# Patient Record
Sex: MALE | Race: WHITE | Employment: OTHER | URBAN - NONMETROPOLITAN AREA
[De-identification: names, ages, dates, MRNs, and addresses within clinical notes are randomized per-mention and may not be internally consistent; named-entity substitution may affect disease eponyms.]

---

## 2017-04-14 ENCOUNTER — HOSPITAL ENCOUNTER (OUTPATIENT)
Age: 67
Discharge: HOME | End: 2017-04-14
Payer: COMMERCIAL

## 2017-04-14 ENCOUNTER — HOSPITAL ENCOUNTER (OUTPATIENT)
Dept: GENERAL RADIOLOGY | Age: 67
Discharge: HOME | End: 2017-04-14
Payer: COMMERCIAL

## 2017-04-14 DIAGNOSIS — E55.9 UNSPECIFIED VITAMIN D DEFICIENCY: ICD-10-CM

## 2017-04-14 DIAGNOSIS — E78.5 HYPERLIPIDEMIA: ICD-10-CM

## 2017-04-14 DIAGNOSIS — I25.10 CORONARY ARTERY DISEASE INVOLVING NATIVE CORONARY ARTERY OF NATIVE HEART WITHOUT ANGINA PECTORIS: ICD-10-CM

## 2017-04-14 DIAGNOSIS — Z98.61 H/O CORONARY ANGIOPLASTY: ICD-10-CM

## 2017-04-14 DIAGNOSIS — I10 ESSENTIAL HYPERTENSION: ICD-10-CM

## 2017-04-14 DIAGNOSIS — E11.9 TYPE 2 DIABETES MELLITUS WITHOUT COMPLICATION (HCC): ICD-10-CM

## 2017-04-14 LAB
ABSOLUTE EOS #: 0.2 K/UL (ref 0–0.4)
ABSOLUTE LYMPH #: 1.4 K/UL (ref 0.9–2.5)
ABSOLUTE MONO #: 0.6 K/UL (ref 0–1)
ALBUMIN SERPL-MCNC: 4.1 G/DL (ref 3.5–5.2)
ALBUMIN/GLOBULIN RATIO: ABNORMAL (ref 1–2.5)
ALP BLD-CCNC: 60 U/L (ref 40–129)
ALT SERPL-CCNC: 22 U/L (ref 5–41)
ANION GAP SERPL CALCULATED.3IONS-SCNC: 12 MMOL/L (ref 9–17)
AST SERPL-CCNC: 19 U/L
BASOPHILS # BLD: 0 % (ref 0–2)
BASOPHILS ABSOLUTE: 0 K/UL (ref 0–0.2)
BILIRUB SERPL-MCNC: 0.85 MG/DL (ref 0.3–1.2)
BUN BLDV-MCNC: 21 MG/DL (ref 8–23)
BUN/CREAT BLD: 23 (ref 9–20)
CALCIUM SERPL-MCNC: 8.7 MG/DL (ref 8.6–10.4)
CHLORIDE BLD-SCNC: 103 MMOL/L (ref 98–107)
CHOLESTEROL/HDL RATIO: 4.6
CHOLESTEROL: 128 MG/DL
CO2: 28 MMOL/L (ref 20–31)
CREAT SERPL-MCNC: 0.92 MG/DL (ref 0.7–1.2)
DIFFERENTIAL TYPE: YES
EOSINOPHILS RELATIVE PERCENT: 2 % (ref 0–5)
GFR AFRICAN AMERICAN: >60 ML/MIN
GFR NON-AFRICAN AMERICAN: >60 ML/MIN
GFR SERPL CREATININE-BSD FRML MDRD: ABNORMAL ML/MIN/{1.73_M2}
GFR SERPL CREATININE-BSD FRML MDRD: ABNORMAL ML/MIN/{1.73_M2}
GLUCOSE BLD-MCNC: 170 MG/DL (ref 70–99)
HCT VFR BLD CALC: 41.7 % (ref 41–53)
HDLC SERPL-MCNC: 28 MG/DL
HEMOGLOBIN: 13.8 G/DL (ref 13.5–17.5)
LDL CHOLESTEROL: 62 MG/DL (ref 0–130)
LYMPHOCYTES # BLD: 22 % (ref 13–44)
MCH RBC QN AUTO: 30.4 PG (ref 26–34)
MCHC RBC AUTO-ENTMCNC: 33.1 G/DL (ref 31–37)
MCV RBC AUTO: 91.7 FL (ref 80–100)
MONOCYTES # BLD: 9 % (ref 5–9)
PATIENT FASTING?: YES
PDW BLD-RTO: 13.5 % (ref 12.1–15.2)
PLATELET # BLD: 232 K/UL (ref 140–450)
PLATELET ESTIMATE: NORMAL
PMV BLD AUTO: NORMAL FL (ref 6–12)
POTASSIUM SERPL-SCNC: 4.8 MMOL/L (ref 3.7–5.3)
RBC # BLD: 4.55 M/UL (ref 4.5–5.9)
RBC # BLD: NORMAL 10*6/UL
SEG NEUTROPHILS: 67 % (ref 39–75)
SEGMENTED NEUTROPHILS ABSOLUTE COUNT: 4.3 K/UL (ref 2.1–6.5)
SODIUM BLD-SCNC: 143 MMOL/L (ref 135–144)
TOTAL PROTEIN: 7 G/DL (ref 6.4–8.3)
TRIGL SERPL-MCNC: 188 MG/DL
TSH SERPL DL<=0.05 MIU/L-ACNC: 1.08 MIU/L (ref 0.3–5)
VITAMIN D 25-HYDROXY: 31.9 NG/ML (ref 30–100)
VLDLC SERPL CALC-MCNC: 38 MG/DL (ref 1–30)
WBC # BLD: 6.4 K/UL (ref 3.5–11)
WBC # BLD: NORMAL 10*3/UL

## 2017-04-14 PROCEDURE — 71020 XR CHEST STANDARD TWO VW: CPT

## 2017-04-14 PROCEDURE — 82306 VITAMIN D 25 HYDROXY: CPT

## 2017-04-14 PROCEDURE — 80053 COMPREHEN METABOLIC PANEL: CPT

## 2017-04-14 PROCEDURE — 84443 ASSAY THYROID STIM HORMONE: CPT

## 2017-04-14 PROCEDURE — 36415 COLL VENOUS BLD VENIPUNCTURE: CPT

## 2017-04-14 PROCEDURE — 80061 LIPID PANEL: CPT

## 2017-04-14 PROCEDURE — 93005 ELECTROCARDIOGRAM TRACING: CPT

## 2017-04-14 PROCEDURE — 85025 COMPLETE CBC W/AUTO DIFF WBC: CPT

## 2017-04-17 LAB
EKG ATRIAL RATE: 71 BPM
EKG P AXIS: 20 DEGREES
EKG P-R INTERVAL: 178 MS
EKG Q-T INTERVAL: 402 MS
EKG QRS DURATION: 108 MS
EKG QTC CALCULATION (BAZETT): 436 MS
EKG R AXIS: 70 DEGREES
EKG T AXIS: 79 DEGREES
EKG VENTRICULAR RATE: 71 BPM

## 2017-05-08 ENCOUNTER — OFFICE VISIT (OUTPATIENT)
Dept: CARDIOLOGY CLINIC | Age: 67
End: 2017-05-08
Payer: COMMERCIAL

## 2017-05-08 VITALS
BODY MASS INDEX: 34.45 KG/M2 | OXYGEN SATURATION: 96 % | WEIGHT: 247 LBS | DIASTOLIC BLOOD PRESSURE: 70 MMHG | HEART RATE: 76 BPM | SYSTOLIC BLOOD PRESSURE: 150 MMHG

## 2017-05-08 DIAGNOSIS — Z79.4 TYPE 2 DIABETES MELLITUS WITH MICROALBUMINURIA, WITH LONG-TERM CURRENT USE OF INSULIN (HCC): ICD-10-CM

## 2017-05-08 DIAGNOSIS — Z98.61 H/O CORONARY ANGIOPLASTY: ICD-10-CM

## 2017-05-08 DIAGNOSIS — E11.29 TYPE 2 DIABETES MELLITUS WITH MICROALBUMINURIA, WITH LONG-TERM CURRENT USE OF INSULIN (HCC): ICD-10-CM

## 2017-05-08 DIAGNOSIS — I10 ESSENTIAL HYPERTENSION: Primary | ICD-10-CM

## 2017-05-08 DIAGNOSIS — I25.10 CORONARY ARTERY DISEASE INVOLVING NATIVE CORONARY ARTERY OF NATIVE HEART WITHOUT ANGINA PECTORIS: ICD-10-CM

## 2017-05-08 DIAGNOSIS — R80.9 TYPE 2 DIABETES MELLITUS WITH MICROALBUMINURIA, WITH LONG-TERM CURRENT USE OF INSULIN (HCC): ICD-10-CM

## 2017-05-08 DIAGNOSIS — E78.5 HYPERLIPIDEMIA, UNSPECIFIED HYPERLIPIDEMIA TYPE: ICD-10-CM

## 2017-05-08 DIAGNOSIS — E55.9 VITAMIN D DEFICIENCY DISEASE: ICD-10-CM

## 2017-05-08 PROCEDURE — 93308 TTE F-UP OR LMTD: CPT | Performed by: INTERNAL MEDICINE

## 2017-05-08 PROCEDURE — 99214 OFFICE O/P EST MOD 30 MIN: CPT | Performed by: INTERNAL MEDICINE

## 2017-05-23 RX ORDER — DOXAZOSIN 8 MG/1
TABLET ORAL
Qty: 180 TABLET | Refills: 2 | Status: SHIPPED | OUTPATIENT
Start: 2017-05-23 | End: 2017-09-26 | Stop reason: SDUPTHER

## 2017-07-24 RX ORDER — CELECOXIB 200 MG/1
CAPSULE ORAL
Qty: 180 CAPSULE | Refills: 0 | Status: SHIPPED | OUTPATIENT
Start: 2017-07-24 | End: 2017-09-26 | Stop reason: SDUPTHER

## 2017-07-24 RX ORDER — POTASSIUM CHLORIDE 20 MEQ/1
TABLET, EXTENDED RELEASE ORAL
Qty: 90 TABLET | Refills: 0 | Status: SHIPPED | OUTPATIENT
Start: 2017-07-24 | End: 2017-09-26 | Stop reason: SDUPTHER

## 2017-07-24 RX ORDER — FUROSEMIDE 20 MG/1
TABLET ORAL
Qty: 180 TABLET | Refills: 0 | Status: SHIPPED | OUTPATIENT
Start: 2017-07-24 | End: 2017-09-26 | Stop reason: SDUPTHER

## 2017-07-28 ENCOUNTER — OFFICE VISIT (OUTPATIENT)
Dept: FAMILY MEDICINE CLINIC | Age: 67
End: 2017-07-28
Payer: COMMERCIAL

## 2017-07-28 VITALS
DIASTOLIC BLOOD PRESSURE: 72 MMHG | BODY MASS INDEX: 34.16 KG/M2 | HEART RATE: 70 BPM | SYSTOLIC BLOOD PRESSURE: 140 MMHG | WEIGHT: 244 LBS | HEIGHT: 71 IN | RESPIRATION RATE: 18 BRPM

## 2017-07-28 DIAGNOSIS — R25.2 MUSCLE CRAMP: ICD-10-CM

## 2017-07-28 DIAGNOSIS — E11.42 TYPE 2 DIABETES MELLITUS WITH PERIPHERAL NEUROPATHY (HCC): Primary | ICD-10-CM

## 2017-07-28 DIAGNOSIS — Z79.4 LONG-TERM INSULIN USE (HCC): ICD-10-CM

## 2017-07-28 DIAGNOSIS — E11.42 DIABETIC PERIPHERAL NEUROPATHY (HCC): ICD-10-CM

## 2017-07-28 DIAGNOSIS — L98.9 SKIN LESION: ICD-10-CM

## 2017-07-28 PROCEDURE — 99214 OFFICE O/P EST MOD 30 MIN: CPT | Performed by: FAMILY MEDICINE

## 2017-07-28 ASSESSMENT — ENCOUNTER SYMPTOMS
RESPIRATORY NEGATIVE: 1
GASTROINTESTINAL NEGATIVE: 1
DIARRHEA: 0

## 2017-08-14 RX ORDER — ATORVASTATIN CALCIUM 20 MG/1
TABLET, FILM COATED ORAL
Qty: 90 TABLET | Refills: 1 | Status: SHIPPED | OUTPATIENT
Start: 2017-08-14 | End: 2017-09-26 | Stop reason: SDUPTHER

## 2017-08-15 DIAGNOSIS — R06.02 SOB (SHORTNESS OF BREATH): ICD-10-CM

## 2017-08-15 DIAGNOSIS — R94.31 ABNORMAL EKG: Primary | ICD-10-CM

## 2017-08-22 ENCOUNTER — HOSPITAL ENCOUNTER (OUTPATIENT)
Dept: NUCLEAR MEDICINE | Age: 67
Discharge: HOME | End: 2017-08-22
Payer: COMMERCIAL

## 2017-08-22 ENCOUNTER — HOSPITAL ENCOUNTER (OUTPATIENT)
Dept: NON INVASIVE DIAGNOSTICS | Age: 67
Discharge: HOME | End: 2017-08-22
Payer: COMMERCIAL

## 2017-08-22 VITALS — HEART RATE: 77 BPM | DIASTOLIC BLOOD PRESSURE: 59 MMHG | RESPIRATION RATE: 16 BRPM | SYSTOLIC BLOOD PRESSURE: 142 MMHG

## 2017-08-22 DIAGNOSIS — R06.02 SOB (SHORTNESS OF BREATH): ICD-10-CM

## 2017-08-22 DIAGNOSIS — R94.31 ABNORMAL EKG: ICD-10-CM

## 2017-08-22 DIAGNOSIS — R94.31 EKG ABNORMALITIES: ICD-10-CM

## 2017-08-22 DIAGNOSIS — R07.9 CHEST PAIN, UNSPECIFIED TYPE: ICD-10-CM

## 2017-08-22 LAB
LV EF: 55 %
LVEF MODALITY: NORMAL

## 2017-08-22 PROCEDURE — A9500 TC99M SESTAMIBI: HCPCS | Performed by: INTERNAL MEDICINE

## 2017-08-22 PROCEDURE — 93017 CV STRESS TEST TRACING ONLY: CPT

## 2017-08-22 PROCEDURE — 78452 HT MUSCLE IMAGE SPECT MULT: CPT

## 2017-08-22 PROCEDURE — 93306 TTE W/DOPPLER COMPLETE: CPT

## 2017-08-22 PROCEDURE — 3430000000 HC RX DIAGNOSTIC RADIOPHARMACEUTICAL: Performed by: INTERNAL MEDICINE

## 2017-08-22 PROCEDURE — 6360000002 HC RX W HCPCS: Performed by: INTERNAL MEDICINE

## 2017-08-22 PROCEDURE — 2580000003 HC RX 258: Performed by: INTERNAL MEDICINE

## 2017-08-22 RX ORDER — AMINOPHYLLINE DIHYDRATE 25 MG/ML
50 INJECTION, SOLUTION INTRAVENOUS
Status: DISPENSED | OUTPATIENT
Start: 2017-08-22 | End: 2017-08-22

## 2017-08-22 RX ORDER — 0.9 % SODIUM CHLORIDE 0.9 %
10 VIAL (ML) INJECTION PRN
Status: DISCONTINUED | OUTPATIENT
Start: 2017-08-22 | End: 2017-08-23 | Stop reason: HOSPADM

## 2017-08-22 RX ORDER — AMINOPHYLLINE DIHYDRATE 25 MG/ML
100 INJECTION, SOLUTION INTRAVENOUS
Status: ACTIVE | OUTPATIENT
Start: 2017-08-22 | End: 2017-08-22

## 2017-08-22 RX ADMIN — REGADENOSON 0.4 MG: 0.08 INJECTION, SOLUTION INTRAVENOUS at 08:29

## 2017-08-22 RX ADMIN — Medication 10 ML: at 08:29

## 2017-08-22 RX ADMIN — TETRAKIS(2-METHOXYISOBUTYLISOCYANIDE)COPPER(I) TETRAFLUOROBORATE 10 MILLICURIE: 1 INJECTION, POWDER, LYOPHILIZED, FOR SOLUTION INTRAVENOUS at 07:10

## 2017-08-22 RX ADMIN — TETRAKIS(2-METHOXYISOBUTYLISOCYANIDE)COPPER(I) TETRAFLUOROBORATE 30 MILLICURIE: 1 INJECTION, POWDER, LYOPHILIZED, FOR SOLUTION INTRAVENOUS at 08:29

## 2017-08-24 ENCOUNTER — OFFICE VISIT (OUTPATIENT)
Dept: CARDIOLOGY CLINIC | Age: 67
End: 2017-08-24
Payer: COMMERCIAL

## 2017-08-24 ENCOUNTER — TELEPHONE (OUTPATIENT)
Dept: CARDIOLOGY CLINIC | Age: 67
End: 2017-08-24

## 2017-08-24 VITALS
SYSTOLIC BLOOD PRESSURE: 160 MMHG | HEART RATE: 78 BPM | DIASTOLIC BLOOD PRESSURE: 80 MMHG | WEIGHT: 243 LBS | BODY MASS INDEX: 33.89 KG/M2 | OXYGEN SATURATION: 95 %

## 2017-08-24 DIAGNOSIS — E11.29 TYPE 2 DIABETES MELLITUS WITH MICROALBUMINURIA, WITH LONG-TERM CURRENT USE OF INSULIN (HCC): ICD-10-CM

## 2017-08-24 DIAGNOSIS — I10 ESSENTIAL HYPERTENSION: Primary | ICD-10-CM

## 2017-08-24 DIAGNOSIS — R94.39 ABNORMAL STRESS ECG: ICD-10-CM

## 2017-08-24 DIAGNOSIS — Z98.61 H/O CORONARY ANGIOPLASTY: ICD-10-CM

## 2017-08-24 DIAGNOSIS — N28.9 RENAL INSUFFICIENCY: ICD-10-CM

## 2017-08-24 DIAGNOSIS — I25.10 CORONARY ARTERY DISEASE INVOLVING NATIVE CORONARY ARTERY OF NATIVE HEART WITHOUT ANGINA PECTORIS: ICD-10-CM

## 2017-08-24 DIAGNOSIS — R80.9 TYPE 2 DIABETES MELLITUS WITH MICROALBUMINURIA, WITH LONG-TERM CURRENT USE OF INSULIN (HCC): ICD-10-CM

## 2017-08-24 DIAGNOSIS — I20.8 ANGINA AT REST (HCC): ICD-10-CM

## 2017-08-24 DIAGNOSIS — Z79.4 TYPE 2 DIABETES MELLITUS WITH MICROALBUMINURIA, WITH LONG-TERM CURRENT USE OF INSULIN (HCC): ICD-10-CM

## 2017-08-24 DIAGNOSIS — E78.5 HYPERLIPIDEMIA, UNSPECIFIED HYPERLIPIDEMIA TYPE: ICD-10-CM

## 2017-08-24 PROCEDURE — 99214 OFFICE O/P EST MOD 30 MIN: CPT | Performed by: INTERNAL MEDICINE

## 2017-08-25 ENCOUNTER — HOSPITAL ENCOUNTER (OUTPATIENT)
Age: 67
Discharge: HOME | End: 2017-08-25
Payer: COMMERCIAL

## 2017-08-25 ENCOUNTER — HOSPITAL ENCOUNTER (OUTPATIENT)
Dept: GENERAL RADIOLOGY | Age: 67
Discharge: HOME | End: 2017-08-25
Payer: COMMERCIAL

## 2017-08-25 DIAGNOSIS — R94.39 ABNORMAL STRESS ECG: ICD-10-CM

## 2017-08-25 DIAGNOSIS — I25.10 CORONARY ARTERY DISEASE INVOLVING NATIVE CORONARY ARTERY OF NATIVE HEART WITHOUT ANGINA PECTORIS: ICD-10-CM

## 2017-08-25 DIAGNOSIS — E78.5 HYPERLIPIDEMIA, UNSPECIFIED HYPERLIPIDEMIA TYPE: ICD-10-CM

## 2017-08-25 DIAGNOSIS — Z79.4 TYPE 2 DIABETES MELLITUS WITH MICROALBUMINURIA, WITH LONG-TERM CURRENT USE OF INSULIN (HCC): ICD-10-CM

## 2017-08-25 DIAGNOSIS — R80.9 TYPE 2 DIABETES MELLITUS WITH MICROALBUMINURIA, WITH LONG-TERM CURRENT USE OF INSULIN (HCC): ICD-10-CM

## 2017-08-25 DIAGNOSIS — I10 ESSENTIAL HYPERTENSION: ICD-10-CM

## 2017-08-25 DIAGNOSIS — E11.29 TYPE 2 DIABETES MELLITUS WITH MICROALBUMINURIA, WITH LONG-TERM CURRENT USE OF INSULIN (HCC): ICD-10-CM

## 2017-08-25 DIAGNOSIS — Z98.61 H/O CORONARY ANGIOPLASTY: ICD-10-CM

## 2017-08-25 LAB
ABSOLUTE EOS #: 0.2 K/UL (ref 0–0.4)
ABSOLUTE LYMPH #: 1.4 K/UL (ref 0.9–2.5)
ABSOLUTE MONO #: 0.7 K/UL (ref 0–1)
ALBUMIN SERPL-MCNC: 3.9 G/DL (ref 3.5–5.2)
ALBUMIN/GLOBULIN RATIO: ABNORMAL (ref 1–2.5)
ALP BLD-CCNC: 72 U/L (ref 40–129)
ALT SERPL-CCNC: 25 U/L (ref 5–41)
ANION GAP SERPL CALCULATED.3IONS-SCNC: 13 MMOL/L (ref 9–17)
AST SERPL-CCNC: 20 U/L
BASOPHILS # BLD: 1 %
BASOPHILS ABSOLUTE: 0 K/UL (ref 0–0.2)
BILIRUB SERPL-MCNC: 0.71 MG/DL (ref 0.3–1.2)
BILIRUBIN URINE: NEGATIVE
BUN BLDV-MCNC: 19 MG/DL (ref 8–23)
BUN/CREAT BLD: 22 (ref 9–20)
C-REACTIVE PROTEIN: 15.7 MG/L (ref 0–5)
CALCIUM SERPL-MCNC: 8.4 MG/DL (ref 8.6–10.4)
CHLORIDE BLD-SCNC: 102 MMOL/L (ref 98–107)
CHOLESTEROL/HDL RATIO: 4
CHOLESTEROL: 120 MG/DL
CO2: 25 MMOL/L (ref 20–31)
COLOR: YELLOW
COMMENT UA: ABNORMAL
CREAT SERPL-MCNC: 0.86 MG/DL (ref 0.7–1.2)
DIFFERENTIAL TYPE: YES
EOSINOPHILS RELATIVE PERCENT: 3 %
GFR AFRICAN AMERICAN: >60 ML/MIN
GFR NON-AFRICAN AMERICAN: >60 ML/MIN
GFR SERPL CREATININE-BSD FRML MDRD: ABNORMAL ML/MIN/{1.73_M2}
GFR SERPL CREATININE-BSD FRML MDRD: ABNORMAL ML/MIN/{1.73_M2}
GLUCOSE BLD-MCNC: 208 MG/DL (ref 70–99)
GLUCOSE URINE: ABNORMAL
HCT VFR BLD CALC: 41.2 % (ref 41–53)
HDLC SERPL-MCNC: 30 MG/DL
HEMOGLOBIN: 13.8 G/DL (ref 13.5–17.5)
KETONES, URINE: NEGATIVE
LDL CHOLESTEROL: 55 MG/DL (ref 0–130)
LEUKOCYTE ESTERASE, URINE: NEGATIVE
LYMPHOCYTES # BLD: 21 %
MCH RBC QN AUTO: 30.9 PG (ref 26–34)
MCHC RBC AUTO-ENTMCNC: 33.5 G/DL (ref 31–37)
MCV RBC AUTO: 92.4 FL (ref 80–100)
MONOCYTES # BLD: 10 %
NITRITE, URINE: NEGATIVE
PATIENT FASTING?: YES
PDW BLD-RTO: 13.7 % (ref 12.1–15.2)
PH UA: 6.5 (ref 5–8)
PLATELET # BLD: 239 K/UL (ref 140–450)
PLATELET ESTIMATE: ABNORMAL
PMV BLD AUTO: ABNORMAL FL (ref 6–12)
POTASSIUM SERPL-SCNC: 4.1 MMOL/L (ref 3.7–5.3)
PROTEIN UA: ABNORMAL
RBC # BLD: 4.46 M/UL (ref 4.5–5.9)
RBC # BLD: ABNORMAL 10*6/UL
SEDIMENTATION RATE, ERYTHROCYTE: 27 MM (ref 0–20)
SEG NEUTROPHILS: 65 %
SEGMENTED NEUTROPHILS ABSOLUTE COUNT: 4.6 K/UL (ref 2.1–6.5)
SODIUM BLD-SCNC: 140 MMOL/L (ref 135–144)
SPECIFIC GRAVITY UA: 1.01 (ref 1–1.03)
TOTAL PROTEIN: 7.1 G/DL (ref 6.4–8.3)
TRIGL SERPL-MCNC: 177 MG/DL
TURBIDITY: CLEAR
URINE HGB: NEGATIVE
UROBILINOGEN, URINE: NORMAL
VLDLC SERPL CALC-MCNC: ABNORMAL MG/DL (ref 1–30)
WBC # BLD: 7 K/UL (ref 3.5–11)
WBC # BLD: ABNORMAL 10*3/UL

## 2017-08-25 PROCEDURE — 93005 ELECTROCARDIOGRAM TRACING: CPT

## 2017-08-25 PROCEDURE — 80061 LIPID PANEL: CPT

## 2017-08-25 PROCEDURE — 85651 RBC SED RATE NONAUTOMATED: CPT

## 2017-08-25 PROCEDURE — 86140 C-REACTIVE PROTEIN: CPT

## 2017-08-25 PROCEDURE — 85025 COMPLETE CBC W/AUTO DIFF WBC: CPT

## 2017-08-25 PROCEDURE — 86235 NUCLEAR ANTIGEN ANTIBODY: CPT

## 2017-08-25 PROCEDURE — 86225 DNA ANTIBODY NATIVE: CPT

## 2017-08-25 PROCEDURE — 81003 URINALYSIS AUTO W/O SCOPE: CPT

## 2017-08-25 PROCEDURE — 80053 COMPREHEN METABOLIC PANEL: CPT

## 2017-08-25 PROCEDURE — 36415 COLL VENOUS BLD VENIPUNCTURE: CPT

## 2017-08-25 PROCEDURE — 71020 XR CHEST STANDARD TWO VW: CPT

## 2017-08-28 ENCOUNTER — TELEPHONE (OUTPATIENT)
Dept: FAMILY MEDICINE CLINIC | Age: 67
End: 2017-08-28

## 2017-08-28 LAB
ANA REFERENCE RANGE:: NORMAL
ANTI DNA DOUBLE STRANDED: 8 IU/ML
ANTI JO-1 IGG: 11 U/ML
ANTI RNP AB: 42 U/ML
ANTI SSA: 10 U/ML
ANTI SSB: 13 U/ML
ANTI-CENTROMERE: 5 U/ML
ANTI-SCLERODERMA: 6 U/ML
ANTI-SMITH: 12 U/ML
HISTONE ANTIBODY: 17 U/ML

## 2017-08-28 RX ORDER — AZITHROMYCIN 250 MG/1
TABLET, FILM COATED ORAL
Qty: 1 PACKET | Refills: 0 | Status: SHIPPED | OUTPATIENT
Start: 2017-08-28 | End: 2017-09-07

## 2017-08-29 LAB
EKG ATRIAL RATE: 73 BPM
EKG P AXIS: 30 DEGREES
EKG P-R INTERVAL: 152 MS
EKG Q-T INTERVAL: 406 MS
EKG QRS DURATION: 106 MS
EKG QTC CALCULATION (BAZETT): 447 MS
EKG R AXIS: 74 DEGREES
EKG T AXIS: 58 DEGREES
EKG VENTRICULAR RATE: 73 BPM

## 2017-08-30 ENCOUNTER — PROCEDURE VISIT (OUTPATIENT)
Dept: CARDIOLOGY CLINIC | Age: 67
End: 2017-08-30

## 2017-08-30 DIAGNOSIS — Z98.61 POST PTCA: Primary | ICD-10-CM

## 2017-08-31 ENCOUNTER — TELEPHONE (OUTPATIENT)
Dept: CARDIOLOGY CLINIC | Age: 67
End: 2017-08-31

## 2017-08-31 RX ORDER — CLOPIDOGREL BISULFATE 75 MG/1
75 TABLET ORAL DAILY
Qty: 90 TABLET | Refills: 3 | Status: SHIPPED | OUTPATIENT
Start: 2017-08-31 | End: 2017-09-26 | Stop reason: SDUPTHER

## 2017-09-01 ENCOUNTER — HOSPITAL ENCOUNTER (OUTPATIENT)
Age: 67
Discharge: HOME | End: 2017-09-01
Payer: MEDICARE

## 2017-09-01 ENCOUNTER — TELEPHONE (OUTPATIENT)
Dept: CARDIOLOGY CLINIC | Age: 67
End: 2017-09-01

## 2017-09-01 DIAGNOSIS — E78.5 HYPERLIPIDEMIA, UNSPECIFIED HYPERLIPIDEMIA TYPE: ICD-10-CM

## 2017-09-01 DIAGNOSIS — R80.9 TYPE 2 DIABETES MELLITUS WITH MICROALBUMINURIA, WITH LONG-TERM CURRENT USE OF INSULIN (HCC): ICD-10-CM

## 2017-09-01 DIAGNOSIS — Z79.4 TYPE 2 DIABETES MELLITUS WITH MICROALBUMINURIA, WITH LONG-TERM CURRENT USE OF INSULIN (HCC): ICD-10-CM

## 2017-09-01 DIAGNOSIS — Z98.61 H/O CORONARY ANGIOPLASTY: ICD-10-CM

## 2017-09-01 DIAGNOSIS — I10 ESSENTIAL HYPERTENSION: ICD-10-CM

## 2017-09-01 DIAGNOSIS — R94.39 ABNORMAL STRESS ECG: ICD-10-CM

## 2017-09-01 DIAGNOSIS — E11.29 TYPE 2 DIABETES MELLITUS WITH MICROALBUMINURIA, WITH LONG-TERM CURRENT USE OF INSULIN (HCC): ICD-10-CM

## 2017-09-01 DIAGNOSIS — N28.9 RENAL INSUFFICIENCY: ICD-10-CM

## 2017-09-01 DIAGNOSIS — I25.10 CORONARY ARTERY DISEASE INVOLVING NATIVE CORONARY ARTERY OF NATIVE HEART WITHOUT ANGINA PECTORIS: ICD-10-CM

## 2017-09-01 LAB
ANION GAP SERPL CALCULATED.3IONS-SCNC: 13 MMOL/L (ref 9–17)
BUN BLDV-MCNC: 24 MG/DL (ref 8–23)
BUN/CREAT BLD: 25 (ref 9–20)
CALCIUM SERPL-MCNC: 8.6 MG/DL (ref 8.6–10.4)
CHLORIDE BLD-SCNC: 102 MMOL/L (ref 98–107)
CO2: 24 MMOL/L (ref 20–31)
CREAT SERPL-MCNC: 0.95 MG/DL (ref 0.7–1.2)
GFR AFRICAN AMERICAN: >60 ML/MIN
GFR NON-AFRICAN AMERICAN: >60 ML/MIN
GFR SERPL CREATININE-BSD FRML MDRD: ABNORMAL ML/MIN/{1.73_M2}
GFR SERPL CREATININE-BSD FRML MDRD: ABNORMAL ML/MIN/{1.73_M2}
GLUCOSE BLD-MCNC: 409 MG/DL (ref 70–99)
POTASSIUM SERPL-SCNC: 4.4 MMOL/L (ref 3.7–5.3)
SODIUM BLD-SCNC: 139 MMOL/L (ref 135–144)

## 2017-09-01 PROCEDURE — 36415 COLL VENOUS BLD VENIPUNCTURE: CPT

## 2017-09-01 PROCEDURE — 80048 BASIC METABOLIC PNL TOTAL CA: CPT

## 2017-09-08 ENCOUNTER — HOSPITAL ENCOUNTER (OUTPATIENT)
Dept: CARDIAC REHAB | Age: 67
Setting detail: THERAPIES SERIES
Discharge: HOME | End: 2017-09-08
Payer: MEDICARE

## 2017-09-08 VITALS — HEIGHT: 71 IN | BODY MASS INDEX: 33.71 KG/M2 | WEIGHT: 240.8 LBS

## 2017-09-11 ENCOUNTER — HOSPITAL ENCOUNTER (OUTPATIENT)
Dept: CARDIAC REHAB | Age: 67
Setting detail: THERAPIES SERIES
Discharge: HOME | End: 2017-09-11
Payer: MEDICARE

## 2017-09-11 PROCEDURE — 93798 PHYS/QHP OP CAR RHAB W/ECG: CPT

## 2017-09-12 ENCOUNTER — HOSPITAL ENCOUNTER (OUTPATIENT)
Dept: CARDIAC REHAB | Age: 67
Setting detail: THERAPIES SERIES
Discharge: HOME | End: 2017-09-12
Payer: MEDICARE

## 2017-09-12 PROCEDURE — 93798 PHYS/QHP OP CAR RHAB W/ECG: CPT

## 2017-09-14 ENCOUNTER — HOSPITAL ENCOUNTER (OUTPATIENT)
Dept: CARDIAC REHAB | Age: 67
Setting detail: THERAPIES SERIES
Discharge: HOME | End: 2017-09-14
Payer: MEDICARE

## 2017-09-14 PROCEDURE — 93798 PHYS/QHP OP CAR RHAB W/ECG: CPT

## 2017-09-18 ENCOUNTER — HOSPITAL ENCOUNTER (OUTPATIENT)
Dept: CARDIAC REHAB | Age: 67
Setting detail: THERAPIES SERIES
Discharge: HOME | End: 2017-09-18
Payer: MEDICARE

## 2017-09-18 PROCEDURE — 93798 PHYS/QHP OP CAR RHAB W/ECG: CPT

## 2017-09-19 ENCOUNTER — HOSPITAL ENCOUNTER (OUTPATIENT)
Dept: CARDIAC REHAB | Age: 67
Setting detail: THERAPIES SERIES
Discharge: HOME | End: 2017-09-19
Payer: MEDICARE

## 2017-09-19 PROCEDURE — 93798 PHYS/QHP OP CAR RHAB W/ECG: CPT

## 2017-09-21 ENCOUNTER — HOSPITAL ENCOUNTER (OUTPATIENT)
Dept: CARDIAC REHAB | Age: 67
Setting detail: THERAPIES SERIES
Discharge: HOME | End: 2017-09-21
Payer: MEDICARE

## 2017-09-21 PROCEDURE — 93798 PHYS/QHP OP CAR RHAB W/ECG: CPT

## 2017-09-25 ENCOUNTER — HOSPITAL ENCOUNTER (OUTPATIENT)
Dept: CARDIAC REHAB | Age: 67
Setting detail: THERAPIES SERIES
Discharge: HOME | End: 2017-09-25
Payer: MEDICARE

## 2017-09-25 PROCEDURE — 93798 PHYS/QHP OP CAR RHAB W/ECG: CPT

## 2017-09-26 ENCOUNTER — HOSPITAL ENCOUNTER (OUTPATIENT)
Dept: CARDIAC REHAB | Age: 67
Setting detail: THERAPIES SERIES
Discharge: HOME | End: 2017-09-26
Payer: MEDICARE

## 2017-09-26 ENCOUNTER — TELEPHONE (OUTPATIENT)
Dept: FAMILY MEDICINE CLINIC | Age: 67
End: 2017-09-26

## 2017-09-26 PROCEDURE — 93798 PHYS/QHP OP CAR RHAB W/ECG: CPT

## 2017-09-26 RX ORDER — CLONIDINE HYDROCHLORIDE 0.3 MG/1
TABLET ORAL
Qty: 270 TABLET | Refills: 3 | Status: SHIPPED | OUTPATIENT
Start: 2017-09-26 | End: 2018-04-10 | Stop reason: CLARIF

## 2017-09-26 RX ORDER — POTASSIUM CHLORIDE 20 MEQ/1
TABLET, EXTENDED RELEASE ORAL
Qty: 90 TABLET | Refills: 1 | Status: SHIPPED | OUTPATIENT
Start: 2017-09-26 | End: 2018-05-01 | Stop reason: ALTCHOICE

## 2017-09-26 RX ORDER — ATORVASTATIN CALCIUM 20 MG/1
TABLET, FILM COATED ORAL
Qty: 90 TABLET | Refills: 1 | Status: SHIPPED | OUTPATIENT
Start: 2017-09-26 | End: 2018-04-10 | Stop reason: SDUPTHER

## 2017-09-26 RX ORDER — CELECOXIB 200 MG/1
CAPSULE ORAL
Qty: 180 CAPSULE | Refills: 1 | Status: SHIPPED | OUTPATIENT
Start: 2017-09-26 | End: 2018-04-10 | Stop reason: CLARIF

## 2017-09-26 RX ORDER — FUROSEMIDE 20 MG/1
TABLET ORAL
Qty: 180 TABLET | Refills: 1 | Status: SHIPPED | OUTPATIENT
Start: 2017-09-26 | End: 2017-11-28 | Stop reason: DRUGHIGH

## 2017-09-26 RX ORDER — CLOPIDOGREL BISULFATE 75 MG/1
75 TABLET ORAL DAILY
Qty: 90 TABLET | Refills: 3 | Status: SHIPPED | OUTPATIENT
Start: 2017-09-26 | End: 2018-10-16 | Stop reason: SDUPTHER

## 2017-09-26 RX ORDER — DOXAZOSIN 8 MG/1
TABLET ORAL
Qty: 180 TABLET | Refills: 3 | Status: SHIPPED | OUTPATIENT
Start: 2017-09-26 | End: 2018-08-21 | Stop reason: SDUPTHER

## 2017-09-28 ENCOUNTER — HOSPITAL ENCOUNTER (OUTPATIENT)
Dept: CARDIAC REHAB | Age: 67
Setting detail: THERAPIES SERIES
Discharge: HOME | End: 2017-09-28
Payer: MEDICARE

## 2017-09-28 PROCEDURE — 93798 PHYS/QHP OP CAR RHAB W/ECG: CPT

## 2017-09-29 RX ORDER — DOXAZOSIN MESYLATE 4 MG/1
8 TABLET ORAL 2 TIMES DAILY
Qty: 360 TABLET | Refills: 3 | Status: SHIPPED | OUTPATIENT
Start: 2017-09-29 | End: 2017-10-09 | Stop reason: SDUPTHER

## 2017-10-02 ENCOUNTER — HOSPITAL ENCOUNTER (OUTPATIENT)
Dept: CARDIAC REHAB | Age: 67
Setting detail: THERAPIES SERIES
Discharge: HOME | End: 2017-10-02
Payer: MEDICARE

## 2017-10-02 PROCEDURE — 93798 PHYS/QHP OP CAR RHAB W/ECG: CPT

## 2017-10-03 ENCOUNTER — HOSPITAL ENCOUNTER (OUTPATIENT)
Dept: CARDIAC REHAB | Age: 67
Setting detail: THERAPIES SERIES
Discharge: HOME | End: 2017-10-03
Payer: MEDICARE

## 2017-10-03 PROCEDURE — 93798 PHYS/QHP OP CAR RHAB W/ECG: CPT

## 2017-10-05 ENCOUNTER — HOSPITAL ENCOUNTER (OUTPATIENT)
Dept: CARDIAC REHAB | Age: 67
Setting detail: THERAPIES SERIES
Discharge: HOME | End: 2017-10-05
Payer: MEDICARE

## 2017-10-05 PROCEDURE — 93798 PHYS/QHP OP CAR RHAB W/ECG: CPT

## 2017-10-06 NOTE — PROGRESS NOTES
Phase II Cardiac Rehab Individualized Treatment Plan-30 Day     Patient Name: Cori Peres  Date of Initial Assessment: 9/8/17  ACCOUNT #: [de-identified]  Diagnosis: Percutaneous Coronary Intervention  Onset Date: 8/30/17  Referring Physician: Anthony Mcfarlane MD  Risk Stratification: High  Session Number: 12   EXERCISE    Stages of Change:   [] pre-contemplation   [x] Action   [] Contemplate   [] Maintainence   [] Prep   [] Relapse          Exercise Prescription:  Mode: [x] TM [] B [x] STP [x] UBE [x] R  Frequency: 3 DAYS PER WEEK  Duration: 31-60 MIN  Intensity: RPE 11-15 / THRR   Progression: Increase 1-2 levels/week or 1-2 min/week to achieve target HR and RPE 11-15.      [] Angina with Exertion           THR:    [x] Resistance Training            Weight (70% OF 1 RM):                                         Reps: 8-15 REPS. [] Angina with Exertion THR:    [x] Resistance Training            Weight (70% OF 1 RM):                                         Reps: 8-15 REPS    Hypertension:  [x] Yes  [] No  Resting BP: 152/62  Peak Exercise BP: 166/62  [] Med change? NO    Intervention:  Home Exercise:  Type: TM AND STATIONARY ERGOMETER  Duration: 30-40 MIN  Frequency: 2-3 DAYS PER WEEK   [x] Resistance Training BLUE RESISTIVE BANDS    Education:   [x] Equipment Hardy  [x] Self pulse   [x] Proper use weights/therabands   [x] S/S to report  [x] Low Na Diet    [x] Warm up/ Cool down  [x] BP Medication    [x] RPE Scale   [x] Understand BP   [x] Ex Safety   [x] Exercise specialist class-Home Exercise       Target Goal:   -Individual Exercise Plan  -BP<140/90 or <130/80 if DM   -Aerobic active 30 + minutes 5-7 days per week    Nutrition    Stages of Change:   [] pre-contemplation   [x] Action   [] Contemplate   [] Maintainence   [] Prep   [] Relapse    Lipids: 5/8/17  Total Cholesterol: 120  Triglycerides: 177  HDL: 30  LDL: 55  [] Med Change?  NO    Diabetes:  [x] Yes  [] No  Random BS: 246  HbA1c:6.6 ON RISK  Assistive Device:   [] Cane  [] Maryette Leghorn [] Wheel Chair  [] Gait belt    Patient/Program goal:   \"To decrease shortness of breath when walking and exerting physically and to improve my balance\"  / PROGRESSING TOWARD GOAL  INCREASE HDL TO AT LEAST 40 ON NEXT 3-MONTH LIPID PANEL / Future lipid panel ordered per Dr. Siobhan Lyons  -increased stamina/strength to 30-50 total exercise by increasing 1-2 level/wk and 1-2 / AVERAGE CARDIO FUNCTIONAL CAPACITY HAS ADVANCED FROM 4 METS TO 4.4 METS, A 10% INCREASE AND TOLERATING 50 MINUTES TOTAL CARDIO EXERCISE WITHIN INTENSITY TARGETS   min/wk  to achieve THR and RPE 11-15  -introduce weights/ therabands 8-10# for 8-15 reps / UNIVERSAL WT MACHINE FOR ARNEL UE INITIATED ON 10/5/17 WITH AVG WT 30 LB  -manage BP better / NOT AT GOAL--DR. ULLOA NOTIFIED  -MANAGE BP BETTER / NOT AT GOAL-DR. GILES NOTIFIED  -develop regular exercise 30 min daily  LOSS 5-10 LB IN 2-3 MONTHS WITH CONTROLLED NUTRITION INTAKE AND REGULAR EXERCISE OF AVG >3.5 HR MOD LEVEL PER WEEK / BODY WT HAS INCREASED 5 LB--DIETICIAN CONSULT RECOMMENDED TO PT    Physician Changes/Comments:      Cardiac Rehab Staff:  Sierra Muller RN

## 2017-10-09 ENCOUNTER — HOSPITAL ENCOUNTER (OUTPATIENT)
Dept: CARDIAC REHAB | Age: 67
Setting detail: THERAPIES SERIES
Discharge: HOME | End: 2017-10-09
Payer: MEDICARE

## 2017-10-09 PROCEDURE — 93798 PHYS/QHP OP CAR RHAB W/ECG: CPT

## 2017-10-09 RX ORDER — DOXAZOSIN MESYLATE 4 MG/1
8 TABLET ORAL 2 TIMES DAILY
Qty: 360 TABLET | Refills: 3 | Status: SHIPPED | OUTPATIENT
Start: 2017-10-09 | End: 2017-10-31 | Stop reason: SDUPTHER

## 2017-10-10 ENCOUNTER — HOSPITAL ENCOUNTER (OUTPATIENT)
Dept: CARDIAC REHAB | Age: 67
Setting detail: THERAPIES SERIES
Discharge: HOME | End: 2017-10-10
Payer: MEDICARE

## 2017-10-10 ENCOUNTER — OFFICE VISIT (OUTPATIENT)
Dept: CARDIOLOGY CLINIC | Age: 67
End: 2017-10-10
Payer: MEDICARE

## 2017-10-10 VITALS
BODY MASS INDEX: 32.36 KG/M2 | SYSTOLIC BLOOD PRESSURE: 130 MMHG | OXYGEN SATURATION: 96 % | DIASTOLIC BLOOD PRESSURE: 60 MMHG | HEART RATE: 83 BPM | WEIGHT: 232 LBS

## 2017-10-10 DIAGNOSIS — E11.29 TYPE 2 DIABETES MELLITUS WITH MICROALBUMINURIA, WITH LONG-TERM CURRENT USE OF INSULIN (HCC): ICD-10-CM

## 2017-10-10 DIAGNOSIS — E78.5 HYPERLIPIDEMIA, UNSPECIFIED HYPERLIPIDEMIA TYPE: ICD-10-CM

## 2017-10-10 DIAGNOSIS — R80.9 TYPE 2 DIABETES MELLITUS WITH MICROALBUMINURIA, WITH LONG-TERM CURRENT USE OF INSULIN (HCC): ICD-10-CM

## 2017-10-10 DIAGNOSIS — Z79.4 TYPE 2 DIABETES MELLITUS WITH MICROALBUMINURIA, WITH LONG-TERM CURRENT USE OF INSULIN (HCC): ICD-10-CM

## 2017-10-10 DIAGNOSIS — G47.33 OSA (OBSTRUCTIVE SLEEP APNEA): Primary | ICD-10-CM

## 2017-10-10 DIAGNOSIS — R53.83 FATIGUE, UNSPECIFIED TYPE: ICD-10-CM

## 2017-10-10 DIAGNOSIS — R06.02 SOB (SHORTNESS OF BREATH): ICD-10-CM

## 2017-10-10 DIAGNOSIS — I10 ESSENTIAL HYPERTENSION: ICD-10-CM

## 2017-10-10 DIAGNOSIS — I25.10 CORONARY ARTERY DISEASE INVOLVING NATIVE CORONARY ARTERY OF NATIVE HEART WITHOUT ANGINA PECTORIS: ICD-10-CM

## 2017-10-10 PROCEDURE — 4040F PNEUMOC VAC/ADMIN/RCVD: CPT | Performed by: INTERNAL MEDICINE

## 2017-10-10 PROCEDURE — 1123F ACP DISCUSS/DSCN MKR DOCD: CPT | Performed by: INTERNAL MEDICINE

## 2017-10-10 PROCEDURE — 93798 PHYS/QHP OP CAR RHAB W/ECG: CPT

## 2017-10-10 PROCEDURE — G8484 FLU IMMUNIZE NO ADMIN: HCPCS | Performed by: INTERNAL MEDICINE

## 2017-10-10 PROCEDURE — G8598 ASA/ANTIPLAT THER USED: HCPCS | Performed by: INTERNAL MEDICINE

## 2017-10-10 PROCEDURE — 3046F HEMOGLOBIN A1C LEVEL >9.0%: CPT | Performed by: INTERNAL MEDICINE

## 2017-10-10 PROCEDURE — 3017F COLORECTAL CA SCREEN DOC REV: CPT | Performed by: INTERNAL MEDICINE

## 2017-10-10 PROCEDURE — G8427 DOCREV CUR MEDS BY ELIG CLIN: HCPCS | Performed by: INTERNAL MEDICINE

## 2017-10-10 PROCEDURE — 99214 OFFICE O/P EST MOD 30 MIN: CPT | Performed by: INTERNAL MEDICINE

## 2017-10-10 PROCEDURE — G8417 CALC BMI ABV UP PARAM F/U: HCPCS | Performed by: INTERNAL MEDICINE

## 2017-10-10 PROCEDURE — 1036F TOBACCO NON-USER: CPT | Performed by: INTERNAL MEDICINE

## 2017-10-10 RX ORDER — LISINOPRIL 20 MG/1
TABLET ORAL
Qty: 1 TABLET | Refills: 0 | Status: SHIPPED | COMMUNITY
Start: 2017-10-10 | End: 2018-05-31 | Stop reason: ALTCHOICE

## 2017-10-10 NOTE — PROGRESS NOTES
Ov Dr. Zuri Rock for follow up  C/o tired  Currently in rehab  Wearing cpap  Has never been checked/updated  Today is a face to face ov   For cpap supplies and   Machine update/check   He feels better wearing  cpap   Doesn't feel any different  Since ptca   Will be going to [facility] end of Nov    Will get PFT done   Will decide after PFT if CT Chest is needed  Will reorder a sleep study

## 2017-10-12 ENCOUNTER — HOSPITAL ENCOUNTER (OUTPATIENT)
Dept: CARDIAC REHAB | Age: 67
Setting detail: THERAPIES SERIES
Discharge: HOME | End: 2017-10-12
Payer: MEDICARE

## 2017-10-12 PROCEDURE — 93798 PHYS/QHP OP CAR RHAB W/ECG: CPT

## 2017-10-16 ENCOUNTER — HOSPITAL ENCOUNTER (OUTPATIENT)
Dept: PULMONOLOGY | Age: 67
Discharge: HOME | End: 2017-10-16
Payer: MEDICARE

## 2017-10-16 ENCOUNTER — HOSPITAL ENCOUNTER (OUTPATIENT)
Dept: CARDIAC REHAB | Age: 67
Setting detail: THERAPIES SERIES
Discharge: HOME | End: 2017-10-16
Payer: MEDICARE

## 2017-10-16 VITALS — OXYGEN SATURATION: 97 %

## 2017-10-16 PROCEDURE — 94729 DIFFUSING CAPACITY: CPT

## 2017-10-16 PROCEDURE — 6360000002 HC RX W HCPCS: Performed by: INTERNAL MEDICINE

## 2017-10-16 PROCEDURE — 94060 EVALUATION OF WHEEZING: CPT

## 2017-10-16 PROCEDURE — 94729 DIFFUSING CAPACITY: CPT | Performed by: INTERNAL MEDICINE

## 2017-10-16 PROCEDURE — 94060 EVALUATION OF WHEEZING: CPT | Performed by: INTERNAL MEDICINE

## 2017-10-16 PROCEDURE — 93798 PHYS/QHP OP CAR RHAB W/ECG: CPT

## 2017-10-16 PROCEDURE — 94726 PLETHYSMOGRAPHY LUNG VOLUMES: CPT

## 2017-10-16 RX ORDER — ALBUTEROL SULFATE 2.5 MG/3ML
2.5 SOLUTION RESPIRATORY (INHALATION) ONCE
Status: COMPLETED | OUTPATIENT
Start: 2017-10-16 | End: 2017-10-16

## 2017-10-16 RX ADMIN — ALBUTEROL SULFATE 2.5 MG: 2.5 SOLUTION RESPIRATORY (INHALATION) at 12:51

## 2017-10-17 ENCOUNTER — HOSPITAL ENCOUNTER (OUTPATIENT)
Dept: CARDIAC REHAB | Age: 67
Setting detail: THERAPIES SERIES
Discharge: HOME | End: 2017-10-17
Payer: MEDICARE

## 2017-10-17 NOTE — PROGRESS NOTES
Zina Vu M.D. [address] [place] [address]  [phone]          October 10, 2017            Victorina Dong DO  [address] [address]      RE:  [facility]  : 1950    Dear Dr. Fuad Ty:    CHIEF COMPLAINT:  1. Shortness of breath and fatigue. 2.  Severe coronary artery disease. 3.  Status post angioplasty and stent placement in a 99% lesion in the mid-portion of a very large right coronary artery, done on 2017. HISTORY OF PRESENT ILLNESS:  I had the pleasure of seeing Mr. Lovella Merlin in our office on 10/10/2017. As you well know, from the full H and P on 2017, he has severe coronary artery disease and had open heart surgery by Dr. Jacky Wright in  with a LIMA to the LAD and a vein graft to the diagonal.    He developed marked fatigue, which worsened in  and July with marked shortness of breath. I did a stress test on 2017, which was abnormal, showing a large defect in the inferolateral region. This prompted a cardiac catheterization on 2017. This showed a patent LIMA to the LAD and a patent vein graft to a very large diagonal.  The circumflex was unremarkable. His right coronary artery was extremely large and dominant, filled much of the inferolateral wall and had a 95% to 99% lesion in its midportion. EF was 50%. We dilated and stented his right coronary artery using a 3.5 x 18 mm drug-eluting Xience stent with a good end result. I am bringing him back for re-evaluation. Unfortunately, he has not noticed much difference in his shortness of breath. He has had no chest pain and he is in cardiac rehab. He is doing fairly well in cardiac rehab, but he complains of marked fatigue and shortness of breath at home when he attempts to do activity. Again, it has not really changed since his angioplasty. He has had no PND or orthopnea. No unusual pedal edema.   No No hepatomegaly, splenomegaly. EXTREMITIES:  Good femoral pulses. Good pedal pulses. No pedal edema. Skin was warm and dry. No calf tenderness. Nail beds pink. Good cap refill. PULSES:  Bilateral symmetrical radial, brachial and carotid pulses. No carotid bruits. Good femoral and pedal pulses. NEUROLOGIC EXAM:  Within normal limits. PSYCHIATRIC EXAM:  Within normal limits. LABORATORY DATA:  I did not repeat laboratory data. IMPRESSION:   1. Continued marked fatigue and shortness of breath with activity. 2.  Severe coronary artery disease. 3.  Status post cardiac catheterization on 08/30/2017, which demonstrated a patent vein graft to the very large diagonal and a patent LIMA to the LAD, with unremarkable circumflex and 95% to 99% disease in extremely large dominant right coronary artery, which was stented with a 3.5 x 18 mm Xience stent, with EF of 50% with mild inferior wall hypokinesis. 4.  Severe sleep apnea, on a CPAP mask since 2011, which has not been checked since that time. 5.  Hypertension, well controlled. 6.  Open heart surgery on 06/03/2014, by Dr. Kaz Green with a LIMA to the LAD and a vein graft to the diagonal.  7.  Insulin-dependent diabetes. 8.  Borderline normal LV function with inferior wall hypokinesis, with EF of 50%. PLAN:  1.  Pulmonary function tests. 2.  Recheck CPAP machine to see if it is still effective. 3.  We will determine, while he is in the rehab, whether he has an appropriate chronotropic response with exercise, which could be contributing to his shortness of breath. 4.  Possible CT scan of the chest dependent on the results of the PFTs. 5.  We will re-order sleep study. 6.  May need to decrease his clonidine if he has an insufficient chronotropic response contributing to his shortness of breath with activity. DISCUSSION:  Mr. Ryann Piper has had no change in his overall loss of energy and his fatigue and shortness of breath with activity.   This is disappointing and somewhat surprising as his right coronary artery was extremely large and dominant and had a severe 95% to 99% lesion in the midportion, with much myocardium at risk for damage from a myocardial infarction. I had a long discussion with Mr. Austen Fall and Mrs. Austen Fall. Obviously, his shortness of breath was not exclusively secondary to his coronary artery disease. Thus, we will start evaluation of his pulmonary status. He does have severe sleep apnea, has not had his machine checked and we will re-order a sleep study. He did have wheezing today and again was somewhat short of breath with minimal activity. We did check an O2 saturation and he remains in the 95% range with walking, even though he is short of breath. I will do pulmonary function tests to determine his lung status. I will decide after his pulmonary function tests whether we do a CT scan of his chest with contrast.  I did not order this now as his O2 saturation was okay with activity, although a chronic PE is still a consideration. Concerned also whether he has insufficient chronotropic response to activity contributing to his shortness of breath with activity. I will have Logan pay special attention to his heart rate when he is on the treadmill. Again, I may need to decrease his clonidine to increase his chronotropic response. We will do the above tests and I will go over the results and reevaluate on 11/17/2017. Of note, after his first angioplasty and after his open heart surgery, it took him several months for his shortness of breath to resolve. Thus, he will continue in cardiac rehab. Thank you very much for allowing me the privilege of seeing Mr. Austen Fall. If you have any questions on my thoughts, please do not hesitate to contact me.     Sincerely,        Cam Pallas    D: 10/11/2017 5:58:51       T: 10/11/2017 6:09:28     [id number]  Job#: [id number]     Doc#: [id number]

## 2017-10-18 ENCOUNTER — HOSPITAL ENCOUNTER (OUTPATIENT)
Dept: CARDIAC REHAB | Age: 67
Setting detail: THERAPIES SERIES
Discharge: HOME | End: 2017-10-18
Payer: MEDICARE

## 2017-10-18 PROCEDURE — 93798 PHYS/QHP OP CAR RHAB W/ECG: CPT

## 2017-10-18 NOTE — PROCEDURES
[address]                            [address]                              PULMONARY FUNCTION    PATIENT NAME: Manuel Yadav                     :             1950  MED REC NO:   [id number]                               ROOM:  ACCOUNT NO:   [de-identified]                            ADMISSION DATE:  10/16/2017  PROVIDER:     Matthias Colon    DATE OF PROCEDURE:  10/16/2017    PULMONARY FUNCTION TEST WITH BRONCHODILATOR AND DLCO    ATTENDING PHYSICIAN:  Dr. Corina Urrutia    ORDERING PROVIDER:  Amadeo Benitez    REASON FOR TEST:  Shortness of breath. SUMMARY:  1. The respiratory therapist reports the patient's effort has been  good, but requiring several attempts to get a good study. 2.  Forced vital capacity is decreased at 77% of predicted. 3.  The forced expiratory volume in 1 second is decreased at 76% of  predicted. 4.  The forced expiratory flow 25% to 75% is decreased to 70% of  predicted. 5.  A significant improvement was seen only in the forced expiratory  flow of 25% to 75% after one time dose of bronchodilator. 6.  Total lung capacity is decreased at 78% of predicted. 7.  The DLCO was mildly decreased at 78% of predicted. IMPRESSION:  1.  Pulmonary function test results suggest a combined mild  obstructive and mild restrictive lung disease. 2.  The obstructive component appears to have a reversible small  airway component (asthma), as seen by an improvement in the forced  expiratory flow of 25% to 75% after one time dose of bronchodilator,  please correlate clinically. 3.  The restrictive component appears to be extrapulmonary with the  expiratory reserve volume being decreased out of proportion to the  inspiratory capacity. This is likely secondary to obesity, please  correlate clinically. 4.  The DLCO was mildly decreased.       LIANA COLON    D: 10/17/2017 7:00:28       T: 10/17/2017 15:03:20 [id number]  Job#: [id number]     Doc#: [id number]

## 2017-10-20 ENCOUNTER — HOSPITAL ENCOUNTER (OUTPATIENT)
Dept: CARDIAC REHAB | Age: 67
Setting detail: THERAPIES SERIES
Discharge: HOME | End: 2017-10-20
Payer: MEDICARE

## 2017-10-20 DIAGNOSIS — G47.33 OSA (OBSTRUCTIVE SLEEP APNEA): ICD-10-CM

## 2017-10-20 DIAGNOSIS — I25.10 CORONARY ARTERY DISEASE INVOLVING NATIVE CORONARY ARTERY OF NATIVE HEART WITHOUT ANGINA PECTORIS: ICD-10-CM

## 2017-10-20 DIAGNOSIS — R06.02 SOB (SHORTNESS OF BREATH): Primary | ICD-10-CM

## 2017-10-20 LAB
AVERAGE GLUCOSE: NORMAL
HBA1C MFR BLD: 7.8 %

## 2017-10-20 PROCEDURE — 93798 PHYS/QHP OP CAR RHAB W/ECG: CPT

## 2017-10-23 ENCOUNTER — HOSPITAL ENCOUNTER (OUTPATIENT)
Dept: CARDIAC REHAB | Age: 67
Setting detail: THERAPIES SERIES
Discharge: HOME | End: 2017-10-23
Payer: MEDICARE

## 2017-10-23 ENCOUNTER — HOSPITAL ENCOUNTER (OUTPATIENT)
Age: 67
Discharge: HOME | End: 2017-10-23
Payer: MEDICARE

## 2017-10-23 DIAGNOSIS — R06.02 SOB (SHORTNESS OF BREATH): ICD-10-CM

## 2017-10-23 DIAGNOSIS — G47.33 OSA (OBSTRUCTIVE SLEEP APNEA): ICD-10-CM

## 2017-10-23 DIAGNOSIS — I25.10 CORONARY ARTERY DISEASE INVOLVING NATIVE CORONARY ARTERY OF NATIVE HEART WITHOUT ANGINA PECTORIS: ICD-10-CM

## 2017-10-23 LAB
ANION GAP SERPL CALCULATED.3IONS-SCNC: 11 MMOL/L (ref 9–17)
BUN BLDV-MCNC: 15 MG/DL (ref 8–23)
BUN/CREAT BLD: 18 (ref 9–20)
CALCIUM SERPL-MCNC: 8.6 MG/DL (ref 8.6–10.4)
CHLORIDE BLD-SCNC: 101 MMOL/L (ref 98–107)
CO2: 28 MMOL/L (ref 20–31)
CREAT SERPL-MCNC: 0.82 MG/DL (ref 0.7–1.2)
GFR AFRICAN AMERICAN: >60 ML/MIN
GFR NON-AFRICAN AMERICAN: >60 ML/MIN
GFR SERPL CREATININE-BSD FRML MDRD: ABNORMAL ML/MIN/{1.73_M2}
GFR SERPL CREATININE-BSD FRML MDRD: ABNORMAL ML/MIN/{1.73_M2}
GLUCOSE BLD-MCNC: 303 MG/DL (ref 70–99)
POTASSIUM SERPL-SCNC: 4 MMOL/L (ref 3.7–5.3)
SODIUM BLD-SCNC: 140 MMOL/L (ref 135–144)

## 2017-10-23 PROCEDURE — 80048 BASIC METABOLIC PNL TOTAL CA: CPT

## 2017-10-23 PROCEDURE — 36415 COLL VENOUS BLD VENIPUNCTURE: CPT

## 2017-10-23 PROCEDURE — 93798 PHYS/QHP OP CAR RHAB W/ECG: CPT

## 2017-10-25 ENCOUNTER — HOSPITAL ENCOUNTER (OUTPATIENT)
Dept: CARDIAC REHAB | Age: 67
Setting detail: THERAPIES SERIES
Discharge: HOME | End: 2017-10-25
Payer: MEDICARE

## 2017-10-25 PROCEDURE — 93798 PHYS/QHP OP CAR RHAB W/ECG: CPT

## 2017-10-27 ENCOUNTER — HOSPITAL ENCOUNTER (OUTPATIENT)
Dept: CT IMAGING | Age: 67
Discharge: HOME | End: 2017-10-27
Payer: MEDICARE

## 2017-10-27 ENCOUNTER — HOSPITAL ENCOUNTER (OUTPATIENT)
Dept: CARDIAC REHAB | Age: 67
Setting detail: THERAPIES SERIES
Discharge: HOME | End: 2017-10-27
Payer: MEDICARE

## 2017-10-27 DIAGNOSIS — R06.02 SOB (SHORTNESS OF BREATH): ICD-10-CM

## 2017-10-27 DIAGNOSIS — G47.33 OSA (OBSTRUCTIVE SLEEP APNEA): ICD-10-CM

## 2017-10-27 DIAGNOSIS — I25.10 CORONARY ARTERY DISEASE INVOLVING NATIVE CORONARY ARTERY OF NATIVE HEART WITHOUT ANGINA PECTORIS: ICD-10-CM

## 2017-10-27 PROCEDURE — 71275 CT ANGIOGRAPHY CHEST: CPT

## 2017-10-27 PROCEDURE — 6360000004 HC RX CONTRAST MEDICATION: Performed by: INTERNAL MEDICINE

## 2017-10-27 PROCEDURE — 93798 PHYS/QHP OP CAR RHAB W/ECG: CPT

## 2017-10-27 RX ADMIN — IOVERSOL 100 ML: 741 INJECTION INTRA-ARTERIAL; INTRAVENOUS at 14:19

## 2017-10-30 ENCOUNTER — HOSPITAL ENCOUNTER (OUTPATIENT)
Dept: CARDIAC REHAB | Age: 67
Setting detail: THERAPIES SERIES
Discharge: HOME | End: 2017-10-30
Payer: MEDICARE

## 2017-10-30 ENCOUNTER — TELEPHONE (OUTPATIENT)
Dept: CARDIOLOGY CLINIC | Age: 67
End: 2017-10-30

## 2017-10-30 PROCEDURE — 93798 PHYS/QHP OP CAR RHAB W/ECG: CPT

## 2017-10-31 RX ORDER — DOXAZOSIN 8 MG/1
8 TABLET ORAL 2 TIMES DAILY
Qty: 180 TABLET | Refills: 3 | Status: SHIPPED | OUTPATIENT
Start: 2017-10-31 | End: 2017-11-02 | Stop reason: SDUPTHER

## 2017-11-01 ENCOUNTER — HOSPITAL ENCOUNTER (OUTPATIENT)
Dept: CARDIAC REHAB | Age: 67
Setting detail: THERAPIES SERIES
Discharge: HOME | End: 2017-11-01
Payer: MEDICARE

## 2017-11-01 ENCOUNTER — HOSPITAL ENCOUNTER (OUTPATIENT)
Dept: SLEEP CENTER | Age: 67
Discharge: HOME | End: 2017-11-01
Payer: MEDICARE

## 2017-11-01 PROCEDURE — 93798 PHYS/QHP OP CAR RHAB W/ECG: CPT

## 2017-11-02 ENCOUNTER — OFFICE VISIT (OUTPATIENT)
Dept: CARDIOLOGY CLINIC | Age: 67
End: 2017-11-02
Payer: MEDICARE

## 2017-11-02 ENCOUNTER — HOSPITAL ENCOUNTER (EMERGENCY)
Age: 67
Discharge: HOME | End: 2017-11-02
Attending: EMERGENCY MEDICINE
Payer: MEDICARE

## 2017-11-02 ENCOUNTER — OFFICE VISIT (OUTPATIENT)
Dept: FAMILY MEDICINE CLINIC | Age: 67
End: 2017-11-02
Payer: MEDICARE

## 2017-11-02 ENCOUNTER — APPOINTMENT (OUTPATIENT)
Dept: GENERAL RADIOLOGY | Age: 67
End: 2017-11-02
Payer: MEDICARE

## 2017-11-02 ENCOUNTER — HOSPITAL ENCOUNTER (OUTPATIENT)
Age: 67
Discharge: HOME | End: 2017-11-02
Payer: MEDICARE

## 2017-11-02 VITALS
DIASTOLIC BLOOD PRESSURE: 52 MMHG | OXYGEN SATURATION: 95 % | TEMPERATURE: 97.9 F | BODY MASS INDEX: 35.15 KG/M2 | SYSTOLIC BLOOD PRESSURE: 136 MMHG | WEIGHT: 252 LBS | HEART RATE: 59 BPM | RESPIRATION RATE: 17 BRPM

## 2017-11-02 VITALS
HEART RATE: 68 BPM | SYSTOLIC BLOOD PRESSURE: 160 MMHG | BODY MASS INDEX: 35.7 KG/M2 | WEIGHT: 256 LBS | OXYGEN SATURATION: 95 % | DIASTOLIC BLOOD PRESSURE: 60 MMHG

## 2017-11-02 VITALS
HEART RATE: 68 BPM | WEIGHT: 240 LBS | BODY MASS INDEX: 33.6 KG/M2 | HEIGHT: 71 IN | SYSTOLIC BLOOD PRESSURE: 150 MMHG | DIASTOLIC BLOOD PRESSURE: 60 MMHG | OXYGEN SATURATION: 97 %

## 2017-11-02 DIAGNOSIS — I25.119 CORONARY ARTERY DISEASE INVOLVING NATIVE CORONARY ARTERY OF NATIVE HEART WITH ANGINA PECTORIS (HCC): ICD-10-CM

## 2017-11-02 DIAGNOSIS — J98.4 MIXED RESTRICTIVE AND OBSTRUCTIVE LUNG DISEASE (HCC): ICD-10-CM

## 2017-11-02 DIAGNOSIS — R06.02 SHORTNESS OF BREATH: Primary | ICD-10-CM

## 2017-11-02 DIAGNOSIS — G47.33 OSA (OBSTRUCTIVE SLEEP APNEA): ICD-10-CM

## 2017-11-02 DIAGNOSIS — R06.02 SOB (SHORTNESS OF BREATH): ICD-10-CM

## 2017-11-02 DIAGNOSIS — I50.21 ACUTE SYSTOLIC CONGESTIVE HEART FAILURE (HCC): ICD-10-CM

## 2017-11-02 DIAGNOSIS — R06.02 SOB (SHORTNESS OF BREATH): Primary | ICD-10-CM

## 2017-11-02 DIAGNOSIS — J43.9 MIXED RESTRICTIVE AND OBSTRUCTIVE LUNG DISEASE (HCC): ICD-10-CM

## 2017-11-02 DIAGNOSIS — Z79.4 TYPE 2 DIABETES MELLITUS WITH OTHER SPECIFIED COMPLICATION, WITH LONG-TERM CURRENT USE OF INSULIN (HCC): ICD-10-CM

## 2017-11-02 DIAGNOSIS — E11.69 TYPE 2 DIABETES MELLITUS WITH OTHER SPECIFIED COMPLICATION, WITH LONG-TERM CURRENT USE OF INSULIN (HCC): ICD-10-CM

## 2017-11-02 LAB
ABSOLUTE EOS #: 0.2 K/UL (ref 0–0.4)
ABSOLUTE IMMATURE GRANULOCYTE: ABNORMAL K/UL (ref 0–0.3)
ABSOLUTE LYMPH #: 1.5 K/UL (ref 1–4.8)
ABSOLUTE MONO #: 0.8 K/UL (ref 0–1)
ALBUMIN SERPL-MCNC: 4 G/DL (ref 3.5–5.2)
ALBUMIN/GLOBULIN RATIO: ABNORMAL (ref 1–2.5)
ALP BLD-CCNC: 70 U/L (ref 40–129)
ALT SERPL-CCNC: 42 U/L (ref 5–41)
ANION GAP SERPL CALCULATED.3IONS-SCNC: 12 MMOL/L (ref 9–17)
AST SERPL-CCNC: 33 U/L
BASOPHILS # BLD: 0 %
BASOPHILS ABSOLUTE: 0 K/UL (ref 0–0.2)
BILIRUB SERPL-MCNC: 0.78 MG/DL (ref 0.3–1.2)
BNP INTERPRETATION: ABNORMAL
BUN BLDV-MCNC: 20 MG/DL (ref 8–23)
BUN/CREAT BLD: 24 (ref 9–20)
CALCIUM SERPL-MCNC: 9.4 MG/DL (ref 8.6–10.4)
CHLORIDE BLD-SCNC: 102 MMOL/L (ref 98–107)
CO2: 26 MMOL/L (ref 20–31)
CREAT SERPL-MCNC: 0.85 MG/DL (ref 0.7–1.2)
D-DIMER QUANTITATIVE: 0.88 MG/L FEU (ref 0–0.5)
DIFFERENTIAL TYPE: YES
EKG ATRIAL RATE: 66 BPM
EKG P AXIS: 31 DEGREES
EKG P-R INTERVAL: 150 MS
EKG Q-T INTERVAL: 424 MS
EKG QRS DURATION: 94 MS
EKG QTC CALCULATION (BAZETT): 444 MS
EKG R AXIS: 85 DEGREES
EKG T AXIS: 69 DEGREES
EKG VENTRICULAR RATE: 66 BPM
EOSINOPHILS RELATIVE PERCENT: 2 %
GFR AFRICAN AMERICAN: >60 ML/MIN
GFR NON-AFRICAN AMERICAN: >60 ML/MIN
GFR SERPL CREATININE-BSD FRML MDRD: ABNORMAL ML/MIN/{1.73_M2}
GFR SERPL CREATININE-BSD FRML MDRD: ABNORMAL ML/MIN/{1.73_M2}
GLUCOSE BLD-MCNC: 152 MG/DL (ref 70–99)
HCT VFR BLD CALC: 38.3 % (ref 41–53)
HEMOGLOBIN: 13 G/DL (ref 13.5–17.5)
IMMATURE GRANULOCYTES: ABNORMAL %
LYMPHOCYTES # BLD: 17 %
MCH RBC QN AUTO: 31 PG (ref 26–34)
MCHC RBC AUTO-ENTMCNC: 33.9 G/DL (ref 31–37)
MCV RBC AUTO: 91.4 FL (ref 80–100)
MONOCYTES # BLD: 9 %
PDW BLD-RTO: 13.5 % (ref 12.1–15.2)
PLATELET # BLD: 202 K/UL (ref 140–450)
PLATELET ESTIMATE: ABNORMAL
PMV BLD AUTO: ABNORMAL FL (ref 6–12)
POTASSIUM SERPL-SCNC: 4.2 MMOL/L (ref 3.7–5.3)
PRO-BNP: 625 PG/ML
RBC # BLD: 4.19 M/UL (ref 4.5–5.9)
RBC # BLD: ABNORMAL 10*6/UL
SEG NEUTROPHILS: 72 %
SEGMENTED NEUTROPHILS ABSOLUTE COUNT: 6.4 K/UL (ref 2.1–6.5)
SODIUM BLD-SCNC: 140 MMOL/L (ref 135–144)
TOTAL PROTEIN: 6.6 G/DL (ref 6.4–8.3)
TROPONIN INTERP: NORMAL
TROPONIN T: <0.03 NG/ML
TSH SERPL DL<=0.05 MIU/L-ACNC: 3.53 MIU/L (ref 0.3–5)
WBC # BLD: 9 K/UL (ref 3.5–11)
WBC # BLD: ABNORMAL 10*3/UL

## 2017-11-02 PROCEDURE — G8598 ASA/ANTIPLAT THER USED: HCPCS | Performed by: FAMILY MEDICINE

## 2017-11-02 PROCEDURE — 3017F COLORECTAL CA SCREEN DOC REV: CPT | Performed by: INTERNAL MEDICINE

## 2017-11-02 PROCEDURE — 99214 OFFICE O/P EST MOD 30 MIN: CPT | Performed by: FAMILY MEDICINE

## 2017-11-02 PROCEDURE — 4040F PNEUMOC VAC/ADMIN/RCVD: CPT | Performed by: FAMILY MEDICINE

## 2017-11-02 PROCEDURE — 96374 THER/PROPH/DIAG INJ IV PUSH: CPT

## 2017-11-02 PROCEDURE — 85379 FIBRIN DEGRADATION QUANT: CPT

## 2017-11-02 PROCEDURE — 36415 COLL VENOUS BLD VENIPUNCTURE: CPT

## 2017-11-02 PROCEDURE — G8417 CALC BMI ABV UP PARAM F/U: HCPCS | Performed by: INTERNAL MEDICINE

## 2017-11-02 PROCEDURE — 84443 ASSAY THYROID STIM HORMONE: CPT

## 2017-11-02 PROCEDURE — 3017F COLORECTAL CA SCREEN DOC REV: CPT | Performed by: FAMILY MEDICINE

## 2017-11-02 PROCEDURE — 85025 COMPLETE CBC W/AUTO DIFF WBC: CPT

## 2017-11-02 PROCEDURE — 3046F HEMOGLOBIN A1C LEVEL >9.0%: CPT | Performed by: FAMILY MEDICINE

## 2017-11-02 PROCEDURE — G8484 FLU IMMUNIZE NO ADMIN: HCPCS | Performed by: INTERNAL MEDICINE

## 2017-11-02 PROCEDURE — 71020 XR CHEST STANDARD TWO VW: CPT

## 2017-11-02 PROCEDURE — 1123F ACP DISCUSS/DSCN MKR DOCD: CPT | Performed by: INTERNAL MEDICINE

## 2017-11-02 PROCEDURE — 6360000002 HC RX W HCPCS: Performed by: EMERGENCY MEDICINE

## 2017-11-02 PROCEDURE — 99214 OFFICE O/P EST MOD 30 MIN: CPT | Performed by: INTERNAL MEDICINE

## 2017-11-02 PROCEDURE — G8417 CALC BMI ABV UP PARAM F/U: HCPCS | Performed by: FAMILY MEDICINE

## 2017-11-02 PROCEDURE — 1123F ACP DISCUSS/DSCN MKR DOCD: CPT | Performed by: FAMILY MEDICINE

## 2017-11-02 PROCEDURE — 1036F TOBACCO NON-USER: CPT | Performed by: INTERNAL MEDICINE

## 2017-11-02 PROCEDURE — 80053 COMPREHEN METABOLIC PANEL: CPT

## 2017-11-02 PROCEDURE — G8926 SPIRO NO PERF OR DOC: HCPCS | Performed by: FAMILY MEDICINE

## 2017-11-02 PROCEDURE — 99285 EMERGENCY DEPT VISIT HI MDM: CPT

## 2017-11-02 PROCEDURE — 1036F TOBACCO NON-USER: CPT | Performed by: FAMILY MEDICINE

## 2017-11-02 PROCEDURE — G8484 FLU IMMUNIZE NO ADMIN: HCPCS | Performed by: FAMILY MEDICINE

## 2017-11-02 PROCEDURE — 4040F PNEUMOC VAC/ADMIN/RCVD: CPT | Performed by: INTERNAL MEDICINE

## 2017-11-02 PROCEDURE — G8598 ASA/ANTIPLAT THER USED: HCPCS | Performed by: INTERNAL MEDICINE

## 2017-11-02 PROCEDURE — 6370000000 HC RX 637 (ALT 250 FOR IP): Performed by: EMERGENCY MEDICINE

## 2017-11-02 PROCEDURE — G8427 DOCREV CUR MEDS BY ELIG CLIN: HCPCS | Performed by: FAMILY MEDICINE

## 2017-11-02 PROCEDURE — 3023F SPIROM DOC REV: CPT | Performed by: FAMILY MEDICINE

## 2017-11-02 PROCEDURE — 84484 ASSAY OF TROPONIN QUANT: CPT

## 2017-11-02 PROCEDURE — 93005 ELECTROCARDIOGRAM TRACING: CPT

## 2017-11-02 PROCEDURE — G8427 DOCREV CUR MEDS BY ELIG CLIN: HCPCS | Performed by: INTERNAL MEDICINE

## 2017-11-02 PROCEDURE — 83880 ASSAY OF NATRIURETIC PEPTIDE: CPT

## 2017-11-02 RX ORDER — ALBUTEROL SULFATE 90 UG/1
2 AEROSOL, METERED RESPIRATORY (INHALATION) EVERY 6 HOURS PRN
Qty: 1 INHALER | Refills: 3 | Status: SHIPPED | OUTPATIENT
Start: 2017-11-02 | End: 2018-09-05 | Stop reason: ALTCHOICE

## 2017-11-02 RX ORDER — LORAZEPAM 1 MG/1
1 TABLET ORAL EVERY 8 HOURS PRN
Qty: 5 TABLET | Refills: 0 | Status: SHIPPED | OUTPATIENT
Start: 2017-11-02 | End: 2017-12-02

## 2017-11-02 RX ORDER — SPIRONOLACTONE 25 MG/1
25 TABLET ORAL 2 TIMES DAILY
Qty: 60 TABLET | Refills: 11 | Status: SHIPPED | OUTPATIENT
Start: 2017-11-02 | End: 2017-11-28 | Stop reason: DRUGHIGH

## 2017-11-02 RX ORDER — NITROGLYCERIN 0.4 MG/1
0.4 TABLET SUBLINGUAL EVERY 5 MIN PRN
Status: DISCONTINUED | OUTPATIENT
Start: 2017-11-02 | End: 2017-11-02 | Stop reason: HOSPADM

## 2017-11-02 RX ORDER — FUROSEMIDE 10 MG/ML
20 INJECTION INTRAMUSCULAR; INTRAVENOUS ONCE
Status: COMPLETED | OUTPATIENT
Start: 2017-11-02 | End: 2017-11-02

## 2017-11-02 RX ORDER — LORAZEPAM 0.5 MG/1
1 TABLET ORAL ONCE
Status: COMPLETED | OUTPATIENT
Start: 2017-11-02 | End: 2017-11-02

## 2017-11-02 RX ADMIN — LORAZEPAM 1 MG: 0.5 TABLET ORAL at 02:33

## 2017-11-02 RX ADMIN — FUROSEMIDE 20 MG: 10 INJECTION, SOLUTION INTRAMUSCULAR; INTRAVENOUS at 02:34

## 2017-11-02 RX ADMIN — Medication 0.4 MG: at 02:34

## 2017-11-02 ASSESSMENT — PATIENT HEALTH QUESTIONNAIRE - PHQ9
SUM OF ALL RESPONSES TO PHQ QUESTIONS 1-9: 0
SUM OF ALL RESPONSES TO PHQ9 QUESTIONS 1 & 2: 0
2. FEELING DOWN, DEPRESSED OR HOPELESS: 0
1. LITTLE INTEREST OR PLEASURE IN DOING THINGS: 0

## 2017-11-02 NOTE — PROGRESS NOTES
Name: Jo Miller  : 1950         Chief Complaint:     Chief Complaint   Patient presents with    Shortness of Breath    Congestive Heart Failure     seen in [facility] ER 17       History of Present Illness: Jo Miller is a 78 y.o.  male who presents with Shortness of Breath and Congestive Heart Failure (seen in [facility] ER 17)      HPI     Pt presents accompanied by wife Karma c/o SOB. Long h/o CAD s/p stent placement & CABG a few yrs ago. In the summer was getting more fatigued and SOB, had cath and another stent placed at the end of August. He then started cardiac rehab, and 2-3 wks later started feeling \"like hell,\" getting very SOB, nhi with activity. Has been cutting back on food but weight increasing. Lower extremity edema has been increasing and belly feels bloated. Last night had a small/regular portion of tomato soup and grilled cheese sandwich but felt like he had eaten a huge meal. Went in for sleep study at 9:30, still felt very full and bloated. Was having to try to sleep without CPAP and felt unable to do it, couldn't lie flat and felt very SOB. He decided to end the test and went home. SOB persisted, along with wheezing, so he went to the ER. Had testing, was given lasix 20 mg IV and was suggested to start taking 40 mg every morning and 20 mg in the evening instead of the 20 BID he's usually taken. Yesterday at cardiac rehab was only able to walk 10 minutes at 2 mph before having to stop d/t SOB. Was able to go 20 min on arm bike. Previously had been able to walk >20 minutes at 2.8 mph. Pt has very little cough and no chest pain. Had recent PFT's and was told possible asthma. Has never tried inhaler. Reports sugars have been well-controlled. Sees endo and had recent med changes, stopping lantus, novolog, and bydureon and starting 70/30 instead.  Changes made d/t cost.     Planning to go to [place] at the end of the month and very much wanting to avoid any delay of the trip.    Past Medical History:     Past Medical History:   Diagnosis Date    Arthritis     CAD (coronary artery disease)     Diabetes mellitus ([address]     H/O coronary angioplasty     Hyperlipidemia     Hypertension     Numbness and tingling     dannie hands    Pulmonary edema     SECONDARY TO FAT EMBOLI AFTER KNEE SURGERY    Unspecified sleep apnea 2012    cpap        Past Surgical History:     Past Surgical History:   Procedure Laterality Date    ANGIOPLASTY  08/30/2017    Dr. Ludmila Basilio mm drug eluting Xience stents in the right coronary. DANIELA-3 flow prior to the procedure DANIELA-3 flow following the procedure. The patient has had a good result from his angioplasty of the right coronary artery. [facility] APPENDECTOMY      APPENDECTOMY      BUNIONECTOMY  2010    CARDIAC CATHETERIZATION      stent pacement    CARDIAC CATHETERIZATION Left 05/14/14    Dr Henrietta Hayden. [facility] Severe CAD, 70% disese with in-stent stenosis in the left anterior descending at the level of a very large codominant diagonal.  90% disease of the ostium of a very large codominant diagonal with 60% disease beyond the diagonal with a fractional flow reserve of 0.76 in the diagonal and 0.78 in the left anterior descending indicating obstructive disease of significance in both the     CARDIAC CATHETERIZATION Left 05/14/14    left anterior descending & diagonal.. Mild irregularities of 30% in a small, nondominant circumglex. Mild 30- 40% disease in the midportion of a very large,dominant right coronary artery.   Normal left ventricular function, ejection fraction of 55-60%    CATARACT REMOVAL  2004    COLONOSCOPY  10-    Dr. Christina Johnson (hemorrhoids)    COLONOSCOPY  10/09/2013    CORONARY ANGIOPLASTY WITH STENT PLACEMENT      CORONARY ARTERY BYPASS GRAFT  6/3/14    Dr Dolly Vines @ [address]   8/2012    B leakage in back of eyes    JOINT REPLACEMENT Bilateral     knees    KNEE SURGERY  2010, 2011    BILATERAL KNEE ORTHO Medications:       Prior to Admission medications    Medication Sig Start Date End Date Taking?  Authorizing Provider   albuterol sulfate HFA (VENTOLIN HFA) 108 (90 Base) MCG/ACT inhaler Inhale 2 puffs into the lungs every 6 hours as needed for Wheezing 11/2/17  Yes Ute Motta DO   budesonide (PULMICORT FLEXHALER) 180 MCG/ACT AEPB inhaler Inhale 2 puffs into the lungs 2 times daily 11/2/17  Yes Ute Motta DO   LORazepam (ATIVAN) 1 MG tablet Take 1 tablet by mouth every 8 hours as needed for Anxiety 11/2/17 12/2/17 Yes Michelle Fletcher DO   lisinopril (PRINIVIL;ZESTRIL) 20 MG tablet TAKE 20 MG IN MORNING AND 20 MG IN EVENING. 10/10/17  Yes Amie Pope MD   celecoxib (CELEBREX) 200 MG capsule TAKE 1 CAPSULE TWICE A DAY 9/26/17  Yes Ute Motta DO   atorvastatin (LIPITOR) 20 MG tablet TAKE 1 TABLET DAILY 9/26/17  Yes Ute Motta DO   furosemide (LASIX) 20 MG tablet TAKE 1 TABLET TWICE A DAY  Patient taking differently: 40 mg  9/26/17  Yes Ute Motta DO   potassium chloride (KLOR-CON M) 20 MEQ extended release tablet TAKE 1 TABLET DAILY 9/26/17  Yes Ute Motta DO   doxazosin (CARDURA) 8 MG tablet TAKE 1 TABLET TWICE A DAY 9/26/17  Yes Amie Pope MD   cloNIDine (CATAPRES) 0.3 MG tablet TAKE 1 TABLET THREE TIMES A DAY 9/26/17  Yes Amie Pope MD   metoprolol tartrate (LOPRESSOR) 25 MG tablet TAKE 1 TABLET THREE TIMES A DAY 9/26/17  Yes Amie Pope MD   clopidogrel (PLAVIX) 75 MG tablet Take 1 tablet by mouth daily 9/26/17  Yes Amie Pope MD   ONE TOUCH ULTRA TEST strip  8/8/16  Yes Historical Provider, MD   BD ULTRA-FINE PEN NEEDLES 29G X 12.7MM MISC  8/1/16  Yes Historical Provider, MD   glimepiride (AMARYL) 4 MG tablet Take 4 mg by mouth 2 times daily   Yes Historical Provider, MD   Omega-3 Fatty Acids (FISH OIL) 1200 MG CAPS Take 1 capsule by mouth 2 times daily Plus 360 omega 3   Yes Historical Provider, MD   amLODIPine (NORVASC) 10 MG tablet Take 10 mg by mouth daily   Yes Historical Provider, MD   Glucosamine Sulfate 1000 MG CAPS Take by mouth 2 times daily   Yes Historical Provider, MD   Chromium-Cinnamon (CINNAMON PLUS CHROMIUM PO) Take 1,000 mg by mouth 2 times daily    Yes Historical Provider, MD   vitamin E 400 UNIT capsule Take 400 Units by mouth daily   Yes Historical Provider, MD   Multiple Vitamins-Minerals (ONE-A-DAY MENS 50+ ADVANTAGE PO) Take by mouth daily   Yes Historical Provider, MD   B Complex-C (SUPER B COMPLEX/VITAMIN C PO) Take by mouth daily   Yes Historical Provider, MD   Vitamin D (CHOLECALCIFEROL) 1000 UNITS CAPS capsule   Take by mouth 2 times daily Vitamin D 3   Yes Historical Provider, MD   Insulin Syringes, Disposable, U-100 0.3 ML MISC Inject  into the skin. Use prn 7/19/12  Yes Isreal Cole,    metformin (GLUCOPHAGE) 1000 MG tablet Take 1,000 mg by mouth 2 times daily. Yes Historical Provider, MD   aspirin (ASPIR-81) 81 MG EC tablet Take 81 mg by mouth daily. Yes Historical Provider, MD   exenatide (BYDUREON) 2 MG SRER injection Inject 2 mg into the skin    Historical Provider, MD   Insulin NPH Isophane & Regular (NOVOLIN 70/30 INNOLET SC) Inject 50 Units into the skin 50 am 60 lunch 50 pm    Historical Provider, MD   spironolactone (ALDACTONE) 25 MG tablet Take 1 tablet by mouth 2 times daily 11/2/17   Santos Villalta MD        Allergies:       Review of patient's allergies indicates no known allergies. Social History:     Tobacco:    reports that he quit smoking about 42 years ago. He has a 1.50 pack-year smoking history. He has never used smokeless tobacco.  Alcohol:      reports that he does not drink alcohol. Drug Use:  reports that he does not use drugs.     Family History:     Family History   Problem Relation Age of Onset    Alzheimer's Disease Mother     Heart Disease Mother     Heart Disease Father        Review of Systems:     Positive and Negative as described in HPI    Review of Systems Gastrointestinal: Negative. Skin: Negative. Physical Exam:     Vitals:  BP (!) 150/60   Pulse 68   Ht 5' 11\" (1.803 m)   Wt 240 lb (108.9 kg)   SpO2 97%   BMI 33.47 kg/m²   Physical Exam   Constitutional: He is oriented to person, place, and time. He appears well-developed and well-nourished. He is cooperative. HENT:   Right Ear: Hearing and tympanic membrane normal.   Left Ear: Hearing and tympanic membrane normal.   Nose: Nose normal.   Mouth/Throat: Oropharynx is clear and moist and mucous membranes are normal. Normal dentition. Eyes: Conjunctivae and EOM are normal. Pupils are equal, round, and reactive to light. Neck: No thyroid mass and no thyromegaly present. Cardiovascular: Normal rate, regular rhythm, S1 normal and S2 normal.    No murmur heard. 3+ pitting edema dannie distal pretibial, gradually decreasing to trace in mid thighs   Pulmonary/Chest: Effort normal and breath sounds normal.   Diminished breath sounds, shallow breathing. Appears mildly short of breath but is able to speak in full sentences. After albuterol tx, pt reports feeling a little better and has marginally better air entry on exam.   Abdominal: Soft. Bowel sounds are normal. There is no hepatosplenomegaly. There is no tenderness. Musculoskeletal:   Muscles of normal tone and bulk. Normal gait. Lymphadenopathy:     He has no cervical adenopathy. Neurological: He is alert and oriented to person, place, and time. He has normal strength. Skin: Skin is warm and dry. No rash noted. Psychiatric: He has a normal mood and affect. His behavior is normal.   Nursing note and vitals reviewed.       Data:     Lab Results   Component Value Date     11/02/2017    K 4.2 11/02/2017     11/02/2017    CO2 26 11/02/2017    BUN 20 11/02/2017    CREATININE 0.85 11/02/2017    GLUCOSE 152 11/02/2017    PROT 6.6 11/02/2017    LABALBU 4.0 11/02/2017    BILITOT 0.78 11/02/2017    ALKPHOS 70 11/02/2017    AST 33 11/02/2017    ALT 42 11/02/2017     Lab Results   Component Value Date    WBC 9.0 11/02/2017    RBC 4.19 11/02/2017    HGB 13.0 11/02/2017    HCT 38.3 11/02/2017    MCV 91.4 11/02/2017    MCH 31.0 11/02/2017    MCHC 33.9 11/02/2017    RDW 13.5 11/02/2017     11/02/2017    MPV NOT REPORTED 11/02/2017     Lab Results   Component Value Date    TSH 3.53 11/02/2017     Lab Results   Component Value Date    CHOL 120 08/25/2017    HDL 30 08/25/2017    PSA 0.2 11/13/2015    LABA1C 6.6 04/18/2016         Assessment & Plan:       1. Shortness of breath     2. Coronary artery disease involving native coronary artery of native heart with angina pectoris ([facility]     3. Type 2 diabetes mellitus with other specified complication, with long-term current use of insulin ([facility]     4. 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[facility]     5. GHAZALA (obstructive sleep apnea)     SOB for past couple of mos, starting a couple wks after coronary artery stent placement. Hasn't been helped by cardiac rehab. ddx incl angina, lung disease (had abnl PFT's with MILD obstructive and MILD restrictive, which did respond to bronchodilator). Starting albuterol prn and pulmicort. Discussed restrictive component r/t obesity. May also be r/t some fluid overload. Increase lasix to 40 mg QAM and continue 20 mg every evening. Has f/u with Dr Sydney Arnett soon. Considered ordering echo but decided to leave decision on further cardiac diagnostics up to Dr Sydney Arnett. DM controlled. Updated med list. Continue current insulin and metformin dosing. GHAZALA - several yrs since last sleep study. Extensive difficulty doing study without mask and was told he could use ativan to help fall asleep when he re-attempts this. rx given.         Requested Prescriptions     Signed Prescriptions Disp Refills    albuterol sulfate HFA (VENTOLIN HFA) 108 (90 Base) MCG/ACT inhaler 1 Inhaler 3     Sig: Inhale 2 puffs into the lungs every 6 hours as needed for Wheezing    budesonide (PULMICORT FLEXHALER) 180 MCG/ACT AEPB inhaler 1 each 1     Sig: Inhale 2 puffs into the lungs 2 times daily    LORazepam (ATIVAN) 1 MG tablet 5 tablet 0     Sig: Take 1 tablet by mouth every 8 hours as needed for Anxiety       Patient Instructions     SURVEY:    You may be receiving a survey from [employer] regarding your visit today. Please complete the survey to enable us to provide the highest quality of care to you and your family. If you cannot score us as very good on any question, please call the office to discuss how we could have made your experience exceptional.     Thank you. Broderick Class received counseling on the following healthy behaviors: medication adherence  Reviewed prior labs and health maintenance. Continue current medications, diet and exercise. Discussed use, benefit, and side effects of prescribed medications. Barriers to medication compliance addressed. Patient given educational materials - see patient instructions. All patient questions answered. Patient voiced understanding.        Electronically signed by Enrike Negron DO on 11/3/2017 at 9:53 PM

## 2017-11-02 NOTE — ED PROVIDER NOTES
eMERGENCY dEPARTMENT eNCOUnter        [address] [facility] [place]    Chief Complaint   Patient presents with    Shortness of Breath     pt. has felt SOB with exertion for past few weeks, worsening tonight after attempted sleep study. Pt. had stent placed 8/30 by Dr. Jaiden Flores. HPI    Ned Chairez is a 78 y.o. male who presents to ED from home. By car. With complaint of shortness of breath. Onset 2 weeks, worse tonight   Intensity of symptoms moderate. Patient has extensive history of coronary artery disease. Patient states that he has been short of breath with exertion and also when laying down. REVIEW OF SYSTEMS    All systems reviewed and positives are in the HPI. PAST MEDICAL HISTORY    Past Medical History:   Diagnosis Date    Arthritis     CAD (coronary artery disease)     Diabetes mellitus ([address]     H/O coronary angioplasty     Hyperlipidemia     Hypertension     Numbness and tingling     dannie hands    Pulmonary edema     SECONDARY TO FAT EMBOLI AFTER KNEE SURGERY    Unspecified sleep apnea 2012    cpap       SURGICAL HISTORY    Past Surgical History:   Procedure Laterality Date    ANGIOPLASTY  08/30/2017    Dr. Tony Rivas mm drug eluting Xience stents in the right coronary. DANIELA-3 flow prior to the procedure DANIELA-3 flow following the procedure. The patient has had a good result from his angioplasty of the right coronary artery. Leonor Thompson APPENDECTOMY      APPENDECTOMY      BUNIONECTOMY  2010    CARDIAC CATHETERIZATION      stent pacement    CARDIAC CATHETERIZATION Left 05/14/14    Dr Axel Bhatia. [facility] Severe CAD, 70% disese with in-stent stenosis in the left anterior descending at the level of a very large codominant diagonal.  90% disease of the ostium of a very large codominant diagonal with 60% disease beyond the diagonal with a fractional flow reserve of 0.76 in the diagonal and 0.78 in the left anterior descending indicating obstructive disease of significance in both the    Gilmer nataliia CARDIAC CATHETERIZATION Left 05/14/14    left anterior descending & diagonal.. Mild irregularities of 30% in a small, nondominant circumglex. Mild 30- 40% disease in the midportion of a very large,dominant right coronary artery. Normal left ventricular function, ejection fraction of 55-60%    CATARACT REMOVAL  2004    COLONOSCOPY  10-    Dr. Jarrell Padron (hemorrhoids)    COLONOSCOPY  10/09/2013    CORONARY ANGIOPLASTY WITH STENT PLACEMENT      CORONARY ARTERY BYPASS GRAFT  6/3/14    Dr Lilliam Holcomb @ [address]   8/2012    B leakage in back of eyes    JOINT REPLACEMENT Bilateral     knees    KNEE SURGERY  2010, 2011    BILATERAL KNEE ORTHO       CURRENT MEDICATIONS    Current Outpatient Rx   Medication Sig Dispense Refill    doxazosin (CARDURA) 8 MG tablet Take 1 tablet by mouth 2 times daily 180 tablet 3    Insulin NPH Isophane & Regular (NOVOLIN 70/30 SC) Inject 50 Units into the skin 2 times daily       lisinopril (PRINIVIL;ZESTRIL) 20 MG tablet TAKE 20 MG IN MORNING AND 20 MG IN EVENING.  1 tablet 0    celecoxib (CELEBREX) 200 MG capsule TAKE 1 CAPSULE TWICE A  capsule 1    atorvastatin (LIPITOR) 20 MG tablet TAKE 1 TABLET DAILY 90 tablet 1    furosemide (LASIX) 20 MG tablet TAKE 1 TABLET TWICE A  tablet 1    potassium chloride (KLOR-CON M) 20 MEQ extended release tablet TAKE 1 TABLET DAILY 90 tablet 1    doxazosin (CARDURA) 8 MG tablet TAKE 1 TABLET TWICE A  tablet 3    cloNIDine (CATAPRES) 0.3 MG tablet TAKE 1 TABLET THREE TIMES A  tablet 3    metoprolol tartrate (LOPRESSOR) 25 MG tablet TAKE 1 TABLET THREE TIMES A  tablet 3    clopidogrel (PLAVIX) 75 MG tablet Take 1 tablet by mouth daily 90 tablet 3    glimepiride (AMARYL) 4 MG tablet Take 4 mg by mouth 2 times daily      Omega-3 Fatty Acids (FISH OIL) 1200 MG CAPS Take 1 capsule by mouth 2 times daily Plus 360 omega 3      amLODIPine (NORVASC) 10 MG tablet Take 10 mg by mouth daily      Glucosamine Sulfate 1000 MG CAPS Take by mouth 2 times daily      Chromium-Cinnamon (CINNAMON PLUS CHROMIUM PO) Take 1,000 mg by mouth 2 times daily       vitamin E 400 UNIT capsule Take 400 Units by mouth daily      Multiple Vitamins-Minerals (ONE-A-DAY MENS 50+ ADVANTAGE PO) Take by mouth daily      Coenzyme Q10 (CO Q 10 PO) Co Q 10 plus Carnitine 200mg    1 daily      B Complex-C (SUPER B COMPLEX/VITAMIN C PO) Take by mouth daily      Vitamin D (CHOLECALCIFEROL) 1000 UNITS CAPS capsule   Take by mouth 2 times daily Vitamin D 3      metformin (GLUCOPHAGE) 1000 MG tablet Take 1,000 mg by mouth 2 times daily.  aspirin (ASPIR-81) 81 MG EC tablet Take 81 mg by mouth daily.  ONE TOUCH ULTRA TEST strip       BD ULTRA-FINE PEN NEEDLES 29G X 12.7MM MISC       Insulin Syringes, Disposable, U-100 0.3 ML MISC Inject  into the skin.  Use prn 25 each 0       ALLERGIES    No Known Allergies    FAMILY HISTORY    Family History   Problem Relation Age of Onset    Alzheimer's Disease Mother     Heart Disease Mother     Heart Disease Father        SOCIAL HISTORY    Social History     Social History    Marital status:      Spouse name: N/A    Number of children: N/A    Years of education: N/A     Social History Main Topics    Smoking status: Former Smoker     Packs/day: 0.50     Years: 3.00     Quit date: 10/9/1975    Smokeless tobacco: Never Used    Alcohol use No    Drug use: No    Sexual activity: Not Asked     Other Topics Concern    None     Social History Narrative    None       PHYSICAL EXAM    VITAL SIGNS: BP (!) 136/52   Pulse 59   Temp 97.9 °F (36.6 °C) (Temporal)   Resp 17   Wt 252 lb (114.3 kg)   SpO2 95%   BMI 35.15 kg/m²    Constitutional:  Well developed, well nourished, no acute distress Patient appears mildly short of breath on arrival  HENT:  Atraumatic, external ears normal, nose normal, oropharynx moist. Neck- supple   Respiratory:  Bibasilar rales  Cardiovascular: Regular rhythm no murmurs  GI:  Soft, nondistended, normal bowel sounds, nontender, no organomegaly, no mass, no rebound, no guarding   Musculoskeletal:  No edema, no tenderness, no deformities. Back- no tenderness   Integument:  Well hydrated, no rash   Neurologic:  Alert & oriented x 3, no focal deficits noted     EKG    Sinus rhythm with sinus arrhythmia. Rate of 566    RADIOLOGY/PROCEDURES    . wet    Labs  Labs Reviewed   COMPREHENSIVE METABOLIC PANEL - Abnormal; Notable for the following:        Result Value    Glucose 152 (*)     Bun/Cre Ratio 24 (*)     ALT 42 (*)     All other components within normal limits   CBC WITH AUTO DIFFERENTIAL - Abnormal; Notable for the following:     RBC 4.19 (*)     Hemoglobin 13.0 (*)     Hematocrit 38.3 (*)     All other components within normal limits   BRAIN NATRIURETIC PEPTIDE - Abnormal; Notable for the following:     Pro- (*)     All other components within normal limits   TROPONIN   TSH WITHOUT REFLEX           Summation      Patient Course: Patient has elevated BNP of 625 the rest of the labs are within normal limits. Hospitalization versus management at home was discussed with the patient his family. Patient wants to go home. Patient is instructed to check weight daily. Patient is instructed to increase his morning dose of Lasix to 40 mg daily. Patient has a follow-up appointment with Dr. Deepali Bolden in AM.  Patient is also instructed to follow-up with Dr. Kelsy Johnson. ED Medications administered this visit:    Medications   furosemide (LASIX) injection 20 mg (20 mg Intravenous Given 11/2/17 0234)   LORazepam (ATIVAN) tablet 1 mg (1 mg Oral Given 11/2/17 0233)       New Prescriptions from this visit:    Discharge Medication List as of 11/2/2017  3:15 AM          Follow-up:  Ioana Crisostomo DO  [address] [id number] [phone]    Today  return to ED if worse        Final Impression:   1. Shortness of breath    2.  Acute systolic

## 2017-11-02 NOTE — PATIENT INSTRUCTIONS
SURVEY:    You may be receiving a survey from [employer] regarding your visit today. Please complete the survey to enable us to provide the highest quality of care to you and your family. If you cannot score us as very good on any question, please call the office to discuss how we could have made your experience exceptional.     Thank you.

## 2017-11-02 NOTE — LETTER
Solo Betts M.D. [address] [place] [address], [place]   [phone]          2017          Amrit Rustam [address], [facility]      [facility]  : 1950    Dear Dr. Marylee Georgia:    CHIEF COMPLAINT:  Marked shortness of breath. HISTORY OF PRESENT ILLNESS:  I saw Mr. Dl Serna in our office today, 2017. He went to get a sleep study last night. When he was in the sleep lab, he became very short of breath. He had stopped his study and drove home. He was then taken back to ER because of shortness of breath. He was seen in ER and a myocardial infarction was ruled out. D-dimer was not done. He was told he had congestive heart failure and he was given Lasix intravenously and discharged. He saw you then in your office. I had ordered pulmonary function test and a sleep study for a lung evaluation because of his shortness of breath. His pulmonary function test showed mild COPD with a reversible component consistent with asthma. He had a mild diffusion capacity defect also. When he saw you, you appropriately gave him Pulmicort twice a day for him to use his albuterol on an as needed basis. I am seeing him today in followup. On questioning again his history, it sounds like he did better after the angioplasty of his right coronary artery, which was on 2017. His right coronary artery was extremely large and dominant. He had a 99% lesion in its midportion with good result from the angioplasty. His shortness of breath however began to worsen several weeks after. He had his angioplasty when he was in cardiac rehab. I did do CTA of his chest on 10/27, which was negative for any PE. Again, we did order pulmonary function test, which we discussed. He has noted increased bloating of his abdomen and some edema.   He has had reversibility in addition to fluid overload with right-sided CHF. He does have restrictive lung disease from his abdomen pressing on his diaphragm. Hopefully with some diuresis, he will improve. I will meet with him again in a week to see the response to the Pulmicort and his Aldactone and will recheck kidney function. We will decide then whether we would do a Lexiscan Cardiolite stress test to evaluate his coronary artery status and followup with angioplasty of his right coronary artery. Thank you very much for allowing me the privilege of seeing Mr. Malathi Pinedo. Any questions on my thoughts, please do not hesitate to contact me.     Sincerely,        Ayah Sifuentes    D: 11/02/2017 11:33:55       T: 11/02/2017 23:50:02     [email or web address]  Job#: [id number]     Doc#: [id number]

## 2017-11-02 NOTE — PROGRESS NOTES
Ov dr Lola Edward was at sleep study  Butler sob  [place]ler anxious  Sent to ED   Before leaving the house felt abd bloating  Been sob 3 weeks   Was in cardiac rehab 2.0 on treadmill after 10 min had  To quick due to sob  Had cath done on Aug 30  Butler had trouble   Since then   After starting rehab started going down  [place] couple weeks afterwards  No chest pain  Some dannie ankle edema more in lt foot. Seen DR Edd Pimentel today   Had breathing treatment while in office  Didn't feel helped   Given albuteral and pulmocort inhalers  Told PFT showed asthma and some CHF  Had taken oral lasix last night then given another  20mg lasix IV and another 40mg oral today  Told to increase to 40mg bid  Sob on exertion  Suppose to be going to [place] for the winter   At end of month.   Normally sleep in bed   Will add aldactone 25mg bid  Stay with 20mg lasix bid instead of 40mg  Will repeat bmp in 1 week no rehab until then

## 2017-11-03 ENCOUNTER — HOSPITAL ENCOUNTER (OUTPATIENT)
Dept: NUCLEAR MEDICINE | Age: 67
Discharge: HOME | End: 2017-11-03
Payer: MEDICARE

## 2017-11-03 ENCOUNTER — TELEPHONE (OUTPATIENT)
Dept: CARDIOLOGY CLINIC | Age: 67
End: 2017-11-03

## 2017-11-03 ENCOUNTER — HOSPITAL ENCOUNTER (OUTPATIENT)
Dept: CARDIAC REHAB | Age: 67
Discharge: HOME | End: 2017-11-03

## 2017-11-03 DIAGNOSIS — R06.02 SOB (SHORTNESS OF BREATH): ICD-10-CM

## 2017-11-03 PROBLEM — J43.9 MIXED RESTRICTIVE AND OBSTRUCTIVE LUNG DISEASE (HCC): Status: ACTIVE | Noted: 2017-11-03

## 2017-11-03 PROBLEM — J44.9 MIXED RESTRICTIVE AND OBSTRUCTIVE LUNG DISEASE: Status: ACTIVE | Noted: 2017-11-03

## 2017-11-03 PROBLEM — J98.4 MIXED RESTRICTIVE AND OBSTRUCTIVE LUNG DISEASE (HCC): Status: ACTIVE | Noted: 2017-11-03

## 2017-11-03 PROCEDURE — 78582 LUNG VENTILAT&PERFUS IMAGING: CPT

## 2017-11-03 PROCEDURE — A9539 TC99M PENTETATE: HCPCS | Performed by: INTERNAL MEDICINE

## 2017-11-03 PROCEDURE — 3430000000 HC RX DIAGNOSTIC RADIOPHARMACEUTICAL: Performed by: INTERNAL MEDICINE

## 2017-11-03 PROCEDURE — A9567 TECHNETIUM TC-99M AEROSOL: HCPCS | Performed by: INTERNAL MEDICINE

## 2017-11-03 RX ORDER — KIT FOR THE PREPARATION OF TECHNETIUM TC 99M PENTETATE 20 MG/1
35 INJECTION, POWDER, LYOPHILIZED, FOR SOLUTION INTRAVENOUS; RESPIRATORY (INHALATION)
Status: COMPLETED | OUTPATIENT
Start: 2017-11-03 | End: 2017-11-03

## 2017-11-03 RX ADMIN — KIT FOR THE PREPARATION OF TECHNETIUM TC 99M PENTETATE 35 MILLICURIE: 20 INJECTION, POWDER, LYOPHILIZED, FOR SOLUTION INTRAVENOUS; RESPIRATORY (INHALATION) at 12:01

## 2017-11-03 ASSESSMENT — ENCOUNTER SYMPTOMS: GASTROINTESTINAL NEGATIVE: 1

## 2017-11-03 NOTE — PROGRESS NOTES
Phase II Cardiac Rehab Individualized Treatment Plan-60 Day     Patient Name: Dolly Lima  Date of Initial Assessment: 11/3/2017  ACCOUNT #:   Diagnosis: PTCA with Stent   Onset Date: 08/30/172  Referring Physician: DR Siobhan Lyons  Risk Stratification: High  Session Number: ***   EXERCISE    Stages of Change:   [] pre-contemplation   [x] Action   [] Contemplate   [] Maintainence   [x] Prep   [] Relapse          Exercise Prescription:  Mode: [x] TM [x] B [x] STP [x] EL [x] R  Frequency: 3 x week  Duration: 31-60 min   Intensity: METS  Progression: Increase 1-2 levels/week or 1-2 min/week to achieve target HR and RPE 11-13. [] Angina with Exertion THR:    [] Resistance Training Weight (lbs):   Reps:     Hypertension:  [x] Yes  [] No  Resting BP: ***  Peak Exercise BP: ***  [] Med change? Intervention:  Home Exercise:  Type: treadmill and walking  Duration: 30-40 min   Frequency: 2-3 x week   [x] Resistance Training-blue  Education:   [x] Equipment Arabi  [x] Self pulse   [x] Proper use weights/therabands   [x] S/S to report  [x] Low Na Diet    [x] Warm up/ Cool down  [x] BP Medication    [x] RPE Scale   [x] Understand BP   [x] Ex Safety   [x] Exercise specialist class-Home Exercise       Target Goal:   -Individual Exercise Plan  -BP<140/90 or <130/80 if DM   -Aerobic active 30 + minutes 5-7 days per week    Nutrition    Stages of Change:   [] pre-contemplation   [x] Action   [] Contemplate   [] Maintainence   [x] Prep   [] Relapse    Lipids: Total Cholesterol: no new results  Triglycerides:   HDL:   LDL:   [] Med Change? Diabetes:  [x] Yes  [] No  Random BS:  HbA1c: 6.6  [] Med Change?     Weight Management:  Wt Goal: 1-2 lbs/wk    Intervention:   [x] Dietitian Consult       [x] Nurse/Patient Discussion     [x] Diet Class           [] Referred to Diabetes Education     Education:  [x] S&S hypo/hyperglycemia  [x] Low fat/low cholesterol diet  [x] Weight loss methods      [x] Relate Diabetes/CAD level/wk and 1-2 / AVERAGE CARDIO FUNCTIONAL CAPACITY HAS ADVANCED FROM 4 METS TO 4.4 METS, A 10% INCREASE AND TOLERATING 50 MINUTES TOTAL CARDIO EXERCISE WITHIN INTENSITY TARGETS   min/wk  to achieve THR and RPE 11-15  -introduce weights/ therabands 8-10# for 8-15 reps / UNIVERSAL WT MACHINE FOR ARNEL UE INITIATED ON 10/5/17 WITH AVG WT 30 LB  -manage BP better / NOT AT GOAL--DR. ULLOA NOTIFIED  -MANAGE BP BETTER / NOT AT GOAL-DR. GILES NOTIFIED  -develop regular exercise 30 min daily  LOSS 5-10 LB IN 2-3 MONTHS WITH CONTROLLED NUTRITION INTAKE AND REGULAR EXERCISE OF AVG >3.5 HR MOD LEVEL PER WEEK / BODY WT HAS INCREASED 5 LB--DIETICIAN CONSULT RECOMMENDED TO PT    Physician Changes/Comments:      Cardiac Rehab Staff

## 2017-11-06 ENCOUNTER — HOSPITAL ENCOUNTER (OUTPATIENT)
Dept: CARDIAC REHAB | Age: 67
Discharge: HOME | End: 2017-11-06

## 2017-11-06 NOTE — PROGRESS NOTES
Alyssa Rizvi M.D. [address] [place] [address]  [phone]          2017          Sergo Vaz, DO  [address] [facility] [address]      [facility]  : 1950    Dear Dr. Alexandre Clements:    CHIEF COMPLAINT:  Marked shortness of breath. HISTORY OF PRESENT ILLNESS:  I saw Mr. Jason Wilkins in our office today, 2017. He went to get a sleep study last night. When he was in the sleep lab, he became very short of breath. He had stopped his study and drove home. He was then taken back to ER because of shortness of breath. He was seen in ER and a myocardial infarction was ruled out. D-dimer was not done. He was told he had congestive heart failure and he was given Lasix intravenously and discharged. He saw you then in your office. I had ordered pulmonary function test and a sleep study for a lung evaluation because of his shortness of breath. His pulmonary function test showed mild COPD with a reversible component consistent with asthma. He had a mild diffusion capacity defect also. When he saw you, you appropriately gave him Pulmicort twice a day for him to use his albuterol on an as needed basis. I am seeing him today in followup. On questioning again his history, it sounds like he did better after the angioplasty of his right coronary artery, which was on 2017. His right coronary artery was extremely large and dominant. He had a 99% lesion in its midportion with good result from the angioplasty. His shortness of breath however began to worsen several weeks after. He had his angioplasty when he was in cardiac rehab. I did do CTA of his chest on 10/27, which was negative for any PE. Again, we did order pulmonary function test, which we discussed. He has noted increased bloating of his abdomen and some edema.   He has had in general no PND or orthopnea until last night when he was in the sleep lab. He denies chest pain. His main complaint is that of marked shortness of breath. MEDICATIONS:  Today are albuterol 2 puffs every 6 hours p.r.n., Norvasc 10 mg daily, aspirin 81 mg daily, Lipitor 20 mg daily, Pulmicort 2 puffs b.i.d. given today, Celebrex 200 mg b.i.d., Catapres 0.3 mg t.i.d., Plavix 75 mg daily, Cardura 8 mg b.i.d., Lasix 20 mg b.i.d., Amaryl 4 mg b.i.d., Novolin 70/30 50 units in the morning, 60 at lunch, 50 at bedtime, Prinivil 20 mg b.i.d., Ativan 1 mg p.r.n., Glucophage 1000 mg b.i.d., Lopressor 25 mg t.i.d., fish oil 1200 mg b.i.d., vitamin D 1000 units b.i.d. and vitamin E daily. PHYSICAL EXAMINATION:  VITAL SIGNS:  His blood pressure was 160/60 with a heart rate of 70 and regular. Respiratory rate 18. O2 sat was 94%. Weight 256 pounds. GENERAL:  He is a pleasant 52-year-old gentleman. Denied pain. He was oriented to person, place, and time. Answered questions appropriately. SKIN:  No unusual skin changes. HEENT:  The pupils are equally round and reactive to light and accommodation. Extraocular movements were intact. Mucous membranes were dry. NECK:  No JVD. Good carotid pulses. No carotid bruits. No lymphadenopathy or thyromegaly. CARDIOVASCULAR EXAM:  S1 and S2 were normal.  No S3 or S4. Soft systolic blowing type murmur. No diastolic murmur. PMI was normal.  No lifts, thrusts or pericardial friction rub. LUNGS:  Fairly clear to auscultation and percussion. ABDOMEN:  Very protuberant. Soft and nontender. It appeared to me that there was some ascites present, although, difficult to ascertain for sure. EXTREMITIES:  Good femoral pulses. Good pedal pulses. He had 3+ edema bilaterally. Chest x-ray was unremarkable with no acute congestive heart failure. LABORATORY DATA:  Sodium was 140, potassium 4.2, BUN 20, creatinine 0.85, GFR greater than 60, calcium 9.4. Troponin less than 0.03. Pro-. ALT was 42, AST was 33. TSH 3.53. White count 9.0, hemoglobin 13.0 with a platelet count 576,[phone]. Pulmonary function test showed mild COPD with some reversibility consistent with angina. DLCO was mildly decreased at 78%. IMPRESSION:  1. Severe coronary artery disease. 2.  Status post angioplasty of a very large dominant right coronary artery, which was stenosed 99% on 08/30/2017 with a widely patent LIMA to the LAD and widely patent vein graft to the diagonal with unremarkable circumflex and EF of 50% with placement of 3.5 x 18 mm Xience stent in right coronary artery. 3.  Initially better, but then development of marked shortness of breath. 4.  Mild COPD with reversibility consistent with asthma, currently he just started taking Pulmicort. 5.  Marked shortness of breath last night while in the sleep lab associated with marked abdominal swelling and 3+ edema, more consistent with right-sided CHF as chest x-ray is relatively clear. 6.  Mild COPD with asthma component, currently on Pulmicort. 7.  Hypertension, well controlled. 8.  Hyperlipidemia. PLAN:  1. Place him on Aldactone 25 mg b.i.d. along with his Lasix 20 mg b.i.d.  2.  Do a D-dimer today to make sure there was no pulmonary embolism. 3.  See him in one week for repeat chem-7 and to also evaluate the response to both the Pulmicort and the Aldactone. 4.  If his shortness of breath is unchanged, will switch from Plavix to Brilinta as occasionally generic Plavix can cause shortness of breath. 5.  Would also do Sallyann Serve stress test to reevaluate his inferior wall to see if it appears that the stent is still patent.   6.  If there is any significant abnormality on his Cardiolite stress test in the inferior segments or if he did not respond to diuresis with Aldactone and Pulmicort, would consider repeat re-look catheterization to make sure his right coronary artery is widely patent after stenting on 08/30.  7.  Complete sleep study after the above tests are done.    DISCUSSION:  Mr. Austen Fall is somewhat perplexing. He was initially better after the angioplasty, but has worsened and had become markedly short of breath. He was short of breath when he went to the emergency room. He was told he had congestive heart failure, but really his BNP was minimally elevated at 625. As you well know, we generally see this in the 2 or 3000 range with significant CHF. He does appear to be fluid overloaded, but it seems to be mainly ascites and pedal edema consistent with pulmonary hypertension and right-sided CHF. With this in mind, I have placed him on Aldactone along with his Lasix. We might well need to raise the Lasix, but I will leave him on the 20 mg b.i.d. for the time being as we add the Aldactone 25 mg b.i.d.  I will need to see him in a week to reevaluate his ascites and edema and see his response to Aldactone. If he still has marked edema, then will increase him to 40 mg b.i.d. on Lasix. He does have some reversibility or asthma component to his COPD. I am anxious also to see the response to Pulmicort. I am hoping his weight will be down and he will be breathing better in the above measures. If there is no change, then I would want to reevaluate to see if it appears his right coronary artery is still widely patent. He had a good result from the angioplasty, but certainly anything is possible including edge dissection, which could have occurred later causing restenosis and his symptoms of shortness of breath. Therefore, I would do a Lexiscan Cardiolite stress test and consider repeat re-look catheterization to make sure the stent is widely patent in his right coronary artery. I explained to him that his symptoms are probably a confluence of several different etiologies including his sleep apnea and COPD with a reversibility in addition to fluid overload with right-sided CHF.   He does have restrictive lung disease from his abdomen pressing on his diaphragm. Hopefully with some diuresis, he will improve. I will meet with him again in a week to see the response to the Pulmicort and his Aldactone and will recheck kidney function. We will decide then whether we would do a Lexiscan Cardiolite stress test to evaluate his coronary artery status and followup with angioplasty of his right coronary artery. Thank you very much for allowing me the privilege of seeing Mr. Trupti Villagomez. Any questions on my thoughts, please do not hesitate to contact me.     Sincerely,        Brayan Baer    D: 11/02/2017 11:33:55       T: 11/02/2017 23:50:02     [email or web address]  Job#: [id number]     Doc#: [id number]

## 2017-11-08 ENCOUNTER — HOSPITAL ENCOUNTER (OUTPATIENT)
Dept: CARDIAC REHAB | Age: 67
Discharge: HOME | End: 2017-11-08

## 2017-11-08 ENCOUNTER — HOSPITAL ENCOUNTER (OUTPATIENT)
Age: 67
Discharge: HOME | End: 2017-11-08
Payer: MEDICARE

## 2017-11-08 DIAGNOSIS — R06.02 SOB (SHORTNESS OF BREATH): ICD-10-CM

## 2017-11-08 LAB
ANION GAP SERPL CALCULATED.3IONS-SCNC: 12 MMOL/L (ref 9–17)
BUN BLDV-MCNC: 24 MG/DL (ref 8–23)
BUN/CREAT BLD: 27 (ref 9–20)
CALCIUM SERPL-MCNC: 9.3 MG/DL (ref 8.6–10.4)
CHLORIDE BLD-SCNC: 102 MMOL/L (ref 98–107)
CO2: 26 MMOL/L (ref 20–31)
CREAT SERPL-MCNC: 0.89 MG/DL (ref 0.7–1.2)
GFR AFRICAN AMERICAN: >60 ML/MIN
GFR NON-AFRICAN AMERICAN: >60 ML/MIN
GFR SERPL CREATININE-BSD FRML MDRD: ABNORMAL ML/MIN/{1.73_M2}
GFR SERPL CREATININE-BSD FRML MDRD: ABNORMAL ML/MIN/{1.73_M2}
GLUCOSE BLD-MCNC: 249 MG/DL (ref 70–99)
POTASSIUM SERPL-SCNC: 4.8 MMOL/L (ref 3.7–5.3)
PROSTATE SPECIFIC ANTIGEN: 0.2 UG/L
SODIUM BLD-SCNC: 140 MMOL/L (ref 135–144)

## 2017-11-08 PROCEDURE — 80048 BASIC METABOLIC PNL TOTAL CA: CPT

## 2017-11-08 PROCEDURE — 36415 COLL VENOUS BLD VENIPUNCTURE: CPT

## 2017-11-08 PROCEDURE — 84153 ASSAY OF PSA TOTAL: CPT

## 2017-11-09 ENCOUNTER — OFFICE VISIT (OUTPATIENT)
Dept: CARDIOLOGY CLINIC | Age: 67
End: 2017-11-09
Payer: MEDICARE

## 2017-11-09 VITALS
BODY MASS INDEX: 35.84 KG/M2 | DIASTOLIC BLOOD PRESSURE: 50 MMHG | SYSTOLIC BLOOD PRESSURE: 140 MMHG | OXYGEN SATURATION: 96 % | HEART RATE: 66 BPM | WEIGHT: 257 LBS

## 2017-11-09 DIAGNOSIS — E11.42 TYPE 2 DIABETES MELLITUS WITH PERIPHERAL NEUROPATHY (HCC): Primary | ICD-10-CM

## 2017-11-09 PROCEDURE — 3017F COLORECTAL CA SCREEN DOC REV: CPT | Performed by: INTERNAL MEDICINE

## 2017-11-09 PROCEDURE — G8417 CALC BMI ABV UP PARAM F/U: HCPCS | Performed by: INTERNAL MEDICINE

## 2017-11-09 PROCEDURE — G8428 CUR MEDS NOT DOCUMENT: HCPCS | Performed by: INTERNAL MEDICINE

## 2017-11-09 PROCEDURE — 1123F ACP DISCUSS/DSCN MKR DOCD: CPT | Performed by: INTERNAL MEDICINE

## 2017-11-09 PROCEDURE — 99214 OFFICE O/P EST MOD 30 MIN: CPT | Performed by: INTERNAL MEDICINE

## 2017-11-09 PROCEDURE — 3046F HEMOGLOBIN A1C LEVEL >9.0%: CPT | Performed by: INTERNAL MEDICINE

## 2017-11-09 PROCEDURE — G8484 FLU IMMUNIZE NO ADMIN: HCPCS | Performed by: INTERNAL MEDICINE

## 2017-11-09 PROCEDURE — 1036F TOBACCO NON-USER: CPT | Performed by: INTERNAL MEDICINE

## 2017-11-09 PROCEDURE — 4040F PNEUMOC VAC/ADMIN/RCVD: CPT | Performed by: INTERNAL MEDICINE

## 2017-11-09 PROCEDURE — G8598 ASA/ANTIPLAT THER USED: HCPCS | Performed by: INTERNAL MEDICINE

## 2017-11-09 RX ORDER — ISOSORBIDE MONONITRATE 30 MG/1
15 TABLET, EXTENDED RELEASE ORAL DAILY
Qty: 30 TABLET | Refills: 3 | Status: SHIPPED | OUTPATIENT
Start: 2017-11-09 | End: 2017-12-19 | Stop reason: SDUPTHER

## 2017-11-09 NOTE — LETTER
Roby Gorman M.D. [address] [place] [address]  [phone]        2017        Jaz Colvin, DO   [address] [address]     RE:  Domenico Saldaña  : 1950    Dear Dr. James Romano:    CHIEF COMPLAINT:  Marked fatigue, shortness of breath with marked limitation of activity consistent with a class III angina. HISTORY OF PRESENT ILLNESS:  I had the pleasure of seeing Mr. Gerard Esquivel in our office on 2017. He is a pleasant 46-year-old gentleman who has diabetes, hyperlipidemia, as well as very labile hypertension. He also has severe sleep apnea, uses a CPAP mask. He has a history of CAD and on 2012, I did a catheterization which showed severe disease of the bifurcation in the LAD with unremarkable right coronary artery and circumflex. I did angioplasty on 2012, placing 2.75 x 28 mm Promus stent in the LAD. A repeat catheterization was done on May 14, 2015, which showed 70 to 80% in-stent stenosis in the LAD and 90% diagonal, circumflex and right coronary unremarkable, EF of 50 to 55%. He therefore had open heart surgery by Dr. Perez Bai on 2014, with a LIMA to the LAD and vein graft to diagonal.    He began developing increasing shortness of breath in May. Also began developing chest pain and chest discomfort. Did a stress test on 2017, which was abnormal, showing a large defect in the inferolateral region with some redistribution and a moderate amount of jose daniel-infarct ischemia. His bypass grafts were widely patent. I therefore did a cardiac catheterization on 2017. His right coronary artery was extremely large and dominant. He had a 99% disease in the mid portion.   His LAD had 90% disease with a patent LIMA to the LAD, diagonal had 90% disease with a patent saphenous vein graft to the mg daily; Pulmicort 2 puffs b.i.d.; Celebrex 200 mg b.i.d.; Catapres 0.3 mg t.i.d.; Plavix 75 mg daily; Cardura 8 mg b.i.d.; Lasix 20 mg b.i.d.; Amaryl 4 mg b.i.d.; insulin, Regular 50 units in the morning, 60 at lunch, 50 in the evening; Prinivil 20 mg b.i.d.; Ativan 1 mg p.r.n.; Glucophage 1000 mg b.i.d.; Lopressor 25 mg t.i.d.; fish oil 1200 b.i.d.; potassium 20 mEq daily; Aldactone 25 mg b.i.d.; vitamin D b.i.d.; vitamin E daily. PAST MEDICAL HISTORY:  Arthroscopic surgery in  with a knee replacement complicated by fat embolism 2011; insulin-dependent diabetes for 26 years; hypertension, many years; left and right cataract surgery in ; sleep apnea in , with his cardiac history as described above. FAMILY HISTORY:  Father  at 80, CHF. Mother  of CHF. Two brothers and sister, alive and well. SOCIAL HISTORY:  He is 78years old. He was a  and is retired. He is , has 2 children. He retired in 2017. He bought a house in a [place] with the intent of spending [place] in [place]. He has a daughter who is a professor at the [employer] in [place]; 2 grandchildren in [place]. REVIEW OF SYSTEMS:  Cardiac as above. Other 10 systems reviewed and are negative including neurologic, psychiatric, pulmonary, cardiac GI, , renal, and musculoskeletal.  He has had no weight loss, weight gain or change in bowel habits, no blood in stools. He has had increasing shortness of breath with activity with marked fatigue with chest pain with activity, all consistent with class III angina. He has known mild asthma with mild restrictive and obstructive lung disease. PHYSICAL EXAMINATION:  VITAL SIGNS:  His blood pressure was 140/50 with a heart rate of 66 and regular. Respirations were 18. O2 saturation 94%. Weight 257 pounds.   GENERAL:  He is a pleasant 43-year-old gentleman who has a very sallow appearance. He was oriented to person, place, and time. Answered questions appropriately. SKIN:  No unusual skin changes. HEENT:  The pupils are equally round and intact. Mucous membranes were dry. NECK:  No JVD. Good carotid pulses. No carotid bruits. No lymphadenopathy or thyromegaly. CARDIOVASCULAR EXAM:  S1 and S2 were normal.  No S3 or S4. Soft systolic blowing-type murmur. No diastolic murmur. PMI was normal.  No lift, thrust, or pericardial friction rub. LUNGS:  Quite clear to auscultation and percussion. ABDOMEN:  Soft and nontender. Good bowel sounds. The aorta was not enlarged. No hepatomegaly, splenomegaly. EXTREMITIES:  Good femorals, good pedal pulses. Mild pedal edema. Skin was warm and dry. No calf tenderness. Nail beds pink. Good cap refill. PULSES:  Bilateral symmetrical radial, brachial and carotid pulses. No carotid bruits. Good femoral and pedal pulses. NEUROLOGIC EXAM:  Within normal limits. PSYCHIATRIC EXAM:  Within normal limits. LABORATORY DATA:  On November 2, 2017; sodium 140, potassium 4.2, BUN 25, creatinine 0.85. GFR was 60. Calcium 9.4. ALT was 42, AST was 33. Glucose 152, with a TSH 3.53. White count 9.0, hemoglobin 13.0 with a platelet count 632,580. Pulmonary function test showed mild restrictive and obstructive lung disease with reversibility. CT scan was negative for any pulmonary embolism on October 27, 2017. Lung perfusion scan was normal with low probability of PE on November 3, 2017. Echocardiogram on August 22, 2017, showed an EF of 55% with mild-to-moderate mitral regurgitation. IMPRESSION:  1.  Marked limitation with marked shortness of breath, chest pain and marked loss of energy, all consistent with class III angina. 2.  Severe coronary artery disease. 3.  Catheterization, October 31, 2012, showing severe disease at bifurcation of LAD and diagonal with unremarkable right coronary and circumflex, EF of 55 to 60%. 4. Angioplasty to the LAD and diagonal on November 7, 2012, placing a 2.75 x 28 Promus stent in the LAD and dilating the diagonal through the struts with kissing balloons. 5.  Angina in May 2014. 6.  Catheterization on May 14, 2014, at [facility], showing 70 to 80% in-stent stenosis to the LAD and diagonal with FFR of 0.78 with unremarkable right coronary and circumflex, normal LV function. 7.  Open heart surgery, Cici 3, 2014, by Dr. Kemal Rothman with LIMA to LAD and vein graft to diagonal.  8.  Abnormal Cardiolite stress test, August 22nd with inferior wall ischemia. 9.  Status post cardiac catheterization on August 30, 2017, which showed patent LIMA to the LAD and patent vein graft to the diagonal with unremarkable circumflex and a severe 95% lesion in the mid right coronary artery with EF of 50% with angioplasty of the mid right coronary artery placing 3.5 x 18 mm drug-eluting Xience stent with a good end result. 10.  He initially did well for approximately 2 weeks and developed increasing shortness of breath, chest pain, loss of energy consistent with angina which has progressed since that time. 11.  Hypertension, well controlled. 12.  Hyperlipidemia. 13.  Severe sleep apnea with a CPAP mask. PLAN:  1. Add Imdur 30 mg half a tablet daily. 2.  Proceed with cardiac catheterization this coming week to redefine his anatomy in view of his class III angina. DISCUSSION:  Mr. Inez Kanner has deteriorated. He has more shortness of breath, chest pain, loss of energy, all consistent with his class III angina. He initially did better after his angioplasty to the right coronary artery, but he has subsequently worsened. I am concerned about a lesion in the right coronary artery such as _____ dissection. I have looked for other etiology such as pulmonary with a PE workup, which was negative.   We also checked his pulmonary function tests and he has

## 2017-11-09 NOTE — PROGRESS NOTES
Ov Dr Radha Slater 1 week follow up  Maybe for a day felt just little better  But still very sob almost feels  Little worse   Weight home up and down by a pound  Or two.  Still have edema  Will do heart cath on 11/15  Will start Imdur 30mg 1/2 tab daily  Walked in mathew very sob pulse ox 96% rest   No change with walking

## 2017-11-10 ENCOUNTER — HOSPITAL ENCOUNTER (OUTPATIENT)
Dept: CARDIAC REHAB | Age: 67
Setting detail: THERAPIES SERIES
Discharge: HOME | End: 2017-11-10

## 2017-11-13 ENCOUNTER — HOSPITAL ENCOUNTER (OUTPATIENT)
Dept: CARDIAC REHAB | Age: 67
Discharge: HOME | End: 2017-11-13

## 2017-11-13 NOTE — PROGRESS NOTES
Alyssa Rizvi M.D. [address] [place] [address] [facility]  [phone]        2017        Sergo Vaz DO   [address] [place] [address] [facility]     RE:  Mayelin Long  : 1950    Dear Dr. Alexandre Clements:    CHIEF COMPLAINT:  Marked fatigue, shortness of breath with marked limitation of activity consistent with a class III angina. HISTORY OF PRESENT ILLNESS:  I had the pleasure of seeing Mr. Jason Wilkins in our office on 2017. He is a pleasant 32-year-old gentleman who has diabetes, hyperlipidemia, as well as very labile hypertension. He also has severe sleep apnea, uses a CPAP mask. He has a history of CAD and on 2012, I did a catheterization which showed severe disease of the bifurcation in the LAD with unremarkable right coronary artery and circumflex. I did angioplasty on 2012, placing 2.75 x 28 mm Promus stent in the LAD. A repeat catheterization was done on May 14, 2015, which showed 70 to 80% in-stent stenosis in the LAD and 90% diagonal, circumflex and right coronary unremarkable, EF of 50 to 55%. He therefore had open heart surgery by Dr. Don Fall on 2014, with a LIMA to the LAD and vein graft to diagonal.    He began developing increasing shortness of breath in May. Also began developing chest pain and chest discomfort. Did a stress test on 2017, which was abnormal, showing a large defect in the inferolateral region with some redistribution and a moderate amount of jose daniel-infarct ischemia. His bypass grafts were widely patent. I therefore did a cardiac catheterization on 2017. His right coronary artery was extremely large and dominant. He had a 99% disease in the mid portion.   His LAD had 90% disease with a patent LIMA to the LAD, diagonal had 90% disease with a patent saphenous vein graft to the diagonal, circumflex is unremarkable, EF changes. HEENT:  The pupils are equally round and intact. Mucous membranes were dry. NECK:  No JVD. Good carotid pulses. No carotid bruits. No lymphadenopathy or thyromegaly. CARDIOVASCULAR EXAM:  S1 and S2 were normal.  No S3 or S4. Soft systolic blowing-type murmur. No diastolic murmur. PMI was normal.  No lift, thrust, or pericardial friction rub. LUNGS:  Quite clear to auscultation and percussion. ABDOMEN:  Soft and nontender. Good bowel sounds. The aorta was not enlarged. No hepatomegaly, splenomegaly. EXTREMITIES:  Good femorals, good pedal pulses. Mild pedal edema. Skin was warm and dry. No calf tenderness. Nail beds pink. Good cap refill. PULSES:  Bilateral symmetrical radial, brachial and carotid pulses. No carotid bruits. Good femoral and pedal pulses. NEUROLOGIC EXAM:  Within normal limits. PSYCHIATRIC EXAM:  Within normal limits. LABORATORY DATA:  On November 2, 2017; sodium 140, potassium 4.2, BUN 25, creatinine 0.85. GFR was 60. Calcium 9.4. ALT was 42, AST was 33. Glucose 152, with a TSH 3.53. White count 9.0, hemoglobin 13.0 with a platelet count 806,854. Pulmonary function test showed mild restrictive and obstructive lung disease with reversibility. CT scan was negative for any pulmonary embolism on October 27, 2017. Lung perfusion scan was normal with low probability of PE on November 3, 2017. Echocardiogram on August 22, 2017, showed an EF of 55% with mild-to-moderate mitral regurgitation. IMPRESSION:  1.  Marked limitation with marked shortness of breath, chest pain and marked loss of energy, all consistent with class III angina. 2.  Severe coronary artery disease. 3.  Catheterization, October 31, 2012, showing severe disease at bifurcation of LAD and diagonal with unremarkable right coronary and circumflex, EF of 55 to 60%.   4.  Angioplasty to the LAD and diagonal on November 7, 2012, placing a 2.75 x 28 Promus stent in the LAD and dilating will do so this coming Wednesday. I have added Imdur half a tablet daily. Again, I am very concerned about Mr. Trupti Villagomez. We will proceed with catheterization this coming week. Thank you very much for allowing me the privilege of seeing Mr. Trupti Villagomez. If you have any questions on my thoughts, please do not hesitate to contact me.     Sincerely,          Brayan Baer    D: 11/09/2017 14:02:58       T: 11/09/2017 17:34:38     [email or web address]  Job#: [id number]     Doc#: [id number]

## 2017-11-16 ENCOUNTER — TELEPHONE (OUTPATIENT)
Dept: CARDIOLOGY CLINIC | Age: 67
End: 2017-11-16

## 2017-11-16 DIAGNOSIS — R06.02 SOB (SHORTNESS OF BREATH): ICD-10-CM

## 2017-11-16 DIAGNOSIS — I27.20 PULMONARY HTN (HCC): ICD-10-CM

## 2017-11-16 DIAGNOSIS — R06.83 SNORING: Primary | ICD-10-CM

## 2017-11-16 NOTE — TELEPHONE ENCOUNTER
Pt called following cath   Sleep study referral made  To increase lasix to 40mg bid  To stop in tomorrow to talk with   Dr Haines Cornea

## 2017-11-17 ENCOUNTER — TELEPHONE (OUTPATIENT)
Dept: CARDIOLOGY CLINIC | Age: 67
End: 2017-11-17

## 2017-11-17 ENCOUNTER — HOSPITAL ENCOUNTER (OUTPATIENT)
Age: 67
Discharge: HOME | End: 2017-11-17
Payer: MEDICARE

## 2017-11-17 DIAGNOSIS — I27.20 PULMONARY HTN (HCC): Primary | ICD-10-CM

## 2017-11-17 DIAGNOSIS — E11.42 TYPE 2 DIABETES MELLITUS WITH PERIPHERAL NEUROPATHY (HCC): ICD-10-CM

## 2017-11-17 LAB
BUN BLDV-MCNC: 27 MG/DL (ref 8–23)
CREAT SERPL-MCNC: 0.98 MG/DL (ref 0.7–1.2)
GFR AFRICAN AMERICAN: >60 ML/MIN
GFR NON-AFRICAN AMERICAN: >60 ML/MIN
GFR SERPL CREATININE-BSD FRML MDRD: ABNORMAL ML/MIN/{1.73_M2}
GFR SERPL CREATININE-BSD FRML MDRD: ABNORMAL ML/MIN/{1.73_M2}

## 2017-11-17 PROCEDURE — 82565 ASSAY OF CREATININE: CPT

## 2017-11-17 PROCEDURE — 84520 ASSAY OF UREA NITROGEN: CPT

## 2017-11-17 PROCEDURE — 36415 COLL VENOUS BLD VENIPUNCTURE: CPT

## 2017-11-17 NOTE — TELEPHONE ENCOUNTER
Pt here for discussion with DR Mike Valenzuela  After heart cath  dannie edema sl improved to cont.  Amanda  Will see on 11/22 with bmp and check  Edema then again 1 week later   To hold off on cardiac rehab   Labs given today may restart metformin

## 2017-11-20 ENCOUNTER — HOSPITAL ENCOUNTER (OUTPATIENT)
Dept: CARDIAC REHAB | Age: 67
Discharge: HOME | End: 2017-11-20

## 2017-11-21 ENCOUNTER — HOSPITAL ENCOUNTER (OUTPATIENT)
Age: 67
Discharge: HOME | End: 2017-11-21
Payer: MEDICARE

## 2017-11-21 DIAGNOSIS — I27.20 PULMONARY HTN (HCC): ICD-10-CM

## 2017-11-21 LAB
ANION GAP SERPL CALCULATED.3IONS-SCNC: 15 MMOL/L (ref 9–17)
BUN BLDV-MCNC: 32 MG/DL (ref 8–23)
BUN/CREAT BLD: 33 (ref 9–20)
CALCIUM SERPL-MCNC: 9.3 MG/DL (ref 8.6–10.4)
CHLORIDE BLD-SCNC: 99 MMOL/L (ref 98–107)
CO2: 24 MMOL/L (ref 20–31)
CREAT SERPL-MCNC: 0.98 MG/DL (ref 0.7–1.2)
GFR AFRICAN AMERICAN: >60 ML/MIN
GFR NON-AFRICAN AMERICAN: >60 ML/MIN
GFR SERPL CREATININE-BSD FRML MDRD: ABNORMAL ML/MIN/{1.73_M2}
GFR SERPL CREATININE-BSD FRML MDRD: ABNORMAL ML/MIN/{1.73_M2}
GLUCOSE BLD-MCNC: 276 MG/DL (ref 70–99)
POTASSIUM SERPL-SCNC: 4.9 MMOL/L (ref 3.7–5.3)
SODIUM BLD-SCNC: 138 MMOL/L (ref 135–144)

## 2017-11-21 PROCEDURE — 36415 COLL VENOUS BLD VENIPUNCTURE: CPT

## 2017-11-21 PROCEDURE — 80048 BASIC METABOLIC PNL TOTAL CA: CPT

## 2017-11-22 ENCOUNTER — HOSPITAL ENCOUNTER (OUTPATIENT)
Dept: CARDIAC REHAB | Age: 67
Discharge: HOME | End: 2017-11-22

## 2017-11-22 ENCOUNTER — OFFICE VISIT (OUTPATIENT)
Dept: CARDIOLOGY CLINIC | Age: 67
End: 2017-11-22

## 2017-11-22 VITALS
BODY MASS INDEX: 35.01 KG/M2 | HEART RATE: 66 BPM | OXYGEN SATURATION: 99 % | SYSTOLIC BLOOD PRESSURE: 150 MMHG | DIASTOLIC BLOOD PRESSURE: 64 MMHG | WEIGHT: 251 LBS

## 2017-11-22 DIAGNOSIS — I10 HYPERTENSION, UNSPECIFIED TYPE: Primary | ICD-10-CM

## 2017-11-22 NOTE — PROGRESS NOTES
Pt here for bp check  Lost 6lbs edema improving  Sob improving some   Appears not as sob  But if walks a lot does   Get sob  Dr Julio Ruano aware no changes  In meds see again in 1 week

## 2017-11-27 ENCOUNTER — HOSPITAL ENCOUNTER (OUTPATIENT)
Age: 67
Discharge: HOME | End: 2017-11-27
Payer: MEDICARE

## 2017-11-27 ENCOUNTER — HOSPITAL ENCOUNTER (OUTPATIENT)
Dept: GENERAL RADIOLOGY | Age: 67
Discharge: HOME | End: 2017-11-27
Payer: MEDICARE

## 2017-11-27 ENCOUNTER — HOSPITAL ENCOUNTER (OUTPATIENT)
Dept: CARDIAC REHAB | Age: 67
Discharge: HOME | End: 2017-11-27

## 2017-11-27 DIAGNOSIS — Z79.4 TYPE 2 DIABETES MELLITUS WITH MICROALBUMINURIA, WITH LONG-TERM CURRENT USE OF INSULIN (HCC): ICD-10-CM

## 2017-11-27 DIAGNOSIS — I25.10 CORONARY ARTERY DISEASE INVOLVING NATIVE CORONARY ARTERY OF NATIVE HEART WITHOUT ANGINA PECTORIS: ICD-10-CM

## 2017-11-27 DIAGNOSIS — E78.5 HYPERLIPIDEMIA, UNSPECIFIED HYPERLIPIDEMIA TYPE: ICD-10-CM

## 2017-11-27 DIAGNOSIS — E55.9 VITAMIN D DEFICIENCY DISEASE: ICD-10-CM

## 2017-11-27 DIAGNOSIS — E11.29 TYPE 2 DIABETES MELLITUS WITH MICROALBUMINURIA, WITH LONG-TERM CURRENT USE OF INSULIN (HCC): ICD-10-CM

## 2017-11-27 DIAGNOSIS — I10 ESSENTIAL HYPERTENSION: ICD-10-CM

## 2017-11-27 DIAGNOSIS — R80.9 TYPE 2 DIABETES MELLITUS WITH MICROALBUMINURIA, WITH LONG-TERM CURRENT USE OF INSULIN (HCC): ICD-10-CM

## 2017-11-27 DIAGNOSIS — Z98.61 H/O CORONARY ANGIOPLASTY: ICD-10-CM

## 2017-11-27 LAB
ABSOLUTE EOS #: 0.2 K/UL (ref 0–0.4)
ABSOLUTE IMMATURE GRANULOCYTE: ABNORMAL K/UL (ref 0–0.3)
ABSOLUTE LYMPH #: 1.4 K/UL (ref 1–4.8)
ABSOLUTE MONO #: 0.6 K/UL (ref 0–1)
ALBUMIN SERPL-MCNC: 4.1 G/DL (ref 3.5–5.2)
ALBUMIN/GLOBULIN RATIO: ABNORMAL (ref 1–2.5)
ALP BLD-CCNC: 56 U/L (ref 40–129)
ALT SERPL-CCNC: 21 U/L (ref 5–41)
ANION GAP SERPL CALCULATED.3IONS-SCNC: 14 MMOL/L (ref 9–17)
AST SERPL-CCNC: 18 U/L
BASOPHILS # BLD: 1 % (ref 0–2)
BASOPHILS ABSOLUTE: 0 K/UL (ref 0–0.2)
BILIRUB SERPL-MCNC: 0.86 MG/DL (ref 0.3–1.2)
BUN BLDV-MCNC: 33 MG/DL (ref 8–23)
BUN/CREAT BLD: 32 (ref 9–20)
CALCIUM SERPL-MCNC: 9.7 MG/DL (ref 8.6–10.4)
CHLORIDE BLD-SCNC: 100 MMOL/L (ref 98–107)
CHOLESTEROL/HDL RATIO: 4.1
CHOLESTEROL: 123 MG/DL
CO2: 25 MMOL/L (ref 20–31)
CREAT SERPL-MCNC: 1.04 MG/DL (ref 0.7–1.2)
DIFFERENTIAL TYPE: YES
EKG ATRIAL RATE: 51 BPM
EKG P AXIS: 40 DEGREES
EKG P-R INTERVAL: 156 MS
EKG Q-T INTERVAL: 462 MS
EKG QRS DURATION: 104 MS
EKG QTC CALCULATION (BAZETT): 425 MS
EKG R AXIS: 72 DEGREES
EKG T AXIS: 65 DEGREES
EKG VENTRICULAR RATE: 51 BPM
EOSINOPHILS RELATIVE PERCENT: 3 % (ref 0–5)
GFR AFRICAN AMERICAN: >60 ML/MIN
GFR NON-AFRICAN AMERICAN: >60 ML/MIN
GFR SERPL CREATININE-BSD FRML MDRD: ABNORMAL ML/MIN/{1.73_M2}
GFR SERPL CREATININE-BSD FRML MDRD: ABNORMAL ML/MIN/{1.73_M2}
GLUCOSE BLD-MCNC: 204 MG/DL (ref 70–99)
HCT VFR BLD CALC: 38.9 % (ref 41–53)
HDLC SERPL-MCNC: 30 MG/DL
HEMOGLOBIN: 12.9 G/DL (ref 13.5–17.5)
IMMATURE GRANULOCYTES: ABNORMAL %
LDL CHOLESTEROL: 54 MG/DL (ref 0–130)
LYMPHOCYTES # BLD: 21 % (ref 13–44)
MAGNESIUM: 2.2 MG/DL (ref 1.6–2.6)
MCH RBC QN AUTO: 30.5 PG (ref 26–34)
MCHC RBC AUTO-ENTMCNC: 33.1 G/DL (ref 31–37)
MCV RBC AUTO: 92.1 FL (ref 80–100)
MONOCYTES # BLD: 9 % (ref 5–9)
PATIENT FASTING?: NO
PDW BLD-RTO: 13.6 % (ref 12.1–15.2)
PLATELET # BLD: 231 K/UL (ref 140–450)
PLATELET ESTIMATE: ABNORMAL
PMV BLD AUTO: ABNORMAL FL (ref 6–12)
POTASSIUM SERPL-SCNC: 5 MMOL/L (ref 3.7–5.3)
RBC # BLD: 4.22 M/UL (ref 4.5–5.9)
RBC # BLD: ABNORMAL 10*6/UL
SEG NEUTROPHILS: 66 % (ref 39–75)
SEGMENTED NEUTROPHILS ABSOLUTE COUNT: 4.2 K/UL (ref 2.1–6.5)
SODIUM BLD-SCNC: 139 MMOL/L (ref 135–144)
TOTAL PROTEIN: 6.9 G/DL (ref 6.4–8.3)
TRIGL SERPL-MCNC: 194 MG/DL
TSH SERPL DL<=0.05 MIU/L-ACNC: 1.54 MIU/L (ref 0.3–5)
VITAMIN D 25-HYDROXY: 29.4 NG/ML (ref 30–100)
VLDLC SERPL CALC-MCNC: ABNORMAL MG/DL (ref 1–30)
WBC # BLD: 6.4 K/UL (ref 3.5–11)
WBC # BLD: ABNORMAL 10*3/UL

## 2017-11-27 PROCEDURE — 85025 COMPLETE CBC W/AUTO DIFF WBC: CPT

## 2017-11-27 PROCEDURE — 82306 VITAMIN D 25 HYDROXY: CPT

## 2017-11-27 PROCEDURE — 36415 COLL VENOUS BLD VENIPUNCTURE: CPT

## 2017-11-27 PROCEDURE — 93005 ELECTROCARDIOGRAM TRACING: CPT

## 2017-11-27 PROCEDURE — 83735 ASSAY OF MAGNESIUM: CPT

## 2017-11-27 PROCEDURE — 84443 ASSAY THYROID STIM HORMONE: CPT

## 2017-11-27 PROCEDURE — 80061 LIPID PANEL: CPT

## 2017-11-27 PROCEDURE — 80053 COMPREHEN METABOLIC PANEL: CPT

## 2017-11-27 PROCEDURE — 71020 XR CHEST STANDARD TWO VW: CPT

## 2017-11-28 ENCOUNTER — HOSPITAL ENCOUNTER (OUTPATIENT)
Dept: SLEEP CENTER | Age: 67
Discharge: HOME | End: 2017-11-28
Payer: MEDICARE

## 2017-11-28 ENCOUNTER — OFFICE VISIT (OUTPATIENT)
Dept: CARDIOLOGY CLINIC | Age: 67
End: 2017-11-28
Payer: MEDICARE

## 2017-11-28 VITALS
BODY MASS INDEX: 34.87 KG/M2 | DIASTOLIC BLOOD PRESSURE: 60 MMHG | SYSTOLIC BLOOD PRESSURE: 120 MMHG | HEART RATE: 59 BPM | OXYGEN SATURATION: 96 % | WEIGHT: 250 LBS

## 2017-11-28 DIAGNOSIS — I27.20 PULMONARY HTN (HCC): ICD-10-CM

## 2017-11-28 DIAGNOSIS — E78.5 HYPERLIPIDEMIA, UNSPECIFIED HYPERLIPIDEMIA TYPE: ICD-10-CM

## 2017-11-28 DIAGNOSIS — I25.119 CORONARY ARTERY DISEASE INVOLVING NATIVE CORONARY ARTERY OF NATIVE HEART WITH ANGINA PECTORIS (HCC): ICD-10-CM

## 2017-11-28 DIAGNOSIS — I10 HYPERTENSION, UNSPECIFIED TYPE: Primary | ICD-10-CM

## 2017-11-28 PROCEDURE — G8417 CALC BMI ABV UP PARAM F/U: HCPCS | Performed by: INTERNAL MEDICINE

## 2017-11-28 PROCEDURE — G8428 CUR MEDS NOT DOCUMENT: HCPCS | Performed by: INTERNAL MEDICINE

## 2017-11-28 PROCEDURE — 4040F PNEUMOC VAC/ADMIN/RCVD: CPT | Performed by: INTERNAL MEDICINE

## 2017-11-28 PROCEDURE — G8598 ASA/ANTIPLAT THER USED: HCPCS | Performed by: INTERNAL MEDICINE

## 2017-11-28 PROCEDURE — 3017F COLORECTAL CA SCREEN DOC REV: CPT | Performed by: INTERNAL MEDICINE

## 2017-11-28 PROCEDURE — 95811 POLYSOM 6/>YRS CPAP 4/> PARM: CPT

## 2017-11-28 PROCEDURE — 1123F ACP DISCUSS/DSCN MKR DOCD: CPT | Performed by: INTERNAL MEDICINE

## 2017-11-28 PROCEDURE — 99213 OFFICE O/P EST LOW 20 MIN: CPT | Performed by: INTERNAL MEDICINE

## 2017-11-28 PROCEDURE — 1036F TOBACCO NON-USER: CPT | Performed by: INTERNAL MEDICINE

## 2017-11-28 PROCEDURE — G8484 FLU IMMUNIZE NO ADMIN: HCPCS | Performed by: INTERNAL MEDICINE

## 2017-11-28 RX ORDER — SPIRONOLACTONE 25 MG/1
50 TABLET ORAL 2 TIMES DAILY
Status: SHIPPED | COMMUNITY
Start: 2017-11-28 | End: 2017-11-28 | Stop reason: SDUPTHER

## 2017-11-28 RX ORDER — FUROSEMIDE 20 MG/1
40 TABLET ORAL 2 TIMES DAILY
Status: SHIPPED | COMMUNITY
Start: 2017-11-28 | End: 2017-11-28 | Stop reason: SDUPTHER

## 2017-11-28 RX ORDER — FUROSEMIDE 20 MG/1
40 TABLET ORAL 2 TIMES DAILY
Qty: 360 TABLET | Refills: 3 | Status: SHIPPED | OUTPATIENT
Start: 2017-11-28 | End: 2018-04-10 | Stop reason: SDUPTHER

## 2017-11-28 RX ORDER — SPIRONOLACTONE 25 MG/1
50 TABLET ORAL 2 TIMES DAILY
Qty: 360 TABLET | Refills: 3 | Status: SHIPPED | OUTPATIENT
Start: 2017-11-28 | End: 2017-12-04 | Stop reason: SDUPTHER

## 2017-11-28 ASSESSMENT — SLEEP AND FATIGUE QUESTIONNAIRES: NECK CIRCUMFERENCE (INCHES): 18

## 2017-11-28 NOTE — LETTER
[facility]  [address] [address]  Phone: [phone]  Fax: [phone]    Neel Harris MD        November 28, 2017     Patient: Caesar Shelby   YOB: 1950   Date of Visit: 11/28/2017       To Whom It May Concern: It is my medical opinion that Zonia Emerson requires a disability parking placard for the following reasons:  Pulmonary HTN  Duration of need: 2022    If you have any questions or concerns, please don't hesitate to call.     Sincerely,        Neel Harris MD
HEENT:  The pupils are equally round and intact. Mucous membranes were dry. NECK:  No JVD. Good carotid pulses. No carotid bruits. No lymphadenopathy or thyromegaly. CARDIOVASCULAR EXAM:  S1 and S2 were normal.  No S3 or S4. Soft systolic blowing type murmur. No diastolic murmur. PMI was normal.  No lifts, thrusts or pericardial friction rub. LUNGS:  Quite clear to auscultation and percussion. ABDOMEN:  Soft and nontender. Good bowel sounds. He was protuberant. Still appeared to have a moderate amount of ascites present. EXTREMITIES:  Good femoral pulses. Good pedal pulses. He had 3+ edema. Skin was warm and dry. No calf tenderness. Nail beds pink. Good cap refill. NEUROLOGIC EXAM:  Unremarkable. PSYCHIATRIC EXAM:  Unremarkable. LABORATORY DATA:  From today, sodium was 139, potassium 5.0, BUN 33, creatinine 1.04, GFR greater than 60, magnesium 2.2, calcium 9.7. Cholesterol 123 with an HDL of 30, LDL 54, triglycerides 194. ALT was 21, AST was 18. TSH was 1.54. Vitamin D 29.4. White count 6.4, hemoglobin 12.9 with a platelet count of 405,168. IMPRESSION:  1. Continued mainly right-sided CHF with ascites and pedal edema, 3+ in both lower extremities. 2.  History of severe sleep apnea with him having a repeat sleep study tonight. 3.  Status post catheterization on 11/15/2017 with patent LIMA to the LAD and a patent vein graft to the diagonal with 50% RCA, with a widely patent stent in the distal RCA, with 80% small diagonal and EF of 55%. 4.  Hypertension, well controlled. PLAN:  1. Increase Aldactone to 50 mg b.i.d.  2.  Have him decrease his banana intake to control his potassium. 3.  If his potassium does increase, we will need to next stop potassium supplement. 4.  If his potassium would increase on the higher dose of Aldactone, would have to change from Prinivil to hydralazine.   5.  Reevaluate Chem-7 in one week to make sure his electrolytes are okay

## 2017-11-29 ENCOUNTER — HOSPITAL ENCOUNTER (OUTPATIENT)
Dept: CARDIAC REHAB | Age: 67
Discharge: HOME | End: 2017-11-29

## 2017-11-30 ENCOUNTER — HOSPITAL ENCOUNTER (OUTPATIENT)
Dept: CARDIAC REHAB | Age: 67
Discharge: HOME | End: 2017-11-30

## 2017-12-01 ENCOUNTER — HOSPITAL ENCOUNTER (OUTPATIENT)
Dept: CARDIAC REHAB | Age: 67
Discharge: HOME | End: 2017-12-01

## 2017-12-04 ENCOUNTER — HOSPITAL ENCOUNTER (OUTPATIENT)
Age: 67
Discharge: HOME | End: 2017-12-04
Payer: MEDICARE

## 2017-12-04 ENCOUNTER — HOSPITAL ENCOUNTER (OUTPATIENT)
Dept: CARDIAC REHAB | Age: 67
Discharge: HOME | End: 2017-12-04

## 2017-12-04 DIAGNOSIS — I10 HYPERTENSION, UNSPECIFIED TYPE: ICD-10-CM

## 2017-12-04 DIAGNOSIS — I27.20 PULMONARY HTN (HCC): ICD-10-CM

## 2017-12-04 DIAGNOSIS — I25.119 CORONARY ARTERY DISEASE INVOLVING NATIVE CORONARY ARTERY OF NATIVE HEART WITH ANGINA PECTORIS (HCC): ICD-10-CM

## 2017-12-04 DIAGNOSIS — E78.5 HYPERLIPIDEMIA, UNSPECIFIED HYPERLIPIDEMIA TYPE: ICD-10-CM

## 2017-12-04 LAB
ANION GAP SERPL CALCULATED.3IONS-SCNC: 14 MMOL/L (ref 9–17)
BUN BLDV-MCNC: 34 MG/DL (ref 8–23)
BUN/CREAT BLD: 32 (ref 9–20)
CALCIUM SERPL-MCNC: 8.9 MG/DL (ref 8.6–10.4)
CHLORIDE BLD-SCNC: 98 MMOL/L (ref 98–107)
CO2: 24 MMOL/L (ref 20–31)
CREAT SERPL-MCNC: 1.06 MG/DL (ref 0.7–1.2)
GFR AFRICAN AMERICAN: >60 ML/MIN
GFR NON-AFRICAN AMERICAN: >60 ML/MIN
GFR SERPL CREATININE-BSD FRML MDRD: ABNORMAL ML/MIN/{1.73_M2}
GFR SERPL CREATININE-BSD FRML MDRD: ABNORMAL ML/MIN/{1.73_M2}
GLUCOSE BLD-MCNC: 271 MG/DL (ref 70–99)
POTASSIUM SERPL-SCNC: 5.3 MMOL/L (ref 3.7–5.3)
SODIUM BLD-SCNC: 136 MMOL/L (ref 135–144)

## 2017-12-04 PROCEDURE — 80048 BASIC METABOLIC PNL TOTAL CA: CPT

## 2017-12-04 PROCEDURE — 36415 COLL VENOUS BLD VENIPUNCTURE: CPT

## 2017-12-04 RX ORDER — SPIRONOLACTONE 25 MG/1
50 TABLET ORAL 2 TIMES DAILY
Qty: 120 TABLET | Refills: 3 | Status: SHIPPED | OUTPATIENT
Start: 2017-12-04 | End: 2018-04-10 | Stop reason: CLARIF

## 2017-12-04 NOTE — PROGRESS NOTES
Roby Gorman M.D. [address] [place] [address]  [phone]          2017          Jaz Colvin, DO  [address] [address]      RE:  Mau Trujillo. Gerard Esquivel  : 1950    Dear Dr. Ramirez Blood:    CHIEF COMPLAINT:  1.  Mainly right-sided CHF with ascites and edema. 2.  Severe sleep apnea. HISTORY OF PRESENT ILLNESS:  I met with Mr. Gerard Esquivel in the office on 2017. As you know, he has been having marked edema and ascites and we have been working on his diuretics. He does have severe sleep apnea. He is waiting to have a sleep study, which will be done tonight. His shortness of breath does seem to be better. His edema is now slightly both in his legs and his abdomen. He has had no chest pain or chest discomfort. His energy level is improved slightly. Of note, his wife states that he is eating \"bananas like chimpanzees. \"    MEDICATIONS:  His medications are Ventolin inhaler; Norvasc 10 mg daily; aspirin 81 mg daily; Lipitor 20 mg daily; Pulmicort inhaler 2 puffs b.i.d.; Celebrex 200 mg b.i.d.; Catapres 0.3 mg t.i.d.; Plavix 75 mg daily; Cardura 8 mg b.i.d.; Lasix 20 mg 2 tablets b.i.d.; Amaryl 4 mg b.i.d.; NPH 50 units in the morning, 60 at lunch, 50 in the evening; Imdur 30 mg half a tablet daily; Prinivil 20 mg b.i.d.; Ativan 1 mg q. 8 hours p.r.n.; Glucophage 1000 mg b.i.d.; metoprolol XL 25 mg t.i.d.; fish oil 1200 mg daily; potassium 20 mEq daily; Aldactone 25 mg b.i.d.; vitamin D 2000 units b.i.d. and vitamin E daily. PHYSICAL EXAMINATION:  VITAL SIGNS:  His blood pressure was 120/60 with a heart rate of 60 and regular. Respiratory rate 18. O2 saturation 96%. Weight 250 pounds. GENERAL:  He is a pleasant 81-year-old gentleman. Denied pain. He was oriented to person, place and time. Answered questions appropriately. SKIN:  No unusual skin changes.   HEENT:  The pupils are equally round and intact. Mucous membranes were dry. NECK:  No JVD. Good carotid pulses. No carotid bruits. No lymphadenopathy or thyromegaly. CARDIOVASCULAR EXAM:  S1 and S2 were normal.  No S3 or S4. Soft systolic blowing type murmur. No diastolic murmur. PMI was normal.  No lifts, thrusts or pericardial friction rub. LUNGS:  Quite clear to auscultation and percussion. ABDOMEN:  Soft and nontender. Good bowel sounds. He was protuberant. Still appeared to have a moderate amount of ascites present. EXTREMITIES:  Good femoral pulses. Good pedal pulses. He had 3+ edema. Skin was warm and dry. No calf tenderness. Nail beds pink. Good cap refill. NEUROLOGIC EXAM:  Unremarkable. PSYCHIATRIC EXAM:  Unremarkable. LABORATORY DATA:  From today, sodium was 139, potassium 5.0, BUN 33, creatinine 1.04, GFR greater than 60, magnesium 2.2, calcium 9.7. Cholesterol 123 with an HDL of 30, LDL 54, triglycerides 194. ALT was 21, AST was 18. TSH was 1.54. Vitamin D 29.4. White count 6.4, hemoglobin 12.9 with a platelet count of 470,815. IMPRESSION:  1. Continued mainly right-sided CHF with ascites and pedal edema, 3+ in both lower extremities. 2.  History of severe sleep apnea with him having a repeat sleep study tonight. 3.  Status post catheterization on 11/15/2017 with patent LIMA to the LAD and a patent vein graft to the diagonal with 50% RCA, with a widely patent stent in the distal RCA, with 80% small diagonal and EF of 55%. 4.  Hypertension, well controlled. PLAN:  1. Increase Aldactone to 50 mg b.i.d.  2.  Have him decrease his banana intake to control his potassium. 3.  If his potassium does increase, we will need to next stop potassium supplement. 4.  If his potassium would increase on the higher dose of Aldactone, would have to change from Prinivil to hydralazine. 5.  Reevaluate Chem-7 in one week to make sure his electrolytes are okay on the higher dose of Aldactone.     DISCUSSION:

## 2017-12-04 NOTE — PROGRESS NOTES
Phase II Cardiac Rehab Individualized Treatment Plan-Discharge    Patient Name: Cori Peres  Date of Initial Assessment: 9/8/2017  ACCOUNT #: [de-identified]  Diagnosis: PTCA with Stent   Onset Date: 08/30/17  Referring Physician: Dr Madelon Schirmer  Risk Stratification: Mod  Session Number: 23   EXERCISE    Stages of Change:   [] pre-contemplation   [x] Action   [] Contemplate   [] Maintainence   [] Prep   [] Relapse          Exercise Prescription:  Mode: [x] TM [x] B [x] STP [x] EL [x] R  Frequency: 3 x week  Duration: 31-60 min   Intensity: METS 4.8  Progression: Increase 1-2 levels/week or 1-2 min/week to achieve target HR and RPE 11-13.      [x] Angina with Exertion THR:    [x] Resistance Training  Weight (% of 1RM):  70              Reps: 8-15     Hypertension:  [] Yes  [] No  Resting BP: 152/60  Peak Exercise BP: 170/58  [x] Med Change? INCREASED ALDACTONE TO 50 MG, TWICE DAILY    Intervention:  Home Exercise:  Type: bike and treadmill  Duration: 2040 min   Frequency: 3-4 x week   [x] Resistance Training GREY RESISTIVE BANDS       Depression Screening:NOT COMPLETED D/T PATIENT DID NOT RETURN TO COMPLETE PROGRAM   [] Have you been feeling sad. ..down in the dumps? [] Have you lost interest in your job, sports, hobbies, friends? [] Do you often feel tired? [] Do you have trouble sleeping or do you sleep too much? [] Have you been gaining or losing weight? [] Do you often feel down on yourself, that everything is your fault? [] Do you have troubled making decisions or concentrating on your work? [] Do you often feel agitated or like you can barely move? [] Do you ever feel that life isn't worth living?    *If greater than 5 symptoms listed,  notified.     Education:   [x] Education Goals met (PATIENT HAD BEEN THROUGH PROGRAM PRIOR AND REVIEW OF CONTENT SATISFACTORILY COMPLETED.)        Target Goal:   -Individual Exercise Plan  -BP<140/90 or <130/80 if DM   -Aerobic active 30 + minutes 5-7 days per week    Nutrition    Stages of Change:   [] pre-contemplation   [x] Action   [] Contemplate   [] Maintainenc   [] Prep   [] Relapse    Lipids: 11/27/17  Total Cholesterol: 123  Triglycerides: 194  HDL: 30  LDL: 54  [] Med Change? NO    Diabetes:  [x] Yes  [] No  FBS: 145  HbA1c:6.6  [] Med Change? NO     [] BS in range NO THOUGH LAST A1C 4/2017 AT [facility]    Weight Management:  Weight: 247.4 LB  Height: 71 IN (3.25)  BMI: 34.6  Wt Goal: 1-2 lbs/wk / LOSS  Special Diet: AHA STEP II, 1,800 KCAL, AND ADA GUIDELINES TAUGHT AND RECOMMENDED TO THIS PATIENT    Intervention:   [] Dietitian Consult       [x] Nurse/Patient Discussion     [x] Diet Class           [] Referred to Diabetes Education     Education:  [x] Education Goals met    Target Goal:  -LDL-C<100 if triglycerides are > 200  -LDL-C < 70 for high risk patients  -HbA1c < 7%  -BMI < 25   Education    Stages of Change:    [] pre-contemplation   [x] Action   [] Contemplate   [] Maintainence   [] Prep   [] Relapse    Knowledge test score: NOT COMPLETED D/T PATIENT DOES NOT RETURN AFTER MEDICAL HOLD PER DR. Homero Freire      Family support: [x] Yes  [] No    Tobacco use: [] Yes  [x] No    Intervention:  [] Referred to smoking cessation counselor     [] Individual education and counseling  [] Tobacco adjunct  [] Informed of education class schedule     Education:   [x] Education goals met    Target Goal:  -Complete cessation of tobacco use (if applicable)  -Continued risk factor modifications  -Recognizing signs/symptoms to report  -Proper use of meds    Psychosocial  Stages of Change:    [] pre-contemplation   [] Action   [] Contemplate   [] Maintainence   [] Prep   [] Relapse    Psychosocial Test:  Tool Used: [employer] Quality of Life  Score: NOT COMPLETED D/T PATIENT DID NOT RETURN TO COMPLETE PROGRAM  Depression screening score: NOT COMPLETED D/T PATIENT DID NOT RETURN TO COMPLETE PROGRAM  []Medication change?  N/A    Education:    [x] Education Goals met    Target Goal:  -Assess presence or absence of depression using a valid screening tool. -Maximize coping skills.  -Positive support system.     Preventative Medication:   [] Aspirin       [x] Beta Blockade      [x] Statin or other lipid lowering agent     [x] Clopidogrel   [x] ACE Inhibitor   [] Other anticoagulation medications     Fall Risk assess: [] Yes  [x] No  Assistive Device:   [] Cane  [] Walker [] Wheel Chair  [] Gait belt    Patient/Program goal: \"To decrease shortness of breath when walking and exerting physically and to improve my balance\"  / NOT MET D/T PT PULMONARY NEW ONSET DIAGNOSIS AND TREATMENT AFTER DISCONTINUING PROGRAM  INCREASE HDL TO AT LEAST 40 ON NEXT 3-MONTH LIPID PANEL / Lipid panel ordered per Dr. Bhavana Valiente ON 11/27 NOT IMPROVED  -increased stamina/strength to 30-50 total exercise by increasing 1-2 level/wk and 1-2 / AVERAGE CARDIO FUNCTIONAL CAPACITY HAS ADVANCED FROM 4 METS TO 4.4 METS, A 10% INCREASE AND TOLERATING 50 MINUTES TOTAL CARDIO EXERCISE WITHIN INTENSITY TARGETS / ACHIEVED LAST 30 DAYS   min/wk  to achieve THR and RPE 11-15-working at 12-15 METS 4.8  -introduce weights/ therabands 8-10# for 8-15 reps / [address] UE INITIATED ON 10/5/17 WITH AVG WT 30 LB  -manage BP better / NOT ACHIEVED  -MANAGE BP BETTER / NOT ACHIEVED  -develop regular exercise 30 min daily-Not consistantly exercising at home / HAD BEEN COMPLIANT UNTIL PULMONARY SET-BACK  LOSS 5-10 LB IN 2-3 MONTHS WITH CONTROLLED NUTRITION INTAKE AND REGULAR EXERCISE OF AVG >3.5 HR MOD LEVEL PER WEEK / BODY WT HAS INCREASED 5 LB--DIETICIAN CONSULT RECOMMENDED TO PT / PT OPTS NOT TO SEE DIETICIAN AND DOES NOT LOSE WT      Physician Changes/Comments:      Cardiac Rehab Staff:  MONA SMITH RN, CEP

## 2017-12-05 ENCOUNTER — OFFICE VISIT (OUTPATIENT)
Dept: CARDIOLOGY CLINIC | Age: 67
End: 2017-12-05
Payer: MEDICARE

## 2017-12-05 VITALS
OXYGEN SATURATION: 96 % | WEIGHT: 253 LBS | HEART RATE: 60 BPM | BODY MASS INDEX: 35.29 KG/M2 | DIASTOLIC BLOOD PRESSURE: 80 MMHG | SYSTOLIC BLOOD PRESSURE: 142 MMHG

## 2017-12-05 DIAGNOSIS — R60.0 LEG EDEMA: ICD-10-CM

## 2017-12-05 DIAGNOSIS — R06.02 SOB (SHORTNESS OF BREATH): Primary | ICD-10-CM

## 2017-12-05 PROCEDURE — G8598 ASA/ANTIPLAT THER USED: HCPCS | Performed by: INTERNAL MEDICINE

## 2017-12-05 PROCEDURE — G8417 CALC BMI ABV UP PARAM F/U: HCPCS | Performed by: INTERNAL MEDICINE

## 2017-12-05 PROCEDURE — 3017F COLORECTAL CA SCREEN DOC REV: CPT | Performed by: INTERNAL MEDICINE

## 2017-12-05 PROCEDURE — G8427 DOCREV CUR MEDS BY ELIG CLIN: HCPCS | Performed by: INTERNAL MEDICINE

## 2017-12-05 PROCEDURE — 1123F ACP DISCUSS/DSCN MKR DOCD: CPT | Performed by: INTERNAL MEDICINE

## 2017-12-05 PROCEDURE — G8484 FLU IMMUNIZE NO ADMIN: HCPCS | Performed by: INTERNAL MEDICINE

## 2017-12-05 PROCEDURE — 1036F TOBACCO NON-USER: CPT | Performed by: INTERNAL MEDICINE

## 2017-12-05 PROCEDURE — 99212 OFFICE O/P EST SF 10 MIN: CPT | Performed by: INTERNAL MEDICINE

## 2017-12-05 PROCEDURE — 4040F PNEUMOC VAC/ADMIN/RCVD: CPT | Performed by: INTERNAL MEDICINE

## 2017-12-11 ENCOUNTER — HOSPITAL ENCOUNTER (OUTPATIENT)
Dept: CARDIAC REHAB | Age: 67
Discharge: HOME | End: 2017-12-11

## 2017-12-11 NOTE — PROGRESS NOTES
Don Echeverria M.D. [address] [place] [address] [address]  [phone]          2017          Shaina Santillan, DO  [address] [place], [place]      RE:  Jennifer Jon  : [phone]     Dear Dr. Deepali Bolden:    CHIEF COMPLAINT:  1. Shortness of breath. 2.  Coronary artery disease. HISTORY OF PRESENT ILLNESS:  I met with Mr. Anand Jon today. He had a sleep study done and has severe sleep apnea. He has changed his CPAP company to  [facility] [address] in [facility]. He was waiting for his CPAP machine and actually, they called him during this appointment and he is going over there this afternoon to get his CPAP machine. His edema is better and his ascites is better. He still has his shortness of breath. When he lays, it is somewhat better. He is leaving for [place] probably tomorrow after he gets his CPAP machine. MEDICATIONS:  His medications today are albuterol inhaler 2 puffs q. 6 hours p.r.n.; Norvasc 10 mg daily; aspirin 81 mg daily; Lipitor 20 mg daily; Pulmicort 2 puffs b.i.d.; Celebrex 200 mg b.i.d.; Catapres 0.3 mg t.i.d.; Plavix 75 mg daily; Cardura 8 mg b.i.d.; Lasix 20 mg 2 tablets b.i.d.; Amaryl 4 mg b.i.d.; NPH 50 units in the morning, 60 at lunch, 50 in the evening; Imdur 30 mg half a tablet daily; Prinivil 20 mg b.i.d.; Glucophage 1000 mg b.i.d.; Lopressor 25 mg t.i.d.; Aldactone 25 mg 2 tablets b.i.d.; vitamin D 1000 units b.i.d. PHYSICAL EXAMINATION:  VITAL SIGNS:  His blood pressure was 142/80 with a heart rate of 60 and regular. Respiratory rate 18. O2 saturation 96%. Weight 253 pounds. GENERAL:  He is a pleasant 62-year-old gentleman. Denied pain. He was oriented to person, place and time. Answered questions appropriately. SKIN:  No unusual skin changes. HEENT:  The pupils are equally round and reactive to light and accommodation. Extraocular movements were intact.   Mucous membranes were dry.  NECK:  No JVD. Good carotid pulses. No carotid bruits. No lymphadenopathy or thyromegaly. CARDIOVASCULAR EXAM:  S1 and S2 were normal.  No S3 or S4. Soft systolic blowing type murmur. No diastolic murmur. PMI was normal.  No lifts, thrusts or pericardial friction rub. LUNGS:  Quite clear to auscultation and percussion. ABDOMEN:  Soft and nontender. Good bowel sounds. EXTREMITIES:  Good femoral pulses. Good pedal pulses. Mild pedal edema of 1 to 2+. LABORATORY DATA:  From today, his sodium was 136, potassium 5.3, BUN 34, creatinine 1.06, GFR is 60, calcium 8.9. IMPRESSION:  1. Severe CAD. 2.  Status post angioplasty of a large dominant right coronary artery on 08/30/2017 because of a 99% disease with a widely patent LIMA to the LAD and a widely patent vein graft to the diagonal, unremarkable circumflex, EF of 50% with placing a 3.5 x 18 mm Xience stent in the right coronary artery. 3.  Severe sleep apnea, getting his CPAP machine today. 4.  Mild COPD. 5.  Hypertension, well controlled. 6.  Hyperlipidemia. 7.  Primarily right-sided CHF with edema and ascites, which is better with his current diuretic schedule. 8.  Shortness of breath, which I think is in large part secondary to his sleep apnea. PLAN:  1. Continue same medications. 2.  He will go to [place] and do a BMP in 2 weeks, which we will call him with the results. 3.  We will make an appointment for next April when he returns from [place], but he will keep us posted on how he is doing with his sleep apnea and with his edema in [place]. DISCUSSION:  Mr. Lakisha Stanton is doing better from an edema standpoint. He really does not have any ascites and his edema is at the 1 to 2+ range. He is limiting his fluids. His coronary artery status is good since he has had angioplasty of the right coronary artery. His LV function is also good at 50% to 55%. I am hoping his shortness of breath is primarily due to his sleep apnea.   I am hoping that his new CPAP machine will help his energy level and his breathing. He will continue his exercise program in [place]. He will keep us posted on his weight and his edema. I will see him when he gets back from [place], but we will be in touch by phone while he is down there. Thank you very much for allowing me the privilege of seeing Mr. Ryann Piper. Anyquestions on my thoughts, please do not hesitate to contact me.     Sincerely,        Concepcion Salinas    D: 12/07/2017 0:50:46       T: 12/07/2017 2:31:53     [email or web address]  Job#: [id number]     Doc#: [id number]

## 2017-12-12 NOTE — PROGRESS NOTES
[facility]                              [address] [address]                                SLEEP STUDY REPORT    PATIENT NAME: Marnell Seip                    :        1950  MED REC NO:   [id number]                              ROOM:  ACCOUNT NO:   [de-identified]                           ADMIT DATE: 2017  PROVIDER:     Sharita Middleton    [facility]  INTERPRETATION OF CLINICAL POLYSOMNOGRAPHY    DATE OF STUDY:  2017    This is a split night attended comprehensive polysomnogram.    Sleep laboratory is the [address] [place] [address]:  This is a 45-year-old male who is 71.5 inches tall,  weighs 245 pounds with a body mass index of 33.7. The patient was referred  for a split night study, as the patient's home BiPAP unit is broken, and  needs retested. MEDICATIONS:  Norvasc, aspirin, Lipitor, vitamins, Celebrex, Catapres,  Plavix, coenzyme Q10, Cardura, Lasix, Amaryl, insulin, Prinivil,  Glucophage, Lopressor, potassium, and vitamin D.    SLEEP STAGING:  This is a comprehensive polysomnogram portion of the study. Total duration of this portion of study is 199.8 minutes. Total sleep time  is 74 minutes. This led to an extremely poor sleep efficiency of only 37%. The patient has sleep latency in 2.6 minutes. Stage N1 sleep accounted for  45.3% of this portion of study, stage N2 sleep accounted for 39.9% of the  total study. Slow wave sleep accounted for 14.9% of this portion of study  and there was no REM sleep noted during this portion of the study. COMPREHENSIVE VENTILATORY MONITORING:  There were no central apneas. There  were 9 obstructive apneas. There were no mixed apneas. There were 87  hypopneas. This led to an apnea-hypopnea index of 77.8 per hour. The  average oxygenation while awake was 99% with a minimum oxygen saturation  down to 87%.     LIMB MOVEMENT DATA:  Two limb movements corresponding to a limb movement  index of 1.6 per hour. AROUSAL DATA:  There were total of 80 arousals leading to an arousal index  of 54.9 per hour, 66 arousals were associated with respiratory event, 13  arousals were spontaneous. BODY POSITION DATA:  The patient spent a total of 74 minutes in the supine  position, which corresponded to an apnea-hypopnea index of 70.5 per hour. IMPRESSION:  1. This patient does meet criteria for severe obstructive sleep apnea. Therefore, the patient had a split-night study. 2.  The patient would benefit from treatment of any other coexisting  medical conditions including obesity, insomnia, anxiety, or depression. 3.  Please correlate clinically. TITRATION PORTION OF THE STUDY:  Total duration of this portion of study is  292.9 minutes. Total sleep time is 265.5 minutes. This led to a sleep  efficiency of 90.7%. The patient has a sleep latency in 5.9 minutes. Stage N1 sleep accounted for 13.2% of this portion of study. Stage N2  sleep accounted for 64.8% of this portion of the study. Slow-wave sleep  accounted for 22% of this portion of the study and there was no REM sleep  noted during this recording. COMPREHENSIVE VENTILATORY MONITORING:  There were 2 central apneas. There  were no obstructive apneas. There were no mixed apneas and there were 3  hypopneas. This led to an apnea-hypopnea index of 1.1 per hour. The  average oxygenation while awake was 98% with a minimum oxygen saturation  down to 90%. LIMB MOVEMENT DATA:  There were a total of 12 limb movements corresponding  to a limb movement index of 12.7 per hour. AROUSAL DATA:  There were a total of 50 arousals leading to an arousal  index of 13.3 per hour, 24 arousals were associated with respiratory event,  22 arousals were spontaneous.     BODY POSITION DATA:  The patient spent a total of 265.5 minutes on the  supine position, which corresponded to an apnea-hypopnea index of 0. 7. TITRATION DATA:  The patient was started on CPAP at a low pressure and  titrated upwards in the patient's response and tendency for apnea. The  patient was then switched to BiPAP for ease of exhalation. The best nasal  BiPAP pressure was noted at 20/15 cm of water with a residual  apnea-hypopnea index of 0. IMPRESSION:  1. This patient's obstructive sleep apnea responds reasonably well to CPAP  at a pressure of 20/15 cm of water using heated humidification of air. The  suggested RAMP time is 20 minutes. 2.  The patient would benefit from treatment of any other coexisting  medical condition including obesity, insomnia, anxiety, or depression. 3.  Please correlate clinically.         Navid Garduno    D:12/07/2017 22:30:05               T: 12/08/2017 17:28:25     [email or web address]  Job#: [id number]     Doc#: [id number]    CC:  Jayson Parrish

## 2017-12-18 ENCOUNTER — TELEPHONE (OUTPATIENT)
Dept: CARDIOLOGY CLINIC | Age: 67
End: 2017-12-18

## 2017-12-18 ENCOUNTER — HOSPITAL ENCOUNTER (OUTPATIENT)
Dept: CARDIAC REHAB | Age: 67
Setting detail: THERAPIES SERIES
Discharge: HOME | End: 2017-12-18
Payer: MEDICARE

## 2017-12-19 DIAGNOSIS — R60.0 LEG EDEMA: ICD-10-CM

## 2017-12-19 DIAGNOSIS — R06.02 SOB (SHORTNESS OF BREATH): ICD-10-CM

## 2017-12-19 RX ORDER — ISOSORBIDE MONONITRATE 30 MG/1
15 TABLET, EXTENDED RELEASE ORAL DAILY
Qty: 90 TABLET | Refills: 3 | Status: SHIPPED | OUTPATIENT
Start: 2017-12-19 | End: 2018-01-29 | Stop reason: SDUPTHER

## 2018-01-29 RX ORDER — ISOSORBIDE MONONITRATE 30 MG/1
30 TABLET, EXTENDED RELEASE ORAL DAILY
Qty: 90 TABLET | Refills: 3 | Status: SHIPPED | OUTPATIENT
Start: 2018-01-29 | End: 2018-04-10 | Stop reason: CLARIF

## 2018-01-29 RX ORDER — ISOSORBIDE MONONITRATE 30 MG/1
30 TABLET, EXTENDED RELEASE ORAL DAILY
Qty: 90 TABLET | Refills: 3 | Status: SHIPPED | OUTPATIENT
Start: 2018-01-29 | End: 2018-02-12 | Stop reason: SDUPTHER

## 2018-02-12 RX ORDER — ISOSORBIDE MONONITRATE 30 MG/1
30 TABLET, EXTENDED RELEASE ORAL DAILY
Qty: 90 TABLET | Refills: 3 | Status: SHIPPED | OUTPATIENT
Start: 2018-02-12 | End: 2020-05-26 | Stop reason: SDUPTHER

## 2018-03-19 ENCOUNTER — TELEPHONE (OUTPATIENT)
Dept: FAMILY MEDICINE CLINIC | Age: 68
End: 2018-03-19

## 2018-04-10 ENCOUNTER — OFFICE VISIT (OUTPATIENT)
Dept: CARDIOLOGY CLINIC | Age: 68
End: 2018-04-10
Payer: MEDICARE

## 2018-04-10 VITALS
DIASTOLIC BLOOD PRESSURE: 60 MMHG | SYSTOLIC BLOOD PRESSURE: 160 MMHG | HEART RATE: 74 BPM | BODY MASS INDEX: 36.4 KG/M2 | WEIGHT: 261 LBS | OXYGEN SATURATION: 96 %

## 2018-04-10 DIAGNOSIS — Z95.1 S/P CABG (CORONARY ARTERY BYPASS GRAFT): ICD-10-CM

## 2018-04-10 DIAGNOSIS — Z95.820 S/P ANGIOPLASTY WITH STENT: ICD-10-CM

## 2018-04-10 DIAGNOSIS — I48.0 PAF (PAROXYSMAL ATRIAL FIBRILLATION) (HCC): ICD-10-CM

## 2018-04-10 DIAGNOSIS — Z51.81 VISIT FOR MONITORING TIKOSYN THERAPY: ICD-10-CM

## 2018-04-10 DIAGNOSIS — I48.91 ATRIAL FIBRILLATION, UNSPECIFIED TYPE (HCC): Primary | ICD-10-CM

## 2018-04-10 DIAGNOSIS — I50.32 CHRONIC DIASTOLIC CHF (CONGESTIVE HEART FAILURE), NYHA CLASS 3 (HCC): ICD-10-CM

## 2018-04-10 DIAGNOSIS — Z79.899 VISIT FOR MONITORING TIKOSYN THERAPY: ICD-10-CM

## 2018-04-10 DIAGNOSIS — Z79.01 CHRONIC ANTICOAGULATION: ICD-10-CM

## 2018-04-10 DIAGNOSIS — G47.33 OSA TREATED WITH BIPAP: ICD-10-CM

## 2018-04-10 DIAGNOSIS — I25.10 ASHD (ARTERIOSCLEROTIC HEART DISEASE): ICD-10-CM

## 2018-04-10 DIAGNOSIS — I27.20 PULMONARY HYPERTENSION (HCC): ICD-10-CM

## 2018-04-10 DIAGNOSIS — I48.91 ATRIAL FIBRILLATION, UNSPECIFIED TYPE (HCC): ICD-10-CM

## 2018-04-10 DIAGNOSIS — R60.0 LEG EDEMA: Primary | ICD-10-CM

## 2018-04-10 PROCEDURE — 99214 OFFICE O/P EST MOD 30 MIN: CPT | Performed by: INTERNAL MEDICINE

## 2018-04-10 PROCEDURE — 3017F COLORECTAL CA SCREEN DOC REV: CPT | Performed by: INTERNAL MEDICINE

## 2018-04-10 PROCEDURE — 1123F ACP DISCUSS/DSCN MKR DOCD: CPT | Performed by: INTERNAL MEDICINE

## 2018-04-10 PROCEDURE — G8598 ASA/ANTIPLAT THER USED: HCPCS | Performed by: INTERNAL MEDICINE

## 2018-04-10 PROCEDURE — 1036F TOBACCO NON-USER: CPT | Performed by: INTERNAL MEDICINE

## 2018-04-10 PROCEDURE — G8417 CALC BMI ABV UP PARAM F/U: HCPCS | Performed by: INTERNAL MEDICINE

## 2018-04-10 PROCEDURE — 4040F PNEUMOC VAC/ADMIN/RCVD: CPT | Performed by: INTERNAL MEDICINE

## 2018-04-10 PROCEDURE — G8427 DOCREV CUR MEDS BY ELIG CLIN: HCPCS | Performed by: INTERNAL MEDICINE

## 2018-04-10 RX ORDER — NIFEDIPINE 90 MG/1
90 TABLET, FILM COATED, EXTENDED RELEASE ORAL DAILY
COMMUNITY
End: 2018-04-10 | Stop reason: SDUPTHER

## 2018-04-10 RX ORDER — DOFETILIDE 0.25 MG/1
500 CAPSULE ORAL 2 TIMES DAILY
COMMUNITY
End: 2018-04-10 | Stop reason: SDUPTHER

## 2018-04-10 RX ORDER — CARVEDILOL 12.5 MG/1
12.5 TABLET ORAL 2 TIMES DAILY WITH MEALS
COMMUNITY
End: 2018-04-10 | Stop reason: SDUPTHER

## 2018-04-11 RX ORDER — CARVEDILOL 12.5 MG/1
12.5 TABLET ORAL 2 TIMES DAILY WITH MEALS
Qty: 180 TABLET | Refills: 3 | Status: SHIPPED | OUTPATIENT
Start: 2018-04-11 | End: 2018-04-16 | Stop reason: SDUPTHER

## 2018-04-11 RX ORDER — ATORVASTATIN CALCIUM 20 MG/1
TABLET, FILM COATED ORAL
Qty: 90 TABLET | Refills: 3 | Status: SHIPPED | OUTPATIENT
Start: 2018-04-11 | End: 2018-08-20 | Stop reason: SDUPTHER

## 2018-04-11 RX ORDER — NIFEDIPINE 90 MG/1
90 TABLET, FILM COATED, EXTENDED RELEASE ORAL DAILY
Qty: 90 TABLET | Refills: 3 | Status: SHIPPED | OUTPATIENT
Start: 2018-04-11 | End: 2018-04-16 | Stop reason: SDUPTHER

## 2018-04-11 RX ORDER — FUROSEMIDE 40 MG/1
TABLET ORAL
Qty: 135 TABLET | Refills: 3 | Status: SHIPPED | OUTPATIENT
Start: 2018-04-11 | End: 2018-05-08 | Stop reason: ALTCHOICE

## 2018-04-11 RX ORDER — DOFETILIDE 0.25 MG/1
500 CAPSULE ORAL 2 TIMES DAILY
Qty: 180 CAPSULE | Refills: 3 | Status: SHIPPED | OUTPATIENT
Start: 2018-04-11 | End: 2018-04-24 | Stop reason: CLARIF

## 2018-04-11 ASSESSMENT — ENCOUNTER SYMPTOMS
EYE PAIN: 0
DOUBLE VISION: 0
BACK PAIN: 0
WHEEZING: 0
HEMOPTYSIS: 0
COUGH: 0
SPUTUM PRODUCTION: 0
PHOTOPHOBIA: 0
EYE DISCHARGE: 0
ORTHOPNEA: 1
SHORTNESS OF BREATH: 1
BLURRED VISION: 0

## 2018-04-16 RX ORDER — CARVEDILOL 12.5 MG/1
12.5 TABLET ORAL 2 TIMES DAILY WITH MEALS
Qty: 60 TABLET | Refills: 3 | Status: SHIPPED | OUTPATIENT
Start: 2018-04-16 | End: 2019-01-08 | Stop reason: SDUPTHER

## 2018-04-16 RX ORDER — NIFEDIPINE 90 MG/1
90 TABLET, FILM COATED, EXTENDED RELEASE ORAL DAILY
Qty: 30 TABLET | Refills: 3 | Status: SHIPPED | OUTPATIENT
Start: 2018-04-16 | End: 2019-01-08 | Stop reason: SDUPTHER

## 2018-04-23 ENCOUNTER — TELEPHONE (OUTPATIENT)
Dept: CARDIOLOGY CLINIC | Age: 68
End: 2018-04-23

## 2018-04-23 DIAGNOSIS — I10 HYPERTENSION, UNSPECIFIED TYPE: ICD-10-CM

## 2018-04-23 DIAGNOSIS — E78.5 HYPERLIPIDEMIA, UNSPECIFIED HYPERLIPIDEMIA TYPE: ICD-10-CM

## 2018-04-23 DIAGNOSIS — I25.119 CORONARY ARTERY DISEASE INVOLVING NATIVE CORONARY ARTERY OF NATIVE HEART WITH ANGINA PECTORIS (HCC): Primary | ICD-10-CM

## 2018-04-23 DIAGNOSIS — Z79.4 TYPE 2 DIABETES MELLITUS WITH OTHER SPECIFIED COMPLICATION, WITH LONG-TERM CURRENT USE OF INSULIN (HCC): ICD-10-CM

## 2018-04-23 DIAGNOSIS — E11.69 TYPE 2 DIABETES MELLITUS WITH OTHER SPECIFIED COMPLICATION, WITH LONG-TERM CURRENT USE OF INSULIN (HCC): ICD-10-CM

## 2018-04-24 ENCOUNTER — OFFICE VISIT (OUTPATIENT)
Dept: CARDIOLOGY CLINIC | Age: 68
End: 2018-04-24
Payer: MEDICARE

## 2018-04-24 ENCOUNTER — HOSPITAL ENCOUNTER (OUTPATIENT)
Age: 68
Discharge: HOME | End: 2018-04-24
Payer: MEDICARE

## 2018-04-24 VITALS
WEIGHT: 265 LBS | OXYGEN SATURATION: 96 % | SYSTOLIC BLOOD PRESSURE: 150 MMHG | HEART RATE: 72 BPM | BODY MASS INDEX: 36.96 KG/M2 | DIASTOLIC BLOOD PRESSURE: 60 MMHG

## 2018-04-24 DIAGNOSIS — R06.02 SOB (SHORTNESS OF BREATH): ICD-10-CM

## 2018-04-24 DIAGNOSIS — I10 HYPERTENSION, UNSPECIFIED TYPE: ICD-10-CM

## 2018-04-24 DIAGNOSIS — I25.119 CORONARY ARTERY DISEASE INVOLVING NATIVE CORONARY ARTERY OF NATIVE HEART WITH ANGINA PECTORIS (HCC): ICD-10-CM

## 2018-04-24 DIAGNOSIS — E78.5 HYPERLIPIDEMIA, UNSPECIFIED HYPERLIPIDEMIA TYPE: ICD-10-CM

## 2018-04-24 DIAGNOSIS — Z79.4 TYPE 2 DIABETES MELLITUS WITH OTHER SPECIFIED COMPLICATION, WITH LONG-TERM CURRENT USE OF INSULIN (HCC): ICD-10-CM

## 2018-04-24 DIAGNOSIS — I10 HYPERTENSION, UNSPECIFIED TYPE: Primary | ICD-10-CM

## 2018-04-24 DIAGNOSIS — E11.69 TYPE 2 DIABETES MELLITUS WITH OTHER SPECIFIED COMPLICATION, WITH LONG-TERM CURRENT USE OF INSULIN (HCC): ICD-10-CM

## 2018-04-24 DIAGNOSIS — I48.91 ATRIAL FIBRILLATION, UNSPECIFIED TYPE (HCC): ICD-10-CM

## 2018-04-24 LAB
ANION GAP SERPL CALCULATED.3IONS-SCNC: 11 MMOL/L (ref 8–16)
BUN BLDV-MCNC: 22 MG/DL (ref 8–23)
BUN/CREAT BLD: 22 (ref 9–20)
CALCIUM SERPL-MCNC: 9.4 MG/DL (ref 8.6–10.4)
CHLORIDE BLD-SCNC: 100 MMOL/L (ref 98–107)
CO2: 29 MMOL/L (ref 20–31)
CREAT SERPL-MCNC: 0.98 MG/DL (ref 0.7–1.2)
GFR AFRICAN AMERICAN: >60 ML/MIN
GFR NON-AFRICAN AMERICAN: >60 ML/MIN
GFR SERPL CREATININE-BSD FRML MDRD: ABNORMAL ML/MIN/{1.73_M2}
GFR SERPL CREATININE-BSD FRML MDRD: ABNORMAL ML/MIN/{1.73_M2}
GLUCOSE BLD-MCNC: 309 MG/DL (ref 70–99)
POTASSIUM SERPL-SCNC: 4.7 MMOL/L (ref 3.7–5.3)
SODIUM BLD-SCNC: 140 MMOL/L (ref 135–144)

## 2018-04-24 PROCEDURE — 1123F ACP DISCUSS/DSCN MKR DOCD: CPT | Performed by: INTERNAL MEDICINE

## 2018-04-24 PROCEDURE — 36415 COLL VENOUS BLD VENIPUNCTURE: CPT

## 2018-04-24 PROCEDURE — G8427 DOCREV CUR MEDS BY ELIG CLIN: HCPCS | Performed by: INTERNAL MEDICINE

## 2018-04-24 PROCEDURE — 80048 BASIC METABOLIC PNL TOTAL CA: CPT

## 2018-04-24 PROCEDURE — G8598 ASA/ANTIPLAT THER USED: HCPCS | Performed by: INTERNAL MEDICINE

## 2018-04-24 PROCEDURE — 4040F PNEUMOC VAC/ADMIN/RCVD: CPT | Performed by: INTERNAL MEDICINE

## 2018-04-24 PROCEDURE — 99213 OFFICE O/P EST LOW 20 MIN: CPT | Performed by: INTERNAL MEDICINE

## 2018-04-24 PROCEDURE — 1036F TOBACCO NON-USER: CPT | Performed by: INTERNAL MEDICINE

## 2018-04-24 PROCEDURE — 3017F COLORECTAL CA SCREEN DOC REV: CPT | Performed by: INTERNAL MEDICINE

## 2018-04-24 PROCEDURE — G8417 CALC BMI ABV UP PARAM F/U: HCPCS | Performed by: INTERNAL MEDICINE

## 2018-04-24 RX ORDER — DOFETILIDE 0.25 MG/1
250 CAPSULE ORAL 2 TIMES DAILY
COMMUNITY
End: 2018-05-18 | Stop reason: ALTCHOICE

## 2018-04-24 RX ORDER — SPIRONOLACTONE 25 MG/1
25 TABLET ORAL 2 TIMES DAILY
COMMUNITY
End: 2019-12-30

## 2018-04-24 RX ORDER — CELECOXIB 200 MG/1
200 CAPSULE ORAL 2 TIMES DAILY
COMMUNITY
End: 2018-07-31 | Stop reason: SDUPTHER

## 2018-05-01 ENCOUNTER — OFFICE VISIT (OUTPATIENT)
Dept: CARDIOLOGY CLINIC | Age: 68
End: 2018-05-01
Payer: MEDICARE

## 2018-05-01 ENCOUNTER — HOSPITAL ENCOUNTER (OUTPATIENT)
Age: 68
Discharge: HOME | End: 2018-05-01
Payer: MEDICARE

## 2018-05-01 VITALS
WEIGHT: 261 LBS | DIASTOLIC BLOOD PRESSURE: 60 MMHG | BODY MASS INDEX: 36.4 KG/M2 | SYSTOLIC BLOOD PRESSURE: 130 MMHG | HEART RATE: 74 BPM

## 2018-05-01 DIAGNOSIS — R06.02 SOB (SHORTNESS OF BREATH): ICD-10-CM

## 2018-05-01 DIAGNOSIS — I10 HYPERTENSION, UNSPECIFIED TYPE: ICD-10-CM

## 2018-05-01 DIAGNOSIS — I48.91 ATRIAL FIBRILLATION, UNSPECIFIED TYPE (HCC): ICD-10-CM

## 2018-05-01 DIAGNOSIS — I48.91 ATRIAL FIBRILLATION, UNSPECIFIED TYPE (HCC): Primary | ICD-10-CM

## 2018-05-01 LAB
ANION GAP SERPL CALCULATED.3IONS-SCNC: 11 MMOL/L (ref 9–17)
BUN BLDV-MCNC: 20 MG/DL (ref 8–23)
BUN/CREAT BLD: 22 (ref 9–20)
CALCIUM SERPL-MCNC: 8.9 MG/DL (ref 8.6–10.4)
CHLORIDE BLD-SCNC: 100 MMOL/L (ref 98–107)
CO2: 28 MMOL/L (ref 20–31)
CREAT SERPL-MCNC: 0.89 MG/DL (ref 0.7–1.2)
GFR AFRICAN AMERICAN: >60 ML/MIN
GFR NON-AFRICAN AMERICAN: >60 ML/MIN
GFR SERPL CREATININE-BSD FRML MDRD: ABNORMAL ML/MIN/{1.73_M2}
GFR SERPL CREATININE-BSD FRML MDRD: ABNORMAL ML/MIN/{1.73_M2}
GLUCOSE BLD-MCNC: 201 MG/DL (ref 70–99)
POTASSIUM SERPL-SCNC: 4.5 MMOL/L (ref 3.7–5.3)
SODIUM BLD-SCNC: 139 MMOL/L (ref 135–144)

## 2018-05-01 PROCEDURE — 80048 BASIC METABOLIC PNL TOTAL CA: CPT

## 2018-05-01 PROCEDURE — G8417 CALC BMI ABV UP PARAM F/U: HCPCS | Performed by: INTERNAL MEDICINE

## 2018-05-01 PROCEDURE — 3017F COLORECTAL CA SCREEN DOC REV: CPT | Performed by: INTERNAL MEDICINE

## 2018-05-01 PROCEDURE — 36415 COLL VENOUS BLD VENIPUNCTURE: CPT

## 2018-05-01 PROCEDURE — G8428 CUR MEDS NOT DOCUMENT: HCPCS | Performed by: INTERNAL MEDICINE

## 2018-05-01 PROCEDURE — 99211 OFF/OP EST MAY X REQ PHY/QHP: CPT | Performed by: INTERNAL MEDICINE

## 2018-05-08 ENCOUNTER — OFFICE VISIT (OUTPATIENT)
Dept: CARDIOLOGY CLINIC | Age: 68
End: 2018-05-08
Payer: MEDICARE

## 2018-05-08 ENCOUNTER — HOSPITAL ENCOUNTER (OUTPATIENT)
Age: 68
Discharge: HOME | End: 2018-05-08
Payer: MEDICARE

## 2018-05-08 VITALS
WEIGHT: 258 LBS | OXYGEN SATURATION: 96 % | BODY MASS INDEX: 35.98 KG/M2 | DIASTOLIC BLOOD PRESSURE: 60 MMHG | HEART RATE: 76 BPM | SYSTOLIC BLOOD PRESSURE: 140 MMHG

## 2018-05-08 DIAGNOSIS — I10 HYPERTENSION, UNSPECIFIED TYPE: ICD-10-CM

## 2018-05-08 DIAGNOSIS — I10 HYPERTENSION, UNSPECIFIED TYPE: Primary | ICD-10-CM

## 2018-05-08 DIAGNOSIS — R06.02 SOB (SHORTNESS OF BREATH): ICD-10-CM

## 2018-05-08 DIAGNOSIS — I25.119 CORONARY ARTERY DISEASE INVOLVING NATIVE CORONARY ARTERY OF NATIVE HEART WITH ANGINA PECTORIS (HCC): ICD-10-CM

## 2018-05-08 LAB
ANION GAP SERPL CALCULATED.3IONS-SCNC: 12 MMOL/L (ref 9–17)
BUN BLDV-MCNC: 26 MG/DL (ref 8–23)
BUN/CREAT BLD: 29 (ref 9–20)
CALCIUM SERPL-MCNC: 9.2 MG/DL (ref 8.6–10.4)
CHLORIDE BLD-SCNC: 99 MMOL/L (ref 98–107)
CO2: 27 MMOL/L (ref 20–31)
CREAT SERPL-MCNC: 0.9 MG/DL (ref 0.7–1.2)
GFR AFRICAN AMERICAN: >60 ML/MIN
GFR NON-AFRICAN AMERICAN: >60 ML/MIN
GFR SERPL CREATININE-BSD FRML MDRD: ABNORMAL ML/MIN/{1.73_M2}
GFR SERPL CREATININE-BSD FRML MDRD: ABNORMAL ML/MIN/{1.73_M2}
GLUCOSE BLD-MCNC: 208 MG/DL (ref 70–99)
POTASSIUM SERPL-SCNC: 4.5 MMOL/L (ref 3.7–5.3)
SODIUM BLD-SCNC: 138 MMOL/L (ref 135–144)

## 2018-05-08 PROCEDURE — G8417 CALC BMI ABV UP PARAM F/U: HCPCS | Performed by: INTERNAL MEDICINE

## 2018-05-08 PROCEDURE — 99212 OFFICE O/P EST SF 10 MIN: CPT | Performed by: INTERNAL MEDICINE

## 2018-05-08 PROCEDURE — 1123F ACP DISCUSS/DSCN MKR DOCD: CPT | Performed by: INTERNAL MEDICINE

## 2018-05-08 PROCEDURE — 1036F TOBACCO NON-USER: CPT | Performed by: INTERNAL MEDICINE

## 2018-05-08 PROCEDURE — 80048 BASIC METABOLIC PNL TOTAL CA: CPT

## 2018-05-08 PROCEDURE — G8428 CUR MEDS NOT DOCUMENT: HCPCS | Performed by: INTERNAL MEDICINE

## 2018-05-08 PROCEDURE — G8598 ASA/ANTIPLAT THER USED: HCPCS | Performed by: INTERNAL MEDICINE

## 2018-05-08 PROCEDURE — 36415 COLL VENOUS BLD VENIPUNCTURE: CPT

## 2018-05-08 PROCEDURE — 3017F COLORECTAL CA SCREEN DOC REV: CPT | Performed by: INTERNAL MEDICINE

## 2018-05-08 PROCEDURE — 4040F PNEUMOC VAC/ADMIN/RCVD: CPT | Performed by: INTERNAL MEDICINE

## 2018-05-08 RX ORDER — FUROSEMIDE 40 MG/1
80 TABLET ORAL 2 TIMES DAILY
COMMUNITY
End: 2018-05-31 | Stop reason: CLARIF

## 2018-05-18 ENCOUNTER — HOSPITAL ENCOUNTER (OUTPATIENT)
Age: 68
Discharge: HOME | End: 2018-05-18
Payer: MEDICARE

## 2018-05-18 ENCOUNTER — OFFICE VISIT (OUTPATIENT)
Dept: CARDIOLOGY CLINIC | Age: 68
End: 2018-05-18
Payer: MEDICARE

## 2018-05-18 VITALS
DIASTOLIC BLOOD PRESSURE: 70 MMHG | BODY MASS INDEX: 35.84 KG/M2 | SYSTOLIC BLOOD PRESSURE: 160 MMHG | WEIGHT: 257 LBS | HEART RATE: 75 BPM | OXYGEN SATURATION: 95 %

## 2018-05-18 DIAGNOSIS — I48.91 ATRIAL FIBRILLATION, UNSPECIFIED TYPE (HCC): Primary | ICD-10-CM

## 2018-05-18 DIAGNOSIS — I10 HYPERTENSION, UNSPECIFIED TYPE: ICD-10-CM

## 2018-05-18 DIAGNOSIS — I25.119 CORONARY ARTERY DISEASE INVOLVING NATIVE CORONARY ARTERY OF NATIVE HEART WITH ANGINA PECTORIS (HCC): ICD-10-CM

## 2018-05-18 LAB
ANION GAP SERPL CALCULATED.3IONS-SCNC: 10 MMOL/L (ref 9–17)
BUN BLDV-MCNC: 23 MG/DL (ref 8–23)
BUN/CREAT BLD: 23 (ref 9–20)
CALCIUM SERPL-MCNC: 8.9 MG/DL (ref 8.6–10.4)
CHLORIDE BLD-SCNC: 102 MMOL/L (ref 98–107)
CO2: 28 MMOL/L (ref 20–31)
CREAT SERPL-MCNC: 0.99 MG/DL (ref 0.7–1.2)
GFR AFRICAN AMERICAN: >60 ML/MIN
GFR NON-AFRICAN AMERICAN: >60 ML/MIN
GFR SERPL CREATININE-BSD FRML MDRD: ABNORMAL ML/MIN/{1.73_M2}
GFR SERPL CREATININE-BSD FRML MDRD: ABNORMAL ML/MIN/{1.73_M2}
GLUCOSE BLD-MCNC: 189 MG/DL (ref 70–99)
POTASSIUM SERPL-SCNC: 4.8 MMOL/L (ref 3.7–5.3)
SODIUM BLD-SCNC: 140 MMOL/L (ref 135–144)

## 2018-05-18 PROCEDURE — 99213 OFFICE O/P EST LOW 20 MIN: CPT | Performed by: INTERNAL MEDICINE

## 2018-05-18 PROCEDURE — G8417 CALC BMI ABV UP PARAM F/U: HCPCS | Performed by: INTERNAL MEDICINE

## 2018-05-18 PROCEDURE — 4040F PNEUMOC VAC/ADMIN/RCVD: CPT | Performed by: INTERNAL MEDICINE

## 2018-05-18 PROCEDURE — 1123F ACP DISCUSS/DSCN MKR DOCD: CPT | Performed by: INTERNAL MEDICINE

## 2018-05-18 PROCEDURE — G8598 ASA/ANTIPLAT THER USED: HCPCS | Performed by: INTERNAL MEDICINE

## 2018-05-18 PROCEDURE — G8427 DOCREV CUR MEDS BY ELIG CLIN: HCPCS | Performed by: INTERNAL MEDICINE

## 2018-05-18 PROCEDURE — 36415 COLL VENOUS BLD VENIPUNCTURE: CPT

## 2018-05-18 PROCEDURE — 1036F TOBACCO NON-USER: CPT | Performed by: INTERNAL MEDICINE

## 2018-05-18 PROCEDURE — 80048 BASIC METABOLIC PNL TOTAL CA: CPT

## 2018-05-18 PROCEDURE — 3017F COLORECTAL CA SCREEN DOC REV: CPT | Performed by: INTERNAL MEDICINE

## 2018-05-23 ENCOUNTER — HOSPITAL ENCOUNTER (OUTPATIENT)
Age: 68
Discharge: HOME | End: 2018-05-23
Payer: MEDICARE

## 2018-05-23 DIAGNOSIS — I25.119 CORONARY ARTERY DISEASE INVOLVING NATIVE CORONARY ARTERY OF NATIVE HEART WITH ANGINA PECTORIS (HCC): ICD-10-CM

## 2018-05-23 DIAGNOSIS — I10 HYPERTENSION, UNSPECIFIED TYPE: ICD-10-CM

## 2018-05-23 LAB
ANION GAP SERPL CALCULATED.3IONS-SCNC: 11 MMOL/L (ref 9–17)
BUN BLDV-MCNC: 30 MG/DL (ref 8–23)
BUN/CREAT BLD: 31 (ref 9–20)
CALCIUM SERPL-MCNC: 8.7 MG/DL (ref 8.6–10.4)
CHLORIDE BLD-SCNC: 98 MMOL/L (ref 98–107)
CO2: 27 MMOL/L (ref 20–31)
CREAT SERPL-MCNC: 0.96 MG/DL (ref 0.7–1.2)
GFR AFRICAN AMERICAN: >60 ML/MIN
GFR NON-AFRICAN AMERICAN: >60 ML/MIN
GFR SERPL CREATININE-BSD FRML MDRD: ABNORMAL ML/MIN/{1.73_M2}
GFR SERPL CREATININE-BSD FRML MDRD: ABNORMAL ML/MIN/{1.73_M2}
GLUCOSE BLD-MCNC: 178 MG/DL (ref 70–99)
POTASSIUM SERPL-SCNC: 4.6 MMOL/L (ref 3.7–5.3)
SODIUM BLD-SCNC: 136 MMOL/L (ref 135–144)

## 2018-05-23 PROCEDURE — 36415 COLL VENOUS BLD VENIPUNCTURE: CPT

## 2018-05-23 PROCEDURE — 83735 ASSAY OF MAGNESIUM: CPT

## 2018-05-23 PROCEDURE — 80048 BASIC METABOLIC PNL TOTAL CA: CPT

## 2018-05-25 ENCOUNTER — OFFICE VISIT (OUTPATIENT)
Dept: CARDIOLOGY CLINIC | Age: 68
End: 2018-05-25
Payer: MEDICARE

## 2018-05-25 VITALS
DIASTOLIC BLOOD PRESSURE: 60 MMHG | HEART RATE: 76 BPM | SYSTOLIC BLOOD PRESSURE: 140 MMHG | WEIGHT: 258 LBS | BODY MASS INDEX: 35.98 KG/M2

## 2018-05-25 DIAGNOSIS — J43.9 MIXED RESTRICTIVE AND OBSTRUCTIVE LUNG DISEASE (HCC): ICD-10-CM

## 2018-05-25 DIAGNOSIS — I27.20 PULMONARY HTN (HCC): Primary | ICD-10-CM

## 2018-05-25 DIAGNOSIS — J98.4 MIXED RESTRICTIVE AND OBSTRUCTIVE LUNG DISEASE (HCC): ICD-10-CM

## 2018-05-25 DIAGNOSIS — R06.02 SOB (SHORTNESS OF BREATH): ICD-10-CM

## 2018-05-25 LAB — MAGNESIUM: 2.2 MG/DL (ref 1.6–2.6)

## 2018-05-25 PROCEDURE — 99212 OFFICE O/P EST SF 10 MIN: CPT | Performed by: INTERNAL MEDICINE

## 2018-05-25 PROCEDURE — 1123F ACP DISCUSS/DSCN MKR DOCD: CPT | Performed by: INTERNAL MEDICINE

## 2018-05-25 PROCEDURE — G8427 DOCREV CUR MEDS BY ELIG CLIN: HCPCS | Performed by: INTERNAL MEDICINE

## 2018-05-25 PROCEDURE — 4040F PNEUMOC VAC/ADMIN/RCVD: CPT | Performed by: INTERNAL MEDICINE

## 2018-05-25 PROCEDURE — 3017F COLORECTAL CA SCREEN DOC REV: CPT | Performed by: INTERNAL MEDICINE

## 2018-05-25 PROCEDURE — G8417 CALC BMI ABV UP PARAM F/U: HCPCS | Performed by: INTERNAL MEDICINE

## 2018-05-25 PROCEDURE — 1036F TOBACCO NON-USER: CPT | Performed by: INTERNAL MEDICINE

## 2018-05-25 PROCEDURE — G8926 SPIRO NO PERF OR DOC: HCPCS | Performed by: INTERNAL MEDICINE

## 2018-05-25 PROCEDURE — 3023F SPIROM DOC REV: CPT | Performed by: INTERNAL MEDICINE

## 2018-05-25 PROCEDURE — G8598 ASA/ANTIPLAT THER USED: HCPCS | Performed by: INTERNAL MEDICINE

## 2018-05-30 ENCOUNTER — HOSPITAL ENCOUNTER (OUTPATIENT)
Age: 68
Discharge: HOME | End: 2018-05-30
Payer: MEDICARE

## 2018-05-30 DIAGNOSIS — J98.4 MIXED RESTRICTIVE AND OBSTRUCTIVE LUNG DISEASE (HCC): ICD-10-CM

## 2018-05-30 DIAGNOSIS — J43.9 MIXED RESTRICTIVE AND OBSTRUCTIVE LUNG DISEASE (HCC): ICD-10-CM

## 2018-05-30 DIAGNOSIS — I27.20 PULMONARY HTN (HCC): ICD-10-CM

## 2018-05-30 DIAGNOSIS — R06.02 SOB (SHORTNESS OF BREATH): ICD-10-CM

## 2018-05-30 LAB
ANION GAP SERPL CALCULATED.3IONS-SCNC: 10 MMOL/L (ref 9–17)
BUN BLDV-MCNC: 32 MG/DL (ref 8–23)
BUN/CREAT BLD: 28 (ref 9–20)
CALCIUM SERPL-MCNC: 8.8 MG/DL (ref 8.6–10.4)
CHLORIDE BLD-SCNC: 102 MMOL/L (ref 98–107)
CO2: 28 MMOL/L (ref 20–31)
CREAT SERPL-MCNC: 1.16 MG/DL (ref 0.7–1.2)
GFR AFRICAN AMERICAN: >60 ML/MIN
GFR NON-AFRICAN AMERICAN: >60 ML/MIN
GFR SERPL CREATININE-BSD FRML MDRD: ABNORMAL ML/MIN/{1.73_M2}
GFR SERPL CREATININE-BSD FRML MDRD: ABNORMAL ML/MIN/{1.73_M2}
GLUCOSE BLD-MCNC: 193 MG/DL (ref 70–99)
MAGNESIUM: 2.2 MG/DL (ref 1.6–2.6)
POTASSIUM SERPL-SCNC: 4.7 MMOL/L (ref 3.7–5.3)
SODIUM BLD-SCNC: 140 MMOL/L (ref 135–144)

## 2018-05-30 PROCEDURE — 83735 ASSAY OF MAGNESIUM: CPT

## 2018-05-30 PROCEDURE — 36415 COLL VENOUS BLD VENIPUNCTURE: CPT

## 2018-05-30 PROCEDURE — 80048 BASIC METABOLIC PNL TOTAL CA: CPT

## 2018-05-31 ENCOUNTER — OFFICE VISIT (OUTPATIENT)
Dept: CARDIOLOGY CLINIC | Age: 68
End: 2018-05-31
Payer: MEDICARE

## 2018-05-31 VITALS
WEIGHT: 255 LBS | HEART RATE: 77 BPM | DIASTOLIC BLOOD PRESSURE: 60 MMHG | OXYGEN SATURATION: 98 % | BODY MASS INDEX: 35.57 KG/M2 | SYSTOLIC BLOOD PRESSURE: 138 MMHG

## 2018-05-31 DIAGNOSIS — I10 HYPERTENSION, UNSPECIFIED TYPE: Primary | ICD-10-CM

## 2018-05-31 PROCEDURE — G8598 ASA/ANTIPLAT THER USED: HCPCS | Performed by: INTERNAL MEDICINE

## 2018-05-31 PROCEDURE — 99212 OFFICE O/P EST SF 10 MIN: CPT | Performed by: INTERNAL MEDICINE

## 2018-05-31 PROCEDURE — G8417 CALC BMI ABV UP PARAM F/U: HCPCS | Performed by: INTERNAL MEDICINE

## 2018-05-31 PROCEDURE — 1123F ACP DISCUSS/DSCN MKR DOCD: CPT | Performed by: INTERNAL MEDICINE

## 2018-05-31 PROCEDURE — 1036F TOBACCO NON-USER: CPT | Performed by: INTERNAL MEDICINE

## 2018-05-31 PROCEDURE — 4040F PNEUMOC VAC/ADMIN/RCVD: CPT | Performed by: INTERNAL MEDICINE

## 2018-05-31 PROCEDURE — G8427 DOCREV CUR MEDS BY ELIG CLIN: HCPCS | Performed by: INTERNAL MEDICINE

## 2018-05-31 PROCEDURE — 3017F COLORECTAL CA SCREEN DOC REV: CPT | Performed by: INTERNAL MEDICINE

## 2018-05-31 RX ORDER — FUROSEMIDE 40 MG/1
80 TABLET ORAL 2 TIMES DAILY
Qty: 360 TABLET | Refills: 3 | Status: SHIPPED | OUTPATIENT
Start: 2018-05-31 | End: 2018-09-18 | Stop reason: SDUPTHER

## 2018-05-31 RX ORDER — FUROSEMIDE 80 MG
80 TABLET ORAL 2 TIMES DAILY
COMMUNITY
End: 2018-05-31 | Stop reason: SDUPTHER

## 2018-06-06 ENCOUNTER — HOSPITAL ENCOUNTER (OUTPATIENT)
Age: 68
Discharge: HOME | End: 2018-06-06
Payer: MEDICARE

## 2018-06-06 DIAGNOSIS — I10 HYPERTENSION, UNSPECIFIED TYPE: ICD-10-CM

## 2018-06-06 LAB
ANION GAP SERPL CALCULATED.3IONS-SCNC: 13 MMOL/L (ref 9–17)
BUN BLDV-MCNC: 39 MG/DL (ref 8–23)
BUN/CREAT BLD: 31 (ref 9–20)
CALCIUM SERPL-MCNC: 9.2 MG/DL (ref 8.6–10.4)
CHLORIDE BLD-SCNC: 100 MMOL/L (ref 98–107)
CO2: 28 MMOL/L (ref 20–31)
CREAT SERPL-MCNC: 1.26 MG/DL (ref 0.7–1.2)
GFR AFRICAN AMERICAN: >60 ML/MIN
GFR NON-AFRICAN AMERICAN: 57 ML/MIN
GFR SERPL CREATININE-BSD FRML MDRD: ABNORMAL ML/MIN/{1.73_M2}
GFR SERPL CREATININE-BSD FRML MDRD: ABNORMAL ML/MIN/{1.73_M2}
GLUCOSE BLD-MCNC: 105 MG/DL (ref 70–99)
POTASSIUM SERPL-SCNC: 4.4 MMOL/L (ref 3.7–5.3)
SODIUM BLD-SCNC: 141 MMOL/L (ref 135–144)

## 2018-06-06 PROCEDURE — 80048 BASIC METABOLIC PNL TOTAL CA: CPT

## 2018-06-06 PROCEDURE — 36415 COLL VENOUS BLD VENIPUNCTURE: CPT

## 2018-06-07 ENCOUNTER — HOSPITAL ENCOUNTER (OUTPATIENT)
Dept: CARDIAC REHAB | Age: 68
Setting detail: THERAPIES SERIES
Discharge: HOME | End: 2018-06-07
Payer: MEDICARE

## 2018-06-07 ENCOUNTER — TELEPHONE (OUTPATIENT)
Dept: CARDIOLOGY CLINIC | Age: 68
End: 2018-06-07

## 2018-06-07 DIAGNOSIS — I48.0 PAF (PAROXYSMAL ATRIAL FIBRILLATION) (HCC): ICD-10-CM

## 2018-06-07 DIAGNOSIS — I27.20 PULMONARY HTN (HCC): ICD-10-CM

## 2018-06-07 DIAGNOSIS — I25.119 CORONARY ARTERY DISEASE INVOLVING NATIVE CORONARY ARTERY OF NATIVE HEART WITH ANGINA PECTORIS (HCC): ICD-10-CM

## 2018-06-07 DIAGNOSIS — J98.4 MIXED RESTRICTIVE AND OBSTRUCTIVE LUNG DISEASE (HCC): ICD-10-CM

## 2018-06-07 DIAGNOSIS — I50.32 CHRONIC DIASTOLIC CHF (CONGESTIVE HEART FAILURE), NYHA CLASS 3 (HCC): ICD-10-CM

## 2018-06-07 DIAGNOSIS — J43.9 MIXED RESTRICTIVE AND OBSTRUCTIVE LUNG DISEASE (HCC): ICD-10-CM

## 2018-06-07 DIAGNOSIS — G47.33 OSA TREATED WITH BIPAP: ICD-10-CM

## 2018-06-07 DIAGNOSIS — I10 HYPERTENSION, UNSPECIFIED TYPE: Primary | ICD-10-CM

## 2018-06-07 PROCEDURE — 99211 OFF/OP EST MAY X REQ PHY/QHP: CPT

## 2018-06-07 RX ORDER — BACLOFEN 20 MG
500 TABLET ORAL DAILY
COMMUNITY

## 2018-06-11 VITALS
OXYGEN SATURATION: 95 % | SYSTOLIC BLOOD PRESSURE: 146 MMHG | HEART RATE: 72 BPM | BODY MASS INDEX: 34.87 KG/M2 | DIASTOLIC BLOOD PRESSURE: 62 MMHG | RESPIRATION RATE: 16 BRPM | WEIGHT: 250 LBS

## 2018-06-13 ENCOUNTER — HOSPITAL ENCOUNTER (OUTPATIENT)
Age: 68
Discharge: HOME | End: 2018-06-13
Payer: MEDICARE

## 2018-06-13 DIAGNOSIS — I25.119 CORONARY ARTERY DISEASE INVOLVING NATIVE CORONARY ARTERY OF NATIVE HEART WITH ANGINA PECTORIS (HCC): ICD-10-CM

## 2018-06-13 DIAGNOSIS — I50.32 CHRONIC DIASTOLIC CHF (CONGESTIVE HEART FAILURE), NYHA CLASS 3 (HCC): ICD-10-CM

## 2018-06-13 DIAGNOSIS — I10 HYPERTENSION, UNSPECIFIED TYPE: ICD-10-CM

## 2018-06-13 DIAGNOSIS — I48.0 PAF (PAROXYSMAL ATRIAL FIBRILLATION) (HCC): ICD-10-CM

## 2018-06-13 DIAGNOSIS — I27.20 PULMONARY HTN (HCC): ICD-10-CM

## 2018-06-13 LAB
ANION GAP SERPL CALCULATED.3IONS-SCNC: 10 MMOL/L (ref 9–17)
BUN BLDV-MCNC: 28 MG/DL (ref 8–23)
BUN/CREAT BLD: 26 (ref 9–20)
CALCIUM SERPL-MCNC: 8.6 MG/DL (ref 8.6–10.4)
CHLORIDE BLD-SCNC: 100 MMOL/L (ref 98–107)
CO2: 26 MMOL/L (ref 20–31)
CREAT SERPL-MCNC: 1.09 MG/DL (ref 0.7–1.2)
GFR AFRICAN AMERICAN: >60 ML/MIN
GFR NON-AFRICAN AMERICAN: >60 ML/MIN
GFR SERPL CREATININE-BSD FRML MDRD: ABNORMAL ML/MIN/{1.73_M2}
GFR SERPL CREATININE-BSD FRML MDRD: ABNORMAL ML/MIN/{1.73_M2}
GLUCOSE BLD-MCNC: 202 MG/DL (ref 70–99)
POTASSIUM SERPL-SCNC: 4.6 MMOL/L (ref 3.7–5.3)
SODIUM BLD-SCNC: 136 MMOL/L (ref 135–144)

## 2018-06-13 PROCEDURE — 36415 COLL VENOUS BLD VENIPUNCTURE: CPT

## 2018-06-13 PROCEDURE — 80048 BASIC METABOLIC PNL TOTAL CA: CPT

## 2018-06-14 ENCOUNTER — HOSPITAL ENCOUNTER (OUTPATIENT)
Dept: CARDIAC REHAB | Age: 68
Setting detail: THERAPIES SERIES
Discharge: HOME | End: 2018-06-14
Payer: MEDICARE

## 2018-06-14 VITALS
DIASTOLIC BLOOD PRESSURE: 62 MMHG | WEIGHT: 258 LBS | RESPIRATION RATE: 16 BRPM | OXYGEN SATURATION: 96 % | HEART RATE: 72 BPM | BODY MASS INDEX: 35.98 KG/M2 | SYSTOLIC BLOOD PRESSURE: 140 MMHG

## 2018-06-14 PROCEDURE — 99211 OFF/OP EST MAY X REQ PHY/QHP: CPT

## 2018-06-18 ENCOUNTER — TELEPHONE (OUTPATIENT)
Dept: CARDIOLOGY CLINIC | Age: 68
End: 2018-06-18

## 2018-06-18 DIAGNOSIS — J43.9 MIXED RESTRICTIVE AND OBSTRUCTIVE LUNG DISEASE (HCC): ICD-10-CM

## 2018-06-18 DIAGNOSIS — I10 HYPERTENSION, UNSPECIFIED TYPE: Primary | ICD-10-CM

## 2018-06-18 DIAGNOSIS — I27.20 PULMONARY HTN (HCC): ICD-10-CM

## 2018-06-18 DIAGNOSIS — Z95.1 S/P CABG (CORONARY ARTERY BYPASS GRAFT): ICD-10-CM

## 2018-06-18 DIAGNOSIS — G47.33 OSA TREATED WITH BIPAP: ICD-10-CM

## 2018-06-18 DIAGNOSIS — I25.119 CORONARY ARTERY DISEASE INVOLVING NATIVE CORONARY ARTERY OF NATIVE HEART WITH ANGINA PECTORIS (HCC): ICD-10-CM

## 2018-06-18 DIAGNOSIS — I50.32 CHRONIC DIASTOLIC CHF (CONGESTIVE HEART FAILURE), NYHA CLASS 3 (HCC): ICD-10-CM

## 2018-06-18 DIAGNOSIS — J98.4 MIXED RESTRICTIVE AND OBSTRUCTIVE LUNG DISEASE (HCC): ICD-10-CM

## 2018-06-20 ENCOUNTER — HOSPITAL ENCOUNTER (OUTPATIENT)
Age: 68
Discharge: HOME | End: 2018-06-20
Payer: MEDICARE

## 2018-06-20 DIAGNOSIS — Z95.1 S/P CABG (CORONARY ARTERY BYPASS GRAFT): ICD-10-CM

## 2018-06-20 DIAGNOSIS — I10 HYPERTENSION, UNSPECIFIED TYPE: ICD-10-CM

## 2018-06-20 DIAGNOSIS — I27.20 PULMONARY HTN (HCC): ICD-10-CM

## 2018-06-20 DIAGNOSIS — I25.119 CORONARY ARTERY DISEASE INVOLVING NATIVE CORONARY ARTERY OF NATIVE HEART WITH ANGINA PECTORIS (HCC): ICD-10-CM

## 2018-06-20 DIAGNOSIS — I50.32 CHRONIC DIASTOLIC CHF (CONGESTIVE HEART FAILURE), NYHA CLASS 3 (HCC): ICD-10-CM

## 2018-06-20 DIAGNOSIS — G47.33 OSA TREATED WITH BIPAP: ICD-10-CM

## 2018-06-20 DIAGNOSIS — J43.9 MIXED RESTRICTIVE AND OBSTRUCTIVE LUNG DISEASE (HCC): ICD-10-CM

## 2018-06-20 DIAGNOSIS — J98.4 MIXED RESTRICTIVE AND OBSTRUCTIVE LUNG DISEASE (HCC): ICD-10-CM

## 2018-06-20 LAB
ANION GAP SERPL CALCULATED.3IONS-SCNC: 14 MMOL/L (ref 9–17)
BUN BLDV-MCNC: 37 MG/DL (ref 8–23)
BUN/CREAT BLD: 33 (ref 9–20)
CALCIUM SERPL-MCNC: 8.9 MG/DL (ref 8.6–10.4)
CHLORIDE BLD-SCNC: 102 MMOL/L (ref 98–107)
CO2: 25 MMOL/L (ref 20–31)
CREAT SERPL-MCNC: 1.13 MG/DL (ref 0.7–1.2)
GFR AFRICAN AMERICAN: >60 ML/MIN
GFR NON-AFRICAN AMERICAN: >60 ML/MIN
GFR SERPL CREATININE-BSD FRML MDRD: ABNORMAL ML/MIN/{1.73_M2}
GFR SERPL CREATININE-BSD FRML MDRD: ABNORMAL ML/MIN/{1.73_M2}
GLUCOSE BLD-MCNC: 157 MG/DL (ref 70–99)
MAGNESIUM: 2.2 MG/DL (ref 1.6–2.6)
POTASSIUM SERPL-SCNC: 4.6 MMOL/L (ref 3.7–5.3)
SODIUM BLD-SCNC: 141 MMOL/L (ref 135–144)

## 2018-06-20 PROCEDURE — 80048 BASIC METABOLIC PNL TOTAL CA: CPT

## 2018-06-20 PROCEDURE — 36415 COLL VENOUS BLD VENIPUNCTURE: CPT

## 2018-06-20 PROCEDURE — 83735 ASSAY OF MAGNESIUM: CPT

## 2018-06-21 ENCOUNTER — HOSPITAL ENCOUNTER (OUTPATIENT)
Dept: CARDIAC REHAB | Age: 68
Setting detail: THERAPIES SERIES
Discharge: HOME | End: 2018-06-21
Payer: MEDICARE

## 2018-06-21 VITALS
DIASTOLIC BLOOD PRESSURE: 52 MMHG | OXYGEN SATURATION: 97 % | BODY MASS INDEX: 35.33 KG/M2 | HEART RATE: 72 BPM | SYSTOLIC BLOOD PRESSURE: 128 MMHG | WEIGHT: 253.3 LBS

## 2018-06-21 PROCEDURE — 99211 OFF/OP EST MAY X REQ PHY/QHP: CPT

## 2018-06-22 ENCOUNTER — TELEPHONE (OUTPATIENT)
Dept: CARDIOLOGY CLINIC | Age: 68
End: 2018-06-22

## 2018-06-22 DIAGNOSIS — I48.91 ATRIAL FIBRILLATION, UNSPECIFIED TYPE (HCC): ICD-10-CM

## 2018-06-22 DIAGNOSIS — I50.9 CHRONIC CONGESTIVE HEART FAILURE, UNSPECIFIED CONGESTIVE HEART FAILURE TYPE: ICD-10-CM

## 2018-06-22 DIAGNOSIS — I25.119 CORONARY ARTERY DISEASE INVOLVING NATIVE CORONARY ARTERY OF NATIVE HEART WITH ANGINA PECTORIS (HCC): Primary | ICD-10-CM

## 2018-06-22 DIAGNOSIS — I27.20 PULMONARY HTN (HCC): ICD-10-CM

## 2018-06-22 DIAGNOSIS — Z98.61 H/O CORONARY ANGIOPLASTY: ICD-10-CM

## 2018-06-25 ENCOUNTER — TELEPHONE (OUTPATIENT)
Dept: CARDIOLOGY CLINIC | Age: 68
End: 2018-06-25

## 2018-06-25 DIAGNOSIS — R06.02 SHORTNESS OF BREATH: ICD-10-CM

## 2018-06-25 DIAGNOSIS — I25.10 CORONARY ARTERY DISEASE INVOLVING NATIVE CORONARY ARTERY OF NATIVE HEART WITHOUT ANGINA PECTORIS: ICD-10-CM

## 2018-06-25 DIAGNOSIS — I10 ESSENTIAL HYPERTENSION: ICD-10-CM

## 2018-06-25 DIAGNOSIS — I50.22 CHRONIC SYSTOLIC CONGESTIVE HEART FAILURE (HCC): ICD-10-CM

## 2018-06-25 DIAGNOSIS — R00.2 PALPITATIONS: ICD-10-CM

## 2018-06-25 DIAGNOSIS — I48.0 PAF (PAROXYSMAL ATRIAL FIBRILLATION) (HCC): Primary | ICD-10-CM

## 2018-06-26 ENCOUNTER — HOSPITAL ENCOUNTER (OUTPATIENT)
Age: 68
Discharge: HOME | End: 2018-06-26
Payer: MEDICARE

## 2018-06-26 DIAGNOSIS — I10 ESSENTIAL HYPERTENSION: ICD-10-CM

## 2018-06-26 DIAGNOSIS — I48.0 PAF (PAROXYSMAL ATRIAL FIBRILLATION) (HCC): ICD-10-CM

## 2018-06-26 DIAGNOSIS — I25.10 CORONARY ARTERY DISEASE INVOLVING NATIVE CORONARY ARTERY OF NATIVE HEART WITHOUT ANGINA PECTORIS: ICD-10-CM

## 2018-06-26 DIAGNOSIS — R00.2 PALPITATIONS: ICD-10-CM

## 2018-06-26 DIAGNOSIS — I50.22 CHRONIC SYSTOLIC CONGESTIVE HEART FAILURE (HCC): ICD-10-CM

## 2018-06-26 DIAGNOSIS — R06.02 SHORTNESS OF BREATH: ICD-10-CM

## 2018-06-26 LAB
ANION GAP SERPL CALCULATED.3IONS-SCNC: 13 MMOL/L (ref 9–17)
BUN BLDV-MCNC: 40 MG/DL (ref 8–23)
BUN/CREAT BLD: 34 (ref 9–20)
CALCIUM SERPL-MCNC: 9.2 MG/DL (ref 8.6–10.4)
CHLORIDE BLD-SCNC: 101 MMOL/L (ref 98–107)
CO2: 26 MMOL/L (ref 20–31)
CREAT SERPL-MCNC: 1.16 MG/DL (ref 0.7–1.2)
GFR AFRICAN AMERICAN: >60 ML/MIN
GFR NON-AFRICAN AMERICAN: >60 ML/MIN
GFR SERPL CREATININE-BSD FRML MDRD: ABNORMAL ML/MIN/{1.73_M2}
GFR SERPL CREATININE-BSD FRML MDRD: ABNORMAL ML/MIN/{1.73_M2}
GLUCOSE BLD-MCNC: 204 MG/DL (ref 70–99)
POTASSIUM SERPL-SCNC: 5 MMOL/L (ref 3.7–5.3)
SODIUM BLD-SCNC: 140 MMOL/L (ref 135–144)

## 2018-06-26 PROCEDURE — 80048 BASIC METABOLIC PNL TOTAL CA: CPT

## 2018-06-26 PROCEDURE — 36415 COLL VENOUS BLD VENIPUNCTURE: CPT

## 2018-06-28 ENCOUNTER — HOSPITAL ENCOUNTER (OUTPATIENT)
Dept: CARDIAC REHAB | Age: 68
Setting detail: THERAPIES SERIES
Discharge: HOME | End: 2018-06-28
Payer: MEDICARE

## 2018-06-28 ENCOUNTER — TELEPHONE (OUTPATIENT)
Dept: CARDIOLOGY CLINIC | Age: 68
End: 2018-06-28

## 2018-06-28 VITALS
HEART RATE: 72 BPM | BODY MASS INDEX: 35.22 KG/M2 | WEIGHT: 252.5 LBS | DIASTOLIC BLOOD PRESSURE: 62 MMHG | SYSTOLIC BLOOD PRESSURE: 152 MMHG | RESPIRATION RATE: 16 BRPM | OXYGEN SATURATION: 98 %

## 2018-06-28 DIAGNOSIS — I25.10 CORONARY ARTERY DISEASE INVOLVING NATIVE CORONARY ARTERY OF NATIVE HEART WITHOUT ANGINA PECTORIS: ICD-10-CM

## 2018-06-28 DIAGNOSIS — I10 ESSENTIAL HYPERTENSION: ICD-10-CM

## 2018-06-28 DIAGNOSIS — R06.02 SHORTNESS OF BREATH: ICD-10-CM

## 2018-06-28 DIAGNOSIS — I50.22 CHRONIC SYSTOLIC CONGESTIVE HEART FAILURE (HCC): ICD-10-CM

## 2018-06-28 DIAGNOSIS — I48.91 ATRIAL FIBRILLATION, UNSPECIFIED TYPE (HCC): Primary | ICD-10-CM

## 2018-06-28 DIAGNOSIS — R53.83 FATIGUE, UNSPECIFIED TYPE: ICD-10-CM

## 2018-06-28 DIAGNOSIS — I48.0 PAF (PAROXYSMAL ATRIAL FIBRILLATION) (HCC): ICD-10-CM

## 2018-06-28 PROCEDURE — 99211 OFF/OP EST MAY X REQ PHY/QHP: CPT

## 2018-07-04 ENCOUNTER — HOSPITAL ENCOUNTER (OUTPATIENT)
Age: 68
Discharge: HOME | End: 2018-07-04
Payer: MEDICARE

## 2018-07-04 DIAGNOSIS — I50.22 CHRONIC SYSTOLIC CONGESTIVE HEART FAILURE (HCC): ICD-10-CM

## 2018-07-04 DIAGNOSIS — R53.83 FATIGUE, UNSPECIFIED TYPE: ICD-10-CM

## 2018-07-04 DIAGNOSIS — R06.02 SHORTNESS OF BREATH: ICD-10-CM

## 2018-07-04 DIAGNOSIS — I10 ESSENTIAL HYPERTENSION: ICD-10-CM

## 2018-07-04 DIAGNOSIS — I25.10 CORONARY ARTERY DISEASE INVOLVING NATIVE CORONARY ARTERY OF NATIVE HEART WITHOUT ANGINA PECTORIS: ICD-10-CM

## 2018-07-04 DIAGNOSIS — I48.0 PAF (PAROXYSMAL ATRIAL FIBRILLATION) (HCC): ICD-10-CM

## 2018-07-04 LAB
ANION GAP SERPL CALCULATED.3IONS-SCNC: 12 MMOL/L (ref 9–17)
BUN BLDV-MCNC: 26 MG/DL (ref 8–23)
BUN/CREAT BLD: 25 (ref 9–20)
CALCIUM SERPL-MCNC: 8.9 MG/DL (ref 8.6–10.4)
CHLORIDE BLD-SCNC: 100 MMOL/L (ref 98–107)
CO2: 28 MMOL/L (ref 20–31)
CREAT SERPL-MCNC: 1.02 MG/DL (ref 0.7–1.2)
GFR AFRICAN AMERICAN: >60 ML/MIN
GFR NON-AFRICAN AMERICAN: >60 ML/MIN
GFR SERPL CREATININE-BSD FRML MDRD: ABNORMAL ML/MIN/{1.73_M2}
GFR SERPL CREATININE-BSD FRML MDRD: ABNORMAL ML/MIN/{1.73_M2}
GLUCOSE BLD-MCNC: 185 MG/DL (ref 70–99)
PATIENT FASTING?: NO
POTASSIUM SERPL-SCNC: 4.5 MMOL/L (ref 3.7–5.3)
SODIUM BLD-SCNC: 140 MMOL/L (ref 135–144)

## 2018-07-04 PROCEDURE — 80048 BASIC METABOLIC PNL TOTAL CA: CPT

## 2018-07-04 PROCEDURE — 36415 COLL VENOUS BLD VENIPUNCTURE: CPT

## 2018-07-05 ENCOUNTER — HOSPITAL ENCOUNTER (OUTPATIENT)
Dept: CARDIAC REHAB | Age: 68
Setting detail: THERAPIES SERIES
Discharge: HOME | End: 2018-07-05
Payer: MEDICARE

## 2018-07-05 VITALS
HEART RATE: 72 BPM | BODY MASS INDEX: 34.73 KG/M2 | OXYGEN SATURATION: 96 % | WEIGHT: 249 LBS | RESPIRATION RATE: 16 BRPM

## 2018-07-16 ENCOUNTER — TELEPHONE (OUTPATIENT)
Dept: CARDIOLOGY CLINIC | Age: 68
End: 2018-07-16

## 2018-07-16 DIAGNOSIS — Z95.0 CARDIAC PACEMAKER IN SITU: Primary | ICD-10-CM

## 2018-07-16 DIAGNOSIS — I50.22 CHRONIC SYSTOLIC HEART FAILURE (HCC): ICD-10-CM

## 2018-07-16 DIAGNOSIS — I10 ESSENTIAL HYPERTENSION: ICD-10-CM

## 2018-07-16 DIAGNOSIS — R06.02 SHORTNESS OF BREATH: ICD-10-CM

## 2018-07-16 DIAGNOSIS — E78.5 DYSLIPIDEMIA: ICD-10-CM

## 2018-07-16 DIAGNOSIS — I48.0 PAF (PAROXYSMAL ATRIAL FIBRILLATION) (HCC): ICD-10-CM

## 2018-07-18 ENCOUNTER — HOSPITAL ENCOUNTER (OUTPATIENT)
Age: 68
Discharge: HOME | End: 2018-07-18
Payer: MEDICARE

## 2018-07-18 DIAGNOSIS — R06.02 SHORTNESS OF BREATH: ICD-10-CM

## 2018-07-18 DIAGNOSIS — E78.5 DYSLIPIDEMIA: ICD-10-CM

## 2018-07-18 DIAGNOSIS — I50.22 CHRONIC SYSTOLIC HEART FAILURE (HCC): ICD-10-CM

## 2018-07-18 DIAGNOSIS — I48.0 PAF (PAROXYSMAL ATRIAL FIBRILLATION) (HCC): ICD-10-CM

## 2018-07-18 DIAGNOSIS — I10 ESSENTIAL HYPERTENSION: ICD-10-CM

## 2018-07-18 DIAGNOSIS — Z95.0 CARDIAC PACEMAKER IN SITU: ICD-10-CM

## 2018-07-18 LAB
ANION GAP SERPL CALCULATED.3IONS-SCNC: 11 MMOL/L (ref 9–17)
BUN BLDV-MCNC: 34 MG/DL (ref 8–23)
BUN/CREAT BLD: 29 (ref 9–20)
CALCIUM SERPL-MCNC: 8.6 MG/DL (ref 8.6–10.4)
CHLORIDE BLD-SCNC: 103 MMOL/L (ref 98–107)
CO2: 27 MMOL/L (ref 20–31)
CREAT SERPL-MCNC: 1.16 MG/DL (ref 0.7–1.2)
GFR AFRICAN AMERICAN: >60 ML/MIN
GFR NON-AFRICAN AMERICAN: >60 ML/MIN
GFR SERPL CREATININE-BSD FRML MDRD: ABNORMAL ML/MIN/{1.73_M2}
GFR SERPL CREATININE-BSD FRML MDRD: ABNORMAL ML/MIN/{1.73_M2}
GLUCOSE BLD-MCNC: 139 MG/DL (ref 70–99)
POTASSIUM SERPL-SCNC: 4.9 MMOL/L (ref 3.7–5.3)
SODIUM BLD-SCNC: 141 MMOL/L (ref 135–144)

## 2018-07-18 PROCEDURE — 36415 COLL VENOUS BLD VENIPUNCTURE: CPT

## 2018-07-18 PROCEDURE — 80048 BASIC METABOLIC PNL TOTAL CA: CPT

## 2018-07-19 ENCOUNTER — HOSPITAL ENCOUNTER (OUTPATIENT)
Dept: CARDIAC REHAB | Age: 68
Setting detail: THERAPIES SERIES
Discharge: HOME | End: 2018-07-19
Payer: MEDICARE

## 2018-07-19 VITALS
WEIGHT: 254.5 LBS | DIASTOLIC BLOOD PRESSURE: 54 MMHG | HEART RATE: 59 BPM | BODY MASS INDEX: 35.5 KG/M2 | SYSTOLIC BLOOD PRESSURE: 152 MMHG | RESPIRATION RATE: 16 BRPM

## 2018-07-19 NOTE — PROGRESS NOTES
Chief Complaint:    Shortness of breath (improving)    Visit Diagnosis:    Chronic heart failure d/t left ventricular hypertrophy, pulmonary hypertension, and diastolic ventricle dysfunction  Coronary artery disease  Obstructive sleep apnea  Atrial fibrillatioin  Essential hypertension    Objective: Body wt is up to 254.5 lb, SPO2 96%, RR 16, HR 59, and /54  Serum creatinine up slightly to 1.16, Na+ is 140, K+ is 4.5, and GFR is >60  Lungs are CTA bilaterally throughout.  Pt denies cough or shortness of breath at rest.  Denied are chest pain, worsened edema, dizziness, orthopnea, and fatigue    Subjective: Body weight at home has been running 249   Overall feels like condition is continuing to improve  Feels swelling, shortness of breath, and energy have remained improved   Remains quite active at home with yard work, home maintenance, traveling with spouse, visiting family, and riding bicycle most days of the week    Progress Narrarive: This patient arrives, accompanied by spouse Martha Booth, for heart failure management reassessment as follow-u[p to last clinic visit here on 7/5/18. At that visit Mr. Sherrilyn Mohs continued to report improvements in energy and symptoms. He expressed satisfaction with his functional and health trends. Although body weight is up about 5 lb in last 3 weeks, Mr. Sherrilyn Mohs continues to report improved energy and less shortness of breath. He remains quite active with his summer, both at home and with family. He is happy to report a favorable meeting with his electrophysiologist who told him an ablation for atrial fibrillation prophylaxis would not be recommended, d/t this patient has experienced p/afib only twice and medication therapy is sufficient. Serum creatinine is up some to 1. 16.  BP remains improved for this patient. Evin Shrestha wishes to be seen in another 4 weeks to ensure progress is continuing. This patient and his spouse both agree to return on 8/16/18 at 0800 with labs prior.  AVS is printed and reviewed. Understanding is voiced. Patient Instructions:  1. No changes to medications today. 2.  Next appt 8/16 at 8 a.m    I have read and agree with all of the above.   Dominga Friedman MD

## 2018-07-31 RX ORDER — CELECOXIB 200 MG/1
CAPSULE ORAL
Qty: 180 CAPSULE | Refills: 1 | Status: SHIPPED | OUTPATIENT
Start: 2018-07-31 | End: 2019-01-14 | Stop reason: SDUPTHER

## 2018-08-09 ENCOUNTER — TELEPHONE (OUTPATIENT)
Dept: CARDIOLOGY CLINIC | Age: 68
End: 2018-08-09

## 2018-08-09 DIAGNOSIS — I48.0 PAF (PAROXYSMAL ATRIAL FIBRILLATION) (HCC): ICD-10-CM

## 2018-08-09 DIAGNOSIS — N28.9 RENAL INSUFFICIENCY: ICD-10-CM

## 2018-08-09 DIAGNOSIS — I25.10 CORONARY ARTERY DISEASE INVOLVING NATIVE CORONARY ARTERY OF NATIVE HEART WITHOUT ANGINA PECTORIS: ICD-10-CM

## 2018-08-09 DIAGNOSIS — I27.20 PULMONARY HTN (HCC): ICD-10-CM

## 2018-08-09 DIAGNOSIS — I50.32 CHRONIC DIASTOLIC CHF (CONGESTIVE HEART FAILURE), NYHA CLASS 3 (HCC): ICD-10-CM

## 2018-08-09 DIAGNOSIS — Z95.0 CARDIAC PACEMAKER IN SITU: Primary | ICD-10-CM

## 2018-08-09 DIAGNOSIS — I10 ESSENTIAL HYPERTENSION: ICD-10-CM

## 2018-08-13 ENCOUNTER — TELEPHONE (OUTPATIENT)
Dept: CARDIOLOGY CLINIC | Age: 68
End: 2018-08-13

## 2018-08-13 LAB
AVERAGE GLUCOSE: NORMAL
HBA1C MFR BLD: 7.2 %

## 2018-08-13 NOTE — TELEPHONE ENCOUNTER
Pt called to cancel appt on 8/16 [place] on vacation and DR Jennifer Jeffery  Also on vacation  Pt doing well some edema off and on   Weight up couple lbs past 3 weeks  Sob same pt feels ok to wait get pt in   For appt on 8/21 see Demetris Burroughs

## 2018-08-16 ENCOUNTER — HOSPITAL ENCOUNTER (OUTPATIENT)
Dept: CARDIAC REHAB | Age: 68
Setting detail: THERAPIES SERIES
Discharge: HOME | End: 2018-08-16
Payer: MEDICARE

## 2018-08-20 ENCOUNTER — OFFICE VISIT (OUTPATIENT)
Dept: FAMILY MEDICINE CLINIC | Age: 68
End: 2018-08-20
Payer: MEDICARE

## 2018-08-20 ENCOUNTER — HOSPITAL ENCOUNTER (OUTPATIENT)
Age: 68
Discharge: HOME | End: 2018-08-20
Payer: MEDICARE

## 2018-08-20 VITALS
DIASTOLIC BLOOD PRESSURE: 60 MMHG | BODY MASS INDEX: 35.56 KG/M2 | HEIGHT: 71 IN | SYSTOLIC BLOOD PRESSURE: 124 MMHG | WEIGHT: 254 LBS

## 2018-08-20 DIAGNOSIS — I48.0 PAF (PAROXYSMAL ATRIAL FIBRILLATION) (HCC): ICD-10-CM

## 2018-08-20 DIAGNOSIS — L82.0 INFLAMED SEBORRHEIC KERATOSIS: ICD-10-CM

## 2018-08-20 DIAGNOSIS — Z95.0 CARDIAC PACEMAKER IN SITU: ICD-10-CM

## 2018-08-20 DIAGNOSIS — Z79.4 LONG-TERM INSULIN USE (HCC): ICD-10-CM

## 2018-08-20 DIAGNOSIS — I27.20 PULMONARY HTN (HCC): ICD-10-CM

## 2018-08-20 DIAGNOSIS — I48.0 PAROXYSMAL ATRIAL FIBRILLATION (HCC): ICD-10-CM

## 2018-08-20 DIAGNOSIS — M54.41 CHRONIC BILATERAL LOW BACK PAIN WITH BILATERAL SCIATICA: ICD-10-CM

## 2018-08-20 DIAGNOSIS — I10 ESSENTIAL HYPERTENSION: ICD-10-CM

## 2018-08-20 DIAGNOSIS — I25.10 CORONARY ARTERY DISEASE INVOLVING NATIVE CORONARY ARTERY OF NATIVE HEART WITHOUT ANGINA PECTORIS: ICD-10-CM

## 2018-08-20 DIAGNOSIS — N28.9 RENAL INSUFFICIENCY: ICD-10-CM

## 2018-08-20 DIAGNOSIS — I50.32 CHRONIC DIASTOLIC CHF (CONGESTIVE HEART FAILURE), NYHA CLASS 3 (HCC): ICD-10-CM

## 2018-08-20 DIAGNOSIS — G89.29 CHRONIC BILATERAL LOW BACK PAIN WITH BILATERAL SCIATICA: ICD-10-CM

## 2018-08-20 DIAGNOSIS — M54.42 CHRONIC BILATERAL LOW BACK PAIN WITH BILATERAL SCIATICA: ICD-10-CM

## 2018-08-20 DIAGNOSIS — E11.42 TYPE 2 DIABETES MELLITUS WITH PERIPHERAL NEUROPATHY (HCC): ICD-10-CM

## 2018-08-20 DIAGNOSIS — E11.3299 NONPROLIFERATIVE DIABETIC RETINOPATHY (HCC): ICD-10-CM

## 2018-08-20 DIAGNOSIS — G47.33 OBSTRUCTIVE SLEEP APNEA: Primary | ICD-10-CM

## 2018-08-20 LAB
ANION GAP SERPL CALCULATED.3IONS-SCNC: 12 MMOL/L (ref 9–17)
BUN BLDV-MCNC: 31 MG/DL (ref 8–23)
BUN/CREAT BLD: 30 (ref 9–20)
CALCIUM SERPL-MCNC: 9 MG/DL (ref 8.6–10.4)
CHLORIDE BLD-SCNC: 101 MMOL/L (ref 98–107)
CO2: 27 MMOL/L (ref 20–31)
CREAT SERPL-MCNC: 1.04 MG/DL (ref 0.7–1.2)
GFR AFRICAN AMERICAN: >60 ML/MIN
GFR NON-AFRICAN AMERICAN: >60 ML/MIN
GFR SERPL CREATININE-BSD FRML MDRD: ABNORMAL ML/MIN/{1.73_M2}
GFR SERPL CREATININE-BSD FRML MDRD: ABNORMAL ML/MIN/{1.73_M2}
GLUCOSE BLD-MCNC: 147 MG/DL (ref 70–99)
POTASSIUM SERPL-SCNC: 4.7 MMOL/L (ref 3.7–5.3)
SODIUM BLD-SCNC: 140 MMOL/L (ref 135–144)

## 2018-08-20 PROCEDURE — 3045F PR MOST RECENT HEMOGLOBIN A1C LEVEL 7.0-9.0%: CPT | Performed by: FAMILY MEDICINE

## 2018-08-20 PROCEDURE — 99214 OFFICE O/P EST MOD 30 MIN: CPT | Performed by: FAMILY MEDICINE

## 2018-08-20 PROCEDURE — 2022F DILAT RTA XM EVC RTNOPTHY: CPT | Performed by: FAMILY MEDICINE

## 2018-08-20 PROCEDURE — 3017F COLORECTAL CA SCREEN DOC REV: CPT | Performed by: FAMILY MEDICINE

## 2018-08-20 PROCEDURE — G8598 ASA/ANTIPLAT THER USED: HCPCS | Performed by: FAMILY MEDICINE

## 2018-08-20 PROCEDURE — 1036F TOBACCO NON-USER: CPT | Performed by: FAMILY MEDICINE

## 2018-08-20 PROCEDURE — 36415 COLL VENOUS BLD VENIPUNCTURE: CPT

## 2018-08-20 PROCEDURE — 80048 BASIC METABOLIC PNL TOTAL CA: CPT

## 2018-08-20 PROCEDURE — 4040F PNEUMOC VAC/ADMIN/RCVD: CPT | Performed by: FAMILY MEDICINE

## 2018-08-20 PROCEDURE — 1123F ACP DISCUSS/DSCN MKR DOCD: CPT | Performed by: FAMILY MEDICINE

## 2018-08-20 PROCEDURE — G8427 DOCREV CUR MEDS BY ELIG CLIN: HCPCS | Performed by: FAMILY MEDICINE

## 2018-08-20 PROCEDURE — 17110 DESTRUCTION B9 LES UP TO 14: CPT | Performed by: FAMILY MEDICINE

## 2018-08-20 PROCEDURE — G8417 CALC BMI ABV UP PARAM F/U: HCPCS | Performed by: FAMILY MEDICINE

## 2018-08-20 PROCEDURE — 1101F PT FALLS ASSESS-DOCD LE1/YR: CPT | Performed by: FAMILY MEDICINE

## 2018-08-20 RX ORDER — ATORVASTATIN CALCIUM 40 MG/1
TABLET, FILM COATED ORAL
Qty: 90 TABLET | Refills: 3 | Status: SHIPPED | OUTPATIENT
Start: 2018-08-20 | End: 2019-07-29 | Stop reason: SDUPTHER

## 2018-08-20 RX ORDER — ATORVASTATIN CALCIUM 40 MG/1
TABLET, FILM COATED ORAL
Qty: 30 TABLET | Refills: 0 | Status: SHIPPED | OUTPATIENT
Start: 2018-08-20 | End: 2018-08-20 | Stop reason: SDUPTHER

## 2018-08-20 ASSESSMENT — PATIENT HEALTH QUESTIONNAIRE - PHQ9
SUM OF ALL RESPONSES TO PHQ QUESTIONS 1-9: 0
SUM OF ALL RESPONSES TO PHQ QUESTIONS 1-9: 0
SUM OF ALL RESPONSES TO PHQ9 QUESTIONS 1 & 2: 0
2. FEELING DOWN, DEPRESSED OR HOPELESS: 0
1. LITTLE INTEREST OR PLEASURE IN DOING THINGS: 0

## 2018-08-20 NOTE — PROGRESS NOTES
Name: Morgan Albert  : 1950         Chief Complaint:     Chief Complaint   Patient presents with    Diabetes    Hypertension       History of Present Illness: Morgan Albert is a 80 y.o.  male who presents with Diabetes and Hypertension      HPI     F/u SOB. Continues to struggle. Was in [place] [address] for the winter and was hospitalized twice with afib, had cardioversion. At one point there was discussion of placing pacemaker as his pulse got as low as 32. was pulled off meds and then pulse went to 140. Pt was asymptomatic in both cases. Lipitor dose increased to 40 mg daily by dr in [place] [address]. Has been on that for a little while and tolerating ok. GHAZALA - had repeat study. Doesn't feel machine helps a lot, yet can't sleep without it anymore, can't breathe when he lies down. BiPAP pressure still 20/15, changed from 19/15. Wears full face mask and does have a little bit of leak chronically. Has goatee which he's had for >40 yrs, won't change it. Uses humidification but still has dry mouth on waking. C/o back pain, radiating down both buttocks and legs, happening at least a couple yrs. At times limits activity, can cause a lot of pain while walking. Back pain limits walking more quickly than breathing does. Had mentioned to Dr. Axel Griffith recently and was told sounded like sciatica. C/o irritated skin lesions on chest and L upper arm, present for quite a while, get caught on clothing or by his fingernail and are itchy. Had similar in the past which resolved with cryo.      Past Medical History:     Past Medical History:   Diagnosis Date    Arthritis     CAD (coronary artery disease)     [facility])     Diabetes mellitus ([facility] [address]     H/O coronary angioplasty     Hyperlipidemia     Hypertension     Numbness and tingling     dannie hands    Pulmonary edema     SECONDARY TO FAT EMBOLI AFTER KNEE SURGERY    Unspecified sleep apnea 2012    cpap        Past Surgical History:     Past Surgical MD   ONE TOUCH ULTRA TEST strip  8/8/16   Historical Provider, MD   BD ULTRA-FINE PEN NEEDLES 29G X 12.7MM MISC  8/1/16   Historical Provider, MD   glimepiride (AMARYL) 4 MG tablet Take 4 mg by mouth 2 times daily    Historical Provider, MD   Omega-3 Fatty Acids (FISH OIL) 1200 MG CAPS Take 1 capsule by mouth 2 times daily Plus 360 omega 3    Historical Provider, MD   Glucosamine Sulfate 1000 MG CAPS Take by mouth 2 times daily    Historical Provider, MD   Chromium-Cinnamon (CINNAMON PLUS CHROMIUM PO) Take 1,000 mg by mouth 2 times daily     Historical Provider, MD   Multiple Vitamins-Minerals (ONE-A-DAY MENS 50+ ADVANTAGE PO) Take by mouth daily    Historical Provider, MD   B Complex-C (SUPER B COMPLEX/VITAMIN C PO) Take by mouth daily    Historical Provider, MD   Vitamin D (CHOLECALCIFEROL) 1000 UNITS CAPS capsule   Take by mouth 2 times daily Vitamin D 3    Historical Provider, MD   Insulin Syringes, Disposable, U-100 0.3 ML MISC Inject  into the skin. Use prn 7/19/12   Annalee Mingle A Jump, DO   metformin (GLUCOPHAGE) 1000 MG tablet Take 1,000 mg by mouth 2 times daily. Historical Provider, MD        Allergies:       Patient has no known allergies. Social History:     Tobacco:    reports that he quit smoking about 42 years ago. He has a 1.50 pack-year smoking history. He has never used smokeless tobacco.  Alcohol:      reports that he does not drink alcohol. Drug Use:  reports that he does not use drugs. Family History:     Family History   Problem Relation Age of Onset    Alzheimer's Disease Mother     Heart Disease Mother     Heart Disease Father        Review of Systems:     Positive and Negative as described in HPI    Review of Systems   Constitutional: Negative. Cardiovascular: Negative. Gastrointestinal: Negative.         Physical Exam:     Vitals:  /60   Ht 5' 11\" (1.803 m)   Wt 254 lb (115.2 kg)   BMI 35.43 kg/m²   Physical Exam   Constitutional: He is oriented to person, place, and GHAZALA in decent control on BiPAP with recent change in pressure. Humidification but still has dry mouth. Cont same. Chronic low back pain, worsening over years. Significant radicular symptoms. Has failed celebrex. MRI ordered. 6 SK's treated with cryo. ddx incl cutaneous horns. F/u if they recur/don't resolve. PAFib, in sinus today on exam. Anticoagulated. Follows with cardio. Cont current rx. Requested Prescriptions     Signed Prescriptions Disp Refills    atorvastatin (LIPITOR) 40 MG tablet 90 tablet 3     Sig: TAKE 1 TABLET DAILY       Patient Instructions     SURVEY:    You may be receiving a survey from [employer] regarding your visit today. Please complete the survey to enable us to provide the highest quality of care to you and your family. If you cannot score us as very good on any question, please call the office to discuss how we could have made your experience exceptional.     Thank you. Soo Byrd received counseling on the following healthy behaviors: medication adherence  Reviewed prior labs and health maintenance. Continue current medications, diet and exercise. Discussed use, benefit, and side effects of prescribed medications. Barriers to medication compliance addressed. Patient given educational materials - see patient instructions. All patient questions answered. Patient voiced understanding.      Electronically signed by Shabana Green DO on 8/22/2018 at 10:17 PM   [address]  [address] [place] [id number]  Dept: [phone]

## 2018-08-21 ENCOUNTER — TELEPHONE (OUTPATIENT)
Dept: CARDIOLOGY CLINIC | Age: 68
End: 2018-08-21

## 2018-08-21 ENCOUNTER — HOSPITAL ENCOUNTER (OUTPATIENT)
Dept: CARDIAC REHAB | Age: 68
Setting detail: THERAPIES SERIES
Discharge: HOME | End: 2018-08-21
Payer: MEDICARE

## 2018-08-21 DIAGNOSIS — I48.91 ATRIAL FIBRILLATION, UNSPECIFIED TYPE (HCC): ICD-10-CM

## 2018-08-21 DIAGNOSIS — I27.20 PULMONARY HTN (HCC): ICD-10-CM

## 2018-08-21 DIAGNOSIS — I50.32 CHRONIC DIASTOLIC CHF (CONGESTIVE HEART FAILURE), NYHA CLASS 3 (HCC): ICD-10-CM

## 2018-08-21 DIAGNOSIS — I48.0 PAF (PAROXYSMAL ATRIAL FIBRILLATION) (HCC): ICD-10-CM

## 2018-08-21 DIAGNOSIS — R53.83 FATIGUE, UNSPECIFIED TYPE: ICD-10-CM

## 2018-08-21 DIAGNOSIS — I25.119 CORONARY ARTERY DISEASE INVOLVING NATIVE CORONARY ARTERY OF NATIVE HEART WITH ANGINA PECTORIS (HCC): ICD-10-CM

## 2018-08-21 DIAGNOSIS — E78.49 OTHER HYPERLIPIDEMIA: ICD-10-CM

## 2018-08-21 DIAGNOSIS — R60.0 LEG EDEMA: ICD-10-CM

## 2018-08-21 DIAGNOSIS — E55.9 VITAMIN D DEFICIENCY: ICD-10-CM

## 2018-08-21 DIAGNOSIS — R06.02 SHORTNESS OF BREATH: ICD-10-CM

## 2018-08-21 DIAGNOSIS — I10 ESSENTIAL HYPERTENSION: Primary | ICD-10-CM

## 2018-08-21 DIAGNOSIS — R79.0 LOW BLOOD MAGNESIUM LEVEL: ICD-10-CM

## 2018-08-21 DIAGNOSIS — N28.9 RENAL INSUFFICIENCY: ICD-10-CM

## 2018-08-21 PROCEDURE — 99211 OFF/OP EST MAY X REQ PHY/QHP: CPT

## 2018-08-21 RX ORDER — DOXAZOSIN 8 MG/1
8 TABLET ORAL DAILY
Qty: 30 TABLET | Refills: 3 | Status: SHIPPED | OUTPATIENT
Start: 2018-08-21 | End: 2018-09-18 | Stop reason: SDUPTHER

## 2018-08-21 NOTE — PROGRESS NOTES
Chief Complaint:    LEG CRAMPS    Visit Diagnosis:    Chronic heart failure d/t left ventricular hypertrophy, pulmonary hypertension, and diastolic ventricle dysfunction  Coronary artery disease  Obstructive sleep apnea  Atrial fibrillatioin  Essential hypertension    Objective: Body wt is unchanged at 254 lb, SPO2 96%, RR 16, HR 59, and BP improved to 124/60  Serum creatinine down slightly to 1.04, Na+ is 147, K+ is 4.7, and GFR is >60  Lungs are CTA bilaterally throughout.  Pt denies cough or shortness of breath at rest.  Denied are chest pain, worsened edema, dizziness, orthopnea, and fatigue  Bilateral ankle edema remains improved to 1+, calves and thighs are soft  Abdomen is distended, soft, and non-tender    Subjective: Body weight at home has been running 249 lb  Overall feels like condition is continuing to improve  Feels swelling, shortness of breath, and energy have remained improved   Remains quite active at home with yard work, home maintenance, traveling with spouse, visiting family, and riding bicycle most days of the week    Progress Narrarive: This patient arrives, accompanied by spouse Nena Loaiza, for heart failure management reassessment as follow-u[p to last clinic visit here on 7/19/18. At that visit body weight was up about 5 lb in last 3 weeks. Mr. Toya Gonzáles continued to report improved energy and less shortness of breath. He had remained quite active with his summer, both at home and with family. He was happy to report a favorable meeting with his electrophysiologist who told him an ablation for atrial fibrillation prophylaxis would not be recommended, d/t this patient had experienced p/afib only twice and medication therapy was sufficient. Serum creatinine was up some to 1. 16.  BP remained improved for this patient. Michele Pizarro wanted to be seen in another 4 weeks to ensure progress was continuing. Today this patient cites continued improved energy and less shortness of breath.   C/O leg cramps,

## 2018-08-22 ASSESSMENT — ENCOUNTER SYMPTOMS: GASTROINTESTINAL NEGATIVE: 1

## 2018-08-23 PROCEDURE — 99211 OFF/OP EST MAY X REQ PHY/QHP: CPT

## 2018-08-23 PROCEDURE — 93798 PHYS/QHP OP CAR RHAB W/ECG: CPT

## 2018-08-24 ENCOUNTER — HOSPITAL ENCOUNTER (OUTPATIENT)
Dept: MRI IMAGING | Age: 68
Discharge: HOME | End: 2018-08-26
Payer: MEDICARE

## 2018-08-24 DIAGNOSIS — M54.41 CHRONIC BILATERAL LOW BACK PAIN WITH BILATERAL SCIATICA: ICD-10-CM

## 2018-08-24 DIAGNOSIS — M54.42 CHRONIC BILATERAL LOW BACK PAIN WITH BILATERAL SCIATICA: ICD-10-CM

## 2018-08-24 DIAGNOSIS — G89.29 CHRONIC BILATERAL LOW BACK PAIN WITH BILATERAL SCIATICA: ICD-10-CM

## 2018-08-24 PROCEDURE — 72148 MRI LUMBAR SPINE W/O DYE: CPT

## 2018-08-27 ENCOUNTER — TELEPHONE (OUTPATIENT)
Dept: FAMILY MEDICINE CLINIC | Age: 68
End: 2018-08-27

## 2018-08-27 NOTE — TELEPHONE ENCOUNTER
Patient notified of results, he will discuss with his wife and let us know where he would like to go.

## 2018-09-05 ENCOUNTER — OFFICE VISIT (OUTPATIENT)
Dept: FAMILY MEDICINE CLINIC | Age: 68
End: 2018-09-05
Payer: MEDICARE

## 2018-09-05 VITALS
SYSTOLIC BLOOD PRESSURE: 120 MMHG | DIASTOLIC BLOOD PRESSURE: 62 MMHG | BODY MASS INDEX: 35.7 KG/M2 | WEIGHT: 255 LBS | HEIGHT: 71 IN

## 2018-09-05 DIAGNOSIS — Z79.4 TYPE 2 DIABETES MELLITUS WITH HYPOGLYCEMIA WITHOUT COMA, WITH LONG-TERM CURRENT USE OF INSULIN (HCC): ICD-10-CM

## 2018-09-05 DIAGNOSIS — E11.649 TYPE 2 DIABETES MELLITUS WITH HYPOGLYCEMIA WITHOUT COMA, WITH LONG-TERM CURRENT USE OF INSULIN (HCC): ICD-10-CM

## 2018-09-05 DIAGNOSIS — L03.317 CELLULITIS OF GLUTEAL REGION: Primary | ICD-10-CM

## 2018-09-05 DIAGNOSIS — M48.062 SPINAL STENOSIS OF LUMBAR REGION WITH NEUROGENIC CLAUDICATION: ICD-10-CM

## 2018-09-05 PROCEDURE — 2022F DILAT RTA XM EVC RTNOPTHY: CPT | Performed by: FAMILY MEDICINE

## 2018-09-05 PROCEDURE — G8427 DOCREV CUR MEDS BY ELIG CLIN: HCPCS | Performed by: FAMILY MEDICINE

## 2018-09-05 PROCEDURE — 1036F TOBACCO NON-USER: CPT | Performed by: FAMILY MEDICINE

## 2018-09-05 PROCEDURE — 3045F PR MOST RECENT HEMOGLOBIN A1C LEVEL 7.0-9.0%: CPT | Performed by: FAMILY MEDICINE

## 2018-09-05 PROCEDURE — 4040F PNEUMOC VAC/ADMIN/RCVD: CPT | Performed by: FAMILY MEDICINE

## 2018-09-05 PROCEDURE — 1123F ACP DISCUSS/DSCN MKR DOCD: CPT | Performed by: FAMILY MEDICINE

## 2018-09-05 PROCEDURE — 3017F COLORECTAL CA SCREEN DOC REV: CPT | Performed by: FAMILY MEDICINE

## 2018-09-05 PROCEDURE — G8598 ASA/ANTIPLAT THER USED: HCPCS | Performed by: FAMILY MEDICINE

## 2018-09-05 PROCEDURE — 1101F PT FALLS ASSESS-DOCD LE1/YR: CPT | Performed by: FAMILY MEDICINE

## 2018-09-05 PROCEDURE — 99214 OFFICE O/P EST MOD 30 MIN: CPT | Performed by: FAMILY MEDICINE

## 2018-09-05 PROCEDURE — G8417 CALC BMI ABV UP PARAM F/U: HCPCS | Performed by: FAMILY MEDICINE

## 2018-09-05 RX ORDER — AMOXICILLIN AND CLAVULANATE POTASSIUM 875; 125 MG/1; MG/1
1 TABLET, FILM COATED ORAL 2 TIMES DAILY
Qty: 20 TABLET | Refills: 0 | Status: SHIPPED | OUTPATIENT
Start: 2018-09-05 | End: 2018-09-15

## 2018-09-05 NOTE — PROGRESS NOTES
Name: Sabrina Huffman  : 1950         Chief Complaint:     No chief complaint on file. History of Present Illness: Sabrina Huffman is a 80 y.o.  male who presents with No chief complaint on file. HPI    Pt c/o skin lesion which has been present on L buttock for several weeks and has been sore. 4-6 wks ago it drained a large amt of fluid, mainly blood but did have some purulence (per wife who is a recently-retired med-surg nurse). Has drained small amts of fluid since then. Has been riding bicycle and this area hurts. Pt had some diarrhea a few times recently but none currently and overall has had ok BM's. No fever or chills, feeling ok. Wife believes pt may have had some type of rectal fistula many yrs ago. Diabetes in good control. Has had some overnight lows recently, sporadic but happening a couple times a week, as low as 50s. Pt is symptomatic with these and corrects easily with oral carbs. Will see endo in November. Had small increase in insulin dose at last appt. Continues to have low back pain radiating to dannie buttocks and thighs. Had recent lumbar MRI and wants to review results.      Medical History:     Patient Active Problem List   Diagnosis    Hypertension    Hyperlipidemia    Diabetes mellitus ([address]    Encounter for screening colonoscopy    Coronary artery disease involving native coronary artery of native heart with angina pectoris ([address]    H/O coronary angioplasty    Severe nonproliferative diabetic retinopathy with macular edema associated with type 2 diabetes mellitus ([address]    Type 2 diabetes mellitus with peripheral neuropathy (HCC)    Diabetic peripheral neuropathy (HCC)    Long-term insulin use (HCC)    GHAZALA (obstructive sleep apnea)    SOB (shortness of breath)    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HCC)    Pulmonary HTN ([address]    Atrial fibrillation (HCC)       Medications:       Prior to Admission medications    Medication Sig Start Date End Historical Provider, MD   Omega-3 Fatty Acids (FISH OIL) 1200 MG CAPS Take 1 capsule by mouth 2 times daily Plus 360 omega 3   Yes Historical Provider, MD   Glucosamine Sulfate 1000 MG CAPS Take by mouth 2 times daily   Yes Historical Provider, MD   Chromium-Cinnamon (CINNAMON PLUS CHROMIUM PO) Take 1,000 mg by mouth 2 times daily    Yes Historical Provider, MD   Multiple Vitamins-Minerals (ONE-A-DAY MENS 50+ ADVANTAGE PO) Take by mouth daily   Yes Historical Provider, MD   Vitamin D (CHOLECALCIFEROL) 1000 UNITS CAPS capsule   Take by mouth 2 times daily Vitamin D 3   Yes Historical Provider, MD   Insulin Syringes, Disposable, U-100 0.3 ML MISC Inject  into the skin. Use prn 7/19/12  Yes Isreal Cole, DO   metformin (GLUCOPHAGE) 1000 MG tablet Take 1,000 mg by mouth 2 times daily. Yes Historical Provider, MD   Insulin NPH Isophane & Regular (NOVOLIN 70/30 INNOLET SC) Inject 50 Units into the skin 62 am 40 lunch 55 pm    Historical Provider, MD        Allergies:       Patient has no known allergies. Review of Systems:     Positive and Negative as described in HPI    Review of Systems   Constitutional: Negative. Genitourinary: Negative. Physical Exam:     Vitals:  /62   Ht 5' 11\" (1.803 m)   Wt 255 lb (115.7 kg)   BMI 35.57 kg/m²   Physical Exam   Constitutional: He is oriented to person, place, and time. He appears well-developed and well-nourished. No distress. Pulmonary/Chest: Effort normal.   Neurological: He is alert and oriented to person, place, and time. Skin: Skin is warm and dry. L medial buttock: approx 3x2 cm area of mild induration, no maximiliano fluctuance, very mild cellulitis. Tender. Psychiatric: He has a normal mood and affect. Judgment normal.   Nursing note and vitals reviewed.       Data:     Lab Results   Component Value Date     08/20/2018    K 4.7 08/20/2018     08/20/2018    CO2 27 08/20/2018    BUN 31 08/20/2018    CREATININE 1.04 08/20/2018    GLUCOSE

## 2018-09-17 ENCOUNTER — TELEPHONE (OUTPATIENT)
Dept: CARDIOLOGY CLINIC | Age: 68
End: 2018-09-17

## 2018-09-17 ENCOUNTER — HOSPITAL ENCOUNTER (OUTPATIENT)
Age: 68
Discharge: HOME | End: 2018-09-17
Payer: MEDICARE

## 2018-09-17 ENCOUNTER — OFFICE VISIT (OUTPATIENT)
Dept: FAMILY MEDICINE CLINIC | Age: 68
End: 2018-09-17
Payer: MEDICARE

## 2018-09-17 VITALS
SYSTOLIC BLOOD PRESSURE: 130 MMHG | WEIGHT: 255 LBS | HEIGHT: 71 IN | BODY MASS INDEX: 35.7 KG/M2 | DIASTOLIC BLOOD PRESSURE: 64 MMHG

## 2018-09-17 DIAGNOSIS — R60.0 LEG EDEMA: ICD-10-CM

## 2018-09-17 DIAGNOSIS — J98.4 MIXED RESTRICTIVE AND OBSTRUCTIVE LUNG DISEASE (HCC): ICD-10-CM

## 2018-09-17 DIAGNOSIS — I50.22 CHRONIC SYSTOLIC CONGESTIVE HEART FAILURE (HCC): ICD-10-CM

## 2018-09-17 DIAGNOSIS — J43.9 MIXED RESTRICTIVE AND OBSTRUCTIVE LUNG DISEASE (HCC): ICD-10-CM

## 2018-09-17 DIAGNOSIS — I10 ESSENTIAL HYPERTENSION: ICD-10-CM

## 2018-09-17 DIAGNOSIS — L03.317 CELLULITIS OF GLUTEAL REGION: Primary | ICD-10-CM

## 2018-09-17 DIAGNOSIS — I25.10 CORONARY ARTERY DISEASE INVOLVING NATIVE CORONARY ARTERY OF NATIVE HEART WITHOUT ANGINA PECTORIS: ICD-10-CM

## 2018-09-17 DIAGNOSIS — I25.10 CORONARY ARTERY DISEASE INVOLVING NATIVE CORONARY ARTERY OF NATIVE HEART WITHOUT ANGINA PECTORIS: Primary | ICD-10-CM

## 2018-09-17 DIAGNOSIS — I27.20 PULMONARY HYPERTENSION (HCC): ICD-10-CM

## 2018-09-17 DIAGNOSIS — R06.02 SHORTNESS OF BREATH: ICD-10-CM

## 2018-09-17 DIAGNOSIS — R79.0 LOW BLOOD MAGNESIUM LEVEL: ICD-10-CM

## 2018-09-17 DIAGNOSIS — I48.0 PAF (PAROXYSMAL ATRIAL FIBRILLATION) (HCC): ICD-10-CM

## 2018-09-17 DIAGNOSIS — Z23 NEED FOR 23-POLYVALENT PNEUMOCOCCAL POLYSACCHARIDE VACCINE: ICD-10-CM

## 2018-09-17 LAB
ANION GAP SERPL CALCULATED.3IONS-SCNC: 12 MMOL/L (ref 9–17)
BUN BLDV-MCNC: 35 MG/DL (ref 8–23)
BUN/CREAT BLD: 29 (ref 9–20)
CALCIUM SERPL-MCNC: 9.1 MG/DL (ref 8.6–10.4)
CHLORIDE BLD-SCNC: 103 MMOL/L (ref 98–107)
CO2: 26 MMOL/L (ref 20–31)
CREAT SERPL-MCNC: 1.22 MG/DL (ref 0.7–1.2)
GFR AFRICAN AMERICAN: >60 ML/MIN
GFR NON-AFRICAN AMERICAN: 59 ML/MIN
GFR SERPL CREATININE-BSD FRML MDRD: ABNORMAL ML/MIN/{1.73_M2}
GFR SERPL CREATININE-BSD FRML MDRD: ABNORMAL ML/MIN/{1.73_M2}
GLUCOSE BLD-MCNC: 171 MG/DL (ref 70–99)
MAGNESIUM: 2.4 MG/DL (ref 1.6–2.6)
POTASSIUM SERPL-SCNC: 5.2 MMOL/L (ref 3.7–5.3)
SODIUM BLD-SCNC: 141 MMOL/L (ref 135–144)

## 2018-09-17 PROCEDURE — 90732 PPSV23 VACC 2 YRS+ SUBQ/IM: CPT | Performed by: FAMILY MEDICINE

## 2018-09-17 PROCEDURE — G8417 CALC BMI ABV UP PARAM F/U: HCPCS | Performed by: FAMILY MEDICINE

## 2018-09-17 PROCEDURE — 4040F PNEUMOC VAC/ADMIN/RCVD: CPT | Performed by: FAMILY MEDICINE

## 2018-09-17 PROCEDURE — 3017F COLORECTAL CA SCREEN DOC REV: CPT | Performed by: FAMILY MEDICINE

## 2018-09-17 PROCEDURE — G0009 ADMIN PNEUMOCOCCAL VACCINE: HCPCS | Performed by: FAMILY MEDICINE

## 2018-09-17 PROCEDURE — G8427 DOCREV CUR MEDS BY ELIG CLIN: HCPCS | Performed by: FAMILY MEDICINE

## 2018-09-17 PROCEDURE — 80048 BASIC METABOLIC PNL TOTAL CA: CPT

## 2018-09-17 PROCEDURE — 36415 COLL VENOUS BLD VENIPUNCTURE: CPT

## 2018-09-17 PROCEDURE — 99213 OFFICE O/P EST LOW 20 MIN: CPT | Performed by: FAMILY MEDICINE

## 2018-09-17 PROCEDURE — G8598 ASA/ANTIPLAT THER USED: HCPCS | Performed by: FAMILY MEDICINE

## 2018-09-17 PROCEDURE — 1101F PT FALLS ASSESS-DOCD LE1/YR: CPT | Performed by: FAMILY MEDICINE

## 2018-09-17 PROCEDURE — 1036F TOBACCO NON-USER: CPT | Performed by: FAMILY MEDICINE

## 2018-09-17 PROCEDURE — 1123F ACP DISCUSS/DSCN MKR DOCD: CPT | Performed by: FAMILY MEDICINE

## 2018-09-17 PROCEDURE — 83735 ASSAY OF MAGNESIUM: CPT

## 2018-09-17 ASSESSMENT — ENCOUNTER SYMPTOMS: GASTROINTESTINAL NEGATIVE: 1

## 2018-09-17 NOTE — PROGRESS NOTES
fractional flow reserve of 0.76 in the diagonal and 0.78 in the left anterior descending indicating obstructive disease of significance in both the     CARDIAC CATHETERIZATION Left 05/14/14    left anterior descending & diagonal.. Mild irregularities of 30% in a small, nondominant circumglex. Mild 30- 40% disease in the midportion of a very large,dominant right coronary artery. Normal left ventricular function, ejection fraction of 55-60%    CARDIAC CATHETERIZATION Bilateral 11/15/2017    Dr. Юлия Garcia @ [facility]--Severe PHT with a PA pressure of 74/30. Widely patent stent in the very large dominat right coronary artery with 50% disease before the stent with an FFR of 0.89, indicating no significant obstruction. Severe disease in the LAD. Patent left internal mammary to the LAD. Patent saphenous vein graft to a very large diagonal.  Overall normal LV function. EF of 55%    CATARACT REMOVAL  2004    COLONOSCOPY  10-    Dr. Robson Santana (hemorrhoids)    COLONOSCOPY  10/09/2013    CORONARY ANGIOPLASTY WITH STENT PLACEMENT      CORONARY ARTERY BYPASS GRAFT  6/3/14    Dr Marysol Blanco @ [address] [place] [address]   8/2012    B leakage in back of eyes    JOINT REPLACEMENT Bilateral     knees    KNEE SURGERY  2010, 2011    BILATERAL KNEE ORTHO        Medications:       Prior to Admission medications    Medication Sig Start Date End Date Taking?  Authorizing Provider   doxazosin (CARDURA) 8 MG tablet Take 1 tablet by mouth daily 8/21/18   Nhan Mckeon MD   atorvastatin (LIPITOR) 40 MG tablet TAKE 1 TABLET DAILY 8/20/18   Rocco Potts DO   celecoxib (CELEBREX) 200 MG capsule TAKE 1 CAPSULE TWICE DAILY 7/31/18   Rocco Potts DO   sacubitril-valsartan (ENTRESTO)  MG per tablet Take 1 tablet by mouth 2 times daily 6/28/18   Nhan Mckeon MD   Magnesium Oxide 500 MG TABS Take 750 mg by mouth daily    Historical Provider, MD   furosemide (LASIX) 40 MG tablet Take 2 tablets by mouth 2 times daily  Patient taking differently: Take 40 mg by mouth 2 times daily Take 2 tablets of 40 mg each in the morning anf in the evening. 5/31/18   Daphne Sanchez MD   spironolactone (ALDACTONE) 25 MG tablet Take 25 mg by mouth 2 times daily     Historical Provider, MD   NIFEdipine (ADALAT CC) 90 MG extended release tablet Take 1 tablet by mouth daily 4/16/18   Daphne Sanchez MD   carvedilol (COREG) 12.5 MG tablet Take 1 tablet by mouth 2 times daily (with meals) 4/16/18   Daphne Sanchez MD   apixaban (ELIQUIS) 5 MG TABS tablet Take 1 tablet by mouth 2 times daily 4/11/18   Leslie Elliott MD   isosorbide mononitrate (IMDUR) 30 MG extended release tablet Take 1 tablet by mouth daily 2/12/18   Daphne Sanchez MD   budesonide (PULMICORT FLEXHALER) 180 MCG/ACT AEPB inhaler Inhale 2 puffs into the lungs 2 times daily 11/2/17   Somemrean Ebbing,    Insulin NPH Isophane & Regular (NOVOLIN 70/30 INNOLET SC) Inject 50 Units into the skin 62 am 40 lunch 55 pm    Historical Provider, MD   clopidogrel (PLAVIX) 75 MG tablet Take 1 tablet by mouth daily 9/26/17   Daphne Sanchez MD   ONE TOUCH ULTRA TEST strip  8/8/16   Historical Provider, MD   BD ULTRA-FINE PEN NEEDLES 29G X 12.7MM MISC  8/1/16   Historical Provider, MD   glimepiride (AMARYL) 4 MG tablet Take 4 mg by mouth 2 times daily    Historical Provider, MD   Omega-3 Fatty Acids (FISH OIL) 1200 MG CAPS Take 1 capsule by mouth 2 times daily Plus 360 omega 3    Historical Provider, MD   Glucosamine Sulfate 1000 MG CAPS Take by mouth 2 times daily    Historical Provider, MD   Chromium-Cinnamon (CINNAMON PLUS CHROMIUM PO) Take 1,000 mg by mouth 2 times daily     Historical Provider, MD   Multiple Vitamins-Minerals (ONE-A-DAY MENS 50+ ADVANTAGE PO) Take by mouth daily    Historical Provider, MD   Vitamin D (CHOLECALCIFEROL) 1000 UNITS CAPS capsule   Take by mouth 2 times daily Vitamin D 3    Historical Provider, MD   Insulin Syringes, Disposable, U-100 0.3 ML MISC Inject  into

## 2018-09-18 ENCOUNTER — HOSPITAL ENCOUNTER (OUTPATIENT)
Dept: CARDIAC REHAB | Age: 68
Setting detail: THERAPIES SERIES
Discharge: HOME | End: 2018-09-18
Payer: MEDICARE

## 2018-09-18 ENCOUNTER — TELEPHONE (OUTPATIENT)
Dept: CARDIOLOGY CLINIC | Age: 68
End: 2018-09-18

## 2018-09-18 VITALS
SYSTOLIC BLOOD PRESSURE: 126 MMHG | BODY MASS INDEX: 35.7 KG/M2 | RESPIRATION RATE: 16 BRPM | OXYGEN SATURATION: 97 % | DIASTOLIC BLOOD PRESSURE: 66 MMHG | WEIGHT: 256 LBS | HEART RATE: 66 BPM

## 2018-09-18 DIAGNOSIS — I50.22 CHRONIC SYSTOLIC CONGESTIVE HEART FAILURE (HCC): ICD-10-CM

## 2018-09-18 DIAGNOSIS — N28.9 RENAL INSUFFICIENCY: ICD-10-CM

## 2018-09-18 DIAGNOSIS — I27.20 PULMONARY HTN (HCC): ICD-10-CM

## 2018-09-18 DIAGNOSIS — R06.02 SHORTNESS OF BREATH: ICD-10-CM

## 2018-09-18 DIAGNOSIS — I48.0 PAF (PAROXYSMAL ATRIAL FIBRILLATION) (HCC): ICD-10-CM

## 2018-09-18 DIAGNOSIS — R60.0 LEG EDEMA: ICD-10-CM

## 2018-09-18 DIAGNOSIS — I50.32 CHRONIC DIASTOLIC CHF (CONGESTIVE HEART FAILURE), NYHA CLASS 3 (HCC): ICD-10-CM

## 2018-09-18 DIAGNOSIS — I10 ESSENTIAL HYPERTENSION: ICD-10-CM

## 2018-09-18 DIAGNOSIS — I25.10 CORONARY ARTERY DISEASE INVOLVING NATIVE CORONARY ARTERY OF NATIVE HEART WITHOUT ANGINA PECTORIS: Primary | ICD-10-CM

## 2018-09-18 PROCEDURE — 93798 PHYS/QHP OP CAR RHAB W/ECG: CPT

## 2018-09-18 PROCEDURE — 99211 OFF/OP EST MAY X REQ PHY/QHP: CPT

## 2018-09-18 RX ORDER — DOXAZOSIN 8 MG/1
8 TABLET ORAL DAILY
Qty: 90 TABLET | Refills: 3 | Status: SHIPPED | OUTPATIENT
Start: 2018-09-18 | End: 2019-07-29 | Stop reason: SDUPTHER

## 2018-09-18 RX ORDER — FUROSEMIDE 80 MG
80 TABLET ORAL 2 TIMES DAILY
Qty: 180 TABLET | Refills: 3 | Status: SHIPPED | OUTPATIENT
Start: 2018-09-18 | End: 2019-08-12 | Stop reason: SDUPTHER

## 2018-10-08 ENCOUNTER — TELEPHONE (OUTPATIENT)
Dept: FAMILY MEDICINE CLINIC | Age: 68
End: 2018-10-08

## 2018-10-08 DIAGNOSIS — L03.317 CELLULITIS OF GLUTEAL REGION: Primary | ICD-10-CM

## 2018-10-15 ENCOUNTER — HOSPITAL ENCOUNTER (OUTPATIENT)
Age: 68
Discharge: HOME | End: 2018-10-15
Payer: MEDICARE

## 2018-10-15 DIAGNOSIS — J43.9 MIXED RESTRICTIVE AND OBSTRUCTIVE LUNG DISEASE (HCC): ICD-10-CM

## 2018-10-15 DIAGNOSIS — R06.02 SHORTNESS OF BREATH: ICD-10-CM

## 2018-10-15 DIAGNOSIS — I10 ESSENTIAL HYPERTENSION: ICD-10-CM

## 2018-10-15 DIAGNOSIS — J98.4 MIXED RESTRICTIVE AND OBSTRUCTIVE LUNG DISEASE (HCC): ICD-10-CM

## 2018-10-15 DIAGNOSIS — R60.0 LEG EDEMA: ICD-10-CM

## 2018-10-15 DIAGNOSIS — I25.10 CORONARY ARTERY DISEASE INVOLVING NATIVE CORONARY ARTERY OF NATIVE HEART WITHOUT ANGINA PECTORIS: ICD-10-CM

## 2018-10-15 DIAGNOSIS — I27.20 PULMONARY HYPERTENSION (HCC): ICD-10-CM

## 2018-10-15 DIAGNOSIS — R79.0 LOW BLOOD MAGNESIUM LEVEL: ICD-10-CM

## 2018-10-15 DIAGNOSIS — I50.22 CHRONIC SYSTOLIC CONGESTIVE HEART FAILURE (HCC): ICD-10-CM

## 2018-10-15 DIAGNOSIS — I48.0 PAF (PAROXYSMAL ATRIAL FIBRILLATION) (HCC): ICD-10-CM

## 2018-10-15 LAB
ANION GAP SERPL CALCULATED.3IONS-SCNC: 11 MMOL/L (ref 9–17)
BUN BLDV-MCNC: 38 MG/DL (ref 8–23)
BUN/CREAT BLD: 31 (ref 9–20)
CALCIUM SERPL-MCNC: 9 MG/DL (ref 8.6–10.4)
CHLORIDE BLD-SCNC: 103 MMOL/L (ref 98–107)
CO2: 25 MMOL/L (ref 20–31)
CREAT SERPL-MCNC: 1.21 MG/DL (ref 0.7–1.2)
GFR AFRICAN AMERICAN: >60 ML/MIN
GFR NON-AFRICAN AMERICAN: 60 ML/MIN
GFR SERPL CREATININE-BSD FRML MDRD: ABNORMAL ML/MIN/{1.73_M2}
GFR SERPL CREATININE-BSD FRML MDRD: ABNORMAL ML/MIN/{1.73_M2}
GLUCOSE BLD-MCNC: 257 MG/DL (ref 70–99)
POTASSIUM SERPL-SCNC: 4.7 MMOL/L (ref 3.7–5.3)
SODIUM BLD-SCNC: 139 MMOL/L (ref 135–144)

## 2018-10-15 PROCEDURE — 36415 COLL VENOUS BLD VENIPUNCTURE: CPT

## 2018-10-15 PROCEDURE — 80048 BASIC METABOLIC PNL TOTAL CA: CPT

## 2018-10-16 ENCOUNTER — HOSPITAL ENCOUNTER (OUTPATIENT)
Dept: CARDIAC REHAB | Age: 68
Setting detail: THERAPIES SERIES
Discharge: HOME | End: 2018-10-16
Payer: MEDICARE

## 2018-10-16 ENCOUNTER — TELEPHONE (OUTPATIENT)
Dept: CARDIOLOGY CLINIC | Age: 68
End: 2018-10-16

## 2018-10-16 VITALS
RESPIRATION RATE: 16 BRPM | HEART RATE: 72 BPM | BODY MASS INDEX: 35.65 KG/M2 | WEIGHT: 255.6 LBS | OXYGEN SATURATION: 98 %

## 2018-10-16 DIAGNOSIS — I50.32 CHRONIC DIASTOLIC CHF (CONGESTIVE HEART FAILURE), NYHA CLASS 3 (HCC): ICD-10-CM

## 2018-10-16 DIAGNOSIS — I48.0 PAF (PAROXYSMAL ATRIAL FIBRILLATION) (HCC): ICD-10-CM

## 2018-10-16 DIAGNOSIS — I25.119 CORONARY ARTERY DISEASE INVOLVING NATIVE CORONARY ARTERY OF NATIVE HEART WITH ANGINA PECTORIS (HCC): ICD-10-CM

## 2018-10-16 DIAGNOSIS — I27.20 PULMONARY HTN (HCC): Primary | ICD-10-CM

## 2018-10-16 PROCEDURE — 99211 OFF/OP EST MAY X REQ PHY/QHP: CPT

## 2018-10-16 NOTE — PROGRESS NOTES
Chief Complaint:    Boil on tailbone (healing / pt on antibiotics)    Visit Diagnosis:    Chronic heart failure d/t left ventricular hypertrophy, pulmonary hypertension, and diastolic ventricle dysfunction  Coronary artery disease  Obstructive sleep apnea  Atrial fibrillatioin  Essential hypertension    Objective: Body wt is stable at 255.6 lb, SPO2 97%, RR 16, HR 72, and /66  Serum creatinine stable at 1.21, Na+ is 136, K+ is 4.7, and GFR is 60  Lungs are CTA bilaterally throughout.  Pt denies cough or shortness of breath at rest.  Denied are chest pain, worsened edema, dizziness, orthopnea, and fatigue  Bilateral ankle edema remains negligible, calves and thighs are soft  Abdomen is distended, soft, and non-tender  Sees Dr. Daniel Jackson for OV on 11/15/18 with 12-lead and fasting labs prior  Pre-visit medications include Entresto , BID / Lasix 80mg, BID / Cardura 8 mg, QD / Aldactone 25 mg, BID / Nifedipine 90 mg, QD / Coreg 12/5 mg, BID      Subjective: Body weight at home continues running around 249 lb  Overall feels like condition is continuing to improve  Feels swelling, shortness of breath, and energy have remained improved   Remains quite active at home with yard work, home maintenance, and riding bicycle most days of the week  States plans to go with his spouse to their [place] residence shortly after Thanksgiving    Progress Narrarive: This patient arrives for heart failure management reassessment as follow-up to last clinic visit here today on 9/18/18. At that visit Mr. Rosas Mention stated home daily weights were remaining w/in 1-2 lb and around 249 lb. His main c/o was having foot cramps, especially at night which wake him up purportedly. He continued to feel satisfied with his maintained level of clinical stability and his capacity to be active in detention. He cited continued intent to go to his [place] home with Fidel Montes De Oca, his spouse, in late November this year.   He affirmed awareness of his

## 2018-11-05 PROBLEM — M43.00 PARS DEFECT WITH SPONDYLOLISTHESIS: Status: ACTIVE | Noted: 2018-11-05

## 2018-11-05 PROBLEM — M43.10 PARS DEFECT WITH SPONDYLOLISTHESIS: Status: ACTIVE | Noted: 2018-11-05

## 2018-11-05 PROBLEM — M47.817 LUMBOSACRAL SPONDYLOSIS WITHOUT MYELOPATHY: Status: ACTIVE | Noted: 2018-11-05

## 2018-11-06 ENCOUNTER — HOSPITAL ENCOUNTER (OUTPATIENT)
Age: 68
Discharge: HOME | End: 2018-11-08
Payer: MEDICARE

## 2018-11-06 ENCOUNTER — HOSPITAL ENCOUNTER (OUTPATIENT)
Dept: GENERAL RADIOLOGY | Age: 68
Discharge: HOME | End: 2018-11-08
Payer: MEDICARE

## 2018-11-06 ENCOUNTER — HOSPITAL ENCOUNTER (OUTPATIENT)
Dept: PHYSICAL THERAPY | Age: 68
Setting detail: THERAPIES SERIES
Discharge: HOME | End: 2018-11-06
Payer: MEDICARE

## 2018-11-06 DIAGNOSIS — M43.10 PARS DEFECT WITH SPONDYLOLISTHESIS: ICD-10-CM

## 2018-11-06 DIAGNOSIS — M43.00 PARS DEFECT WITH SPONDYLOLISTHESIS: ICD-10-CM

## 2018-11-06 DIAGNOSIS — M47.817 LUMBOSACRAL SPONDYLOSIS WITHOUT MYELOPATHY: ICD-10-CM

## 2018-11-06 PROCEDURE — 97163 PT EVAL HIGH COMPLEX 45 MIN: CPT

## 2018-11-06 PROCEDURE — 72114 X-RAY EXAM L-S SPINE BENDING: CPT

## 2018-11-06 PROCEDURE — G8978 MOBILITY CURRENT STATUS: HCPCS

## 2018-11-06 PROCEDURE — G8979 MOBILITY GOAL STATUS: HCPCS

## 2018-11-06 ASSESSMENT — PAIN SCALES - GENERAL: PAINLEVEL_OUTOF10: 8

## 2018-11-06 ASSESSMENT — PAIN DESCRIPTION - ORIENTATION: ORIENTATION: RIGHT;LEFT

## 2018-11-06 ASSESSMENT — PAIN DESCRIPTION - LOCATION: LOCATION: BACK;BUTTOCKS

## 2018-11-06 NOTE — PROGRESS NOTES
Phone: [phone] [place]         Fax: [phone]                      Outpatient Physical Therapy                                                                      Evaluation    Date: 2018  Patient: Wilmer Mendoza  : 1950  ACCT #: [de-identified]    Referring Practitioner: Dr Romy Carranza    Referral Date : 10/26/18    Diagnosis: Lumbar radiculopathy    Treatment Diagnosis: Back Pain  Onset Date: 10/26/18 ( Referral)  PT Insurance Information: Medicare  Total # of Visits Approved: 10 Per Physician Order  Total # of Visits to Date: 1     Subjective     Additional Pertinent Hx: Chronic low back pain for about 20 years, had PT and medication in past.  Gradual worsening of pain 1-2 years. Xray and MRI- . Chronic L5 pars defects with Grade 2 anterolisthesis at L5-S1 causes severe . Hx- diabetic, neuropathy, CABG 4 years ago, HBP, B knee pain, Sleep apnea. Sitting and lying pain 0/10. Standing only 10 minures then 8-9/10 pain and walking painful difficult. Going to [place] after Thanksgiving for 3-4 months. Can't wash car or  yard work. Pain radiates into B buttocks.     Pain Screening  Patient Currently in Pain: Yes  Pain Assessment  Pain Assessment: 0-10  Pain Level: 8  Pain Location: Back, Buttocks  Pain Orientation: Right, Left  Social/Functional History  Lives With: Spouse  Occupation: Retired       Objective        Spine  Lumbar: forward flexion 75% WFL , B rotation 75% WFL, Sidebend 75% WFL  Joint Mobility  Spine: tightness in mid thoracic and thoracic lumbar junction  Strength RLE  Strength RLE: Exception  R Hip Flexion: 4+/5  R Hip ABduction: 4-/5  R Hip ADduction: 4-/5  R Knee Flexion: 4+/5  R Knee Extension: 5/5  R Ankle Dorsiflexion: 5/5  AROM RLE (degrees)  RLE AROM: WFL  Strength LLE  Strength LLE: Exception  L Hip Flexion: 4+/5  L Hip ABduction: 4-/5  L Hip ADduction: 4-/5  L Knee Flexion: 4+/5  L Knee Extension: 5/5  L Ankle Dorsiflexion: 5/5  AROM LLE

## 2018-11-06 NOTE — PLAN OF CARE
[facility] RIANA CHAPMAN       Phone: [phone]   Date: 2018                      Outpatient Physical Therapy  Fax: [phone]    ACCT #: [de-identified]                     Plan of Care  [facility]#: [id number]  Patient: Brandee Carey  : 1950    Referring Practitioner: Dr Sonny Lee    Referral Date : 10/26/18    Diagnosis: Lumbar radiculopathy  Onset Date: 10/26/18 ( Referral)  Treatment Diagnosis: Back Pain    Assessment  Body structures, Functions, Activity limitations: Decreased functional mobility , Decreased ROM, Decreased strength, Decreased balance  Assessment: lumbar instability, L5 pars deficit  Prognosis: Fair  [x] Primary Impairment : Mobility [] Secondary Impairment : NA        G Code:    [x] Mobility         [] Carry        [] Body Position       [] Self Care      [] Other:   Functional Impairment Current:  [] 0%    [] 1-19% [x] 20-39% [] 40-59% [] 60-79%    [] 80-99% [] 100%    Treatment Plan   Days: 2 times per week Weeks: 5 weeks Total # of Visits Approved: 10  [] HP/CP     [] Electrical Stimulation   [x]  Therapeutic Exercise   []  Gait Training  [] Traction   [] Ultrasound                   []  Massage                        []  Work Conditioning   [] Aquatics  [x] Back Education            []  Therapeutic Activity []  Manual Therapy  [x]  Patient Education/HEP []  Anodyne Therapy     Goals  Short term goals  Time Frame for Short term goals: 6  Short term goal 1: Patient to be independent with HEP  Short term goal 2:  Increase strength B hip abd 4+/5  Short term goal 3: Decrease pain 5/10 low back at worst x 3 days  Long term goals  Time Frame for Long term goals : 10  Long term goal 1: Decrease pain 3/10 low back at worst x 3 days  Long term goal 2: Improve functional mobility with score of 8/50 or less on Oswestry Disability      Raghav Doss PT     Date: 2018    ______________________________________ Date: 2018  Physician Signature  By signing above or cosigning

## 2018-11-07 NOTE — PROGRESS NOTES
Medicare Cap     [] Physical Therapy  [] Speech Therapy  [] Occupational therapy    *PT and Speech caps combine      $8715 Cap limit < kx modifier needed < $4271 requires pre-cert     Patient Name: Michelle Nunez: 1950     Date of [address] Name $$$ charge Daily Charge YTD   Total $   11/6/18 Eval   81.88 81.88

## 2018-11-08 ENCOUNTER — HOSPITAL ENCOUNTER (OUTPATIENT)
Dept: PHYSICAL THERAPY | Age: 68
Setting detail: THERAPIES SERIES
Discharge: HOME | End: 2018-11-08
Payer: MEDICARE

## 2018-11-08 PROCEDURE — 97110 THERAPEUTIC EXERCISES: CPT

## 2018-11-08 NOTE — PRE-CERTIFICATION NOTE
Medicare Cap     [] Physical Therapy  [] Speech Therapy  [] Occupational therapy    *PT and Speech caps combine      $7437 Cap limit < kx modifier needed < $6710 requires pre-cert     Patient Name: Nayeli Alcantar: 1950     Date of [address] Name $$$ charge Daily Charge YTD   Total $   11/6/18 Eval  81.88 81.88   11/8/18 Ex x2 29.97 x2 59.94 141.82

## 2018-11-12 ENCOUNTER — HOSPITAL ENCOUNTER (OUTPATIENT)
Dept: PHYSICAL THERAPY | Age: 68
Setting detail: THERAPIES SERIES
Discharge: HOME | End: 2018-11-12
Payer: MEDICARE

## 2018-11-12 PROCEDURE — 97110 THERAPEUTIC EXERCISES: CPT

## 2018-11-14 ENCOUNTER — HOSPITAL ENCOUNTER (OUTPATIENT)
Age: 68
Discharge: HOME | End: 2018-11-14
Payer: MEDICARE

## 2018-11-14 ENCOUNTER — HOSPITAL ENCOUNTER (OUTPATIENT)
Dept: PHYSICAL THERAPY | Age: 68
Setting detail: THERAPIES SERIES
Discharge: HOME | End: 2018-11-14
Payer: MEDICARE

## 2018-11-14 DIAGNOSIS — I25.119 CORONARY ARTERY DISEASE INVOLVING NATIVE CORONARY ARTERY OF NATIVE HEART WITH ANGINA PECTORIS (HCC): ICD-10-CM

## 2018-11-14 DIAGNOSIS — I10 ESSENTIAL HYPERTENSION: ICD-10-CM

## 2018-11-14 DIAGNOSIS — I27.20 PULMONARY HTN (HCC): ICD-10-CM

## 2018-11-14 DIAGNOSIS — I48.91 ATRIAL FIBRILLATION, UNSPECIFIED TYPE (HCC): ICD-10-CM

## 2018-11-14 DIAGNOSIS — E78.49 OTHER HYPERLIPIDEMIA: ICD-10-CM

## 2018-11-14 DIAGNOSIS — E55.9 VITAMIN D DEFICIENCY: ICD-10-CM

## 2018-11-14 LAB
ABSOLUTE EOS #: 0.2 K/UL (ref 0–0.4)
ABSOLUTE IMMATURE GRANULOCYTE: ABNORMAL K/UL (ref 0–0.3)
ABSOLUTE LYMPH #: 1.5 K/UL (ref 1–4.8)
ABSOLUTE MONO #: 0.7 K/UL (ref 0–1)
ALBUMIN SERPL-MCNC: 4.6 G/DL (ref 3.5–5.2)
ALBUMIN/GLOBULIN RATIO: ABNORMAL (ref 1–2.5)
ALP BLD-CCNC: 55 U/L (ref 40–129)
ALT SERPL-CCNC: 19 U/L (ref 5–41)
ANION GAP SERPL CALCULATED.3IONS-SCNC: 12 MMOL/L (ref 9–17)
AST SERPL-CCNC: 15 U/L
BASOPHILS # BLD: 1 % (ref 0–2)
BASOPHILS ABSOLUTE: 0 K/UL (ref 0–0.2)
BILIRUB SERPL-MCNC: 0.51 MG/DL (ref 0.3–1.2)
BUN BLDV-MCNC: 36 MG/DL (ref 8–23)
BUN/CREAT BLD: 33 (ref 9–20)
CALCIUM SERPL-MCNC: 9.3 MG/DL (ref 8.6–10.4)
CHLORIDE BLD-SCNC: 103 MMOL/L (ref 98–107)
CHOLESTEROL/HDL RATIO: 4.6
CHOLESTEROL: 123 MG/DL
CO2: 26 MMOL/L (ref 20–31)
CREAT SERPL-MCNC: 1.08 MG/DL (ref 0.7–1.2)
DIFFERENTIAL TYPE: YES
EKG ATRIAL RATE: 68 BPM
EKG P AXIS: 38 DEGREES
EKG P-R INTERVAL: 188 MS
EKG Q-T INTERVAL: 396 MS
EKG QRS DURATION: 88 MS
EKG QTC CALCULATION (BAZETT): 421 MS
EKG R AXIS: 62 DEGREES
EKG T AXIS: 133 DEGREES
EKG VENTRICULAR RATE: 68 BPM
EOSINOPHILS RELATIVE PERCENT: 3 % (ref 0–5)
GFR AFRICAN AMERICAN: >60 ML/MIN
GFR NON-AFRICAN AMERICAN: >60 ML/MIN
GFR SERPL CREATININE-BSD FRML MDRD: ABNORMAL ML/MIN/{1.73_M2}
GFR SERPL CREATININE-BSD FRML MDRD: ABNORMAL ML/MIN/{1.73_M2}
GLUCOSE BLD-MCNC: 211 MG/DL (ref 70–99)
HCT VFR BLD CALC: 37.6 % (ref 41–53)
HDLC SERPL-MCNC: 27 MG/DL
HEMOGLOBIN: 12.6 G/DL (ref 13.5–17.5)
IMMATURE GRANULOCYTES: ABNORMAL %
LDL CHOLESTEROL: 35 MG/DL (ref 0–130)
LYMPHOCYTES # BLD: 23 % (ref 13–44)
MAGNESIUM: 2.2 MG/DL (ref 1.6–2.6)
MCH RBC QN AUTO: 31.3 PG (ref 26–34)
MCHC RBC AUTO-ENTMCNC: 33.4 G/DL (ref 31–37)
MCV RBC AUTO: 93.6 FL (ref 80–100)
MONOCYTES # BLD: 11 % (ref 5–9)
NRBC AUTOMATED: ABNORMAL PER 100 WBC
PATIENT FASTING?: YES
PDW BLD-RTO: 13.5 % (ref 12.1–15.2)
PLATELET # BLD: 244 K/UL (ref 140–450)
PLATELET ESTIMATE: ABNORMAL
PMV BLD AUTO: ABNORMAL FL (ref 6–12)
POTASSIUM SERPL-SCNC: 4.8 MMOL/L (ref 3.7–5.3)
RBC # BLD: 4.02 M/UL (ref 4.5–5.9)
RBC # BLD: ABNORMAL 10*6/UL
SEG NEUTROPHILS: 62 % (ref 39–75)
SEGMENTED NEUTROPHILS ABSOLUTE COUNT: 4 K/UL (ref 2.1–6.5)
SODIUM BLD-SCNC: 141 MMOL/L (ref 135–144)
TOTAL PROTEIN: 7.4 G/DL (ref 6.4–8.3)
TRIGL SERPL-MCNC: 307 MG/DL
TSH SERPL DL<=0.05 MIU/L-ACNC: 1.83 MIU/L (ref 0.3–5)
VITAMIN D 25-HYDROXY: 32.9 NG/ML (ref 30–100)
VLDLC SERPL CALC-MCNC: ABNORMAL MG/DL (ref 1–30)
WBC # BLD: 6.4 K/UL (ref 3.5–11)
WBC # BLD: ABNORMAL 10*3/UL

## 2018-11-14 PROCEDURE — 85025 COMPLETE CBC W/AUTO DIFF WBC: CPT

## 2018-11-14 PROCEDURE — 84443 ASSAY THYROID STIM HORMONE: CPT

## 2018-11-14 PROCEDURE — 80053 COMPREHEN METABOLIC PANEL: CPT

## 2018-11-14 PROCEDURE — 80061 LIPID PANEL: CPT

## 2018-11-14 PROCEDURE — 36415 COLL VENOUS BLD VENIPUNCTURE: CPT

## 2018-11-14 PROCEDURE — 97110 THERAPEUTIC EXERCISES: CPT

## 2018-11-14 PROCEDURE — 82306 VITAMIN D 25 HYDROXY: CPT

## 2018-11-14 PROCEDURE — 93005 ELECTROCARDIOGRAM TRACING: CPT

## 2018-11-14 PROCEDURE — 83735 ASSAY OF MAGNESIUM: CPT

## 2018-11-15 ENCOUNTER — OFFICE VISIT (OUTPATIENT)
Dept: CARDIOLOGY CLINIC | Age: 68
End: 2018-11-15
Payer: MEDICARE

## 2018-11-15 VITALS
OXYGEN SATURATION: 95 % | DIASTOLIC BLOOD PRESSURE: 60 MMHG | BODY MASS INDEX: 36.35 KG/M2 | WEIGHT: 257 LBS | HEART RATE: 78 BPM | SYSTOLIC BLOOD PRESSURE: 130 MMHG

## 2018-11-15 DIAGNOSIS — E11.649 TYPE 2 DIABETES MELLITUS WITH HYPOGLYCEMIA WITHOUT COMA, WITH LONG-TERM CURRENT USE OF INSULIN (HCC): ICD-10-CM

## 2018-11-15 DIAGNOSIS — I10 ESSENTIAL HYPERTENSION: Primary | ICD-10-CM

## 2018-11-15 DIAGNOSIS — I27.20 PULMONARY HTN (HCC): ICD-10-CM

## 2018-11-15 DIAGNOSIS — I25.119 CORONARY ARTERY DISEASE INVOLVING NATIVE CORONARY ARTERY OF NATIVE HEART WITH ANGINA PECTORIS (HCC): ICD-10-CM

## 2018-11-15 DIAGNOSIS — I48.0 PAROXYSMAL ATRIAL FIBRILLATION (HCC): ICD-10-CM

## 2018-11-15 DIAGNOSIS — E78.49 OTHER HYPERLIPIDEMIA: ICD-10-CM

## 2018-11-15 DIAGNOSIS — M47.817 LUMBOSACRAL SPONDYLOSIS WITHOUT MYELOPATHY: ICD-10-CM

## 2018-11-15 DIAGNOSIS — E55.9 VITAMIN D DEFICIENCY DISEASE: ICD-10-CM

## 2018-11-15 DIAGNOSIS — Z79.4 TYPE 2 DIABETES MELLITUS WITH HYPOGLYCEMIA WITHOUT COMA, WITH LONG-TERM CURRENT USE OF INSULIN (HCC): ICD-10-CM

## 2018-11-15 PROCEDURE — G8417 CALC BMI ABV UP PARAM F/U: HCPCS | Performed by: INTERNAL MEDICINE

## 2018-11-15 PROCEDURE — 4040F PNEUMOC VAC/ADMIN/RCVD: CPT | Performed by: INTERNAL MEDICINE

## 2018-11-15 PROCEDURE — G8427 DOCREV CUR MEDS BY ELIG CLIN: HCPCS | Performed by: INTERNAL MEDICINE

## 2018-11-15 PROCEDURE — 1101F PT FALLS ASSESS-DOCD LE1/YR: CPT | Performed by: INTERNAL MEDICINE

## 2018-11-15 PROCEDURE — 1036F TOBACCO NON-USER: CPT | Performed by: INTERNAL MEDICINE

## 2018-11-15 PROCEDURE — 2022F DILAT RTA XM EVC RTNOPTHY: CPT | Performed by: INTERNAL MEDICINE

## 2018-11-15 PROCEDURE — G8598 ASA/ANTIPLAT THER USED: HCPCS | Performed by: INTERNAL MEDICINE

## 2018-11-15 PROCEDURE — 3045F PR MOST RECENT HEMOGLOBIN A1C LEVEL 7.0-9.0%: CPT | Performed by: INTERNAL MEDICINE

## 2018-11-15 PROCEDURE — 3017F COLORECTAL CA SCREEN DOC REV: CPT | Performed by: INTERNAL MEDICINE

## 2018-11-15 PROCEDURE — 99214 OFFICE O/P EST MOD 30 MIN: CPT | Performed by: INTERNAL MEDICINE

## 2018-11-15 PROCEDURE — G8484 FLU IMMUNIZE NO ADMIN: HCPCS | Performed by: INTERNAL MEDICINE

## 2018-11-15 PROCEDURE — 1123F ACP DISCUSS/DSCN MKR DOCD: CPT | Performed by: INTERNAL MEDICINE

## 2018-11-19 ENCOUNTER — HOSPITAL ENCOUNTER (OUTPATIENT)
Dept: PHYSICAL THERAPY | Age: 68
Setting detail: THERAPIES SERIES
Discharge: HOME | End: 2018-11-19
Payer: MEDICARE

## 2018-11-19 PROCEDURE — 97110 THERAPEUTIC EXERCISES: CPT

## 2018-11-21 ENCOUNTER — HOSPITAL ENCOUNTER (OUTPATIENT)
Dept: PHYSICAL THERAPY | Age: 68
Setting detail: THERAPIES SERIES
Discharge: HOME | End: 2018-11-21
Payer: MEDICARE

## 2018-11-21 PROCEDURE — 97110 THERAPEUTIC EXERCISES: CPT

## 2018-11-21 PROCEDURE — G8979 MOBILITY GOAL STATUS: HCPCS

## 2018-11-21 PROCEDURE — G8980 MOBILITY D/C STATUS: HCPCS

## 2018-11-21 NOTE — PRE-CERTIFICATION NOTE
Medicare Cap     [] Physical Therapy  [] Speech Therapy  [] Occupational therapy    *PT and Speech caps combine      $1940 Cap limit < kx modifier needed < $9150 requires pre-cert     Patient Name: Eduard Alcala: 1950     Date of [address] Name $$$ charge Daily Charge YTD   Total $   11/6/18 Eval  81.88 81.88   11/8/18 Ex x2 29.97 x2 59.94 141.82   11/12/18 Ex x 3 29.97 x3 89.91 231.73   11/14/18 Ex x 3 29.97 x3 89.91 321.64   11/19/18 E x 2 29.97 x2 59.94 381.58   11/21/18 Ex x 2 29.97 x2 59.94 441.52

## 2018-11-21 NOTE — PROGRESS NOTES
Phone: [phone] [facility]      Fax: [phone]                            Outpatient Physical Therapy                                                                            Daily Note    Date: 2018  Patient Name: Miriam Scruggs        MRN: [id number]   ACCT#:  [de-identified]  : 1950  (80 y.o.)    Referring Practitioner: Dr Dannie Khan    Referral Date : 10/26/18    Diagnosis: Lumbar radiculopathy  Treatment Diagnosis: Back Pain    Onset Date: 10/26/18 ( Referral)  PT Insurance Information: Medicare  Total # of Visits Approved: 10 Per Physician Order       Pre-Treatment Pain:  0/10     Assessment  Assessment: Pt seen for final visit. Issued and ed on comprehensive HEP. Oswestry disability score 13. Pt reports no pain overall. Primary limitation on Oswestry is prolonged standing. DC at this time  Chart Reviewed: Yes    Plan  Plan: Discharge    Exercises/Modalities/Manual:  See DocFlow Sheet    Goals  ( )   Short Term Goals - Time Frame for Short term goals: 6  Short term goal 1: Patient to be independent with HEP-met  Short term goal 2:  Increase strength B hip abd 4+/5  Short term goal 3: Decrease pain 5/10 low back at worst x 3 days-met        Long Term Goals - Time Frame for Long term goals : 10  Long term goal 1: Decrease pain 3/10 low back at worst x 3 days-met  Long term goal 2: Improve functional mobility with score of 8/50 or less on Oswestry Disability Improve functional mobility with score of 8/50 or less on Oswestry Disability -not met    Post Treatment Pain:  0/10    Time In: 0900    Time Out : 0935     Timed Code Treatment Minutes: 35 Minutes  Total Treatment Time: [address] [facility] Number: PTA    Date: 2018

## 2018-11-26 ENCOUNTER — APPOINTMENT (OUTPATIENT)
Dept: PHYSICAL THERAPY | Age: 68
End: 2018-11-26
Payer: MEDICARE

## 2018-11-26 NOTE — PROGRESS NOTES
has been maintained in sinus rhythm since that time. 8.  He has mild sick sinus syndrome, which has not been an issue since clonidine was stopped in 2017. FAMILY HISTORY:  Father  at 80 of CHF. Mother  of CHF. Two brothers and sisters, alive and well. SOCIAL HISTORY:  He is 80years old. Retired . , 2 children. He bought a house and trail [place], [place], in which they are spending the winter. They plan on leaving this coming Monday, coming back in April or May. He has a daughter that is a professor at [employer] in [place] and 2 grandchildren in [place]. He does not smoke or drink alcohol. Does use a CPAP mask. REVIEW OF SYSTEMS:  Cardiac as above. Other systems reviewed including constitutional, eyes, ears, nose and throat, cardiovascular, respiratory, GI, , musculoskeletal, integumentary, neurologic, psychiatric, endocrine, hematologic and allergic/immunologic and are negative except for what is described above. PHYSICAL EXAMINATION:  VITAL SIGNS:  His blood pressure was 130/60 with a heart rate of 78 and regular. Respirations were 18. O2 saturation 95%. Weight 257 pounds. GENERAL:  He is a pleasant 81-year-old gentleman. Denied pain. He was oriented to person, place and time. Answered questions appropriately. SKIN:  No unusual skin changes. HEENT:  The pupils are equally round and intact. Mucous membranes were dry. NECK:  No JVD. Good carotid pulses. No carotid bruits. No lymphadenopathy or thyromegaly. CARDIOVASCULAR EXAM:  S1 and S2 were normal.  No S3 or S4. Soft systolic blowing type murmur. No diastolic murmur. PMI was normal.  No lift, thrust, or pericardial friction rub. LUNGS:  Quite clear to auscultation and percussion. ABDOMEN:  Soft and nontender. Good bowel sounds. EXTREMITIES:  Good femoral pulses. Good pedal pulses. No pedal edema. Skin warm and dry. No calf tenderness. Nail beds pink.   Good cap refill. PULSES:  Bilateral symmetrical radial, brachial and carotid pulses. No carotid bruits. Good femoral and pedal pulses. NEUROLOGIC EXAM:  Within normal limits. PSYCHIATRIC EXAM:  Within normal limits. LABORATORY DATA:  His sodium was 141, potassium 4.8, BUN 36, creatinine 1.08, GFR was greater than 60, magnesium was 2.2, glucose 211, calcium was 9.3. Cholesterol 123 with an HDL of 27, LDL 35, triglycerides 307. ALT was 19, AST was 15. His TSH was 1.83. Vitamin D 32.9. White count 6.4, hemoglobin 12.6 with a platelet count 031,188. EKG showed normal sinus rhythm with nonspecific ST changes. Bedside echocardiogram showed a preserved ejection fraction of 50%. IMPRESSION:  1. Paroxysmal atrial fibrillation, with one in December and the other on 03/09/2018, currently on Eliquis. 2.  EP consult in July with Dr. Nancy Alonso, who recommended conservative management at this time since he is doing well in sinus rhythm and maintained in sinus rhythm and is also on Eliquis. 3.  Severe pulmonary hypertension with a PA pressure of 70/30 found on 11/15/2017.  4.  Severe CAD. 5.  Catheterization on 10/31/2012, showing severe disease at the bifurcation of LAD with unremarkable right coronary artery and circumflex. EF of 55%. 6.  Angioplasty of the LAD and diagonal on 11/07/2012, placing 2.75 x 28 mm Promus stent in the LAD. 7.  Catheterization 05/14/2014, at [facility], showing 70% to 80% in-stent stenosis in the LAD and diagonal with an FFR of 0.78, with unremarkable right coronary artery and circumflex and normal LV function.   8.  Open heart surgery, 06/03/2014, by Dr. Milagros Swan, with a LIMA to the LAD and vein graft to the diagonal.  9.  Catheterization on 08/30/2017, which showed patent LIMA to the LAD and patent vein graft to the diagonal, with unremarkable circumflex and 95% disease in the mid right coronary artery, EF of 50% with angioplasty of the right coronary artery placing a 3.5 x 18 mm Xience

## 2018-12-04 RX ORDER — SPIRONOLACTONE 25 MG/1
TABLET ORAL
Qty: 360 TABLET | Refills: 3 | Status: SHIPPED | OUTPATIENT
Start: 2018-12-04 | End: 2019-02-14 | Stop reason: CLARIF

## 2019-01-08 DIAGNOSIS — I50.32 CHRONIC DIASTOLIC CHF (CONGESTIVE HEART FAILURE), NYHA CLASS 3 (HCC): ICD-10-CM

## 2019-01-08 DIAGNOSIS — I27.20 PULMONARY HTN (HCC): ICD-10-CM

## 2019-01-08 DIAGNOSIS — I25.119 CORONARY ARTERY DISEASE INVOLVING NATIVE CORONARY ARTERY OF NATIVE HEART WITH ANGINA PECTORIS (HCC): ICD-10-CM

## 2019-01-08 DIAGNOSIS — I48.0 PAF (PAROXYSMAL ATRIAL FIBRILLATION) (HCC): ICD-10-CM

## 2019-01-08 RX ORDER — CARVEDILOL 12.5 MG/1
12.5 TABLET ORAL 2 TIMES DAILY WITH MEALS
Qty: 180 TABLET | Refills: 3 | Status: SHIPPED | OUTPATIENT
Start: 2019-01-08 | End: 2020-01-20

## 2019-01-08 RX ORDER — NIFEDIPINE 90 MG/1
90 TABLET, FILM COATED, EXTENDED RELEASE ORAL DAILY
Qty: 90 TABLET | Refills: 3 | Status: SHIPPED | OUTPATIENT
Start: 2019-01-08 | End: 2019-02-13 | Stop reason: SDUPTHER

## 2019-01-14 RX ORDER — CELECOXIB 200 MG/1
CAPSULE ORAL
Qty: 180 CAPSULE | Refills: 1 | Status: SHIPPED | OUTPATIENT
Start: 2019-01-14 | End: 2019-06-17 | Stop reason: SDUPTHER

## 2019-01-18 DIAGNOSIS — I25.119 CORONARY ARTERY DISEASE INVOLVING NATIVE CORONARY ARTERY OF NATIVE HEART WITH ANGINA PECTORIS (HCC): ICD-10-CM

## 2019-01-18 DIAGNOSIS — I10 ESSENTIAL HYPERTENSION: ICD-10-CM

## 2019-01-18 LAB
BUN BLDV-MCNC: 32 MG/DL
CALCIUM SERPL-MCNC: 8.6 MG/DL
CHLORIDE BLD-SCNC: 103 MMOL/L
CO2: 28 MMOL/L
CREAT SERPL-MCNC: 1.4 MG/DL
GFR CALCULATED: 52
GLUCOSE BLD-MCNC: 179 MG/DL
POTASSIUM SERPL-SCNC: 5 MMOL/L
SODIUM BLD-SCNC: 138 MMOL/L

## 2019-02-11 ENCOUNTER — TELEPHONE (OUTPATIENT)
Dept: FAMILY MEDICINE CLINIC | Age: 69
End: 2019-02-11

## 2019-02-11 RX ORDER — ISOSORBIDE MONONITRATE 30 MG/1
TABLET, EXTENDED RELEASE ORAL
Qty: 90 TABLET | Refills: 3 | Status: SHIPPED | OUTPATIENT
Start: 2019-02-11 | End: 2019-04-22 | Stop reason: SDUPTHER

## 2019-02-11 RX ORDER — AZITHROMYCIN 250 MG/1
250 TABLET, FILM COATED ORAL SEE ADMIN INSTRUCTIONS
Qty: 6 TABLET | Refills: 0 | Status: SHIPPED | OUTPATIENT
Start: 2019-02-11 | End: 2019-02-16

## 2019-02-13 RX ORDER — NIFEDIPINE 90 MG/1
90 TABLET, FILM COATED, EXTENDED RELEASE ORAL DAILY
Qty: 90 TABLET | Refills: 3 | Status: SHIPPED | OUTPATIENT
Start: 2019-02-13 | End: 2020-02-24

## 2019-02-14 ENCOUNTER — TELEPHONE (OUTPATIENT)
Dept: CARDIOLOGY CLINIC | Age: 69
End: 2019-02-14

## 2019-02-14 DIAGNOSIS — N28.9 RENAL INSUFFICIENCY: ICD-10-CM

## 2019-02-14 DIAGNOSIS — R53.83 FATIGUE, UNSPECIFIED TYPE: ICD-10-CM

## 2019-02-14 DIAGNOSIS — E87.5 HYPERKALEMIA: Primary | ICD-10-CM

## 2019-02-14 DIAGNOSIS — I10 ESSENTIAL HYPERTENSION: ICD-10-CM

## 2019-02-14 DIAGNOSIS — R06.02 SHORTNESS OF BREATH: ICD-10-CM

## 2019-02-14 DIAGNOSIS — I50.32 CHRONIC DIASTOLIC CHF (CONGESTIVE HEART FAILURE), NYHA CLASS 3 (HCC): ICD-10-CM

## 2019-02-14 DIAGNOSIS — I48.0 PAF (PAROXYSMAL ATRIAL FIBRILLATION) (HCC): ICD-10-CM

## 2019-02-14 DIAGNOSIS — I27.20 PULMONARY HYPERTENSION (HCC): ICD-10-CM

## 2019-02-14 DIAGNOSIS — I25.10 CORONARY ARTERY DISEASE INVOLVING NATIVE CORONARY ARTERY OF NATIVE HEART WITHOUT ANGINA PECTORIS: ICD-10-CM

## 2019-02-14 DIAGNOSIS — I25.119 CORONARY ARTERY DISEASE INVOLVING NATIVE CORONARY ARTERY OF NATIVE HEART WITH ANGINA PECTORIS (HCC): ICD-10-CM

## 2019-02-21 PROBLEM — Z79.4 LONG-TERM INSULIN USE (HCC): Status: RESOLVED | Noted: 2017-07-28 | Resolved: 2019-02-21

## 2019-03-27 ENCOUNTER — TELEPHONE (OUTPATIENT)
Dept: CARDIOLOGY CLINIC | Age: 69
End: 2019-03-27

## 2019-03-27 DIAGNOSIS — J43.9 MIXED RESTRICTIVE AND OBSTRUCTIVE LUNG DISEASE (HCC): ICD-10-CM

## 2019-03-27 DIAGNOSIS — I50.32 CHRONIC DIASTOLIC CHF (CONGESTIVE HEART FAILURE), NYHA CLASS 3 (HCC): ICD-10-CM

## 2019-03-27 DIAGNOSIS — N28.9 RENAL INSUFFICIENCY: ICD-10-CM

## 2019-03-27 DIAGNOSIS — R06.02 SHORTNESS OF BREATH: Primary | ICD-10-CM

## 2019-03-27 DIAGNOSIS — J98.4 MIXED RESTRICTIVE AND OBSTRUCTIVE LUNG DISEASE (HCC): ICD-10-CM

## 2019-03-27 DIAGNOSIS — I25.119 CORONARY ARTERY DISEASE INVOLVING NATIVE CORONARY ARTERY OF NATIVE HEART WITH ANGINA PECTORIS (HCC): ICD-10-CM

## 2019-03-27 DIAGNOSIS — I10 ESSENTIAL HYPERTENSION: ICD-10-CM

## 2019-03-27 DIAGNOSIS — I27.20 PULMONARY HTN (HCC): ICD-10-CM

## 2019-03-27 DIAGNOSIS — I48.0 PAF (PAROXYSMAL ATRIAL FIBRILLATION) (HCC): ICD-10-CM

## 2019-03-28 DIAGNOSIS — I25.10 CORONARY ARTERY DISEASE INVOLVING NATIVE CORONARY ARTERY OF NATIVE HEART WITHOUT ANGINA PECTORIS: ICD-10-CM

## 2019-03-28 DIAGNOSIS — I48.0 PAROXYSMAL ATRIAL FIBRILLATION (HCC): ICD-10-CM

## 2019-03-28 DIAGNOSIS — J98.4 MIXED RESTRICTIVE AND OBSTRUCTIVE LUNG DISEASE (HCC): ICD-10-CM

## 2019-03-28 DIAGNOSIS — R60.0 LEG EDEMA: ICD-10-CM

## 2019-03-28 DIAGNOSIS — J43.9 MIXED RESTRICTIVE AND OBSTRUCTIVE LUNG DISEASE (HCC): ICD-10-CM

## 2019-03-28 DIAGNOSIS — I10 ESSENTIAL HYPERTENSION: ICD-10-CM

## 2019-03-28 DIAGNOSIS — R06.02 SHORTNESS OF BREATH: ICD-10-CM

## 2019-03-28 DIAGNOSIS — I50.32 CHRONIC DIASTOLIC CHF (CONGESTIVE HEART FAILURE), NYHA CLASS 3 (HCC): ICD-10-CM

## 2019-03-28 DIAGNOSIS — I27.20 PULMONARY HTN (HCC): ICD-10-CM

## 2019-04-18 ENCOUNTER — HOSPITAL ENCOUNTER (OUTPATIENT)
Dept: GENERAL RADIOLOGY | Age: 69
Discharge: HOME | End: 2019-04-20
Payer: MEDICARE

## 2019-04-18 ENCOUNTER — HOSPITAL ENCOUNTER (OUTPATIENT)
Age: 69
Discharge: HOME | End: 2019-04-18
Payer: MEDICARE

## 2019-04-18 ENCOUNTER — HOSPITAL ENCOUNTER (OUTPATIENT)
Age: 69
Discharge: HOME | End: 2019-04-20
Payer: MEDICARE

## 2019-04-18 DIAGNOSIS — Z79.4 TYPE 2 DIABETES MELLITUS WITH HYPOGLYCEMIA WITHOUT COMA, WITH LONG-TERM CURRENT USE OF INSULIN (HCC): ICD-10-CM

## 2019-04-18 DIAGNOSIS — I27.20 PULMONARY HTN (HCC): ICD-10-CM

## 2019-04-18 DIAGNOSIS — I10 ESSENTIAL HYPERTENSION: ICD-10-CM

## 2019-04-18 DIAGNOSIS — I25.119 CORONARY ARTERY DISEASE INVOLVING NATIVE CORONARY ARTERY OF NATIVE HEART WITH ANGINA PECTORIS (HCC): ICD-10-CM

## 2019-04-18 DIAGNOSIS — I48.0 PAROXYSMAL ATRIAL FIBRILLATION (HCC): ICD-10-CM

## 2019-04-18 DIAGNOSIS — E11.649 TYPE 2 DIABETES MELLITUS WITH HYPOGLYCEMIA WITHOUT COMA, WITH LONG-TERM CURRENT USE OF INSULIN (HCC): ICD-10-CM

## 2019-04-18 DIAGNOSIS — E78.49 OTHER HYPERLIPIDEMIA: ICD-10-CM

## 2019-04-18 DIAGNOSIS — E55.9 VITAMIN D DEFICIENCY DISEASE: ICD-10-CM

## 2019-04-18 LAB
ABSOLUTE EOS #: 0.2 K/UL (ref 0–0.4)
ABSOLUTE IMMATURE GRANULOCYTE: ABNORMAL K/UL (ref 0–0.3)
ABSOLUTE LYMPH #: 1.5 K/UL (ref 1–4.8)
ABSOLUTE MONO #: 0.6 K/UL (ref 0–1)
ALBUMIN SERPL-MCNC: 4.5 G/DL (ref 3.5–5.2)
ALBUMIN/GLOBULIN RATIO: ABNORMAL (ref 1–2.5)
ALP BLD-CCNC: 62 U/L (ref 40–129)
ALT SERPL-CCNC: 23 U/L (ref 5–41)
ANION GAP SERPL CALCULATED.3IONS-SCNC: 13 MMOL/L (ref 9–17)
AST SERPL-CCNC: 18 U/L
BASOPHILS # BLD: 0 % (ref 0–2)
BASOPHILS ABSOLUTE: 0 K/UL (ref 0–0.2)
BILIRUB SERPL-MCNC: 0.59 MG/DL (ref 0.3–1.2)
BUN BLDV-MCNC: 35 MG/DL (ref 8–23)
BUN/CREAT BLD: 30 (ref 9–20)
CALCIUM SERPL-MCNC: 9.2 MG/DL (ref 8.6–10.4)
CHLORIDE BLD-SCNC: 102 MMOL/L (ref 98–107)
CHOLESTEROL/HDL RATIO: 3.6
CHOLESTEROL: 103 MG/DL
CO2: 25 MMOL/L (ref 20–31)
CREAT SERPL-MCNC: 1.18 MG/DL (ref 0.7–1.2)
DIFFERENTIAL TYPE: YES
EOSINOPHILS RELATIVE PERCENT: 3 % (ref 0–5)
ESTIMATED AVERAGE GLUCOSE: 143 MG/DL
GFR AFRICAN AMERICAN: >60 ML/MIN
GFR NON-AFRICAN AMERICAN: >60 ML/MIN
GFR SERPL CREATININE-BSD FRML MDRD: ABNORMAL ML/MIN/{1.73_M2}
GFR SERPL CREATININE-BSD FRML MDRD: ABNORMAL ML/MIN/{1.73_M2}
GLUCOSE BLD-MCNC: 170 MG/DL (ref 70–99)
HBA1C MFR BLD: 6.6 % (ref 4.8–5.9)
HCT VFR BLD CALC: 37 % (ref 41–53)
HDLC SERPL-MCNC: 29 MG/DL
HEMOGLOBIN: 12.2 G/DL (ref 13.5–17.5)
IMMATURE GRANULOCYTES: ABNORMAL %
LDL CHOLESTEROL: 34 MG/DL (ref 0–130)
LYMPHOCYTES # BLD: 25 % (ref 13–44)
MAGNESIUM: 2.5 MG/DL (ref 1.6–2.6)
MCH RBC QN AUTO: 31 PG (ref 26–34)
MCHC RBC AUTO-ENTMCNC: 33.1 G/DL (ref 31–37)
MCV RBC AUTO: 93.8 FL (ref 80–100)
MONOCYTES # BLD: 11 % (ref 5–9)
NRBC AUTOMATED: ABNORMAL PER 100 WBC
PATIENT FASTING?: YES
PDW BLD-RTO: 13.5 % (ref 12.1–15.2)
PLATELET # BLD: 268 K/UL (ref 140–450)
PLATELET ESTIMATE: ABNORMAL
PMV BLD AUTO: ABNORMAL FL (ref 6–12)
POTASSIUM SERPL-SCNC: 4.9 MMOL/L (ref 3.7–5.3)
RBC # BLD: 3.94 M/UL (ref 4.5–5.9)
RBC # BLD: ABNORMAL 10*6/UL
SEG NEUTROPHILS: 61 % (ref 39–75)
SEGMENTED NEUTROPHILS ABSOLUTE COUNT: 3.5 K/UL (ref 2.1–6.5)
SODIUM BLD-SCNC: 140 MMOL/L (ref 135–144)
TOTAL PROTEIN: 7.4 G/DL (ref 6.4–8.3)
TRIGL SERPL-MCNC: 200 MG/DL
TSH SERPL DL<=0.05 MIU/L-ACNC: 1.65 MIU/L (ref 0.3–5)
VITAMIN D 25-HYDROXY: 31.7 NG/ML (ref 30–100)
VLDLC SERPL CALC-MCNC: ABNORMAL MG/DL (ref 1–30)
WBC # BLD: 5.8 K/UL (ref 3.5–11)
WBC # BLD: ABNORMAL 10*3/UL

## 2019-04-18 PROCEDURE — 84443 ASSAY THYROID STIM HORMONE: CPT

## 2019-04-18 PROCEDURE — 85025 COMPLETE CBC W/AUTO DIFF WBC: CPT

## 2019-04-18 PROCEDURE — 83036 HEMOGLOBIN GLYCOSYLATED A1C: CPT

## 2019-04-18 PROCEDURE — 80053 COMPREHEN METABOLIC PANEL: CPT

## 2019-04-18 PROCEDURE — 83735 ASSAY OF MAGNESIUM: CPT

## 2019-04-18 PROCEDURE — 93005 ELECTROCARDIOGRAM TRACING: CPT

## 2019-04-18 PROCEDURE — 82306 VITAMIN D 25 HYDROXY: CPT

## 2019-04-18 PROCEDURE — 71046 X-RAY EXAM CHEST 2 VIEWS: CPT

## 2019-04-18 PROCEDURE — 80061 LIPID PANEL: CPT

## 2019-04-18 PROCEDURE — 36415 COLL VENOUS BLD VENIPUNCTURE: CPT

## 2019-04-22 ENCOUNTER — OFFICE VISIT (OUTPATIENT)
Dept: CARDIOLOGY CLINIC | Age: 69
End: 2019-04-22
Payer: MEDICARE

## 2019-04-22 VITALS
WEIGHT: 257 LBS | HEART RATE: 77 BPM | SYSTOLIC BLOOD PRESSURE: 118 MMHG | OXYGEN SATURATION: 96 % | BODY MASS INDEX: 36.35 KG/M2 | DIASTOLIC BLOOD PRESSURE: 60 MMHG

## 2019-04-22 DIAGNOSIS — E11.649 TYPE 2 DIABETES MELLITUS WITH HYPOGLYCEMIA WITHOUT COMA, WITH LONG-TERM CURRENT USE OF INSULIN (HCC): ICD-10-CM

## 2019-04-22 DIAGNOSIS — Z79.4 TYPE 2 DIABETES MELLITUS WITH HYPOGLYCEMIA WITHOUT COMA, WITH LONG-TERM CURRENT USE OF INSULIN (HCC): ICD-10-CM

## 2019-04-22 DIAGNOSIS — I25.10 CORONARY ARTERY DISEASE INVOLVING NATIVE CORONARY ARTERY OF NATIVE HEART WITHOUT ANGINA PECTORIS: Primary | ICD-10-CM

## 2019-04-22 DIAGNOSIS — E55.9 VITAMIN D DEFICIENCY DISEASE: ICD-10-CM

## 2019-04-22 DIAGNOSIS — I10 ESSENTIAL HYPERTENSION: ICD-10-CM

## 2019-04-22 PROCEDURE — 1123F ACP DISCUSS/DSCN MKR DOCD: CPT | Performed by: INTERNAL MEDICINE

## 2019-04-22 PROCEDURE — 3044F HG A1C LEVEL LT 7.0%: CPT | Performed by: INTERNAL MEDICINE

## 2019-04-22 PROCEDURE — 99214 OFFICE O/P EST MOD 30 MIN: CPT | Performed by: INTERNAL MEDICINE

## 2019-04-22 PROCEDURE — 4040F PNEUMOC VAC/ADMIN/RCVD: CPT | Performed by: INTERNAL MEDICINE

## 2019-04-22 PROCEDURE — G8417 CALC BMI ABV UP PARAM F/U: HCPCS | Performed by: INTERNAL MEDICINE

## 2019-04-22 PROCEDURE — G8427 DOCREV CUR MEDS BY ELIG CLIN: HCPCS | Performed by: INTERNAL MEDICINE

## 2019-04-22 PROCEDURE — 2022F DILAT RTA XM EVC RTNOPTHY: CPT | Performed by: INTERNAL MEDICINE

## 2019-04-22 PROCEDURE — 1036F TOBACCO NON-USER: CPT | Performed by: INTERNAL MEDICINE

## 2019-04-22 PROCEDURE — G8598 ASA/ANTIPLAT THER USED: HCPCS | Performed by: INTERNAL MEDICINE

## 2019-04-22 PROCEDURE — 3017F COLORECTAL CA SCREEN DOC REV: CPT | Performed by: INTERNAL MEDICINE

## 2019-04-22 RX ORDER — DULAGLUTIDE 1.5 MG/.5ML
1.5 INJECTION, SOLUTION SUBCUTANEOUS WEEKLY
COMMUNITY
Start: 2019-02-11

## 2019-04-22 NOTE — LETTER
Andres Clement M.D. [address] [place] [address]  [phone]          2019          [facility]   [address] [facility] [address]    RE:   Shantel Olivera  :  1950      Dear Dr. Kinga Manzano:    CHIEF COMPLAINT:  1. Shortness of breath. 2.  Coronary artery disease. 3.  Paroxysmal atrial fibrillation. HISTORY OF PRESENT ILLNESS:  I had the pleasure of seeing Mr. Katharina Ceballos in the office on 2019. He is a pleasant 42-year-old gentleman who had a catheterization on 10/31/2012, that showed severe disease at the bifurcation of the LAD with an unremarkable right coronary artery and circumflex. I did angioplasty on 2012, placing a 2.75 x 28 mm drug-eluting Promus stent with a good end result. A catheterization done on 2014, that showed 70% to 80% in-stent stenosis in the LAD and diagonal with right coronary artery and circumflex unremarkable. His EF was 50% to 55%, and he had open heart surgery by Dr. Makeda Hidalgo on 2014, with a LIMA to the LAD and a vein graft to the diagonal.    He had developed more shortness of breath in 2017. I did a catheterization on 2017, that showed a dominant right coronary artery with 95% disease in the midportion, the LAD had 90% disease, with a patent LIMA to the LAD, the diagonal had 90% disease with a patent vein graft to the diagonal, circumflex unremarkable, EF of 50%, and therefore I placed a 3.5 x 18 mm drug-eluting Xience stent in the right coronary artery. His last catheterization was on 11/15/2017, at [facility], which showed patent bypass grafts with widely patent stent in the right coronary artery with an FFR of 0.89, with an EF of 55%. He did have a severe pulmonary hypertension with a PA pressure of 70/30. He does have diastolic dysfunction and has been on Aldactone.   He also has paroxysmal atrial fibrillation, which is very symptomatic. He has been anticoagulated with Eliquis, which he has tolerated nicely. He did well over the winter. We have been following him through our CHF Clinic almost weekly. His weight has remained very stable. He has had blood work on a regular basis and his BUN and creatinine have been stable with his potassium also staying in a good range. He had had an episode of hyperkalemia a year ago when he was in [place]. He was seen by Dr. Nichelle Ortega for his paroxysmal atrial fibrillation but since his episodes of atrial fibrillation were rare, no ablation was recommended. He did recommend Multaq if he would develop more episodes of atrial fibrillation. Again, he has done well over the winter. He has had no chest pain or chest discomfort. He is limited in his activity because of his severe back pain and is having ablation later this summer. He uses a walker because of his back if he tries to exert himself. He had much company while they were in [place], and therefore, he did a fair amount of walking. He denies any chest pain or chest discomfort. He does get short of breath if he tries to exert himself but he attributes this to inactivity with his back pain. He had no hospitalizations or procedures. CARDIAC RISK FACTORS:  Known CAD:  Positive. PTCA:  Positive. Bypass Surgery:  Positive. Hypertension:  Positive. Hyperlipidemia:  Positive. Diabetes:  Positive. Smoking:  Negative. Other Family Members:  Positive.     MEDICATIONS AT THIS TIME:  He is on Eliquis 5 mg b.i.d., Lipitor 40 mg daily, Coreg 12.5 mg b.i.d., Celebrex 200 mg b.i.d., Cardura 8 mg daily, Lasix 80 mg b.i.d., Amaryl 4 mg b.i.d., NPH insulin 62 units in the morning, 40 units at lunch, and 55 in the evening, Imdur 30 mg daily, magnesium 750 mg daily, Glucophage 1000 mg b.i.d., Adalat CC 90 mg daily, Entresto 97/103 b.i.d., Aldactone 25 mg b.i.d, Trulicity 8.24 mg daily, vitamin D 1000 units b.i.d. PAST MEDICAL HISTORY:  1. Arthroscopic surgery in  with knee replacement complicated by fat embolism in 2011.  2.  Insulin-dependent diabetes for 29 years followed by an endocrinologist in [place] at [facility]. 3.  Hypertension many years. 4.  Left and right cataract surgery in . 5.  Sleep apnea in , uses a CPAP mask. 6.  Two episodes of atrial fibrillation, one in 2017 and one in 2018 with maintenance of sinus rhythm since that time. 7.  Mild sick sinus syndrome. FAMILY HISTORY:  Father  at 80 with CHF. Mother  of CHF. Two brothers and sisters alive and well. SOCIAL HISTORY:  He is 80years old, retired , , 2 children. He has a trailer house in [place], which they spent the winter. A daughter, who is a professor at [employer] in [place] with 2 grandchildren in [place]. Does not smoke or drink alcohol. He does use a CPAP mask. REVIEW OF SYSTEMS:  Cardiac as above. Other systems reviewed including constitutional, eyes, ears, nose and throat, cardiovascular, respiratory, GI, , musculoskeletal, integumentary, neurologic, psychiatric, endocrine, hematologic and allergic/immunologic and are negative except for what is described above. No weight loss or weight gain. No change in bowel habits, no blood in stools. No fevers, sweats or chills. PHYSICAL EXAMINATION:  VITAL SIGNS:  His blood pressure was 118/60 with a heart rate of 77 and regular. Respirations were 18. O2 saturation was 96%. Weight 257 pounds. GENERAL:  He is a pleasant 52-year-old gentleman. Denied pain. He was oriented to person, place and time. Answered questions appropriately. SKIN:  No unusual skin changes. HEENT:  The pupils are equally round and reactive to light and accommodation. Extraocular movements were intact. Mucous membranes were dry. NECK:  No JVD. Good carotid pulses. No carotid bruits.   No lymphadenopathy or thyromegaly. CARDIOVASCULAR EXAM:  S1 and S2 were normal.  No S3 or S4. Soft systolic blowing type murmur. No diastolic murmur. PMI was normal.  No lift, thrust, or pericardial friction rub. LUNGS:  Quite clear to auscultation and percussion. ABDOMEN:  Soft and nontender. Good bowel sounds. The aorta was not enlarged. No hepatomegaly, splenomegaly. EXTREMITIES:  Good femoral pulses. Good pedal pulses. No pedal edema. Skin was warm and dry. No calf tenderness. Nail beds pink. Good cap refill. PULSES:  Bilateral symmetrical radial, brachial and carotid pulses. No carotid bruits. Good femoral and pedal pulses. NEUROLOGIC EXAM:  Within normal limits. PSYCHIATRIC EXAM:  Within normal limits. LABORATORY DATA:  On 04/18/2019, sodium was 140, potassium 4.9, BUN was 35, creatinine 1.18, GFR was 60, magnesium was 2.5, glucose 170 with hemoglobin A1c of 6.6. Cholesterol 103 with an HDL of 29, LDL 34, triglycerides 200. ALT was 23, AST was 18. His TSH was 1.65. Vitamin D was 31.7. White count 5.8, hemoglobin 12.2 with a platelet count of 253,613. EKG showed sinus rhythm with PACs, otherwise normal.    Chest x-ray was unremarkable. IMPRESSION:  1. Severe CAD. 2.  Catheterization on 10/31/2012, showing severe disease at the bifurcation of the LAD and diagonal, with unremarkable right coronary artery and circumflex, EF of 55%. 3.  Angioplasty of the LAD and diagonal on 11/07/2012, placing 2.75 x 28 mm Promus stent in the LAD. 4.  Catheterization on 05/14/2014, at [facility] showing 70% to 80% disease in the LAD and diagonal, with an FFR of 0.78, with unremarkable right coronary artery and circumflex and normal LV function.   5.  Open heart surgery on 06/03/2014 by Dr. Ty Jackson, with a LIMA to the LAD and a vein graft to the diagonal.  6.  Catheterization on 08/30/2017, which showed patent LIMA to the LAD and patent vein graft to the diagonal, with unremarkable circumflex, 95% mid RCA, EF of 50%, with angioplasty of the right coronary artery, placing 3.5 x 18 mm Xience stent. 7.  Repeat catheterization on 11/15/2017 showing patent bypass graft with a widely patent stent in the right coronary artery, unremarkable circumflex, EF of 55%, with severe pulmonary hypertension with a PA pressure of 70/30 secondary to sleep apnea. 8.  Sleep apnea, on a CPAP mask. 9.  Diastolic function with history of right-sided CHF, which is controlled. 10.  Paroxysmal atrial fibrillation in 12/2017 and on 03/09/2018, currently on Eliquis with no further episodes of atrial fibrillation. 11.  EP consult with Dr. Emilio Stearns in 07/2018 who recommended conservative management. 12.  EF of 50% with mild-to-moderate mitral regurgitation. 13.  Severe back pain, pending possible ablation because of his back, which markedly limits his activity. PLAN:  1. Continue same medications. 2.  See in 3 months. 3.  We will see early if he develops more edema, shortness of breath or chest pain. DISCUSSION:  Mr. Joslyn Umanzor has done well over the winter. He has had no chest pain, no unusual shortness of breath. Because of his back pain, he uses a walker if he tries to exert himself. He has had shortness of breath if he exerts himself but he attributes this to relative inactivity. He is hoping to do ablation of his back nerves sometime in the early summer, which should help with his activity level. I think he has done an excellent job over the winter with maintaining a good exercise program and watching his diet. His hemoglobin A1c is doing excellent at 6.6. He has had excellent risk modification. I made no change in medications. I will see him in 3 months in followup. I am delighted on how he is doing as I see him today. If you have any questions on my thoughts, please do not hesitate to contact me.     Sincerely,        Analilia Gray

## 2019-04-22 NOTE — PROGRESS NOTES
Ov Dr. Nisreen Allen for 6 month follow up  No hospitalizations/procedures/er visits  No chest pain heaviness or palpitations  No unusual sob  No dizziness or lightheadedness  No unusual edema  Bedside echo done    No changes  Follow up in three months  With routine testing

## 2019-04-30 LAB
EKG ATRIAL RATE: 64 BPM
EKG P AXIS: 27 DEGREES
EKG P-R INTERVAL: 174 MS
EKG Q-T INTERVAL: 392 MS
EKG QRS DURATION: 102 MS
EKG QTC CALCULATION (BAZETT): 404 MS
EKG R AXIS: 67 DEGREES
EKG T AXIS: 102 DEGREES
EKG VENTRICULAR RATE: 64 BPM

## 2019-05-02 NOTE — PROGRESS NOTES
Fredrick Garcia M.D. [address] [place] [address]  [phone]          2019          Syl Astudillo, DO  [address] [address]    RE:   Juany Naik  :  1950      Dear Dr. Ally Karimi:    CHIEF COMPLAINT:  1. Shortness of breath. 2.  Coronary artery disease. 3.  Paroxysmal atrial fibrillation. HISTORY OF PRESENT ILLNESS:  I had the pleasure of seeing Mr. Dony Clements in the office on 2019. He is a pleasant 81-year-old gentleman who had a catheterization on 10/31/2012, that showed severe disease at the bifurcation of the LAD with an unremarkable right coronary artery and circumflex. I did angioplasty on 2012, placing a 2.75 x 28 mm drug-eluting Promus stent with a good end result. A catheterization done on 2014, that showed 70% to 80% in-stent stenosis in the LAD and diagonal with right coronary artery and circumflex unremarkable. His EF was 50% to 55%, and he had open heart surgery by Dr. Nitza Murry on 2014, with a LIMA to the LAD and a vein graft to the diagonal.    He had developed more shortness of breath in 2017. I did a catheterization on 2017, that showed a dominant right coronary artery with 95% disease in the midportion, the LAD had 90% disease, with a patent LIMA to the LAD, the diagonal had 90% disease with a patent vein graft to the diagonal, circumflex unremarkable, EF of 50%, and therefore I placed a 3.5 x 18 mm drug-eluting Xience stent in the right coronary artery. His last catheterization was on 11/15/2017, at [facility], which showed patent bypass grafts with widely patent stent in the right coronary artery with an FFR of 0.89, with an EF of 55%. He did have a severe pulmonary hypertension with a PA pressure of 70/30. He does have diastolic dysfunction and has been on Aldactone.   He also has paroxysmal atrial fibrillation, which is very symptomatic. He has been anticoagulated with Eliquis, which he has tolerated nicely. He did well over the winter. We have been following him through our CHF Clinic almost weekly. His weight has remained very stable. He has had blood work on a regular basis and his BUN and creatinine have been stable with his potassium also staying in a good range. He had had an episode of hyperkalemia a year ago when he was in [place]. He was seen by Dr. Jules Pace for his paroxysmal atrial fibrillation but since his episodes of atrial fibrillation were rare, no ablation was recommended. He did recommend Multaq if he would develop more episodes of atrial fibrillation. Again, he has done well over the winter. He has had no chest pain or chest discomfort. He is limited in his activity because of his severe back pain and is having ablation later this summer. He uses a walker because of his back if he tries to exert himself. He had much company while they were in [place], and therefore, he did a fair amount of walking. He denies any chest pain or chest discomfort. He does get short of breath if he tries to exert himself but he attributes this to inactivity with his back pain. He had no hospitalizations or procedures. CARDIAC RISK FACTORS:  Known CAD:  Positive. PTCA:  Positive. Bypass Surgery:  Positive. Hypertension:  Positive. Hyperlipidemia:  Positive. Diabetes:  Positive. Smoking:  Negative. Other Family Members:  Positive. MEDICATIONS AT THIS TIME:  He is on Eliquis 5 mg b.i.d., Lipitor 40 mg daily, Coreg 12.5 mg b.i.d., Celebrex 200 mg b.i.d., Cardura 8 mg daily, Lasix 80 mg b.i.d., Amaryl 4 mg b.i.d., NPH insulin 62 units in the morning, 40 units at lunch, and 55 in the evening, Imdur 30 mg daily, magnesium 750 mg daily, Glucophage 1000 mg b.i.d., Adalat CC 90 mg daily, Entresto 97/103 b.i.d., Aldactone 25 mg b.i.d, Trulicity 1.92 mg daily, vitamin D 1000 units b.i.d.     PAST MEDICAL HISTORY:  1. Arthroscopic surgery in  with knee replacement complicated by fat embolism in 2011.  2.  Insulin-dependent diabetes for 29 years followed by an endocrinologist in [facility] at [facility]. 3.  Hypertension many years. 4.  Left and right cataract surgery in . 5.  Sleep apnea in , uses a CPAP mask. 6.  Two episodes of atrial fibrillation, one in 2017 and one in 2018 with maintenance of sinus rhythm since that time. 7.  Mild sick sinus syndrome. FAMILY HISTORY:  Father  at 80 with CHF. Mother  of CHF. Two brothers and sisters alive and well. SOCIAL HISTORY:  He is 80years old, retired , , 2 children. He has a trailer house in [place], which they spent the winter. A daughter, who is a professor at [employer] in [place] with 2 grandchildren in [place]. Does not smoke or drink alcohol. He does use a CPAP mask. REVIEW OF SYSTEMS:  Cardiac as above. Other systems reviewed including constitutional, eyes, ears, nose and throat, cardiovascular, respiratory, GI, , musculoskeletal, integumentary, neurologic, psychiatric, endocrine, hematologic and allergic/immunologic and are negative except for what is described above. No weight loss or weight gain. No change in bowel habits, no blood in stools. No fevers, sweats or chills. PHYSICAL EXAMINATION:  VITAL SIGNS:  His blood pressure was 118/60 with a heart rate of 77 and regular. Respirations were 18. O2 saturation was 96%. Weight 257 pounds. GENERAL:  He is a pleasant 70-year-old gentleman. Denied pain. He was oriented to person, place and time. Answered questions appropriately. SKIN:  No unusual skin changes. HEENT:  The pupils are equally round and reactive to light and accommodation. Extraocular movements were intact. Mucous membranes were dry. NECK:  No JVD. Good carotid pulses. No carotid bruits. No lymphadenopathy or thyromegaly.   CARDIOVASCULAR EXAM:  S1 and S2 were normal. x 18 mm Xience stent. 7.  Repeat catheterization on 11/15/2017 showing patent bypass graft with a widely patent stent in the right coronary artery, unremarkable circumflex, EF of 55%, with severe pulmonary hypertension with a PA pressure of 70/30 secondary to sleep apnea. 8.  Sleep apnea, on a CPAP mask. 9.  Diastolic function with history of right-sided CHF, which is controlled. 10.  Paroxysmal atrial fibrillation in 12/2017 and on 03/09/2018, currently on Eliquis with no further episodes of atrial fibrillation. 11.  EP consult with Dr. Miguel Mcfarlane in 07/2018 who recommended conservative management. 12.  EF of 50% with mild-to-moderate mitral regurgitation. 13.  Severe back pain, pending possible ablation because of his back, which markedly limits his activity. PLAN:  1. Continue same medications. 2.  See in 3 months. 3.  We will see early if he develops more edema, shortness of breath or chest pain. DISCUSSION:  Mr. Dony Clements has done well over the winter. He has had no chest pain, no unusual shortness of breath. Because of his back pain, he uses a walker if he tries to exert himself. He has had shortness of breath if he exerts himself but he attributes this to relative inactivity. He is hoping to do ablation of his back nerves sometime in the early summer, which should help with his activity level. I think he has done an excellent job over the winter with maintaining a good exercise program and watching his diet. His hemoglobin A1c is doing excellent at 6.6. He has had excellent risk modification. I made no change in medications. I will see him in 3 months in followup. I am delighted on how he is doing as I see him today. If you have any questions on my thoughts, please do not hesitate to contact me.     Sincerely,        Mal Perez    D: 04/22/2019 14:40:12     T: 04/23/2019 2:44:24     [email or web address]  Job#: [id number]   Doc#: [id number]

## 2019-05-07 ENCOUNTER — OFFICE VISIT (OUTPATIENT)
Dept: FAMILY MEDICINE CLINIC | Age: 69
End: 2019-05-07
Payer: MEDICARE

## 2019-05-07 VITALS
DIASTOLIC BLOOD PRESSURE: 58 MMHG | HEIGHT: 71 IN | OXYGEN SATURATION: 98 % | SYSTOLIC BLOOD PRESSURE: 114 MMHG | WEIGHT: 258 LBS | BODY MASS INDEX: 36.12 KG/M2 | HEART RATE: 72 BPM

## 2019-05-07 DIAGNOSIS — E11.42 TYPE 2 DIABETES MELLITUS WITH PERIPHERAL NEUROPATHY (HCC): Primary | ICD-10-CM

## 2019-05-07 DIAGNOSIS — I27.20 PULMONARY HTN (HCC): ICD-10-CM

## 2019-05-07 DIAGNOSIS — I48.0 PAROXYSMAL ATRIAL FIBRILLATION (HCC): ICD-10-CM

## 2019-05-07 DIAGNOSIS — R25.2 MUSCLE CRAMP, NOCTURNAL: ICD-10-CM

## 2019-05-07 DIAGNOSIS — J43.9 MIXED RESTRICTIVE AND OBSTRUCTIVE LUNG DISEASE (HCC): ICD-10-CM

## 2019-05-07 DIAGNOSIS — J98.4 MIXED RESTRICTIVE AND OBSTRUCTIVE LUNG DISEASE (HCC): ICD-10-CM

## 2019-05-07 LAB
CREATININE URINE POCT: 50
MICROALBUMIN/CREAT 24H UR: 10 MG/G{CREAT}
MICROALBUMIN/CREAT UR-RTO: 30

## 2019-05-07 PROCEDURE — 3044F HG A1C LEVEL LT 7.0%: CPT | Performed by: FAMILY MEDICINE

## 2019-05-07 PROCEDURE — 3017F COLORECTAL CA SCREEN DOC REV: CPT | Performed by: FAMILY MEDICINE

## 2019-05-07 PROCEDURE — 3023F SPIROM DOC REV: CPT | Performed by: FAMILY MEDICINE

## 2019-05-07 PROCEDURE — 99214 OFFICE O/P EST MOD 30 MIN: CPT | Performed by: FAMILY MEDICINE

## 2019-05-07 PROCEDURE — G8417 CALC BMI ABV UP PARAM F/U: HCPCS | Performed by: FAMILY MEDICINE

## 2019-05-07 PROCEDURE — G8926 SPIRO NO PERF OR DOC: HCPCS | Performed by: FAMILY MEDICINE

## 2019-05-07 PROCEDURE — 1123F ACP DISCUSS/DSCN MKR DOCD: CPT | Performed by: FAMILY MEDICINE

## 2019-05-07 PROCEDURE — G8427 DOCREV CUR MEDS BY ELIG CLIN: HCPCS | Performed by: FAMILY MEDICINE

## 2019-05-07 PROCEDURE — 2022F DILAT RTA XM EVC RTNOPTHY: CPT | Performed by: FAMILY MEDICINE

## 2019-05-07 PROCEDURE — 82044 UR ALBUMIN SEMIQUANTITATIVE: CPT | Performed by: FAMILY MEDICINE

## 2019-05-07 PROCEDURE — G8598 ASA/ANTIPLAT THER USED: HCPCS | Performed by: FAMILY MEDICINE

## 2019-05-07 PROCEDURE — 4040F PNEUMOC VAC/ADMIN/RCVD: CPT | Performed by: FAMILY MEDICINE

## 2019-05-07 PROCEDURE — 1036F TOBACCO NON-USER: CPT | Performed by: FAMILY MEDICINE

## 2019-05-07 ASSESSMENT — PATIENT HEALTH QUESTIONNAIRE - PHQ9
SUM OF ALL RESPONSES TO PHQ QUESTIONS 1-9: 0
SUM OF ALL RESPONSES TO PHQ9 QUESTIONS 1 & 2: 0
SUM OF ALL RESPONSES TO PHQ QUESTIONS 1-9: 0
2. FEELING DOWN, DEPRESSED OR HOPELESS: 0
1. LITTLE INTEREST OR PLEASURE IN DOING THINGS: 0

## 2019-05-07 NOTE — PROGRESS NOTES
Name: Bria Rdz  : 1950         Chief Complaint:     Chief Complaint   Patient presents with    Diabetes     foot check and papers for diabetic shoes       History of Present Illness: Bria Rdz is a 80 y.o.  male who presents with Diabetes (foot check and papers for diabetic shoes)      HPI    DM well controlled. Does have some hypoglycemic events. Had recent decrease of dinnertime 70/30 dose which has helped a little. Neuropathy dannie feet and distal half of legs. S/p dannie bunion surgeries. Questions re measles and shingles immunizations. C/o ongoing leg cramps. Using theraworks which does seem to help. Happens overnight, toes draw down, has to stand up to get toes to extend. No pain with ambulation. Only issue with walking is back pain. Will see Dr Frederic Bradford next month and will consider RFA. Takes magox 750 nightly and has for past couple yrs. Has a little bit of diarrhea. Has been adjusting spironolactone and lasix dosing d/t K levels and swelling. Still has some SOB and at times wonders if it may be r/t his back trouble. Has been doing some exercise, bike riding. Walking is limited d/t back trouble. Did get a rollator in [place] and used it, so that he could sit if legs started feeling weak. Pulm htn and chronic SOB seem stable. On entresto and lasix. No peripheral edema. Paroxysmal afib without palpitations or dizziness. On eliquis and has no symptoms of bleeding.      Medical History:     Patient Active Problem List   Diagnosis    Hypertension    Hyperlipidemia    Coronary artery disease involving native coronary artery of native heart with angina pectoris ([facility]    H/O coronary angioplasty    Severe nonproliferative diabetic retinopathy with macular edema associated with type 2 diabetes mellitus (HCC)    Type 2 diabetes mellitus with peripheral neuropathy (HCC)    Diabetic peripheral neuropathy (HCC)    GHAZALA (obstructive sleep apnea)    SOB (shortness of breath)    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[facility] [address] [facility]    Pulmonary HTN ([facility] [address] [facility]    Atrial fibrillation ([facility] [address] [facility]    Lumbosacral spondylosis without myelopathy    Pars defect with spondylolisthesis       Medications:       Prior to Admission medications    Medication Sig Start Date End Date Taking?  Authorizing Provider   TRULICITY 0.85 EI/6.3LY SOPN  2/11/19  Yes Historical Provider, MD   NIFEdipine (ADALAT CC) 90 MG extended release tablet Take 1 tablet by mouth daily 2/13/19  Yes Teto Maradiaga MD   celecoxib (CELEBREX) 200 MG capsule TAKE 1 CAPSULE TWICE DAILY 1/14/19  Yes Sharri Holden DO   carvedilol (COREG) 12.5 MG tablet Take 1 tablet by mouth 2 times daily (with meals) 1/8/19  Yes Teto Maradiaga MD   apixaban (ELIQUIS) 5 MG TABS tablet Take 1 tablet by mouth 2 times daily 1/8/19  Yes Teto Maradiaga MD   sacubitril-valsartan (ENTRESTO)  MG per tablet Take 1 tablet by mouth 2 times daily 9/18/18  Yes Teto Maradiaga MD   furosemide (LASIX) 80 MG tablet Take 1 tablet by mouth 2 times daily 9/18/18  Yes Teto Maradiaga MD   doxazosin (CARDURA) 8 MG tablet Take 1 tablet by mouth daily 9/18/18  Yes Teto Maradiaga MD   atorvastatin (LIPITOR) 40 MG tablet TAKE 1 TABLET DAILY 8/20/18  Yes Sharri Holden DO   Magnesium Oxide 500 MG TABS Take 750 mg by mouth daily   Yes Historical Provider, MD   spironolactone (ALDACTONE) 25 MG tablet Take 25 mg by mouth 2 times daily    Yes Historical Provider, MD   isosorbide mononitrate (IMDUR) 30 MG extended release tablet Take 1 tablet by mouth daily 2/12/18  Yes Teto Maradiaga MD   Insulin NPH Isophane & Regular (NOVOLIN 70/30 INNOLET SC) Inject 50 Units into the skin 62 am 40 lunch 55 pm   Yes Historical Provider, MD   ONE TOUCH ULTRA TEST strip  8/8/16  Yes Historical Provider, MD   BD ULTRA-FINE PEN NEEDLES 29G X 12.7MM [address] [facility]  8/1/16  Yes Historical Provider, MD   glimepiride (AMARYL) 4 MG tablet Take 4 mg by mouth 2 times daily   Yes Historical Provider, MD   Omega-3 Fatty Acids (FISH OIL) 1200 MG CAPS Take 1 capsule by mouth 2 times daily Plus 360 omega 3   Yes Historical Provider, MD   Glucosamine Sulfate 1000 MG CAPS Take by mouth 2 times daily   Yes Historical Provider, MD   Chromium-Cinnamon (CINNAMON PLUS CHROMIUM PO) Take 1,000 mg by mouth 2 times daily    Yes Historical Provider, MD   Multiple Vitamins-Minerals (ONE-A-DAY MENS 50+ ADVANTAGE PO) Take by mouth daily   Yes Historical Provider, MD   Vitamin D (CHOLECALCIFEROL) 1000 UNITS CAPS capsule   Take by mouth 2 times daily Vitamin D 3   Yes Historical Provider, MD   Insulin Syringes, Disposable, U-100 0.3 ML MISC Inject  into the skin. Use prn 7/19/12  Yes Isreal HUFFMAN Jump, DO   metformin (GLUCOPHAGE) 1000 MG tablet Take 1,000 mg by mouth 2 times daily. Yes Historical Provider, MD        Allergies:       Patient has no known allergies. Review ofSystems:     Positive and Negative as described in HPI    Review of Systems   Constitutional: Negative. Gastrointestinal: Negative. Genitourinary: Negative. Physical Exam:     Vitals:  BP (!) 114/58 (Site: Left Upper Arm, Position: Sitting, Cuff Size: Medium Adult)   Pulse 72   Ht 5' 10.5\" (1.791 m)   Wt 258 lb (117 kg)   SpO2 98%   BMI 36.50 kg/m²   Physical Exam   Constitutional: He appears well-developed and well-nourished. No distress. Eyes: Conjunctivae and EOM are normal.   Cardiovascular: Normal rate, regular rhythm, normal heart sounds and intact distal pulses. No peripheral edema. Pulmonary/Chest: Effort normal and breath sounds normal.   Musculoskeletal: He exhibits no edema. Skin: Skin is warm and dry. Psychiatric: He has a normal mood and affect. Judgment normal.   Nursing note and vitals reviewed. DM foot exam: feet warm & well-perfused. DP and PT pulses 2+ bilaterally. Toenails in good repair. Sensation intact per light touch on dannie soles but monofilament sensation absent completely in feet and distal 1/3 of dannie legs.  R first web space white discoloration of skin, damp, slight separation of epidermis and dermis. Sole of R 1st MTP callus approx 1.5 cm diameter. Data:     Lab Results   Component Value Date     04/18/2019    K 4.9 04/18/2019     04/18/2019    CO2 25 04/18/2019    BUN 35 04/18/2019    CREATININE 1.18 04/18/2019    GLUCOSE 170 04/18/2019    PROT 7.4 04/18/2019    LABALBU 4.5 04/18/2019    BILITOT 0.59 04/18/2019    ALKPHOS 62 04/18/2019    AST 18 04/18/2019    ALT 23 04/18/2019     Lab Results   Component Value Date    WBC 5.8 04/18/2019    RBC 3.94 04/18/2019    HGB 12.2 04/18/2019    HCT 37.0 04/18/2019    MCV 93.8 04/18/2019    MCH 31.0 04/18/2019    MCHC 33.1 04/18/2019    RDW 13.5 04/18/2019     04/18/2019    MPV NOT REPORTED 04/18/2019     Lab Results   Component Value Date    TSH 1.65 04/18/2019     Lab Results   Component Value Date    CHOL 103 04/18/2019    HDL 29 04/18/2019    PSA 0.20 11/08/2017    LABA1C 6.6 04/18/2019         Assessment & Plan:        Diagnosis Orders   1. Type 2 diabetes mellitus with peripheral neuropathy (HCC)  POCT microalbumin   2. Muscle cramp, nocturnal     3. 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[address]     4. Pulmonary HTN (HCC)     5. Paroxysmal atrial fibrillation ([address]     DM well controlled, does have some hypoglycemia and is adjusting meds with endocrinologist. Cont statin and ARB (entresto combo). Extensive neuropathy, also has tinea and a callus on R foot. rx filled out for diabetic shoes. Check feet daily. Chronic nocturnal muscle cramps. Normal electrolytes. Advised may inc magox dose to 2 full tabs nightly, also stretch before bed. Breathing all stable, activity at a reasonable level. Cont same. Requested Prescriptions      No prescriptions requested or ordered in this encounter         Patient Instructions     SURVEY:    You may be receiving a survey from [employer] regarding your visit today.     Please complete the survey to enable us to provide the highest quality of care to you and your family. If you cannot score us a very good on any question, please call the office to discuss how we could of made your experience a very good one. Thank you. Yesy Ferrer received counseling on the following healthy behaviors: medication adherence  Reviewed prior labs and health maintenance. Continue current medications, diet and exercise. Discussed use, benefit, and side effects of prescribed medications. Barriers to medication compliance addressed. Patient given educational materials - see patient instructions. All patient questions answered. Patient voiced understanding.        Electronically signed by Patrick Martínez DO on 5/9/2019 at 10:55 PM  [address]  Dept: [phone]

## 2019-05-07 NOTE — PATIENT INSTRUCTIONS
SURVEY:    You may be receiving a survey from [employer] regarding your visit today. Please complete the survey to enable us to provide the highest quality of care to you and your family. If you cannot score us a very good on any question, please call the office to discuss how we could of made your experience a very good one. Thank you.

## 2019-05-09 ASSESSMENT — ENCOUNTER SYMPTOMS: GASTROINTESTINAL NEGATIVE: 1

## 2019-05-17 ENCOUNTER — TELEPHONE (OUTPATIENT)
Dept: CARDIAC REHAB | Age: 69
End: 2019-05-17

## 2019-05-21 ENCOUNTER — TELEPHONE (OUTPATIENT)
Dept: CARDIOLOGY CLINIC | Age: 69
End: 2019-05-21

## 2019-05-21 DIAGNOSIS — R06.02 SHORTNESS OF BREATH: ICD-10-CM

## 2019-05-21 DIAGNOSIS — I10 ESSENTIAL HYPERTENSION: Primary | ICD-10-CM

## 2019-05-21 DIAGNOSIS — I25.10 CORONARY ARTERY DISEASE INVOLVING NATIVE CORONARY ARTERY OF NATIVE HEART WITHOUT ANGINA PECTORIS: ICD-10-CM

## 2019-05-21 DIAGNOSIS — I48.0 PAROXYSMAL ATRIAL FIBRILLATION (HCC): ICD-10-CM

## 2019-05-21 DIAGNOSIS — I50.32 CHRONIC DIASTOLIC CHF (CONGESTIVE HEART FAILURE), NYHA CLASS 3 (HCC): ICD-10-CM

## 2019-05-21 DIAGNOSIS — R60.0 LEG EDEMA: ICD-10-CM

## 2019-05-21 DIAGNOSIS — N28.9 RENAL INSUFFICIENCY: ICD-10-CM

## 2019-05-21 DIAGNOSIS — I27.20 PULMONARY HTN (HCC): ICD-10-CM

## 2019-06-05 ENCOUNTER — TELEPHONE (OUTPATIENT)
Dept: CARDIAC REHAB | Age: 69
End: 2019-06-05

## 2019-06-17 ENCOUNTER — HOSPITAL ENCOUNTER (OUTPATIENT)
Age: 69
Discharge: HOME | End: 2019-06-17
Payer: MEDICARE

## 2019-06-17 DIAGNOSIS — I10 ESSENTIAL HYPERTENSION: ICD-10-CM

## 2019-06-17 DIAGNOSIS — I48.0 PAROXYSMAL ATRIAL FIBRILLATION (HCC): ICD-10-CM

## 2019-06-17 DIAGNOSIS — R60.0 LEG EDEMA: ICD-10-CM

## 2019-06-17 DIAGNOSIS — N28.9 RENAL INSUFFICIENCY: ICD-10-CM

## 2019-06-17 DIAGNOSIS — I50.32 CHRONIC DIASTOLIC CHF (CONGESTIVE HEART FAILURE), NYHA CLASS 3 (HCC): ICD-10-CM

## 2019-06-17 DIAGNOSIS — I27.20 PULMONARY HTN (HCC): ICD-10-CM

## 2019-06-17 DIAGNOSIS — I25.10 CORONARY ARTERY DISEASE INVOLVING NATIVE CORONARY ARTERY OF NATIVE HEART WITHOUT ANGINA PECTORIS: ICD-10-CM

## 2019-06-17 DIAGNOSIS — R06.02 SHORTNESS OF BREATH: ICD-10-CM

## 2019-06-17 LAB
ANION GAP SERPL CALCULATED.3IONS-SCNC: 13 MMOL/L (ref 9–17)
BUN BLDV-MCNC: 31 MG/DL (ref 8–23)
BUN/CREAT BLD: 24 (ref 9–20)
CALCIUM SERPL-MCNC: 9.1 MG/DL (ref 8.6–10.4)
CHLORIDE BLD-SCNC: 106 MMOL/L (ref 98–107)
CO2: 23 MMOL/L (ref 20–31)
CREAT SERPL-MCNC: 1.31 MG/DL (ref 0.7–1.2)
GFR AFRICAN AMERICAN: >60 ML/MIN
GFR NON-AFRICAN AMERICAN: 55 ML/MIN
GFR SERPL CREATININE-BSD FRML MDRD: ABNORMAL ML/MIN/{1.73_M2}
GFR SERPL CREATININE-BSD FRML MDRD: ABNORMAL ML/MIN/{1.73_M2}
GLUCOSE BLD-MCNC: 189 MG/DL (ref 70–99)
POTASSIUM SERPL-SCNC: 5.2 MMOL/L (ref 3.7–5.3)
SODIUM BLD-SCNC: 142 MMOL/L (ref 135–144)

## 2019-06-17 PROCEDURE — 80048 BASIC METABOLIC PNL TOTAL CA: CPT

## 2019-06-17 PROCEDURE — 36415 COLL VENOUS BLD VENIPUNCTURE: CPT

## 2019-06-18 RX ORDER — CELECOXIB 200 MG/1
CAPSULE ORAL
Qty: 180 CAPSULE | Refills: 1 | Status: SHIPPED | OUTPATIENT
Start: 2019-06-18 | End: 2019-12-02 | Stop reason: SDUPTHER

## 2019-06-18 NOTE — TELEPHONE ENCOUNTER
Omega-3 Fatty Acids (FISH OIL) 1200 MG CAPS Take 1 capsule by mouth 2 times daily Plus 360 omega 3    Historical Provider, MD   Glucosamine Sulfate 1000 MG CAPS Take by mouth 2 times daily    Historical Provider, MD   Chromium-Cinnamon (CINNAMON PLUS CHROMIUM PO) Take 1,000 mg by mouth 2 times daily     Historical Provider, MD   Multiple Vitamins-Minerals (ONE-A-DAY MENS 50+ ADVANTAGE PO) Take by mouth daily    Historical Provider, MD   Vitamin D (CHOLECALCIFEROL) 1000 UNITS CAPS capsule   Take by mouth 2 times daily Vitamin D 3    Historical Provider, MD   Insulin Syringes, Disposable, U-100 0.3 ML MISC Inject  into the skin. Use prn 7/19/12   Daune Pretty A Jump, DO   metformin (GLUCOPHAGE) 1000 MG tablet Take 1,000 mg by mouth 2 times daily. Historical Provider, MD       Allergies:  Patient has no known allergies. Hemoglobin A1C (%)   Date Value   04/18/2019 6.6 (H)   08/13/2018 7.2   10/20/2017 7.8             ( goal A1C is < 7)   Microalb/Crt.  Ratio (mcg/mg creat)   Date Value   09/27/2016 107 (H)     LDL Cholesterol (mg/dL)   Date Value   04/18/2019 34   11/14/2018 35       (goal LDL is <100)   AST (U/L)   Date Value   04/18/2019 18     ALT (U/L)   Date Value   04/18/2019 23     BUN (mg/dL)   Date Value   06/17/2019 31 (H)     BP Readings from Last 3 Encounters:   05/07/19 (!) 114/58   04/22/19 118/60   11/15/18 130/60          (goal 120/80)

## 2019-06-25 ENCOUNTER — TELEPHONE (OUTPATIENT)
Dept: CARDIAC REHAB | Age: 69
End: 2019-06-25

## 2019-06-25 NOTE — TELEPHONE ENCOUNTER
Attempted phone call for heart failure management reassessment as follow up to last attempt on 6/5/19 and to review basic metabolic panel results from last week. No answer but VM left for patient to call [facility] back.

## 2019-07-18 ENCOUNTER — HOSPITAL ENCOUNTER (OUTPATIENT)
Age: 69
Discharge: HOME | End: 2019-07-18
Payer: MEDICARE

## 2019-07-18 DIAGNOSIS — I10 ESSENTIAL HYPERTENSION: ICD-10-CM

## 2019-07-18 DIAGNOSIS — I25.10 CORONARY ARTERY DISEASE INVOLVING NATIVE CORONARY ARTERY OF NATIVE HEART WITHOUT ANGINA PECTORIS: ICD-10-CM

## 2019-07-18 DIAGNOSIS — Z79.4 TYPE 2 DIABETES MELLITUS WITH HYPOGLYCEMIA WITHOUT COMA, WITH LONG-TERM CURRENT USE OF INSULIN (HCC): ICD-10-CM

## 2019-07-18 DIAGNOSIS — E11.649 TYPE 2 DIABETES MELLITUS WITH HYPOGLYCEMIA WITHOUT COMA, WITH LONG-TERM CURRENT USE OF INSULIN (HCC): ICD-10-CM

## 2019-07-18 DIAGNOSIS — E55.9 VITAMIN D DEFICIENCY DISEASE: ICD-10-CM

## 2019-07-18 LAB
ABSOLUTE EOS #: 0.2 K/UL (ref 0–0.4)
ABSOLUTE IMMATURE GRANULOCYTE: ABNORMAL K/UL (ref 0–0.3)
ABSOLUTE LYMPH #: 1.6 K/UL (ref 1–4.8)
ABSOLUTE MONO #: 0.6 K/UL (ref 0–1)
ALBUMIN SERPL-MCNC: 4.6 G/DL (ref 3.5–5.2)
ALBUMIN/GLOBULIN RATIO: ABNORMAL (ref 1–2.5)
ALP BLD-CCNC: 54 U/L (ref 40–129)
ALT SERPL-CCNC: 23 U/L (ref 5–41)
ANION GAP SERPL CALCULATED.3IONS-SCNC: 13 MMOL/L (ref 9–17)
AST SERPL-CCNC: 17 U/L
BASOPHILS # BLD: 0 % (ref 0–2)
BASOPHILS ABSOLUTE: 0 K/UL (ref 0–0.2)
BILIRUB SERPL-MCNC: 0.55 MG/DL (ref 0.3–1.2)
BUN BLDV-MCNC: 26 MG/DL (ref 8–23)
BUN/CREAT BLD: 21 (ref 9–20)
CALCIUM SERPL-MCNC: 9.4 MG/DL (ref 8.6–10.4)
CHLORIDE BLD-SCNC: 103 MMOL/L (ref 98–107)
CHOLESTEROL/HDL RATIO: 3.4
CHOLESTEROL: 110 MG/DL
CO2: 26 MMOL/L (ref 20–31)
CREAT SERPL-MCNC: 1.25 MG/DL (ref 0.7–1.2)
DIFFERENTIAL TYPE: YES
EOSINOPHILS RELATIVE PERCENT: 3 % (ref 0–5)
ESTIMATED AVERAGE GLUCOSE: 163 MG/DL
GFR AFRICAN AMERICAN: >60 ML/MIN
GFR NON-AFRICAN AMERICAN: 57 ML/MIN
GFR SERPL CREATININE-BSD FRML MDRD: ABNORMAL ML/MIN/{1.73_M2}
GFR SERPL CREATININE-BSD FRML MDRD: ABNORMAL ML/MIN/{1.73_M2}
GLUCOSE BLD-MCNC: 91 MG/DL (ref 70–99)
HBA1C MFR BLD: 7.3 % (ref 4.8–5.9)
HCT VFR BLD CALC: 37.2 % (ref 41–53)
HDLC SERPL-MCNC: 32 MG/DL
HEMOGLOBIN: 12.6 G/DL (ref 13.5–17.5)
IMMATURE GRANULOCYTES: ABNORMAL %
LDL CHOLESTEROL: 45 MG/DL (ref 0–130)
LYMPHOCYTES # BLD: 27 % (ref 13–44)
MAGNESIUM: 2.3 MG/DL (ref 1.6–2.6)
MCH RBC QN AUTO: 31.7 PG (ref 26–34)
MCHC RBC AUTO-ENTMCNC: 33.9 G/DL (ref 31–37)
MCV RBC AUTO: 93.4 FL (ref 80–100)
MONOCYTES # BLD: 11 % (ref 5–9)
NRBC AUTOMATED: ABNORMAL PER 100 WBC
PATIENT FASTING?: YES
PDW BLD-RTO: 13.9 % (ref 12.1–15.2)
PLATELET # BLD: 246 K/UL (ref 140–450)
PLATELET ESTIMATE: ABNORMAL
PMV BLD AUTO: ABNORMAL FL (ref 6–12)
POTASSIUM SERPL-SCNC: 4.4 MMOL/L (ref 3.7–5.3)
RBC # BLD: 3.98 M/UL (ref 4.5–5.9)
RBC # BLD: ABNORMAL 10*6/UL
SEG NEUTROPHILS: 59 % (ref 39–75)
SEGMENTED NEUTROPHILS ABSOLUTE COUNT: 3.5 K/UL (ref 2.1–6.5)
SODIUM BLD-SCNC: 142 MMOL/L (ref 135–144)
TOTAL PROTEIN: 7.7 G/DL (ref 6.4–8.3)
TRIGL SERPL-MCNC: 165 MG/DL
TSH SERPL DL<=0.05 MIU/L-ACNC: 2.53 MIU/L (ref 0.3–5)
VLDLC SERPL CALC-MCNC: ABNORMAL MG/DL (ref 1–30)
WBC # BLD: 5.9 K/UL (ref 3.5–11)
WBC # BLD: ABNORMAL 10*3/UL

## 2019-07-18 PROCEDURE — 83735 ASSAY OF MAGNESIUM: CPT

## 2019-07-18 PROCEDURE — 36415 COLL VENOUS BLD VENIPUNCTURE: CPT

## 2019-07-18 PROCEDURE — 83036 HEMOGLOBIN GLYCOSYLATED A1C: CPT

## 2019-07-18 PROCEDURE — 84443 ASSAY THYROID STIM HORMONE: CPT

## 2019-07-18 PROCEDURE — 82306 VITAMIN D 25 HYDROXY: CPT

## 2019-07-18 PROCEDURE — 85025 COMPLETE CBC W/AUTO DIFF WBC: CPT

## 2019-07-18 PROCEDURE — 93005 ELECTROCARDIOGRAM TRACING: CPT

## 2019-07-18 PROCEDURE — 80053 COMPREHEN METABOLIC PANEL: CPT

## 2019-07-18 PROCEDURE — 80061 LIPID PANEL: CPT

## 2019-07-19 LAB
EKG ATRIAL RATE: 61 BPM
EKG P AXIS: 22 DEGREES
EKG P-R INTERVAL: 184 MS
EKG Q-T INTERVAL: 394 MS
EKG QRS DURATION: 112 MS
EKG QTC CALCULATION (BAZETT): 396 MS
EKG R AXIS: 66 DEGREES
EKG T AXIS: 86 DEGREES
EKG VENTRICULAR RATE: 61 BPM
VITAMIN D 25-HYDROXY: 42.2 NG/ML (ref 30–100)

## 2019-07-19 PROCEDURE — 93010 ELECTROCARDIOGRAM REPORT: CPT | Performed by: INTERNAL MEDICINE

## 2019-07-23 ENCOUNTER — OFFICE VISIT (OUTPATIENT)
Dept: CARDIOLOGY CLINIC | Age: 69
End: 2019-07-23
Payer: MEDICARE

## 2019-07-23 VITALS
OXYGEN SATURATION: 96 % | SYSTOLIC BLOOD PRESSURE: 114 MMHG | WEIGHT: 258 LBS | DIASTOLIC BLOOD PRESSURE: 60 MMHG | BODY MASS INDEX: 37.02 KG/M2 | HEART RATE: 87 BPM

## 2019-07-23 DIAGNOSIS — Z79.4 TYPE 2 DIABETES MELLITUS WITH HYPOGLYCEMIA WITHOUT COMA, WITH LONG-TERM CURRENT USE OF INSULIN (HCC): ICD-10-CM

## 2019-07-23 DIAGNOSIS — I25.10 CORONARY ARTERY DISEASE INVOLVING NATIVE CORONARY ARTERY OF NATIVE HEART WITHOUT ANGINA PECTORIS: Primary | ICD-10-CM

## 2019-07-23 DIAGNOSIS — E11.649 TYPE 2 DIABETES MELLITUS WITH HYPOGLYCEMIA WITHOUT COMA, WITH LONG-TERM CURRENT USE OF INSULIN (HCC): ICD-10-CM

## 2019-07-23 DIAGNOSIS — I10 ESSENTIAL HYPERTENSION: ICD-10-CM

## 2019-07-23 DIAGNOSIS — E55.9 VITAMIN D DEFICIENCY DISEASE: ICD-10-CM

## 2019-07-23 PROCEDURE — 1036F TOBACCO NON-USER: CPT | Performed by: INTERNAL MEDICINE

## 2019-07-23 PROCEDURE — G8428 CUR MEDS NOT DOCUMENT: HCPCS | Performed by: INTERNAL MEDICINE

## 2019-07-23 PROCEDURE — 3045F PR MOST RECENT HEMOGLOBIN A1C LEVEL 7.0-9.0%: CPT | Performed by: INTERNAL MEDICINE

## 2019-07-23 PROCEDURE — 3017F COLORECTAL CA SCREEN DOC REV: CPT | Performed by: INTERNAL MEDICINE

## 2019-07-23 PROCEDURE — 4040F PNEUMOC VAC/ADMIN/RCVD: CPT | Performed by: INTERNAL MEDICINE

## 2019-07-23 PROCEDURE — G8598 ASA/ANTIPLAT THER USED: HCPCS | Performed by: INTERNAL MEDICINE

## 2019-07-23 PROCEDURE — 2022F DILAT RTA XM EVC RTNOPTHY: CPT | Performed by: INTERNAL MEDICINE

## 2019-07-23 PROCEDURE — 99213 OFFICE O/P EST LOW 20 MIN: CPT | Performed by: INTERNAL MEDICINE

## 2019-07-23 PROCEDURE — G8417 CALC BMI ABV UP PARAM F/U: HCPCS | Performed by: INTERNAL MEDICINE

## 2019-07-23 PROCEDURE — 1123F ACP DISCUSS/DSCN MKR DOCD: CPT | Performed by: INTERNAL MEDICINE

## 2019-07-23 NOTE — LETTER
3.  Hypertension many years, well controlled. 4.  Left and right cataract surgery in 2004. 5.  Sleep apnea in 2014, using a CPAP mask, which has been somewhat bothersome this summer. 6.  Two episodes of atrial fibrillation in 12/2017 and in 03/2018, with maintenance of sinus rhythm, currently on Eliquis. 7.  Mild sick sinus syndrome. 8.  Severe back pain, with ablation on his left side, with ablation pending on his right side. FAMILY HISTORY:      SOCIAL HISTORY:  He is 77years old. Retired . . Two children. Has a trailer in [place], which they spend their winter in. Daughter is a professor at [employer] in [place]. Two grandchildren in [place]. They enjoy golfing. He plans to golf with them this winter. He does not smoke or drink alcohol. He uses a CPAP. He has not been exercising as much as usual because of the hot humid weather. 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 REVIEW OF SYSTEMS:  Cardiac as above. Other systems reviewed including constitutional, eyes, ears, nose and throat, cardiovascular, respiratory, GI, , musculoskeletal, integumentary, neurologic, psychiatric, endocrine, hematologic and allergic/immunologic are negative except for what is described above. No weight loss or weight gain. No change in bowel habits, no blood in stools. No fevers, sweats or chills. PHYSICAL EXAMINATION:  VITAL SIGNS:  His blood pressure was excellent at 114/60 with a heart rate of 87 and regular. Respirations were 18. O2 saturation 96%. Weight 258 pounds. GENERAL:  He is a pleasant 60-year-old gentleman. Denied pain. He was oriented to person, place and time. Answered questions appropriately. SKIN:  No unusual skin changes. HEENT:  The pupils are equally round and reactive to light and accommodation. Extraocular movements were intact. Mucous membranes were dry.

## 2019-07-29 DIAGNOSIS — I10 ESSENTIAL HYPERTENSION: ICD-10-CM

## 2019-07-29 DIAGNOSIS — I25.10 CORONARY ARTERY DISEASE INVOLVING NATIVE CORONARY ARTERY OF NATIVE HEART WITHOUT ANGINA PECTORIS: ICD-10-CM

## 2019-07-29 DIAGNOSIS — R06.02 SHORTNESS OF BREATH: ICD-10-CM

## 2019-07-29 DIAGNOSIS — R60.0 LEG EDEMA: ICD-10-CM

## 2019-07-29 DIAGNOSIS — I50.22 CHRONIC SYSTOLIC CONGESTIVE HEART FAILURE (HCC): ICD-10-CM

## 2019-07-29 RX ORDER — DOXAZOSIN 8 MG/1
TABLET ORAL
Qty: 90 TABLET | Refills: 3 | Status: SHIPPED | OUTPATIENT
Start: 2019-07-29 | End: 2020-04-27 | Stop reason: SDUPTHER

## 2019-07-29 RX ORDER — ATORVASTATIN CALCIUM 40 MG/1
TABLET, FILM COATED ORAL
Qty: 90 TABLET | Refills: 3 | Status: SHIPPED | OUTPATIENT
Start: 2019-07-29 | End: 2020-04-27 | Stop reason: SDUPTHER

## 2019-07-29 NOTE — PROGRESS NOTES
cataract surgery in 2004. 5.  Sleep apnea in 2014, using a CPAP mask, which has been somewhat bothersome this summer. 6.  Two episodes of atrial fibrillation in 12/2017 and in 03/2018, with maintenance of sinus rhythm, currently on Eliquis. 7.  Mild sick sinus syndrome. 8.  Severe back pain, with ablation on his left side, with ablation pending on his right side. FAMILY HISTORY:      SOCIAL HISTORY:  He is 77years old. Retired . . Two children. Has a trailer in [place], which they spend their winter in. Daughter is a professor at [employer] in [place]. Two grandchildren in [place]. They enjoy golfing. He plans to golf with them this winter. He does not smoke or drink alcohol. He uses a CPAP. He has not been exercising as much as usual because of the hot humid weather. 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 REVIEW OF SYSTEMS:  Cardiac as above. Other systems reviewed including constitutional, eyes, ears, nose and throat, cardiovascular, respiratory, GI, , musculoskeletal, integumentary, neurologic, psychiatric, endocrine, hematologic and allergic/immunologic are negative except for what is described above. No weight loss or weight gain. No change in bowel habits, no blood in stools. No fevers, sweats or chills. PHYSICAL EXAMINATION:  VITAL SIGNS:  His blood pressure was excellent at 114/60 with a heart rate of 87 and regular. Respirations were 18. O2 saturation 96%. Weight 258 pounds. GENERAL:  He is a pleasant 70-year-old gentleman. Denied pain. He was oriented to person, place and time. Answered questions appropriately. SKIN:  No unusual skin changes. HEENT:  The pupils are equally round and reactive to light and accommodation. Extraocular movements were intact. Mucous membranes were dry. NECK:  No JVD. Good carotid pulses. No carotid bruits. No lymphadenopathy or thyromegaly.   CARDIOVASCULAR EXAM:  S1 and S2 were normal.  No S3 or S4. Soft systolic blowing type murmur. No diastolic murmur. PMI was normal.  No lift, thrust, or pericardial friction rub. LUNGS:  Quite clear to auscultation and percussion. ABDOMEN:  Soft and nontender. Good bowel sounds. The aorta was not enlarged. No hepatomegaly, splenomegaly. EXTREMITIES:  Good femoral pulses. Good pedal pulses. He had 2+ edema. Skin was warm and dry. No calf tenderness. Nail beds pink. Good cap refill. PULSES:  Bilateral symmetrical radial, brachial and carotid pulses. No carotid bruits. Good femoral and pedal pulses. NEUROLOGIC EXAM:  Within normal limits. PSYCHIATRIC EXAM:  Within normal limits. LABORATORY DATA:  From 07/18/2019, sodium 142, potassium 4.2, BUN 26, creatinine 1.25, GFR was 57. Cholesterol 110 with an HDL of 32, LDL 45, triglycerides 165. ALT was 23, AST was 17. Hemoglobin A1c was 7.3. TSH 2.53 with a vitamin D 42.2. White count 5.9, hemoglobin 12.6 with a platelet count of 751,911. His EKG showed sinus rhythm and was normal.    Bedside echocardiogram showed normal LV size and function. IMPRESSION:  1. Catheterization on 10/31/2012, showing severe disease with bifurcation of LAD and diagonal, with unremarkable right coronary artery and circumflex, EF of 55%. 2.  Angioplasty of the LAD and diagonal on 11/07/2012, placing a 2.75 x 28 mm Promus stent in the LAD. 3.  Catheterization on 05/14/2014, showing 70% to 80% LAD and diagonal in-stent stenosis with an FFR of 0.78, with unremarkable right coronary artery and circumflex, normal LV function.   4.  Open heart surgery on 06/03/2014, by Dr. Alexis Rico with a LIMA to the LAD and a vein graft to the diagonal.  5.  Catheterization on 08/30/2017, showing patent LIMA to the LAD and a patent vein graft to the diagonal, with unremarkable circumflex, with 95% disease in the mid right coronary artery, EF of 50%, with angioplasty of the right coronary artery, placing a 3.5 x 18

## 2019-08-12 DIAGNOSIS — I50.22 CHRONIC SYSTOLIC CONGESTIVE HEART FAILURE (HCC): ICD-10-CM

## 2019-08-12 DIAGNOSIS — R60.0 LEG EDEMA: ICD-10-CM

## 2019-08-12 DIAGNOSIS — I25.10 CORONARY ARTERY DISEASE INVOLVING NATIVE CORONARY ARTERY OF NATIVE HEART WITHOUT ANGINA PECTORIS: ICD-10-CM

## 2019-08-12 DIAGNOSIS — I50.32 CHRONIC DIASTOLIC CHF (CONGESTIVE HEART FAILURE), NYHA CLASS 3 (HCC): ICD-10-CM

## 2019-08-12 DIAGNOSIS — R06.02 SHORTNESS OF BREATH: ICD-10-CM

## 2019-08-12 DIAGNOSIS — I10 ESSENTIAL HYPERTENSION: ICD-10-CM

## 2019-08-12 DIAGNOSIS — I48.0 PAF (PAROXYSMAL ATRIAL FIBRILLATION) (HCC): ICD-10-CM

## 2019-08-12 DIAGNOSIS — I27.20 PULMONARY HTN (HCC): ICD-10-CM

## 2019-08-12 RX ORDER — SACUBITRIL AND VALSARTAN 97; 103 MG/1; MG/1
TABLET, FILM COATED ORAL
Qty: 180 TABLET | Refills: 3 | Status: SHIPPED | OUTPATIENT
Start: 2019-08-12 | End: 2020-04-27 | Stop reason: SDUPTHER

## 2019-08-12 RX ORDER — FUROSEMIDE 80 MG
TABLET ORAL
Qty: 180 TABLET | Refills: 3 | Status: SHIPPED | OUTPATIENT
Start: 2019-08-12 | End: 2020-04-27 | Stop reason: SDUPTHER

## 2019-10-09 ENCOUNTER — HOSPITAL ENCOUNTER (OUTPATIENT)
Age: 69
Discharge: HOME | End: 2019-10-09
Payer: MEDICARE

## 2019-10-09 DIAGNOSIS — E11.649 TYPE 2 DIABETES MELLITUS WITH HYPOGLYCEMIA WITHOUT COMA, WITH LONG-TERM CURRENT USE OF INSULIN (HCC): ICD-10-CM

## 2019-10-09 DIAGNOSIS — E55.9 VITAMIN D DEFICIENCY DISEASE: ICD-10-CM

## 2019-10-09 DIAGNOSIS — I25.10 CORONARY ARTERY DISEASE INVOLVING NATIVE CORONARY ARTERY OF NATIVE HEART WITHOUT ANGINA PECTORIS: ICD-10-CM

## 2019-10-09 DIAGNOSIS — I10 ESSENTIAL HYPERTENSION: ICD-10-CM

## 2019-10-09 DIAGNOSIS — Z79.4 TYPE 2 DIABETES MELLITUS WITH HYPOGLYCEMIA WITHOUT COMA, WITH LONG-TERM CURRENT USE OF INSULIN (HCC): ICD-10-CM

## 2019-10-09 LAB
ABSOLUTE EOS #: 0.2 K/UL (ref 0–0.4)
ABSOLUTE IMMATURE GRANULOCYTE: ABNORMAL K/UL (ref 0–0.3)
ABSOLUTE LYMPH #: 1.6 K/UL (ref 1–4.8)
ABSOLUTE MONO #: 0.7 K/UL (ref 0–1)
ALBUMIN SERPL-MCNC: 4.4 G/DL (ref 3.5–5.2)
ALBUMIN/GLOBULIN RATIO: ABNORMAL (ref 1–2.5)
ALP BLD-CCNC: 62 U/L (ref 40–129)
ALT SERPL-CCNC: 26 U/L (ref 5–41)
ANION GAP SERPL CALCULATED.3IONS-SCNC: 12 MMOL/L (ref 9–17)
AST SERPL-CCNC: 21 U/L
BASOPHILS # BLD: 1 % (ref 0–2)
BASOPHILS ABSOLUTE: 0 K/UL (ref 0–0.2)
BILIRUB SERPL-MCNC: 0.5 MG/DL (ref 0.3–1.2)
BUN BLDV-MCNC: 37 MG/DL (ref 8–23)
BUN/CREAT BLD: 24 (ref 9–20)
CALCIUM SERPL-MCNC: 9.4 MG/DL (ref 8.6–10.4)
CHLORIDE BLD-SCNC: 101 MMOL/L (ref 98–107)
CHOLESTEROL/HDL RATIO: 4.3
CHOLESTEROL: 113 MG/DL
CO2: 25 MMOL/L (ref 20–31)
CREAT SERPL-MCNC: 1.52 MG/DL (ref 0.7–1.2)
CREATININE URINE: 112.2 MG/DL (ref 39–259)
DIFFERENTIAL TYPE: YES
EOSINOPHILS RELATIVE PERCENT: 3 % (ref 0–5)
ESTIMATED AVERAGE GLUCOSE: 177 MG/DL
GFR AFRICAN AMERICAN: 56 ML/MIN
GFR NON-AFRICAN AMERICAN: 46 ML/MIN
GFR SERPL CREATININE-BSD FRML MDRD: ABNORMAL ML/MIN/{1.73_M2}
GFR SERPL CREATININE-BSD FRML MDRD: ABNORMAL ML/MIN/{1.73_M2}
GLUCOSE BLD-MCNC: 159 MG/DL (ref 70–99)
HBA1C MFR BLD: 7.8 % (ref 4.8–5.9)
HCT VFR BLD CALC: 36.5 % (ref 41–53)
HDLC SERPL-MCNC: 26 MG/DL
HEMOGLOBIN: 12.3 G/DL (ref 13.5–17.5)
IMMATURE GRANULOCYTES: ABNORMAL %
LDL CHOLESTEROL: 15 MG/DL (ref 0–130)
LYMPHOCYTES # BLD: 24 % (ref 13–44)
MAGNESIUM: 2.5 MG/DL (ref 1.6–2.6)
MCH RBC QN AUTO: 31.6 PG (ref 26–34)
MCHC RBC AUTO-ENTMCNC: 33.8 G/DL (ref 31–37)
MCV RBC AUTO: 93.5 FL (ref 80–100)
MICROALBUMIN/CREAT 24H UR: <12 MG/L
MICROALBUMIN/CREAT UR-RTO: NORMAL MCG/MG CREAT
MONOCYTES # BLD: 10 % (ref 5–9)
NRBC AUTOMATED: ABNORMAL PER 100 WBC
PATIENT FASTING?: YES
PDW BLD-RTO: 13.5 % (ref 12.1–15.2)
PLATELET # BLD: 264 K/UL (ref 140–450)
PLATELET ESTIMATE: ABNORMAL
PMV BLD AUTO: ABNORMAL FL (ref 6–12)
POTASSIUM SERPL-SCNC: 5 MMOL/L (ref 3.7–5.3)
RBC # BLD: 3.9 M/UL (ref 4.5–5.9)
RBC # BLD: ABNORMAL 10*6/UL
SEG NEUTROPHILS: 62 % (ref 39–75)
SEGMENTED NEUTROPHILS ABSOLUTE COUNT: 4.3 K/UL (ref 2.1–6.5)
SODIUM BLD-SCNC: 138 MMOL/L (ref 135–144)
T3 FREE: 3.73 PG/ML (ref 2.02–4.43)
THYROXINE, FREE: 1.06 NG/DL (ref 0.93–1.7)
TOTAL CK: 142 U/L (ref 39–308)
TOTAL PROTEIN: 7.5 G/DL (ref 6.4–8.3)
TRIGL SERPL-MCNC: 358 MG/DL
TSH SERPL DL<=0.05 MIU/L-ACNC: 1.71 MIU/L (ref 0.3–5)
VITAMIN B-12: 457 PG/ML (ref 232–1245)
VITAMIN D 25-HYDROXY: 36.8 NG/ML (ref 30–100)
VLDLC SERPL CALC-MCNC: ABNORMAL MG/DL (ref 1–30)
WBC # BLD: 6.9 K/UL (ref 3.5–11)
WBC # BLD: ABNORMAL 10*3/UL

## 2019-10-09 PROCEDURE — 82570 ASSAY OF URINE CREATININE: CPT

## 2019-10-09 PROCEDURE — 82306 VITAMIN D 25 HYDROXY: CPT

## 2019-10-09 PROCEDURE — 83735 ASSAY OF MAGNESIUM: CPT

## 2019-10-09 PROCEDURE — 80061 LIPID PANEL: CPT

## 2019-10-09 PROCEDURE — 82043 UR ALBUMIN QUANTITATIVE: CPT

## 2019-10-09 PROCEDURE — 36415 COLL VENOUS BLD VENIPUNCTURE: CPT

## 2019-10-09 PROCEDURE — 84439 ASSAY OF FREE THYROXINE: CPT

## 2019-10-09 PROCEDURE — 85025 COMPLETE CBC W/AUTO DIFF WBC: CPT

## 2019-10-09 PROCEDURE — 84443 ASSAY THYROID STIM HORMONE: CPT

## 2019-10-09 PROCEDURE — 82550 ASSAY OF CK (CPK): CPT

## 2019-10-09 PROCEDURE — 93005 ELECTROCARDIOGRAM TRACING: CPT

## 2019-10-09 PROCEDURE — 80053 COMPREHEN METABOLIC PANEL: CPT

## 2019-10-09 PROCEDURE — 84481 FREE ASSAY (FT-3): CPT

## 2019-10-09 PROCEDURE — 82607 VITAMIN B-12: CPT

## 2019-10-09 PROCEDURE — 83036 HEMOGLOBIN GLYCOSYLATED A1C: CPT

## 2019-10-10 LAB
EKG ATRIAL RATE: 67 BPM
EKG P AXIS: 16 DEGREES
EKG P-R INTERVAL: 176 MS
EKG Q-T INTERVAL: 394 MS
EKG QRS DURATION: 100 MS
EKG QTC CALCULATION (BAZETT): 416 MS
EKG R AXIS: 88 DEGREES
EKG T AXIS: 106 DEGREES
EKG VENTRICULAR RATE: 67 BPM

## 2019-10-10 PROCEDURE — 93010 ELECTROCARDIOGRAM REPORT: CPT | Performed by: INTERNAL MEDICINE

## 2019-10-17 ENCOUNTER — OFFICE VISIT (OUTPATIENT)
Dept: FAMILY MEDICINE CLINIC | Age: 69
End: 2019-10-17
Payer: MEDICARE

## 2019-10-17 VITALS
DIASTOLIC BLOOD PRESSURE: 66 MMHG | BODY MASS INDEX: 35.84 KG/M2 | HEIGHT: 71 IN | WEIGHT: 256 LBS | SYSTOLIC BLOOD PRESSURE: 130 MMHG | HEART RATE: 75 BPM | OXYGEN SATURATION: 99 %

## 2019-10-17 DIAGNOSIS — L40.9 PSORIASIS: ICD-10-CM

## 2019-10-17 DIAGNOSIS — Z82.61 FAMILY HISTORY OF RHEUMATOID ARTHRITIS: ICD-10-CM

## 2019-10-17 DIAGNOSIS — Z23 NEED FOR INFLUENZA VACCINATION: ICD-10-CM

## 2019-10-17 DIAGNOSIS — Z84.0 FAMILY HISTORY OF PSORIATIC ARTHRITIS: ICD-10-CM

## 2019-10-17 DIAGNOSIS — M25.50 POLYARTHRALGIA: Primary | ICD-10-CM

## 2019-10-17 DIAGNOSIS — T75.3XXA MOTION SICKNESS, INITIAL ENCOUNTER: ICD-10-CM

## 2019-10-17 DIAGNOSIS — L57.0 ACTINIC KERATOSES: ICD-10-CM

## 2019-10-17 PROBLEM — R06.02 SOB (SHORTNESS OF BREATH): Status: RESOLVED | Noted: 2017-10-10 | Resolved: 2019-10-17

## 2019-10-17 PROCEDURE — 3017F COLORECTAL CA SCREEN DOC REV: CPT | Performed by: FAMILY MEDICINE

## 2019-10-17 PROCEDURE — 1123F ACP DISCUSS/DSCN MKR DOCD: CPT | Performed by: FAMILY MEDICINE

## 2019-10-17 PROCEDURE — G8417 CALC BMI ABV UP PARAM F/U: HCPCS | Performed by: FAMILY MEDICINE

## 2019-10-17 PROCEDURE — 17003 DESTRUCT PREMALG LES 2-14: CPT | Performed by: FAMILY MEDICINE

## 2019-10-17 PROCEDURE — 99214 OFFICE O/P EST MOD 30 MIN: CPT | Performed by: FAMILY MEDICINE

## 2019-10-17 PROCEDURE — G8482 FLU IMMUNIZE ORDER/ADMIN: HCPCS | Performed by: FAMILY MEDICINE

## 2019-10-17 PROCEDURE — 90686 IIV4 VACC NO PRSV 0.5 ML IM: CPT | Performed by: FAMILY MEDICINE

## 2019-10-17 PROCEDURE — 1036F TOBACCO NON-USER: CPT | Performed by: FAMILY MEDICINE

## 2019-10-17 PROCEDURE — G8427 DOCREV CUR MEDS BY ELIG CLIN: HCPCS | Performed by: FAMILY MEDICINE

## 2019-10-17 PROCEDURE — 17000 DESTRUCT PREMALG LESION: CPT | Performed by: FAMILY MEDICINE

## 2019-10-17 PROCEDURE — 4040F PNEUMOC VAC/ADMIN/RCVD: CPT | Performed by: FAMILY MEDICINE

## 2019-10-17 PROCEDURE — G8598 ASA/ANTIPLAT THER USED: HCPCS | Performed by: FAMILY MEDICINE

## 2019-10-17 PROCEDURE — G0008 ADMIN INFLUENZA VIRUS VAC: HCPCS | Performed by: FAMILY MEDICINE

## 2019-10-17 RX ORDER — SCOLOPAMINE TRANSDERMAL SYSTEM 1 MG/1
1 PATCH, EXTENDED RELEASE TRANSDERMAL
Qty: 2 PATCH | Refills: 0 | Status: SHIPPED | OUTPATIENT
Start: 2019-10-17 | End: 2020-04-20 | Stop reason: ALTCHOICE

## 2019-10-17 RX ORDER — FLUOCINOLONE ACETONIDE 0.1 MG/ML
SOLUTION TOPICAL
Qty: 90 ML | Refills: 1 | Status: SHIPPED | OUTPATIENT
Start: 2019-10-17 | End: 2020-11-05

## 2019-10-17 ASSESSMENT — ENCOUNTER SYMPTOMS
RESPIRATORY NEGATIVE: 1
GASTROINTESTINAL NEGATIVE: 1

## 2019-10-30 ENCOUNTER — TELEPHONE (OUTPATIENT)
Dept: FAMILY MEDICINE CLINIC | Age: 69
End: 2019-10-30

## 2019-10-30 RX ORDER — AZITHROMYCIN 250 MG/1
250 TABLET, FILM COATED ORAL SEE ADMIN INSTRUCTIONS
Qty: 6 TABLET | Refills: 0 | Status: SHIPPED | OUTPATIENT
Start: 2019-10-30 | End: 2019-11-04

## 2019-11-05 ENCOUNTER — OFFICE VISIT (OUTPATIENT)
Dept: CARDIOLOGY CLINIC | Age: 69
End: 2019-11-05
Payer: MEDICARE

## 2019-11-05 VITALS
HEART RATE: 70 BPM | OXYGEN SATURATION: 99 % | WEIGHT: 258 LBS | BODY MASS INDEX: 36.5 KG/M2 | SYSTOLIC BLOOD PRESSURE: 120 MMHG | DIASTOLIC BLOOD PRESSURE: 60 MMHG

## 2019-11-05 DIAGNOSIS — I10 ESSENTIAL HYPERTENSION: ICD-10-CM

## 2019-11-05 DIAGNOSIS — R06.02 SHORTNESS OF BREATH: ICD-10-CM

## 2019-11-05 DIAGNOSIS — I50.22 CHRONIC SYSTOLIC CONGESTIVE HEART FAILURE (HCC): ICD-10-CM

## 2019-11-05 DIAGNOSIS — E55.9 VITAMIN D DEFICIENCY DISEASE: ICD-10-CM

## 2019-11-05 DIAGNOSIS — I27.20 PULMONARY HTN (HCC): ICD-10-CM

## 2019-11-05 DIAGNOSIS — E11.649 TYPE 2 DIABETES MELLITUS WITH HYPOGLYCEMIA WITHOUT COMA, WITH LONG-TERM CURRENT USE OF INSULIN (HCC): ICD-10-CM

## 2019-11-05 DIAGNOSIS — I25.10 CORONARY ARTERY DISEASE INVOLVING NATIVE CORONARY ARTERY OF NATIVE HEART WITHOUT ANGINA PECTORIS: Primary | ICD-10-CM

## 2019-11-05 DIAGNOSIS — Z79.4 TYPE 2 DIABETES MELLITUS WITH HYPOGLYCEMIA WITHOUT COMA, WITH LONG-TERM CURRENT USE OF INSULIN (HCC): ICD-10-CM

## 2019-11-05 PROCEDURE — G8598 ASA/ANTIPLAT THER USED: HCPCS | Performed by: INTERNAL MEDICINE

## 2019-11-05 PROCEDURE — G8482 FLU IMMUNIZE ORDER/ADMIN: HCPCS | Performed by: INTERNAL MEDICINE

## 2019-11-05 PROCEDURE — 2022F DILAT RTA XM EVC RTNOPTHY: CPT | Performed by: INTERNAL MEDICINE

## 2019-11-05 PROCEDURE — 4040F PNEUMOC VAC/ADMIN/RCVD: CPT | Performed by: INTERNAL MEDICINE

## 2019-11-05 PROCEDURE — G8428 CUR MEDS NOT DOCUMENT: HCPCS | Performed by: INTERNAL MEDICINE

## 2019-11-05 PROCEDURE — 1123F ACP DISCUSS/DSCN MKR DOCD: CPT | Performed by: INTERNAL MEDICINE

## 2019-11-05 PROCEDURE — G8417 CALC BMI ABV UP PARAM F/U: HCPCS | Performed by: INTERNAL MEDICINE

## 2019-11-05 PROCEDURE — 99214 OFFICE O/P EST MOD 30 MIN: CPT | Performed by: INTERNAL MEDICINE

## 2019-11-05 PROCEDURE — 1036F TOBACCO NON-USER: CPT | Performed by: INTERNAL MEDICINE

## 2019-11-05 PROCEDURE — 3017F COLORECTAL CA SCREEN DOC REV: CPT | Performed by: INTERNAL MEDICINE

## 2019-11-13 ENCOUNTER — HOSPITAL ENCOUNTER (OUTPATIENT)
Dept: NON INVASIVE DIAGNOSTICS | Age: 69
Discharge: HOME | End: 2019-11-13
Payer: MEDICARE

## 2019-11-13 DIAGNOSIS — R06.02 SHORTNESS OF BREATH: ICD-10-CM

## 2019-11-13 DIAGNOSIS — I27.20 PULMONARY HTN (HCC): ICD-10-CM

## 2019-11-13 LAB
LV EF: 55 %
LVEF MODALITY: NORMAL

## 2019-11-13 PROCEDURE — 93306 TTE W/DOPPLER COMPLETE: CPT

## 2019-11-19 ENCOUNTER — TELEPHONE (OUTPATIENT)
Dept: CARDIOLOGY CLINIC | Age: 69
End: 2019-11-19

## 2019-11-21 ENCOUNTER — TELEPHONE (OUTPATIENT)
Dept: CARDIOLOGY CLINIC | Age: 69
End: 2019-11-21

## 2019-12-03 RX ORDER — ISOSORBIDE MONONITRATE 30 MG/1
TABLET, EXTENDED RELEASE ORAL
Qty: 90 TABLET | Refills: 3 | Status: SHIPPED | OUTPATIENT
Start: 2019-12-03 | End: 2020-04-27 | Stop reason: SDUPTHER

## 2019-12-03 RX ORDER — CELECOXIB 200 MG/1
CAPSULE ORAL
Qty: 180 CAPSULE | Refills: 1 | Status: SHIPPED | OUTPATIENT
Start: 2019-12-03 | End: 2020-07-14 | Stop reason: SDUPTHER

## 2019-12-21 DIAGNOSIS — I48.0 PAF (PAROXYSMAL ATRIAL FIBRILLATION) (HCC): ICD-10-CM

## 2019-12-21 DIAGNOSIS — I50.32 CHRONIC DIASTOLIC CHF (CONGESTIVE HEART FAILURE), NYHA CLASS 3 (HCC): ICD-10-CM

## 2019-12-21 DIAGNOSIS — I25.119 CORONARY ARTERY DISEASE INVOLVING NATIVE CORONARY ARTERY OF NATIVE HEART WITH ANGINA PECTORIS (HCC): ICD-10-CM

## 2019-12-21 DIAGNOSIS — I27.20 PULMONARY HTN (HCC): ICD-10-CM

## 2019-12-23 ENCOUNTER — TELEPHONE (OUTPATIENT)
Dept: CARDIOLOGY CLINIC | Age: 69
End: 2019-12-23

## 2019-12-23 RX ORDER — APIXABAN 5 MG/1
TABLET, FILM COATED ORAL
Qty: 180 TABLET | Refills: 3 | Status: SHIPPED | OUTPATIENT
Start: 2019-12-23 | End: 2020-11-17 | Stop reason: SDUPTHER

## 2019-12-30 RX ORDER — SPIRONOLACTONE 25 MG/1
TABLET ORAL
Qty: 360 TABLET | Refills: 3 | Status: SHIPPED | OUTPATIENT
Start: 2019-12-30 | End: 2020-04-27 | Stop reason: ALTCHOICE

## 2020-01-20 RX ORDER — CARVEDILOL 12.5 MG/1
TABLET ORAL
Qty: 180 TABLET | Refills: 3 | Status: SHIPPED | OUTPATIENT
Start: 2020-01-20 | End: 2020-11-17 | Stop reason: SDUPTHER

## 2020-02-21 ENCOUNTER — TELEPHONE (OUTPATIENT)
Dept: CARDIOLOGY CLINIC | Age: 70
End: 2020-02-21

## 2020-02-24 RX ORDER — NIFEDIPINE 90 MG/1
TABLET, FILM COATED, EXTENDED RELEASE ORAL
Qty: 90 TABLET | Refills: 3 | Status: SHIPPED | OUTPATIENT
Start: 2020-02-24 | End: 2020-11-17

## 2020-03-16 ENCOUNTER — TELEPHONE (OUTPATIENT)
Dept: CARDIOLOGY CLINIC | Age: 70
End: 2020-03-16

## 2020-04-20 ENCOUNTER — HOSPITAL ENCOUNTER (OUTPATIENT)
Age: 70
Discharge: HOME | End: 2020-04-20
Payer: MEDICARE

## 2020-04-20 ENCOUNTER — OFFICE VISIT (OUTPATIENT)
Dept: FAMILY MEDICINE CLINIC | Age: 70
End: 2020-04-20
Payer: MEDICARE

## 2020-04-20 ENCOUNTER — HOSPITAL ENCOUNTER (OUTPATIENT)
Age: 70
Discharge: HOME | End: 2020-04-22
Payer: MEDICARE

## 2020-04-20 ENCOUNTER — HOSPITAL ENCOUNTER (OUTPATIENT)
Dept: GENERAL RADIOLOGY | Age: 70
Discharge: HOME | End: 2020-04-22
Payer: MEDICARE

## 2020-04-20 VITALS
HEART RATE: 61 BPM | SYSTOLIC BLOOD PRESSURE: 136 MMHG | DIASTOLIC BLOOD PRESSURE: 68 MMHG | BODY MASS INDEX: 35.7 KG/M2 | HEIGHT: 71 IN | OXYGEN SATURATION: 98 % | WEIGHT: 255 LBS

## 2020-04-20 LAB
ABSOLUTE EOS #: 0.2 K/UL (ref 0–0.4)
ABSOLUTE IMMATURE GRANULOCYTE: ABNORMAL K/UL (ref 0–0.3)
ABSOLUTE LYMPH #: 1.5 K/UL (ref 1–4.8)
ABSOLUTE MONO #: 0.7 K/UL (ref 0–1)
ALBUMIN SERPL-MCNC: 4.9 G/DL (ref 3.5–5.2)
ALBUMIN/GLOBULIN RATIO: ABNORMAL (ref 1–2.5)
ALP BLD-CCNC: 67 U/L (ref 40–129)
ALT SERPL-CCNC: 26 U/L (ref 5–41)
ANION GAP SERPL CALCULATED.3IONS-SCNC: 12 MMOL/L (ref 9–17)
AST SERPL-CCNC: 19 U/L
BASOPHILS # BLD: 0 % (ref 0–2)
BASOPHILS ABSOLUTE: 0 K/UL (ref 0–0.2)
BILIRUB SERPL-MCNC: 0.53 MG/DL (ref 0.3–1.2)
BUN BLDV-MCNC: 37 MG/DL (ref 8–23)
BUN/CREAT BLD: 30 (ref 9–20)
CALCIUM SERPL-MCNC: 9.5 MG/DL (ref 8.6–10.4)
CHLORIDE BLD-SCNC: 104 MMOL/L (ref 98–107)
CHOLESTEROL/HDL RATIO: 3.9
CHOLESTEROL: 109 MG/DL
CO2: 26 MMOL/L (ref 20–31)
CREAT SERPL-MCNC: 1.25 MG/DL (ref 0.7–1.2)
DIFFERENTIAL TYPE: YES
EOSINOPHILS RELATIVE PERCENT: 2 % (ref 0–5)
ESTIMATED AVERAGE GLUCOSE: 160 MG/DL
GFR AFRICAN AMERICAN: >60 ML/MIN
GFR NON-AFRICAN AMERICAN: 57 ML/MIN
GFR SERPL CREATININE-BSD FRML MDRD: ABNORMAL ML/MIN/{1.73_M2}
GFR SERPL CREATININE-BSD FRML MDRD: ABNORMAL ML/MIN/{1.73_M2}
GLUCOSE BLD-MCNC: 180 MG/DL (ref 70–99)
HBA1C MFR BLD: 7.2 % (ref 4.8–5.9)
HCT VFR BLD CALC: 37.7 % (ref 41–53)
HDLC SERPL-MCNC: 28 MG/DL
HEMOGLOBIN: 12.5 G/DL (ref 13.5–17.5)
IMMATURE GRANULOCYTES: ABNORMAL %
LDL CHOLESTEROL: 33 MG/DL (ref 0–130)
LYMPHOCYTES # BLD: 21 % (ref 13–44)
MAGNESIUM: 2.5 MG/DL (ref 1.6–2.6)
MCH RBC QN AUTO: 31.2 PG (ref 26–34)
MCHC RBC AUTO-ENTMCNC: 33.2 G/DL (ref 31–37)
MCV RBC AUTO: 94 FL (ref 80–100)
MONOCYTES # BLD: 10 % (ref 5–9)
NRBC AUTOMATED: ABNORMAL PER 100 WBC
PATIENT FASTING?: YES
PDW BLD-RTO: 13.7 % (ref 12.1–15.2)
PLATELET # BLD: 280 K/UL (ref 140–450)
PLATELET ESTIMATE: ABNORMAL
PMV BLD AUTO: ABNORMAL FL (ref 6–12)
POTASSIUM SERPL-SCNC: 5.1 MMOL/L (ref 3.7–5.3)
RBC # BLD: 4.01 M/UL (ref 4.5–5.9)
RBC # BLD: ABNORMAL 10*6/UL
SEG NEUTROPHILS: 67 % (ref 39–75)
SEGMENTED NEUTROPHILS ABSOLUTE COUNT: 4.6 K/UL (ref 2.1–6.5)
SODIUM BLD-SCNC: 142 MMOL/L (ref 135–144)
TOTAL PROTEIN: 7.9 G/DL (ref 6.4–8.3)
TRIGL SERPL-MCNC: 241 MG/DL
TSH SERPL DL<=0.05 MIU/L-ACNC: 1.79 MIU/L (ref 0.3–5)
VITAMIN D 25-HYDROXY: 34.8 NG/ML (ref 30–100)
VLDLC SERPL CALC-MCNC: ABNORMAL MG/DL (ref 1–30)
WBC # BLD: 7 K/UL (ref 3.5–11)
WBC # BLD: ABNORMAL 10*3/UL

## 2020-04-20 PROCEDURE — G8510 SCR DEP NEG, NO PLAN REQD: HCPCS | Performed by: FAMILY MEDICINE

## 2020-04-20 PROCEDURE — 85025 COMPLETE CBC W/AUTO DIFF WBC: CPT

## 2020-04-20 PROCEDURE — 83735 ASSAY OF MAGNESIUM: CPT

## 2020-04-20 PROCEDURE — 71046 X-RAY EXAM CHEST 2 VIEWS: CPT

## 2020-04-20 PROCEDURE — 82306 VITAMIN D 25 HYDROXY: CPT

## 2020-04-20 PROCEDURE — 80053 COMPREHEN METABOLIC PANEL: CPT

## 2020-04-20 PROCEDURE — 3288F FALL RISK ASSESSMENT DOCD: CPT | Performed by: FAMILY MEDICINE

## 2020-04-20 PROCEDURE — 3017F COLORECTAL CA SCREEN DOC REV: CPT | Performed by: FAMILY MEDICINE

## 2020-04-20 PROCEDURE — 99214 OFFICE O/P EST MOD 30 MIN: CPT | Performed by: FAMILY MEDICINE

## 2020-04-20 PROCEDURE — 2022F DILAT RTA XM EVC RTNOPTHY: CPT | Performed by: FAMILY MEDICINE

## 2020-04-20 PROCEDURE — 83036 HEMOGLOBIN GLYCOSYLATED A1C: CPT

## 2020-04-20 PROCEDURE — 3051F HG A1C>EQUAL 7.0%<8.0%: CPT | Performed by: FAMILY MEDICINE

## 2020-04-20 PROCEDURE — G0438 PPPS, INITIAL VISIT: HCPCS | Performed by: FAMILY MEDICINE

## 2020-04-20 PROCEDURE — 1123F ACP DISCUSS/DSCN MKR DOCD: CPT | Performed by: FAMILY MEDICINE

## 2020-04-20 PROCEDURE — 36415 COLL VENOUS BLD VENIPUNCTURE: CPT

## 2020-04-20 PROCEDURE — G8417 CALC BMI ABV UP PARAM F/U: HCPCS | Performed by: FAMILY MEDICINE

## 2020-04-20 PROCEDURE — 84443 ASSAY THYROID STIM HORMONE: CPT

## 2020-04-20 PROCEDURE — 1036F TOBACCO NON-USER: CPT | Performed by: FAMILY MEDICINE

## 2020-04-20 PROCEDURE — 80061 LIPID PANEL: CPT

## 2020-04-20 PROCEDURE — 4040F PNEUMOC VAC/ADMIN/RCVD: CPT | Performed by: FAMILY MEDICINE

## 2020-04-20 PROCEDURE — G8427 DOCREV CUR MEDS BY ELIG CLIN: HCPCS | Performed by: FAMILY MEDICINE

## 2020-04-20 PROCEDURE — 93005 ELECTROCARDIOGRAM TRACING: CPT

## 2020-04-20 ASSESSMENT — PATIENT HEALTH QUESTIONNAIRE - PHQ9
SUM OF ALL RESPONSES TO PHQ9 QUESTIONS 1 & 2: 0
1. LITTLE INTEREST OR PLEASURE IN DOING THINGS: 0
SUM OF ALL RESPONSES TO PHQ QUESTIONS 1-9: 0
2. FEELING DOWN, DEPRESSED OR HOPELESS: 0
SUM OF ALL RESPONSES TO PHQ QUESTIONS 1-9: 0

## 2020-04-20 ASSESSMENT — LIFESTYLE VARIABLES: HOW OFTEN DO YOU HAVE A DRINK CONTAINING ALCOHOL: 0

## 2020-04-20 NOTE — PROGRESS NOTES
artery. [facility] APPENDECTOMY      APPENDECTOMY      BUNIONECTOMY  2010    CARDIAC CATHETERIZATION      stent pacement    CARDIAC CATHETERIZATION Left 05/14/14    Dr Flynn Marie. [facility] Severe CAD, 70% disese with in-stent stenosis in the left anterior descending at the level of a very large codominant diagonal.  90% disease of the ostium of a very large codominant diagonal with 60% disease beyond the diagonal with a fractional flow reserve of 0.76 in the diagonal and 0.78 in the left anterior descending indicating obstructive disease of significance in both the     CARDIAC CATHETERIZATION Left 05/14/14    left anterior descending & diagonal.. Mild irregularities of 30% in a small, nondominant circumglex. Mild 30- 40% disease in the midportion of a very large,dominant right coronary artery. Normal left ventricular function, ejection fraction of 55-60%    CARDIAC CATHETERIZATION Bilateral 11/15/2017    Dr. Bentley Adams @ [facility]--Severe PHT with a PA pressure of 74/30. Widely patent stent in the very large dominat right coronary artery with 50% disease before the stent with an FFR of 0.89, indicating no significant obstruction. Severe disease in the LAD. Patent left internal mammary to the LAD. Patent saphenous vein graft to a very large diagonal.  Overall normal LV function.   EF of 55%    CATARACT REMOVAL  2004    COLONOSCOPY  10-    Dr. Jordan Trujillo (hemorrhoids)    COLONOSCOPY  10/09/2013    CORONARY ANGIOPLASTY WITH STENT PLACEMENT      CORONARY ARTERY BYPASS GRAFT  6/3/14    Dr Sissy Boast @ [address] [place] [address] GRAFT      EYE SURGERY  8/2012    B leakage in back of eyes    JOINT REPLACEMENT Bilateral     knees    JOINT REPLACEMENT      KNEE SURGERY  2010, 2011    BILATERAL KNEE ORTHO    KNEE SURGERY         Family History   Problem Relation Age of Onset    Alzheimer's Disease Mother     Heart Disease Mother     Arthritis Mother     High Blood Pressure Mother     Diabetes Mother    Guera Villanueva

## 2020-04-20 NOTE — PROGRESS NOTES
End Date Taking? Authorizing Provider   NIFEdipine (ADALAT CC) 90 MG extended release tablet TAKE 1 TABLET DAILY 2/24/20  Yes Mahin Bean MD   carvedilol (COREG) 12.5 MG tablet TAKE 1 TABLET TWICE DAILY  WITH MEALS 1/20/20  Yes Mahin Bean MD   spironolactone (ALDACTONE) 25 MG tablet TAKE 2 TABLETS TWICE A DAY 12/30/19  Yes Mahin Bean MD   ELIQUIS 5 MG TABS tablet TAKE 1 TABLET TWICE A DAY 12/23/19  Yes Mahin Bean MD   isosorbide mononitrate (IMDUR) 30 MG extended release tablet TAKE 1 TABLET DAILY 12/3/19  Yes Mahin Bean MD   celecoxib (CELEBREX) 200 MG capsule TAKE 1 CAPSULE TWICE DAILY 12/3/19  Yes Zach Langston DO   fluocinolone acetonide (SYNALAR) 0.01 % external solution Apply topically 2 times daily.  10/17/19  Yes Zach Langston DO   furosemide (LASIX) 80 MG tablet TAKE 1 TABLET TWICE A DAY 8/12/19  Yes Mahin Bean MD   ENTRESTO  MG per tablet TAKE 1 TABLET TWICE A DAY 8/12/19  Yes Mahin Bean MD   atorvastatin (LIPITOR) 40 MG tablet TAKE 1 TABLET DAILY 7/29/19  Yes Zach Lagnston DO   doxazosin (CARDURA) 8 MG tablet TAKE 1 TABLET DAILY 7/29/19  Yes Mahin Bean MD   TRULICITY 6.29 XH/2.2DG SOPN  2/11/19  Yes Historical Provider, MD   Magnesium Oxide 500 MG TABS Take 750 mg by mouth daily   Yes Historical Provider, MD   isosorbide mononitrate (IMDUR) 30 MG extended release tablet Take 1 tablet by mouth daily 2/12/18  Yes Mahin Bean MD   Insulin NPH Isophane & Regular (NOVOLIN 70/30 INNOLET SC) Inject 50 Units into the skin 62 am 40 lunch 55 pm   Yes Historical Provider, MD   ONE TOUCH ULTRA TEST strip  8/8/16  Yes Historical Provider, MD   BD ULTRA-FINE PEN NEEDLES 29G X 12.7MM MISC  8/1/16  Yes Historical Provider, MD   glimepiride (AMARYL) 4 MG tablet Take 4 mg by mouth 2 times daily   Yes Historical Provider, MD   Omega-3 Fatty Acids (FISH OIL) 1200 MG CAPS Take 1 capsule by mouth 2 times daily Plus 360 omega 3   Yes Historical Provider, MD

## 2020-04-20 NOTE — PATIENT INSTRUCTIONS
SURVEY:    You may be receiving a survey from [employer] regarding your visit today. You may get this in the mail, through your MyChart or in your email. Please complete the survey to enable us to provide the highest quality of care to you and your family. If you cannot score us as very good ( 5 Stars) on any question, please feel free to call the office to discuss how we could have made your experience exceptional.     Thank you. Clinical Care Team:  DO Catalino Loco, [address] [place] [address] Team:                              Syed Nina    Personalized Preventive Plan for Cori Nyeo - 4/20/2020  Medicare offers a range of preventive health benefits. Some of the tests and screenings are paid in full while other may be subject to a deductible, co-insurance, and/or copay. Some of these benefits include a comprehensive review of your medical history including lifestyle, illnesses that may run in your family, and various assessments and screenings as appropriate. After reviewing your medical record and screening and assessments performed today your provider may have ordered immunizations, labs, imaging, and/or referrals for you. A list of these orders (if applicable) as well as your Preventive Care list are included within your After Visit Summary for your review. Other Preventive Recommendations:    · A preventive eye exam performed by an eye specialist is recommended every 1-2 years to screen for glaucoma; cataracts, macular degeneration, and other eye disorders. · A preventive dental visit is recommended every 6 months. · Try to get at least 150 minutes of exercise per week or 10,000 steps per day on a pedometer . · Order or download the FREE \"Exercise & Physical Activity: Your Everyday Guide\" from [employer].  Call 9-589.973.1997 or search [employer] online. · You need 9100-2182 mg of calcium and 9365-2747 IU of vitamin D per day. It is possible to meet your calcium requirement with diet alone, but a vitamin D supplement is usually necessary to meet this goal.  · When exposed to the sun, use a sunscreen that protects against both UVA and UVB radiation with an SPF of 30 or greater. Reapply every 2 to 3 hours or after sweating, drying off with a towel, or swimming. · Always wear a seat belt when traveling in a car. Always wear a helmet when riding a bicycle or motorcycle.

## 2020-04-21 LAB
EKG ATRIAL RATE: 65 BPM
EKG P AXIS: 10 DEGREES
EKG P-R INTERVAL: 176 MS
EKG Q-T INTERVAL: 394 MS
EKG QRS DURATION: 104 MS
EKG QTC CALCULATION (BAZETT): 409 MS
EKG R AXIS: 60 DEGREES
EKG T AXIS: 73 DEGREES
EKG VENTRICULAR RATE: 65 BPM

## 2020-04-21 PROCEDURE — 93010 ELECTROCARDIOGRAM REPORT: CPT | Performed by: INTERNAL MEDICINE

## 2020-04-21 ASSESSMENT — ENCOUNTER SYMPTOMS
GASTROINTESTINAL NEGATIVE: 1
RESPIRATORY NEGATIVE: 1
EYES NEGATIVE: 1

## 2020-04-27 ENCOUNTER — OFFICE VISIT (OUTPATIENT)
Dept: CARDIOLOGY CLINIC | Age: 70
End: 2020-04-27
Payer: MEDICARE

## 2020-04-27 VITALS
SYSTOLIC BLOOD PRESSURE: 110 MMHG | WEIGHT: 256 LBS | OXYGEN SATURATION: 96 % | HEART RATE: 76 BPM | DIASTOLIC BLOOD PRESSURE: 60 MMHG | BODY MASS INDEX: 36.21 KG/M2

## 2020-04-27 PROCEDURE — 1036F TOBACCO NON-USER: CPT | Performed by: INTERNAL MEDICINE

## 2020-04-27 PROCEDURE — 4040F PNEUMOC VAC/ADMIN/RCVD: CPT | Performed by: INTERNAL MEDICINE

## 2020-04-27 PROCEDURE — 3017F COLORECTAL CA SCREEN DOC REV: CPT | Performed by: INTERNAL MEDICINE

## 2020-04-27 PROCEDURE — G8417 CALC BMI ABV UP PARAM F/U: HCPCS | Performed by: INTERNAL MEDICINE

## 2020-04-27 PROCEDURE — 1123F ACP DISCUSS/DSCN MKR DOCD: CPT | Performed by: INTERNAL MEDICINE

## 2020-04-27 PROCEDURE — G8427 DOCREV CUR MEDS BY ELIG CLIN: HCPCS | Performed by: INTERNAL MEDICINE

## 2020-04-27 PROCEDURE — 99214 OFFICE O/P EST MOD 30 MIN: CPT | Performed by: INTERNAL MEDICINE

## 2020-04-27 RX ORDER — ATORVASTATIN CALCIUM 40 MG/1
TABLET, FILM COATED ORAL
Qty: 90 TABLET | Refills: 3 | Status: SHIPPED | OUTPATIENT
Start: 2020-04-27 | End: 2020-07-15 | Stop reason: SDUPTHER

## 2020-04-27 RX ORDER — DOXAZOSIN 8 MG/1
TABLET ORAL
Qty: 90 TABLET | Refills: 3 | Status: SHIPPED | OUTPATIENT
Start: 2020-04-27 | End: 2020-05-26 | Stop reason: SDUPTHER

## 2020-04-27 RX ORDER — SACUBITRIL AND VALSARTAN 97; 103 MG/1; MG/1
TABLET, FILM COATED ORAL
Qty: 180 TABLET | Refills: 3 | Status: SHIPPED | OUTPATIENT
Start: 2020-04-27 | End: 2020-05-26 | Stop reason: SDUPTHER

## 2020-04-27 RX ORDER — SPIRONOLACTONE 25 MG/1
25 TABLET ORAL DAILY
COMMUNITY
End: 2021-05-06 | Stop reason: DRUGHIGH

## 2020-04-27 RX ORDER — FUROSEMIDE 80 MG
TABLET ORAL
Qty: 180 TABLET | Refills: 3 | Status: SHIPPED | OUTPATIENT
Start: 2020-04-27 | End: 2020-05-26 | Stop reason: SDUPTHER

## 2020-05-20 PROBLEM — M48.061 SPINAL STENOSIS OF LUMBAR REGION WITHOUT NEUROGENIC CLAUDICATION: Status: ACTIVE | Noted: 2020-05-20

## 2020-05-21 ENCOUNTER — HOSPITAL ENCOUNTER (OUTPATIENT)
Age: 70
Discharge: HOME | End: 2020-05-23
Payer: MEDICARE

## 2020-05-21 ENCOUNTER — HOSPITAL ENCOUNTER (OUTPATIENT)
Dept: GENERAL RADIOLOGY | Age: 70
Discharge: HOME | End: 2020-05-23
Payer: MEDICARE

## 2020-05-21 ENCOUNTER — HOSPITAL ENCOUNTER (OUTPATIENT)
Age: 70
Discharge: HOME | End: 2020-05-21
Payer: MEDICARE

## 2020-05-21 LAB
C-REACTIVE PROTEIN: 6 MG/L (ref 0–5)
HEPATITIS C ANTIBODY: NONREACTIVE
PROSTATE SPECIFIC ANTIGEN: 0.31 UG/L
RHEUMATOID FACTOR: <10 IU/ML
THYROXINE, FREE: 1.02 NG/DL (ref 0.93–1.7)
TSH SERPL DL<=0.05 MIU/L-ACNC: 1.52 MIU/L (ref 0.3–5)

## 2020-05-21 PROCEDURE — 84439 ASSAY OF FREE THYROXINE: CPT

## 2020-05-21 PROCEDURE — 36415 COLL VENOUS BLD VENIPUNCTURE: CPT

## 2020-05-21 PROCEDURE — 73110 X-RAY EXAM OF WRIST: CPT

## 2020-05-21 PROCEDURE — 86803 HEPATITIS C AB TEST: CPT

## 2020-05-21 PROCEDURE — 86038 ANTINUCLEAR ANTIBODIES: CPT

## 2020-05-21 PROCEDURE — 73130 X-RAY EXAM OF HAND: CPT

## 2020-05-21 PROCEDURE — 86200 CCP ANTIBODY: CPT

## 2020-05-21 PROCEDURE — 84443 ASSAY THYROID STIM HORMONE: CPT

## 2020-05-21 PROCEDURE — 85651 RBC SED RATE NONAUTOMATED: CPT

## 2020-05-21 PROCEDURE — 86431 RHEUMATOID FACTOR QUANT: CPT

## 2020-05-21 PROCEDURE — 86140 C-REACTIVE PROTEIN: CPT

## 2020-05-21 PROCEDURE — G0103 PSA SCREENING: HCPCS

## 2020-05-22 LAB
ANTI-NUCLEAR ANTIBODY (ANA): NEGATIVE
CCP IGG ANTIBODIES: <1.5 U/ML
SEDIMENTATION RATE, ERYTHROCYTE: 17 MM (ref 0–10)

## 2020-05-26 RX ORDER — DOXAZOSIN 8 MG/1
TABLET ORAL
Qty: 90 TABLET | Refills: 3 | Status: SHIPPED | OUTPATIENT
Start: 2020-05-26 | End: 2021-05-17

## 2020-05-26 RX ORDER — SACUBITRIL AND VALSARTAN 97; 103 MG/1; MG/1
TABLET, FILM COATED ORAL
Qty: 180 TABLET | Refills: 3 | Status: SHIPPED | OUTPATIENT
Start: 2020-05-26 | End: 2021-06-21

## 2020-05-26 RX ORDER — FUROSEMIDE 80 MG
TABLET ORAL
Qty: 180 TABLET | Refills: 3 | Status: SHIPPED | OUTPATIENT
Start: 2020-05-26 | End: 2021-02-01 | Stop reason: DRUGHIGH

## 2020-05-26 RX ORDER — ISOSORBIDE MONONITRATE 30 MG/1
30 TABLET, EXTENDED RELEASE ORAL DAILY
Qty: 90 TABLET | Refills: 3 | Status: SHIPPED | OUTPATIENT
Start: 2020-05-26 | End: 2021-03-02

## 2020-05-26 NOTE — TELEPHONE ENCOUNTER
Patient called for refills on his Isosorbide 30mg 1 tab daily, furosemide 80mg 1 daily, Entresto 97/103mg bid, Doxazosin 8mg QD to [facility] [facility]. Patient returned call to office.   Rx's sent to [address] per request, cheaper for him

## 2020-05-28 ENCOUNTER — HOSPITAL ENCOUNTER (OUTPATIENT)
Dept: ULTRASOUND IMAGING | Age: 70
Discharge: HOME | End: 2020-05-30
Payer: MEDICARE

## 2020-05-28 PROCEDURE — 76706 US ABDL AORTA SCREEN AAA: CPT

## 2020-06-02 ENCOUNTER — HOSPITAL ENCOUNTER (OUTPATIENT)
Dept: GENERAL RADIOLOGY | Age: 70
Discharge: HOME | End: 2020-06-04
Payer: MEDICARE

## 2020-06-02 ENCOUNTER — HOSPITAL ENCOUNTER (OUTPATIENT)
Age: 70
Discharge: HOME | End: 2020-06-04
Payer: MEDICARE

## 2020-06-02 PROCEDURE — 72114 X-RAY EXAM L-S SPINE BENDING: CPT

## 2020-06-04 LAB
SEND OUT REPORT: NORMAL
TEST NAME: NORMAL

## 2020-07-14 NOTE — TELEPHONE ENCOUNTER
Last visit:  4/20/2020  Next Visit Date:    Future Appointments   Date Time Provider Jaovn Degroot   7/29/2020 10:15 AM MD HERBERT Renteria [facility]   10/29/2020  9:30 AM MD Angelique Bowers [facility]     Last Med refill:    Medication List:  Prior to Admission medications    Medication Sig Start Date End Date Taking? Authorizing Provider   doxazosin (CARDURA) 8 MG tablet TAKE 1 TABLET DAILY 5/26/20   Jose F Garrison MD   sacubitril-valsartan (ENTRESTO)  MG per tablet TAKE 1 TABLET TWICE A DAY 5/26/20   Jose F Garrison MD   furosemide (LASIX) 80 MG tablet TAKE 1 TABLET TWICE A DAY 5/26/20   Jose F Garrison MD   isosorbide mononitrate (IMDUR) 30 MG extended release tablet Take 1 tablet by mouth daily 5/26/20   Jose F Garrison MD   spironolactone (ALDACTONE) 25 MG tablet Take 25 mg by mouth 2 times daily    Historical Provider, MD   atorvastatin (LIPITOR) 40 MG tablet One daily 4/27/20   Jose F Garrison MD   NIFEdipine (ADALAT CC) 90 MG extended release tablet TAKE 1 TABLET DAILY 2/24/20   Jose F Garrison MD   carvedilol (COREG) 12.5 MG tablet TAKE 1 TABLET TWICE DAILY  WITH MEALS 1/20/20   Jose F Garrison MD   ELIQUIS 5 MG TABS tablet TAKE 1 TABLET TWICE A DAY 12/23/19   Jose F Garrison MD   celecoxib (CELEBREX) 200 MG capsule TAKE 1 CAPSULE TWICE DAILY 12/3/19   Radhika Mahmood DO   fluocinolone acetonide (SYNALAR) 0.01 % external solution Apply topically 2 times daily.  10/17/19   Radhika Mahmood DO   TRULICITY 4.75 DD/1.5JL SOPN  2/11/19   Historical Provider, MD   Magnesium Oxide 500 MG TABS Take 500 mg by mouth daily     Historical Provider, MD   Insulin NPH Isophane & Regular (NOVOLIN 70/30 INNOLET SC) Inject 50 Units into the skin 62 am 40 lunch 55 pm    Historical Provider, MD   [address] TEST strip  8/8/16   Historical Provider, MD   BD ULTRA-FINE PEN NEEDLES 29G X 12.7MM MISC  8/1/16   Historical Provider, MD   glimepiride (AMARYL) 4 MG tablet Take 4 mg by mouth 2 times daily    Historical Provider, MD   Omega-3 Fatty Acids (FISH OIL) 1200 MG CAPS Take 1 capsule by mouth 2 times daily Plus 360 omega 3    Historical Provider, MD   Glucosamine Sulfate 1000 MG CAPS Take by mouth 2 times daily    Historical Provider, MD   Chromium-Cinnamon (CINNAMON PLUS CHROMIUM PO) Take 1,000 mg by mouth 2 times daily     Historical Provider, MD   Multiple Vitamins-Minerals (ONE-A-DAY MENS 50+ ADVANTAGE PO) Take by mouth daily    Historical Provider, MD   Vitamin D (CHOLECALCIFEROL) 1000 UNITS CAPS capsule   Take by mouth 2 times daily Vitamin D 3    Historical Provider, MD   Insulin Syringes, Disposable, U-100 0.3 ML MISC Inject  into the skin. Use prn 7/19/12   Nahomi Cole,    metformin (GLUCOPHAGE) 1000 MG tablet Take 1,000 mg by mouth 2 times daily. Historical Provider, MD       Allergies:  Patient has no known allergies. Hemoglobin A1C (%)   Date Value   04/20/2020 7.2 (H)   10/09/2019 7.8 (H)   07/18/2019 7.3 (H)             ( goal A1C is < 7)   Microalb/Crt.  Ratio (mcg/mg creat)   Date Value   10/09/2019 CANNOT BE CALCULATED     LDL Cholesterol (mg/dL)   Date Value   04/20/2020 33   10/09/2019 15       (goal LDL is <100)   AST (U/L)   Date Value   04/20/2020 19     ALT (U/L)   Date Value   04/20/2020 26     BUN (mg/dL)   Date Value   04/20/2020 37 (H)     BP Readings from Last 3 Encounters:   04/27/20 110/60   04/20/20 136/68   11/05/19 120/60          (goal 120/80)

## 2020-07-15 RX ORDER — ATORVASTATIN CALCIUM 40 MG/1
TABLET, FILM COATED ORAL
Qty: 90 TABLET | Refills: 3 | Status: SHIPPED | OUTPATIENT
Start: 2020-07-15 | End: 2021-07-20 | Stop reason: SDUPTHER

## 2020-07-15 RX ORDER — CELECOXIB 200 MG/1
CAPSULE ORAL
Qty: 180 CAPSULE | Refills: 1 | Status: SHIPPED | OUTPATIENT
Start: 2020-07-15 | End: 2021-04-05

## 2020-09-24 ENCOUNTER — PATIENT MESSAGE (OUTPATIENT)
Dept: FAMILY MEDICINE CLINIC | Age: 70
End: 2020-09-24

## 2020-09-25 NOTE — TELEPHONE ENCOUNTER
From: [place]  To: Radhika Mahmood DO  Sent: 9/24/2020 8:49 PM EDT  Subject: Non-Urgent Medical Question    Please update my record. I received my senior flu shot on Sept 24, 2020. Thank you.

## 2020-10-19 ENCOUNTER — HOSPITAL ENCOUNTER (OUTPATIENT)
Dept: GENERAL RADIOLOGY | Age: 70
Discharge: HOME | End: 2020-10-21
Payer: MEDICARE

## 2020-10-19 ENCOUNTER — HOSPITAL ENCOUNTER (OUTPATIENT)
Age: 70
Discharge: HOME | End: 2020-10-19
Payer: MEDICARE

## 2020-10-19 ENCOUNTER — HOSPITAL ENCOUNTER (OUTPATIENT)
Age: 70
Discharge: HOME | End: 2020-10-21
Payer: MEDICARE

## 2020-10-19 LAB
ABSOLUTE EOS #: 0.2 K/UL (ref 0–0.4)
ABSOLUTE IMMATURE GRANULOCYTE: ABNORMAL K/UL (ref 0–0.3)
ABSOLUTE LYMPH #: 1.3 K/UL (ref 1–4.8)
ABSOLUTE MONO #: 0.7 K/UL (ref 0–1)
BASOPHILS # BLD: 1 % (ref 0–2)
BASOPHILS ABSOLUTE: 0 K/UL (ref 0–0.2)
DIFFERENTIAL TYPE: YES
EOSINOPHILS RELATIVE PERCENT: 3 % (ref 0–5)
HCT VFR BLD CALC: 35.6 % (ref 41–53)
HEMOGLOBIN: 11.9 G/DL (ref 13.5–17.5)
IMMATURE GRANULOCYTES: ABNORMAL %
LYMPHOCYTES # BLD: 22 % (ref 13–44)
MAGNESIUM: 2.3 MG/DL (ref 1.6–2.6)
MCH RBC QN AUTO: 30.6 PG (ref 26–34)
MCHC RBC AUTO-ENTMCNC: 33.3 G/DL (ref 31–37)
MCV RBC AUTO: 92.2 FL (ref 80–100)
MONOCYTES # BLD: 11 % (ref 5–9)
NRBC AUTOMATED: ABNORMAL PER 100 WBC
PDW BLD-RTO: 14.1 % (ref 12.1–15.2)
PLATELET # BLD: 261 K/UL (ref 140–450)
PLATELET ESTIMATE: ABNORMAL
PMV BLD AUTO: ABNORMAL FL (ref 6–12)
RBC # BLD: 3.87 M/UL (ref 4.5–5.9)
RBC # BLD: ABNORMAL 10*6/UL
SEG NEUTROPHILS: 63 % (ref 39–75)
SEGMENTED NEUTROPHILS ABSOLUTE COUNT: 3.9 K/UL (ref 2.1–6.5)
VITAMIN D 25-HYDROXY: 35 NG/ML (ref 30–100)
WBC # BLD: 6.2 K/UL (ref 3.5–11)
WBC # BLD: ABNORMAL 10*3/UL

## 2020-10-19 PROCEDURE — 83735 ASSAY OF MAGNESIUM: CPT

## 2020-10-19 PROCEDURE — 73130 X-RAY EXAM OF HAND: CPT

## 2020-10-19 PROCEDURE — 82306 VITAMIN D 25 HYDROXY: CPT

## 2020-10-19 PROCEDURE — 73110 X-RAY EXAM OF WRIST: CPT

## 2020-10-19 PROCEDURE — 85025 COMPLETE CBC W/AUTO DIFF WBC: CPT

## 2020-10-19 PROCEDURE — 93005 ELECTROCARDIOGRAM TRACING: CPT

## 2020-10-19 PROCEDURE — 36415 COLL VENOUS BLD VENIPUNCTURE: CPT

## 2020-10-20 LAB
EKG ATRIAL RATE: 73 BPM
EKG Q-T INTERVAL: 362 MS
EKG QRS DURATION: 152 MS
EKG QTC CALCULATION (BAZETT): 500 MS
EKG R AXIS: 108 DEGREES
EKG T AXIS: -58 DEGREES
EKG VENTRICULAR RATE: 115 BPM

## 2020-10-20 PROCEDURE — 93010 ELECTROCARDIOGRAM REPORT: CPT | Performed by: INTERNAL MEDICINE

## 2020-10-29 ENCOUNTER — OFFICE VISIT (OUTPATIENT)
Dept: CARDIOLOGY CLINIC | Age: 70
End: 2020-10-29
Payer: MEDICARE

## 2020-10-29 ENCOUNTER — HOSPITAL ENCOUNTER (OUTPATIENT)
Age: 70
Discharge: HOME | End: 2020-10-29
Payer: MEDICARE

## 2020-10-29 VITALS
SYSTOLIC BLOOD PRESSURE: 125 MMHG | WEIGHT: 257 LBS | HEART RATE: 80 BPM | OXYGEN SATURATION: 95 % | DIASTOLIC BLOOD PRESSURE: 63 MMHG | BODY MASS INDEX: 36.88 KG/M2

## 2020-10-29 PROCEDURE — 4040F PNEUMOC VAC/ADMIN/RCVD: CPT | Performed by: INTERNAL MEDICINE

## 2020-10-29 PROCEDURE — G8482 FLU IMMUNIZE ORDER/ADMIN: HCPCS | Performed by: INTERNAL MEDICINE

## 2020-10-29 PROCEDURE — 93005 ELECTROCARDIOGRAM TRACING: CPT

## 2020-10-29 PROCEDURE — 2022F DILAT RTA XM EVC RTNOPTHY: CPT | Performed by: INTERNAL MEDICINE

## 2020-10-29 PROCEDURE — 3051F HG A1C>EQUAL 7.0%<8.0%: CPT | Performed by: INTERNAL MEDICINE

## 2020-10-29 PROCEDURE — 1036F TOBACCO NON-USER: CPT | Performed by: INTERNAL MEDICINE

## 2020-10-29 PROCEDURE — G8427 DOCREV CUR MEDS BY ELIG CLIN: HCPCS | Performed by: INTERNAL MEDICINE

## 2020-10-29 PROCEDURE — G8417 CALC BMI ABV UP PARAM F/U: HCPCS | Performed by: INTERNAL MEDICINE

## 2020-10-29 PROCEDURE — 99214 OFFICE O/P EST MOD 30 MIN: CPT | Performed by: INTERNAL MEDICINE

## 2020-10-29 PROCEDURE — 1123F ACP DISCUSS/DSCN MKR DOCD: CPT | Performed by: INTERNAL MEDICINE

## 2020-10-29 PROCEDURE — 3017F COLORECTAL CA SCREEN DOC REV: CPT | Performed by: INTERNAL MEDICINE

## 2020-10-29 RX ORDER — AMIODARONE HYDROCHLORIDE 200 MG/1
200 TABLET ORAL 2 TIMES DAILY
Qty: 60 TABLET | Refills: 11 | Status: SHIPPED | OUTPATIENT
Start: 2020-10-29 | End: 2020-11-17 | Stop reason: SDUPTHER

## 2020-10-29 NOTE — LETTER
Kateryna Crane M.D. [address] [place] [address], [facility]  [phone]        2020        Agus Ramirez, DO Eloina Finley, [address]    RE:   Elder Briones  :  1950    Dear Dr. Janey Diggs:    CHIEF COMPLAINT:  1. Atrial fibrillation. 2.  Shortness of breath. 3.  Coronary artery disease. HISTORY OF PRESENT ILLNESS:  I had the pleasure of seeing Mr. Laith Curtis in our office on 10/29/2020. He is a pleasant 71-year-old gentleman who has severe CAD. He had a catheterization on 10/31/2012 that showed severe disease at the bifurcation of the LAD and diagonal with unremarkable right coronary artery and circumflex. He had angioplasty on 2012, placing a 2.75 x 28 mm drug-eluting Promus stent in the LAD with a good end result. On 2014, he had a catheterization that showed 70% to 80% in-stent stenosis of the LAD and diagonal with unremarkable right coronary artery and circumflex, EF 50% to 55%. He had open heart surgery by Dr. Martha Harrison on 2014 with a LIMA to the LAD and a vein graft to the diagonal.    On 2017, he had shortness of breath and a catheterization on 2017 showed 95% disease in the very dominant right coronary artery with 90% LAD and a patent LIMA to the LAD, with the diagonal of 90% with a patent vein graft to the diagonal, circumflex unremarkable, EF of 50%, and I placed a 3.5 x 18 mm drug-eluting Xience stent in the right coronary artery. His last catheterization was on 11/15/2017 at [facility] in [place] that showed widely patent stents and widely patent bypass grafts with unremarkable circumflex, EF of 55%. He has very symptomatic paroxysmal atrial fibrillation and is anticoagulated with Eliquis. He does have severe back discomfort and he has had ablation, which has not particularly helped. He has had no chest pain or chest discomfort. He has chronic shortness of breath, which is about at baseline. He has had ankle edema, which was worse in the past month. He did have an epidural injection one week ago. He does feel that he has been in atrial fibrillation off and on. He had an EKG on 10/19 in preparation for this visit, which showed atrial fib/flutter. He had a rather rapid ventricular response at 115 to 130 beats per minute. He had a repeat EKG today that showed atrial flutter with 2:1 conduction at a heart rate of 120 beats per minute. He has taken his Eliquis. He denies any chest pain or chest discomfort. He has had no syncope or near syncope. Energy has been low in the last month. CARDIAC RISK FACTORS:  Known CAD:  Positive. PTCA:  Positive. Bypass Surgery:  Positive. Hypertension:  Positive. Hyperlipidemia:  Positive. Diabetes:  Positive. Smoking:  Negative. Other Family Members:  Positive. MEDICATIONS AT THIS TIME:  He is on Lipitor 40 mg daily; Coreg 12.5 mg b.i.d.; Celebrex 200 mg b.i.d.; Cardura 8 mg daily; Eliquis 5 mg b.i.d.; Synalar topical cream; Lasix 80 mg b.i.d.; Amaryl 4 mg b.i.d.; NPH 62 units in the morning, 40 at lunch, 55 in the evening; Imdur 30 mg daily; magnesium 500 mg daily; Glucophage 1000 mg b.i.d.; Adalat CC 90 mg daily; omega fish oil 1200 mg b.i.d.; Entresto 97/103 b.i.d.; Aldactone 25 mg b.i.d.; Trulicity 2.11 mg daily; vitamin D 1000 units daily. PAST MEDICAL AND SURGICAL HISTORY:  1. Arthroscopic surgery with knee replacement that was complicated by fat embolism in 02/2011.  2.  Insulin-dependent diabetes for 32 years, followed by an endocrinologist in [place]. 3.  Hypertension for many years. 4.  Left and right cataract surgery. 5.  Sleep apnea discovered in 2014, uses a CPAP mask. 6.  Paroxysmal atrial fibrillation, 12/2017 and 03/2018 and on his current EKG, on Eliquis b.i.d.  7.  Mild sick sinus syndrome. 8.  Chronic back pain, status post ablation, with now injections in his back. FAMILY HISTORY:  Mother and father had CHF. SOCIAL HISTORY:  He is 72years old, retired . , two children. He has a trailer in [place] where he and his wife spend the winter. His daughter is a professor at [employer] in [place], with two grandchildren in [place]. He does not smoke, does not drink alcohol. Uses CPAP mask. Severe back discomfort. REVIEW OF SYSTEMS:  Cardiac as above. Other systems reviewed including constitutional, eyes, ears, nose and throat, cardiovascular, respiratory, GI, , musculoskeletal, integumentary, neurologic, endocrine, hematologic and allergic/immunologic are negative except for what is described above. No weight loss or weight gain. No change in bowel habits, no blood in stools. No fevers, sweats, or chills. He has had loss of energy over the last month with slightly increased shortness of breath. PHYSICAL EXAMINATION:  VITAL SIGNS:  His blood pressure was 125/63 with a heart rate of 110. Respiratory rate 18. He weighed 257 pounds. GENERAL:  He is a pleasant 29-year-old gentleman. Denied pain. He was oriented to person, place and time. Answered questions appropriately. SKIN:  No unusual skin changes. HEENT:  The pupils are equally round and intact. NECK:  No JVD. Good carotid pulses. No carotid bruits. No lymphadenopathy or thyromegaly. CARDIOVASCULAR EXAM:  S1 and S2 were normal.  No S3 or S4. Soft systolic blowing type murmur. No diastolic murmur. PMI was normal.  No lift, thrust, or pericardial friction rub. LUNGS:  Quite clear to auscultation and percussion. ABDOMEN:  Soft and nontender. Good bowel sounds. EXTREMITIES:  Good femoral pulses. Good pedal pulses. No pedal edema. Skin was warm and dry. No calf tenderness. Nail beds pink. Good cap refill. PULSES:  Bilateral symmetrical radial, brachial and carotid pulses.   No carotid bruits. Good femoral and pedal pulses. NEUROLOGIC EXAM:  Within normal limits. PSYCHIATRIC EXAM:  Within normal limits. LABORATORY DATA:  From 10/19/2020, magnesium 2.3. Vitamin D 35. White count 6.2, hemoglobin 11.0 with a platelet count of 292,027. EKG today showed atrial flutter with 2:1 conduction at 120 beats per minute. He does have an underlying left bundle-branch block. IMPRESSION:  1. Atrial flutter with 2:1 conduction. 2.  History of paroxysmal atrial fibrillation, on Eliquis. 3.  Coronary artery disease. 4.  Catheterization on 10/31/2012, showing severe disease at the bifurcation of the LAD and diagonal with unremarkable right coronary artery and circumflex, EF of 55%. 5.  Angioplasty of LAD and diagonal on 11/07/2012, placing a 2.75 x 28 mm Promus stent in the LAD. 6.  Catheterization on 05/14/2014, showing 70% to 80% LAD and diagonal with in-stent stenosis with an FFR of 0.78, with an unremarkable right coronary artery and circumflex, normal LV function. 7.  Open heart surgery on 06/03/2014, by Dr. Bernabe Vincent with a LIMA to the LAD and a vein graft to the diagonal.  8.  Catheterization on 08/30/2017 showing patent bypass graft with 95% disease in the mid right coronary artery, EF of 50%, with angioplasty of the right coronary artery placing a 3.5 x 18 mm Xience stent. 9.  Last catheterization on 11/15/2017, showing widely patent bypass grafts and widely patent stents, EF of 55%, with a PA pressure elevated at 70/30.  10.  Sleep apnea, on a CPAP mask. 11.  Paroxysmal atrial fibrillation, 12/2017 and 03/09/2018, on Eliquis. 12.  Severe back pain that markedly limits his activity. PLAN:  1. Start amiodarone 200 mg b.i.d.  2.  We will do cardioversion this coming Thursday on 11/05. 3.  We will do labs on Monday or Tuesday and the cardioversion again on Thursday.   4.  We will see again before leaving for [place] to make sure that he is in sinus rhythm and we will decrease his amiodarone to 200 mg daily at that time. DISCUSSION:  Mr. Santiago Whitehead is in atrial flutter at this time. His ventricular response is approximately 115 to 120 beats per minute. He is on beta-blocker and calcium channel blocker. Therefore, I will add amiodarone 200 mg b.i.d. to load him prior to his cardioversion in one week. He is tolerating nicely, although he is slightly more short of breath now. His back pain, however, limits his activity more than his shortness of breath. He is not having any chest pain or chest discomfort to indicate he is having any angina. We will do no other testing. Thank you very much for allowing me the privilege of seeing Mr. Santiago Whitehead. If you have any questions on my thoughts, please do not hesitate to contact me.     Sincerely,        Tiffanie Couch    D: 11/01/2020 19:37:52     T: 11/01/2020 22:43:22     [email or web address]  Job#: [id number]   Doc#: [id number]

## 2020-10-29 NOTE — PROGRESS NOTES
Ov DR Emily Potter 6 mth follow up   Leaving for [place] [address] [address]. No chest pain   Sob not new   No worse. Ankle edema  Getting worse past  Month. Injection epidural back  1 week ago   No hospitalizations  Since seen. Been in a fib off and   On. Repeat EKG   In flutter  Bedside echo done. Will do cardioversion   Next week. Thursday Nov 5th  DR Mari Schneider Nov 19th  For diabetes. Labs on Mon or Tues  Then do cardioversion   On Thursday  Start amiodarone 200mg   Bid.    See before leaving for   [place]

## 2020-11-01 LAB
EKG ATRIAL RATE: 120 BPM
EKG P AXIS: 54 DEGREES
EKG P-R INTERVAL: 160 MS
EKG Q-T INTERVAL: 344 MS
EKG QRS DURATION: 148 MS
EKG QTC CALCULATION (BAZETT): 486 MS
EKG R AXIS: 68 DEGREES
EKG T AXIS: -91 DEGREES
EKG VENTRICULAR RATE: 120 BPM

## 2020-11-01 PROCEDURE — 93010 ELECTROCARDIOGRAM REPORT: CPT | Performed by: INTERNAL MEDICINE

## 2020-11-02 NOTE — PROGRESS NOTES
Evangelina Ahumada, M.D. [address] [place] [address]  [phone]        2020        Cliffton MeckelDO  [facility], [place] [address]    RE:   Ina Vernon  :  1950    Dear Dr. Kamla Reese:    CHIEF COMPLAINT:  1. Atrial fibrillation. 2.  Shortness of breath. 3.  Coronary artery disease. HISTORY OF PRESENT ILLNESS:  I had the pleasure of seeing Mr. Bibi Lopez in our office on 10/29/2020. He is a pleasant 58-year-old gentleman who has severe CAD. He had a catheterization on 10/31/2012 that showed severe disease at the bifurcation of the LAD and diagonal with unremarkable right coronary artery and circumflex. He had angioplasty on 2012, placing a 2.75 x 28 mm drug-eluting Promus stent in the LAD with a good end result. On 2014, he had a catheterization that showed 70% to 80% in-stent stenosis of the LAD and diagonal with unremarkable right coronary artery and circumflex, EF 50% to 55%. He had open heart surgery by Dr. Matthew Segura on 2014 with a LIMA to the LAD and a vein graft to the diagonal.    On 2017, he had shortness of breath and a catheterization on 2017 showed 95% disease in the very dominant right coronary artery with 90% LAD and a patent LIMA to the LAD, with the diagonal of 90% with a patent vein graft to the diagonal, circumflex unremarkable, EF of 50%, and I placed a 3.5 x 18 mm drug-eluting Xience stent in the right coronary artery. His last catheterization was on 11/15/2017 at [facility] in [place] that showed widely patent stents and widely patent bypass grafts with unremarkable circumflex, EF of 55%. He has very symptomatic paroxysmal atrial fibrillation and is anticoagulated with Eliquis. He does have severe back discomfort and he has had ablation, which has not particularly helped. He has had no chest pain or chest discomfort.   He has chronic shortness of breath, which is about at baseline. He has had ankle edema, which was worse in the past month. He did have an epidural injection one week ago. He does feel that he has been in atrial fibrillation off and on. He had an EKG on 10/19 in preparation for this visit, which showed atrial fib/flutter. He had a rather rapid ventricular response at 115 to 130 beats per minute. He had a repeat EKG today that showed atrial flutter with 2:1 conduction at a heart rate of 120 beats per minute. He has taken his Eliquis. He denies any chest pain or chest discomfort. He has had no syncope or near syncope. Energy has been low in the last month. CARDIAC RISK FACTORS:  Known CAD:  Positive. PTCA:  Positive. Bypass Surgery:  Positive. Hypertension:  Positive. Hyperlipidemia:  Positive. Diabetes:  Positive. Smoking:  Negative. Other Family Members:  Positive. MEDICATIONS AT THIS TIME:  He is on Lipitor 40 mg daily; Coreg 12.5 mg b.i.d.; Celebrex 200 mg b.i.d.; Cardura 8 mg daily; Eliquis 5 mg b.i.d.; Synalar topical cream; Lasix 80 mg b.i.d.; Amaryl 4 mg b.i.d.; NPH 62 units in the morning, 40 at lunch, 55 in the evening; Imdur 30 mg daily; magnesium 500 mg daily; Glucophage 1000 mg b.i.d.; Adalat CC 90 mg daily; omega fish oil 1200 mg b.i.d.; Entresto 97/103 b.i.d.; Aldactone 25 mg b.i.d.; Trulicity 5.61 mg daily; vitamin D 1000 units daily. PAST MEDICAL AND SURGICAL HISTORY:  1. Arthroscopic surgery with knee replacement that was complicated by fat embolism in 02/2011.  2.  Insulin-dependent diabetes for 32 years, followed by an endocrinologist in [place]. 3.  Hypertension for many years. 4.  Left and right cataract surgery. 5.  Sleep apnea discovered in 2014, uses a CPAP mask. 6.  Paroxysmal atrial fibrillation, 12/2017 and 03/2018 and on his current EKG, on Eliquis b.i.d.  7.  Mild sick sinus syndrome.   8.  Chronic back pain, status post ablation, with now injections in his back.    FAMILY HISTORY:  Mother and father had CHF. SOCIAL HISTORY:  He is 72years old, retired . , two children. He has a trailer in [place] where he and his wife spend the winter. His daughter is a professor at [employer] in [place], with two grandchildren in [place]. He does not smoke, does not drink alcohol. Uses CPAP mask. Severe back discomfort. REVIEW OF SYSTEMS:  Cardiac as above. Other systems reviewed including constitutional, eyes, ears, nose and throat, cardiovascular, respiratory, GI, , musculoskeletal, integumentary, neurologic, endocrine, hematologic and allergic/immunologic are negative except for what is described above. No weight loss or weight gain. No change in bowel habits, no blood in stools. No fevers, sweats, or chills. He has had loss of energy over the last month with slightly increased shortness of breath. PHYSICAL EXAMINATION:  VITAL SIGNS:  His blood pressure was 125/63 with a heart rate of 110. Respiratory rate 18. He weighed 257 pounds. GENERAL:  He is a pleasant 29-year-old gentleman. Denied pain. He was oriented to person, place and time. Answered questions appropriately. SKIN:  No unusual skin changes. HEENT:  The pupils are equally round and intact. NECK:  No JVD. Good carotid pulses. No carotid bruits. No lymphadenopathy or thyromegaly. CARDIOVASCULAR EXAM:  S1 and S2 were normal.  No S3 or S4. Soft systolic blowing type murmur. No diastolic murmur. PMI was normal.  No lift, thrust, or pericardial friction rub. LUNGS:  Quite clear to auscultation and percussion. ABDOMEN:  Soft and nontender. Good bowel sounds. EXTREMITIES:  Good femoral pulses. Good pedal pulses. No pedal edema. Skin was warm and dry. No calf tenderness. Nail beds pink. Good cap refill. PULSES:  Bilateral symmetrical radial, brachial and carotid pulses. No carotid bruits. Good femoral and pedal pulses.   NEUROLOGIC EXAM:  Within normal limits. PSYCHIATRIC EXAM:  Within normal limits. LABORATORY DATA:  From 10/19/2020, magnesium 2.3. Vitamin D 35. White count 6.2, hemoglobin 11.0 with a platelet count of 395,061. EKG today showed atrial flutter with 2:1 conduction at 120 beats per minute. He does have an underlying left bundle-branch block. IMPRESSION:  1. Atrial flutter with 2:1 conduction. 2.  History of paroxysmal atrial fibrillation, on Eliquis. 3.  Coronary artery disease. 4.  Catheterization on 10/31/2012, showing severe disease at the bifurcation of the LAD and diagonal with unremarkable right coronary artery and circumflex, EF of 55%. 5.  Angioplasty of LAD and diagonal on 11/07/2012, placing a 2.75 x 28 mm Promus stent in the LAD. 6.  Catheterization on 05/14/2014, showing 70% to 80% LAD and diagonal with in-stent stenosis with an FFR of 0.78, with an unremarkable right coronary artery and circumflex, normal LV function. 7.  Open heart surgery on 06/03/2014, by Dr. Sarah Ayala with a LIMA to the LAD and a vein graft to the diagonal.  8.  Catheterization on 08/30/2017 showing patent bypass graft with 95% disease in the mid right coronary artery, EF of 50%, with angioplasty of the right coronary artery placing a 3.5 x 18 mm Xience stent. 9.  Last catheterization on 11/15/2017, showing widely patent bypass grafts and widely patent stents, EF of 55%, with a PA pressure elevated at 70/30.  10.  Sleep apnea, on a CPAP mask. 11.  Paroxysmal atrial fibrillation, 12/2017 and 03/09/2018, on Eliquis. 12.  Severe back pain that markedly limits his activity. PLAN:  1. Start amiodarone 200 mg b.i.d.  2.  We will do cardioversion this coming Thursday on 11/05. 3.  We will do labs on Monday or Tuesday and the cardioversion again on Thursday. 4.  We will see again before leaving for [place] to make sure that he is in sinus rhythm and we will decrease his amiodarone to 200 mg daily at that time.     DISCUSSION:  Mr. Nemo Mendoza is in atrial flutter at this time. His ventricular response is approximately 115 to 120 beats per minute. He is on beta-blocker and calcium channel blocker. Therefore, I will add amiodarone 200 mg b.i.d. to load him prior to his cardioversion in one week. He is tolerating nicely, although he is slightly more short of breath now. His back pain, however, limits his activity more than his shortness of breath. He is not having any chest pain or chest discomfort to indicate he is having any angina. We will do no other testing. Thank you very much for allowing me the privilege of seeing Mr. Jing Pwoer. If you have any questions on my thoughts, please do not hesitate to contact me.     Sincerely,        Jarrell Brumfield    D: 11/01/2020 19:37:52     T: 11/01/2020 22:43:22     [email or web address]  Job#: [id number]   Doc#: [id number]

## 2020-11-03 ENCOUNTER — HOSPITAL ENCOUNTER (OUTPATIENT)
Age: 70
Discharge: HOME | End: 2020-11-03
Payer: MEDICARE

## 2020-11-03 LAB
ABSOLUTE EOS #: 0.2 K/UL (ref 0–0.4)
ABSOLUTE IMMATURE GRANULOCYTE: ABNORMAL K/UL (ref 0–0.3)
ABSOLUTE LYMPH #: 1.4 K/UL (ref 1–4.8)
ABSOLUTE MONO #: 0.7 K/UL (ref 0–1)
ALBUMIN SERPL-MCNC: 4.4 G/DL (ref 3.5–5.2)
ALBUMIN/GLOBULIN RATIO: ABNORMAL (ref 1–2.5)
ALP BLD-CCNC: 64 U/L (ref 40–129)
ALT SERPL-CCNC: 17 U/L (ref 5–41)
ANION GAP SERPL CALCULATED.3IONS-SCNC: 10 MMOL/L (ref 9–17)
AST SERPL-CCNC: 13 U/L
BASOPHILS # BLD: 0 % (ref 0–2)
BASOPHILS ABSOLUTE: 0 K/UL (ref 0–0.2)
BILIRUB SERPL-MCNC: 0.53 MG/DL (ref 0.3–1.2)
BUN BLDV-MCNC: 55 MG/DL (ref 8–23)
BUN/CREAT BLD: 26 (ref 9–20)
CALCIUM SERPL-MCNC: 9.2 MG/DL (ref 8.6–10.4)
CHLORIDE BLD-SCNC: 101 MMOL/L (ref 98–107)
CO2: 24 MMOL/L (ref 20–31)
CREAT SERPL-MCNC: 2.09 MG/DL (ref 0.7–1.2)
DIFFERENTIAL TYPE: YES
EOSINOPHILS RELATIVE PERCENT: 3 % (ref 0–5)
GFR AFRICAN AMERICAN: 38 ML/MIN
GFR NON-AFRICAN AMERICAN: 32 ML/MIN
GFR SERPL CREATININE-BSD FRML MDRD: ABNORMAL ML/MIN/{1.73_M2}
GFR SERPL CREATININE-BSD FRML MDRD: ABNORMAL ML/MIN/{1.73_M2}
GLUCOSE BLD-MCNC: 177 MG/DL (ref 70–99)
HCT VFR BLD CALC: 35.5 % (ref 41–53)
HEMOGLOBIN: 11.7 G/DL (ref 13.5–17.5)
IMMATURE GRANULOCYTES: ABNORMAL %
LYMPHOCYTES # BLD: 20 % (ref 13–44)
MCH RBC QN AUTO: 30.6 PG (ref 26–34)
MCHC RBC AUTO-ENTMCNC: 33 G/DL (ref 31–37)
MCV RBC AUTO: 92.8 FL (ref 80–100)
MONOCYTES # BLD: 9 % (ref 5–9)
NRBC AUTOMATED: ABNORMAL PER 100 WBC
PATIENT FASTING?: YES
PDW BLD-RTO: 14.1 % (ref 12.1–15.2)
PLATELET # BLD: 260 K/UL (ref 140–450)
PLATELET ESTIMATE: ABNORMAL
PMV BLD AUTO: ABNORMAL FL (ref 6–12)
POTASSIUM SERPL-SCNC: 5.1 MMOL/L (ref 3.7–5.3)
RBC # BLD: 3.82 M/UL (ref 4.5–5.9)
RBC # BLD: ABNORMAL 10*6/UL
SEG NEUTROPHILS: 68 % (ref 39–75)
SEGMENTED NEUTROPHILS ABSOLUTE COUNT: 4.7 K/UL (ref 2.1–6.5)
SODIUM BLD-SCNC: 135 MMOL/L (ref 135–144)
TOTAL PROTEIN: 7 G/DL (ref 6.4–8.3)
WBC # BLD: 6.9 K/UL (ref 3.5–11)
WBC # BLD: ABNORMAL 10*3/UL

## 2020-11-03 PROCEDURE — 85025 COMPLETE CBC W/AUTO DIFF WBC: CPT

## 2020-11-03 PROCEDURE — 80053 COMPREHEN METABOLIC PANEL: CPT

## 2020-11-03 PROCEDURE — 36415 COLL VENOUS BLD VENIPUNCTURE: CPT

## 2020-11-05 ENCOUNTER — HOSPITAL ENCOUNTER (OUTPATIENT)
Age: 70
Discharge: HOME | End: 2020-11-05
Payer: MEDICARE

## 2020-11-05 ENCOUNTER — HOSPITAL ENCOUNTER (OUTPATIENT)
Dept: NON INVASIVE DIAGNOSTICS | Age: 70
Discharge: HOME | End: 2020-11-05
Payer: MEDICARE

## 2020-11-05 VITALS
RESPIRATION RATE: 18 BRPM | SYSTOLIC BLOOD PRESSURE: 143 MMHG | OXYGEN SATURATION: 96 % | DIASTOLIC BLOOD PRESSURE: 77 MMHG | HEART RATE: 75 BPM

## 2020-11-05 PROCEDURE — 6360000002 HC RX W HCPCS: Performed by: INTERNAL MEDICINE

## 2020-11-05 PROCEDURE — 92960 CARDIOVERSION ELECTRIC EXT: CPT

## 2020-11-05 PROCEDURE — 93005 ELECTROCARDIOGRAM TRACING: CPT

## 2020-11-05 PROCEDURE — 92960 CARDIOVERSION ELECTRIC EXT: CPT | Performed by: INTERNAL MEDICINE

## 2020-11-05 RX ORDER — PROPOFOL 10 MG/ML
INJECTION, EMULSION INTRAVENOUS
Status: COMPLETED | OUTPATIENT
Start: 2020-11-05 | End: 2020-11-05

## 2020-11-05 RX ORDER — HUMAN INSULIN 100 [USP'U]/ML
54 INJECTION, SUSPENSION SUBCUTANEOUS 3 TIMES DAILY
COMMUNITY
Start: 2020-09-21 | End: 2021-04-21 | Stop reason: SDUPTHER

## 2020-11-05 RX ORDER — TRAMADOL HYDROCHLORIDE 50 MG/1
1 TABLET ORAL 2 TIMES DAILY PRN
COMMUNITY
Start: 2020-09-08

## 2020-11-05 RX ADMIN — PROPOFOL 100 MG: 10 INJECTION, EMULSION INTRAVENOUS at 11:33

## 2020-11-05 RX ADMIN — PROPOFOL 30 MG: 10 INJECTION, EMULSION INTRAVENOUS at 11:33

## 2020-11-05 NOTE — H&P
Herman Ribeiro M.D. [address] [place] [address]  [phone]           2020           DO Eloina Orourke  [place] [facility] [address]     RE:   Sylvia Snider  :  1950     Dear Dr. Tanner Renee:  [address]:  1. Atrial fibrillation. 2.  Shortness of breath. 3.  Coronary artery disease.     HISTORY OF PRESENT ILLNESS:  I had the pleasure of seeing Mr. Willi Dan in our office on 10/29/2020. He is a pleasant 52-year-old gentleman who has severe CAD. He had a catheterization on 10/31/2012 that showed severe disease at the bifurcation of the LAD and diagonal with unremarkable right coronary artery and circumflex. He had angioplasty on 2012, placing a 2.75 x 28 mm drug-eluting Promus stent in the LAD with a good end result.     On 2014, he had a catheterization that showed 70% to 80% in-stent stenosis of the LAD and diagonal with unremarkable right coronary artery and circumflex, EF 50% to 55%. He had open heart surgery by Dr. Maria Antonia Elaine on 2014 with a LIMA to the LAD and a vein graft to the diagonal.     On 2017, he had shortness of breath and a catheterization on 2017 showed 95% disease in the very dominant right coronary artery with 90% LAD and a patent LIMA to the LAD, with the diagonal of 90% with a patent vein graft to the diagonal, circumflex unremarkable, EF of 50%, and I placed a 3.5 x 18 mm drug-eluting Xience stent in the right coronary artery.     His last catheterization was on 11/15/2017 at [facility] in [place] that showed widely patent stents and widely patent bypass grafts with unremarkable circumflex, EF of 55%.     He has very symptomatic paroxysmal atrial fibrillation and is anticoagulated with Eliquis. He does have severe back discomfort and he has had ablation, which has not particularly helped.     He has had no chest pain or chest discomfort.   He has chronic shortness of breath, which is about at baseline. He has had ankle edema, which was worse in the past month. He did have an epidural injection one week ago. He does feel that he has been in atrial fibrillation off and on.     He had an EKG on 10/19 in preparation for this visit, which showed atrial fib/flutter. He had a rather rapid ventricular response at 115 to 130 beats per minute. He had a repeat EKG today that showed atrial flutter with 2:1 conduction at a heart rate of 120 beats per minute. He has taken his Eliquis.     He denies any chest pain or chest discomfort. He has had no syncope or near syncope. Energy has been low in the last month.     CARDIAC RISK FACTORS:  Known CAD:  Positive. PTCA:  Positive. Bypass Surgery:  Positive. Hypertension:  Positive. Hyperlipidemia:  Positive. Diabetes:  Positive. Smoking:  Negative. Other Family Members:  Positive.     MEDICATIONS AT THIS TIME:  He is on Lipitor 40 mg daily; Coreg 12.5 mg b.i.d.; Celebrex 200 mg b.i.d.; Cardura 8 mg daily; Eliquis 5 mg b.i.d.; Synalar topical cream; Lasix 80 mg b.i.d.; Amaryl 4 mg b.i.d.; NPH 62 units in the morning, 40 at lunch, 55 in the evening; Imdur 30 mg daily; magnesium 500 mg daily; Glucophage 1000 mg b.i.d.; Adalat CC 90 mg daily; omega fish oil 1200 mg b.i.d.; Entresto 97/103 b.i.d.; Aldactone 25 mg b.i.d.; Trulicity 2.11 mg daily; vitamin D 1000 units daily.     PAST MEDICAL AND SURGICAL HISTORY:  1. Arthroscopic surgery with knee replacement that was complicated by fat embolism in 02/2011.  2.  Insulin-dependent diabetes for 32 years, followed by an endocrinologist in [place]. 3.  Hypertension for many years. 4.  Left and right cataract surgery. 5.  Sleep apnea discovered in 2014, uses a CPAP mask. 6.  Paroxysmal atrial fibrillation, 12/2017 and 03/2018 and on his current EKG, on Eliquis b.i.d.  7.  Mild sick sinus syndrome.   8.  Chronic back pain, status post ablation, with now injections in his back.     FAMILY HISTORY:  Mother and father had CHF.     SOCIAL HISTORY:  He is 72years old, retired . , two children. He has a trailer in [place] where he and his wife spend the winter. His daughter is a professor at [employer] in [place], with two grandchildren in [place]. He does not smoke, does not drink alcohol. Uses CPAP mask. Severe back discomfort.     REVIEW OF SYSTEMS:  Cardiac as above. Other systems reviewed including constitutional, eyes, ears, nose and throat, cardiovascular, respiratory, GI, , musculoskeletal, integumentary, neurologic, endocrine, hematologic and allergic/immunologic are negative except for what is described above. No weight loss or weight gain. No change in bowel habits, no blood in stools. No fevers, sweats, or chills. He has had loss of energy over the last month with slightly increased shortness of breath.       PHYSICAL EXAMINATION:  VITAL SIGNS:  His blood pressure was 125/63 with a heart rate of 110. Respiratory rate 18. He weighed 257 pounds. GENERAL:  He is a pleasant 43-year-old gentleman. Denied pain. He was oriented to person, place and time. Answered questions appropriately. SKIN:  No unusual skin changes. HEENT:  The pupils are equally round and intact. NECK:  No JVD. Good carotid pulses. No carotid bruits. No lymphadenopathy or thyromegaly. CARDIOVASCULAR EXAM:  S1 and S2 were normal.  No S3 or S4. Soft systolic blowing type murmur. No diastolic murmur. PMI was normal.  No lift, thrust, or pericardial friction rub. LUNGS:  Quite clear to auscultation and percussion. ABDOMEN:  Soft and nontender. Good bowel sounds. EXTREMITIES:  Good femoral pulses. Good pedal pulses. No pedal edema. Skin was warm and dry. No calf tenderness. Nail beds pink. Good cap refill. PULSES:  Bilateral symmetrical radial, brachial and carotid pulses. No carotid bruits. Good femoral and pedal pulses.   NEUROLOGIC EXAM: Within normal limits. PSYCHIATRIC EXAM:  Within normal limits.     LABORATORY DATA:  From 10/19/2020, magnesium 2.3. Vitamin D 35. White count 6.2, hemoglobin 11.0 with a platelet count of 516,035.     EKG today showed atrial flutter with 2:1 conduction at 120 beats per minute. He does have an underlying left bundle-branch block.     IMPRESSION:  1. Atrial flutter with 2:1 conduction. 2.  History of paroxysmal atrial fibrillation, on Eliquis. 3.  Coronary artery disease. 4.  Catheterization on 10/31/2012, showing severe disease at the bifurcation of the LAD and diagonal with unremarkable right coronary artery and circumflex, EF of 55%. 5.  Angioplasty of LAD and diagonal on 11/07/2012, placing a 2.75 x 28 mm Promus stent in the LAD. 6.  Catheterization on 05/14/2014, showing 70% to 80% LAD and diagonal with in-stent stenosis with an FFR of 0.78, with an unremarkable right coronary artery and circumflex, normal LV function. 7.  Open heart surgery on 06/03/2014, by Dr. Bipin Espinoza with a LIMA to the LAD and a vein graft to the diagonal.  8.  Catheterization on 08/30/2017 showing patent bypass graft with 95% disease in the mid right coronary artery, EF of 50%, with angioplasty of the right coronary artery placing a 3.5 x 18 mm Xience stent. 9.  Last catheterization on 11/15/2017, showing widely patent bypass grafts and widely patent stents, EF of 55%, with a PA pressure elevated at 70/30.  10.  Sleep apnea, on a CPAP mask. 11.  Paroxysmal atrial fibrillation, 12/2017 and 03/09/2018, on Eliquis. 12.  Severe back pain that markedly limits his activity.     PLAN:  1. Start amiodarone 200 mg b.i.d.  2.  We will do cardioversion this coming Thursday on 11/05. 3.  We will do labs on Monday or Tuesday and the cardioversion again on Thursday.   4.  We will see again before leaving for [place] to make sure that he is in sinus rhythm and we will decrease his amiodarone to 200 mg daily at that time.     DISCUSSION:   Alexsander Mccoy is in atrial flutter at this time. His ventricular response is approximately 115 to 120 beats per minute.     He is on beta-blocker and calcium channel blocker. Therefore, I will add amiodarone 200 mg b.i.d. to load him prior to his cardioversion in one week.     He is tolerating nicely, although he is slightly more short of breath now. His back pain, however, limits his activity more than his shortness of breath.     He is not having any chest pain or chest discomfort to indicate he is having any angina. We will do no other testing.     Thank you very much for allowing me the privilege of seeing Mr. Alexsander Mccoy.   If you have any questions on my thoughts, please do not hesitate to contact me.     Sincerely,           Cony Quintana

## 2020-11-05 NOTE — PRE SEDATION
Sedation Pre-Procedure Note    Patient Name: Anjel Ramon   Date of SKBBE:[birth year]  Room/Bed: Room/bed info not found  Medical Record Number: [id number]  Date: 11/5/2020   Time: 8:06 AM       Indication:  Atrial fibrillation    Consent: I have discussed with the patient and/or the patient representative the indication, alternatives, and the possible risks and/or complications of the planned procedure and the anesthesia methods. The patient and/or patient representative appear to understand and agree to proceed. Vital Signs: There were no vitals filed for this visit. Past Medical History:   has a past medical history of Arthritis, Arthritis, CAD (coronary artery disease), CHF (congestive heart failure) ([facility] CHF (congestive heart failure) ([facility] Coronary artery disease, Diabetes ([facility] [address] Diabetes mellitus ([facility] H/O coronary angioplasty, Hyperlipidemia, Hypertension, Neuropathy, Numbness and tingling, Pulmonary edema, Sleep apnea, and Unspecified sleep apnea. Past Surgical History:   has a past surgical history that includes knee surgery (2010, 2011); Bunionectomy (2010); Cataract removal (2004); Appendectomy; Appendectomy; eye surgery (8/2012); joint replacement (Bilateral); Cardiac catheterization; Coronary angioplasty with stent; Colonoscopy (10-); Colonoscopy (10/09/2013); Cardiac catheterization (Left, 05/14/14); Cardiac catheterization (Left, 05/14/14); Coronary artery bypass graft (6/3/14); angioplasty (08/30/2017); Cardiac catheterization (Bilateral, 11/15/2017); Coronary artery bypass graft; knee surgery; and joint replacement. Medications:   Scheduled Meds:    dexamethasone (PF)  10 mg Other Once     Continuous Infusions:   PRN Meds:   Home Meds:   Prior to Admission medications    Medication Sig Start Date End Date Taking?  Authorizing Provider   amiodarone (CORDARONE) 200 MG tablet Take 1 tablet by mouth 2 times daily 10/29/20   Jackie Teixeira MD   atorvastatin (LIPITOR) 40 MG tablet One daily 7/15/20   Ricardo Paulino DO   celecoxib (CELEBREX) 200 MG capsule TAKE 1 CAPSULE TWICE DAILY 7/15/20   Ricardo Paulino DO   doxazosin (CARDURA) 8 MG tablet TAKE 1 TABLET DAILY 5/26/20   Collins Stephens MD   sacubitril-valsartan (ENTRESTO)  MG per tablet TAKE 1 TABLET TWICE A DAY 5/26/20   Collins Stephens MD   furosemide (LASIX) 80 MG tablet TAKE 1 TABLET TWICE A DAY 5/26/20   Collins Stephens MD   isosorbide mononitrate (IMDUR) 30 MG extended release tablet Take 1 tablet by mouth daily 5/26/20   Collins Stephens MD   spironolactone (ALDACTONE) 25 MG tablet Take 25 mg by mouth 2 times daily    Historical Provider, MD   NIFEdipine (ADALAT CC) 90 MG extended release tablet TAKE 1 TABLET DAILY 2/24/20   Collins Stephens MD   carvedilol (COREG) 12.5 MG tablet TAKE 1 TABLET TWICE DAILY  WITH MEALS 1/20/20   Collins Stephens MD   ELIQUIS 5 MG TABS tablet TAKE 1 TABLET TWICE A DAY 12/23/19   Collins Stephens MD   fluocinolone acetonide (SYNALAR) 0.01 % external solution Apply topically 2 times daily.  10/17/19   Ricardo Paulino DO   TRULICITY 1.77 FB/5.3WH SOPN  2/11/19   Historical Provider, MD   Magnesium Oxide 500 MG TABS Take 500 mg by mouth daily     Historical Provider, MD   Insulin NPH Isophane & Regular (NOVOLIN 70/30 INNOLET SC) Inject 50 Units into the skin 62 am 40 lunch 55 pm    Historical Provider, MD   ONE TOUCH ULTRA TEST strip  8/8/16   Historical Provider, MD   BD ULTRA-FINE PEN NEEDLES 29G X 12.7MM MISC  8/1/16   Historical Provider, MD   glimepiride (AMARYL) 4 MG tablet Take 4 mg by mouth 2 times daily    Historical Provider, MD   Omega-3 Fatty Acids (FISH OIL) 1200 MG CAPS Take 1 capsule by mouth 2 times daily Plus 360 omega 3    Historical Provider, MD   Glucosamine Sulfate 1000 MG CAPS Take by mouth 2 times daily    Historical Provider, MD   Chromium-Cinnamon (CINNAMON PLUS CHROMIUM PO) Take 1,000 mg by mouth 2 times daily     Historical Provider, MD   Multiple Vitamins-Minerals

## 2020-11-09 LAB
EKG ATRIAL RATE: 202 BPM
EKG ATRIAL RATE: 76 BPM
EKG P AXIS: 36 DEGREES
EKG P-R INTERVAL: 196 MS
EKG Q-T INTERVAL: 430 MS
EKG Q-T INTERVAL: 444 MS
EKG QRS DURATION: 154 MS
EKG QRS DURATION: 158 MS
EKG QTC CALCULATION (BAZETT): 486 MS
EKG QTC CALCULATION (BAZETT): 499 MS
EKG R AXIS: 76 DEGREES
EKG R AXIS: 86 DEGREES
EKG T AXIS: 62 DEGREES
EKG T AXIS: 75 DEGREES
EKG VENTRICULAR RATE: 76 BPM
EKG VENTRICULAR RATE: 77 BPM

## 2020-11-09 NOTE — PROCEDURES
[place] [address]                            CARDIAC CATHETERIZATION    PATIENT NAME: Jo Wen                    :        1950  MED REC NO:   [id number]                              ROOM:  ACCOUNT NO:   [de-identified]                           ADMIT DATE: 2020  PROVIDER:     Aparna Diane    DATE OF PROCEDURE:  2020    NAME OF TEST:  Cardioversion to sinus rhythm from atrial flutter. With the help of two RNs and a respiratory therapist, we gave him  propofol. After he was asleep, we converted him once with 100 joules  successfully to sinus rhythm from atrial flutter. He awoke without  difficulty. There were no complications. IMPRESSION:  Successful cardioversion from atrial flutter to normal  sinus rhythm with 100 joules.         Charisse Olivo    D: 2020 3:46:52       T: 2020 4:20:21     [email or web address]  Job#: [id number]     Doc#: [id number]    CC:  Ashley Cruz

## 2020-11-16 ENCOUNTER — TELEPHONE (OUTPATIENT)
Dept: CARDIOLOGY CLINIC | Age: 70
End: 2020-11-16

## 2020-11-16 NOTE — TELEPHONE ENCOUNTER
Patient's wife Enio called to report that Jason's HR has been running low past few days. Is 46 currently. He is feeling whoozy and has HA. Denies CP/pressure. His SOB is no more than usual. Not sure what they should do. Did sent via email his EKG from his [facility] on phone to nurse Brianda Salas for Dr to review.  Please advise

## 2020-11-17 ENCOUNTER — HOSPITAL ENCOUNTER (OUTPATIENT)
Age: 70
Discharge: HOME | End: 2020-11-17
Payer: MEDICARE

## 2020-11-17 ENCOUNTER — OFFICE VISIT (OUTPATIENT)
Dept: CARDIOLOGY CLINIC | Age: 70
End: 2020-11-17
Payer: MEDICARE

## 2020-11-17 VITALS
SYSTOLIC BLOOD PRESSURE: 102 MMHG | DIASTOLIC BLOOD PRESSURE: 47 MMHG | BODY MASS INDEX: 36.88 KG/M2 | WEIGHT: 257 LBS | OXYGEN SATURATION: 98 % | HEART RATE: 44 BPM

## 2020-11-17 LAB
ABSOLUTE EOS #: 0.1 K/UL (ref 0–0.4)
ABSOLUTE IMMATURE GRANULOCYTE: ABNORMAL K/UL (ref 0–0.3)
ABSOLUTE LYMPH #: 1.3 K/UL (ref 1–4.8)
ABSOLUTE MONO #: 0.7 K/UL (ref 0–1)
ALBUMIN SERPL-MCNC: 4.6 G/DL (ref 3.5–5.2)
ALBUMIN/GLOBULIN RATIO: ABNORMAL (ref 1–2.5)
ALP BLD-CCNC: 63 U/L (ref 40–129)
ALT SERPL-CCNC: 17 U/L (ref 5–41)
ANION GAP SERPL CALCULATED.3IONS-SCNC: 9 MMOL/L (ref 9–17)
AST SERPL-CCNC: 17 U/L
BASOPHILS # BLD: 0 % (ref 0–2)
BASOPHILS ABSOLUTE: 0 K/UL (ref 0–0.2)
BILIRUB SERPL-MCNC: 0.67 MG/DL (ref 0.3–1.2)
BUN BLDV-MCNC: 64 MG/DL (ref 8–23)
BUN/CREAT BLD: 25 (ref 9–20)
CALCIUM SERPL-MCNC: 10.2 MG/DL (ref 8.6–10.4)
CHLORIDE BLD-SCNC: 98 MMOL/L (ref 98–107)
CO2: 28 MMOL/L (ref 20–31)
CREAT SERPL-MCNC: 2.59 MG/DL (ref 0.7–1.2)
DIFFERENTIAL TYPE: YES
EOSINOPHILS RELATIVE PERCENT: 2 % (ref 0–5)
GFR AFRICAN AMERICAN: 30 ML/MIN
GFR NON-AFRICAN AMERICAN: 25 ML/MIN
GFR SERPL CREATININE-BSD FRML MDRD: ABNORMAL ML/MIN/{1.73_M2}
GFR SERPL CREATININE-BSD FRML MDRD: ABNORMAL ML/MIN/{1.73_M2}
GLUCOSE BLD-MCNC: 137 MG/DL (ref 70–99)
HCT VFR BLD CALC: 35.7 % (ref 41–53)
HEMOGLOBIN: 11.9 G/DL (ref 13.5–17.5)
IMMATURE GRANULOCYTES: ABNORMAL %
LYMPHOCYTES # BLD: 20 % (ref 13–44)
MCH RBC QN AUTO: 31.1 PG (ref 26–34)
MCHC RBC AUTO-ENTMCNC: 33.4 G/DL (ref 31–37)
MCV RBC AUTO: 92.9 FL (ref 80–100)
MONOCYTES # BLD: 11 % (ref 5–9)
NRBC AUTOMATED: ABNORMAL PER 100 WBC
PDW BLD-RTO: 14.3 % (ref 12.1–15.2)
PLATELET # BLD: 270 K/UL (ref 140–450)
PLATELET ESTIMATE: ABNORMAL
PMV BLD AUTO: ABNORMAL FL (ref 6–12)
POTASSIUM SERPL-SCNC: 6 MMOL/L (ref 3.7–5.3)
RBC # BLD: 3.84 M/UL (ref 4.5–5.9)
RBC # BLD: ABNORMAL 10*6/UL
SEG NEUTROPHILS: 67 % (ref 39–75)
SEGMENTED NEUTROPHILS ABSOLUTE COUNT: 4.2 K/UL (ref 2.1–6.5)
SODIUM BLD-SCNC: 135 MMOL/L (ref 135–144)
TOTAL PROTEIN: 7.4 G/DL (ref 6.4–8.3)
WBC # BLD: 6.3 K/UL (ref 3.5–11)
WBC # BLD: ABNORMAL 10*3/UL

## 2020-11-17 PROCEDURE — 99212 OFFICE O/P EST SF 10 MIN: CPT | Performed by: INTERNAL MEDICINE

## 2020-11-17 PROCEDURE — 1036F TOBACCO NON-USER: CPT | Performed by: INTERNAL MEDICINE

## 2020-11-17 PROCEDURE — G8417 CALC BMI ABV UP PARAM F/U: HCPCS | Performed by: INTERNAL MEDICINE

## 2020-11-17 PROCEDURE — G8428 CUR MEDS NOT DOCUMENT: HCPCS | Performed by: INTERNAL MEDICINE

## 2020-11-17 PROCEDURE — 3017F COLORECTAL CA SCREEN DOC REV: CPT | Performed by: INTERNAL MEDICINE

## 2020-11-17 PROCEDURE — 36415 COLL VENOUS BLD VENIPUNCTURE: CPT

## 2020-11-17 PROCEDURE — G8482 FLU IMMUNIZE ORDER/ADMIN: HCPCS | Performed by: INTERNAL MEDICINE

## 2020-11-17 PROCEDURE — 1123F ACP DISCUSS/DSCN MKR DOCD: CPT | Performed by: INTERNAL MEDICINE

## 2020-11-17 PROCEDURE — 4040F PNEUMOC VAC/ADMIN/RCVD: CPT | Performed by: INTERNAL MEDICINE

## 2020-11-17 PROCEDURE — 80053 COMPREHEN METABOLIC PANEL: CPT

## 2020-11-17 PROCEDURE — 85025 COMPLETE CBC W/AUTO DIFF WBC: CPT

## 2020-11-17 PROCEDURE — 93005 ELECTROCARDIOGRAM TRACING: CPT

## 2020-11-17 RX ORDER — NIFEDIPINE 90 MG/1
45 TABLET, FILM COATED, EXTENDED RELEASE ORAL DAILY
Qty: 45 TABLET | Refills: 3 | Status: SHIPPED | OUTPATIENT
Start: 2020-11-17 | End: 2020-11-18 | Stop reason: SDUPTHER

## 2020-11-17 RX ORDER — AMIODARONE HYDROCHLORIDE 200 MG/1
200 TABLET ORAL NIGHTLY
Qty: 90 TABLET | Refills: 3 | Status: SHIPPED | OUTPATIENT
Start: 2020-11-17 | End: 2020-11-18 | Stop reason: SDUPTHER

## 2020-11-17 RX ORDER — CARVEDILOL 12.5 MG/1
TABLET ORAL
Qty: 180 TABLET | Refills: 3 | Status: SHIPPED | OUTPATIENT
Start: 2020-11-17 | End: 2020-11-18 | Stop reason: SDUPTHER

## 2020-11-17 NOTE — LETTER
Jayson White M.D. [address] [place] [address]  [phone]        2020        [place] [address]    RE:   Roxie Yeh  :  1950    Dear Dr. Guera Chung:    CHIEF COMPLAINT:  1. Atrial fibrillation. 2.  Status post cardioversion on 2020.  3.  Hypotension and bradycardia after taking morning meds. HISTORY OF PRESENT ILLNESS:  I met with Mr. Lisa Huston and his wife, Susanne Kendrick, in the office on . I trust you received my full H and P from 10/29. He developed his atrial fibrillation and we brought him in for cardioversion on . I had placed him on amiodarone 200 mg b.i.d. He converted to sinus rhythm. However, in the last couple of days, he has been developing more dizziness and lightheadedness. He will awake doing well but approximately an hour after he starts taking medications, he becomes dizzy, lightheaded and very fatigued. His wife has been taking his blood pressure and his pressure has been running in the low 100s, with the heart rate at high 40s and low 50s. We brought him in today for reevaluation. He denies any chest pain or chest discomfort. He does have pedal edema, which has not changed. MEDICATIONS:  His medications today are Cordarone 200 mg b.i.d.; Eliquis 5 mg b.i.d.; Lipitor 40 mg daily; Coreg 12.5 mg b.i.d.; Celebrex 200 mg b.i.d.; Cardura 8 mg nightly; Lasix 80 mg b.i.d.; Amaryl 4 mg b.i.d.; Imdur 30 mg daily; Glucophage 1000 mg b.i.d.; Adalat 90 mg daily; Novolin 70/30 50 units in the morning, 32 at lunch and 40 at supper; fish oil 1200 mg b.i.d.; Entresto 97/103 b.i.d.; Aldactone 25 mg b.i.d.; Trulicity daily; and vitamin D 1000 units b.i.d. PHYSICAL EXAMINATION:  VITAL SIGNS:  His blood pressure today was 102/47 with a heart rate of 44. He weighed 257 pounds. His exam was unremarkable. An EKG was done that showed sinus rhythm with a left bundle-branch block at a rate of 76 beats per minute. IMPRESSION:  1. Normal sinus rhythm after cardioversion on 11/05/2020.  2.  Hypotension and bradycardia after taking morning medications. 3.  Pedal edema, which has not changed. PLAN:  1. We will decrease Cordarone to 200 mg once a day taking it in the evening rather than in the morning. 2.  We will decrease Adalat to a half a tablet or 45 mg daily. 3.  He will take his first dose of Cordarone tomorrow night and nightly thereafter. 4.  He will call us tomorrow and Friday to let us know how he is doing on the lower dose of Cordarone and the lower dose of Adalat. DISCUSSION:  Mr. Tisha Martinez is developing bradycardia and hypotension after he takes his morning medications. The new medication, of course, is amiodarone, which he has been taking twice a day. We will lower his amiodarone to once a day and I will have him take it in the evening rather than in the morning. I have also lowered his Adalat to a half a tablet or 45 mg daily. Therefore, in the morning, he will take his usual dose of Coreg at 12.5 mg, which is twice a day dosing. He will also take a half a tablet of Adalat in the morning. We will check to see if he is still bradycardic and hypotensive on the lower dose of Adalat and his usual dose of Coreg. If he is, then I would stop Adalat entirely and just have him take Coreg 12.5 mg b.i.d. along with amiodarone 200 mg once a day in the evening. We will repeat a set of blood work today to make sure that his electrolytes are doing well. Thank you very much for allowing me the privilege of seeing Mr. Tisha Martinez. If you have any questions on my thoughts, please do not hesitate to contact me.      Sincerely,        Hyla Frankel    D: 11/17/2020 12:00:12     T: 11/17/2020 12:05:41     [email or web address]  Job#: [id number]   Doc#: [id number]

## 2020-11-17 NOTE — PROGRESS NOTES
Ov DR Jauregui Apt follow up post   Cardioversion. Last couple days dizzyness   Heart rate lower and bp low . About an hour after taking medication. By evening feels better. Decreased amiodarone yesterday  To once a day so only took 1 dose  Yesterday and 1 dose today. No chest pain. Has dannie ankle edema  Bedside echo done. Decrease adalat to 1/2 tab   Daily   Start taking amiodarone once   A day at night   Will do cbc and cmp today   Do monthly cmp in [address] to leave on Sunday.      Pt called with potassium level   To hold lasix and aldactone for   2 days repeat lab on Thursday

## 2020-11-18 LAB
EKG ATRIAL RATE: 49 BPM
EKG P AXIS: 27 DEGREES
EKG P-R INTERVAL: 196 MS
EKG Q-T INTERVAL: 486 MS
EKG QRS DURATION: 160 MS
EKG QTC CALCULATION (BAZETT): 439 MS
EKG R AXIS: 66 DEGREES
EKG T AXIS: 72 DEGREES
EKG VENTRICULAR RATE: 49 BPM

## 2020-11-18 PROCEDURE — 93010 ELECTROCARDIOGRAM REPORT: CPT | Performed by: INTERNAL MEDICINE

## 2020-11-18 RX ORDER — NIFEDIPINE 90 MG/1
90 TABLET, FILM COATED, EXTENDED RELEASE ORAL DAILY
Qty: 90 TABLET | Refills: 3 | Status: SHIPPED | OUTPATIENT
Start: 2020-11-18 | End: 2021-04-11

## 2020-11-18 RX ORDER — CARVEDILOL 12.5 MG/1
TABLET ORAL
Qty: 180 TABLET | Refills: 3 | Status: SHIPPED | OUTPATIENT
Start: 2020-11-18 | End: 2021-04-26 | Stop reason: DRUGHIGH

## 2020-11-18 RX ORDER — AMIODARONE HYDROCHLORIDE 200 MG/1
200 TABLET ORAL NIGHTLY
Qty: 90 TABLET | Refills: 3 | Status: SHIPPED | OUTPATIENT
Start: 2020-11-18 | End: 2020-11-23 | Stop reason: ALTCHOICE

## 2020-11-19 ENCOUNTER — HOSPITAL ENCOUNTER (OUTPATIENT)
Age: 70
Discharge: HOME | End: 2020-11-19
Payer: MEDICARE

## 2020-11-19 LAB
ANION GAP SERPL CALCULATED.3IONS-SCNC: 9 MMOL/L (ref 9–17)
BUN BLDV-MCNC: 45 MG/DL (ref 8–23)
BUN/CREAT BLD: 24 (ref 9–20)
CALCIUM SERPL-MCNC: 9.9 MG/DL (ref 8.6–10.4)
CHLORIDE BLD-SCNC: 104 MMOL/L (ref 98–107)
CO2: 25 MMOL/L (ref 20–31)
CREAT SERPL-MCNC: 1.84 MG/DL (ref 0.7–1.2)
GFR AFRICAN AMERICAN: 44 ML/MIN
GFR NON-AFRICAN AMERICAN: 37 ML/MIN
GFR SERPL CREATININE-BSD FRML MDRD: ABNORMAL ML/MIN/{1.73_M2}
GFR SERPL CREATININE-BSD FRML MDRD: ABNORMAL ML/MIN/{1.73_M2}
GLUCOSE BLD-MCNC: 118 MG/DL (ref 70–99)
POTASSIUM SERPL-SCNC: 5.5 MMOL/L (ref 3.7–5.3)
SODIUM BLD-SCNC: 138 MMOL/L (ref 135–144)

## 2020-11-19 PROCEDURE — 36415 COLL VENOUS BLD VENIPUNCTURE: CPT

## 2020-11-19 PROCEDURE — 80048 BASIC METABOLIC PNL TOTAL CA: CPT

## 2020-11-23 ENCOUNTER — TELEPHONE (OUTPATIENT)
Dept: CARDIOLOGY CLINIC | Age: 70
End: 2020-11-23

## 2020-11-23 ENCOUNTER — HOSPITAL ENCOUNTER (OUTPATIENT)
Age: 70
Discharge: HOME | End: 2020-11-23
Payer: MEDICARE

## 2020-11-23 LAB
ABSOLUTE EOS #: 0.2 K/UL (ref 0–0.4)
ABSOLUTE IMMATURE GRANULOCYTE: ABNORMAL K/UL (ref 0–0.3)
ABSOLUTE LYMPH #: 1.2 K/UL (ref 1–4.8)
ABSOLUTE MONO #: 0.7 K/UL (ref 0–1)
ALBUMIN SERPL-MCNC: 4.1 G/DL (ref 3.5–5.2)
ALBUMIN/GLOBULIN RATIO: ABNORMAL (ref 1–2.5)
ALP BLD-CCNC: 68 U/L (ref 40–129)
ALT SERPL-CCNC: 19 U/L (ref 5–41)
ANION GAP SERPL CALCULATED.3IONS-SCNC: 10 MMOL/L (ref 9–17)
AST SERPL-CCNC: 15 U/L
BASOPHILS # BLD: 1 % (ref 0–2)
BASOPHILS ABSOLUTE: 0 K/UL (ref 0–0.2)
BILIRUB SERPL-MCNC: 0.45 MG/DL (ref 0.3–1.2)
BUN BLDV-MCNC: 26 MG/DL (ref 8–23)
BUN/CREAT BLD: 22 (ref 9–20)
CALCIUM SERPL-MCNC: 9 MG/DL (ref 8.6–10.4)
CHLORIDE BLD-SCNC: 109 MMOL/L (ref 98–107)
CHOLESTEROL/HDL RATIO: 3.2
CHOLESTEROL: 93 MG/DL
CO2: 22 MMOL/L (ref 20–31)
CREAT SERPL-MCNC: 1.18 MG/DL (ref 0.7–1.2)
DIFFERENTIAL TYPE: YES
EOSINOPHILS RELATIVE PERCENT: 3 % (ref 0–5)
GFR AFRICAN AMERICAN: >60 ML/MIN
GFR NON-AFRICAN AMERICAN: >60 ML/MIN
GFR SERPL CREATININE-BSD FRML MDRD: ABNORMAL ML/MIN/{1.73_M2}
GFR SERPL CREATININE-BSD FRML MDRD: ABNORMAL ML/MIN/{1.73_M2}
GLUCOSE BLD-MCNC: 125 MG/DL (ref 70–99)
HCT VFR BLD CALC: 33.9 % (ref 41–53)
HDLC SERPL-MCNC: 29 MG/DL
HEMOGLOBIN: 11.2 G/DL (ref 13.5–17.5)
IMMATURE GRANULOCYTES: ABNORMAL %
LDL CHOLESTEROL: 44 MG/DL (ref 0–130)
LYMPHOCYTES # BLD: 19 % (ref 13–44)
MAGNESIUM: 2.1 MG/DL (ref 1.6–2.6)
MCH RBC QN AUTO: 30.9 PG (ref 26–34)
MCHC RBC AUTO-ENTMCNC: 33 G/DL (ref 31–37)
MCV RBC AUTO: 93.7 FL (ref 80–100)
MONOCYTES # BLD: 12 % (ref 5–9)
NRBC AUTOMATED: ABNORMAL PER 100 WBC
PDW BLD-RTO: 14.4 % (ref 12.1–15.2)
PLATELET # BLD: 244 K/UL (ref 140–450)
PLATELET ESTIMATE: ABNORMAL
PMV BLD AUTO: ABNORMAL FL (ref 6–12)
POTASSIUM SERPL-SCNC: 4.5 MMOL/L (ref 3.7–5.3)
RBC # BLD: 3.61 M/UL (ref 4.5–5.9)
RBC # BLD: ABNORMAL 10*6/UL
SEG NEUTROPHILS: 65 % (ref 39–75)
SEGMENTED NEUTROPHILS ABSOLUTE COUNT: 4.2 K/UL (ref 2.1–6.5)
SODIUM BLD-SCNC: 141 MMOL/L (ref 135–144)
TOTAL PROTEIN: 6.7 G/DL (ref 6.4–8.3)
TRIGL SERPL-MCNC: 99 MG/DL
TSH SERPL DL<=0.05 MIU/L-ACNC: 2.28 MIU/L (ref 0.3–5)
VITAMIN D 25-HYDROXY: 40.1 NG/ML (ref 30–100)
VLDLC SERPL CALC-MCNC: ABNORMAL MG/DL (ref 1–30)
WBC # BLD: 6.3 K/UL (ref 3.5–11)
WBC # BLD: ABNORMAL 10*3/UL

## 2020-11-23 PROCEDURE — 84443 ASSAY THYROID STIM HORMONE: CPT

## 2020-11-23 PROCEDURE — 85025 COMPLETE CBC W/AUTO DIFF WBC: CPT

## 2020-11-23 PROCEDURE — 83735 ASSAY OF MAGNESIUM: CPT

## 2020-11-23 PROCEDURE — 80061 LIPID PANEL: CPT

## 2020-11-23 PROCEDURE — 80053 COMPREHEN METABOLIC PANEL: CPT

## 2020-11-23 PROCEDURE — 82306 VITAMIN D 25 HYDROXY: CPT

## 2020-11-23 PROCEDURE — 36415 COLL VENOUS BLD VENIPUNCTURE: CPT

## 2020-11-23 NOTE — PROGRESS NOTES
Aly Anderson M.D. [address] [place] [address]  [phone]        2020        Yaquelin Ceballos DO  [place] [address]    RE:   Mason Schmidt  :  1950    Dear Dr. Sylvain Hancock:    CHIEF COMPLAINT:  1. Atrial fibrillation. 2.  Status post cardioversion on 2020.  3.  Hypotension and bradycardia after taking morning meds. HISTORY OF PRESENT ILLNESS:  I met with Mr. Kirill Maurer and his wife, Connor Lau, in the office on . I trust you received my full H and P from 10/29. He developed his atrial fibrillation and we brought him in for cardioversion on . I had placed him on amiodarone 200 mg b.i.d. He converted to sinus rhythm. However, in the last couple of days, he has been developing more dizziness and lightheadedness. He will awake doing well but approximately an hour after he starts taking medications, he becomes dizzy, lightheaded and very fatigued. His wife has been taking his blood pressure and his pressure has been running in the low 100s, with the heart rate at high 40s and low 50s. We brought him in today for reevaluation. He denies any chest pain or chest discomfort. He does have pedal edema, which has not changed. MEDICATIONS:  His medications today are Cordarone 200 mg b.i.d.; Eliquis 5 mg b.i.d.; Lipitor 40 mg daily; Coreg 12.5 mg b.i.d.; Celebrex 200 mg b.i.d.; Cardura 8 mg nightly; Lasix 80 mg b.i.d.; Amaryl 4 mg b.i.d.; Imdur 30 mg daily; Glucophage 1000 mg b.i.d.; Adalat 90 mg daily; Novolin 70/30 50 units in the morning, 32 at lunch and 40 at supper; fish oil 1200 mg b.i.d.; Entresto 97/103 b.i.d.; Aldactone 25 mg b.i.d.; Trulicity daily; and vitamin D 1000 units b.i.d. PHYSICAL EXAMINATION:  VITAL SIGNS:  His blood pressure today was 102/47 with a heart rate of 44. He weighed 257 pounds. His exam was unremarkable.     An EKG was done that showed sinus

## 2020-11-23 NOTE — TELEPHONE ENCOUNTER
Pt called been texting  changing medication and holding others   Bmp today   DR informed   Pt to take lasix 80mg 1/2 tab daily  And aldactone 25mg daily instead of bid  Will continue to talk with  on phone  To get bmp in [place] when they go down   To do on 11/30

## 2020-12-22 ENCOUNTER — TELEPHONE (OUTPATIENT)
Dept: CARDIOLOGY CLINIC | Age: 70
End: 2020-12-22

## 2020-12-22 NOTE — TELEPHONE ENCOUNTER
Pt called c/o increased edema   Talked with DR Rona Gimenez   To increase lasix to full tab in am  40mg in pm

## 2020-12-30 LAB
ALBUMIN SERPL-MCNC: 4.1 G/DL
ALP BLD-CCNC: 58 U/L
ALT SERPL-CCNC: 17 U/L
ANION GAP SERPL CALCULATED.3IONS-SCNC: NORMAL MMOL/L
AST SERPL-CCNC: 16 U/L
BILIRUB SERPL-MCNC: 0.6 MG/DL (ref 0.1–1.4)
BUN BLDV-MCNC: 24 MG/DL
CALCIUM SERPL-MCNC: 8.4 MG/DL
CHLORIDE BLD-SCNC: 104 MMOL/L
CO2: NORMAL
CREAT SERPL-MCNC: 1.18 MG/DL
GFR CALCULATED: 63
GLUCOSE BLD-MCNC: 112 MG/DL
POTASSIUM SERPL-SCNC: 4.7 MMOL/L
SODIUM BLD-SCNC: 142 MMOL/L
TOTAL PROTEIN: 6.3

## 2021-01-04 ENCOUNTER — TELEPHONE (OUTPATIENT)
Dept: CARDIOLOGY CLINIC | Age: 71
End: 2021-01-04

## 2021-02-01 ENCOUNTER — TELEPHONE (OUTPATIENT)
Dept: CARDIOLOGY CLINIC | Age: 71
End: 2021-02-01

## 2021-02-01 DIAGNOSIS — R60.0 LEG EDEMA: ICD-10-CM

## 2021-02-01 DIAGNOSIS — I27.20 PULMONARY HTN (HCC): ICD-10-CM

## 2021-02-01 DIAGNOSIS — I48.0 PAF (PAROXYSMAL ATRIAL FIBRILLATION) (HCC): ICD-10-CM

## 2021-02-01 DIAGNOSIS — I50.32 CHRONIC DIASTOLIC CHF (CONGESTIVE HEART FAILURE), NYHA CLASS 3 (HCC): ICD-10-CM

## 2021-02-01 DIAGNOSIS — R06.02 SHORTNESS OF BREATH: ICD-10-CM

## 2021-02-01 DIAGNOSIS — I25.10 CORONARY ARTERY DISEASE INVOLVING NATIVE CORONARY ARTERY OF NATIVE HEART WITHOUT ANGINA PECTORIS: ICD-10-CM

## 2021-02-01 DIAGNOSIS — I10 ESSENTIAL HYPERTENSION: ICD-10-CM

## 2021-02-01 DIAGNOSIS — N28.9 KIDNEY INSUFFICIENCY: Primary | ICD-10-CM

## 2021-02-01 DIAGNOSIS — I50.22 CHRONIC SYSTOLIC CONGESTIVE HEART FAILURE (HCC): ICD-10-CM

## 2021-02-01 RX ORDER — FUROSEMIDE 80 MG
TABLET ORAL
Qty: 180 TABLET | Refills: 3 | Status: SHIPPED
Start: 2021-02-01 | End: 2021-05-06 | Stop reason: DRUGHIGH

## 2021-02-11 ENCOUNTER — TELEPHONE (OUTPATIENT)
Dept: CARDIOLOGY CLINIC | Age: 71
End: 2021-02-11

## 2021-02-11 ENCOUNTER — PATIENT MESSAGE (OUTPATIENT)
Dept: FAMILY MEDICINE CLINIC | Age: 71
End: 2021-02-11

## 2021-02-11 DIAGNOSIS — N28.9 KIDNEY INSUFFICIENCY: ICD-10-CM

## 2021-02-11 DIAGNOSIS — R60.0 LEG EDEMA: ICD-10-CM

## 2021-02-11 NOTE — TELEPHONE ENCOUNTER
Pt has been sending weekly Kardia strip and vs in and out of a fib   No changes in meds made after reviewing with dr Elsy Mchugh  See weekly reading for updates.

## 2021-02-12 NOTE — TELEPHONE ENCOUNTER
From: Marlon Alonzo  To: Heriberto Reeves DO  Sent: 2/11/2021 7:11 PM EST  Subject: Non-Urgent Medical Question    Joel Posey has received Covid Vaccine -first dose Pfizer Lot# [id number] on 1-21-21 at [facility] in [facility]. Joel Posey has received Covid Vaccine - second dose [facility] Lot # [id number] on 2-11-21 at [facility] in [facility]. Please update our records. Thank you.  [address]

## 2021-03-02 RX ORDER — ISOSORBIDE MONONITRATE 30 MG/1
TABLET, EXTENDED RELEASE ORAL
Qty: 90 TABLET | Refills: 3 | Status: SHIPPED | OUTPATIENT
Start: 2021-03-02 | End: 2022-05-16

## 2021-04-11 ENCOUNTER — HOSPITAL ENCOUNTER (INPATIENT)
Age: 71
LOS: 3 days | Discharge: HOME | DRG: 280 | End: 2021-04-14
Attending: EMERGENCY MEDICINE | Admitting: INTERNAL MEDICINE
Payer: MEDICARE

## 2021-04-11 ENCOUNTER — APPOINTMENT (OUTPATIENT)
Dept: GENERAL RADIOLOGY | Age: 71
DRG: 280 | End: 2021-04-11
Payer: MEDICARE

## 2021-04-11 DIAGNOSIS — I50.20 SYSTOLIC CONGESTIVE HEART FAILURE, UNSPECIFIED HF CHRONICITY (HCC): Primary | ICD-10-CM

## 2021-04-11 DIAGNOSIS — I25.10 CORONARY ARTERY DISEASE INVOLVING NATIVE CORONARY ARTERY OF NATIVE HEART WITHOUT ANGINA PECTORIS: ICD-10-CM

## 2021-04-11 DIAGNOSIS — I10 ESSENTIAL HYPERTENSION: ICD-10-CM

## 2021-04-11 DIAGNOSIS — I50.23 ACUTE ON CHRONIC SYSTOLIC CONGESTIVE HEART FAILURE (HCC): ICD-10-CM

## 2021-04-11 DIAGNOSIS — R06.00 DYSPNEA, UNSPECIFIED TYPE: ICD-10-CM

## 2021-04-11 PROBLEM — I50.33 ACUTE ON CHRONIC DIASTOLIC CHF (CONGESTIVE HEART FAILURE) (HCC): Status: ACTIVE | Noted: 2021-04-11

## 2021-04-11 PROBLEM — I48.0 PAF (PAROXYSMAL ATRIAL FIBRILLATION) (HCC): Status: ACTIVE | Noted: 2018-05-18

## 2021-04-11 PROBLEM — I50.9 CHF EXACERBATION (HCC): Status: ACTIVE | Noted: 2021-04-11

## 2021-04-11 LAB
ABSOLUTE EOS #: 0.1 K/UL (ref 0–0.4)
ABSOLUTE IMMATURE GRANULOCYTE: ABNORMAL K/UL (ref 0–0.3)
ABSOLUTE LYMPH #: 1.1 K/UL (ref 1–4.8)
ABSOLUTE MONO #: 0.8 K/UL (ref 0–1)
ALBUMIN SERPL-MCNC: 4 G/DL (ref 3.5–5.2)
ALBUMIN/GLOBULIN RATIO: ABNORMAL (ref 1–2.5)
ALP BLD-CCNC: 75 U/L (ref 40–129)
ALT SERPL-CCNC: 21 U/L (ref 5–41)
ANION GAP SERPL CALCULATED.3IONS-SCNC: 10 MMOL/L (ref 9–17)
AST SERPL-CCNC: 14 U/L
BASOPHILS # BLD: 0 % (ref 0–2)
BASOPHILS ABSOLUTE: 0 K/UL (ref 0–0.2)
BILIRUB SERPL-MCNC: 0.7 MG/DL (ref 0.3–1.2)
BNP INTERPRETATION: ABNORMAL
BUN BLDV-MCNC: 30 MG/DL (ref 8–23)
BUN/CREAT BLD: 19 (ref 9–20)
CALCIUM SERPL-MCNC: 9.3 MG/DL (ref 8.6–10.4)
CHLORIDE BLD-SCNC: 101 MMOL/L (ref 98–107)
CO2: 28 MMOL/L (ref 20–31)
CREAT SERPL-MCNC: 1.59 MG/DL (ref 0.7–1.2)
DIFFERENTIAL TYPE: YES
EKG ATRIAL RATE: 52 BPM
EKG Q-T INTERVAL: 466 MS
EKG QRS DURATION: 158 MS
EKG QTC CALCULATION (BAZETT): 506 MS
EKG R AXIS: 59 DEGREES
EKG T AXIS: 133 DEGREES
EKG VENTRICULAR RATE: 71 BPM
EOSINOPHILS RELATIVE PERCENT: 2 % (ref 0–5)
GFR AFRICAN AMERICAN: 53 ML/MIN
GFR NON-AFRICAN AMERICAN: 43 ML/MIN
GFR SERPL CREATININE-BSD FRML MDRD: ABNORMAL ML/MIN/{1.73_M2}
GFR SERPL CREATININE-BSD FRML MDRD: ABNORMAL ML/MIN/{1.73_M2}
GLUCOSE BLD-MCNC: 125 MG/DL (ref 65–99)
GLUCOSE BLD-MCNC: 151 MG/DL (ref 65–99)
GLUCOSE BLD-MCNC: 220 MG/DL (ref 70–99)
HCT VFR BLD CALC: 34.8 % (ref 41–53)
HEMOGLOBIN: 11.6 G/DL (ref 13.5–17.5)
IMMATURE GRANULOCYTES: ABNORMAL %
LYMPHOCYTES # BLD: 15 % (ref 13–44)
MCH RBC QN AUTO: 31 PG (ref 26–34)
MCHC RBC AUTO-ENTMCNC: 33.3 G/DL (ref 31–37)
MCV RBC AUTO: 93.1 FL (ref 80–100)
MONOCYTES # BLD: 11 % (ref 5–9)
NRBC AUTOMATED: ABNORMAL PER 100 WBC
PDW BLD-RTO: 14.3 % (ref 12.1–15.2)
PLATELET # BLD: 263 K/UL (ref 140–450)
PLATELET ESTIMATE: ABNORMAL
PMV BLD AUTO: ABNORMAL FL (ref 6–12)
POTASSIUM SERPL-SCNC: 4.9 MMOL/L (ref 3.7–5.3)
PRO-BNP: 1629 PG/ML
RBC # BLD: 3.73 M/UL (ref 4.5–5.9)
RBC # BLD: ABNORMAL 10*6/UL
SARS-COV-2, RAPID: NOT DETECTED
SEG NEUTROPHILS: 72 % (ref 39–75)
SEGMENTED NEUTROPHILS ABSOLUTE COUNT: 5.4 K/UL (ref 2.1–6.5)
SODIUM BLD-SCNC: 139 MMOL/L (ref 135–144)
SPECIMEN DESCRIPTION: NORMAL
TOTAL PROTEIN: 6.7 G/DL (ref 6.4–8.3)
TROPONIN INTERP: ABNORMAL
TROPONIN T: ABNORMAL NG/ML
TROPONIN, HIGH SENSITIVITY: 27 NG/L (ref 0–22)
TROPONIN, HIGH SENSITIVITY: 29 NG/L (ref 0–22)
TROPONIN, HIGH SENSITIVITY: 33 NG/L (ref 0–22)
TSH SERPL DL<=0.05 MIU/L-ACNC: 1.65 MIU/L (ref 0.3–5)
WBC # BLD: 7.4 K/UL (ref 3.5–11)
WBC # BLD: ABNORMAL 10*3/UL

## 2021-04-11 PROCEDURE — 1200000000 HC SEMI PRIVATE

## 2021-04-11 PROCEDURE — 85025 COMPLETE CBC W/AUTO DIFF WBC: CPT

## 2021-04-11 PROCEDURE — 87635 SARS-COV-2 COVID-19 AMP PRB: CPT

## 2021-04-11 PROCEDURE — 6370000000 HC RX 637 (ALT 250 FOR IP): Performed by: INTERNAL MEDICINE

## 2021-04-11 PROCEDURE — 36415 COLL VENOUS BLD VENIPUNCTURE: CPT

## 2021-04-11 PROCEDURE — 99284 EMERGENCY DEPT VISIT MOD MDM: CPT

## 2021-04-11 PROCEDURE — 93005 ELECTROCARDIOGRAM TRACING: CPT | Performed by: EMERGENCY MEDICINE

## 2021-04-11 PROCEDURE — 83880 ASSAY OF NATRIURETIC PEPTIDE: CPT

## 2021-04-11 PROCEDURE — 6360000002 HC RX W HCPCS: Performed by: EMERGENCY MEDICINE

## 2021-04-11 PROCEDURE — 82947 ASSAY GLUCOSE BLOOD QUANT: CPT

## 2021-04-11 PROCEDURE — 93010 ELECTROCARDIOGRAM REPORT: CPT | Performed by: INTERNAL MEDICINE

## 2021-04-11 PROCEDURE — 99223 1ST HOSP IP/OBS HIGH 75: CPT | Performed by: FAMILY MEDICINE

## 2021-04-11 PROCEDURE — 84443 ASSAY THYROID STIM HORMONE: CPT

## 2021-04-11 PROCEDURE — C9803 HOPD COVID-19 SPEC COLLECT: HCPCS

## 2021-04-11 PROCEDURE — 2580000003 HC RX 258: Performed by: INTERNAL MEDICINE

## 2021-04-11 PROCEDURE — 71045 X-RAY EXAM CHEST 1 VIEW: CPT

## 2021-04-11 PROCEDURE — 80053 COMPREHEN METABOLIC PANEL: CPT

## 2021-04-11 PROCEDURE — 83036 HEMOGLOBIN GLYCOSYLATED A1C: CPT

## 2021-04-11 PROCEDURE — 94761 N-INVAS EAR/PLS OXIMETRY MLT: CPT

## 2021-04-11 PROCEDURE — 84484 ASSAY OF TROPONIN QUANT: CPT

## 2021-04-11 PROCEDURE — 96374 THER/PROPH/DIAG INJ IV PUSH: CPT

## 2021-04-11 RX ORDER — ACETAMINOPHEN 500 MG
500 TABLET ORAL SEE ADMIN INSTRUCTIONS
COMMUNITY

## 2021-04-11 RX ORDER — SODIUM CHLORIDE 9 MG/ML
25 INJECTION, SOLUTION INTRAVENOUS PRN
Status: DISCONTINUED | OUTPATIENT
Start: 2021-04-11 | End: 2021-04-14 | Stop reason: HOSPADM

## 2021-04-11 RX ORDER — CARVEDILOL 12.5 MG/1
12.5 TABLET ORAL 2 TIMES DAILY WITH MEALS
Status: DISCONTINUED | OUTPATIENT
Start: 2021-04-11 | End: 2021-04-14 | Stop reason: HOSPADM

## 2021-04-11 RX ORDER — DOXAZOSIN 2 MG/1
8 TABLET ORAL DAILY
Status: DISCONTINUED | OUTPATIENT
Start: 2021-04-11 | End: 2021-04-14 | Stop reason: HOSPADM

## 2021-04-11 RX ORDER — SODIUM CHLORIDE 0.9 % (FLUSH) 0.9 %
5-40 SYRINGE (ML) INJECTION EVERY 12 HOURS SCHEDULED
Status: DISCONTINUED | OUTPATIENT
Start: 2021-04-11 | End: 2021-04-14 | Stop reason: HOSPADM

## 2021-04-11 RX ORDER — NIFEDIPINE 90 MG/1
45 TABLET, FILM COATED, EXTENDED RELEASE ORAL DAILY
Status: DISCONTINUED | OUTPATIENT
Start: 2021-04-12 | End: 2021-04-12 | Stop reason: DRUGHIGH

## 2021-04-11 RX ORDER — NICOTINE POLACRILEX 4 MG
15 LOZENGE BUCCAL PRN
Status: DISCONTINUED | OUTPATIENT
Start: 2021-04-11 | End: 2021-04-14 | Stop reason: HOSPADM

## 2021-04-11 RX ORDER — LANOLIN ALCOHOL/MO/W.PET/CERES
400 CREAM (GRAM) TOPICAL DAILY
Status: DISCONTINUED | OUTPATIENT
Start: 2021-04-11 | End: 2021-04-14 | Stop reason: HOSPADM

## 2021-04-11 RX ORDER — SPIRONOLACTONE 25 MG/1
25 TABLET ORAL DAILY
Status: DISCONTINUED | OUTPATIENT
Start: 2021-04-11 | End: 2021-04-14 | Stop reason: HOSPADM

## 2021-04-11 RX ORDER — NIFEDIPINE 90 MG/1
45 TABLET, FILM COATED, EXTENDED RELEASE ORAL DAILY
COMMUNITY

## 2021-04-11 RX ORDER — AMIODARONE HYDROCHLORIDE 200 MG/1
100 TABLET ORAL NIGHTLY
COMMUNITY
Start: 2021-02-14 | End: 2021-11-01

## 2021-04-11 RX ORDER — OMEGA-3-ACID ETHYL ESTERS 1 G/1
1 CAPSULE, LIQUID FILLED ORAL 2 TIMES DAILY
Status: DISCONTINUED | OUTPATIENT
Start: 2021-04-11 | End: 2021-04-14 | Stop reason: HOSPADM

## 2021-04-11 RX ORDER — DEXTROSE MONOHYDRATE 50 MG/ML
100 INJECTION, SOLUTION INTRAVENOUS PRN
Status: DISCONTINUED | OUTPATIENT
Start: 2021-04-11 | End: 2021-04-14 | Stop reason: HOSPADM

## 2021-04-11 RX ORDER — MV-MINS/FOLIC/LYCOPENE/GINKGO 400-300MCG
1 TABLET ORAL DAILY
Status: DISCONTINUED | OUTPATIENT
Start: 2021-04-11 | End: 2021-04-11

## 2021-04-11 RX ORDER — POLYETHYLENE GLYCOL 3350 17 G/17G
17 POWDER, FOR SOLUTION ORAL DAILY PRN
Status: DISCONTINUED | OUTPATIENT
Start: 2021-04-11 | End: 2021-04-14 | Stop reason: HOSPADM

## 2021-04-11 RX ORDER — DEXTROSE MONOHYDRATE 25 G/50ML
12.5 INJECTION, SOLUTION INTRAVENOUS PRN
Status: DISCONTINUED | OUTPATIENT
Start: 2021-04-11 | End: 2021-04-14 | Stop reason: HOSPADM

## 2021-04-11 RX ORDER — TRAMADOL HYDROCHLORIDE 50 MG/1
50 TABLET ORAL 2 TIMES DAILY PRN
Status: DISCONTINUED | OUTPATIENT
Start: 2021-04-11 | End: 2021-04-14 | Stop reason: HOSPADM

## 2021-04-11 RX ORDER — PROMETHAZINE HYDROCHLORIDE 25 MG/1
12.5 TABLET ORAL EVERY 6 HOURS PRN
Status: DISCONTINUED | OUTPATIENT
Start: 2021-04-11 | End: 2021-04-14 | Stop reason: HOSPADM

## 2021-04-11 RX ORDER — SODIUM CHLORIDE 0.9 % (FLUSH) 0.9 %
5-40 SYRINGE (ML) INJECTION PRN
Status: DISCONTINUED | OUTPATIENT
Start: 2021-04-11 | End: 2021-04-14 | Stop reason: HOSPADM

## 2021-04-11 RX ORDER — FUROSEMIDE 10 MG/ML
20 INJECTION INTRAMUSCULAR; INTRAVENOUS ONCE
Status: COMPLETED | OUTPATIENT
Start: 2021-04-11 | End: 2021-04-11

## 2021-04-11 RX ORDER — FUROSEMIDE 10 MG/ML
40 INJECTION INTRAMUSCULAR; INTRAVENOUS 2 TIMES DAILY
Status: DISCONTINUED | OUTPATIENT
Start: 2021-04-11 | End: 2021-04-14 | Stop reason: HOSPADM

## 2021-04-11 RX ORDER — VITAMIN B COMPLEX
1000 TABLET ORAL 2 TIMES DAILY
Status: DISCONTINUED | OUTPATIENT
Start: 2021-04-11 | End: 2021-04-14 | Stop reason: HOSPADM

## 2021-04-11 RX ORDER — ZINC GLUCONATE 50 MG
50 TABLET ORAL DAILY
COMMUNITY
End: 2021-04-21 | Stop reason: ALTCHOICE

## 2021-04-11 RX ORDER — ONDANSETRON 2 MG/ML
4 INJECTION INTRAMUSCULAR; INTRAVENOUS EVERY 6 HOURS PRN
Status: DISCONTINUED | OUTPATIENT
Start: 2021-04-11 | End: 2021-04-14 | Stop reason: HOSPADM

## 2021-04-11 RX ORDER — MULTIVITAMIN WITH IRON
1 TABLET ORAL DAILY
Status: DISCONTINUED | OUTPATIENT
Start: 2021-04-11 | End: 2021-04-14 | Stop reason: HOSPADM

## 2021-04-11 RX ORDER — AMIODARONE HYDROCHLORIDE 200 MG/1
200 TABLET ORAL SEE ADMIN INSTRUCTIONS
Status: DISCONTINUED | OUTPATIENT
Start: 2021-04-11 | End: 2021-04-12 | Stop reason: SDUPTHER

## 2021-04-11 RX ORDER — ISOSORBIDE MONONITRATE 30 MG/1
30 TABLET, EXTENDED RELEASE ORAL DAILY
Status: DISCONTINUED | OUTPATIENT
Start: 2021-04-11 | End: 2021-04-14 | Stop reason: HOSPADM

## 2021-04-11 RX ORDER — ACETAMINOPHEN 325 MG/1
650 TABLET ORAL EVERY 6 HOURS PRN
Status: DISCONTINUED | OUTPATIENT
Start: 2021-04-11 | End: 2021-04-14 | Stop reason: HOSPADM

## 2021-04-11 RX ORDER — ATORVASTATIN CALCIUM 40 MG/1
40 TABLET, FILM COATED ORAL DAILY
Status: DISCONTINUED | OUTPATIENT
Start: 2021-04-11 | End: 2021-04-14 | Stop reason: HOSPADM

## 2021-04-11 RX ORDER — ACETAMINOPHEN 650 MG/1
650 SUPPOSITORY RECTAL EVERY 6 HOURS PRN
Status: DISCONTINUED | OUTPATIENT
Start: 2021-04-11 | End: 2021-04-14 | Stop reason: HOSPADM

## 2021-04-11 RX ADMIN — SACUBITRIL AND VALSARTAN 4 TABLET: 24; 26 TABLET, FILM COATED ORAL at 21:20

## 2021-04-11 RX ADMIN — SODIUM CHLORIDE, PRESERVATIVE FREE 10 ML: 5 INJECTION INTRAVENOUS at 21:21

## 2021-04-11 RX ADMIN — CHOLECALCIFEROL TAB 25 MCG (1000 UNIT) 1000 UNITS: 25 TAB at 21:09

## 2021-04-11 RX ADMIN — FUROSEMIDE 20 MG: 10 INJECTION, SOLUTION INTRAMUSCULAR; INTRAVENOUS at 15:19

## 2021-04-11 RX ADMIN — APIXABAN 5 MG: 5 TABLET, FILM COATED ORAL at 21:09

## 2021-04-11 RX ADMIN — DOXAZOSIN 8 MG: 2 TABLET ORAL at 21:20

## 2021-04-11 RX ADMIN — ATORVASTATIN CALCIUM 40 MG: 40 TABLET, FILM COATED ORAL at 21:32

## 2021-04-11 RX ADMIN — CARVEDILOL 12.5 MG: 12.5 TABLET, FILM COATED ORAL at 21:09

## 2021-04-11 RX ADMIN — OMEGA-3-ACID ETHYL ESTERS 1 CAPSULE: 1 CAPSULE, LIQUID FILLED ORAL at 21:09

## 2021-04-11 ASSESSMENT — ENCOUNTER SYMPTOMS
NAUSEA: 0
COUGH: 0
BACK PAIN: 0
VOMITING: 0
SHORTNESS OF BREATH: 1
TROUBLE SWALLOWING: 0
EYE PAIN: 0
EYE REDNESS: 0
ABDOMINAL PAIN: 0
DIARRHEA: 0
SORE THROAT: 0
ABDOMINAL DISTENTION: 1
COLOR CHANGE: 0

## 2021-04-11 NOTE — ED PROVIDER NOTES
[address]  eMERGENCY dEPARTMENT eNCOUnter      Pt Name: Muna Francis  MRN: [id number]  [place] 1950  Date of evaluation: 4/11/2021  Provider: Jazmin Ordonez MD    CHIEF COMPLAINT       Chief Complaint   Patient presents with   [facility]     feels like abd is bloated, increased SOB, hx CHF     Shortness of Breath     Patient is a 23-year-old male who presents to the emergency department complaining of shortness of breath which is gradually worsened over the past few weeks and feeling bloated. He states he feels like his abdomen is bloated and the last time he felt like this he had a CHF exacerbation. He denies any chest pain. He does have increased swelling in his legs. He states they have been trying to wean down his diuretics to protect his kidneys. He denies other associated symptoms. He denies any fever or chills. Nursing Notes were reviewed. REVIEW OF SYSTEMS    (2-9 systems for level 4, 10 or more for level 5)     Review of Systems   Constitutional: Negative for chills and fever. HENT: Negative for ear pain, sore throat and trouble swallowing. Eyes: Negative for pain and redness. Respiratory: Positive for shortness of breath. Negative for cough. Cardiovascular: Positive for leg swelling. Negative for chest pain and palpitations. Gastrointestinal: Positive for abdominal distention. Negative for abdominal pain, diarrhea, nausea and vomiting. Genitourinary: Negative for dysuria and frequency. Musculoskeletal: Negative for back pain and neck pain. Skin: Negative for color change and rash. Neurological: Negative for dizziness, syncope and headaches. Psychiatric/Behavioral: Negative for hallucinations and suicidal ideas. Except as noted above the remainder of the review of systems was reviewed and negative.        PAST MEDICAL HISTORY     Past Medical History:   Diagnosis Date    Arthritis     Arthritis     CAD (coronary artery disease)     CHF (congestive heart failure) (HCC)     CHF (congestive heart failure) (HCC)     Coronary artery disease     Diabetes [facility] [address]     Diabetes mellitus ([facility] [address]     H/O coronary angioplasty     Hyperlipidemia     Hypertension     Neuropathy     Numbness and tingling     dannie hands    Pulmonary edema     SECONDARY TO FAT EMBOLI AFTER KNEE SURGERY    Sleep apnea     Unspecified sleep apnea 2012    cpap         SURGICAL HISTORY       Past Surgical History:   Procedure Laterality Date    ANGIOPLASTY  08/30/2017    Dr. Warner Davis mm drug eluting Xience stents in the right coronary. DANIELA-3 flow prior to the procedure DANIELA-3 flow following the procedure. The patient has had a good result from his angioplasty of the right coronary artery. Ej Liao APPENDECTOMY      APPENDECTOMY      BUNIONECTOMY  2010    CARDIAC CATHETERIZATION      stent pacement    CARDIAC CATHETERIZATION Left 05/14/14    Dr Wilmer Valdivia. [facility] Severe CAD, 70% disese with in-stent stenosis in the left anterior descending at the level of a very large codominant diagonal.  90% disease of the ostium of a very large codominant diagonal with 60% disease beyond the diagonal with a fractional flow reserve of 0.76 in the diagonal and 0.78 in the left anterior descending indicating obstructive disease of significance in both the     CARDIAC CATHETERIZATION Left 05/14/14    left anterior descending & diagonal.. Mild irregularities of 30% in a small, nondominant circumglex. Mild 30- 40% disease in the midportion of a very large,dominant right coronary artery. Normal left ventricular function, ejection fraction of 55-60%    CARDIAC CATHETERIZATION Bilateral 11/15/2017    Dr. Sarah Arguelles @ [facility]--Severe PHT with a PA pressure of 74/30. Widely patent stent in the very large dominat right coronary artery with 50% disease before the stent with an FFR of 0.89, indicating no significant obstruction. Severe disease in the LAD.   Patent left internal mammary to the LAD.  Patent saphenous vein graft to a very large diagonal.  Overall normal LV function. EF of 55%    CATARACT REMOVAL      COLONOSCOPY  10-    Dr. Adela Shaffer (hemorrhoids)    COLONOSCOPY  10/09/2013    CORONARY ANGIOPLASTY WITH STENT PLACEMENT      CORONARY ARTERY BYPASS GRAFT  6/3/14    Dr Nick Duncan @ [facility] GRAFT      EYE SURGERY  2012    B leakage in back of eyes    JOINT REPLACEMENT Bilateral     knees    JOINT REPLACEMENT      KNEE SURGERY  ,     BILATERAL KNEE ORTHO    KNEE SURGERY           ALLERGIES     Patient has no known allergies.     FAMILY HISTORY       Family History   Problem Relation Age of Onset    Alzheimer's Disease Mother     Heart Disease Mother     Arthritis Mother     High Blood Pressure Mother     Diabetes Mother     Heart Disease Father     Arthritis Father     High Blood Pressure Father     Diabetes Father           SOCIAL HISTORY       Social History     Socioeconomic History    Marital status:      Spouse name: None    Number of children: None    Years of education: None    Highest education level: None   Occupational History    None   Social Needs    Financial resource strain: None    Food insecurity     Worry: None     Inability: None    Transportation needs     Medical: None     Non-medical: None   Tobacco Use    Smoking status: Former Smoker     Packs/day: 0.50     Years: 3.00     Pack years: 1.50     Quit date: 10/9/1975     Years since quittin.5    Smokeless tobacco: Never Used   Substance and Sexual Activity    Alcohol use: No    Drug use: No    Sexual activity: None   Lifestyle    Physical activity     Days per week: None     Minutes per session: None    Stress: None   Relationships    Social connections     Talks on phone: None     Gets together: None     Attends Faith service: None     Active member of club or organization: None     Attends meetings of clubs or organizations: None Relationship status: None    Intimate partner violence     Fear of current or ex partner: None     Emotionally abused: None     Physically abused: None     Forced sexual activity: None   Other Topics Concern    None   Social History Narrative    None           PHYSICAL EXAM    (up to 7 for level 4, 8 ormore for level 5)     ED Triage Vitals [04/11/21 1401]   BP Temp Temp src Pulse Resp SpO2 Height Weight   (!) 127/51 97.6 °F (36.4 °C) -- 75 20 93 % -- 263 lb (119.3 kg)       Physical Exam     Physical    Vital signs and nursing notes were reviewed as well as the social, family, and past medical history. Gen. appearance: Patient is alert and oriented and in no acute distress    Head: Atraumatic, normocephalic    Neck: Supple, trachea/thyroid normal    EENT: PERRLA, EOMI, conjunctiva normal.    Skin: Warm and dry with no rash    Cardiovascular: Heart RRR, no gallops or rubs, no aortic enlargement or bruits noted. Respiratory: Lungs clear, no wheezing, no rales, normal breath sounds. Gastrointestinal: Abdomen nontender, bowel sounds normal, no rebound/guarding/distention or mass    Musculoskeletal: No tenderness in the extremities, no back or hip pain. Neurological: Patient is alert and oriented ×3, no focal motor or sensory deficits noted      DIAGNOSTIC RESULTS     EKG: All EKG's are interpreted by the Emergency Department Physicianwho either signs or Co-signs this chart in the absence of a cardiologist.    EKG shows atrial fibrillation with a left bundle branch block and a rate of 71.   No acute change    RADIOLOGY:         LABS:  Labs Reviewed   CBC WITH AUTO DIFFERENTIAL - Abnormal; Notable for the following components:       Result Value    RBC 3.73 (*)     Hemoglobin 11.6 (*)     Hematocrit 34.8 (*)     Monocytes 11 (*)     All other components within normal limits   COMPREHENSIVE METABOLIC PANEL W/ REFLEX TO MG FOR LOW K - Abnormal; Notable for the following components:    Glucose 220 (*)     BUN 30 (*)     CREATININE 1.59 (*)     GFR Non- 43 (*)     GFR  53 (*)     All other components within normal limits   TROPONIN - Abnormal; Notable for the following components:    Troponin, High Sensitivity 33 (*)     All other components within normal limits   BRAIN NATRIURETIC PEPTIDE - Abnormal; Notable for the following components:    Pro-BNP 1,629 (*)     All other components within normal limits   TROPONIN - Abnormal; Notable for the following components:    Troponin, High Sensitivity 29 (*)     All other components within normal limits   TROPONIN - Abnormal; Notable for the following components:    Troponin, High Sensitivity 27 (*)     All other components within normal limits   HEMOGLOBIN A1C - Abnormal; Notable for the following components:    Hemoglobin A1C 7.7 (*)     All other components within normal limits   GLUCOSE, WHOLE BLOOD - Abnormal; Notable for the following components:    POC Glucose 125 (*)     All other components within normal limits   BASIC METABOLIC PANEL - Abnormal; Notable for the following components:    Glucose 140 (*)     BUN 25 (*)     CREATININE 1.36 (*)     Anion Gap 8 (*)     GFR Non- 52 (*)     All other components within normal limits   LIPID PANEL - Abnormal; Notable for the following components:    HDL 25 (*)     Triglycerides 159 (*)     VLDL NOT REPORTED (*)     All other components within normal limits   CBC WITH AUTO DIFFERENTIAL - Abnormal; Notable for the following components:    RBC 4.00 (*)     Hemoglobin 12.3 (*)     Hematocrit 37.1 (*)     Monocytes 13 (*)     All other components within normal limits   GLUCOSE, WHOLE BLOOD - Abnormal; Notable for the following components:    POC Glucose 151 (*)     All other components within normal limits   GLUCOSE, WHOLE BLOOD - Abnormal; Notable for the following components:    POC Glucose 203 (*)     All other components within normal limits   GLUCOSE, WHOLE BLOOD - Abnormal; Notable MD  04/16/21 9453

## 2021-04-11 NOTE — PROGRESS NOTES
Call made to Dr Airam Arellano to clarify. TO to hold this dose. Patient had 80 mgPO at home, 20 mg IV in ED. Electronically signed by Bonny Han RN on 4/11/2021 at 6:36 PM

## 2021-04-11 NOTE — CONSULTS
Patient: Gladis Hamilton  : 1950  Date of Admission: 2021  Primary Care Physician: Ebonie Galindo  Today's Date: 2021    REASON FOR CONSULTATION: Bloated (feels like abd is bloated, increased SOB, hx CHF ) and Shortness of Breath      HPI: Mr. Lisa Pickett is a 72 y.o. male who was admitted to the hospital with a roughly 2 week history of progressive increased shortness of breath, increased lower extremity edema and abdominal distention. Mr. Lisa Pickett is a patient of Dr. Aashish Landaverde and was last seen in his office on . Last year he developed his atrial fibrillation and underwent a cardioversion on 2020. He was placed on amiodarone 200 mg b.i.d. and then continued on 200 mg daily. She had a generator change on 2015, with a [employer] Ellipse ICD implanted by Dr. Rupal Archer. He had a catheterization on 10/31/2012 that showed severe disease at the bifurcation of the LAD and diagonal with unremarkable right coronary artery and circumflex. He had angioplasty on 2012, placing a 2.75 x 28 mm drug-eluting Promus stent in the LAD with a good end result.     On 2014, he had a catheterization that showed 70% to 80% in-stent stenosis of the LAD and diagonal with unremarkable right coronary artery and circumflex, EF 50% to 55%.   He had open heart surgery by Dr. Jabari Murcia on 2014 with a LIMA to the LAD and a vein graft to the diagonal.     On 2017, he had shortness of breath and a catheterization on 2017 showed 95% disease in the very dominant right coronary artery with 90% LAD and a patent LIMA to the LAD, with the diagonal of 90% with a patent vein graft to the diagonal, circumflex unremarkable, EF of 50%, and I placed a 3.5 x 18 mm drug-eluting Xience stent in the right coronary artery.     His last catheterization was on 11/15/2017 at [place] [address] in [place] that showed widely patent stents and widely patent bypass grafts with unremarkable circumflex, EF of 55%.     He has very symptomatic paroxysmal atrial fibrillation and is anticoagulated with Eliquis. He does have severe back discomfort and he has had ablation, which has not particularly helped. Mr. Mary Jo Baxter says that he had been feeling fine but just drove back from [place] last Wednesday. He says he blood pressure has been well controlled and denies any medication changes. He says he cannot tell when he is in atrial fibrillation but says that when he checks his rhythm he has more times than not been in atrial fibrillation. The last time he thinks he was is normal sinus rhythm was about 2 weeks about but he is not sure about this. Mr. Mary Jo Baxter also denied any current or recent chest pain, abdominal pain, bleeding problems, problems with his medications or any other concerns at this time. Past Medical History:   Diagnosis Date    Arthritis     Arthritis     CAD (coronary artery disease)     [facility]     [facility]     Coronary artery disease     Diabetes ([address]     Diabetes mellitus ([address]     H/O coronary angioplasty     Hyperlipidemia     Hypertension     Neuropathy     Numbness and tingling     dannie hands    Pulmonary edema     SECONDARY TO FAT EMBOLI AFTER KNEE SURGERY    Sleep apnea     Unspecified sleep apnea 2012    cpap       CURRENT ALLERGIES: Patient has no known allergies. REVIEW OF SYSTEMS: 14 systems were reviewed. Pertinent positives and negatives as above, all else negative. Past Surgical History:   Procedure Laterality Date    ANGIOPLASTY  08/30/2017    Dr. Khalida Bustillo mm drug eluting Xience stents in the right coronary. DANIELA-3 flow prior to the procedure DANIELA-3 flow following the procedure. The patient has had a good result from his angioplasty of the right coronary artery. Bari Bennett APPENDECTOMY      APPENDECTOMY      BUNIONECTOMY  2010    CARDIAC CATHETERIZATION      stent pacement    CARDIAC CATHETERIZATION Left 14    Dr Karin Hou Severe CAD, 70% disese with in-stent stenosis in the left anterior descending at the level of a very large codominant diagonal.  90% disease of the ostium of a very large codominant diagonal with 60% disease beyond the diagonal with a fractional flow reserve of 0.76 in the diagonal and 0.78 in the left anterior descending indicating obstructive disease of significance in both the     CARDIAC CATHETERIZATION Left 14    left anterior descending & diagonal.. Mild irregularities of 30% in a small, nondominant circumglex. Mild 30- 40% disease in the midportion of a very large,dominant right coronary artery. Normal left ventricular function, ejection fraction of 55-60%    CARDIAC CATHETERIZATION Bilateral 11/15/2017    Dr. Clare Chou @ [facility]--Severe PHT with a PA pressure of 74/30. Widely patent stent in the very large dominat right coronary artery with 50% disease before the stent with an FFR of 0.89, indicating no significant obstruction. Severe disease in the LAD. Patent left internal mammary to the LAD. Patent saphenous vein graft to a very large diagonal.  Overall normal LV function.   EF of 55%    CATARACT REMOVAL      COLONOSCOPY  10-    Dr. Khalil Saliva (hemorrhoids)    COLONOSCOPY  10/09/2013    CORONARY ANGIOPLASTY WITH STENT PLACEMENT      CORONARY ARTERY BYPASS GRAFT  6/3/14    Dr Luis Mercedes @ [address] [place] [address] GRAFT      EYE SURGERY  2012    B leakage in back of eyes    JOINT REPLACEMENT Bilateral     knees    JOINT REPLACEMENT      KNEE SURGERY  ,     BILATERAL KNEE ORTHO    KNEE SURGERY      Social History:  Social History     Tobacco Use    Smoking status: Former Smoker     Packs/day: 0.50     Years: 3.00     Pack years: 1.50     Quit date: 10/9/1975     Years since quittin.5    Smokeless tobacco: Never Used   Substance Use Topics    Alcohol use: No    Drug use: No        CURRENT MEDICATIONS:  Outpatient Medications Marked as Taking for the 4/11/21 encounter [facility] Encounter)   Medication Sig Dispense Refill    NIFEdipine (ADALAT CC) 90 MG extended release tablet Take 45 mg by mouth daily      Coenzyme Q10 (CO Q 10) 100 MG CAPS Take 100 mg by mouth nightly      acetaminophen (TYLENOL) 500 MG tablet Take 500 mg by mouth See Admin Instructions Take 500 mg in am  Take 1000 mg in Pm      zinc 50 MG TABS tablet Take 50 mg by mouth daily      celecoxib (CELEBREX) 200 MG capsule TAKE 1 CAPSULE TWICE DAILY 180 capsule 1    isosorbide mononitrate (IMDUR) 30 MG extended release tablet TAKE 1 TABLET DAILY 90 tablet 3    furosemide (LASIX) 80 MG tablet Take 1 tab in the am and 1/2 tab pm 180 tablet 3    apixaban (ELIQUIS) 5 MG TABS tablet TAKE 1 TABLET TWICE A  tablet 3    carvedilol (COREG) 12.5 MG tablet TAKE 1 TABLET TWICE DAILY  WITH MEALS 180 tablet 3    traMADol (ULTRAM) 50 MG tablet Take 1 tablet by mouth 2 times daily as needed for Pain.  NOVOLIN 70/30 (70-30) 100 UNIT/ML injection vial Inject 54 Units into the skin 3 times daily Inject 54 units AM, Inject 32 units at Lunch, Inject 40 units at supper as directed subcutaneously.       atorvastatin (LIPITOR) 40 MG tablet One daily 90 tablet 3    doxazosin (CARDURA) 8 MG tablet TAKE 1 TABLET DAILY 90 tablet 3    sacubitril-valsartan (ENTRESTO)  MG per tablet TAKE 1 TABLET TWICE A  tablet 3    spironolactone (ALDACTONE) 25 MG tablet Take 25 mg by mouth daily       TRULICITY 0.24 TT/6.3MD SOPN Inject 0.75 mg into the skin once a week Mondays      Magnesium Oxide 500 MG TABS Take 500 mg by mouth daily       glimepiride (AMARYL) 4 MG tablet Take 2 mg by mouth 2 times daily       Omega-3 Fatty Acids (FISH OIL) 1200 MG CAPS Take 1 capsule by mouth 2 times daily Plus 360 omega 3      Glucosamine Sulfate 1000 MG CAPS Take 10,000 mg by mouth daily       Chromium-Cinnamon (CINNAMON PLUS CHROMIUM PO) Take 1,000 mg by mouth 2 times daily  Multiple Vitamins-Minerals (ONE-A-DAY MENS 50+ ADVANTAGE PO) Take 1 tablet by mouth daily       Vitamin D (CHOLECALCIFEROL) 1000 UNITS CAPS capsule   Take by mouth 2 times daily Vitamin D 3      metformin (GLUCOPHAGE) 1000 MG tablet Take 1,000 mg by mouth daily (with breakfast)          amiodarone (CORDARONE) tablet 200 mg, See Admin Instructions  apixaban (ELIQUIS) tablet 5 mg, BID  atorvastatin (LIPITOR) tablet 40 mg, Daily  carvedilol (COREG) tablet 12.5 mg, BID WC  doxazosin (CARDURA) tablet 8 mg, Daily  isosorbide mononitrate (IMDUR) extended release tablet 30 mg, Daily  magnesium oxide (MAG-OX) tablet 400 mg, Daily  [START ON 4/12/2021] NIFEdipine (ADALAT CC) extended release tablet 90 mg, Daily  [START ON 4/12/2021] insulin lispro protamine & lispro (HUMALOG MIX) (75-25) 100 UNIT per ML injection vial SUSP 54 Units, QAM  [START ON 4/12/2021] insulin lispro protamine & lispro (HUMALOG MIX) (75-25) 100 UNIT per ML injection vial SUSP 32 Units, Daily before lunch  insulin lispro protamine & lispro (HUMALOG MIX) (75-25) 100 UNIT per ML injection vial SUSP 40 Units, Nightly  omega-3 acid ethyl esters (LOVAZA) capsule 1 capsule, BID  sacubitril-valsartan (ENTRESTO) 24-26 MG per tablet 4 tablet, BID  spironolactone (ALDACTONE) tablet 25 mg, Daily  traMADol (ULTRAM) tablet 50 mg, BID PRN  Dulaglutide SOPN 0.75 mg, Weekly  Vitamin D (CHOLECALCIFEROL) tablet 1,000 Units, BID  furosemide (LASIX) injection 40 mg, BID  sodium chloride flush 0.9 % injection 5-40 mL, 2 times per day  sodium chloride flush 0.9 % injection 5-40 mL, PRN  0.9 % sodium chloride infusion, PRN  promethazine (PHENERGAN) tablet 12.5 mg, Q6H PRN    Or  ondansetron (ZOFRAN) injection 4 mg, Q6H PRN  polyethylene glycol (GLYCOLAX) packet 17 g, Daily PRN  acetaminophen (TYLENOL) tablet 650 mg, Q6H PRN    Or  acetaminophen (TYLENOL) suppository 650 mg, Q6H PRN  glucose (GLUTOSE) 40 % oral gel 15 g, PRN  dextrose 50 % IV solution, PRN  glucagon (rDNA) injection 1 mg, PRN  dextrose 5 % solution, PRN  insulin lispro (HUMALOG) injection vial 0-3 Units, Nightly  multivitamin 1 tablet, Daily    sodium chloride 0.9 % injection 10 mL, PRN       FAMILY HISTORY: family history includes Alzheimer's Disease in his mother; Arthritis in his father and mother; Diabetes in his father and mother; Heart Disease in his father and mother; High Blood Pressure in his father and mother. PHYSICAL EXAM:   /66   Pulse 88   Temp 98.6 °F (37 °C) (Oral)   Resp 20   Ht 5' 10\" (1.778 m)   Wt 259 lb 6.4 oz (117.7 kg)   SpO2 94%   BMI 37.22 kg/m²  Body mass index is 37.22 kg/m². [ INSTRUCTIONS:  \"[x]\" Indicates a positive item  \"[]\" Indicates a negative item   Vital Signs: (As obtained by patient/caregiver or practitioner observation)    Blood pressure-  Heart rate-    Respiratory rate-    Temperature-  Pulse oximetry-     Constitutional: [x] Appears well-developed and well-nourished [x] No apparent distress      [] Abnormal-   Mental status  [x] Alert and awake  [x] Oriented to person/place/time [x]Able to follow commands      Eyes:  EOM    [x]  Normal  [] Abnormal-  Sclera  [x]  Normal  [] Abnormal -         Discharge [x]  None visible  [] Abnormal -    HENT:   [x] Normocephalic, atraumatic.   [] Abnormal   [] Mouth/Throat: Mucous membranes are moist.     External Ears [x] Normal  [] Abnormal-     Neck: [x] No visualized mass     Pulmonary/Chest: [x] Respiratory effort normal.  [x] No visualized signs of difficulty breathing or respiratory distress        [] Abnormal-     Neurological:        [x] No Facial Asymmetry (Cranial nerve 7 motor function) (limited exam to video visit)          [] No gaze palsy        [] Abnormal-         Skin:        [x] No significant exanthematous lesions or discoloration noted on facial skin         [] Abnormal-            Psychiatric:       [x] Normal Affect [] No Hallucinations        [] Abnormal-     Other pertinent observable physical exam findings: None       MOST RECENT LABS ON RECORD:   Lab Results   Component Value Date    WBC 7.4 04/11/2021    HGB 11.6 (L) 04/11/2021    HCT 34.8 (L) 04/11/2021     04/11/2021    CHOL 93 11/23/2020    TRIG 99 11/23/2020    HDL 29 (L) 11/23/2020    LDLCHOLESTEROL 44 11/23/2020    ALT 21 04/11/2021    AST 14 04/11/2021     04/11/2021    K 4.9 04/11/2021     04/11/2021    CREATININE 1.59 (H) 04/11/2021    BUN 30 (H) 04/11/2021    CO2 28 04/11/2021    TSH 1.65 04/11/2021    PSA 0.31 05/21/2020    LABA1C 7.2 (H) 04/20/2020    LABMICR CANNOT BE CALCULATED 10/09/2019        ASSESSMENT:  Patient Active Problem List    Diagnosis Date Noted    CHF exacerbation ([facility] 04/11/2021    Acute on chronic diastolic CHF (congestive heart failure) ([facility] 04/11/2021    Spinal stenosis of lumbar region without neurogenic claudication 05/20/2020    Lumbosacral spondylosis without myelopathy 11/05/2018    Pars defect with spondylolisthesis 11/05/2018    Atrial fibrillation ([address] 05/18/2018    Pulmonary HTN ([address] 11/16/2017    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[address] 11/03/2017    GHAZALA (obstructive sleep apnea) 10/10/2017    Type 2 diabetes mellitus with peripheral neuropathy ([address] 07/28/2017    Diabetic peripheral neuropathy ([facility] 07/28/2017    Severe nonproliferative diabetic retinopathy with macular edema associated with type 2 diabetes mellitus ([address] 11/07/2016    Coronary artery disease involving native coronary artery of native heart with angina pectoris ([address]     H/O coronary angioplasty     Hypertension 10/10/2011    Hyperlipidemia 10/10/2011     PLAN:     Acute on chronic diastolic heart failure: New York Heart Association Class: IV (Severe limitations, symptoms even at rest)   Beta Blocker: Continue Carvedilol (Coreg) 12.5 mg twice daily.  ACE Inibitor/ARB: Continue sacubitril/valsartan (Entresto) 24/26 mg twice daily.    Diuretics: START furosemide (Lasix) 40 mg IV 2 embolization    Disclaimer: Risk Score calculation is dependent on accuracy of patient problem list and past encounter diagnosis.  Stroke Risk: CHADS2-VASc Score: 4/9 (4% stroke risk)   Anticoagulation: Continue Apixaban (Eliquis) 5 mg every 12 hours.  Additional Testing List: I ordered a echocardiogram to better assess for the etiology of this problem and to help guide future management     Type 2 Myocardial Infarction: Mr. Liza Morrison meets criteria for a Type 2 MI defined by a rise and fall of troponin with at least one value above the 99th percentile and evidence of an imbalance between myocardial oxygen supply and demand unrelated to coronary thrombosis, evidenced by symptoms of acute myocardial ischemia which has manifested as the development of heart failure and shortness of breath at rest.  o Because of this history, I ordered a echocardiogram to better assess the severity of this problem. Once again, thank you for allowing me to participate in this patients care. Please do not hesitate to contact me if I could be of any further assistance. Sincerely,  Keesha Miranda MD, MS, F.A.C.C. [facility] Cardiology Specialist    [address] [place] [address]  Phone: [phone], Fax: [phone]     I believe that the risk of significant morbidity and mortality related to the patient's current medical conditions are: intermediate-high. April 11, 2021     Fadi Rosales is a 72 y.o. male being evaluated by a Virtual Visit (video visit) encounter to address concerns as mentioned above. A caregiver was present when appropriate. Due to this being a TeleHealth encounter (During ZVTXS-94 public health emergency), evaluation of the following organ systems was limited: Vitals/Constitutional/EENT/Resp/CV/GI//MS/Neuro/Skin/Heme-Lymph-Imm.   Pursuant to the emergency declaration under the [address] [facility] waiver authority and the Coronavirus Preparedness and Response Supplemental Appropriations Act, this Virtual Visit was conducted with patient's (and/or legal guardian's) consent, to reduce the patient's risk of exposure to COVID-19 and provide necessary medical care. The patient (and/or legal guardian) has also been advised to contact this office for worsening conditions or problems, and seek emergency medical treatment and/or call 911 if deemed necessary. Services were provided through a video synchronous discussion virtually to substitute for in-person visit. Mr. Edson Helms was located in the patients hospital room in [address] [place] [address] performed the visit from my home in [facility]    --Eugenie Simmons MD on 4/11/2021 at 7:53 PM    An electronic signature was used to authenticate this note. --Eugenie Simmons MD on 4/11/2021 at 7:31 PM    An electronic signature was used to authenticate this note.

## 2021-04-11 NOTE — PROGRESS NOTES
Radiology called about ECHO order. It will be done in the am. Electronically signed by Krista Kumar RN on 4/11/2021 at 5:54 PM

## 2021-04-11 NOTE — PLAN OF CARE
Problem: Falls - Risk of:  Goal: Will remain free from falls  Description: Will remain free from falls  Outcome: Met This Shift     Problem:  Activity:  Goal: Risk for activity intolerance will decrease  Description: Risk for activity intolerance will decrease  Outcome: Ongoing  Goal: Capacity to carry out activities will improve  Description: Capacity to carry out activities will improve  Outcome: Ongoing

## 2021-04-12 LAB
ABSOLUTE EOS #: 0.2 K/UL (ref 0–0.4)
ABSOLUTE IMMATURE GRANULOCYTE: ABNORMAL K/UL (ref 0–0.3)
ABSOLUTE LYMPH #: 1.4 K/UL (ref 1–4.8)
ABSOLUTE MONO #: 0.8 K/UL (ref 0–1)
ANION GAP SERPL CALCULATED.3IONS-SCNC: 8 MMOL/L (ref 9–17)
BASOPHILS # BLD: 1 % (ref 0–2)
BASOPHILS ABSOLUTE: 0 K/UL (ref 0–0.2)
BUN BLDV-MCNC: 25 MG/DL (ref 8–23)
BUN/CREAT BLD: 18 (ref 9–20)
CALCIUM SERPL-MCNC: 9.1 MG/DL (ref 8.6–10.4)
CHLORIDE BLD-SCNC: 102 MMOL/L (ref 98–107)
CHOLESTEROL/HDL RATIO: 3.9
CHOLESTEROL: 98 MG/DL
CO2: 29 MMOL/L (ref 20–31)
CREAT SERPL-MCNC: 1.36 MG/DL (ref 0.7–1.2)
DIFFERENTIAL TYPE: YES
EKG ATRIAL RATE: 64 BPM
EKG P AXIS: 14 DEGREES
EKG P-R INTERVAL: 196 MS
EKG Q-T INTERVAL: 500 MS
EKG QRS DURATION: 166 MS
EKG QTC CALCULATION (BAZETT): 515 MS
EKG R AXIS: 74 DEGREES
EKG T AXIS: 96 DEGREES
EKG VENTRICULAR RATE: 64 BPM
EOSINOPHILS RELATIVE PERCENT: 3 % (ref 0–5)
ESTIMATED AVERAGE GLUCOSE: 174 MG/DL
GFR AFRICAN AMERICAN: >60 ML/MIN
GFR NON-AFRICAN AMERICAN: 52 ML/MIN
GFR SERPL CREATININE-BSD FRML MDRD: ABNORMAL ML/MIN/{1.73_M2}
GFR SERPL CREATININE-BSD FRML MDRD: ABNORMAL ML/MIN/{1.73_M2}
GLUCOSE BLD-MCNC: 140 MG/DL (ref 70–99)
GLUCOSE BLD-MCNC: 203 MG/DL (ref 65–99)
GLUCOSE BLD-MCNC: 246 MG/DL (ref 65–99)
GLUCOSE BLD-MCNC: 317 MG/DL (ref 65–99)
HBA1C MFR BLD: 7.7 % (ref 4–6)
HCT VFR BLD CALC: 37.1 % (ref 41–53)
HDLC SERPL-MCNC: 25 MG/DL
HEMOGLOBIN: 12.3 G/DL (ref 13.5–17.5)
IMMATURE GRANULOCYTES: ABNORMAL %
LDL CHOLESTEROL: 41 MG/DL (ref 0–130)
LV EF: 43 %
LVEF MODALITY: NORMAL
LYMPHOCYTES # BLD: 23 % (ref 13–44)
MAGNESIUM: 2 MG/DL (ref 1.6–2.6)
MCH RBC QN AUTO: 30.7 PG (ref 26–34)
MCHC RBC AUTO-ENTMCNC: 33 G/DL (ref 31–37)
MCV RBC AUTO: 92.8 FL (ref 80–100)
MONOCYTES # BLD: 13 % (ref 5–9)
NRBC AUTOMATED: ABNORMAL PER 100 WBC
PDW BLD-RTO: 14.3 % (ref 12.1–15.2)
PLATELET # BLD: 259 K/UL (ref 140–450)
PLATELET ESTIMATE: ABNORMAL
PMV BLD AUTO: ABNORMAL FL (ref 6–12)
POTASSIUM SERPL-SCNC: 4.4 MMOL/L (ref 3.7–5.3)
RBC # BLD: 4 M/UL (ref 4.5–5.9)
RBC # BLD: ABNORMAL 10*6/UL
SEG NEUTROPHILS: 60 % (ref 39–75)
SEGMENTED NEUTROPHILS ABSOLUTE COUNT: 3.8 K/UL (ref 2.1–6.5)
SODIUM BLD-SCNC: 139 MMOL/L (ref 135–144)
TRIGL SERPL-MCNC: 159 MG/DL
VLDLC SERPL CALC-MCNC: ABNORMAL MG/DL (ref 1–30)
WBC # BLD: 6.2 K/UL (ref 3.5–11)
WBC # BLD: ABNORMAL 10*3/UL

## 2021-04-12 PROCEDURE — 83735 ASSAY OF MAGNESIUM: CPT

## 2021-04-12 PROCEDURE — 80061 LIPID PANEL: CPT

## 2021-04-12 PROCEDURE — 80048 BASIC METABOLIC PNL TOTAL CA: CPT

## 2021-04-12 PROCEDURE — 82947 ASSAY GLUCOSE BLOOD QUANT: CPT

## 2021-04-12 PROCEDURE — 2580000003 HC RX 258: Performed by: INTERNAL MEDICINE

## 2021-04-12 PROCEDURE — 1200000000 HC SEMI PRIVATE

## 2021-04-12 PROCEDURE — 6370000000 HC RX 637 (ALT 250 FOR IP): Performed by: INTERNAL MEDICINE

## 2021-04-12 PROCEDURE — 99222 1ST HOSP IP/OBS MODERATE 55: CPT | Performed by: INTERNAL MEDICINE

## 2021-04-12 PROCEDURE — 93010 ELECTROCARDIOGRAM REPORT: CPT | Performed by: INTERNAL MEDICINE

## 2021-04-12 PROCEDURE — 85025 COMPLETE CBC W/AUTO DIFF WBC: CPT

## 2021-04-12 PROCEDURE — 93005 ELECTROCARDIOGRAM TRACING: CPT | Performed by: INTERNAL MEDICINE

## 2021-04-12 PROCEDURE — 6360000002 HC RX W HCPCS: Performed by: INTERNAL MEDICINE

## 2021-04-12 PROCEDURE — 94761 N-INVAS EAR/PLS OXIMETRY MLT: CPT

## 2021-04-12 PROCEDURE — 36415 COLL VENOUS BLD VENIPUNCTURE: CPT

## 2021-04-12 PROCEDURE — 5A2204Z RESTORATION OF CARDIAC RHYTHM, SINGLE: ICD-10-PCS | Performed by: INTERNAL MEDICINE

## 2021-04-12 RX ORDER — PROPOFOL 10 MG/ML
INJECTION, EMULSION INTRAVENOUS
Status: COMPLETED | OUTPATIENT
Start: 2021-04-12 | End: 2021-04-12

## 2021-04-12 RX ORDER — AMIODARONE HYDROCHLORIDE 200 MG/1
200 TABLET ORAL EVERY EVENING
Status: DISCONTINUED | OUTPATIENT
Start: 2021-04-12 | End: 2021-04-12

## 2021-04-12 RX ORDER — NIFEDIPINE 90 MG/1
45 TABLET, FILM COATED, EXTENDED RELEASE ORAL DAILY
Status: DISCONTINUED | OUTPATIENT
Start: 2021-04-12 | End: 2021-04-14 | Stop reason: HOSPADM

## 2021-04-12 RX ORDER — AMIODARONE HYDROCHLORIDE 200 MG/1
200 TABLET ORAL EVERY EVENING
Status: DISCONTINUED | OUTPATIENT
Start: 2021-04-12 | End: 2021-04-14 | Stop reason: HOSPADM

## 2021-04-12 RX ORDER — AMIODARONE HYDROCHLORIDE 200 MG/1
100 TABLET ORAL
Status: DISCONTINUED | OUTPATIENT
Start: 2021-04-12 | End: 2021-04-14 | Stop reason: HOSPADM

## 2021-04-12 RX ADMIN — CHOLECALCIFEROL TAB 25 MCG (1000 UNIT) 1000 UNITS: 25 TAB at 10:16

## 2021-04-12 RX ADMIN — Medication 400 MG: at 10:16

## 2021-04-12 RX ADMIN — CARVEDILOL 12.5 MG: 12.5 TABLET, FILM COATED ORAL at 20:47

## 2021-04-12 RX ADMIN — SODIUM CHLORIDE, PRESERVATIVE FREE 10 ML: 5 INJECTION INTRAVENOUS at 18:22

## 2021-04-12 RX ADMIN — SACUBITRIL AND VALSARTAN 4 TABLET: 24; 26 TABLET, FILM COATED ORAL at 20:46

## 2021-04-12 RX ADMIN — ATORVASTATIN CALCIUM 40 MG: 40 TABLET, FILM COATED ORAL at 20:46

## 2021-04-12 RX ADMIN — SODIUM CHLORIDE, PRESERVATIVE FREE 10 ML: 5 INJECTION INTRAVENOUS at 10:21

## 2021-04-12 RX ADMIN — THERA TABS 1 TABLET: TAB at 10:16

## 2021-04-12 RX ADMIN — APIXABAN 5 MG: 5 TABLET, FILM COATED ORAL at 20:47

## 2021-04-12 RX ADMIN — SODIUM CHLORIDE, PRESERVATIVE FREE 10 ML: 5 INJECTION INTRAVENOUS at 20:51

## 2021-04-12 RX ADMIN — OMEGA-3-ACID ETHYL ESTERS 1 CAPSULE: 1 CAPSULE, LIQUID FILLED ORAL at 20:47

## 2021-04-12 RX ADMIN — CARVEDILOL 12.5 MG: 12.5 TABLET, FILM COATED ORAL at 10:17

## 2021-04-12 RX ADMIN — OMEGA-3-ACID ETHYL ESTERS 1 CAPSULE: 1 CAPSULE, LIQUID FILLED ORAL at 10:14

## 2021-04-12 RX ADMIN — SPIRONOLACTONE 25 MG: 25 TABLET, FILM COATED ORAL at 10:16

## 2021-04-12 RX ADMIN — PROPOFOL 130 MG: 10 INJECTION, EMULSION INTRAVENOUS at 07:47

## 2021-04-12 RX ADMIN — FUROSEMIDE 40 MG: 10 INJECTION, SOLUTION INTRAMUSCULAR; INTRAVENOUS at 18:16

## 2021-04-12 RX ADMIN — APIXABAN 5 MG: 5 TABLET, FILM COATED ORAL at 10:14

## 2021-04-12 RX ADMIN — FUROSEMIDE 40 MG: 10 INJECTION, SOLUTION INTRAMUSCULAR; INTRAVENOUS at 10:18

## 2021-04-12 RX ADMIN — ISOSORBIDE MONONITRATE 30 MG: 30 TABLET, EXTENDED RELEASE ORAL at 10:20

## 2021-04-12 RX ADMIN — AMIODARONE HYDROCHLORIDE 100 MG: 200 TABLET ORAL at 12:46

## 2021-04-12 RX ADMIN — SACUBITRIL AND VALSARTAN 4 TABLET: 24; 26 TABLET, FILM COATED ORAL at 10:21

## 2021-04-12 RX ADMIN — NIFEDIPINE 90 MG: 90 TABLET, FILM COATED, EXTENDED RELEASE ORAL at 18:51

## 2021-04-12 RX ADMIN — CHOLECALCIFEROL TAB 25 MCG (1000 UNIT) 1000 UNITS: 25 TAB at 20:47

## 2021-04-12 RX ADMIN — AMIODARONE HYDROCHLORIDE 200 MG: 200 TABLET ORAL at 20:47

## 2021-04-12 RX ADMIN — DOXAZOSIN 8 MG: 2 TABLET ORAL at 20:49

## 2021-04-12 NOTE — PROGRESS NOTES
SW met with pt and spouse to complete assessment during quality rounds this morning with RN case manager. Pt is alert and oriented and cooperative with assessment. Pt is a 72year old  male admitted for CHF exacerbation. Pt also had a cardioversion this morning. Pt lives with is spouse in their home in [facility]. Pt has a CPAP, diabetes supplies, and a walker at home to use. Spouse reports that they have a plethora of DME In their basement to use as needed. Pt was not utilizing any services prior to admission and recently returned from their home in [place]. Pt drives and is able to get to appointments. Pt is a full code and follows with Dr Samantha Arevalo as PCP. Pt has advance directives and spouse provided a copy and this was placed on paper chart. ACP note completed with pt. Pt reports that his medications are affordable. Pt plans to return home with spouse at discharge. Pt and spouse deny any discharge needs or concerns at this time. SW will remain available as needed.  Arianne HARDYW 4/12/2021

## 2021-04-12 NOTE — CONSULTS
[place] [address]                                  CONSULTATION    PATIENT NAME: Ascencion Ace                    :        1950  MED REC NO:   [id number]                              ROOM:       1735  ACCOUNT NO:   [de-identified]                           ADMIT DATE: 2021  PROVIDER:     Araceli Chahal    CONSULT DATE:  2021    REASON FOR CONSULT:  1.  CHF. 2.  Atrial fibrillation. HISTORY OF PRESENT ILLNESS:  The patient is a pleasant 43-year-old  gentleman with extensive cardiac history. He had a catheterization on  10/31/2012, that showed severe disease at the bifurcation of the LAD and  diagonal, with unremarkable right coronary artery and circumflex. He  had angioplasty on 2012, placing a 2.75 x 28 mm Promus stent with  a good end result. On 2014, a catheterization showed 70% to 80% in-stent stenosis in  the LAD and diagonal, with unremarkable right coronary artery and  circumflex, EF of 55% to 60%. He had an open heart surgery by Dr. Rossy Ibrahim on 2014, with a LIMA to the LAD and a vein graft to the  diagonal.    On 2017, he had shortness of breath, and a catheterization on  2017, showed 95% disease in a very dominant right coronary artery,  with 90% LAD, patent LIMA to the LAD, with diagonal of 90%, with patent  vein graft to the diagonal, circumflex unremarkable, EF of 50%, and I  placed a 3.5 x 18 mm drug-eluting Xience stent in the right coronary  artery. Last catheterization was on 11/15/2017, that showed widely patent bypass  grafts, with an EF of 55%. He has very symptomatic paroxysmal atrial fibrillation and is  anticoagulated with Eliquis. We did a last cardioversion on 2020. He then went to [place]. He  had been in touch with us in [place] and has actually done quite well.    He did have some episodes of atrial fibrillation in [place] but reverted  to sinus rhythm. He and his wife came back from [place] 5 days ago. He began noticing  increasing shortness of breath as well as edema and ascites for the last  2 weeks in [place]. He denied any chest pain or chest discomfort. Last night, his shortness of breath worsened and he came to the  emergency room and was admitted for CHF. His chest x-ray did show  borderline pulmonary vascular congestion. He does have renal insufficiency, with creatinine about at baseline at  1.36. When I see him this morning, he has been diuresing. He is less short of  breath although still does appear to be short of breath. He has had no  syncope or near syncope. Denies any palpitations. He does have sleep apnea, which is treated with a CPAP mask, which he  uses routinely. He has been markedly helped for his sleep apnea with  his CPAP machine. His enzymes have been negative for myocardial infarction. Dr. Dheeraj Matta saw him via tele-consult last night and recommended diuresis. CARDIAC RISK FACTORS:  Known CAD:  Positive. PTCA:  Positive. Bypass Surgery:  Positive. Hypertension:  Positive. Hyperlipidemia:  Positive. Diabetes:  Positive. Smoking:  Negative. Other Family Members:  Positive. MEDICATIONS AT HOME PRIOR TO ADMISSION:  Adalat 90 mg half a tablet  daily, CoQ10 daily, Celebrex 200 mg b.i.d., Imdur 30 mg daily, Lasix 80  mg b.i.d., Eliquis 5 mg b.i.d., Coreg 12.5 mg b.i.d., Novolin 70/30 54  units t.i.d., Lipitor 40 mg daily, Cardura 8 mg daily, Entresto 97/103  b.i.d., Aldactone 25 mg daily, Trulicity weekly, Amaryl 4 mg half a  tablet b.i.d., fish oil daily, glucosamine daily, vitamin D daily,  Glucophage 1000 mg daily, Cordarone 200 mg daily in PM with 100 mg AM.    PAST MEDICAL AND SURGICAL HISTORY:  1.   Arthroscopic surgery with knee replacement, complicated by fat  embolism in 02/2011.  2.  Insulin-dependent diabetes for 33 years, followed by endocrinologist.  3.  Hypertension many years. 4.  Left and right cataract surgery. 5.  Sleep apnea in 2014, uses a CPAP mask, which has helped markedly. 6.  Paroxysmal atrial fibrillation, on Eliquis b.i.d.  7.  Mild sick sinus syndrome, not requiring a pacemaker. 8.  Chronic back pain, with status post ablation, with injection in the  back. FAMILY HISTORY:  Mother and father with CHF. SOCIAL HISTORY:  He is 72years old. Retired . . Two children. Has a trailer in [place], in which he and his  wife spent the winter, just came last Wednesday, 5 days ago. Daughter  is a professor at [employer] in [place]. Two grandchildren in  [place]. Does not smoke or drink alcohol. Uses a CPAP mask. He has  severe back pain. REVIEW OF SYSTEMS:  Cardiac as above. Other systems reviewed including  constitutional, eyes, ears, nose and throat, cardiovascular,  respiratory, GI, , musculoskeletal, integumentary, neurologic,  endocrine, hematologic and allergic/immunologic are negative except for  what is described above. PHYSICAL EXAMINATION:  VITAL SIGNS:  His blood pressure was 127/59 with a heart rate of 60 and  irregularly irregular. Respirations were 18. GENERAL:  He is a pleasant 64-year-old gentleman. Denied pain. He was  oriented to person, place and time. Answered questions appropriately. SKIN:  No unusual skin changes. HEENT:  The pupils are equally round and intact. Mucous membranes were  dry. NECK:  No JVD. Good carotid pulses. No carotid bruits. No  lymphadenopathy or thyromegaly. CARDIOVASCULAR EXAM:  S1 and S2 were normal.  No S3 or S4. Soft  systolic blowing type murmur. No diastolic murmur. PMI was normal.  No  lift, thrust, or pericardial friction rub. LUNGS:  Quite clear to auscultation and percussion. ABDOMEN:  Soft and nontender. Good bowel sounds. EXTREMITIES:  Good femoral pulses. Good pedal pulses. No pedal edema.    Skin was warm and dry. No calf tenderness. Nail beds pink. Good cap  refill. PULSES:  Bilateral symmetrical radial, brachial and carotid pulses. No  carotid bruits. Good femoral and pedal pulses. NEUROLOGIC EXAM:  Within normal limits. PSYCHIATRIC EXAM:  Within normal limits. LABORATORY DATA:  His sodium was 139, potassium 4.4, BUN 25, creatinine  1.36. His white count 6.2, hemoglobin 12.3 with a platelet count of  327,856. EKG showed atrial fibrillation with left bundle-branch block. IMPRESSION:  1.  CHF. 2.  Paroxysmal atrial fibrillation, atrial fibrillation at this time, on  Eliquis. 3.  Progressive shortness of breath and increased edema and ascites for  the last 2 weeks. 4.  On amiodarone for his paroxysmal atrial fibrillation, with his last  cardioversion on 11/05/2020.  5.  Coronary artery disease. 6.  Catheterization on 10/31/2012, showing severe disease at the  bifurcation of the LAD and diagonal, unremarkable right coronary artery  and circumflex, EF of 55%. 7.  Angioplasty of the LAD and diagonal on 11/07/2012, placing a 2.75 x  28 mm Promus stent in the LAD. 8.  Catheterization on 05/14/2014, showing 70% to 80% LAD and diagonal  in-stent stenosis with an FFR of 0.78, with unremarkable right coronary  artery and circumflex, EF of 55% to 60%. 9.  Open heart surgery on 06/30/2014, by Dr. Tara Noel with a LIMA to the  LAD and a vein graft to the diagonal.  10.  Catheterization on 08/30/2017, showing patent bypass grafts, with  95% disease in the mid right coronary artery, EF of 50%, with  angioplasty of the right coronary artery, placing a 3.5 x 18 mm Xience  stent. 11.  Last catheterization on 11/15/2017, showed widely patent stents and  widely patent bypass grafts. 12.  Sleep apnea, on a CPAP mask, which he uses regularly and it  markedly helped. 13.  Chronic back pain. PLAN:  1. Cardioversion. 2.  Continue amiodarone at 83267 mg daily.   3.  If he would revert back out of sinus rhythm again to atrial  fibrillation, would leave him in atrial fibrillation although would  consider ablation evaluation since he is quite symptomatic with his  atrial fibrillation. 4.  Continue diuresis. 5.  Repeat echocardiogram.    DISCUSSION:  The patient has had increase in edema and ascites for the  last 2 weeks. I suspect that he has been in atrial fibrillation over  the last 2 weeks. He is being diuresed and is already feeling better although still has  marked ascites. We will continue diuresis, but I will also cardiovert him to sinus  rhythm. If he would revert back out of sinus rhythm, I would leave him  off amiodarone but would consider ablation if he remains symptomatic  with his atrial fibrillation. There is no evidence of any acute coronary syndrome and he denies any  chest pain or chest discomfort. He is stable at this time. I  anticipate he would perhaps go home tomorrow. If he does revert back to atrial fibrillation will have him consult with EP for consideration of ablation. Thank you very much for allowing me the privilege of seeing the patient. If you have any questions on my thoughts, please do not hesitate to  contact me.         Christy Kenney    D: 04/12/2021 8:04:58       T: 04/12/2021 9:57:59     [email or web address]  Job#: [id number]     Doc#: [id number]    CC:  Yordy Colon

## 2021-04-12 NOTE — PRE SEDATION
Sedation Pre-Procedure Note    Patient Name: Cade Connolly   YOB: 1950  Room/Bed: [facility]0266-01  Medical Record Number: [id number]  Date: 4/12/2021   Time: 7:34 AM       Indication:  Atrial fibrillation    Consent: I have discussed with the patient and/or the patient representative the indication, alternatives, and the possible risks and/or complications of the planned procedure and the anesthesia methods. The patient and/or patient representative appear to understand and agree to proceed. Vital Signs:   Vitals:    04/12/21 0731   BP: 137/79   Pulse: 83   Resp: 18   Temp:    SpO2: 96%       Past Medical History:   has a past medical history of Arthritis, Arthritis, CAD (coronary artery disease), CHF (congestive heart failure) ([facility] [address] CHF (congestive heart failure) ([facility] [address] Coronary artery disease, Diabetes ([facility] [address] Diabetes mellitus ([facility] [address] H/O coronary angioplasty, Hyperlipidemia, Hypertension, Neuropathy, Numbness and tingling, Pulmonary edema, Sleep apnea, and Unspecified sleep apnea. Past Surgical History:   has a past surgical history that includes knee surgery (2010, 2011); Bunionectomy (2010); Cataract removal (2004); Appendectomy; Appendectomy; eye surgery (8/2012); joint replacement (Bilateral); Cardiac catheterization; Coronary angioplasty with stent; Colonoscopy (10-); Colonoscopy (10/09/2013); Cardiac catheterization (Left, 05/14/14); Cardiac catheterization (Left, 05/14/14); Coronary artery bypass graft (6/3/14); angioplasty (08/30/2017); Cardiac catheterization (Bilateral, 11/15/2017); Coronary artery bypass graft; knee surgery; and joint replacement.     Medications:   Scheduled Meds:    amiodarone  200 mg Oral See Admin Instructions    apixaban  5 mg Oral BID    atorvastatin  40 mg Oral Daily    carvedilol  12.5 mg Oral BID WC    doxazosin  8 mg Oral Daily    isosorbide mononitrate  30 mg Oral Daily    magnesium oxide  400 mg Oral Daily    NIFEdipine  90 mg Oral Daily    insulin lispro protamine & lispro  54 Units Subcutaneous QAM    insulin lispro protamine & lispro  32 Units Subcutaneous Daily before lunch    insulin lispro protamine & lispro  40 Units Subcutaneous Nightly    omega-3 acid ethyl esters  1 capsule Oral BID    sacubitril-valsartan  4 tablet Oral BID    spironolactone  25 mg Oral Daily    Dulaglutide  0.75 mg Subcutaneous Weekly    Vitamin D  1,000 Units Oral BID    furosemide  40 mg Intravenous BID    sodium chloride flush  5-40 mL Intravenous 2 times per day    insulin lispro  0-3 Units Subcutaneous Nightly    multivitamin  1 tablet Oral Daily     Continuous Infusions:    sodium chloride      dextrose       PRN Meds: traMADol, sodium chloride flush, sodium chloride, promethazine **OR** ondansetron, polyethylene glycol, acetaminophen **OR** acetaminophen, glucose, dextrose, glucagon (rDNA), dextrose  Home Meds:   Prior to Admission medications    Medication Sig Start Date End Date Taking?  Authorizing Provider   NIFEdipine (ADALAT CC) 90 MG extended release tablet Take 45 mg by mouth daily   Yes Historical Provider, MD   Coenzyme Q10 (CO Q 10) 100 MG CAPS Take 100 mg by mouth nightly   Yes Historical Provider, MD   acetaminophen (TYLENOL) 500 MG tablet Take 500 mg by mouth See Admin Instructions Take 500 mg in am  Take 1000 mg in Pm   Yes Historical Provider, MD   zinc 50 MG TABS tablet Take 50 mg by mouth daily   Yes Historical Provider, MD   celecoxib (CELEBREX) 200 MG capsule TAKE 1 CAPSULE TWICE DAILY 4/5/21  Yes Malka Russell,    isosorbide mononitrate (IMDUR) 30 MG extended release tablet TAKE 1 TABLET DAILY 3/2/21  Yes Willem Mark MD   furosemide (LASIX) 80 MG tablet Take 1 tab in the am and 1/2 tab pm 2/1/21  Yes Willem Mark MD   apixaban (ELIQUIS) 5 MG TABS tablet TAKE 1 TABLET TWICE A DAY 11/18/20  Yes Willem Mark MD   carvedilol (COREG) 12.5 MG tablet TAKE 1 TABLET TWICE DAILY  WITH MEALS 11/18/20  Yes Willem Mark MD Historical Provider, MD JJ ULTRA-FINE PEN NEEDLES 29G X 12.7MM MISC  8/1/16   Historical Provider, MD   Insulin Syringes, Disposable, U-100 0.3 ML MISC Inject  into the skin. Use prn 7/19/12   Keary Deep A Jump, DO     Coumadin Use Last 7 Days:  no  Antiplatelet drug therapy use last 7 days: no  Other anticoagulant use last 7 days: yes - eliquis  Additional Medication Information:          Pre-Sedation Documentation and Exam:   I have personally completed a history, physical exam & review of systems for this patient (see notes).     Mallampati Airway Assessment:  normal neck range of motion    Prior History of Anesthesia Complications:   none    ASA Classification:  Class 2 - A normal healthy patient with mild systemic disease    Sedation/ Anesthesia Plan:   intravenous sedation    Medications Planned:   propofol intravenously    Patient is an appropriate candidate for plan of sedation: yes    Electronically signed by Willem Mark MD on 4/12/2021 at 7:34 AM

## 2021-04-12 NOTE — PLAN OF CARE
improve  Outcome: Ongoing     Problem: Physical Regulation:  Goal: Complications related to the disease process, condition or treatment will be avoided or minimized  Description: Complications related to the disease process, condition or treatment will be avoided or minimized  Outcome: Ongoing  Goal: Diagnostic test results will improve  Description: Diagnostic test results will improve  Outcome: Ongoing     Problem: Skin Integrity:  Goal: Risk for impaired skin integrity will decrease  Description: Risk for impaired skin integrity will decrease  Outcome: Ongoing     Problem: Cardiac:  Goal: Complications related to the disease process, condition or treatment will be avoided or minimized  Description: Complications related to the disease process, condition or treatment will be avoided or minimized  Outcome: Ongoing  Goal: Diagnostic test results will improve  Description: Diagnostic test results will improve  Outcome: Ongoing     Problem: Respiratory:  Goal: Ability to maintain adequate ventilation will improve  Description: Ability to maintain adequate ventilation will improve  Outcome: Ongoing     Problem:  Activity Intolerance:  Goal: Ability to tolerate increased activity will improve  Outcome: Ongoing

## 2021-04-12 NOTE — ACP (ADVANCE CARE PLANNING)
Advance Care Planning     Advance Care Planning Activator (Inpatient)  Conversation Note      Date of ACP Conversation: 4/12/2021  Conversation Conducted with: Patient with Decision Making Capacity    ACP Activator: [address] [place] [address] Drive Decision Maker:     Current Designated Health Care Decision Maker:     Primary Decision Maker: Karma Blakely - Spouse - [phone]    Secondary Decision Maker: Blanca Correa - Child - [phone]  Click here to complete Healthcare Decision Makers including section of the Healthcare Decision Maker Relationship (ie \"Primary\")  Today we documented Decision Maker(s) consistent with ACP documents on file. Care Preferences    Ventilation: \"If you were in your present state of health and suddenly became very ill and were unable to breathe on your own, what would your preference be about the use of a ventilator (breathing machine) if it were available to you? \"      Would the patient desire the use of ventilator (breathing machine)?: yes    \"If your health worsens and it becomes clear that your chance of recovery is unlikely, what would your preference be about the use of a ventilator (breathing machine) if it were available to you? \"     Would the patient desire the use of ventilator (breathing machine)?: No      Resuscitation  \"CPR works best to restart the heart when there is a sudden event, like a heart attack, in someone who is otherwise healthy. Unfortunately, CPR does not typically restart the heart for people who have serious health conditions or who are very sick. \"    \"In the event your heart stopped as a result of an underlying serious health condition, would you want attempts to be made to restart your heart (answer \"yes\" for attempt to resuscitate) or would you prefer a natural death (answer \"no\" for do not attempt to resuscitate)? \" yes       [] Yes   [x] No   Educated Patient / Claudia Nieto regarding differences between Advance Directives and portable DNR orders.     Length of ACP Conversation in minutes:  15    Conversation Outcomes:  [x] ACP discussion completed  [x] Existing advance directive reviewed with patient; no changes to patient's previously recorded wishes  [] New Advance Directive completed  [] Portable Do Not Rescitate prepared for Provider review and signature  [] POLST/POST/MOLST/MOST prepared for Provider review and signature      Follow-up plan:    [] Schedule follow-up conversation to continue planning  [] Referred individual to Provider for additional questions/concerns   [x] Advised patient/agent/surrogate to review completed ACP document and update if needed with changes in condition, patient preferences or care setting    [x] This note routed to one or more involved healthcare providers

## 2021-04-12 NOTE — PROGRESS NOTES
Cardiology  Appreciate Dr. Felisha Hinds help    1. CHF, progressive in past 2 weeks  2. PAF, in AF now, on Amiodarone 200 mg daily  3.  CAD    He has had progressive sob for 2 weeks with no chest pain. Admitted for diuresis. Currently in AF, which is contributing to his CHF. Will cardiovert this morning to NSR. Continue to diurese. Diana Benito MD    Cardioversion:    Cardioverted to NSR with 100 joules.     Diana Benito MD

## 2021-04-12 NOTE — H&P
Hospital Medicine  History and Physical    Patient:  Mazin Maguire  MRN: [id number]    CHIEF COMPLAINT:  Shortness of breath (SOB)    History Obtained From:  patient  PCP: Rick Galeana DO    HISTORY OF PRESENT ILLNESS:   The patient is a 72 y.o. male who presents to the ER with worsening SOB. According to [facility], for the past several weeks he has been SOB. He has noticed increased swelling in his legs and his abdomen became very tight. He is unable to sleep flat in bed. He denies waking SOB but states he uses CPAP at night which helps a lot with his breathing. [facility] states he has been in [place] but was having labs monthly and communicating with his Cardiologist, Dr. Amaya Orlando, on a regular basis. Over this time he denies episodes of chest pain but states he went back into atrial fibrillation and feels this likely played a role. He states his heart rate is less than 100 most the time. He denies any recent cold symptoms but does have a lot of nasal congestion secondary to allergies. Appetite has been good, no nausea. He states he has been good about taking all of his medications as prescribed including Eliquis. With his worsening SOB he came to the ER. In the ER his CMP was normal other than a BUN of 30, creatinine of 1.59, and a glucose of 220. BNP was elevated at 1,629. Troponin was 33. TSH was 1.65. CBC showed a WBC of 7.4 with a Hgb of 11.6 and Plt ct of 263. COVID was negative. CXR showed borderline pulmonary venous congestion with some minimal atelectasis and Cardiomegaly. ECG showed atrial fibrillation with LBBB. [facility] is admitted for IV diuresis with further evaluation by Cardiology.       Past Medical History:        Diagnosis Date    Arthritis     Arthritis     CAD (coronary artery disease)     CHF (congestive heart failure) (HCC)     CHF (congestive heart failure) (HCC)     Coronary artery disease     Diabetes [facility]     Diabetes mellitus [facility]     H/O coronary angioplasty     Hyperlipidemia     Hypertension     Neuropathy     Numbness and tingling     dannie hands    Pulmonary edema     SECONDARY TO FAT EMBOLI AFTER KNEE SURGERY    Sleep apnea     Unspecified sleep apnea 2012    cpap       Past Surgical History:        Procedure Laterality Date    ANGIOPLASTY  08/30/2017    Dr. Glenis Aquino mm drug eluting Xience stents in the right coronary. DANIELA-3 flow prior to the procedure DANIELA-3 flow following the procedure. The patient has had a good result from his angioplasty of the right coronary artery. Carlos Jointer APPENDECTOMY      APPENDECTOMY      BUNIONECTOMY  2010    CARDIAC CATHETERIZATION      stent pacement    CARDIAC CATHETERIZATION Left 05/14/14    Dr Margret Yoo. [facility] Severe CAD, 70% disese with in-stent stenosis in the left anterior descending at the level of a very large codominant diagonal.  90% disease of the ostium of a very large codominant diagonal with 60% disease beyond the diagonal with a fractional flow reserve of 0.76 in the diagonal and 0.78 in the left anterior descending indicating obstructive disease of significance in both the     CARDIAC CATHETERIZATION Left 05/14/14    left anterior descending & diagonal.. Mild irregularities of 30% in a small, nondominant circumglex. Mild 30- 40% disease in the midportion of a very large,dominant right coronary artery. Normal left ventricular function, ejection fraction of 55-60%    CARDIAC CATHETERIZATION Bilateral 11/15/2017    Dr. Jr Gutierrez @ [facility]--Severe PHT with a PA pressure of 74/30. Widely patent stent in the very large dominat right coronary artery with 50% disease before the stent with an FFR of 0.89, indicating no significant obstruction. Severe disease in the LAD. Patent left internal mammary to the LAD. Patent saphenous vein graft to a very large diagonal.  Overall normal LV function.   EF of 55%    CATARACT REMOVAL  2004    COLONOSCOPY  10-    Dr. Enmanuel Izaguirre (hemorrhoids)    COLONOSCOPY 10/09/2013    CORONARY ANGIOPLASTY WITH STENT PLACEMENT      CORONARY ARTERY BYPASS GRAFT  6/3/14    Dr Silva Liz @ [facility] GRAFT      EYE SURGERY  8/2012    B leakage in back of eyes    JOINT REPLACEMENT Bilateral     knees    JOINT REPLACEMENT      KNEE SURGERY  2010, 2011    BILATERAL KNEE ORTHO    KNEE SURGERY         Medications Prior to Admission:  I obtained, documented, reviewed, and updated the patient's current medications. Prior to Admission medications    Medication Sig Start Date End Date Taking? Authorizing Provider   NIFEdipine (ADALAT CC) 90 MG extended release tablet Take 45 mg by mouth daily   Yes Historical Provider, MD   Coenzyme Q10 (CO Q 10) 100 MG CAPS Take 100 mg by mouth nightly   Yes Historical Provider, MD   acetaminophen (TYLENOL) 500 MG tablet Take 500 mg by mouth See Admin Instructions Take 500 mg in am  Take 1000 mg in Pm   Yes Historical Provider, MD   zinc 50 MG TABS tablet Take 50 mg by mouth daily   Yes Historical Provider, MD   celecoxib (CELEBREX) 200 MG capsule TAKE 1 CAPSULE TWICE DAILY 4/5/21  Yes Miguel A Cornell DO   isosorbide mononitrate (IMDUR) 30 MG extended release tablet TAKE 1 TABLET DAILY 3/2/21  Yes Cori Morales MD   furosemide (LASIX) 80 MG tablet Take 1 tab in the am and 1/2 tab pm 2/1/21  Yes Cori Morales MD   apixaban (ELIQUIS) 5 MG TABS tablet TAKE 1 TABLET TWICE A DAY 11/18/20  Yes Cori Morales MD   carvedilol (COREG) 12.5 MG tablet TAKE 1 TABLET TWICE DAILY  WITH MEALS 11/18/20  Yes Cori Morales MD   traMADol (ULTRAM) 50 MG tablet Take 1 tablet by mouth 2 times daily as needed for Pain. 9/8/20  Yes Historical Provider, MD   NOVOLIN 70/30 (70-30) 100 UNIT/ML injection vial Inject 54 Units into the skin 3 times daily Inject 54 units AM, Inject 32 units at Lunch, Inject 40 units at supper as directed subcutaneously.  9/21/20  Yes Historical Provider, MD   atorvastatin (LIPITOR) 40 MG tablet One daily 7/15/20  Yes Neena Peng,    doxazosin (CARDURA) 8 MG tablet TAKE 1 TABLET DAILY 5/26/20  Yes Ivan Finney MD   sacubitril-valsartan (ENTRESTO)  MG per tablet TAKE 1 TABLET TWICE A DAY 5/26/20  Yes Ivan Finney MD   spironolactone (ALDACTONE) 25 MG tablet Take 25 mg by mouth daily    Yes Historical Provider, MD   TRULICITY 5.82 DH/2.7JN SOPN Inject 0.75 mg into the skin once a week Mondays 2/11/19  Yes Historical Provider, MD   Magnesium Oxide 500 MG TABS Take 500 mg by mouth daily    Yes Historical Provider, MD   glimepiride (AMARYL) 4 MG tablet Take 2 mg by mouth 2 times daily    Yes Historical Provider, MD   Omega-3 Fatty Acids (FISH OIL) 1200 MG CAPS Take 1 capsule by mouth 2 times daily Plus 360 omega 3   Yes Historical Provider, MD   Glucosamine Sulfate 1000 MG CAPS Take 10,000 mg by mouth daily    Yes Historical Provider, MD   Chromium-Cinnamon (CINNAMON PLUS CHROMIUM PO) Take 1,000 mg by mouth 2 times daily    Yes Historical Provider, MD   Multiple Vitamins-Minerals (ONE-A-DAY MENS 50+ ADVANTAGE PO) Take 1 tablet by mouth daily    Yes Historical Provider, MD   Vitamin D (CHOLECALCIFEROL) 1000 UNITS CAPS capsule   Take by mouth 2 times daily Vitamin D 3   Yes Historical Provider, MD   metformin (GLUCOPHAGE) 1000 MG tablet Take 1,000 mg by mouth daily (with breakfast)    Yes Historical Provider, MD   amiodarone (CORDARONE) 200 MG tablet Take 200 mg by mouth See Admin Instructions Take 100 mg in am Take 200mg in pm 2/14/21   Historical Provider, MD   ONE TOUCH ULTRA TEST strip  8/8/16   Historical Provider, MD   BD ULTRA-FINE PEN NEEDLES 29G X 12.7MM [facility]  8/1/16   Historical Provider, MD   Insulin Syringes, Disposable, U-100 0.3 ML MISC Inject  into the skin. Use prn 7/19/12   Rolley Shall A Jump, DO       Allergies:  Patient has no known allergies. Social History:   TOBACCO:   reports that he quit smoking about 45 years ago. He has a 1.50 pack-year smoking history.  He has never used smokeless Bilateral LE edema. No calf tenderness. Neurologic: Mental status: Alert, oriented, thought content appropriate    CBC:   Recent Labs     04/11/21  1424 04/12/21  0415   WBC 7.4 6.2   HGB 11.6* 12.3*    259     BMP:    Recent Labs     04/11/21  1424      K 4.9      CO2 28   BUN 30*   CREATININE 1.59*   GLUCOSE 220*     Hepatic:   Recent Labs     04/11/21  1424   AST 14   ALT 21   BILITOT 0.70   ALKPHOS 75     Troponin: No results for input(s): TROPONINI in the last 72 hours. BNP: No results for input(s): BNP in the last 72 hours. Lipids: No results for input(s): CHOL, HDL in the last 72 hours. Invalid input(s): LDLCALCU  ABGs: No results found for: PHART, PO2ART, GSI3FNM  INR: No results for input(s): INR in the last 72 hours. -----------------------------------------------------------------  PA/lat CXR: Pulmonary congestion. EKG: Interpreted by me:  Atrial fib with LBBB. Assessment and Plan   1. Acute on chronic diastolic heart failure class IV - on Coreg, Entresto, and IV Lasix. 2.  CAD - on Coreg, Nifedipine, Imdur, and Lipitor. 3.  Paroxysmal atrial fibrillation - rate controlled on beta blocker / calcium channel blocker. Has been on Amiodarone. On Eliquis. 4.  DM II - insulin dependant. 5.  Hyperlipidemia - on Lipitor. 6.  OA   7. CKD Stage III - stable. 8.  Obese  9. GHAZALA - on CPAP. Plan:  1. Admit to the hospital on cardiac monitor. 2.  IV Lasix 40 mg BID. 3.  Consult Cardiology. ECHO ordered for today. 4.  Monitor labs closely with diuresis. 5.  Continue previous home medication. 6.  FSBS with Humalog sliding scale. 7.  Continue Eliquis for stroke / DVT prophylaxis. 8.  Full code. Medical Necessity: In patient is appropriate for this patient secondary to the need for IV diuresis and further evaluation / treatment by Cardiology. Estimated length of stay 3 days.       The beneficiary may reasonably be expected to be discharged or transferred to a hospital within 96 hours after admission. Patient Active Problem List   Diagnosis Code    Hypertension I10    Hyperlipidemia E78.5    Coronary artery disease involving native coronary artery of native heart without angina pectoris I25.10    H/O coronary angioplasty Z98.61    Severe nonproliferative diabetic retinopathy with macular edema associated with type 2 diabetes mellitus ([facility] A79.6252    Type 2 diabetes mellitus with peripheral neuropathy ([facility]) E11.42    Diabetic peripheral neuropathy ([facility]) E11.42    GHAZALA (obstructive sleep apnea) G47.33    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, J98.4    Pulmonary HTN ([facility]) I27.20    PAF (paroxysmal atrial fibrillation) ([facility]) I48.0    Lumbosacral spondylosis without myelopathy M47.817    Pars defect with spondylolisthesis M43.00, M43.10    Spinal stenosis of lumbar region without neurogenic claudication M48.061    CHF exacerbation ([facility]) I50.9    Acute on chronic diastolic CHF (congestive heart failure) ([facility]) A25.63    Systolic congestive heart failure ([facility]) I50.20    Type 2 MI (myocardial infarction) ([facility] I21. A1       DVT prophylaxis:   [] Lovenox   [] SCDs   [] SQ Heparin   [] Encourage ambulation, low risk for DVT, no chemical or mechanical    prophylaxis necessary      [x] Already on Anticoagulation    Heart Failure     ( )  The patient has current or prior documentation of left ventricular ejection fraction (LVEF) less than 40%, or moderate or severely depressed left ventricular systolic function. Answer both:   (x) The patient was prescribed or already taking an Angiotensin -Converting Enzyme (ACE) Inhibitor, or Angiotensin Receptor Blocker (ARB). (x) The patient was prescribed or already taking a beta - blocker. If Yes to both, stop here. ( )  Patient not prescribed / taking:   ( ) ACE or ARB for medical/patient/system reason(s) including ( ) (ex.   Allergy, intolerance, contraindication)   ( ) Beta - blocker for medical/patient/system reason(s) include ( ) (ex. Allergy, intolerance, contraindication)  ( )  Patient not prescribed / taking:   ( ) ACE or ARB, no reason given (does not satisfy MIPS performance)   ( ) Beta - blocker, no reason given (does not satisfy MIPS performance)    Advanced Care Plan    (x)  I confirmed that the patient's [address] is present, code status documented, or surrogate decision maker is listed in the patient's medical record. ( )  The patient's advanced care plan is not present because:  (select)   ( ) I confirmed today that the patient does not wish or was not able to name a surrogate decision maker or provide an [address]. ( ) Hospice care is currently being provided or has been provided this calender year. ( )  I did not confirm today the presence of an [address] or surrogate decision maker documented within the patient's medical record. (Does not satisfy MIPS performance). Documentation of Current Medications in the Medical Record    (x)  I have utilized all available immediate resources to obtain, update, or review the patient's current medications. If Yes, Stop Here  ( ) The patient is not eligible for medications reconciliation; the patient is in an emergent medical situation where delaying treatment would jeopardize the patient's health. ( ) I did not confirm, update or review the patient's current list of medications today.   (does not satisfy MIPS performance)      Jina Sullivan MD  Admitting Hospitalist

## 2021-04-12 NOTE — PROGRESS NOTES
Pt sitting on side of bed without difficulty. Pt states that he feels better and believes that his abdomen is softer. Pt denies any needs at this time.

## 2021-04-12 NOTE — PROGRESS NOTES
Pt cardioverted per Dr. Bobby Hernández at 100 joules and converts to [facility] in the 60's. Pt awakens easily. Wife at bedside.

## 2021-04-12 NOTE — PROGRESS NOTES
Edema, Weight     Discharge Planning:    Continue current diet     Electronically signed by Jay Araiza RD, LD on 4/12/21 at 9:49 AM EDT    Contact: [phone]

## 2021-04-12 NOTE — PROGRESS NOTES
Spoke with Dr. Sarah Arguelles with supervisor Maira's cell phone and he clarified it is okay to cut the adalat 90 mg CC tablet 1/2 tablet= 45 mg. Verified patient's home medication.     Electronically signed by Tor Prajapati, [address] [place] [address] on 4/12/2021 at 5:03 PM

## 2021-04-13 ENCOUNTER — APPOINTMENT (OUTPATIENT)
Dept: NUCLEAR MEDICINE | Age: 71
DRG: 280 | End: 2021-04-13
Payer: MEDICARE

## 2021-04-13 ENCOUNTER — TELEPHONE (OUTPATIENT)
Dept: FAMILY MEDICINE CLINIC | Age: 71
End: 2021-04-13

## 2021-04-13 LAB
ANION GAP SERPL CALCULATED.3IONS-SCNC: 7 MMOL/L (ref 9–17)
BUN BLDV-MCNC: 31 MG/DL (ref 8–23)
BUN/CREAT BLD: 21 (ref 9–20)
CALCIUM SERPL-MCNC: 9.2 MG/DL (ref 8.6–10.4)
CHLORIDE BLD-SCNC: 102 MMOL/L (ref 98–107)
CO2: 30 MMOL/L (ref 20–31)
CREAT SERPL-MCNC: 1.49 MG/DL (ref 0.7–1.2)
GFR AFRICAN AMERICAN: 57 ML/MIN
GFR NON-AFRICAN AMERICAN: 47 ML/MIN
GFR SERPL CREATININE-BSD FRML MDRD: ABNORMAL ML/MIN/{1.73_M2}
GFR SERPL CREATININE-BSD FRML MDRD: ABNORMAL ML/MIN/{1.73_M2}
GLUCOSE BLD-MCNC: 120 MG/DL (ref 70–99)
GLUCOSE BLD-MCNC: 152 MG/DL (ref 65–99)
GLUCOSE BLD-MCNC: 152 MG/DL (ref 65–99)
GLUCOSE BLD-MCNC: 156 MG/DL (ref 65–99)
GLUCOSE BLD-MCNC: 172 MG/DL (ref 65–99)
POTASSIUM SERPL-SCNC: 4.2 MMOL/L (ref 3.7–5.3)
SODIUM BLD-SCNC: 139 MMOL/L (ref 135–144)

## 2021-04-13 PROCEDURE — 6370000000 HC RX 637 (ALT 250 FOR IP): Performed by: INTERNAL MEDICINE

## 2021-04-13 PROCEDURE — 94761 N-INVAS EAR/PLS OXIMETRY MLT: CPT

## 2021-04-13 PROCEDURE — 3430000000 HC RX DIAGNOSTIC RADIOPHARMACEUTICAL: Performed by: INTERNAL MEDICINE

## 2021-04-13 PROCEDURE — 1200000000 HC SEMI PRIVATE

## 2021-04-13 PROCEDURE — 6360000002 HC RX W HCPCS: Performed by: INTERNAL MEDICINE

## 2021-04-13 PROCEDURE — 80048 BASIC METABOLIC PNL TOTAL CA: CPT

## 2021-04-13 PROCEDURE — 99232 SBSQ HOSP IP/OBS MODERATE 35: CPT | Performed by: INTERNAL MEDICINE

## 2021-04-13 PROCEDURE — 82947 ASSAY GLUCOSE BLOOD QUANT: CPT

## 2021-04-13 PROCEDURE — A9500 TC99M SESTAMIBI: HCPCS | Performed by: INTERNAL MEDICINE

## 2021-04-13 PROCEDURE — 2580000003 HC RX 258: Performed by: INTERNAL MEDICINE

## 2021-04-13 PROCEDURE — 78452 HT MUSCLE IMAGE SPECT MULT: CPT

## 2021-04-13 PROCEDURE — 36415 COLL VENOUS BLD VENIPUNCTURE: CPT

## 2021-04-13 PROCEDURE — 93017 CV STRESS TEST TRACING ONLY: CPT

## 2021-04-13 RX ORDER — NITROGLYCERIN 0.4 MG/1
0.4 TABLET SUBLINGUAL EVERY 5 MIN PRN
Status: CANCELLED | OUTPATIENT
Start: 2021-04-13 | End: 2021-04-13

## 2021-04-13 RX ORDER — SODIUM CHLORIDE 9 MG/ML
500 INJECTION, SOLUTION INTRAVENOUS CONTINUOUS PRN
Status: CANCELLED | OUTPATIENT
Start: 2021-04-13 | End: 2021-04-13

## 2021-04-13 RX ORDER — ATROPINE SULFATE 0.1 MG/ML
0.5 INJECTION INTRAVENOUS EVERY 5 MIN PRN
Status: CANCELLED | OUTPATIENT
Start: 2021-04-13 | End: 2021-04-13

## 2021-04-13 RX ORDER — AMINOPHYLLINE DIHYDRATE 25 MG/ML
50 INJECTION, SOLUTION INTRAVENOUS PRN
Status: ACTIVE | OUTPATIENT
Start: 2021-04-13 | End: 2021-04-13

## 2021-04-13 RX ORDER — METOPROLOL TARTRATE 5 MG/5ML
5 INJECTION INTRAVENOUS EVERY 5 MIN PRN
Status: CANCELLED | OUTPATIENT
Start: 2021-04-13 | End: 2021-04-13

## 2021-04-13 RX ORDER — ALBUTEROL SULFATE 2.5 MG/3ML
2.5 SOLUTION RESPIRATORY (INHALATION) EVERY 6 HOURS PRN
Status: CANCELLED | OUTPATIENT
Start: 2021-04-13

## 2021-04-13 RX ORDER — SODIUM CHLORIDE 0.9 % (FLUSH) 0.9 %
5-40 SYRINGE (ML) INJECTION PRN
Status: ACTIVE | OUTPATIENT
Start: 2021-04-13 | End: 2021-04-13

## 2021-04-13 RX ADMIN — OMEGA-3-ACID ETHYL ESTERS 1 CAPSULE: 1 CAPSULE, LIQUID FILLED ORAL at 12:20

## 2021-04-13 RX ADMIN — DOXAZOSIN 8 MG: 2 TABLET ORAL at 20:28

## 2021-04-13 RX ADMIN — FUROSEMIDE 40 MG: 10 INJECTION, SOLUTION INTRAMUSCULAR; INTRAVENOUS at 12:23

## 2021-04-13 RX ADMIN — ISOSORBIDE MONONITRATE 30 MG: 30 TABLET, EXTENDED RELEASE ORAL at 12:19

## 2021-04-13 RX ADMIN — SODIUM CHLORIDE, PRESERVATIVE FREE 10 ML: 5 INJECTION INTRAVENOUS at 20:28

## 2021-04-13 RX ADMIN — FUROSEMIDE 40 MG: 10 INJECTION, SOLUTION INTRAMUSCULAR; INTRAVENOUS at 18:28

## 2021-04-13 RX ADMIN — CHOLECALCIFEROL TAB 25 MCG (1000 UNIT) 1000 UNITS: 25 TAB at 20:29

## 2021-04-13 RX ADMIN — ATORVASTATIN CALCIUM 40 MG: 40 TABLET, FILM COATED ORAL at 20:28

## 2021-04-13 RX ADMIN — Medication 400 MG: at 12:28

## 2021-04-13 RX ADMIN — NIFEDIPINE 90 MG: 90 TABLET, FILM COATED, EXTENDED RELEASE ORAL at 12:24

## 2021-04-13 RX ADMIN — CHOLECALCIFEROL TAB 25 MCG (1000 UNIT) 1000 UNITS: 25 TAB at 12:21

## 2021-04-13 RX ADMIN — CARVEDILOL 12.5 MG: 12.5 TABLET, FILM COATED ORAL at 20:28

## 2021-04-13 RX ADMIN — SACUBITRIL AND VALSARTAN 4 TABLET: 24; 26 TABLET, FILM COATED ORAL at 12:19

## 2021-04-13 RX ADMIN — SODIUM CHLORIDE, PRESERVATIVE FREE 10 ML: 5 INJECTION INTRAVENOUS at 12:32

## 2021-04-13 RX ADMIN — TETRAKIS(2-METHOXYISOBUTYLISOCYANIDE)COPPER(I) TETRAFLUOROBORATE 10 MILLICURIE: 1 INJECTION, POWDER, LYOPHILIZED, FOR SOLUTION INTRAVENOUS at 09:29

## 2021-04-13 RX ADMIN — CARVEDILOL 12.5 MG: 12.5 TABLET, FILM COATED ORAL at 12:21

## 2021-04-13 RX ADMIN — REGADENOSON 0.4 MG: 0.08 INJECTION, SOLUTION INTRAVENOUS at 10:47

## 2021-04-13 RX ADMIN — ACETAMINOPHEN 650 MG: 325 TABLET, FILM COATED ORAL at 23:23

## 2021-04-13 RX ADMIN — THERA TABS 1 TABLET: TAB at 12:20

## 2021-04-13 RX ADMIN — AMIODARONE HYDROCHLORIDE 200 MG: 200 TABLET ORAL at 20:28

## 2021-04-13 RX ADMIN — APIXABAN 5 MG: 5 TABLET, FILM COATED ORAL at 20:29

## 2021-04-13 RX ADMIN — SPIRONOLACTONE 25 MG: 25 TABLET, FILM COATED ORAL at 12:20

## 2021-04-13 RX ADMIN — AMIODARONE HYDROCHLORIDE 100 MG: 200 TABLET ORAL at 12:20

## 2021-04-13 RX ADMIN — SODIUM CHLORIDE, PRESERVATIVE FREE 10 ML: 5 INJECTION INTRAVENOUS at 10:47

## 2021-04-13 RX ADMIN — TETRAKIS(2-METHOXYISOBUTYLISOCYANIDE)COPPER(I) TETRAFLUOROBORATE 30 MILLICURIE: 1 INJECTION, POWDER, LYOPHILIZED, FOR SOLUTION INTRAVENOUS at 11:48

## 2021-04-13 RX ADMIN — SACUBITRIL AND VALSARTAN 4 TABLET: 24; 26 TABLET, FILM COATED ORAL at 20:28

## 2021-04-13 RX ADMIN — APIXABAN 5 MG: 5 TABLET, FILM COATED ORAL at 12:21

## 2021-04-13 RX ADMIN — OMEGA-3-ACID ETHYL ESTERS 1 CAPSULE: 1 CAPSULE, LIQUID FILLED ORAL at 20:29

## 2021-04-13 ASSESSMENT — PAIN DESCRIPTION - LOCATION: LOCATION: FOOT

## 2021-04-13 ASSESSMENT — PAIN SCALES - GENERAL
PAINLEVEL_OUTOF10: 0
PAINLEVEL_OUTOF10: 8

## 2021-04-13 NOTE — PROGRESS NOTES
Pt up to walk around one half of nurses station 3 times. Pt is slightly dyspnic upon return. SPO2 maintained at 94% while ambulating. Pt returns to room and denies any complaints of chest discomfort, just slight dyspnea.

## 2021-04-13 NOTE — PROGRESS NOTES
Pt resting in bed, states that he is frustrated that he has to have a stress test today. Questions answered.   Pt denies any needs at this time

## 2021-04-13 NOTE — PROGRESS NOTES
Hospitalist Progress Note  4/13/2021 5:33 AM  Subjective:   Admit Date: 4/11/2021  PCP: Rodriguez Padron DO    Interval History: Scooter Lamb states he is feeling much better. No chest pain and his breathing is getting close to baseline. He continues in sinus after being cardioverted yesterday. Appetite is okay, no nausea. No BM yesterday. He continues to urinate frequently on Lasix. Diet: DIET LOW SODIUM 2 GM; Medications:   Scheduled Meds:   amiodarone  100 mg Oral Daily with breakfast    amiodarone  200 mg Oral QPM    insulin 70-30  60 Units Subcutaneous QAM AC    insulin 70-30  25 Units Subcutaneous Daily before lunch    insulin 70-30  20 Units Subcutaneous Dinner    NIFEdipine  90 mg Oral Daily    insulin 70-30  35 Units Subcutaneous Nightly    insulin lispro  0-6 Units Subcutaneous TID WC    insulin lispro  0-3 Units Subcutaneous Nightly    apixaban  5 mg Oral BID    atorvastatin  40 mg Oral Daily    carvedilol  12.5 mg Oral BID WC    doxazosin  8 mg Oral Daily    isosorbide mononitrate  30 mg Oral Daily    magnesium oxide  400 mg Oral Daily    omega-3 acid ethyl esters  1 capsule Oral BID    sacubitril-valsartan  4 tablet Oral BID    spironolactone  25 mg Oral Daily    Dulaglutide  0.75 mg Subcutaneous Weekly    Vitamin D  1,000 Units Oral BID    furosemide  40 mg Intravenous BID    sodium chloride flush  5-40 mL Intravenous 2 times per day    insulin lispro  0-3 Units Subcutaneous Nightly    multivitamin  1 tablet Oral Daily     Continuous Infusions:   sodium chloride      dextrose         Patient's current medications documented, reviewed, and updated.       CBC:   Recent Labs     04/11/21  1424 04/12/21  0415   WBC 7.4 6.2   HGB 11.6* 12.3*    259     BMP:    Recent Labs     04/11/21  1424 04/12/21  0415 04/13/21  0410    139 139   K 4.9 4.4 4.2    102 102   CO2 28 29 30   BUN 30* 25* 31*   CREATININE 1.59* 1.36* 1.49*   GLUCOSE 220* 140* 120*     Hepatic: Recent Labs     04/11/21  1424   AST 14   ALT 21   BILITOT 0.70   ALKPHOS 75     Troponin: No results for input(s): TROPONINI in the last 72 hours. BNP: No results for input(s): BNP in the last 72 hours. Lipids:   Recent Labs     04/12/21  0415   CHOL 98   HDL 25*     INR: No results for input(s): INR in the last 72 hours. Objective:   Vitals: /78   Pulse 61   Temp 98.4 °F (36.9 °C) (Oral)   Resp 18   Ht 5' 10\" (1.778 m)   Wt 259 lb 6.4 oz (117.7 kg)   SpO2 94%   BMI 37.22 kg/m²   General appearance: alert and cooperative with exam  HEENT: Head: Normocephalic, no lesions, without obvious abnormality. Eye: Normal external eye, conjunctiva, lids cornea, COLLIN. Nose: Normal external nose, mucus membranes and septum. Neck: no adenopathy, no carotid bruit and supple, symmetrical, trachea midline  Lungs: Few basilar rales posteriorly. Heart: regular rate and rhythm and S1, S2 normal  Abdomen: soft, non-tender; bowel sounds normal; no masses,  no organomegaly and obese. Extremities: 1 + LE edema. No calf tenderness. Neurologic: Mental status: Alert, oriented, thought content appropriate    Assessment and Plan:   1. Acute on chronic diastolic heart failure class IV - on Coreg, Entresto, and IV Lasix. Significant improvement in symptoms. 2.  CAD - on Coreg, Nifedipine, Imdur, and Lipitor. 3.  Paroxysmal atrial fibrillation - Cardioverted on 4/12/21. On beta blocker / calcium channel blocker. Has been on Amiodarone. On Eliquis. 4.  DM II - insulin dependant. Placed on a Humalog sliding scale. 5.  Hyperlipidemia - on Lipitor. 6.  OA   7. CKD Stage III - stable. 8.  Obese  9. GHAZALA - on CPAP. Plan:  1. Chelo Gleason is doing well this am.  Weight is down 4 lbs and he feels his breathing is getting back to baseline. 2.  Will discuss DC planning with Dr. Halle Aldrich. Patient continues to require in patient admission secondary to the need for IV Lasix.     DVT prophylaxis:   [] Lovenox   [] SCDs   [] SQ Heparin   [] Encourage ambulation, low risk for DVT, no chemical or mechanical    prophylaxis necessary      [x] Already on Anticoagulation    Patient Active Problem List:     Hypertension     Hyperlipidemia     Coronary artery disease involving native coronary artery of native heart without angina pectoris     H/O coronary angioplasty     Severe nonproliferative diabetic retinopathy with macular edema associated with type 2 diabetes mellitus (HCC)     Type 2 diabetes mellitus with peripheral neuropathy (HCC)     Diabetic peripheral neuropathy (HCC)     GHAZALA (obstructive sleep apnea)     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HCC)     Pulmonary HTN (HCC)     PAF (paroxysmal atrial fibrillation) (HCC)     Lumbosacral spondylosis without myelopathy     Pars defect with spondylolisthesis     Spinal stenosis of lumbar region without neurogenic claudication     CHF exacerbation (HCC)     Acute on chronic diastolic CHF (congestive heart failure) (HCC)     Systolic congestive heart failure (HCC)     Type 2 MI (myocardial infarction) ([facility]        Advanced Care Plan    (x)  I confirmed that the patient's Advanced Care Plan is present, code status documented, or surrogate decision maker is listed in the patient's medical record. ( )  The patient's advanced care plan is not present because:  (select)   ( ) I confirmed today that the patient does not wish or was not able to name a surrogate decision maker or provide an [address] ( ) Hospice care is currently being provided or has been provided this calender year. ( )  I did not confirm today the presence of an [address] or surrogate decision maker documented within the patient's medical record. (Does not satisfy MIPS performance). Documentation of Current Medications in the Medical Record    (x)  I have utilized all available immediate resources to obtain, update, or review the patient's current medications.   If Yes, Stop Here  ( ) The patient is not eligivel for medications reconciliation; the patient is in an emergent medical situation where delaying treatment would jeopardize the patient's health. ( ) I did not confirm, update or review the patient's current list of medications today.   (does not satisfy [facility] performance)      Loel Koyanagi, MD  [facility] Hospitalist

## 2021-04-13 NOTE — DISCHARGE SUMMARY
°C) (Oral)   Resp 18   Ht 5' 10\" (1.778 m)   Wt 256 lb 3.2 oz (116.2 kg)   SpO2 95%   BMI 36.76 kg/m²   General appearance: alert and cooperative with exam  HEENT: Head: Normocephalic, no lesions, without obvious abnormality. Eye: Normal external eye, conjunctiva, lids cornea, COLLIN. Nose: Normal external nose, mucus membranes and septum. Neck: no adenopathy, no carotid bruit and supple, symmetrical, trachea midline  Lungs: clear to auscultation bilaterally  Heart: regular rate and rhythm, S1, S2 normal and II/VI systolic murmur. Abdomen: soft, non-tender; bowel sounds normal; no masses,  no organomegaly and obese. Extremities: No calf tenderness and edema is much improved. Pain over the mid 5th metatarsal with palpation. Neurologic: Mental status: Alert, oriented, thought content appropriate    Discharge Medications:       Pat Severino"   Home Medication Instructions NNS:[id number]    Printed on:04/14/21 5670   Medication Information                      acetaminophen (TYLENOL) 500 MG tablet  Take 500 mg by mouth See Admin Instructions Take 500 mg in am  Take 1000 mg in Pm             amiodarone (CORDARONE) 200 MG tablet  Take 200 mg by mouth See Admin Instructions Take 100 mg in am Take 200mg in pm             apixaban (ELIQUIS) 5 MG TABS tablet  TAKE 1 TABLET TWICE A DAY             atorvastatin (LIPITOR) 40 MG tablet  One daily             BD ULTRA-FINE PEN NEEDLES 29G X 12.7MM MISC               carvedilol (COREG) 12.5 MG tablet  TAKE 1 TABLET TWICE DAILY  WITH MEALS             Chromium-Cinnamon (CINNAMON PLUS CHROMIUM PO)  Take 1,000 mg by mouth 2 times daily              Coenzyme Q10 (CO Q 10) 100 MG CAPS  Take 100 mg by mouth nightly             colchicine (COLCRYS) 0.6 MG tablet  Take 1 tablet every hour until pain resolves (maximum 3 per day) x 3 days.              doxazosin (CARDURA) 8 MG tablet  TAKE 1 TABLET DAILY             furosemide (LASIX) 80 MG tablet  Take 1 tab in the am and 1/2 tab pm             glimepiride (AMARYL) 4 MG tablet  Take 2 mg by mouth 2 times daily              Glucosamine Sulfate 1000 MG CAPS  Take 10,000 mg by mouth daily              Insulin Syringes, Disposable, U-100 0.3 ML MISC  Inject  into the skin. Use prn             isosorbide mononitrate (IMDUR) 30 MG extended release tablet  TAKE 1 TABLET DAILY             Magnesium Oxide 500 MG TABS  Take 500 mg by mouth daily              metformin (GLUCOPHAGE) 1000 MG tablet  Take 1,000 mg by mouth daily (with breakfast)              Multiple Vitamins-Minerals (ONE-A-DAY MENS 50+ ADVANTAGE PO)  Take 1 tablet by mouth daily              NIFEdipine (ADALAT CC) 90 MG extended release tablet  Take 45 mg by mouth daily             NOVOLIN 70/30 (70-30) 100 UNIT/ML injection vial  Inject 54 Units into the skin 3 times daily Inject 54 units AM, Inject 32 units at Lunch, Inject 40 units at supper as directed subcutaneously. Omega-3 Fatty Acids (FISH OIL) 1200 MG CAPS  Take 1 capsule by mouth 2 times daily Plus 360 omega 3             ONE TOUCH ULTRA TEST strip               sacubitril-valsartan (ENTRESTO)  MG per tablet  TAKE 1 TABLET TWICE A DAY             spironolactone (ALDACTONE) 25 MG tablet  Take 25 mg by mouth daily              traMADol (ULTRAM) 50 MG tablet  Take 1 tablet by mouth 2 times daily as needed for Pain. TRULICITY 6.96 DN/6.0DL SOPN  Inject 0.75 mg into the skin once a week Mondays             Vitamin D (CHOLECALCIFEROL) 1000 UNITS CAPS capsule    Take by mouth 2 times daily Vitamin D 3             zinc 50 MG TABS tablet  Take 50 mg by mouth daily                 Consults:  Dr. Rancho Waddell. Significant Diagnostic Studies:  Labs, ECG, CXR, COVID testing, Lexiscan Myoview stress test, Xray right foot. Treatments:   IV Lasix, Cardioversion. Disposition:   Home.     Discharge Instructions:  Continue the previous home medication but discontinue Celebrex as this can

## 2021-04-13 NOTE — PROGRESS NOTES
Call to Dr Pako Farias office for f/u appt.  Per Hailey Chacko, Dr Rita Salinas stated it is ok to keep his previously scheduled appt on May 6 @ 1030am. appt time added to discharge AVS.

## 2021-04-13 NOTE — PROGRESS NOTES
Lexiscan administered, pt reports shortness of breath. 10:53 Pt reports shortness of breath is better. 10:54 Pt eloy Rj Brar well.  Pt return to 266 via wheel chair

## 2021-04-13 NOTE — PLAN OF CARE
Problem: Falls - Risk of:  Goal: Will remain free from falls  Description: Will remain free from falls  4/13/2021 0404 by Arnol Carvalho RN  Outcome: Ongoing  4/12/2021 1710 by Manuel Shaw RN  Outcome: Ongoing  4/12/2021 1709 by Manuel Shaw RN  Outcome: Ongoing  Goal: Absence of physical injury  Description: Absence of physical injury  Outcome: Ongoing     Problem:  Activity:  Goal: Risk for activity intolerance will decrease  Description: Risk for activity intolerance will decrease  4/13/2021 0404 by Arnol Carvalho RN  Outcome: Ongoing  4/12/2021 1710 by Manuel Shaw RN  Outcome: Ongoing  Goal: Capacity to carry out activities will improve  Description: Capacity to carry out activities will improve  4/13/2021 0404 by Arnol Carvalho RN  Outcome: Ongoing  4/12/2021 1710 by Manuel Shaw RN  Outcome: Ongoing  Goal: Will verbalize the importance of balancing activity with adequate rest periods  Description: Will verbalize the importance of balancing activity with adequate rest periods  4/13/2021 0404 by Arnol Carvalho RN  Outcome: Ongoing  4/12/2021 1710 by Manuel Shaw RN  Outcome: Ongoing  Goal: Ability to tolerate increased activity will improve  4/13/2021 0404 by Arnol Carvalho RN  Outcome: Ongoing  4/12/2021 1710 by Manuel Shaw RN  Outcome: Ongoing  Goal: Expression of feelings of increased energy will increase  4/12/2021 1710 by Manuel Shaw RN  Outcome: Ongoing     Problem: Coping:  Goal: Ability to adjust to condition or change in health will improve  Description: Ability to adjust to condition or change in health will improve  4/13/2021 0404 by Arnol Carvalho RN  Outcome: Ongoing  4/12/2021 1710 by Manuel Shaw RN  Outcome: Ongoing  Goal: Verbalizations of decreased anxiety will decrease  Description: Verbalizations of decreased anxiety will decrease  4/13/2021 0404 by Arnol Carvalho RN  Outcome: Ongoing  4/12/2021 1710 by Deepa Canas RN  Outcome: Ongoing  Goal: Ability to verbalize feelings about condition will improve  Description: Ability to verbalize feelings about condition will improve  Outcome: Ongoing  Goal: Ability to identify strategies to decrease anxiety will improve  Outcome: Ongoing  Goal: Level of anxiety will decrease  Outcome: Ongoing  Goal: General experience of comfort will improve  4/13/2021 0404 by Mercy Ying RN  Outcome: Ongoing  4/12/2021 1710 by Deepa Canas RN  Outcome: Ongoing     Problem: Fluid Volume:  Goal: Ability to maintain a balanced intake and output will improve  Description: Ability to maintain a balanced intake and output will improve  4/13/2021 0404 by Mercy Ying RN  Outcome: Ongoing  4/12/2021 1710 by Deepa Canas RN  Outcome: Ongoing  Goal: Maintenance of adequate hydration will improve  Description: Maintenance of adequate hydration will improve  Outcome: Ongoing  Goal: Will show no signs and symptoms of electrolyte imbalance  Description: Will show no signs and symptoms of electrolyte imbalance  Outcome: Ongoing     Problem: Health Behavior:  Goal: Ability to manage health-related needs will improve  Description: Ability to manage health-related needs will improve  4/13/2021 0404 by Mercy Ying RN  Outcome: Ongoing  4/12/2021 1710 by Deepa Canas RN  Outcome: Ongoing  Goal: Ability to identify changes in lifestyle to reduce recurrence of condition will improve  Outcome: Ongoing     Problem: Metabolic:  Goal: Ability to maintain appropriate glucose levels will improve  Description: Ability to maintain appropriate glucose levels will improve  4/12/2021 1710 by Deepa Canas RN  Outcome: Ongoing     Problem: Nutritional:  Goal: Maintenance of adequate nutrition will improve  Description: Maintenance of adequate nutrition will improve  4/13/2021 0404 by Mercy Ying RN  Outcome: Ongoing  4/12/2021 1710 by James Denise RN  Outcome: Ongoing  Goal: Progress toward achieving an optimal weight will improve  Description: Progress toward achieving an optimal weight will improve  Outcome: Ongoing     Problem: Physical Regulation:  Goal: Complications related to the disease process, condition or treatment will be avoided or minimized  Description: Complications related to the disease process, condition or treatment will be avoided or minimized  4/13/2021 0404 by Peter Ruff RN  Outcome: Ongoing  4/12/2021 1710 by James Denise RN  Outcome: Ongoing  Goal: Diagnostic test results will improve  Description: Diagnostic test results will improve  4/13/2021 0404 by Peter Ruff RN  Outcome: Ongoing  4/12/2021 1710 by James Denise RN  Outcome: Ongoing     Problem: Skin Integrity:  Goal: Risk for impaired skin integrity will decrease  Description: Risk for impaired skin integrity will decrease  4/12/2021 1710 by James Denise RN  Outcome: Ongoing     Problem: Cardiac:  Goal: Complications related to the disease process, condition or treatment will be avoided or minimized  Description: Complications related to the disease process, condition or treatment will be avoided or minimized  4/13/2021 0404 by Peter Ruff RN  Outcome: Ongoing  4/12/2021 1710 by James Denise RN  Outcome: Ongoing  Goal: Diagnostic test results will improve  Description: Diagnostic test results will improve  4/13/2021 0404 by Peter Ruff RN  Outcome: Ongoing  4/12/2021 1710 by James Denise RN  Outcome: Ongoing     Problem: Respiratory:  Goal: Ability to maintain adequate ventilation will improve  Description: Ability to maintain adequate ventilation will improve  4/12/2021 1710 by James Denise RN  Outcome: Ongoing     Problem:  Activity Intolerance:  Goal: Ability to tolerate increased activity will improve  4/13/2021 0404 by Kingston Bennett Fariba Hamilton RN  Outcome: Ongoing  4/12/2021 1710 by Jeffery Casper RN  Outcome: Ongoing     Problem: Anxiety/Stress:  Goal: Level of anxiety will decrease  Outcome: Ongoing     Problem: Mental Status - Impaired:  Goal: Absence of physical injury  Description: Absence of physical injury  Outcome: Ongoing

## 2021-04-13 NOTE — PLAN OF CARE
will improve  Outcome: Ongoing  Goal: Ability to verbalize feelings about condition will improve  Description: Ability to verbalize feelings about condition will improve  4/13/2021 1058 by Jim Hatch RN  Outcome: Ongoing  4/13/2021 0404 by Hanh Dick RN  Outcome: Ongoing  Goal: Ability to identify strategies to decrease anxiety will improve  4/13/2021 0404 by Hanh Dick RN  Outcome: Ongoing  Goal: Level of anxiety will decrease  4/13/2021 0404 by Hanh Dick RN  Outcome: Ongoing  Goal: General experience of comfort will improve  4/13/2021 1058 by Jim Hatch RN  Outcome: Ongoing  4/13/2021 0404 by Hanh Dick RN  Outcome: Ongoing     Problem: Fluid Volume:  Goal: Ability to maintain a balanced intake and output will improve  Description: Ability to maintain a balanced intake and output will improve  4/13/2021 1058 by Jim Hatch RN  Outcome: Ongoing  4/13/2021 0404 by Hanh Dick RN  Outcome: Ongoing  Goal: Maintenance of adequate hydration will improve  Description: Maintenance of adequate hydration will improve  4/13/2021 0404 by Hanh Dick RN  Outcome: Ongoing  Goal: Will show no signs and symptoms of electrolyte imbalance  Description: Will show no signs and symptoms of electrolyte imbalance  4/13/2021 0404 by Hanh Dick RN  Outcome: Ongoing     Problem: Health Behavior:  Goal: Ability to manage health-related needs will improve  Description: Ability to manage health-related needs will improve  4/13/2021 1058 by Jim Hatch RN  Outcome: Ongoing  4/13/2021 0404 by Hanh Dick RN  Outcome: Ongoing  Goal: Ability to identify changes in lifestyle to reduce recurrence of condition will improve  4/13/2021 0404 by Hanh Dick RN  Outcome: Ongoing     Problem: Nutritional:  Goal: Maintenance of adequate nutrition will improve  Description: Maintenance of adequate nutrition will improve  4/13/2021 1058 by Epifanio Albrecht Ophelia Madera RN  Outcome: Ongoing  4/13/2021 0404 by Emma Silveira RN  Outcome: Ongoing  Goal: Progress toward achieving an optimal weight will improve  Description: Progress toward achieving an optimal weight will improve  4/13/2021 0404 by Emma Silveira RN  Outcome: Ongoing     Problem: Physical Regulation:  Goal: Complications related to the disease process, condition or treatment will be avoided or minimized  Description: Complications related to the disease process, condition or treatment will be avoided or minimized  4/13/2021 1058 by Italo Barboza RN  Outcome: Ongoing  4/13/2021 0404 by Emma Silveira RN  Outcome: Ongoing  Goal: Diagnostic test results will improve  Description: Diagnostic test results will improve  4/13/2021 1058 by Italo Barboza RN  Outcome: Ongoing  4/13/2021 0404 by Emma Silveira RN  Outcome: Ongoing     Problem: Tissue Perfusion:  Goal: Adequacy of tissue perfusion will improve  Description: Adequacy of tissue perfusion will improve  Outcome: Ongoing     Problem: Cardiac:  Goal: Complications related to the disease process, condition or treatment will be avoided or minimized  Description: Complications related to the disease process, condition or treatment will be avoided or minimized  4/13/2021 1058 by Italo Barboza RN  Outcome: Ongoing  4/13/2021 0404 by Emma Silveira RN  Outcome: Ongoing  Goal: Diagnostic test results will improve  Description: Diagnostic test results will improve  4/13/2021 1058 by Italo Barboza RN  Outcome: Ongoing  4/13/2021 0404 by Emma Silveira RN  Outcome: Ongoing     Problem: Safety:  Goal: Ability to remain free from injury will improve  Description: Ability to remain free from injury will improve  Outcome: Ongoing  Goal: Will show no signs and symptoms of excessive bleeding  Description: Will show no signs and symptoms of excessive bleeding  Outcome: Ongoing     Problem:  Activity Intolerance:  Goal:

## 2021-04-13 NOTE — TELEPHONE ENCOUNTER
Patient is scheduled for hospital follow up on 4/20/21. Discharge date 4/13/21. DX CHF. Health Maintenance   Topic Date Due    DTaP/Tdap/Td vaccine (1 - Tdap) Never done    Diabetic microalbuminuria test  10/09/2020    Diabetic foot exam  04/20/2021    Annual Wellness Visit (AWV)  04/21/2021    Diabetic retinal exam  11/13/2021    A1C test (Diabetic or Prediabetic)  04/11/2022    TSH testing  04/11/2022    Lipid screen  04/12/2022    Potassium monitoring  04/13/2022    Creatinine monitoring  04/13/2022    Colon cancer screen colonoscopy  10/09/2023    Flu vaccine  Completed    Shingles Vaccine  Completed    Pneumococcal 65+ years Vaccine  Completed    COVID-19 Vaccine  Completed    AAA screen  Completed    Hepatitis C screen  Completed    Hepatitis A vaccine  Aged Out    Hib vaccine  Aged Out    Meningococcal (ACWY) vaccine  Aged Out             (applicable per patient's age: Cancer Screenings, Depression Screening, Fall Risk Screening, Immunizations)    Hemoglobin A1C (%)   Date Value   04/11/2021 7.7 (H)   04/20/2020 7.2 (H)   10/09/2019 7.8 (H)     Microalb/Crt.  Ratio (mcg/mg creat)   Date Value   10/09/2019 CANNOT BE CALCULATED     LDL Cholesterol (mg/dL)   Date Value   04/12/2021 41     AST (U/L)   Date Value   04/11/2021 14     ALT (U/L)   Date Value   04/11/2021 21     BUN (mg/dL)   Date Value   04/13/2021 31 (H)      (goal A1C is < 7)   (goal LDL is <100) need 30-50% reduction from baseline     BP Readings from Last 3 Encounters:   04/13/21 128/72   11/17/20 (!) 102/47   11/05/20 (!) 143/77    (goal /80)      All Future Testing planned in [facility]:  Lab Frequency Next Occurrence   EKG 12 Lead Once 11/17/2021   XR CHEST (2 VW) Once 11/17/2021   [place] NJX AA&/STRD TFRML EPI LUMBAR/SACRAL 1 LEVEL Once 04/12/2021   Comprehensive Metabolic Panel Every 4 Weeks 4/30/2021   Up with assistance PRN    Intake and output EVERY 8 HOURS    Initiate Oxygen Therapy Protocol DAILY (RT)    Basic Metabolic Panel DAILY    POCT glucose 4 TIMES DAILY BEFORE MEALS & AT BEDTIME    HYPOGLYCEMIA TREATMENT: blood glucose less than 50 mg/dL and patient  ALERT and TOLERATING PO PRN    HYPOGLYCEMIA TREATMENT: blood glucose less than 70 mg/dL and patient ALERT and TOLERATING PO PRN    HYPOGLYCEMIA TREATMENT: blood glucose less than 70 mg/dL and patient NOT ALERT or NPO PRN    POCT Glucose PRN    Home BIPAP or CPAP DAILY (RT)    Nasal Cannula Oxygen PRN        Next Visit Date:  Future Appointments   Date Time Provider Javon Degroot   4/20/2021  9:20 AM DO Janine Wetzel [facility]   4/20/2021 10:30 AM Monse Romano [facility]   5/6/2021 10:30 AM MD Deysi Garsia [facility]            Patient Active Problem List:     Hypertension     Hyperlipidemia     Coronary artery disease involving native coronary artery of native heart without angina pectoris     H/O coronary angioplasty     Severe nonproliferative diabetic retinopathy with macular edema associated with type 2 diabetes mellitus ([address]     Type 2 diabetes mellitus with peripheral neuropathy (HCC)     Diabetic peripheral neuropathy (HCC)     GHAZALA (obstructive sleep apnea)     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HCC)     Pulmonary HTN (HCC)     PAF (paroxysmal atrial fibrillation) (HCC)     Lumbosacral spondylosis without myelopathy     Pars defect with spondylolisthesis     Spinal stenosis of lumbar region without neurogenic claudication     CHF exacerbation (HCC)     Acute on chronic diastolic CHF (congestive heart failure) (HCC)     Systolic congestive heart failure ([address]     Type 2 MI (myocardial infarction) ([address]

## 2021-04-13 NOTE — PROCEDURES
[place] [id number]                              CARDIAC STRESS TEST    PATIENT NAME: Willy Box                    :        1950  MED REC NO:   [id number]                              ROOM:       0212  ACCOUNT NO:   [de-identified]                           ADMIT DATE: 2021  PROVIDER:     Ching Causey      DATE OF STUDY:  2021    Cardiovascular Diagnostics Department    Ordering Provider:  Marta Golden MD    Primary Care Provider:  Shawn Thacker. Zackery Mcconnell DO    Interpreting Physician:  Ching Causey MD    MYOCARDIAL PERFUSION STRESS IMAGING    The stress ECG results are reported separately. NUCLEAR IMAGING RESULTS:  The overall quality of the study is good. Mild attenuation artifact was seen. There is no evidence of abnormal  lung uptake. Additionally, the right ventricle appears normal.  The  left ventricular cavity is noted to be enlarged in size on stress  images. There is no evidence of transient ischemic dilatation (TID) of  the left ventricle. Gated SPECT imaging demonstrates global hypokinesis. The calculated  left ventricular ejection fraction was 45%. The rest images demonstrated a small perfusion abnormality of mild  intensity in the inferolateral region, which is most likely due to  artifact. On stress imaging, a small/moderate perfusion abnormality of mild  intensity was noted in the lateral, inferolateral and apical regions,  which may be due to artifact. IMPRESSION:  1. Equivocal myocardial perfusion study. There is a small/moderate  perfusion defect of mild intensity in the lateral, inferolateral and  apical regions during stress imaging, which is most consistent with  ischemia, but may be due to artifact. 2.  Global left ventricular systolic function was abnormal with an EF of  45%.     Overall these results are most consistent with a low/intermediate risk  for significant coronary artery disease. Depending on the patient's symptoms and level of clinical suspicion,  aggressive medical management vs. additional testing by coronary  angiography may be indicated. The results of this test were discussed with Dr. Shakira Lott on 04/13/2021  at 1406.         Katty Billy    D: 04/12/2021 14:55:13       T: 04/13/2021 14:56:48     ILIANA/CATIA_YONATANIT  Job#: [id number]     Doc#: Unknown    CC:  Delfino Ibrahim

## 2021-04-14 ENCOUNTER — APPOINTMENT (OUTPATIENT)
Dept: GENERAL RADIOLOGY | Age: 71
DRG: 280 | End: 2021-04-14
Payer: MEDICARE

## 2021-04-14 VITALS
DIASTOLIC BLOOD PRESSURE: 68 MMHG | TEMPERATURE: 98.7 F | BODY MASS INDEX: 36.68 KG/M2 | WEIGHT: 256.2 LBS | HEIGHT: 70 IN | RESPIRATION RATE: 18 BRPM | OXYGEN SATURATION: 96 % | HEART RATE: 66 BPM | SYSTOLIC BLOOD PRESSURE: 146 MMHG

## 2021-04-14 LAB
ANION GAP SERPL CALCULATED.3IONS-SCNC: 9 MMOL/L (ref 9–17)
BUN BLDV-MCNC: 29 MG/DL (ref 8–23)
BUN/CREAT BLD: 23 (ref 9–20)
CALCIUM SERPL-MCNC: 8.8 MG/DL (ref 8.6–10.4)
CHLORIDE BLD-SCNC: 101 MMOL/L (ref 98–107)
CO2: 27 MMOL/L (ref 20–31)
CREAT SERPL-MCNC: 1.24 MG/DL (ref 0.7–1.2)
GFR AFRICAN AMERICAN: >60 ML/MIN
GFR NON-AFRICAN AMERICAN: 58 ML/MIN
GFR SERPL CREATININE-BSD FRML MDRD: ABNORMAL ML/MIN/{1.73_M2}
GFR SERPL CREATININE-BSD FRML MDRD: ABNORMAL ML/MIN/{1.73_M2}
GLUCOSE BLD-MCNC: 211 MG/DL (ref 70–99)
GLUCOSE BLD-MCNC: 219 MG/DL (ref 65–99)
POTASSIUM SERPL-SCNC: 4.4 MMOL/L (ref 3.7–5.3)
SODIUM BLD-SCNC: 137 MMOL/L (ref 135–144)
URIC ACID: 9.2 MG/DL (ref 3.4–7)

## 2021-04-14 PROCEDURE — 6360000002 HC RX W HCPCS: Performed by: INTERNAL MEDICINE

## 2021-04-14 PROCEDURE — 84550 ASSAY OF BLOOD/URIC ACID: CPT

## 2021-04-14 PROCEDURE — 2580000003 HC RX 258: Performed by: INTERNAL MEDICINE

## 2021-04-14 PROCEDURE — 36415 COLL VENOUS BLD VENIPUNCTURE: CPT

## 2021-04-14 PROCEDURE — 73630 X-RAY EXAM OF FOOT: CPT

## 2021-04-14 PROCEDURE — 82947 ASSAY GLUCOSE BLOOD QUANT: CPT

## 2021-04-14 PROCEDURE — 6370000000 HC RX 637 (ALT 250 FOR IP): Performed by: INTERNAL MEDICINE

## 2021-04-14 PROCEDURE — 80048 BASIC METABOLIC PNL TOTAL CA: CPT

## 2021-04-14 RX ORDER — COLCHICINE 0.6 MG/1
TABLET ORAL
Qty: 9 TABLET | Refills: 0 | Status: SHIPPED | OUTPATIENT
Start: 2021-04-14 | End: 2021-04-21 | Stop reason: ALTCHOICE

## 2021-04-14 RX ADMIN — SACUBITRIL AND VALSARTAN 4 TABLET: 24; 26 TABLET, FILM COATED ORAL at 08:53

## 2021-04-14 RX ADMIN — CHOLECALCIFEROL TAB 25 MCG (1000 UNIT) 1000 UNITS: 25 TAB at 08:53

## 2021-04-14 RX ADMIN — CARVEDILOL 12.5 MG: 12.5 TABLET, FILM COATED ORAL at 08:53

## 2021-04-14 RX ADMIN — ISOSORBIDE MONONITRATE 30 MG: 30 TABLET, EXTENDED RELEASE ORAL at 08:54

## 2021-04-14 RX ADMIN — NIFEDIPINE 90 MG: 90 TABLET, FILM COATED, EXTENDED RELEASE ORAL at 08:56

## 2021-04-14 RX ADMIN — ACETAMINOPHEN 650 MG: 325 TABLET, FILM COATED ORAL at 10:24

## 2021-04-14 RX ADMIN — AMIODARONE HYDROCHLORIDE 100 MG: 200 TABLET ORAL at 08:53

## 2021-04-14 RX ADMIN — THERA TABS 1 TABLET: TAB at 08:53

## 2021-04-14 RX ADMIN — SPIRONOLACTONE 25 MG: 25 TABLET, FILM COATED ORAL at 08:54

## 2021-04-14 RX ADMIN — FUROSEMIDE 40 MG: 10 INJECTION, SOLUTION INTRAMUSCULAR; INTRAVENOUS at 08:52

## 2021-04-14 RX ADMIN — SODIUM CHLORIDE, PRESERVATIVE FREE 10 ML: 5 INJECTION INTRAVENOUS at 08:54

## 2021-04-14 RX ADMIN — Medication 400 MG: at 08:56

## 2021-04-14 RX ADMIN — OMEGA-3-ACID ETHYL ESTERS 1 CAPSULE: 1 CAPSULE, LIQUID FILLED ORAL at 08:53

## 2021-04-14 RX ADMIN — APIXABAN 5 MG: 5 TABLET, FILM COATED ORAL at 08:53

## 2021-04-14 ASSESSMENT — PAIN DESCRIPTION - ORIENTATION
ORIENTATION: RIGHT
ORIENTATION: RIGHT

## 2021-04-14 ASSESSMENT — PAIN SCALES - GENERAL: PAINLEVEL_OUTOF10: 6

## 2021-04-14 NOTE — PLAN OF CARE
Problem: Falls - Risk of:  Goal: Will remain free from falls  Description: Will remain free from falls  4/14/2021 0040 by Carlton Berger RN  Outcome: Ongoing  4/13/2021 1058 by Jose Martin Goldman RN  Outcome: Ongoing  Goal: Absence of physical injury  Description: Absence of physical injury  4/14/2021 0040 by Carlton Berger RN  Outcome: Ongoing  4/13/2021 1058 by Jose Martin Goldman RN  Outcome: Ongoing     Problem:  Activity:  Goal: Risk for activity intolerance will decrease  Description: Risk for activity intolerance will decrease  4/14/2021 0040 by Carlton Berger RN  Outcome: Ongoing  4/13/2021 1058 by Jose Martin Goldman RN  Outcome: Ongoing  Goal: Capacity to carry out activities will improve  Description: Capacity to carry out activities will improve  4/14/2021 0040 by Carlton Berger RN  Outcome: Ongoing  4/13/2021 1058 by Jose Martin Goldman RN  Outcome: Ongoing  Goal: Will verbalize the importance of balancing activity with adequate rest periods  Description: Will verbalize the importance of balancing activity with adequate rest periods  4/14/2021 0040 by Carlton Berger RN  Outcome: Ongoing  4/13/2021 1058 by Jose Martin Goldman RN  Outcome: Ongoing  Goal: Ability to tolerate increased activity will improve  Outcome: Ongoing  Goal: Expression of feelings of increased energy will increase  Outcome: Ongoing     Problem: Coping:  Goal: Ability to adjust to condition or change in health will improve  Description: Ability to adjust to condition or change in health will improve  4/14/2021 0040 by Carlton Berger RN  Outcome: Ongoing  4/13/2021 1058 by Jose Martin Goldman RN  Outcome: Ongoing  Goal: Verbalizations of decreased anxiety will decrease  Description: Verbalizations of decreased anxiety will decrease  Outcome: Ongoing  Goal: Ability to identify and develop effective coping behavior will improve  Description: Ability to identify and develop effective coping behavior will improve  4/14/2021 0040 by Brock Mc RN  Outcome: Ongoing  4/13/2021 1058 by Jackson Husain RN  Outcome: Ongoing  Goal: Ability to verbalize feelings about condition will improve  Description: Ability to verbalize feelings about condition will improve  4/14/2021 0040 by Brock Mc RN  Outcome: Ongoing  4/13/2021 1058 by Jackson Husani RN  Outcome: Ongoing  Goal: Ability to identify strategies to decrease anxiety will improve  Outcome: Ongoing  Goal: General experience of comfort will improve  4/14/2021 0040 by Brock Mc RN  Outcome: Ongoing  4/13/2021 1058 by Jackson Husain RN  Outcome: Ongoing     Problem: Fluid Volume:  Goal: Ability to maintain a balanced intake and output will improve  Description: Ability to maintain a balanced intake and output will improve  4/14/2021 0040 by Brock Mc RN  Outcome: Ongoing  4/13/2021 1058 by Jackson Husain RN  Outcome: Ongoing  Goal: Risk for excess fluid volume will decrease  Description: Risk for excess fluid volume will decrease  Outcome: Ongoing  Goal: Maintenance of adequate hydration will improve  Description: Maintenance of adequate hydration will improve  Outcome: Ongoing  Goal: Will show no signs and symptoms of electrolyte imbalance  Description: Will show no signs and symptoms of electrolyte imbalance  Outcome: Ongoing     Problem: Health Behavior:  Goal: Ability to identify and utilize available resources and services will improve  Description: Ability to identify and utilize available resources and services will improve  Outcome: Ongoing  Goal: Ability to manage health-related needs will improve  Description: Ability to manage health-related needs will improve  4/14/2021 0040 by Brock Mc RN  Outcome: Ongoing  4/13/2021 1058 by Jackson Husain RN  Outcome: Ongoing  Goal: Ability to seek appropriate health care will improve  Description: Ability to seek appropriate health care will Ongoing     Problem: Safety:  Goal: Ability to remain free from injury will improve  Description: Ability to remain free from injury will improve  4/13/2021 1058 by Mayito Neri RN  Outcome: Ongoing  Goal: Will show no signs and symptoms of excessive bleeding  Description: Will show no signs and symptoms of excessive bleeding  4/13/2021 1058 by Mayito Neri RN  Outcome: Ongoing     Problem:  Activity Intolerance:  Goal: Ability to tolerate increased activity will improve  Outcome: Ongoing     Problem: Mental Status - Impaired:  Goal: Absence of physical injury  Description: Absence of physical injury  4/14/2021 0040 by Ishan Fonseca RN  Outcome: Ongoing  4/13/2021 1058 by Mayito Neri RN  Outcome: Ongoing     Problem: Serum Glucose Level - Abnormal:  Goal: Ability to maintain appropriate glucose levels will improve to within specified parameters  Description: Ability to maintain appropriate glucose levels will improve to within specified parameters  4/13/2021 1058 by Mayito Neri RN  Outcome: Ongoing

## 2021-04-14 NOTE — PROGRESS NOTES
Acknowledge pt discharge to home today with spouse and no further needs.  Dorothy Cobian MSW LSW 4/14/2021

## 2021-04-14 NOTE — PROGRESS NOTES
Cardiology    [facility] in [facility]. Developed severe pain in right foot. Does have hx of gout. Will discharge on same meds as admission. I will see him in 1 week.     Thanks, Francisco Kline MD

## 2021-04-14 NOTE — PROCEDURES
[place] [address]                              CARDIAC STRESS TEST    PATIENT NAME: Binta De Guzman                    :        1950  MED REC NO:   [id number]                              ROOM:       7237  ACCOUNT NO:   [de-identified]                           ADMIT DATE: 2021  PROVIDER:     Key Michel      DATE OF STUDY:  2021    LEXISCAN CARDIOLITE STRESS TEST:    INDICATION:  Chest pain and LV dysfunction. IMPRESSION:  1. We gave 0.4 mg of Lexiscan intravenously. 2.  This was followed in 20 seconds by Cardiolite infusion. 3.  There was no chest pain. 4.  There was no ST depression. 5.  It was an overall negative Lexiscan stress test.  6.  Cardiolite to follow.         Jesus Briscoe    D: 2021 2:27:56       T: 2021 4:01:33     TORY_DRAKE_[email or web address]  Job#: [id number]     Doc#: [id number]    CC:  Ilona Severe

## 2021-04-14 NOTE — PROGRESS NOTES
Pt rings call light to get up to bathroom. Pt assisted to side of bed. Pt tries to stand but is experiencing foot pain and unable to get up to bathroom. Pt states pain is 4/10 in R foot on lateral side. Foot is edematous. Urinal provided- pt voids 500 mL clear, yellow urine. Vitals and assessment completed. Call light in reach. Will continue to monitor.

## 2021-04-14 NOTE — PLAN OF CARE
Problem: Falls - Risk of:  Goal: Will remain free from falls  Description: Will remain free from falls  4/14/2021 1142 by Felicitas Monroy RN  Outcome: Ongoing  4/14/2021 0040 by Ingrid Live RN  Outcome: Ongoing  Goal: Absence of physical injury  Description: Absence of physical injury  4/14/2021 1142 by Felicitas Monroy RN  Outcome: Ongoing  4/14/2021 0040 by Ingrid Live RN  Outcome: Ongoing     Problem:  Activity:  Goal: Risk for activity intolerance will decrease  Description: Risk for activity intolerance will decrease  4/14/2021 1142 by Felicitas Monroy RN  Outcome: Ongoing  4/14/2021 0040 by Ingrid Live RN  Outcome: Ongoing  Goal: Capacity to carry out activities will improve  Description: Capacity to carry out activities will improve  4/14/2021 1142 by Felicitas Monroy RN  Outcome: Ongoing  4/14/2021 0040 by Ingrid Live RN  Outcome: Ongoing  Goal: Will verbalize the importance of balancing activity with adequate rest periods  Description: Will verbalize the importance of balancing activity with adequate rest periods  4/14/2021 0040 by Ingrid Live RN  Outcome: Ongoing  Goal: Ability to tolerate increased activity will improve  4/14/2021 1142 by Felicitas Monroy RN  Outcome: Ongoing  4/14/2021 0040 by nIgrid Live RN  Outcome: Ongoing  Goal: Expression of feelings of increased energy will increase  4/14/2021 0040 by Ingrid Live RN  Outcome: Ongoing     Problem: Coping:  Goal: Ability to adjust to condition or change in health will improve  Description: Ability to adjust to condition or change in health will improve  4/14/2021 1142 by Felicitas Monroy RN  Outcome: Ongoing  4/14/2021 0040 by Ingrid Live RN  Outcome: Ongoing  Goal: Verbalizations of decreased anxiety will decrease  Description: Verbalizations of decreased anxiety will decrease  4/14/2021 1142 by Felicitas Monroy RN  Outcome: Ongoing  4/14/2021 0040 by Diana Espinoza Sharath Antunez RN  Outcome: Ongoing  Goal: Ability to identify and develop effective coping behavior will improve  Description: Ability to identify and develop effective coping behavior will improve  4/14/2021 0040 by Tammy Ramirez RN  Outcome: Ongoing  Goal: Ability to verbalize feelings about condition will improve  Description: Ability to verbalize feelings about condition will improve  4/14/2021 0040 by Tammy Ramirez RN  Outcome: Ongoing  Goal: Ability to identify strategies to decrease anxiety will improve  4/14/2021 1142 by Susie Mtz RN  Outcome: Ongoing  4/14/2021 0040 by Tammy Ramirez RN  Outcome: Ongoing  Goal: General experience of comfort will improve  4/14/2021 1142 by Susie Mtz RN  Outcome: Ongoing  4/14/2021 0040 by Tammy Ramirez RN  Outcome: Ongoing     Problem: Fluid Volume:  Goal: Ability to maintain a balanced intake and output will improve  Description: Ability to maintain a balanced intake and output will improve  4/14/2021 1142 by Susie Mtz RN  Outcome: Ongoing  4/14/2021 0040 by Tammy Ramirez RN  Outcome: Ongoing  Goal: Risk for excess fluid volume will decrease  Description: Risk for excess fluid volume will decrease  4/14/2021 1142 by Susie Mtz RN  Outcome: Ongoing  4/14/2021 0040 by Tammy Ramirez RN  Outcome: Ongoing  Goal: Maintenance of adequate hydration will improve  Description: Maintenance of adequate hydration will improve  4/14/2021 0040 by Tammy Ramirez RN  Outcome: Ongoing  Goal: Will show no signs and symptoms of electrolyte imbalance  Description: Will show no signs and symptoms of electrolyte imbalance  4/14/2021 0040 by Tammy Ramirez RN  Outcome: Ongoing     Problem: Health Behavior:  Goal: Ability to identify and utilize available resources and services will improve  Description: Ability to identify and utilize available resources and services will improve  4/14/2021 1142 by Susie Mtz RN  Outcome: Ongoing  4/14/2021 0040 by Sunita Baltazar RN  Outcome: Ongoing  Goal: Ability to manage health-related needs will improve  Description: Ability to manage health-related needs will improve  4/14/2021 0040 by Sunita Baltazar RN  Outcome: Ongoing  Goal: Ability to seek appropriate health care will improve  Description: Ability to seek appropriate health care will improve  4/14/2021 0040 by Sunita Baltazar RN  Outcome: Ongoing  Goal: Identification of resources available to assist in meeting health care needs will improve  Description: Identification of resources available to assist in meeting health care needs will improve  4/14/2021 1142 by Jim Hatch RN  Outcome: Ongoing  4/14/2021 0040 by Sunita Baltazar RN  Outcome: Ongoing  Goal: Ability to identify changes in lifestyle to reduce recurrence of condition will improve  4/14/2021 0040 by Sunita Baltazar RN  Outcome: Ongoing     Problem: Metabolic:  Goal: Ability to maintain appropriate glucose levels will improve  Description: Ability to maintain appropriate glucose levels will improve  4/14/2021 1142 by Jim Hatch RN  Outcome: Ongoing  4/14/2021 0040 by Sunita Baltazar RN  Outcome: Ongoing     Problem: Nutritional:  Goal: Maintenance of adequate nutrition will improve  Description: Maintenance of adequate nutrition will improve  4/14/2021 0040 by Sunita Baltazar RN  Outcome: Ongoing  Goal: Ability to identify appropriate dietary choices will improve  Description: Ability to identify appropriate dietary choices will improve  Outcome: Ongoing     Problem: Physical Regulation:  Goal: Complications related to the disease process, condition or treatment will be avoided or minimized  Description: Complications related to the disease process, condition or treatment will be avoided or minimized  Outcome: Ongoing  Goal: Diagnostic test results will improve  Description: Diagnostic test results will improve  Outcome: Ongoing     Problem: Tissue Perfusion:  Goal: Adequacy of tissue perfusion will improve  Description: Adequacy of tissue perfusion will improve  Outcome: Ongoing     Problem: Cardiac:  Goal: Complications related to the disease process, condition or treatment will be avoided or minimized  Description: Complications related to the disease process, condition or treatment will be avoided or minimized  Outcome: Ongoing  Goal: Diagnostic test results will improve  Description: Diagnostic test results will improve  Outcome: Ongoing     Problem: Respiratory:  Goal: Respiratory status will improve  Description: Respiratory status will improve  Outcome: Ongoing     Problem: Safety:  Goal: Ability to remain free from injury will improve  Description: Ability to remain free from injury will improve  Outcome: Ongoing     Problem:  Activity Intolerance:  Goal: Ability to tolerate increased activity will improve  4/14/2021 1142 by Mayito Neri RN  Outcome: Ongoing  4/14/2021 0040 by Ishan Fonseca RN  Outcome: Ongoing     Problem: Mental Status - Impaired:  Goal: Absence of physical injury  Description: Absence of physical injury  4/14/2021 1142 by Mayito Neri RN  Outcome: Ongoing  4/14/2021 0040 by Ishan Fonseca RN  Outcome: Ongoing     Problem: Serum Glucose Level - Abnormal:  Goal: Ability to maintain appropriate glucose levels will improve to within specified parameters  Description: Ability to maintain appropriate glucose levels will improve to within specified parameters  Outcome: Ongoing     Problem: Sleep Pattern Disturbance:  Goal: Appears well-rested  Description: Appears well-rested  Outcome: Ongoing

## 2021-04-14 NOTE — PROGRESS NOTES
Hospitalist Progress Note  4/14/2021 5:52 AM  Subjective:   Admit Date: 4/11/2021  PCP: Johanna Godwin DO    Interval History: Buck Byers states he was doing a lot better up until this morning when he developed right lateral foot pain while ambulating in the [place]. He denies any trauma and did not feel it pop while walking. \"It feels like it's broke\". Buck Byers feels his breathing is much better and denies CP with ambulating. He was hoping to go  Home today. Weight down 7 lbs. Appetite is good. No trouble urinating. Labs reviewed this am.    Diet: DIET LOW SODIUM 2 GM; Medications:   Scheduled Meds:   [START ON 4/19/2021] Dulaglutide  0.75 mg Subcutaneous Weekly    amiodarone  100 mg Oral Daily with breakfast    amiodarone  200 mg Oral QPM    insulin 70-30  60 Units Subcutaneous QAM AC    insulin 70-30  25 Units Subcutaneous Daily before lunch    insulin 70-30  20 Units Subcutaneous Dinner    NIFEdipine  90 mg Oral Daily    insulin 70-30  35 Units Subcutaneous Nightly    insulin lispro  0-6 Units Subcutaneous TID WC    insulin lispro  0-3 Units Subcutaneous Nightly    apixaban  5 mg Oral BID    atorvastatin  40 mg Oral Daily    carvedilol  12.5 mg Oral BID WC    doxazosin  8 mg Oral Daily    isosorbide mononitrate  30 mg Oral Daily    magnesium oxide  400 mg Oral Daily    omega-3 acid ethyl esters  1 capsule Oral BID    sacubitril-valsartan  4 tablet Oral BID    spironolactone  25 mg Oral Daily    Vitamin D  1,000 Units Oral BID    furosemide  40 mg Intravenous BID    sodium chloride flush  5-40 mL Intravenous 2 times per day    insulin lispro  0-3 Units Subcutaneous Nightly    multivitamin  1 tablet Oral Daily     Continuous Infusions:   sodium chloride      dextrose         Patient's current medications documented, reviewed, and updated.       CBC:   Recent Labs     04/11/21  1424 04/12/21 0415   WBC 7.4 6.2   HGB 11.6* 12.3*    259     BMP:    Recent Labs     04/12/21 0416 04/13/21  0410 04/14/21  0405    139 137   K 4.4 4.2 4.4    102 101   CO2 29 30 27   BUN 25* 31* 29*   CREATININE 1.36* 1.49* 1.24*   GLUCOSE 140* 120* 211*     Hepatic:   Recent Labs     04/11/21  1424   AST 14   ALT 21   BILITOT 0.70   ALKPHOS 75     Troponin: No results for input(s): TROPONINI in the last 72 hours. BNP: No results for input(s): BNP in the last 72 hours. Lipids:   Recent Labs     04/12/21  0415   CHOL 98   HDL 25*     INR: No results for input(s): INR in the last 72 hours. Objective:   Vitals: BP (!) 141/62   Pulse 65   Temp 98.6 °F (37 °C) (Oral)   Resp 18   Ht 5' 10\" (1.778 m)   Wt 256 lb 3.2 oz (116.2 kg)   SpO2 95%   BMI 36.76 kg/m²   General appearance: alert and cooperative with exam  HEENT: Head: Normocephalic, no lesions, without obvious abnormality. Eye: Normal external eye, conjunctiva, lids cornea, COLLIN. Nose: Normal external nose, mucus membranes and septum. Neck: no adenopathy, no carotid bruit and supple, symmetrical, trachea midline  Lungs: clear to auscultation bilaterally  Heart: regular rate and rhythm, S1, S2 normal and II/VI systolic murmur. Abdomen: soft, non-tender; bowel sounds normal; no masses,  no organomegaly and obese. Extremities: No calf tenderness and edema is much improved. Pain over the mid 5th metatarsal with palpation. Neurologic: Mental status: Alert, oriented, thought content appropriate    Assessment and Plan:   1.  Acute on chronic diastolic / systolic heart failure class IV - on Coreg, Entresto, and IV Lasix. Significant improvement in symptoms. EF 40 to 45 % on ECHO. Stress test with normal Lexiscan portion with small / moderate defet of mild intensity in the lateral, inferolateral, and apical regions. 2.  CAD - on Coreg, Nifedipine, Imdur, and Lipitor. 3.  Paroxysmal atrial fibrillation - Cardioverted on 4/12/21. On beta blocker / calcium channel blocker.  Has been on Amiodarone.  On Eliquis.    4.  DM II - insulin dependant.  Placed on a Humalog sliding scale. 5.  Hyperlipidemia - on Lipitor. 6.  OA   7.  CKD Stage III - stable. 8.  Obese  9.  GHAZALA - on CPAP. 10.  Acute right foot pain over the 5th metatarsal this am.    Plan:  1. Obtain an xray of the right foot this am.  Consider a referral to Podiatry depending on the results. 2.  Consider discharge today if cleared by Dr. Shawn Ramos. Patient continues to require in patient admission if cardiology continues to feel he needs further diuresis with IV Lasix. DVT prophylaxis:   [] Lovenox   [] SCDs   [] SQ Heparin   [] Encourage ambulation, low risk for DVT, no chemical or mechanical    prophylaxis necessary      [x] Already on Anticoagulation    Patient Active Problem List:     Hypertension     Hyperlipidemia     Coronary artery disease involving native coronary artery of native heart without angina pectoris     H/O coronary angioplasty     Severe nonproliferative diabetic retinopathy with macular edema associated with type 2 diabetes mellitus (HCC)     Type 2 diabetes mellitus with peripheral neuropathy (HCC)     Diabetic peripheral neuropathy (HCC)     GHAZALA (obstructive sleep apnea)     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[facility])     Pulmonary HTN ([facility])     PAF (paroxysmal atrial fibrillation) ([facility])     Lumbosacral spondylosis without myelopathy     Pars defect with spondylolisthesis     Spinal stenosis of lumbar region without neurogenic claudication     CHF exacerbation (HCC)     Acute on chronic diastolic CHF (congestive heart failure) ([facility])     Systolic congestive heart failure ([facility])     Type 2 MI (myocardial infarction) ([facility] [address]      Heart Failure     (x)  The patient has current or prior documentation of left ventricular ejection fraction (LVEF) less than 40%, or moderate or severely depressed left ventricular systolic function.   Answer both:   (x) The patient was prescribed or already taking an Angiotensin -Converting Enzyme (ACE) Inhibitor, or Angiotensin Receptor Blocker (ARB). (x) The patient was prescrbed or alrady taking a beta - blocker. If Yes to both, stop here. ( )  Patient not prescribed / taking:   ( ) ACE or ARB for medical/patient/system reason(s) including ( ) (ex. Allergy, intolerance, contraindication)   ( ) Beta - blocker for medical/patient/system reason(s) include ( ) (ex. Allergy, intolerance, contraindication)  ( )  Patient not prescribed / taking:   ( ) ACE or ARB, no reason given (does not stisfy MIPS performance)   ( ) Beta - blocker, no reason given (does not stisfy MIPS performance)    Advanced Care Plan    (x)  I confirmed that the patient's [address] is present, code status documented, or surrogate decision maker is listed in the patient's medical record. ( )  The patient's advanced care plan is not present because:  (select)   ( ) I confirmed today that the patient does not wish or was not able to name a surrogate decision maker or provide an [address]. ( ) Hospice care is currently being provided or has been provided this calender year. ( )  I did not confirm today the presence of an [address] or surrogate decision maker documented within the patient's medical record. (Does not satisfy MIPS performance). Documentation of Current Medications in the Medical Record    (x)  I have utilized all available immediate resources to obtain, update, or review the patient's current medications. If Yes, Stop Here  ( ) The patient is not eligivel for medications reconciliation; the patient is in an emergent medical situation where delaying treatment would jeopardize the patient's health. ( ) I did not confirm, update or review the patient's current list of medications today.   (does not satisfy MIPS performance)      Denise Yu MD  [facility] Hospitalist

## 2021-04-14 NOTE — PROGRESS NOTES
Discharge instructions given to patient and wife. Given information about home care, daily weight, follow up appointments and change in medications with questions answered. Pt leaves with personal belongings in stable condition.

## 2021-04-15 ENCOUNTER — CARE COORDINATION (OUTPATIENT)
Dept: CASE MANAGEMENT | Age: 71
End: 2021-04-15

## 2021-04-15 DIAGNOSIS — I50.33 ACUTE ON CHRONIC DIASTOLIC CHF (CONGESTIVE HEART FAILURE) (HCC): Primary | ICD-10-CM

## 2021-04-15 PROCEDURE — 1111F DSCHRG MED/CURRENT MED MERGE: CPT | Performed by: FAMILY MEDICINE

## 2021-04-15 NOTE — CARE COORDINATION
the CTN role. CTN reviewed discharge instructions, medical action plan and red flags with patient who verbalized understanding. Patient given an opportunity to ask questions and does not have any further questions or concerns at this time. Were discharge instructions available to patient? Yes. Reviewed appropriate site of care based on symptoms and resources available to patient including: PCP, Specialist and When to call 911. The patient agrees to contact the PCP office for questions related to their healthcare. Medication reconciliation was performed with patient, who verbalizes understanding of administration of home medications. Advised obtaining a 90-day supply of all daily and as-needed medications. Covid Risk Education    Patient has following risk factors of: heart failure and diabetes. Education provided regarding infection prevention, and signs and symptoms of COVID-19 and when to seek medical attention with patient who verbalized understanding. Discussed exposure protocols and quarantine From CDC: Are you at higher risk for severe illness?   and given an opportunity for questions and concerns. The patient agrees to contact the COVID-19 hotline [phone] or PCP office for questions related to COVID-19. For more information on steps you can take to protect yourself, see CDC's How to Protect Yourself     Was patient discharged with a pulse oximeter? No.    Discussed follow-up appointments. If no appointment was previously scheduled, appointment scheduling offered: Yes. Is follow up appointment scheduled within 7 days of discharge? Yes  Non-[facility] follow up appointment(s): n/a     Plan for follow-up call in 10-14 days based on severity of symptoms and risk factors. Plan for next call: symptom management-routine follow up   CTN provided contact information for future needs.         Non-face-to-face services provided:  Scheduled appointment with PCP-confirmed appt 4/20  Scheduled appointment with Specialist-confirmed appt with Dr Dimitri Wahl 4/21, [address] 4/20  Obtained and reviewed discharge summary and/or continuity of care documents  Assessment and support for treatment adherence and medication management-[id number] complete    Care Transitions 24 Hour Call    Do you have any ongoing symptoms?: No  Do you have a copy of your discharge instructions?: Yes  Do you have all of your prescriptions and are they filled?: Yes  Have you been contacted by a [address]?: No  Have you scheduled your follow up appointment?: Yes  How are you going to get to your appointment?: Car - drive self  Were you discharged with any Home Care or Post Acute Services: No  Do you feel like you have everything you need to keep you well at home?: Yes  Care Transitions Interventions         Follow Up  Future Appointments   Date Time Provider Javon Degroot   4/20/2021 10:30 AM Lina Best MD [facility]   4/21/2021  8:00 [facility]   4/21/2021  9:00 AM MD Jeanine Licea [facility]   5/6/2021 10:30 AM MD Jeanine Licea [facility]       Benjy Escobar, RN

## 2021-04-19 ENCOUNTER — HOSPITAL ENCOUNTER (OUTPATIENT)
Age: 71
Discharge: HOME | End: 2021-04-19
Payer: MEDICARE

## 2021-04-19 DIAGNOSIS — I50.23 ACUTE ON CHRONIC SYSTOLIC CONGESTIVE HEART FAILURE (HCC): ICD-10-CM

## 2021-04-19 DIAGNOSIS — I10 ESSENTIAL HYPERTENSION: ICD-10-CM

## 2021-04-19 DIAGNOSIS — I25.10 CORONARY ARTERY DISEASE INVOLVING NATIVE CORONARY ARTERY OF NATIVE HEART WITHOUT ANGINA PECTORIS: ICD-10-CM

## 2021-04-19 LAB
ANION GAP SERPL CALCULATED.3IONS-SCNC: 10 MMOL/L (ref 9–17)
BUN BLDV-MCNC: 28 MG/DL (ref 8–23)
BUN/CREAT BLD: 23 (ref 9–20)
CALCIUM SERPL-MCNC: 9.6 MG/DL (ref 8.6–10.4)
CHLORIDE BLD-SCNC: 102 MMOL/L (ref 98–107)
CO2: 30 MMOL/L (ref 20–31)
CREAT SERPL-MCNC: 1.2 MG/DL (ref 0.7–1.2)
GFR AFRICAN AMERICAN: >60 ML/MIN
GFR NON-AFRICAN AMERICAN: >60 ML/MIN
GFR SERPL CREATININE-BSD FRML MDRD: ABNORMAL ML/MIN/{1.73_M2}
GFR SERPL CREATININE-BSD FRML MDRD: ABNORMAL ML/MIN/{1.73_M2}
GLUCOSE BLD-MCNC: 92 MG/DL (ref 70–99)
POTASSIUM SERPL-SCNC: 4.6 MMOL/L (ref 3.7–5.3)
SODIUM BLD-SCNC: 142 MMOL/L (ref 135–144)

## 2021-04-19 PROCEDURE — 80048 BASIC METABOLIC PNL TOTAL CA: CPT

## 2021-04-19 PROCEDURE — 36415 COLL VENOUS BLD VENIPUNCTURE: CPT

## 2021-04-21 ENCOUNTER — OFFICE VISIT (OUTPATIENT)
Dept: CARDIOLOGY CLINIC | Age: 71
End: 2021-04-21
Payer: MEDICARE

## 2021-04-21 ENCOUNTER — OFFICE VISIT (OUTPATIENT)
Dept: FAMILY MEDICINE CLINIC | Age: 71
End: 2021-04-21
Payer: MEDICARE

## 2021-04-21 VITALS
DIASTOLIC BLOOD PRESSURE: 50 MMHG | HEART RATE: 57 BPM | WEIGHT: 252 LBS | OXYGEN SATURATION: 98 % | BODY MASS INDEX: 36.08 KG/M2 | HEIGHT: 70 IN | SYSTOLIC BLOOD PRESSURE: 120 MMHG

## 2021-04-21 VITALS
WEIGHT: 252 LBS | HEART RATE: 60 BPM | DIASTOLIC BLOOD PRESSURE: 60 MMHG | BODY MASS INDEX: 36.16 KG/M2 | SYSTOLIC BLOOD PRESSURE: 120 MMHG | OXYGEN SATURATION: 94 %

## 2021-04-21 DIAGNOSIS — M19.049 ARTHRITIS OF HAND: ICD-10-CM

## 2021-04-21 DIAGNOSIS — Z09 HOSPITAL DISCHARGE FOLLOW-UP: ICD-10-CM

## 2021-04-21 DIAGNOSIS — I48.0 PAF (PAROXYSMAL ATRIAL FIBRILLATION) (HCC): ICD-10-CM

## 2021-04-21 DIAGNOSIS — E78.5 HYPERLIPIDEMIA, UNSPECIFIED HYPERLIPIDEMIA TYPE: ICD-10-CM

## 2021-04-21 DIAGNOSIS — J43.9 MIXED RESTRICTIVE AND OBSTRUCTIVE LUNG DISEASE (HCC): ICD-10-CM

## 2021-04-21 DIAGNOSIS — J98.4 MIXED RESTRICTIVE AND OBSTRUCTIVE LUNG DISEASE (HCC): ICD-10-CM

## 2021-04-21 DIAGNOSIS — Z79.4 TYPE 2 DIABETES MELLITUS WITH HYPOGLYCEMIA WITHOUT COMA, WITH LONG-TERM CURRENT USE OF INSULIN (HCC): ICD-10-CM

## 2021-04-21 DIAGNOSIS — M10.071 ACUTE IDIOPATHIC GOUT OF RIGHT FOOT: ICD-10-CM

## 2021-04-21 DIAGNOSIS — I48.0 PAF (PAROXYSMAL ATRIAL FIBRILLATION) (HCC): Primary | ICD-10-CM

## 2021-04-21 DIAGNOSIS — I50.33 ACUTE ON CHRONIC DIASTOLIC CHF (CONGESTIVE HEART FAILURE) (HCC): Primary | ICD-10-CM

## 2021-04-21 DIAGNOSIS — I10 ESSENTIAL HYPERTENSION: ICD-10-CM

## 2021-04-21 DIAGNOSIS — E11.649 TYPE 2 DIABETES MELLITUS WITH HYPOGLYCEMIA WITHOUT COMA, WITH LONG-TERM CURRENT USE OF INSULIN (HCC): ICD-10-CM

## 2021-04-21 DIAGNOSIS — N28.9 KIDNEY INSUFFICIENCY: Primary | ICD-10-CM

## 2021-04-21 PROCEDURE — 99495 TRANSJ CARE MGMT MOD F2F 14D: CPT | Performed by: FAMILY MEDICINE

## 2021-04-21 PROCEDURE — 3017F COLORECTAL CA SCREEN DOC REV: CPT | Performed by: INTERNAL MEDICINE

## 2021-04-21 PROCEDURE — G8427 DOCREV CUR MEDS BY ELIG CLIN: HCPCS | Performed by: INTERNAL MEDICINE

## 2021-04-21 PROCEDURE — 4040F PNEUMOC VAC/ADMIN/RCVD: CPT | Performed by: INTERNAL MEDICINE

## 2021-04-21 PROCEDURE — 1123F ACP DISCUSS/DSCN MKR DOCD: CPT | Performed by: INTERNAL MEDICINE

## 2021-04-21 PROCEDURE — 1111F DSCHRG MED/CURRENT MED MERGE: CPT | Performed by: INTERNAL MEDICINE

## 2021-04-21 PROCEDURE — G8417 CALC BMI ABV UP PARAM F/U: HCPCS | Performed by: INTERNAL MEDICINE

## 2021-04-21 PROCEDURE — 99214 OFFICE O/P EST MOD 30 MIN: CPT | Performed by: INTERNAL MEDICINE

## 2021-04-21 PROCEDURE — 1036F TOBACCO NON-USER: CPT | Performed by: INTERNAL MEDICINE

## 2021-04-21 RX ORDER — CELECOXIB 200 MG/1
CAPSULE ORAL
Qty: 90 CAPSULE | Refills: 1
Start: 2021-04-21 | End: 2021-07-20 | Stop reason: SDUPTHER

## 2021-04-21 RX ORDER — HUMAN INSULIN 100 [USP'U]/ML
INJECTION, SUSPENSION SUBCUTANEOUS
Qty: 1 VIAL | Refills: 0 | Status: ON HOLD
Start: 2021-04-21 | End: 2022-09-19 | Stop reason: SDUPTHER

## 2021-04-21 SDOH — ECONOMIC STABILITY: TRANSPORTATION INSECURITY
IN THE PAST 12 MONTHS, HAS THE LACK OF TRANSPORTATION KEPT YOU FROM MEDICAL APPOINTMENTS OR FROM GETTING MEDICATIONS?: NOT ASKED

## 2021-04-21 SDOH — ECONOMIC STABILITY: FOOD INSECURITY: WITHIN THE PAST 12 MONTHS, THE FOOD YOU BOUGHT JUST DIDN'T LAST AND YOU DIDN'T HAVE MONEY TO GET MORE.: NEVER TRUE

## 2021-04-21 SDOH — ECONOMIC STABILITY: INCOME INSECURITY: HOW HARD IS IT FOR YOU TO PAY FOR THE VERY BASICS LIKE FOOD, HOUSING, MEDICAL CARE, AND HEATING?: NOT HARD AT ALL

## 2021-04-21 SDOH — ECONOMIC STABILITY: TRANSPORTATION INSECURITY
IN THE PAST 12 MONTHS, HAS LACK OF TRANSPORTATION KEPT YOU FROM MEETINGS, WORK, OR FROM GETTING THINGS NEEDED FOR DAILY LIVING?: NOT ASKED

## 2021-04-21 SDOH — ECONOMIC STABILITY: FOOD INSECURITY: WITHIN THE PAST 12 MONTHS, YOU WORRIED THAT YOUR FOOD WOULD RUN OUT BEFORE YOU GOT MONEY TO BUY MORE.: NEVER TRUE

## 2021-04-21 ASSESSMENT — PATIENT HEALTH QUESTIONNAIRE - PHQ9
SUM OF ALL RESPONSES TO PHQ9 QUESTIONS 1 & 2: 0
1. LITTLE INTEREST OR PLEASURE IN DOING THINGS: 0
SUM OF ALL RESPONSES TO PHQ QUESTIONS 1-9: 0

## 2021-04-21 NOTE — PATIENT INSTRUCTIONS
SURVEY:    You may be receiving a survey from [employer] regarding your visit today. You may get this in the mail, through your MyChart or in your email. Please complete the survey to enable us to provide the highest quality of care to you and your family. If you cannot score us as very good ( 5 Stars) on any question, please feel free to call the office to discuss how we could have made your experience exceptional.     Thank you.     Clinical Care Team:  Dr. Tanja Self, DO Kristie Finn, [address] [place] [address] Team:  [address] [place] [address]

## 2021-04-21 NOTE — PROGRESS NOTES
Ov DR Eamon Neal follow up from   Discharge from the hospital   Returned from [place] 2 weeks ago. Came into the hospital a week later. C/o sob  Gotten worse since back. Some ankle edema better than it was  No chest pain  Back in a fib Was cardioverted in the hospital   Seen DR Jaqueline Conrad this morning   Does feel better than when  He came to hospital  EKG done in room .   Will set up for cath May 7 at 9;00

## 2021-04-21 NOTE — PROGRESS NOTES
Name: Jarvis Severin  : 1950         Chief Complaint:     Chief Complaint   Patient presents with    Congestive Heart Failure    Arthritis       History of Present Illness: Jarvis Severin is a 72 y.o.  male who presents with Congestive Heart Failure and Arthritis      HPI     Patient was admitted to [facility] from  to  for chf exacerbation. Initial post-discharge communication occurred between nurse care coordinator and patient on 4/15- see documentation in chart: telephone encounter. Diagnostic test results reviewed: inpatient labs, post-discharge labs, echocardiogram and stress test-lexiscan    Patient risk of morbidity and mortality: moderate    Medical Decision Making: moderate complexity    Prior to hospitalization, abdomen was very bloated and pt was feeling more SOB, over a period of a couple wks, starting when pt and wife were still in [facility] where they spend [place]. Believes this was r/t spending more time in [place]. Presented to ER and was admitted. Underwent cardioversion, echo, stress test while in hospital. Overall feeling better but still winded with activity. Trying to walk more. Continues lasix. abd bloating hasn't seemed to build back up. Off metformin and also celebrex. Can tell difference off celebrex, significant joint pains and nhi stiffness in hands in the morning. Taking tylenol 1000 mg BID which doesn't help with the stiffness. Numbness dannie hands at night and also while driving. Helped some with aspercreme with lidocaine. Sees rheum Dr Tucker Macias and was told no RA, has OA, CTS. Was put on tramadol which he takes at night. Was prescribed BID but seemed to make him sleepy with daytime dosing. GHAZALA on CPAP, compliant with tx but has a lot of air leak despite attempts to fix mask fit. Was told about hybrid nasal pillow face mask that may work better for him but hasn't gotten to try it yet. DM, has hypoglycemia just about every night. Sugars are higher currently d/t DALJIT yesterday. Has a [place] sensor now. Lets sugar run around 160 at bedtime to try to avoid nocturnal hypoglycemia. Endo appt next wk. Using 70/30, prescribed 54, 32, 40 units with meals but usually only uses 2 of the doses. R foot gout started in the hospital and cleared up quite a bit with colchicine. Seeing Dr Severino Alves soon. Hadn't had gout in approx 30 yrs. Thinks walking around halls in just slipper socks may have had something to do with development of the pain. Past Medical History:     Past Medical History:   Diagnosis Date    Arthritis     Arthritis     CAD (coronary artery disease)     CHF (congestive heart failure) (HCC)     CHF (congestive heart failure) (HCC)     Coronary artery disease     Diabetes ([address]     Diabetes mellitus ([address]     H/O coronary angioplasty     Hyperlipidemia     Hypertension     Neuropathy     Numbness and tingling     dannie hands    Pulmonary edema     SECONDARY TO FAT EMBOLI AFTER KNEE SURGERY    Sleep apnea     Unspecified sleep apnea 2012    cpap        Past Surgical History:     Past Surgical History:   Procedure Laterality Date    ANGIOPLASTY  08/30/2017    Dr. Teodoro Nava mm drug eluting Xience stents in the right coronary. DANIELA-3 flow prior to the procedure DANIELA-3 flow following the procedure. The patient has had a good result from his angioplasty of the right coronary artery. Frankey Lard APPENDECTOMY      APPENDECTOMY      BUNIONECTOMY  2010    CARDIAC CATHETERIZATION      stent pacement    CARDIAC CATHETERIZATION Left 05/14/14    Dr Jaime Almazan. [facility] Severe CAD, 70% disese with in-stent stenosis in the left anterior descending at the level of a very large codominant diagonal.  90% disease of the ostium of a very large codominant diagonal with 60% disease beyond the diagonal with a fractional flow reserve of 0.76 in the diagonal and 0.78 in the left anterior descending indicating obstructive disease of significance in both the     CARDIAC CATHETERIZATION Left 05/14/14    left anterior descending & diagonal.. Mild irregularities of 30% in a small, nondominant circumglex. Mild 30- 40% disease in the midportion of a very large,dominant right coronary artery. Normal left ventricular function, ejection fraction of 55-60%    CARDIAC CATHETERIZATION Bilateral 11/15/2017    Dr. Eamon Neal @ [facility]--Severe PHT with a PA pressure of 74/30. Widely patent stent in the very large dominat right coronary artery with 50% disease before the stent with an FFR of 0.89, indicating no significant obstruction. Severe disease in the LAD. Patent left internal mammary to the LAD. Patent saphenous vein graft to a very large diagonal.  Overall normal LV function. EF of 55%    CATARACT REMOVAL  2004    COLONOSCOPY  10-    Dr. Clent Halsted (hemorrhoids)    COLONOSCOPY  10/09/2013    CORONARY ANGIOPLASTY WITH STENT PLACEMENT      CORONARY ARTERY BYPASS GRAFT  6/3/14    Dr Zeke Ashley @ [address] [place] [address] GRAFT      EYE SURGERY  8/2012    B leakage in back of eyes    JOINT REPLACEMENT Bilateral     knees    JOINT REPLACEMENT      KNEE SURGERY  2010, 2011    BILATERAL KNEE ORTHO    KNEE SURGERY          Medications:       Prior to Admission medications    Medication Sig Start Date End Date Taking? Authorizing Provider   NOVOLIN 70/30 (70-30) 100 UNIT/ML injection vial Inject 54 units AM, Inject 32 units at Lunch, Inject 40 units at supper as directed subcutaneously.  4/21/21  Yes Olesya Soto, DO   celecoxib (CELEBREX) 200 MG capsule 1 po daily 4/21/21  Yes Olesya Soto, DO   metFORMIN (GLUCOPHAGE) 1000 MG tablet Take 1 tablet by mouth 2 times daily (with meals) 4/21/21  Yes Olesya Soto, DO   NIFEdipine (ADALAT CC) 90 MG extended release tablet Take 45 mg by mouth daily   Yes Historical Provider, MD   amiodarone (CORDARONE) 200 MG tablet Take 200 mg by mouth See Admin Instructions Take 100 mg in am Take 200mg in pm 2/14/21  Yes Historical Provider, MD   Coenzyme Q10 (CO Q 10) 100 MG CAPS Take 100 mg by mouth nightly   Yes Historical Provider, MD   acetaminophen (TYLENOL) 500 MG tablet Take 500 mg by mouth See Admin Instructions Take 500 mg in am  Take 1000 mg in Pm   Yes Historical Provider, MD   isosorbide mononitrate (IMDUR) 30 MG extended release tablet TAKE 1 TABLET DAILY 3/2/21  Yes Trang Pineda MD   furosemide (LASIX) 80 MG tablet Take 1 tab in the am and 1/2 tab pm 2/1/21  Yes Trang Pineda MD   apixaban (ELIQUIS) 5 MG TABS tablet TAKE 1 TABLET TWICE A DAY 11/18/20  Yes Trang Pineda MD   carvedilol (COREG) 12.5 MG tablet TAKE 1 TABLET TWICE DAILY  WITH MEALS 11/18/20  Yes Trang Pineda MD   traMADol (ULTRAM) 50 MG tablet Take 1 tablet by mouth 2 times daily as needed for Pain.   9/8/20  Yes Historical Provider, MD   atorvastatin (LIPITOR) 40 MG tablet One daily 7/15/20  Yes Jose Gomez DO   doxazosin (CARDURA) 8 MG tablet TAKE 1 TABLET DAILY 5/26/20  Yes Trang Pineda MD   sacubitril-valsartan (ENTRESTO)  MG per tablet TAKE 1 TABLET TWICE A DAY 5/26/20  Yes Trang Pineda MD   spironolactone (ALDACTONE) 25 MG tablet Take 25 mg by mouth daily    Yes Historical Provider, MD   TRULICITY 3.03 AD/7.7HK SOPN Inject 0.75 mg into the skin once a week Mondays 2/11/19  Yes Historical Provider, MD   Magnesium Oxide 500 MG TABS Take 500 mg by mouth daily    Yes Historical Provider, MD   ONE TOUCH ULTRA TEST strip  8/8/16  Yes Historical Provider, MD   BD ULTRA-FINE PEN NEEDLES 29G X 12.7MM MISC  8/1/16  Yes Historical Provider, MD   glimepiride (AMARYL) 4 MG tablet Take 2 mg by mouth 2 times daily    Yes Historical Provider, MD   Omega-3 Fatty Acids (FISH OIL) 1200 MG CAPS Take 1 capsule by mouth 2 times daily Plus 360 omega 3   Yes Historical Provider, MD   Glucosamine Sulfate 1000 MG CAPS Take 10,000 mg by mouth daily    Yes Historical Provider, MD   Chromium-Cinnamon (CINNAMON PLUS CHROMIUM PO) Take 1,000 mg by mouth 2 times daily    Yes Historical Provider, MD   Multiple Vitamins-Minerals (ONE-A-DAY MENS 50+ ADVANTAGE PO) Take 1 tablet by mouth daily    Yes Historical Provider, MD   Vitamin D (CHOLECALCIFEROL) 1000 UNITS CAPS capsule   Take by mouth 2 times daily Vitamin D 3   Yes Historical Provider, MD   Insulin Syringes, Disposable, U-100 0.3 ML MISC Inject  into the skin. Use prn 7/19/12   Ruperto Cocker A Jump, DO        Allergies:       Patient has no known allergies. Social History:     Tobacco:    reports that he quit smoking about 45 years ago. He has a 1.50 pack-year smoking history. He has never used smokeless tobacco.  Alcohol:      reports no history of alcohol use. Drug Use:  reports no history of drug use. Family History:     Family History   Problem Relation Age of Onset    Alzheimer's Disease Mother     Heart Disease Mother     Arthritis Mother     High Blood Pressure Mother     Diabetes Mother     Heart Disease Father     Arthritis Father     High Blood Pressure Father     Diabetes Father        Review of Systems:     Positive and Negative as described in HPI    Review of Systems   Constitutional: Negative. Gastrointestinal: Negative. Genitourinary: Negative. Physical Exam:     Vitals:  BP (!) 120/50   Pulse 57   Ht 5' 10\" (1.778 m)   Wt 252 lb (114.3 kg)   SpO2 98%   BMI 36.16 kg/m²   Physical Exam  Vitals signs and nursing note reviewed. Constitutional:       Appearance: Normal appearance. He is well-developed. He is not ill-appearing. HENT:      Right Ear: Hearing and tympanic membrane normal.      Left Ear: Hearing and tympanic membrane normal.      Nose: Nose normal.      Mouth/Throat:      Dentition: Normal dentition. Eyes:      Conjunctiva/sclera: Conjunctivae normal.      Pupils: Pupils are equal, round, and reactive to light. Neck:      Thyroid: No thyroid mass or thyromegaly. Cardiovascular:      Rate and Rhythm: Normal rate and regular rhythm. Heart sounds: S1 normal and S2 normal. No murmur. Comments: No pitting edema lower ext  Pulmonary:      Effort: Pulmonary effort is normal.      Breath sounds: Normal breath sounds. Abdominal:      General: Bowel sounds are normal. There is distension (mild). Palpations: Abdomen is soft. Tenderness: There is no abdominal tenderness. Lymphadenopathy:      Cervical: No cervical adenopathy. Skin:     General: Skin is warm and dry. Findings: No rash. Neurological:      Mental Status: He is alert and oriented to person, place, and time. Psychiatric:         Behavior: Behavior normal. Behavior is cooperative. Data:     Lab Results   Component Value Date     04/19/2021    K 4.6 04/19/2021     04/19/2021    CO2 30 04/19/2021    BUN 28 04/19/2021    CREATININE 1.20 04/19/2021    GLUCOSE 92 04/19/2021    PROT 6.7 04/11/2021    LABALBU 4.0 04/11/2021    BILITOT 0.70 04/11/2021    ALKPHOS 75 04/11/2021    AST 14 04/11/2021    ALT 21 04/11/2021     Lab Results   Component Value Date    WBC 6.2 04/12/2021    RBC 4.00 04/12/2021    HGB 12.3 04/12/2021    HCT 37.1 04/12/2021    MCV 92.8 04/12/2021    MCH 30.7 04/12/2021    MCHC 33.0 04/12/2021    RDW 14.3 04/12/2021     04/12/2021    MPV NOT REPORTED 04/12/2021     Lab Results   Component Value Date    TSH 1.65 04/11/2021     Lab Results   Component Value Date    CHOL 98 04/12/2021    HDL 25 04/12/2021    PSA 0.31 05/21/2020    LABA1C 7.7 04/11/2021 4/11/2021 echo  Summary  Left ventricle is mildly enlarged. Mild left ventricular hypertrophy. Global left ventricular systolic function is mildly reduced with an  estimated ejection fraction of 40-45 %, although difficult to visualize  endocardium. Left atrium is moderately severely dilated. Right atrium is moderately dilated . Moderately dilated right ventricular cavity. Aortic valve leaflets are mildly thickened. No aortic stenosis.   Mild aortic Will start with lower dose, 200 mg daily rather than previous 200 mg BID. 5. Recent R foot pain dx as gout, improved with colchicine. Elevated uric acid level. Discussed poss use of allopurinol to prevent future episodes. Pt wishes to defer for the moment. 6. DM, currently hyperglycemic d/t recent steroid injection but has nightly hypoglycemia. Does follow with endo. Advised pt not to hold metformin at night and rather to hold glimepiride evening dose and may also need to decrease insulin dose. 7. SOB multifactorial - cont same rx         Requested Prescriptions     Signed Prescriptions Disp Refills    NOVOLIN 70/30 (70-30) 100 UNIT/ML injection vial 1 vial 0     Sig: Inject 54 units AM, Inject 32 units at Lunch, Inject 40 units at supper as directed subcutaneously.  celecoxib (CELEBREX) 200 MG capsule 90 capsule 1     Si po daily    metFORMIN (GLUCOPHAGE) 1000 MG tablet 60 tablet 0     Sig: Take 1 tablet by mouth 2 times daily (with meals)       Patient Instructions   SURVEY:    You may be receiving a survey from [employer] regarding your visit today. You may get this in the mail, through your MyChart or in your email. Please complete the survey to enable us to provide the highest quality of care to you and your family. If you cannot score us as very good ( 5 Stars) on any question, please feel free to call the office to discuss how we could have made your experience exceptional.     Thank you. Clinical Care Team:  DO Melida Murrell, [address] [place] [address] Team:  Angelito Bullock received counseling on the following healthy behaviors: medication adherence  Reviewed prior labs and health maintenance. Continue current medications, diet and exercise. Discussed use, benefit, and side effects of prescribed medications.  Barriers to medication compliance addressed. Patient given educational materials - see patient instructions. All patient questions answered. Patient voiced understanding.      Electronically signed by Simon Diane DO on 4/21/2021 at 8:52 AM   [address]  Dept: [phone]

## 2021-04-21 NOTE — LETTER
Morgan Edward M.D. [address] [place] [address]  [phone]        2021        Basia Baum DO  [place] [address]    RE:   Korin Zelaya  :  1950    Dear Dr. Mike Prieto:    CHIEF COMPLAINT:  1. Fatigue and shortness of breath. 2.  Atrial fibrillation with cardioversion. 3.  Hospitalization on , for CHF, which was exacerbated by atrial fibrillation. 4.  Cardioversion to sinus rhythm on 2021.  5.  Continued shortness of breath along with some chest pain. 6.  Abnormal Lexiscan Cardiolite stress test.    HISTORY OF PRESENT ILLNESS:  I had the pleasure of seeing Mr. Kavon Donnelly along with his wife, Randy Newman, in our office on 2021. He is a very complex 61-year-old gentleman, who has extensive cardiac history. He had a catheterization on 10/31/2012, which showed severe disease at the bifurcation of the LAD and diagonal, with unremarkable right coronary artery and circumflex. He had angioplasty on 2012, placing a 2.75 x 28 mm Promus stent with a good end result. On 2014, a catheterization showed 70% to 80% in-stent stenosis of the LAD and diagonal, with unremarkable right coronary artery, EF of 55% to 60%. He had open heart surgery by Dr. Shaunna Vidal on 2014, with a LIMA to the LAD and a vein graft to the diagonal.    On 2017, he had shortness of breath and a catheterization on 2017, showed 95% disease in the very dominant right coronary artery, with 90% LAD, patent LIMA to the LAD, with the diagonal with 90%, with a patent vein graft to the diagonal, circumflex unremarkable, EF of 50%, and I placed a 3.5 x 18 mm drug-eluting Xience stent in the right coronary artery. His last catheterization was on 11/15/2017, that showed widely patent bypass grafts and widely patent stents with an EF of 55%. He has very symptomatic paroxysmal atrial fibrillation. He was anticoagulated with Eliquis. We did a cardioversion on 11/05/2020, before he went to [place]. He did have some episodes of atrial fibrillation in [place], but these reverted back to sinus rhythm. He does have a Kardia device and sent us rhythms intermittently when he was in [place]. He and his wife returned from [place] on 11/06/2020. He had noticed increasing shortness of breath and edema and ascites for the last 2 weeks in [place]. He had some chest pain and chest discomfort, although it was somewhat atypical.    He developed more shortness of breath on 04/11, and came into the emergency room. X-ray did show pulmonary vascular congestion and he did have renal insufficiency with a creatinine of 1.36. Again, he was in atrial fibrillation. We did a cardioversion while he was hospitalized on 04/12, and did have a successful conversion to sinus rhythm. He was discharged. We did do an echocardiogram that showed an EF down to 40% to 45% from a previous EF of 55%. We did do a Lexiscan Cardiolite stress test on 04/11, that was equivocal with perfusion defect in the lateral and inferior lateral wall consistent with ischemia, with his EF of 45%, with an intermediate risk of coronary artery disease. We brought him back for reevaluation today. His shortness of breath is better than when he came into the hospital, but it is not back to his baseline. He still has some ankle edema, but it is better than before he was hospitalized on 04/11. Again, he does feel better than when he came into the hospital, but he is not at his baseline. His still gets short of breath when he walks up steps. He has some chest pain, but it remains somewhat atypical.    He has had no syncope or near syncope. We did do an EKG today that showed he was still in sinus rhythm. Of note, he did develop severe ankle pain in the hospital, which was treated for gout with colchicine and this has essentially resolved.   He has not had a gout episode for 36 years. CARDIAC RISK FACTORS:  Prior MI:  Positive. Known CAD:  Positive. PTCA:  Positive. Bypass Surgery:  Positive. Hypertension:  Positive. Hyperlipidemia:  Positive. Diabetes:  Positive. Smoking:  Negative. Other Family Members:  Positive. MEDICATIONS AT THIS TIME:  He is on amiodarone 200 mg in the evening with 100 mg in the morning, Eliquis 5 mg b.i.d., Lipitor 40 mg daily, Coreg 12.5 mg b.i.d., Celebrex 200 mg daily, CoQ10 daily, Cardura 8 mg daily, Lasix 80 mg 1 in the morning and half a tablet in the evening, Amaryl 4 mg half a tablet in the morning before breakfast, Imdur 30 mg daily, magnesium 500 mg daily, metformin 1000 mg b.i.d., Adalat CC 90 mg half a tablet daily, Novolin 70/30 54 units in the morning, 32 at lunch, 40 at dinner, fish oil 1200 mg 2 tablets daily, Entresto 97/103 b.i.d., Aldactone 25 mg daily, Ultram 50 mg p.r.n., Trulicity 3.45 mg weekly, vitamin D 1000 units 2 times daily. PAST MEDICAL AND SURGICAL HISTORY:  1. Arthroscopic surgery with knee replacement, complicated by fat embolism in 02/2011.  2.  Insulin-dependent diabetes for 34 years, followed by an endocrinologist.  3.  Hypertension for many years. 4.  Left and right cataract surgery. 5.  Sleep apnea discovered in 2014, with him using a CPAP mask, which has helped markedly. 6.  Paroxysmal atrial fibrillation, very symptomatic, on Eliquis b.i.d. and amiodarone. 7.  Sick sinus syndrome, not requiring pacemaker. 8.  Chronic back pain, status post ablation, with injections. FAMILY HISTORY:  Mother and father had CHF. SOCIAL HISTORY:  He is 72years old, retired . , 2 children. Has a trailer in [place], in which he and his wife spend the winter. Daughter is a professor at [employer] in [place], with 2 grandchildren in [place]. He does not smoke, does not drink alcohol. Uses CPAP mask. He does have severe back discomfort.     REVIEW OF SYSTEMS:  Cardiac as above. Other systems reviewed including constitutional, eyes, ears, nose and throat, cardiovascular, respiratory, GI, , musculoskeletal, integumentary, neurologic, endocrine, hematologic and allergic/immunologic are negative except for what is described above. No weight loss or weight gain. No change in bowel habits, no blood in stools. No fevers, sweats, or chills. PHYSICAL EXAMINATION:  VITAL SIGNS:  His blood pressure was 120/60 with a heart rate of 60 and regular. Respiratory rate 18. O2 sat 94%. Weight 252 pounds. GENERAL:  He is a pleasant 67-year-old gentleman. Denied pain. He was oriented to person, place and time. Answered questions appropriately. SKIN:  No unusual skin changes. HEENT:  The pupils are equally round and intact. Mucous membranes were dry. NECK:  No JVD. Good carotid pulses. No carotid bruits. No lymphadenopathy or thyromegaly. CARDIOVASCULAR EXAM:  S1 and S2 were normal.  No S3 or S4. Soft systolic blowing type murmur. No diastolic murmur. PMI was normal.  No lift, thrust, or pericardial friction rub. LUNGS:  Quite clear to auscultation and percussion. ABDOMEN:  Soft and nontender. Good bowel sounds. EXTREMITIES:  Good femoral pulses. Good pedal pulses. No pedal edema. Skin was warm and dry. No calf tenderness. Nail beds pink. Good cap refill. PULSES:  Bilateral symmetrical radial, brachial and carotid pulses. No carotid bruits. Good femoral and pedal pulses. NEUROLOGIC EXAM:  Within normal limits. PSYCHIATRIC EXAM:  Within normal limits. LABORATORY DATA:  His sodium was 142, potassium 4.6, BUN 20, creatinine 1.20, GFR greater than 60, calcium was 9.6. His white count was 6.2, hemoglobin 12.3 with a platelet count of 480,692. Cholesterol was 98 with an HDL of 25, LDL 41, triglycerides 159. Hemoglobin A1c was 7.7. TSH 1.65. EKG today showed sinus rhythm with left bundle-branch block.     Echocardiogram on 04/12/2021, showed an EF of 40% to 45%, moderate to severely dilated left atrium with moderately dilated right-sided chambers consistent with pulmonary hypertension with a PA pressure of 40 to 45. Abnormal Lexiscan Cardiolite stress test on 04/11/2021, with inferolateral wall ischemia as well as some anterior wall ischemia with an intermediate risk of coronary artery disease. IMPRESSION:  1. Continued shortness of breath with some chest discomfort consistent with angina. 2.  Abnormal Lexiscan Cardiolite stress test with inferior lateral as well as anterior wall ischemia, with an intermediate risk of coronary artery disease. 3.  Mild LV dysfunction, EF of 40% to 45%, which is a change from his previous echo where his EF was 55%. 4.  Paroxysmal atrial fibrillation, very symptomatic, with his last cardioversion on 04/12/2021, to sinus rhythm. 5.  Catheterization on 10/31/2012, showed severe disease at the bifurcation of the LAD and diagonal, with unremarkable right coronary artery and circumflex, EF of 55%, with angioplasty of LAD and diagonal on 11/07/2012, placing a 2.75 x 28 mm Promus stent in the LAD. 6.  Catheterization on 05/14/2014, showing 70% to 80% LAD and diagonal with in-stent stenosis with an FFR of 0.78, with unremarkable right coronary artery and circumflex, EF of 55% to 60%. 7.  Open heart surgery on 06/30/2014, by Dr. Sara Curtis with a LIMA to the LAD and a vein graft to the diagonal.  8.  Catheterization on 08/30/2017, showing patent bypass graft, with 95% disease in the mid right coronary artery, EF of 50%, with angioplasty of the right coronary artery placing a 3.5 x 18 mm Xience stent. 9.  Last catheterization on 11/15/2017, showed widely patent stents and widely patent bypass grafts. 10.  Sleep apnea, on a CPAP mask. 11.  Chronic back pain. PLAN:  1. We will proceed on a cardiac catheterization on 05/07, at 9 o'clock.   2.  We would plan on ablation if he redevelops atrial fibrillation as he is on high dose of amiodarone at 300 mg daily, which he will remain. 3.  After the catheterization, we will consider placing him back in cardiac rehab. DISCUSSION:  Mr. Earlis Kanner remains very complex. He did develop CHF in the last 2 weeks in [place], which worsened on 04/11/2021, precipitating his hospitalization. His LV function appears to have decreased from 55% to 40% to 45%. His Lexiscan Cardiolite stress test also showed inferior lateral wall ischemia as well as I felt anterior wall ischemia. I question whether some of his shortness of breath is due to progressive coronary artery disease and we will proceed on with a cardiac catheterization to define his anatomy. I think it is highly likely that he will revert back to atrial fibrillation. If he does, I would send him for ablation at [facility]. I do want to make sure there has been no progression of his coronary artery disease before we would attempt ablation. He has been very compliant and is excellent to work with. His wife, Pauline Kline, is also very attentive to his health care. Thank you very much for allowing me the privilege of seeing Mr. Earlis Kanner. If you have any questions on my thoughts, please do not hesitate to contact me.      Sincerely,        Merissa Kim    D: 04/21/2021 23:28:45     T: 04/22/2021 1:13:43     [email or web address]  Job#: [id number]   Doc#: [id number]

## 2021-04-25 ASSESSMENT — ENCOUNTER SYMPTOMS: GASTROINTESTINAL NEGATIVE: 1

## 2021-04-25 NOTE — PROGRESS NOTES
Brigida Snellen, M.D. [address] [place] [address]  [phone]        2021        Brenton Patel DO  [facility], [place] [address]    RE:   Cassie Mooney  :  1950    Dear Dr. Didi Perez:    CHIEF COMPLAINT:  1. Fatigue and shortness of breath. 2.  Atrial fibrillation with cardioversion. 3.  Hospitalization on , for CHF, which was exacerbated by atrial fibrillation. 4.  Cardioversion to sinus rhythm on 2021.  5.  Continued shortness of breath along with some chest pain. 6.  Abnormal Lexiscan Cardiolite stress test.    HISTORY OF PRESENT ILLNESS:  I had the pleasure of seeing Mr. Oscar Naik along with his wife, Jean Claude Damon, in our office on 2021. He is a very complex 43-year-old gentleman, who has extensive cardiac history. He had a catheterization on 10/31/2012, which showed severe disease at the bifurcation of the LAD and diagonal, with unremarkable right coronary artery and circumflex. He had angioplasty on 2012, placing a 2.75 x 28 mm Promus stent with a good end result. On 2014, a catheterization showed 70% to 80% in-stent stenosis of the LAD and diagonal, with unremarkable right coronary artery, EF of 55% to 60%. He had open heart surgery by Dr. Bisi Nevarez on 2014, with a LIMA to the LAD and a vein graft to the diagonal.    On 2017, he had shortness of breath and a catheterization on 2017, showed 95% disease in the very dominant right coronary artery, with 90% LAD, patent LIMA to the LAD, with the diagonal with 90%, with a patent vein graft to the diagonal, circumflex unremarkable, EF of 50%, and I placed a 3.5 x 18 mm drug-eluting Xience stent in the right coronary artery. His last catheterization was on 11/15/2017, that showed widely patent bypass grafts and widely patent stents with an EF of 55%. He has very symptomatic paroxysmal atrial fibrillation. SYSTEMS:  Cardiac as above. Other systems reviewed including constitutional, eyes, ears, nose and throat, cardiovascular, respiratory, GI, , musculoskeletal, integumentary, neurologic, endocrine, hematologic and allergic/immunologic are negative except for what is described above. No weight loss or weight gain. No change in bowel habits, no blood in stools. No fevers, sweats, or chills. PHYSICAL EXAMINATION:  VITAL SIGNS:  His blood pressure was 120/60 with a heart rate of 60 and regular. Respiratory rate 18. O2 sat 94%. Weight 252 pounds. GENERAL:  He is a pleasant 69-year-old gentleman. Denied pain. He was oriented to person, place and time. Answered questions appropriately. SKIN:  No unusual skin changes. HEENT:  The pupils are equally round and intact. Mucous membranes were dry. NECK:  No JVD. Good carotid pulses. No carotid bruits. No lymphadenopathy or thyromegaly. CARDIOVASCULAR EXAM:  S1 and S2 were normal.  No S3 or S4. Soft systolic blowing type murmur. No diastolic murmur. PMI was normal.  No lift, thrust, or pericardial friction rub. LUNGS:  Quite clear to auscultation and percussion. ABDOMEN:  Soft and nontender. Good bowel sounds. EXTREMITIES:  Good femoral pulses. Good pedal pulses. No pedal edema. Skin was warm and dry. No calf tenderness. Nail beds pink. Good cap refill. PULSES:  Bilateral symmetrical radial, brachial and carotid pulses. No carotid bruits. Good femoral and pedal pulses. NEUROLOGIC EXAM:  Within normal limits. PSYCHIATRIC EXAM:  Within normal limits. LABORATORY DATA:  His sodium was 142, potassium 4.6, BUN 20, creatinine 1.20, GFR greater than 60, calcium was 9.6. His white count was 6.2, hemoglobin 12.3 with a platelet count of 614,897. Cholesterol was 98 with an HDL of 25, LDL 41, triglycerides 159. Hemoglobin A1c was 7.7. TSH 1.65. EKG today showed sinus rhythm with left bundle-branch block.     Echocardiogram on 04/12/2021, showed an EF of 40% to 45%, moderate to severely dilated left atrium with moderately dilated right-sided chambers consistent with pulmonary hypertension with a PA pressure of 40 to 45. Abnormal Lexiscan Cardiolite stress test on 04/11/2021, with inferolateral wall ischemia as well as some anterior wall ischemia with an intermediate risk of coronary artery disease. IMPRESSION:  1. Continued shortness of breath with some chest discomfort consistent with angina. 2.  Abnormal Lexiscan Cardiolite stress test with inferior lateral as well as anterior wall ischemia, with an intermediate risk of coronary artery disease. 3.  Mild LV dysfunction, EF of 40% to 45%, which is a change from his previous echo where his EF was 55%. 4.  Paroxysmal atrial fibrillation, very symptomatic, with his last cardioversion on 04/12/2021, to sinus rhythm. 5.  Catheterization on 10/31/2012, showed severe disease at the bifurcation of the LAD and diagonal, with unremarkable right coronary artery and circumflex, EF of 55%, with angioplasty of LAD and diagonal on 11/07/2012, placing a 2.75 x 28 mm Promus stent in the LAD. 6.  Catheterization on 05/14/2014, showing 70% to 80% LAD and diagonal with in-stent stenosis with an FFR of 0.78, with unremarkable right coronary artery and circumflex, EF of 55% to 60%. 7.  Open heart surgery on 06/30/2014, by Dr. Tung Valdovinos with a LIMA to the LAD and a vein graft to the diagonal.  8.  Catheterization on 08/30/2017, showing patent bypass graft, with 95% disease in the mid right coronary artery, EF of 50%, with angioplasty of the right coronary artery placing a 3.5 x 18 mm Xience stent. 9.  Last catheterization on 11/15/2017, showed widely patent stents and widely patent bypass grafts. 10.  Sleep apnea, on a CPAP mask. 11.  Chronic back pain. PLAN:  1. We will proceed on a cardiac catheterization on 05/07, at 9 o'clock.   2.  We would plan on ablation if he redevelops atrial fibrillation as he is on high dose of amiodarone at 300 mg daily, which he will remain. 3.  After the catheterization, we will consider placing him back in cardiac rehab. DISCUSSION:  Mr. Amber Gorman remains very complex. He did develop CHF in the last 2 weeks in [place], which worsened on 04/11/2021, precipitating his hospitalization. His LV function appears to have decreased from 55% to 40% to 45%. His Lexiscan Cardiolite stress test also showed inferior lateral wall ischemia as well as I felt anterior wall ischemia. I question whether some of his shortness of breath is due to progressive coronary artery disease and we will proceed on with a cardiac catheterization to define his anatomy. I think it is highly likely that he will revert back to atrial fibrillation. If he does, I would send him for ablation at [facility]. I do want to make sure there has been no progression of his coronary artery disease before we would attempt ablation. He has been very compliant and is excellent to work with. His wife, Deisi Ty, is also very attentive to his health care. Thank you very much for allowing me the privilege of seeing Mr. Amber Gorman. If you have any questions on my thoughts, please do not hesitate to contact me.      Sincerely,        Mandeep Cohn    D: 04/21/2021 23:28:45     T: 04/22/2021 1:13:43     [email or web address]  Job#: [id number]   Doc#: [id number]

## 2021-04-26 ENCOUNTER — TELEPHONE (OUTPATIENT)
Dept: CARDIOLOGY CLINIC | Age: 71
End: 2021-04-26

## 2021-04-26 RX ORDER — CARVEDILOL 12.5 MG/1
TABLET ORAL
Qty: 180 TABLET | Refills: 3 | Status: SHIPPED | OUTPATIENT
Start: 2021-04-26 | End: 2021-04-28 | Stop reason: SINTOL

## 2021-04-28 ENCOUNTER — TELEPHONE (OUTPATIENT)
Dept: CARDIOLOGY CLINIC | Age: 71
End: 2021-04-28

## 2021-04-28 ENCOUNTER — CARE COORDINATION (OUTPATIENT)
Dept: CASE MANAGEMENT | Age: 71
End: 2021-04-28

## 2021-04-28 NOTE — CARE COORDINATION
[facility] Transitions Follow Up Call    2021    Patient: Esvin Villa  Patient : 1950   MRN: [id number]  Reason for Admission: CHF  Discharge Date: 21 RARS: Readmission Risk Score: 20         Spoke with: Gilson Montalvo     Call to pt who states he is \"so far so good\"  States weight and feet/ ankle swelling are up but he is on increased water pills and holding coreg for now (d/t HR per cardiology)  He states he is short of breath but he is having heart cath next   Denies needs  Encouraged to call writer/ CTC or providers if questions or concerns- v/u     Care Transitions Subsequent and Final Call    Subsequent and Final Calls  Do you have any ongoing symptoms?: No  Have your medications changed?: No  Do you have any questions related to your medications?: No  Do you currently have any active services?: No  Do you have any needs or concerns that I can assist you with?: No  Identified Barriers: Lack of Education  Care Transitions Interventions  Other Interventions:            Follow Up  Future Appointments   Date Time Provider Javon Degroot   2021  9:00 AM STEPHANIE Botello PROCEDURE Shivam DWYER   2021 10:30 AM Debra Zhang MD [facility]   2021  8:40 AM DO Tia Campos [facility]       Andrews Griffin RN

## 2021-04-28 NOTE — TELEPHONE ENCOUNTER
Pt sent kari hr rate at dr pugh yesterday in the 40's   C/o dizziness and headache  bp good   Held coreg last elieser and this morning  Hr 50 still dizzy   Talked with DR Chetan Dumont   To stop coreg   Weight up 3.7 lbs. Increase lasix to full tab bid  And aldactone 25 mg bid  For 3 days. Call with update.

## 2021-05-01 ENCOUNTER — HOSPITAL ENCOUNTER (OUTPATIENT)
Age: 71
Discharge: HOME | End: 2021-05-01
Payer: MEDICARE

## 2021-05-01 DIAGNOSIS — I10 ESSENTIAL HYPERTENSION: ICD-10-CM

## 2021-05-01 DIAGNOSIS — N28.9 KIDNEY INSUFFICIENCY: ICD-10-CM

## 2021-05-01 DIAGNOSIS — E78.5 HYPERLIPIDEMIA, UNSPECIFIED HYPERLIPIDEMIA TYPE: ICD-10-CM

## 2021-05-01 LAB
ABSOLUTE EOS #: 0.2 K/UL (ref 0–0.4)
ABSOLUTE IMMATURE GRANULOCYTE: ABNORMAL K/UL (ref 0–0.3)
ABSOLUTE LYMPH #: 1.1 K/UL (ref 1–4.8)
ABSOLUTE MONO #: 0.6 K/UL (ref 0–1)
ALBUMIN SERPL-MCNC: 4.1 G/DL (ref 3.5–5.2)
ALBUMIN/GLOBULIN RATIO: ABNORMAL (ref 1–2.5)
ALP BLD-CCNC: 67 U/L (ref 40–129)
ALT SERPL-CCNC: 18 U/L (ref 5–41)
ANION GAP SERPL CALCULATED.3IONS-SCNC: 12 MMOL/L (ref 9–17)
AST SERPL-CCNC: 15 U/L
BASOPHILS # BLD: 0 % (ref 0–2)
BASOPHILS ABSOLUTE: 0 K/UL (ref 0–0.2)
BILIRUB SERPL-MCNC: 0.68 MG/DL (ref 0.3–1.2)
BUN BLDV-MCNC: 35 MG/DL (ref 8–23)
BUN/CREAT BLD: 20 (ref 9–20)
CALCIUM SERPL-MCNC: 8.9 MG/DL (ref 8.6–10.4)
CHLORIDE BLD-SCNC: 102 MMOL/L (ref 98–107)
CHOLESTEROL/HDL RATIO: 3.5
CHOLESTEROL: 106 MG/DL
CO2: 27 MMOL/L (ref 20–31)
CREAT SERPL-MCNC: 1.76 MG/DL (ref 0.7–1.2)
DIFFERENTIAL TYPE: YES
EOSINOPHILS RELATIVE PERCENT: 3 % (ref 0–5)
GFR AFRICAN AMERICAN: 47 ML/MIN
GFR NON-AFRICAN AMERICAN: 39 ML/MIN
GFR SERPL CREATININE-BSD FRML MDRD: ABNORMAL ML/MIN/{1.73_M2}
GFR SERPL CREATININE-BSD FRML MDRD: ABNORMAL ML/MIN/{1.73_M2}
GLUCOSE BLD-MCNC: 141 MG/DL (ref 70–99)
HCT VFR BLD CALC: 37.9 % (ref 41–53)
HDLC SERPL-MCNC: 30 MG/DL
HEMOGLOBIN: 12.6 G/DL (ref 13.5–17.5)
IMMATURE GRANULOCYTES: ABNORMAL %
LDL CHOLESTEROL: 49 MG/DL (ref 0–130)
LYMPHOCYTES # BLD: 19 % (ref 13–44)
MCH RBC QN AUTO: 30.8 PG (ref 26–34)
MCHC RBC AUTO-ENTMCNC: 33.2 G/DL (ref 31–37)
MCV RBC AUTO: 92.7 FL (ref 80–100)
MONOCYTES # BLD: 10 % (ref 5–9)
NRBC AUTOMATED: ABNORMAL PER 100 WBC
PDW BLD-RTO: 14.7 % (ref 12.1–15.2)
PLATELET # BLD: 280 K/UL (ref 140–450)
PLATELET ESTIMATE: ABNORMAL
PMV BLD AUTO: ABNORMAL FL (ref 6–12)
POTASSIUM SERPL-SCNC: 5 MMOL/L (ref 3.7–5.3)
RBC # BLD: 4.09 M/UL (ref 4.5–5.9)
RBC # BLD: ABNORMAL 10*6/UL
SEG NEUTROPHILS: 68 % (ref 39–75)
SEGMENTED NEUTROPHILS ABSOLUTE COUNT: 4 K/UL (ref 2.1–6.5)
SODIUM BLD-SCNC: 141 MMOL/L (ref 135–144)
TOTAL PROTEIN: 6.9 G/DL (ref 6.4–8.3)
TRIGL SERPL-MCNC: 137 MG/DL
VLDLC SERPL CALC-MCNC: ABNORMAL MG/DL (ref 1–30)
WBC # BLD: 5.8 K/UL (ref 3.5–11)
WBC # BLD: ABNORMAL 10*3/UL

## 2021-05-01 PROCEDURE — 80061 LIPID PANEL: CPT

## 2021-05-01 PROCEDURE — 80053 COMPREHEN METABOLIC PANEL: CPT

## 2021-05-01 PROCEDURE — 85025 COMPLETE CBC W/AUTO DIFF WBC: CPT

## 2021-05-01 PROCEDURE — 36415 COLL VENOUS BLD VENIPUNCTURE: CPT

## 2021-05-04 DIAGNOSIS — R60.0 LEG EDEMA: Primary | ICD-10-CM

## 2021-05-04 RX ORDER — METOLAZONE 5 MG/1
5 TABLET ORAL DAILY
Qty: 20 TABLET | Refills: 0 | Status: SHIPPED | OUTPATIENT
Start: 2021-05-04 | End: 2021-05-06 | Stop reason: DRUGHIGH

## 2021-05-04 NOTE — TELEPHONE ENCOUNTER
Reviewed labs with dr Roxane Arce done 5/1   Creat up but when calling pt c/o  Increase edema and bloating   Weight up 3 lbs. Per DR Roxane Arce will keep lasix 80 mg bid  Aldactone 25 mg bid  Will add zaroxolyn 5 mg tonight   Tomorrow and Thursday   Do bmp on Thursday.

## 2021-05-05 ENCOUNTER — CARE COORDINATION (OUTPATIENT)
Dept: CASE MANAGEMENT | Age: 71
End: 2021-05-05

## 2021-05-06 ENCOUNTER — HOSPITAL ENCOUNTER (OUTPATIENT)
Age: 71
Discharge: HOME | End: 2021-05-06
Payer: MEDICARE

## 2021-05-06 DIAGNOSIS — I48.0 PAF (PAROXYSMAL ATRIAL FIBRILLATION) (HCC): ICD-10-CM

## 2021-05-06 DIAGNOSIS — I27.20 PULMONARY HTN (HCC): ICD-10-CM

## 2021-05-06 DIAGNOSIS — R60.0 LEG EDEMA: ICD-10-CM

## 2021-05-06 DIAGNOSIS — I50.32 CHRONIC DIASTOLIC CHF (CONGESTIVE HEART FAILURE), NYHA CLASS 3 (HCC): ICD-10-CM

## 2021-05-06 DIAGNOSIS — R06.02 SHORTNESS OF BREATH: ICD-10-CM

## 2021-05-06 DIAGNOSIS — I50.22 CHRONIC SYSTOLIC CONGESTIVE HEART FAILURE (HCC): ICD-10-CM

## 2021-05-06 DIAGNOSIS — I10 ESSENTIAL HYPERTENSION: ICD-10-CM

## 2021-05-06 DIAGNOSIS — I25.10 CORONARY ARTERY DISEASE INVOLVING NATIVE CORONARY ARTERY OF NATIVE HEART WITHOUT ANGINA PECTORIS: ICD-10-CM

## 2021-05-06 LAB
ANION GAP SERPL CALCULATED.3IONS-SCNC: 12 MMOL/L (ref 9–17)
BUN BLDV-MCNC: 50 MG/DL (ref 8–23)
BUN/CREAT BLD: 27 (ref 9–20)
CALCIUM SERPL-MCNC: 9 MG/DL (ref 8.6–10.4)
CHLORIDE BLD-SCNC: 99 MMOL/L (ref 98–107)
CO2: 28 MMOL/L (ref 20–31)
CREAT SERPL-MCNC: 1.87 MG/DL (ref 0.7–1.2)
GFR AFRICAN AMERICAN: 44 ML/MIN
GFR NON-AFRICAN AMERICAN: 36 ML/MIN
GFR SERPL CREATININE-BSD FRML MDRD: ABNORMAL ML/MIN/{1.73_M2}
GFR SERPL CREATININE-BSD FRML MDRD: ABNORMAL ML/MIN/{1.73_M2}
GLUCOSE BLD-MCNC: 195 MG/DL (ref 70–99)
POTASSIUM SERPL-SCNC: 5 MMOL/L (ref 3.7–5.3)
SODIUM BLD-SCNC: 139 MMOL/L (ref 135–144)

## 2021-05-06 PROCEDURE — 80048 BASIC METABOLIC PNL TOTAL CA: CPT

## 2021-05-06 PROCEDURE — 36415 COLL VENOUS BLD VENIPUNCTURE: CPT

## 2021-05-06 RX ORDER — FUROSEMIDE 80 MG
TABLET ORAL
Qty: 180 TABLET | Refills: 3 | Status: SHIPPED | OUTPATIENT
Start: 2021-05-06 | End: 2021-06-16

## 2021-05-06 RX ORDER — SPIRONOLACTONE 25 MG/1
25 TABLET ORAL 2 TIMES DAILY
Qty: 30 TABLET | Status: SHIPPED | COMMUNITY
Start: 2021-05-06 | End: 2021-05-10 | Stop reason: DRUGHIGH

## 2021-05-06 RX ORDER — METOLAZONE 5 MG/1
5 TABLET ORAL DAILY
Qty: 20 TABLET | Refills: 0 | Status: SHIPPED | OUTPATIENT
Start: 2021-05-06 | End: 2021-07-20 | Stop reason: ALTCHOICE

## 2021-05-06 NOTE — PROGRESS NOTES
Pt called with lab results  DR Brian Lorenzo pt will cancel cath this week   Due to elevated creatinine   Will schedule in 1 week 5/14  Repeat lab on Monday 5/9  HOLD zaroxoly   Keep lasix and aldactone bid

## 2021-05-10 ENCOUNTER — TELEPHONE (OUTPATIENT)
Dept: CARDIOLOGY CLINIC | Age: 71
End: 2021-05-10

## 2021-05-10 ENCOUNTER — HOSPITAL ENCOUNTER (OUTPATIENT)
Age: 71
Discharge: HOME | End: 2021-05-10
Payer: MEDICARE

## 2021-05-10 DIAGNOSIS — I50.22 CHRONIC SYSTOLIC CONGESTIVE HEART FAILURE (HCC): ICD-10-CM

## 2021-05-10 DIAGNOSIS — R60.0 LEG EDEMA: ICD-10-CM

## 2021-05-10 DIAGNOSIS — I48.0 PAF (PAROXYSMAL ATRIAL FIBRILLATION) (HCC): ICD-10-CM

## 2021-05-10 DIAGNOSIS — R60.0 LEG EDEMA: Primary | ICD-10-CM

## 2021-05-10 DIAGNOSIS — I25.10 CORONARY ARTERY DISEASE INVOLVING NATIVE CORONARY ARTERY OF NATIVE HEART WITHOUT ANGINA PECTORIS: ICD-10-CM

## 2021-05-10 DIAGNOSIS — I27.20 PULMONARY HTN (HCC): ICD-10-CM

## 2021-05-10 DIAGNOSIS — I50.32 CHRONIC DIASTOLIC CHF (CONGESTIVE HEART FAILURE), NYHA CLASS 3 (HCC): ICD-10-CM

## 2021-05-10 DIAGNOSIS — I10 ESSENTIAL HYPERTENSION: ICD-10-CM

## 2021-05-10 DIAGNOSIS — R06.02 SHORTNESS OF BREATH: ICD-10-CM

## 2021-05-10 LAB
ANION GAP SERPL CALCULATED.3IONS-SCNC: 12 MMOL/L (ref 9–17)
BUN BLDV-MCNC: 79 MG/DL (ref 8–23)
BUN/CREAT BLD: 32 (ref 9–20)
CALCIUM SERPL-MCNC: 9.3 MG/DL (ref 8.6–10.4)
CHLORIDE BLD-SCNC: 99 MMOL/L (ref 98–107)
CO2: 26 MMOL/L (ref 20–31)
CREAT SERPL-MCNC: 2.49 MG/DL (ref 0.7–1.2)
GFR AFRICAN AMERICAN: 31 ML/MIN
GFR NON-AFRICAN AMERICAN: 26 ML/MIN
GFR SERPL CREATININE-BSD FRML MDRD: ABNORMAL ML/MIN/{1.73_M2}
GFR SERPL CREATININE-BSD FRML MDRD: ABNORMAL ML/MIN/{1.73_M2}
GLUCOSE BLD-MCNC: 181 MG/DL (ref 70–99)
POTASSIUM SERPL-SCNC: 5.6 MMOL/L (ref 3.7–5.3)
SODIUM BLD-SCNC: 137 MMOL/L (ref 135–144)

## 2021-05-10 PROCEDURE — 36415 COLL VENOUS BLD VENIPUNCTURE: CPT

## 2021-05-10 PROCEDURE — 80048 BASIC METABOLIC PNL TOTAL CA: CPT

## 2021-05-10 RX ORDER — SPIRONOLACTONE 25 MG/1
25 TABLET ORAL DAILY
Qty: 30 TABLET | Status: SHIPPED | COMMUNITY
Start: 2021-05-10 | End: 2021-10-04 | Stop reason: SDUPTHER

## 2021-05-11 ENCOUNTER — CARE COORDINATION (OUTPATIENT)
Dept: CASE MANAGEMENT | Age: 71
End: 2021-05-11

## 2021-05-13 ENCOUNTER — TELEPHONE (OUTPATIENT)
Dept: CARDIOLOGY CLINIC | Age: 71
End: 2021-05-13

## 2021-05-13 ENCOUNTER — HOSPITAL ENCOUNTER (OUTPATIENT)
Age: 71
Discharge: HOME | End: 2021-05-13
Payer: MEDICARE

## 2021-05-13 DIAGNOSIS — R60.0 LEG EDEMA: ICD-10-CM

## 2021-05-13 LAB
ANION GAP SERPL CALCULATED.3IONS-SCNC: 10 MMOL/L (ref 9–17)
BUN BLDV-MCNC: 63 MG/DL (ref 8–23)
BUN/CREAT BLD: 34 (ref 9–20)
CALCIUM SERPL-MCNC: 9 MG/DL (ref 8.6–10.4)
CHLORIDE BLD-SCNC: 100 MMOL/L (ref 98–107)
CO2: 26 MMOL/L (ref 20–31)
CREAT SERPL-MCNC: 1.87 MG/DL (ref 0.7–1.2)
GFR AFRICAN AMERICAN: 44 ML/MIN
GFR NON-AFRICAN AMERICAN: 36 ML/MIN
GFR SERPL CREATININE-BSD FRML MDRD: ABNORMAL ML/MIN/{1.73_M2}
GFR SERPL CREATININE-BSD FRML MDRD: ABNORMAL ML/MIN/{1.73_M2}
GLUCOSE BLD-MCNC: 138 MG/DL (ref 70–99)
POTASSIUM SERPL-SCNC: 4.6 MMOL/L (ref 3.7–5.3)
SODIUM BLD-SCNC: 136 MMOL/L (ref 135–144)

## 2021-05-13 PROCEDURE — 36415 COLL VENOUS BLD VENIPUNCTURE: CPT

## 2021-05-13 PROCEDURE — 80048 BASIC METABOLIC PNL TOTAL CA: CPT

## 2021-05-17 ENCOUNTER — TELEPHONE (OUTPATIENT)
Dept: CARDIOLOGY CLINIC | Age: 71
End: 2021-05-17

## 2021-05-17 ENCOUNTER — HOSPITAL ENCOUNTER (OUTPATIENT)
Age: 71
Discharge: HOME | End: 2021-05-17
Payer: MEDICARE

## 2021-05-17 DIAGNOSIS — E11.649 TYPE 2 DIABETES MELLITUS WITH HYPOGLYCEMIA WITHOUT COMA, WITH LONG-TERM CURRENT USE OF INSULIN (HCC): ICD-10-CM

## 2021-05-17 DIAGNOSIS — I50.22 CHRONIC SYSTOLIC CONGESTIVE HEART FAILURE (HCC): Primary | ICD-10-CM

## 2021-05-17 DIAGNOSIS — Z79.4 TYPE 2 DIABETES MELLITUS WITH HYPOGLYCEMIA WITHOUT COMA, WITH LONG-TERM CURRENT USE OF INSULIN (HCC): ICD-10-CM

## 2021-05-17 LAB
ANION GAP SERPL CALCULATED.3IONS-SCNC: 9 MMOL/L (ref 9–17)
BUN BLDV-MCNC: 26 MG/DL (ref 8–23)
BUN/CREAT BLD: 23 (ref 9–20)
CALCIUM SERPL-MCNC: 8.2 MG/DL (ref 8.6–10.4)
CHLORIDE BLD-SCNC: 106 MMOL/L (ref 98–107)
CO2: 25 MMOL/L (ref 20–31)
CREAT SERPL-MCNC: 1.12 MG/DL (ref 0.7–1.2)
GFR AFRICAN AMERICAN: >60 ML/MIN
GFR NON-AFRICAN AMERICAN: >60 ML/MIN
GFR SERPL CREATININE-BSD FRML MDRD: ABNORMAL ML/MIN/{1.73_M2}
GFR SERPL CREATININE-BSD FRML MDRD: ABNORMAL ML/MIN/{1.73_M2}
GLUCOSE BLD-MCNC: 129 MG/DL (ref 70–99)
POTASSIUM SERPL-SCNC: 4.5 MMOL/L (ref 3.7–5.3)
SODIUM BLD-SCNC: 140 MMOL/L (ref 135–144)

## 2021-05-17 PROCEDURE — 80048 BASIC METABOLIC PNL TOTAL CA: CPT

## 2021-05-17 PROCEDURE — 36415 COLL VENOUS BLD VENIPUNCTURE: CPT

## 2021-05-17 RX ORDER — DOXAZOSIN 8 MG/1
TABLET ORAL
Qty: 90 TABLET | Refills: 3 | Status: SHIPPED | OUTPATIENT
Start: 2021-05-17 | End: 2021-06-16

## 2021-05-17 NOTE — TELEPHONE ENCOUNTER
Pt called follow up cath   Labs done informed ok to restart   Metformin   Per  pt start back on lasix alternating with aldactone every other day. Will do labs in 2 weeks. Pt offered cardiac rehab he will think about it and let us know.

## 2021-05-18 ENCOUNTER — TELEPHONE (OUTPATIENT)
Dept: CARDIOLOGY CLINIC | Age: 71
End: 2021-05-18

## 2021-05-18 NOTE — TELEPHONE ENCOUNTER
Pt called  C/o  Being up 8 lbs. And heart   Rate at 52   Talked with DR Rasheeda Sam   Will increase lasix and alldactone  To once a day.

## 2021-06-01 ENCOUNTER — HOSPITAL ENCOUNTER (OUTPATIENT)
Age: 71
Discharge: HOME | End: 2021-06-01
Payer: MEDICARE

## 2021-06-01 ENCOUNTER — TELEPHONE (OUTPATIENT)
Dept: CARDIOLOGY CLINIC | Age: 71
End: 2021-06-01

## 2021-06-01 DIAGNOSIS — I50.22 CHRONIC SYSTOLIC CONGESTIVE HEART FAILURE (HCC): ICD-10-CM

## 2021-06-01 LAB
ANION GAP SERPL CALCULATED.3IONS-SCNC: 8 MMOL/L (ref 9–17)
BUN BLDV-MCNC: 27 MG/DL (ref 8–23)
BUN/CREAT BLD: 24 (ref 9–20)
CALCIUM SERPL-MCNC: 8.5 MG/DL (ref 8.6–10.4)
CHLORIDE BLD-SCNC: 102 MMOL/L (ref 98–107)
CO2: 27 MMOL/L (ref 20–31)
CREAT SERPL-MCNC: 1.12 MG/DL (ref 0.7–1.2)
GFR AFRICAN AMERICAN: >60 ML/MIN
GFR NON-AFRICAN AMERICAN: >60 ML/MIN
GFR SERPL CREATININE-BSD FRML MDRD: ABNORMAL ML/MIN/{1.73_M2}
GFR SERPL CREATININE-BSD FRML MDRD: ABNORMAL ML/MIN/{1.73_M2}
GLUCOSE BLD-MCNC: 138 MG/DL (ref 70–99)
POTASSIUM SERPL-SCNC: 4.9 MMOL/L (ref 3.7–5.3)
SODIUM BLD-SCNC: 137 MMOL/L (ref 135–144)

## 2021-06-01 PROCEDURE — 36415 COLL VENOUS BLD VENIPUNCTURE: CPT

## 2021-06-01 PROCEDURE — 80048 BASIC METABOLIC PNL TOTAL CA: CPT

## 2021-06-01 NOTE — TELEPHONE ENCOUNTER
Pt called with lab results  Doing ok some edema   Weight is stable  Started coreg 12.5 mg 1/2 tab daily  After heart cath per DR Kin Molina   Heart rate 30-40  Forgotten to take med for last 3 days   Heart rate normal 80   Restarted again yesterday   Feels tired and heart rate down.     Talked with DR Kin Molina will decrease   Coreg to 3.125 mg daily   [facility]

## 2021-06-02 RX ORDER — CARVEDILOL 3.12 MG/1
3.12 TABLET ORAL DAILY
Qty: 30 TABLET | Refills: 6 | Status: SHIPPED | OUTPATIENT
Start: 2021-06-02 | End: 2021-11-30 | Stop reason: SDUPTHER

## 2021-06-10 ENCOUNTER — VIRTUAL VISIT (OUTPATIENT)
Dept: PAIN MANAGEMENT | Age: 71
End: 2021-06-10
Payer: MEDICARE

## 2021-06-10 DIAGNOSIS — M79.604 BILATERAL LEG PAIN: ICD-10-CM

## 2021-06-10 DIAGNOSIS — M79.605 BILATERAL LEG PAIN: ICD-10-CM

## 2021-06-10 DIAGNOSIS — M48.061 SPINAL STENOSIS OF LUMBAR REGION WITHOUT NEUROGENIC CLAUDICATION: ICD-10-CM

## 2021-06-10 DIAGNOSIS — M54.16 LUMBAR RADICULOPATHY: Primary | ICD-10-CM

## 2021-06-10 PROCEDURE — 99442 PR PHYS/QHP TELEPHONE EVALUATION 11-20 MIN: CPT | Performed by: NURSE PRACTITIONER

## 2021-06-10 ASSESSMENT — ENCOUNTER SYMPTOMS
COUGH: 0
GASTROINTESTINAL NEGATIVE: 1
EYES NEGATIVE: 1
BACK PAIN: 1
SHORTNESS OF BREATH: 0

## 2021-06-10 NOTE — PROGRESS NOTES
Carmen Lechuga  (1950)    6/10/2021    TELEHEALTH EVALUATION -- Audio Only (During IBRJF-60 public health emergency)    Due to [place] outbreak, patient's office visit was converted to a virtual visit. Patient was contacted and agreed to proceed with a audio only telehealth service. Patient understands that this encounter is a billable visit and that insurance coverage and co-pays are up to their individual insurance plans. At the beginning of this telehealth encounter, I verified with the patient their name and date of birth. Verbal consent for telehealth encounter was obtained. Subjective:       Chief Complaint   Patient presents with    Back Pain     lumbar       Carmen Lechuga is 72 y.o. male who complains today of: Allergies:  Patient has no known allergies. History:  Past Medical History:   Diagnosis Date    Arthritis     Arthritis     CAD (coronary artery disease)     CHF (congestive heart failure) (HCC)     CHF (congestive heart failure) (HCC)     Coronary artery disease     Diabetes ([address]     Diabetes mellitus ([address]     H/O coronary angioplasty     Hyperlipidemia     Hypertension     Neuropathy     Numbness and tingling     dannie hands    Pulmonary edema     SECONDARY TO FAT EMBOLI AFTER KNEE SURGERY    Sleep apnea     Unspecified sleep apnea 2012    cpap     Past Surgical History:   Procedure Laterality Date    ANGIOPLASTY  08/30/2017    Dr. Marleen Loo mm drug eluting Xience stents in the right coronary. DANIELA-3 flow prior to the procedure DANIELA-3 flow following the procedure. The patient has had a good result from his angioplasty of the right coronary artery. Bella Durant APPENDECTOMY      APPENDECTOMY      BUNIONECTOMY  2010    CARDIAC CATHETERIZATION      stent pacement    CARDIAC CATHETERIZATION Left 05/14/2014    Dr [facility] Severe CAD, 70% disese with in-stent stenosis in the left anterior descending at the level of a very large codominant diagonal. 90% disease of the ostium of a very large codominant diagonal with 60% disease beyond the diagonal with a fractional flow reserve of 0.76 in the diagonal and 0.78 in the left anterior descending indicating obstructive disease of significance in both the     CARDIAC CATHETERIZATION Left 05/14/2014    left anterior descending & diagonal.. Mild irregularities of 30% in a small, nondominant circumglex. Mild 30- 40% disease in the midportion of a very large,dominant right coronary artery. Normal left ventricular function, ejection fraction of 55-60%    CARDIAC CATHETERIZATION Bilateral 11/15/2017    Dr. Rasheeda Sam @ [facility]--Severe PHT with a PA pressure of 74/30. Widely patent stent in the very large dominat right coronary artery with 50% disease before the stent with an FFR of 0.89, indicating no significant obstruction. Severe disease in the LAD. Patent left internal mammary to the LAD. Patent saphenous vein graft to a very large diagonal.  Overall normal LV function.   EF of 55%    CARDIAC CATHETERIZATION Left 05/14/2021    Dr. Rasheeda Sam @ [facility]--Medical therapy    CATARACT REMOVAL  2004    COLONOSCOPY  10/09/2013    Dr. Shania Romano (hemorrhoids)    COLONOSCOPY  10/09/2013    CORONARY ANGIOPLASTY WITH STENT PLACEMENT      CORONARY ARTERY BYPASS GRAFT  06/03/2014    Dr Karlyn Fabry @ [address] [place] [address] GRAFT      EYE SURGERY  08/2012    B leakage in back of eyes    JOINT REPLACEMENT Bilateral     knees    JOINT REPLACEMENT      KNEE SURGERY  2010, 2011    BILATERAL KNEE ORTHO    KNEE SURGERY       Family History   Problem Relation Age of Onset    Alzheimer's Disease Mother     Heart Disease Mother     Arthritis Mother     High Blood Pressure Mother     Diabetes Mother     Heart Disease Father     Arthritis Father     High Blood Pressure Father     Diabetes Father      Social History     Socioeconomic History    Marital status:      Spouse name: Not on file    Number of children: Not on file    Years of education: Not on file    Highest education level: Not on file   Occupational History    Not on file   Tobacco Use    Smoking status: Former Smoker     Packs/day: 0.50     Years: 3.00     Pack years: 1.50     Quit date: 10/9/1975     Years since quittin.7    Smokeless tobacco: Never Used   Substance and Sexual Activity    Alcohol use: No    Drug use: No    Sexual activity: Not on file   Other Topics Concern    Not on file   Social History Narrative    Not on file     Social Determinants of Health     Financial Resource Strain: Low Risk     Difficulty of Paying Living Expenses: Not hard at all   Food Insecurity: No Food Insecurity    Worried About [employer] in the Last Year: Never true    [employer] in the Last Year: Never true   Transportation Needs:     Lack of Transportation (Medical):      Lack of Transportation (Non-Medical):    Physical Activity:     Days of Exercise per Week:     Minutes of Exercise per Session:    Stress:     Feeling of Stress :    Social Connections:     Frequency of Communication with Friends and Family:     Frequency of Social Gatherings with Friends and Family:     Attends Yazidism Services:     Active Member of Clubs or Organizations:     Attends Club or Organization Meetings:     Marital Status:    Intimate Partner Violence:     Fear of Current or Ex-Partner:     Emotionally Abused:     Physically Abused:     Sexually Abused:        Current Outpatient Medications on File Prior to Visit   Medication Sig Dispense Refill    carvedilol (COREG) 3.125 MG tablet Take 1 tablet by mouth daily 30 tablet 6    doxazosin (CARDURA) 8 MG tablet TAKE 1 TABLET DAILY 90 tablet 3    spironolactone (ALDACTONE) 25 MG tablet Take 25 mg by mouth daily  30 tablet     furosemide (LASIX) 80 MG tablet Take 1 tab twice a day (Patient taking differently: daily ) 180 tablet 3    NOVOLIN 70/30 (70-30) 100 UNIT/ML injection vial Inject 54 units AM, Inject 32 units at Lunch, Inject 40 units at supper as directed subcutaneously. 1 vial 0    celecoxib (CELEBREX) 200 MG capsule 1 po daily 90 capsule 1    metFORMIN (GLUCOPHAGE) 1000 MG tablet Take 1 tablet by mouth 2 times daily (with meals) (Patient taking differently: Take 1,000 mg by mouth daily ) 60 tablet 0    NIFEdipine (ADALAT CC) 90 MG extended release tablet Take 45 mg by mouth daily      amiodarone (CORDARONE) 200 MG tablet Take 100 mg by mouth nightly Take 100 mg in am Take 200mg in pm      Coenzyme Q10 (CO Q 10) 100 MG CAPS Take 100 mg by mouth nightly      acetaminophen (TYLENOL) 500 MG tablet Take 500 mg by mouth See Admin Instructions Take 500 mg in am  Take 1000 mg in Pm      isosorbide mononitrate (IMDUR) 30 MG extended release tablet TAKE 1 TABLET DAILY 90 tablet 3    apixaban (ELIQUIS) 5 MG TABS tablet TAKE 1 TABLET TWICE A  tablet 3    traMADol (ULTRAM) 50 MG tablet Take 1 tablet by mouth 2 times daily as needed for Pain.        atorvastatin (LIPITOR) 40 MG tablet One daily 90 tablet 3    sacubitril-valsartan (ENTRESTO)  MG per tablet TAKE 1 TABLET TWICE A  tablet 3    TRULICITY 5.52 XJ/0.3LG SOPN Inject 0.75 mg into the skin once a week Mondays      Magnesium Oxide 500 MG TABS Take 500 mg by mouth daily       BD ULTRA-FINE PEN NEEDLES 29G X 12.7MM MISC       glimepiride (AMARYL) 4 MG tablet Take 2 mg by mouth every morning (before breakfast)       Omega-3 Fatty Acids (FISH OIL) 1200 MG CAPS Take 1 capsule by mouth 2 times daily Plus 360 omega 3      Glucosamine Sulfate 1000 MG CAPS Take 10,000 mg by mouth daily       Chromium-Cinnamon (CINNAMON PLUS CHROMIUM PO) Take 1,000 mg by mouth 2 times daily       Multiple Vitamins-Minerals (ONE-A-DAY MENS 50+ ADVANTAGE PO) Take 1 tablet by mouth daily       Vitamin D (CHOLECALCIFEROL) 1000 UNITS CAPS capsule   Take by mouth 2 times daily Vitamin D 3      Insulin Syringes, Disposable, U-100 0.3 Telehealth visit d/t COVID-19 as noted above. Will order LE EMG with Dr. Leola Govea to evaluate his radiating Sx further - at that time will exam pt as well. Likely need B/L L5-S1 TFESI as discussed. Reviewed lumbar MRI report from 2018 showing significant bilateral foraminal stenosis at L5-S1 as well as L3-4. He is describing L5-S1 Sx today. He is not wanting updated MRI at this time or further PT. He is not interested in surgery. Has seen Dr. Doug Maria in the past.  All questions were answered. Discussed home exercise program and  smoking cessation. Relevant imaging and pain generators reviewed. Pt verbalized understanding and agrees with above plan. Pt has chronic pain. OARRS was reviewed. This NP saw pt under direct supervision of Dr. Leola Govea. Follow up:  Return for for LE EMG and exam with Dr. Leola Govea. Ora Evans, APRN - CNP      >50% of appointment was spent with discussion, addressing questions and concerns, including prognosis, treatment options, patient education, and recommendations. [id number]}    Pursuant to the emergency declaration under the [facility]5 waiver authority and the [employer] Act, this Virtual  Visit was conducted, with patient's consent, to reduce the patient's risk of exposure to COVID-19 and provide continuity of care for an established patient. Services were provided through an audio discussion to substitute for in-person clinic visit.

## 2021-06-16 ENCOUNTER — OFFICE VISIT (OUTPATIENT)
Dept: CARDIOLOGY CLINIC | Age: 71
End: 2021-06-16
Payer: MEDICARE

## 2021-06-16 VITALS
DIASTOLIC BLOOD PRESSURE: 60 MMHG | BODY MASS INDEX: 37.16 KG/M2 | HEART RATE: 75 BPM | SYSTOLIC BLOOD PRESSURE: 140 MMHG | OXYGEN SATURATION: 94 % | WEIGHT: 259 LBS

## 2021-06-16 DIAGNOSIS — R06.02 SOB (SHORTNESS OF BREATH): Primary | ICD-10-CM

## 2021-06-16 PROCEDURE — 1036F TOBACCO NON-USER: CPT | Performed by: INTERNAL MEDICINE

## 2021-06-16 PROCEDURE — G8427 DOCREV CUR MEDS BY ELIG CLIN: HCPCS | Performed by: INTERNAL MEDICINE

## 2021-06-16 PROCEDURE — 1123F ACP DISCUSS/DSCN MKR DOCD: CPT | Performed by: INTERNAL MEDICINE

## 2021-06-16 PROCEDURE — 3017F COLORECTAL CA SCREEN DOC REV: CPT | Performed by: INTERNAL MEDICINE

## 2021-06-16 PROCEDURE — G8417 CALC BMI ABV UP PARAM F/U: HCPCS | Performed by: INTERNAL MEDICINE

## 2021-06-16 PROCEDURE — 4040F PNEUMOC VAC/ADMIN/RCVD: CPT | Performed by: INTERNAL MEDICINE

## 2021-06-16 PROCEDURE — 99214 OFFICE O/P EST MOD 30 MIN: CPT | Performed by: INTERNAL MEDICINE

## 2021-06-16 RX ORDER — FUROSEMIDE 80 MG
80 TABLET ORAL DAILY
COMMUNITY
End: 2021-10-11

## 2021-06-16 RX ORDER — DOXAZOSIN 8 MG/1
8 TABLET ORAL
COMMUNITY
End: 2022-09-16

## 2021-06-16 NOTE — PROGRESS NOTES
Ov Dr Pamela Che follow up post cath. 5/14  No chest pain   C/o sob  With exertion   normal for pt. Decreasing coreg   Feeling better since. dannie leg edema. informed to use pulse ox  Check heart rate while walking. Increase cardura to 1/2 tab in am  And 1 tab in evening. Call in 2 weeks   See in 2 mths. No testing. Called [facility] regarding pt   cpap Dr wanted to see if we could  Decrease the pressures. They will not decrease them unless   We tell them the exact pressure   Needed which DR Pamela Che unable   To do. Pt called informed to call Dr Earnie Opitz see if she will do this for him.

## 2021-06-16 NOTE — LETTER
I would send him for possible ablation. He does have bilateral pedal edema, although it seems about at its baseline. He is struggling with his sleep apnea and CPAP machine. There is much leaking around his mask and he is attempting to find a mask that fits better. He did find one nasal mask; however, it irritated the bottom of his nose and he could not tolerate it. His blood pressure has been elevated in the 140 to 150 range at home. He denies any chest pain or chest discomfort. He has had no syncope or near syncope. His main complaint is that of shortness of breath and an inability to find a CPAP mask for his CPAP machine. MEDICATIONS AT HOME:  He is currently on Cordarone 200 mg half a tablet daily, Eliquis 5 mg b.i.d., Lipitor 40 mg daily, Coreg 3.125 mg daily, Celebrex 200 mg daily, CoQ10 daily, Cardura 8 mg daily, Lasix 80 mg daily, Amaryl 4 mg a half a tablet daily, Imdur 30 mg daily, magnesium 500 mg daily, Glucophage 1000 mg daily, Zaroxolyn 5 mg p.r.n., Adalat 90 mg half a tablet daily, Novolin 70/30, 54 in the a.m., 32 at lunch, 40 at supper, Entresto 97/103 b.i.d., Aldactone 25 mg daily, Trulicity 6.62 mg weekly, vitamin D 1000 units b.i.d. PHYSICAL EXAMINATION:  VITAL SIGNS:  His blood pressure today was 140/60 with a heart rate of 75 and regular. Respiratory rate 18. O2 sat 94%. Weight 259 pounds. GENERAL:  He is a pleasant 71-year-old gentleman. Denied pain. He was oriented to person, place and time. Answered questions appropriately. SKIN:  No unusual skin changes. HEENT:  The pupils are equally round and intact. Mucous membranes were dry. NECK:  No JVD. Good carotid pulses. No carotid bruits. No lymphadenopathy or thyromegaly. CARDIOVASCULAR EXAM:  S1 and S2 were normal.  No S3 or S4. Soft systolic blowing type murmur. No diastolic murmur. PMI was normal.  No lift, thrust, or pericardial friction rub. LUNGS:  Quite clear to auscultation and percussion.   ABDOMEN: stent. 10.  Chronic back pain. 11.  EF of 50%. 12.  Possible chronotropic insufficiency. 13.  Hypertension, elevated at home. PLAN:  1. Increase Cardura to 8 mg in the morning and half a tablet or 4 mg in the evening. 2.  He will monitor his heart rate and O2 saturation when he is walking to see if he has chronotropic incompetence, this will also be checked with his Kardia device. 3.  He will call us in two weeks to let us know his blood pressures on the higher dose of Cardura and also his heart rate with walking. 4.  If it does appear that he is chronotropic incompetent, then we would do a treadmill to document. 5.  If he again proved to be chronotropic incompetent, then we will do a pacemaker. DISCUSSION: Mr. Eryn rojas looks amazingly good. He is short of breath, which I think is secondary to his confluence of etiologies. He was not walking as much as previously and he will start walking again on a daily basis. I do not believe that his CPAP mask is fitted properly and he is still working through this issue with the CPAP distributor. It may be that we will need to decrease the pressure on his mask to try to get it fit better. I am concerned about chronotropic incompetence and I will check his heart rates with pulse ox machine and with the Kardia device with walking. Again, the plan is to send him for atrial fibrillation ablation if he would redevelop atrial fibrillation while he is on amiodarone. Thank you very much for allowing me the privilege of seeing Mr. Earlis Kanner. I will follow up with him in approximately two months for reevaluation. If you have any questions on my thoughts, please do not hesitate to contact me.      Sincerely,        Merissa Kim    D: 06/17/2021 3:57:58     T: 06/17/2021 11:08:43     [email or web address]  Job#: [id number]   Doc#: [id number]

## 2021-06-21 RX ORDER — SACUBITRIL AND VALSARTAN 97; 103 MG/1; MG/1
TABLET, FILM COATED ORAL
Qty: 180 TABLET | Refills: 3 | Status: SHIPPED | OUTPATIENT
Start: 2021-06-21 | End: 2022-07-25

## 2021-06-21 NOTE — PROGRESS NOTES
Kevyn Pedroza M.D. [address] [place] [address]  [phone]        2021        DO Eloina Patricioe [place] [address]    RE:   Macario Rodriguez  :  1950    Dear Dr. Mosqueda Profit:    CHIEF COMPLAINT:  1. Shortness of breath and fatigue. 2.  History of atrial fibrillation. 3.  Status post cardiac catheterization on 2021, which showed widely patent LIMA to the LAD and widely patent vein graft to the diagonal, with patent stent in the right coronary artery, EF of 50%, no intervention needed. HISTORY OF PRESENT ILLNESS:  I had the pleasure of seeing Mr. Kerri Mccollum in our office on 2021. I trust you received my full H and P from 2021. At that time, he had shortness of breath and chest pain and an abnormal Lexiscan Cardiolite stress test.  I brought him for a cardiac catheterization on 2021. His catheterization actually looked amazingly good. His LIMA was patent to the LAD and vein graft was patent to the diagonal, circumflex had no significant disease, with a stent in the right coronary artery that was widely patent. He had 30% disease in the posterior ventricular branch of the right coronary artery, EF was 50%, and no intervention was necessary. We had offered cardiac rehab, which he declined. He chose rather to exercise at home. He continues to have shortness of breath with exertion. He has felt slightly better than he had been previously. His Coreg had been increased and he developed bradycardia with heart rates in the 30s and 40s. We decreased Coreg back to 3.125 mg daily and his heart rate was in the 50s and 60s and he does feel better. He does have a history of atrial fibrillation and is on amiodarone at 100 mg nightly. He is anticoagulated with Eliquis at 5 mg b.i.d.     Our plan was to continue amiodarone, and if he would develop atrial fibrillation again, Soft and nontender. Good bowel sounds. EXTREMITIES:  Good femoral pulses. Good pedal pulses. He did have 1 to 2+ edema. Skin was warm and dry. No calf tenderness. Nail beds pink. Good cap refill. PULSES:  Bilateral symmetrical radial, brachial and carotid pulses. No carotid bruits. Good femoral and pedal pulses. NEUROLOGIC EXAM:  Within normal limits. PSYCHIATRIC EXAM:  Within normal limits. IMPRESSION:  1. Shortness of breath. 2.  Status post cardiac catheterization on 05/14/2021, that showed 90% disease in his LAD, diagonal bifurcation site, with patent LIMA to the LAD, patent vein graft to the diagonal, unremarkable circumflex, 30% disease in the posterior ventricular branch of the right coronary artery, EF of 50%, medical therapy recommended. 3.  Sleep apnea, on a CPAP mask, with difficulty fitting the CPAP mask with much leaking around his mask. 4.  Sick sinus syndrome with bradycardia on Coreg 3.125 mg b.i.d., with his dose decreased to 3.125 mg daily. 5.  Paroxysmal atrial fibrillation, currently on Cordarone 100 mg daily, with a plan to send him for possible ablation if he would re-develop his atrial fibrillation with it very symptomatic. 6.  Catheterization on 10/31/2012, that showed severe disease at the bifurcation of the LAD and diagonal, with unremarkable right coronary artery and circumflex, with angioplasty of LAD and diagonal on 11/07/2012, placing a 2.75 x 28 mm Promus stent in the LAD. 7.  Catheterization on 05/14/2014, showed 70% to 80% LAD and diagonal with in-stent stenosis with an FFR of 0.78, with unremarkable right coronary artery and circumflex, EF of 55% to 60%.   8.  Open heart surgery on 06/30/2014, by Dr. Silva Liz with a LIMA to the LAD and a vein graft to the diagonal.  9.  Catheterization on 04/30/2017, showed patent bypass graft, with 95% disease in the right coronary artery, EF of 50%, with angioplasty of the right coronary artery placing a 3.5 x 18 mm Xience stent. 10.  Chronic back pain. 11.  EF of 50%. 12.  Possible chronotropic insufficiency. 13.  Hypertension, elevated at home. PLAN:  1. Increase Cardura to 8 mg in the morning and half a tablet or 4 mg in the evening. 2.  He will monitor his heart rate and O2 saturation when he is walking to see if he has chronotropic incompetence, this will also be checked with his Kardia device. 3.  He will call us in two weeks to let us know his blood pressures on the higher dose of Cardura and also his heart rate with walking. 4.  If it does appear that he is chronotropic incompetent, then we would do a treadmill to document. 5.  If he again proved to be chronotropic incompetent, then we will do a pacemaker. DISCUSSION: Mr. Masoud Beebe catheterization looks amazingly good. He is short of breath, which I think is secondary to his confluence of etiologies. He was not walking as much as previously and he will start walking again on a daily basis. I do not believe that his CPAP mask is fitted properly and he is still working through this issue with the CPAP distributor. It may be that we will need to decrease the pressure on his mask to try to get it fit better. I am concerned about chronotropic incompetence and I will check his heart rates with pulse ox machine and with the Kardia device with walking. Again, the plan is to send him for atrial fibrillation ablation if he would redevelop atrial fibrillation while he is on amiodarone. Thank you very much for allowing me the privilege of seeing Mr. Gianna Valenzuela. I will follow up with him in approximately two months for reevaluation. If you have any questions on my thoughts, please do not hesitate to contact me.      Sincerely,        Paul Nuñez    D: 06/17/2021 3:57:58     T: 06/17/2021 11:08:43     [email or web address]  Job#: [id number]   Doc#: [id number]

## 2021-07-12 ENCOUNTER — OFFICE VISIT (OUTPATIENT)
Dept: PAIN MANAGEMENT | Age: 71
End: 2021-07-12
Payer: MEDICARE

## 2021-07-12 VITALS — HEIGHT: 70 IN | BODY MASS INDEX: 37.08 KG/M2 | WEIGHT: 259 LBS

## 2021-07-12 DIAGNOSIS — M79.605 BILATERAL LEG PAIN: Primary | ICD-10-CM

## 2021-07-12 DIAGNOSIS — M54.16 LUMBAR RADICULOPATHY: ICD-10-CM

## 2021-07-12 DIAGNOSIS — M79.604 BILATERAL LEG PAIN: Primary | ICD-10-CM

## 2021-07-12 LAB
AVERAGE GLUCOSE: NORMAL
HBA1C MFR BLD: 8 %

## 2021-07-12 PROCEDURE — 95886 MUSC TEST DONE W/N TEST COMP: CPT | Performed by: PHYSICAL MEDICINE & REHABILITATION

## 2021-07-12 PROCEDURE — 95912 NRV CNDJ TEST 11-12 STUDIES: CPT | Performed by: PHYSICAL MEDICINE & REHABILITATION

## 2021-07-12 NOTE — PROGRESS NOTES
He has pain in both sides of low back and into both legs. Pain can be severe at times and up to an 8/10. A focused physical exam demonstrates bilateral L5 paresthesias with positive leg raise on exam today. -Bilateral lumbar L5-S1 transforaminal epidural steroid injection under x-ray with Dr. Vaughn Chawla. 30-minute procedure.  + Eliquis must be held 3 days, request permission. + DM. Consider 10 mg dexamethasone, subcuticular approach. Grade 2 anterolisthesis L5 on S1 with L5 pars defects.
demonstrate increased insertional activity and +1 abnormal spontaneous activity with positive sharp waves. All of the other muscles sampled are free of any increased insertional activity or any abnormal spontaneous activity. Motor unit recruitment is unremarkable. Limited bilateral low lumbar paraspinal muscle sampling is free of any clear abnormal spontaneous activity. The patient is on anticoagulation. The smallest gauge needle electrode is employed for the electromyography portion of the exam. Only superficial muscles are sampled, and all regions close to major vascular structures and nerves are avoided. Pressure is maintained over the needle puncture sites when necessary. No abnormal hemostasis or bleeding is encountered during the exam. The patient is re-examined and confirmed to have no abnormal bleeding after completing the test.    SUMMARY:  This study is abnormal:  1. 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 There is sensory greater than motor nerve involvement with axonal greater than demyelinating changes. There is active denervation in distal limb muscles bilaterally. This may be consistent with the patient's history of diabetes mellitus. 2. Today's study cannot entirely exclude a superimposed Bilateral L5-S1 lumbosacral motor radiculopathy with active denervation. RECOMMENDATIONS: The patient should follow up as previously instructed. If his symptoms persist or worsen, further electrodiagnostic evaluation may be considered if the patient is agreeable. Clinical correlation is recommended.

## 2021-07-13 ENCOUNTER — TELEPHONE (OUTPATIENT)
Dept: PAIN MANAGEMENT | Age: 71
End: 2021-07-13

## 2021-07-13 NOTE — TELEPHONE ENCOUNTER
BILAT L5-S1 TRANSFORAMINAL    NO AUTH REQUIRED    OK to schedule procedure approved as above. Please note sides/levels approved and date range. (If applicable, sides/levels approved may differ from those ordered)    TO BE SCHEDULED WITH DR. Mckenna Carreno

## 2021-07-13 NOTE — TELEPHONE ENCOUNTER
Bilateral lumbar L5-S1 transforaminal epidural steroid injection under x-ray with Dr. Felicitas Boykin.  30-minute procedure.  + Eliquis must be held 3 days, request permission.  + DM.      TRIED TO CALL THE PATIENT TO FIND OUT WHERE TO SEND THE REQUEST FOR THE PATIENT TO BE OFF HIS ELIQUIS, I WAS UNABLE TO GET A HOLD OF HIM, THE PHONE JUST RANG BUSY

## 2021-07-13 NOTE — TELEPHONE ENCOUNTER
ORDER PLACED:    Date: 7/12/2021  Description: BILAT L5-S1 TRANSFORAMINAL  Order Number: [id number]  Ordering Provider: [facility]  Performing Provider: [facility]  CPT Codes: 93071  ICD10 Codes: M54.16

## 2021-07-14 NOTE — TELEPHONE ENCOUNTER
Pt states Dr. Kateryna Crane prescribed his elequis. Fax # [phone], please fax request to Dr. Joey Murray.

## 2021-07-20 ENCOUNTER — OFFICE VISIT (OUTPATIENT)
Dept: FAMILY MEDICINE CLINIC | Age: 71
End: 2021-07-20
Payer: MEDICARE

## 2021-07-20 VITALS
HEIGHT: 70 IN | WEIGHT: 260 LBS | BODY MASS INDEX: 37.22 KG/M2 | SYSTOLIC BLOOD PRESSURE: 116 MMHG | HEART RATE: 73 BPM | OXYGEN SATURATION: 98 % | DIASTOLIC BLOOD PRESSURE: 50 MMHG

## 2021-07-20 DIAGNOSIS — Z79.4 TYPE 2 DIABETES MELLITUS WITH DIABETIC POLYNEUROPATHY, WITH LONG-TERM CURRENT USE OF INSULIN (HCC): ICD-10-CM

## 2021-07-20 DIAGNOSIS — G47.33 OSA TREATED WITH BIPAP: Primary | ICD-10-CM

## 2021-07-20 DIAGNOSIS — I25.10 CORONARY ARTERY DISEASE INVOLVING NATIVE CORONARY ARTERY OF NATIVE HEART WITHOUT ANGINA PECTORIS: ICD-10-CM

## 2021-07-20 DIAGNOSIS — E11.42 TYPE 2 DIABETES MELLITUS WITH DIABETIC POLYNEUROPATHY, WITH LONG-TERM CURRENT USE OF INSULIN (HCC): ICD-10-CM

## 2021-07-20 PROBLEM — I50.33 ACUTE ON CHRONIC DIASTOLIC CHF (CONGESTIVE HEART FAILURE) (HCC): Status: RESOLVED | Noted: 2021-04-11 | Resolved: 2021-07-20

## 2021-07-20 PROBLEM — I50.9 CHF EXACERBATION (HCC): Status: RESOLVED | Noted: 2021-04-11 | Resolved: 2021-07-20

## 2021-07-20 PROCEDURE — 1123F ACP DISCUSS/DSCN MKR DOCD: CPT | Performed by: FAMILY MEDICINE

## 2021-07-20 PROCEDURE — 3052F HG A1C>EQUAL 8.0%<EQUAL 9.0%: CPT | Performed by: FAMILY MEDICINE

## 2021-07-20 PROCEDURE — 2022F DILAT RTA XM EVC RTNOPTHY: CPT | Performed by: FAMILY MEDICINE

## 2021-07-20 PROCEDURE — 99214 OFFICE O/P EST MOD 30 MIN: CPT | Performed by: FAMILY MEDICINE

## 2021-07-20 PROCEDURE — G8427 DOCREV CUR MEDS BY ELIG CLIN: HCPCS | Performed by: FAMILY MEDICINE

## 2021-07-20 PROCEDURE — 4040F PNEUMOC VAC/ADMIN/RCVD: CPT | Performed by: FAMILY MEDICINE

## 2021-07-20 PROCEDURE — 3017F COLORECTAL CA SCREEN DOC REV: CPT | Performed by: FAMILY MEDICINE

## 2021-07-20 PROCEDURE — 1036F TOBACCO NON-USER: CPT | Performed by: FAMILY MEDICINE

## 2021-07-20 PROCEDURE — G8417 CALC BMI ABV UP PARAM F/U: HCPCS | Performed by: FAMILY MEDICINE

## 2021-07-20 RX ORDER — CELECOXIB 200 MG/1
CAPSULE ORAL
Qty: 90 CAPSULE | Refills: 1 | Status: SHIPPED | OUTPATIENT
Start: 2021-07-20 | End: 2022-03-31

## 2021-07-20 RX ORDER — ATORVASTATIN CALCIUM 40 MG/1
TABLET, FILM COATED ORAL
Qty: 90 TABLET | Refills: 3 | Status: SHIPPED | OUTPATIENT
Start: 2021-07-20 | End: 2022-07-13

## 2021-07-20 NOTE — PATIENT INSTRUCTIONS
SURVEY:    You may be receiving a survey from [facility] regarding your visit today. You may get this in the mail, through your MyChart or in your email. Please complete the survey to enable us to provide the highest quality of care to you and your family. If you cannot score us as very good ( 5 Stars) on any question, please feel free to call the office to discuss how we could have made your experience exceptional.     Thank you.     Clinical Care Team:  Dr. Bang Robertson, DO Lalito Don, [address] [place] [address] Team:  [address] [place] [address]

## 2021-07-20 NOTE — LETTER
[facility] [place] [address]  Phone: [phone]  Fax: [address], [place]        July 20, 2021     Patient: Kelsey Aburto   YOB: 1950   Date of Visit: 7/20/2021       To Whom It May Concern: Lonnie Canchola has GHAZALA dependent on BiPAP. His current pressure has been 20/15. This is excessively high for him and is causing discomfort and difficulty sleeping. Please consider this an order to adjust pressure to 18/13. If you have any questions or concerns, please don't hesitate to call.     Sincerely,        Jessa Angeles, DO

## 2021-07-20 NOTE — PROGRESS NOTES
Name: Terry Park  : 1950         Chief Complaint:     Chief Complaint   Patient presents with    Diabetes    Hyperlipidemia    Hypertension    Sleep Apnea       History of Present Illness: Terry Park is a 80 y.o.  male who presents with Diabetes, Hyperlipidemia, Hypertension, and Sleep Apnea      HPI    GHAZALA dx several yrs ago and has great relief from bipap, now finds that he can't sleep at all without it. However, bipap pressure seems to be too high, blows hard in his face, ends up with air leak. Pt reports his cardiologist Dr Boris Holt called DME co to make change in pressure but was told that only sleep specialist Dr Jeremiah Millan could change parameters. Has actually never seen Dr Jeremiah Millan for GHAZALA. Has tried various masks. The one that worked the best unfortunately caused skin breakdown and bleeding under the nose. Believes that was a hybrid full face pillow mask. With full face masks has typically had trouble with air leak around the nose-cheeks. Larger size face mask worked while in the hospital at one point. F/u DM. Sugars continue running low overnight so CGM squawks all night. Eats sugar all night to keep up but no protein despite wife's urging. Evening insulin dose varies, if reading around 120 doesn't take any. 150-200 takes 10 units. Was told if above 200 to use 20 units. In [place] over the winter he stopped evening dose of metformin, remains on it in the morning. Endo recently increased trulicity to 1.5 mg weekly. amaryl only 2 mg QAM.     CAD - remains dyspneic on exertion but no acute changes. Taking meds as directed. Still struggles with lower extremity edema. Comp stockings only overnight, difficulty applying them d/t neuropathy in hands, stockings being too tight.      Medical History:     Patient Active Problem List   Diagnosis    Hypertension    Hyperlipidemia    Coronary artery disease involving native coronary artery of native heart without angina pectoris    H/O coronary angioplasty    Severe nonproliferative diabetic retinopathy with macular edema associated with type 2 diabetes mellitus (HCC)    Type 2 diabetes mellitus with peripheral neuropathy (HCC)    Diabetic peripheral neuropathy (HCC)    GHAZALA (obstructive sleep apnea)    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HCC)    Pulmonary HTN (HCC)    PAF (paroxysmal atrial fibrillation) (HCC)    Lumbosacral spondylosis without myelopathy    Pars defect with spondylolisthesis    Spinal stenosis of lumbar region without neurogenic claudication    Systolic congestive heart failure (HCC)    Type 2 MI (myocardial infarction) ([facility] [address]       Medications:       Prior to Admission medications    Medication Sig Start Date End Date Taking? Authorizing Provider   celecoxib (CELEBREX) 200 MG capsule 1 po daily 7/20/21  Yes Yung Johnson DO   atorvastatin (LIPITOR) 40 MG tablet One daily 7/20/21  Yes Michelle Fletcher,    ENTRESTO  MG per tablet TAKE 1 TABLET TWICE A DAY 6/21/21  Yes Natasha Parrish MD   doxazosin (CARDURA) 8 MG tablet 1/2 tab in the AM 1 tab in the Evening. Yes Historical Provider, MD   furosemide (LASIX) 80 MG tablet Take 80 mg by mouth daily   Yes Historical Provider, MD   carvedilol (COREG) 3.125 MG tablet Take 1 tablet by mouth daily 6/2/21  Yes Natasha Parrish MD   spironolactone (ALDACTONE) 25 MG tablet Take 25 mg by mouth daily  5/10/21  Yes Natasha Parrish MD   NOVOLIN 70/30 (70-30) 100 UNIT/ML injection vial Inject 54 units AM, Inject 32 units at Lunch, Inject 40 units at supper as directed subcutaneously.  4/21/21  Yes Yung Johnson DO   metFORMIN (GLUCOPHAGE) 1000 MG tablet Take 1 tablet by mouth 2 times daily (with meals)  Patient taking differently: Take 1,000 mg by mouth daily  4/21/21  Yes Yung Johnson DO   NIFEdipine (ADALAT CC) 90 MG extended release tablet Take 45 mg by mouth daily   Yes Historical Provider, MD   amiodarone (CORDARONE) 200 MG tablet Take 100 mg by mouth 260 lb (117.9 kg)   SpO2 98%   BMI 37.31 kg/m²   Physical Exam  Constitutional:       Appearance: Normal appearance. He is well-developed. He is not ill-appearing. Cardiovascular:      Rate and Rhythm: Normal rate and regular rhythm. Heart sounds: Normal heart sounds. Comments: 2+ pitting edema dannie pretibial  Pulmonary:      Effort: Pulmonary effort is normal.      Breath sounds: Normal breath sounds. Psychiatric:         Mood and Affect: Mood normal.         Behavior: Behavior normal.         Data:     Lab Results   Component Value Date     06/01/2021    K 4.9 06/01/2021     06/01/2021    CO2 27 06/01/2021    BUN 27 06/01/2021    CREATININE 1.12 06/01/2021    GLUCOSE 138 06/01/2021    PROT 6.9 05/01/2021    LABALBU 4.1 05/01/2021    BILITOT 0.68 05/01/2021    ALKPHOS 67 05/01/2021    AST 15 05/01/2021    ALT 18 05/01/2021     Lab Results   Component Value Date    WBC 5.8 05/01/2021    RBC 4.09 05/01/2021    HGB 12.6 05/01/2021    HCT 37.9 05/01/2021    MCV 92.7 05/01/2021    MCH 30.8 05/01/2021    MCHC 33.2 05/01/2021    RDW 14.7 05/01/2021     05/01/2021    MPV NOT REPORTED 05/01/2021     Lab Results   Component Value Date    TSH 1.65 04/11/2021     Lab Results   Component Value Date    CHOL 106 05/01/2021    HDL 30 05/01/2021    PSA 0.31 05/21/2020    LABA1C 8.0 07/12/2021         Assessment & Plan:        Diagnosis Orders   1. GHAZALA treated with BiPAP     2. Coronary artery disease involving native coronary artery of native heart without angina pectoris     3. Type 2 diabetes mellitus with diabetic polyneuropathy, with long-term current use of insulin (HCC)     GHAZALA greatly helped by BiPAP. Patient has been compliant with treatment every night for at least 4 hours per night. Needs to have decrease in pressures. Letter written for same. Has been on 20/15, should try 18/13. I am unsure why DME company evidently would not take this order from cardiology.   If they do not accept my order either, then patient will need to see sleep specialist for an appointment. 2. CAD stable, cont current rx  3. DM remains uncontrolled but is still having hypoglycemia also. Discussed whether basal insulin would actually change his med costs significantly, since he's already on entresto, eliquis, and trulicity - should meet his deductible quickly at beginning of yr and get through donut hole before end of yr. They will consider this and cont following with endo. Advised that he do include fat, protein, and also more complex carbs when he's correcting for hypoglycemia. Requested Prescriptions     Signed Prescriptions Disp Refills    celecoxib (CELEBREX) 200 MG capsule 90 capsule 1     Si po daily    atorvastatin (LIPITOR) 40 MG tablet 90 tablet 3     Sig: One daily         Patient Instructions   SURVEY:    You may be receiving a survey from [employer] regarding your visit today. You may get this in the mail, through your MyChart or in your email. Please complete the survey to enable us to provide the highest quality of care to you and your family. If you cannot score us as very good ( 5 Stars) on any question, please feel free to call the office to discuss how we could have made your experience exceptional.     Thank you. Clinical Care Team:  Dr. Odalys Hutton, DO Anoop Krueger, [address] [facility] Team:  Geovanni Claire received counseling on the following healthy behaviors: medication adherence  Reviewed prior labs and health maintenance. Continue current medications, diet and exercise. Discussed use, benefit, and side effects of prescribed medications. Barriers to medication compliance addressed. Patient given educational materials - see patient instructions. All patient questions answered.   Patient voiced understanding.     signed by Marleen Neff Wagner Esparza,  on 7/20/2021 at 3:27 PM  [facility]  [facility] [address] [address]  Dept: [phone]

## 2021-08-03 ENCOUNTER — PROCEDURE VISIT (OUTPATIENT)
Dept: PAIN MANAGEMENT | Age: 71
End: 2021-08-03
Payer: MEDICARE

## 2021-08-03 DIAGNOSIS — M54.16 LUMBAR RADICULOPATHY: Primary | ICD-10-CM

## 2021-08-03 DIAGNOSIS — E66.01 SEVERE OBESITY (BMI 35.0-35.9 WITH COMORBIDITY) (HCC): ICD-10-CM

## 2021-08-03 PROCEDURE — 64483 NJX AA&/STRD TFRM EPI L/S 1: CPT | Performed by: PHYSICAL MEDICINE & REHABILITATION

## 2021-08-03 RX ORDER — SODIUM CHLORIDE 9 MG/ML
1 INJECTION INTRAVENOUS ONCE
Status: COMPLETED | OUTPATIENT
Start: 2021-08-03 | End: 2021-08-03

## 2021-08-03 RX ORDER — DEXAMETHASONE SODIUM PHOSPHATE 10 MG/ML
10 INJECTION, EMULSION INTRAMUSCULAR; INTRAVENOUS ONCE
Status: COMPLETED | OUTPATIENT
Start: 2021-08-03 | End: 2021-08-03

## 2021-08-03 RX ORDER — LIDOCAINE HYDROCHLORIDE 10 MG/ML
5 INJECTION, SOLUTION EPIDURAL; INFILTRATION; INTRACAUDAL; PERINEURAL ONCE
Status: COMPLETED | OUTPATIENT
Start: 2021-08-03 | End: 2021-08-03

## 2021-08-03 RX ADMIN — SODIUM CHLORIDE 1 ML: 9 INJECTION INTRAVENOUS at 17:05

## 2021-08-03 RX ADMIN — DEXAMETHASONE SODIUM PHOSPHATE 10 MG: 10 INJECTION, EMULSION INTRAMUSCULAR; INTRAVENOUS at 17:03

## 2021-08-03 RX ADMIN — Medication 0.5 MEQ: at 17:04

## 2021-08-03 RX ADMIN — LIDOCAINE HYDROCHLORIDE 5 ML: 10 INJECTION, SOLUTION EPIDURAL; INFILTRATION; INTRACAUDAL; PERINEURAL at 17:04

## 2021-08-03 NOTE — PROGRESS NOTES
This procedure was 50% more difficult and required 50% more work secondary to the patient's habitus. The patient has a BMI of 37.3 and has comorbidities of coronary artery disease on anticoagulation, diabetes mellitus, hypertension, and osteoarthritis. This required increased work for safe and proper positioning upon the fluoroscopy table, increased needle passes for safe and appropriate needle placement, and increased fluoroscopy time and radiation exposure for proper visualization.

## 2021-08-03 NOTE — PROGRESS NOTES
LUMBAR TRANSFORAMINAL EPIDURAL STEROID INJECTION (TFESI)    Patient Name: Delbert Cruz   : 1950  Date: 8/3/2021     Physician: Geraldine Roche MD     INDICATIONS: Delbert Cruz is a 80 y.o. male who presents with symptoms and physical exam findings consistent with lumbar radiculopathy. He has had persistent pain that limits his activities of daily living. The pain is persistent despite conservative measures. He has significant functional and psychological impairment due to this condition. Given his symptoms, physical exam findings, impairment in activities of daily living, and lack of response to conservative measures, consideration for lumbar transforaminal epidural corticosteroid injection was given. Discussed the risks including but not limited to bleeding, infection, worsened pain, damage to surrounding structures, side effects, toxicity, allergic reactions to medications used, need for surgery, headache, vision changes, difficulty with chewing and/or swallowing, immune and stress-response dysfunction, fat necrosis, skin pigmentation changes, blood sugar elevation, as well as catastrophic injury such as vision loss, paralysis, spinal cord or plexus injury, cerebral brainstem or spinal cord infarction, intrathecal injection, spinal cord puncture, arachnoiditis, discitis, stroke, bowel or bladder incontinence, ventilator dependence, loss of use of the arms and/or legs, and death. Discussed off-label use of corticosteroids and how the Food and Drug Administration (FDA) has not approved corticosteroids for epidural use. Discussed the risks, benefits, alternative procedures, and alternatives to the procedure including no procedure at all. Discussed that we cannot undo any permanent neurologic or orthopaedic damage or change the course of any underlying disease. After thorough discussion, patient expressed understanding and willingness to proceed. Written informed consent was obtained and is in the chart. Verbal consent to proceed was obtained. PROCEDURE: Standard ASIPP guidelines were followed and sterile technique used. Area was cleaned with Betadine three times. Fluoroscopic guidance was used for this procedure. The L5 vertebral body was taken as the first lumbar-appearing vertebral body directly above the sacrum on a lateral view. The skin and subcutaneous tissue was anesthetized with 2 mL of 1% preservative-free lidocaine and 0.5 mEq sodium bicarbonate with a 27 gauge 1.5 inch needle. Then a 25 gauge 5 inch spinal needle was used for the remainder of the procedure. There was fair spread of contrast in the anterior epidural space and limited spread of contrast around the exiting nerve root on the right. There was fair spread of contrast in the anterior epidural space especially on the lateral view and limited spread of contrast around the exiting nerve root on the left. The 6 oclock position of the pedicle was identified. Multiple views of fluoroscopy including lateral were used to confirm accurate needle placement of depth. Injection of 1 mL of Isovue-300 Iopamidol 61% contrast dye was free of any subdural or vascular spread or any other aberrant uptake. Aspiration was negative throughout the procedure. An injectate containing 1 mL of 10 mg of Dexamethasone and 1 ml of 0.9% preservative-free normal saline was injected slowly and without difficulty and divided equally between the two sites. Patient tolerated the procedure well, no obvious complications occurred during the procedure. Patient was appropriately monitored and discharged home in stable condition with his usual motor strength. Post-procedure instructions were given to patient. Patient will resume anticoagulation tomorrow. He was advised that his blood sugars may increase after today's procedure.            [x] Bilateral [] T12-L1    [] L1-2   [] Right [] L2-3    [] L3-4   [] Left [] L4-5    [x] L5-S1                    Crist[facility] [address] [place] [address], [address]  Phone [phone]/[facility]ARA [phone]

## 2021-08-09 ENCOUNTER — TELEPHONE (OUTPATIENT)
Dept: CARDIOLOGY CLINIC | Age: 71
End: 2021-08-09

## 2021-08-09 NOTE — TELEPHONE ENCOUNTER
Received call from Susan Bazzi at [facility] Dr. Don Ellsworth. Octavia Desir is scheduled for a Carpel Tunnel Release on 8/12/21. Can they get a surgical clearance letter from Dr. Mikal Miller and direction on when stop his Eliquis?    Fax [phone]

## 2021-08-09 NOTE — LETTER
[facility] Cardiology Specialist  [address], [address]  Phone: [phone]  Fax: [phone]    Tobi Friedman MD        2021      Dear Dr. Dolores Davis,     Patient Jakub Kline  1950 is cleared from a cardiac standpoint for his upcoming procedure. He may stop is Eliquis 3 days prior to his procedure and then restart as soon as safely possible after the procedure. If you have any questions or concerns please do not hesitate to contact my office.        Sincerely,        Tobi Friedman MD

## 2021-08-09 NOTE — TELEPHONE ENCOUNTER
Per Dr. Jose D Lopes. [facility] for cardiac clearance for his procedure. He may stop his Eliquis 3 days prior to procedure. Patient was notified. Clearance Letter faxed to Dr. Josef Sifuentes office.  [phone]

## 2021-08-19 ENCOUNTER — OFFICE VISIT (OUTPATIENT)
Dept: CARDIOLOGY CLINIC | Age: 71
End: 2021-08-19
Payer: MEDICARE

## 2021-08-19 VITALS
BODY MASS INDEX: 35.87 KG/M2 | OXYGEN SATURATION: 95 % | WEIGHT: 250 LBS | DIASTOLIC BLOOD PRESSURE: 60 MMHG | HEART RATE: 80 BPM | SYSTOLIC BLOOD PRESSURE: 120 MMHG

## 2021-08-19 DIAGNOSIS — I25.10 CORONARY ARTERY DISEASE INVOLVING NATIVE CORONARY ARTERY OF NATIVE HEART WITHOUT ANGINA PECTORIS: ICD-10-CM

## 2021-08-19 DIAGNOSIS — I50.22 CHRONIC SYSTOLIC CONGESTIVE HEART FAILURE (HCC): Primary | ICD-10-CM

## 2021-08-19 DIAGNOSIS — N28.9 KIDNEY INSUFFICIENCY: ICD-10-CM

## 2021-08-19 DIAGNOSIS — E55.9 VITAMIN D DEFICIENCY DISEASE: ICD-10-CM

## 2021-08-19 DIAGNOSIS — E78.5 HYPERLIPIDEMIA, UNSPECIFIED HYPERLIPIDEMIA TYPE: ICD-10-CM

## 2021-08-19 DIAGNOSIS — I48.0 PAF (PAROXYSMAL ATRIAL FIBRILLATION) (HCC): ICD-10-CM

## 2021-08-19 DIAGNOSIS — I10 ESSENTIAL HYPERTENSION: ICD-10-CM

## 2021-08-19 PROCEDURE — 1036F TOBACCO NON-USER: CPT | Performed by: INTERNAL MEDICINE

## 2021-08-19 PROCEDURE — 3017F COLORECTAL CA SCREEN DOC REV: CPT | Performed by: INTERNAL MEDICINE

## 2021-08-19 PROCEDURE — 4040F PNEUMOC VAC/ADMIN/RCVD: CPT | Performed by: INTERNAL MEDICINE

## 2021-08-19 PROCEDURE — 99214 OFFICE O/P EST MOD 30 MIN: CPT | Performed by: INTERNAL MEDICINE

## 2021-08-19 PROCEDURE — G8417 CALC BMI ABV UP PARAM F/U: HCPCS | Performed by: INTERNAL MEDICINE

## 2021-08-19 PROCEDURE — 1123F ACP DISCUSS/DSCN MKR DOCD: CPT | Performed by: INTERNAL MEDICINE

## 2021-08-19 PROCEDURE — G8428 CUR MEDS NOT DOCUMENT: HCPCS | Performed by: INTERNAL MEDICINE

## 2021-08-19 NOTE — LETTER
Syl Spann M.D. [address] [place] [address]  [phone]    2021    DO Daquan [facility]  [facility]      RE:   Jose Roberto Peralta  :  1950      Dear Dr. Rich Running:    CHIEF COMPLAINT:  1. Shortness of breath. 2.  Paroxysmal atrial fibrillation. 3.  Coronary artery disease. HISTORY OF PRESENT ILLNESS:  I had the pleasure of seeing Mr. Edis Mcdermott in the office on 2021. I trust you received my full H and P from 2021. At that time, he had shortness of breath and chest pain and I did a cardiac catheterization on 2021. This looked good with a patent LIMA to the LAD and a patent vein graft to the diagonal, stent in the right coronary artery was widely patent, EF was 50%. He does have a history, as you know, of atrial fibrillation and is currently on amiodarone at 100 mg nightly. He is anticoagulated with Eliquis. He is fairly symptomatic when he develops atrial fibrillation. If he would develop it again on amiodarone, then I would consider sending him for ablation. He overall has been doing well. His weight has been stable. He had left carpal tunnel surgery done by Dr. Ursula Cogan in [place] one week ago. His left hand is quite swollen and very painful. He has had no syncope or near syncope, lightheadedness or dizziness. Denies any chest pain or chest discomfort. MEDICATIONS:  His medications are Cordarone 200 mg half a tablet nightly; Eliquis 5 mg b.i.d.; Lipitor 40 mg daily; Coreg 3.125 mg daily; Celebrex p.r.n.; Cardura 8 mg half a tablet in the morning and 1 tablet in the evening; Entresto 97/103 b.i.d.; Lasix 80 mg daily; Amaryl 4 mg, 2 mg in the morning; Imdur 30 mg daily; magnesium 500 mg daily; metformin 1000 mg daily; nifedipine 90 mg half a tablet daily;  Novolin 70/30, 54 in the morning, 32 at lunch, 40 at supper; spironolactone 25 mg daily; Ultram p.r.n.; Trulicity 1.5 mg weekly; and vitamin D 1000 units b.i.d. PHYSICAL EXAMINATION:  VITAL SIGNS:  His blood pressure was 120/60 with a heart rate of 80 and regular. Respiratory rate 18. O2 saturation 95%. Weight 250 pounds. GENERAL:  He is a pleasant 80-year-old gentleman. Denied pain. He was oriented to person, place and time. Answered questions appropriately. SKIN:  No unusual skin changes. HEENT:  The pupils are equally round and intact. Mucous membranes were dry. NECK:  No JVD. Good carotid pulses. No carotid bruits. No lymphadenopathy or thyromegaly. CARDIOVASCULAR EXAM:  S1 and S2 were normal.  No S3 or S4. Soft systolic blowing type murmur. No diastolic murmur. PMI was normal.  No lift, thrust, or pericardial friction rub. LUNGS:  Fairly clear to auscultation and percussion. ABDOMEN:  Soft and nontender. Good bowel sounds. EXTREMITIES:  Good femoral pulses. Good pedal pulses. No pedal edema. Skin was warm and dry. No calf tenderness. Nail beds pink. Good cap refill. PULSES:  Bilateral symmetrical radial, brachial and carotid pulses. No carotid bruits. Good femoral and pedal pulses. NEUROLOGIC EXAM:  Within normal limits. PSYCHIATRIC EXAM:  Within normal limits. IMPRESSION:  1. Paroxysmal atrial fibrillation, fairly well controlled with amiodarone with the plan to send him for possible ablation if he would re-develop his atrial fibrillation and if it continues to be markedly symptomatic. 2.  Catheterization on 05/14/2021, showing 90% LAD at the diagonal bifurcation, with a patent LIMA to the LAD, patent vein graft to the diagonal, unremarkable circumflex, with widely patent stent in the right coronary artery, EF 50%, medical therapy recommended. 3.  Sleep apnea, on a CPAP mask. 4.  Sick sinus syndrome with his heart rate being good on 3.125 mg of Coreg daily. PLAN:  1. No change in medications.   2.  See for full evaluation in November as he leaves for [place] in the end of November. DISCUSSION:  Mr. Emily Arcos overall is doing well. He has had no unusual shortness of breath, although he is less active now that he has had his carpal tunnel surgery and has had significant pain. He does have some edema, but it seems about at his baseline. I made no change in medications. I will plan on seeing him in November. His endocrinologist asked if he could switch him to either [place] or [place], which is fine from my standpoint. Thank you very much for allowing me the privilege of seeing Mr. Emily Arcos. I will see him in November in followup.     Sincerely,        Alexia Rojas    D: 08/19/2021 13:15:01     T: 08/19/2021 18:43:44     [email or web address]  Job#: [id number]   Doc#: [id number]

## 2021-08-19 NOTE — PROGRESS NOTES
Ov DR Curt Feldman 2 mth follow up  No chest pain or sob  dannie ankle edema   Weight stable   Only wears stocking  When wife can put them on. Had lt carpal tunnel done   In [facility] Dr Alvaro Brown, a week ago   Lt hand very swollen   Says not elevating hand   Like he should. Wants to discuss   diabetic medication. Still plans on going to   [place] for winter in November. No changes in medication.   See in early November  With labs and ekg

## 2021-08-23 ENCOUNTER — OFFICE VISIT (OUTPATIENT)
Dept: PAIN MANAGEMENT | Age: 71
End: 2021-08-23
Payer: MEDICARE

## 2021-08-23 VITALS
SYSTOLIC BLOOD PRESSURE: 124 MMHG | HEIGHT: 70 IN | TEMPERATURE: 97.3 F | BODY MASS INDEX: 36.22 KG/M2 | DIASTOLIC BLOOD PRESSURE: 52 MMHG | HEART RATE: 81 BPM | WEIGHT: 253 LBS

## 2021-08-23 DIAGNOSIS — M79.642 LEFT HAND PAIN: Primary | ICD-10-CM

## 2021-08-23 DIAGNOSIS — G56.42 COMPLEX REGIONAL PAIN SYNDROME TYPE 2 OF LEFT UPPER EXTREMITY: ICD-10-CM

## 2021-08-23 DIAGNOSIS — M54.16 LUMBAR RADICULOPATHY: ICD-10-CM

## 2021-08-23 DIAGNOSIS — M48.061 SPINAL STENOSIS OF LUMBAR REGION WITHOUT NEUROGENIC CLAUDICATION: ICD-10-CM

## 2021-08-23 PROCEDURE — 1036F TOBACCO NON-USER: CPT | Performed by: PHYSICAL MEDICINE & REHABILITATION

## 2021-08-23 PROCEDURE — G8427 DOCREV CUR MEDS BY ELIG CLIN: HCPCS | Performed by: PHYSICAL MEDICINE & REHABILITATION

## 2021-08-23 PROCEDURE — 1123F ACP DISCUSS/DSCN MKR DOCD: CPT | Performed by: PHYSICAL MEDICINE & REHABILITATION

## 2021-08-23 PROCEDURE — G8417 CALC BMI ABV UP PARAM F/U: HCPCS | Performed by: PHYSICAL MEDICINE & REHABILITATION

## 2021-08-23 PROCEDURE — 4040F PNEUMOC VAC/ADMIN/RCVD: CPT | Performed by: PHYSICAL MEDICINE & REHABILITATION

## 2021-08-23 PROCEDURE — 3017F COLORECTAL CA SCREEN DOC REV: CPT | Performed by: PHYSICAL MEDICINE & REHABILITATION

## 2021-08-23 PROCEDURE — 99214 OFFICE O/P EST MOD 30 MIN: CPT | Performed by: PHYSICAL MEDICINE & REHABILITATION

## 2021-08-23 NOTE — PROGRESS NOTES
Emmanuelle Whitmore  (1950)    8/23/2021     Subjective: Emmanuelle Whitmore is 80 y.o. male who complains today of:    Chief Complaint   Patient presents with    Back Pain     Last called by me 5/20/20, last called 6/10/21: bilateral Lumbar L5/S1 TFESI on 8/3/21 provided him with 60% pain relief. He thought these were more helpful than prior lumbar epidural. Bilateral Lumbar L2/3 TFESI on 4/20/21 provided greater than 50% pain relief. He wishes these lasted longer. Bilateral L2/3 TFESI on 10/20/20 and 6/16/20 provided him with greater than 50% pain relief, usually works for over 3-4 months. Had left carpal tunnel release surgery 8/12/21. No restrictions from Ortho hand. Has follow up this week. Having more swelling and pain in the left hand. No weakness. No new therapy for lumbar spine. emg b le done, reviewed below. Taking Tramadol from his Rheumatologist, min relief. No new tests, therapy, or new updates from other specialists.      Left hand pain is a 8/10. Gets to a 9/10. Had pain in hand before surgery, but much worse in the last 1.5 weeks since carpal tunnel release surgery of the left hand. Worse with movement. Better with rest. Numb in his fingertips in the left hand. Right hand is fine. No neck pain. No classic radicular symptoms. Weak in left hand. There are no other associated symptoms or contextual factors. He denies any classic radicular symptoms, new weakness, saddle anesthesia, bowel or bladder dysfunction, or falls. He has pain in both sides of his low back and goes into both thighs. His pain is a 1/10. Gets up to a 5/10. 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 Better with injections. It has been a constant ache for over 1 year. There are no other associated symptoms or contextual factors. He denies any new weakness, saddle anesthesia, bowel or bladder dysfunction, or falls. Allergies:  Patient has no known allergies.     Past Medical History:   Diagnosis Date    Arthritis     Arthritis     CAD (coronary artery disease)     CHF (congestive heart failure) (HCC)     CHF (congestive heart failure) (HCC)     Coronary artery disease     [facility]     Diabetes mellitus [facility]     H/O coronary angioplasty     Hyperlipidemia     Hypertension     Neuropathy     Numbness and tingling     dannie hands    Pulmonary edema     SECONDARY TO FAT EMBOLI AFTER KNEE SURGERY    Sleep apnea     Unspecified sleep apnea 2012    cpap     Past Surgical History:   Procedure Laterality Date    ANGIOPLASTY  08/30/2017    Dr. Lizabeth Irizarry mm drug eluting Xience stents in the right coronary. DANIELA-3 flow prior to the procedure DANIELA-3 flow following the procedure. The patient has had a good result from his angioplasty of the right coronary artery. Ethelle Handler APPENDECTOMY      APPENDECTOMY      BUNIONECTOMY  2010    CARDIAC CATHETERIZATION      stent pacement    CARDIAC CATHETERIZATION Left 05/14/2014    Dr Lisseth Hogue [facility] Severe CAD, 70% disese with in-stent stenosis in the left anterior descending at the level of a very large codominant diagonal.  90% disease of the ostium of a very large codominant diagonal with 60% disease beyond the diagonal with a fractional flow reserve of 0.76 in the diagonal and 0.78 in the left anterior descending indicating obstructive disease of significance in both the     CARDIAC CATHETERIZATION Left 05/14/2014    left anterior descending & diagonal.. Mild irregularities of 30% in a small, nondominant circumglex. Mild 30- 40% disease in the midportion of a very large,dominant right coronary artery. Normal left ventricular function, ejection fraction of 55-60%    CARDIAC CATHETERIZATION Bilateral 11/15/2017    Dr. Uday Chawla @ [facility]--Severe PHT with a PA pressure of 74/30. Widely patent stent in the very large dominat right coronary artery with 50% disease before the stent with an FFR of 0.89, indicating no significant obstruction.   Severe disease in the LAD. Patent left internal mammary to the LAD. Patent saphenous vein graft to a very large diagonal.  Overall normal LV function.   EF of 55%    CARDIAC CATHETERIZATION Left 2021    Dr. Fartun Jane @ [facility]--Medical therapy    CATARACT REMOVAL  2004    COLONOSCOPY  10/09/2013    Dr. Teetee Francis (hemorrhoids)    COLONOSCOPY  10/09/2013    CORONARY ANGIOPLASTY WITH STENT PLACEMENT      CORONARY ARTERY BYPASS GRAFT  2014    Dr Reagan Velasquez @ [address] [place] [address] GRAFT      EYE SURGERY  2012    B leakage in back of eyes    JOINT REPLACEMENT Bilateral     knees    JOINT REPLACEMENT      KNEE SURGERY  ,     BILATERAL KNEE ORTHO    KNEE SURGERY       Family History   Problem Relation Age of Onset    Alzheimer's Disease Mother     Heart Disease Mother     Arthritis Mother     High Blood Pressure Mother     Diabetes Mother     Heart Disease Father     Arthritis Father     High Blood Pressure Father     Diabetes Father      Social History     Socioeconomic History    Marital status:      Spouse name: Not on file    Number of children: Not on file    Years of education: Not on file    Highest education level: Not on file   Occupational History    Not on file   Tobacco Use    Smoking status: Former Smoker     Packs/day: 0.50     Years: 3.00     Pack years: 1.50     Quit date: 10/9/1975     Years since quittin.9    Smokeless tobacco: Never Used   Substance and Sexual Activity    Alcohol use: No    Drug use: No    Sexual activity: Not on file   Other Topics Concern    Not on file   Social History Narrative    Not on file     Social Determinants of Health     Financial Resource Strain: Low Risk     Difficulty of Paying Living Expenses: Not hard at all   Food Insecurity: No Food Insecurity    Worried About [facility] in the Last Year: Never true    Nanyc of Food in the Last Year: Never true   Transportation Needs:     Lack of Transportation (Medical):  Lack of Transportation (Non-Medical):    Physical Activity:     Days of Exercise per Week:     Minutes of Exercise per Session:    Stress:     Feeling of Stress :    Social Connections:     Frequency of Communication with Friends and Family:     Frequency of Social Gatherings with Friends and Family:     Attends Spiritism Services:     Active Member of Clubs or Organizations:     Attends Club or Organization Meetings:     Marital Status:    Intimate Partner Violence:     Fear of Current or Ex-Partner:     Emotionally Abused:     Physically Abused:     Sexually Abused:        Current Outpatient Medications on File Prior to Visit   Medication Sig Dispense Refill    celecoxib (CELEBREX) 200 MG capsule 1 po daily 90 capsule 1    atorvastatin (LIPITOR) 40 MG tablet One daily 90 tablet 3    ENTRESTO  MG per tablet TAKE 1 TABLET TWICE A  tablet 3    doxazosin (CARDURA) 8 MG tablet 1/2 tab in the AM 1 tab in the Evening.  furosemide (LASIX) 80 MG tablet Take 80 mg by mouth daily      carvedilol (COREG) 3.125 MG tablet Take 1 tablet by mouth daily 30 tablet 6    spironolactone (ALDACTONE) 25 MG tablet Take 25 mg by mouth daily  30 tablet     NOVOLIN 70/30 (70-30) 100 UNIT/ML injection vial Inject 54 units AM, Inject 32 units at Lunch, Inject 40 units at supper as directed subcutaneously.  1 vial 0    metFORMIN (GLUCOPHAGE) 1000 MG tablet Take 1 tablet by mouth 2 times daily (with meals) (Patient taking differently: Take 1,000 mg by mouth daily ) 60 tablet 0    NIFEdipine (ADALAT CC) 90 MG extended release tablet Take 45 mg by mouth daily      amiodarone (CORDARONE) 200 MG tablet Take 100 mg by mouth nightly       Coenzyme Q10 (CO Q 10) 100 MG CAPS Take 100 mg by mouth nightly      acetaminophen (TYLENOL) 500 MG tablet Take 500 mg by mouth See Admin Instructions Take 500 mg in am  Take 1000 mg in Pm      isosorbide mononitrate (IMDUR) 30 MG extended release tablet TAKE 1 TABLET DAILY 90 tablet 3    apixaban (ELIQUIS) 5 MG TABS tablet TAKE 1 TABLET TWICE A  tablet 3    traMADol (ULTRAM) 50 MG tablet Take 1 tablet by mouth 2 times daily as needed for Pain.  TRULICITY 1.5 OD/3.1LI SOPN Inject 1.5 mg into the skin once a week Mondays      Magnesium Oxide 500 MG TABS Take 500 mg by mouth daily       ONE TOUCH ULTRA TEST strip       BD ULTRA-FINE PEN NEEDLES 29G X 12.7MM MISC       glimepiride (AMARYL) 4 MG tablet Take 2 mg by mouth every morning (before breakfast)       Omega-3 Fatty Acids (FISH OIL) 1200 MG CAPS Take 1 capsule by mouth 2 times daily Plus 360 omega 3      Glucosamine Sulfate 1000 MG CAPS Take 10,000 mg by mouth daily       Chromium-Cinnamon (CINNAMON PLUS CHROMIUM PO) Take 1,000 mg by mouth 2 times daily       Multiple Vitamins-Minerals (ONE-A-DAY MENS 50+ ADVANTAGE PO) Take 1 tablet by mouth daily       Vitamin D (CHOLECALCIFEROL) 1000 UNITS CAPS capsule   Take by mouth 2 times daily Vitamin D 3      Insulin Syringes, Disposable, U-100 0.3 ML MISC Inject  into the skin. Use prn 25 each 0     Current Facility-Administered Medications on File Prior to Visit   Medication Dose Route Frequency Provider Last Rate Last Admin    iopamidol (ISOVUE-300) 61 % injection 1 mL  1 mL Other ONCE PRN Debi Lovelace MD        sodium chloride 0.9 % injection 10 mL  10 mL Intravenous PRN Jewell Borjas MD   10 mL at 12/04/12 1035       Review of Systems   Constitutional: Negative for fever. HENT: Negative for hearing loss. Respiratory: Negative for shortness of breath    Gastrointestinal: Negative for diarrhea, constipation and nausea. Genitourinary: Negative for difficulty urinating. Musculoskeletal: Positive for back pain. Skin: Negative for rash. Neurological: Negative for headaches. Hematological: Does not bruise/bleed easily.    Psychiatric/Behavioral: Negative for sleep disturbance    Objective:     Vitals:  BP (!) 124/52 (Site: Right Upper Arm, Position: Sitting, Cuff Size: Medium Adult)   Pulse 81   Temp 97.3 °F (36.3 °C) (Axillary)   Ht 5' 10\" (1.778 m)   Wt 253 lb (114.8 kg)   BMI 36.30 kg/m² Pain Score:   1      Exam performed under coronavirus precautions  General: No acute distress  Eyes: No scleral icterus or lid lag appreciated bilaterally  ENMT: Moist mucous membranes. Hearing grossly intact bilaterally. No masses or lesions on ears or nose  Neck: Symmetric without any overt masses or lesions, trachea midline  Respiratory: Respirations are non-labored  Skin: Visualized skin is intact  Psych: Mood normal. Affect normal. Recent and remote memory intact. Judgment and insight normal.  Neurologic: Gait antalgic, no assistive devices for ambulation. Pt is alert and appropriately interactive. Pleasant and cooperative with history and exam. No signs of excessive sedation. Responds promptly and appropriately to questions asked. No aberrant behaviors appreciated. Left hand swelling. Limited left hand range of motion. Glossy skin, no clear atrophic changes. Distal left hand digit 1-5 distal digit numbness. Temperature grossly equal both hands. The patient doesn't report any temperature changes. He endorses pain weakness and limited range of motion in left hand. Sensation intact in both arms except for digit 1-5 numbness distal tips in all fingers  Weakness left hand global  Reflexes and strength functional for right arm use, no abnormal reflexes appreciated on exam today  Strength greater than 3/5 in right arms  Spurling's negative on exam today    Sensation grossly intact in both legs except for bilateral L5 paresthesias. Reflexes and strength functional for ambulation, no abnormal reflexes appreciated on exam today  Strength greater than 3/5 bilateral legs  Straight leg raise negative bilaterally.         Complex Regional Pain Syndrome  Symptoms 3/4 (sensory electrical shock, sudomotor with swelling, motor decreased ROM - must have 3 out of 4)  Signs 3/4 (sudomotor with edema, motor with reduced ROM and weakness- must have 2 out of 4)         EMG B LE 7/12/21: This study is abnormal. 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 There is sensory greater than motor nerve involvement with axonal greater than demyelinating changes. There is active denervation in distal limb muscles bilaterally. This may be consistent with the patient's history of diabetes mellitus. Today's study cannot entirely exclude a superimposed Bilateral L5-S1 lumbosacral motor radiculopathy with active denervation. XR LS Spine 6/2/20: spondylolysis L5 with up to grade 2 anterolisthesis, no sig change with flex ext, degenerative disc disease, facet arthropathy. X-ray lumbar spine 11/6/18: Bilateral pars defects, grade 2 anterolisthesis of L5 on S1. Degenerative disease. No motion on flexion and extension. Facet arthropathy. MRI LS Spine 8/24/18: L5 pars defects. Grade 2 anterolisthesis L5/S1. degenerative disc disease. Facet arthropathy. L1/2 normal canal, mild foraminal stenosis. L2/3 mild canal stenosis, mild bilateral foraminal narrowing. L3/4 up to severe canal stenosis, moderate bilateral foraminal stenosis. L4/5 normal canal, moderate left foramen narrowing. L5/S1 normal canal, severe bilateral foramen narrowing. Assessment:      Diagnosis Orders   1. Left hand pain     2. Spinal stenosis of lumbar region without neurogenic claudication     3. Lumbar radiculopathy     4. Complex regional pain syndrome type 2 of left upper extremity  External Referral to Occupational Therapy     +CAD with 2v coronary artery bypass grafting and stents on Eliquis, pulmonary hypertension, atrial fibrillation, diabetes mellitus, Obstructive sleep apnea on BiPAP  Plan:          No orders of the defined types were placed in this encounter.       Orders Placed This Encounter   Procedures    habits, lifestyle changes, regular aerobic exercise and appropriate weight maintenance as advised by their primary care physician or cardiovascular health provider. Discussed well care and disease prevention/maintenance. All recommendations for therapeutic injections are meant to help decrease pain, improve function with activities of daily living, maintain compliance with home exercise program, improve quality of life, and decrease reliance upon oral medications. Encouraged compliance with his home exercise program. Informed him to wear bracing as appropriate, and that bracing is not a replacement for core strengthening or muscle stabilization. Discussed the elevated risks of excessive sedation while on pain medications. Advised him against driving or operating heavy machinery or performing any activities where he may harm himself or others while on pain medications. Particular caution was emphasized especially during dose adjustments and medication changes. Discussed the risks of temporary disability, permanent disability, morbidity, and mortality with poorly-managed or undiagnosed medical conditions and comorbidities. Emphasized the importance of timely medical evaluation and treatment as previously recommended by us or other medical professionals. Risks of not pursing these recommendations were emphasized. Advised him that any lab testing, imaging, or other diagnostic test results are best discussed in person in the office so that we can provide a clear explanation of their significance and best treatment based upon these results. It is his responsibility to make and keep a follow up appointment to discuss these test results in person. He expressed complete understanding and agreement with the entire plan as outlined above.  Portions of this note may have been typed, auto-populated, dictated or transcribed by voice recognition resulting in errors, omissions, or close substitutions which may be missed despite careful proofreading. Please contact the author for any questions or concerns. This note was not reviewed to expedite clinical care. Follow up:  Return in about 1 month (around 9/23/2021) for reassessment of pain and symptoms, P.T. External Ref.     Verito Oliver MD

## 2021-08-23 NOTE — PROGRESS NOTES
Anisha Neff M.D. [address] [place] [address]  [phone]    2021    Kathy CandidaDO César irahetajosh  [place] [id number]      RE:   Luiz Madison  :  1950      Dear Dr. Scarlett Mauro:    CHIEF COMPLAINT:  1. Shortness of breath. 2.  Paroxysmal atrial fibrillation. 3.  Coronary artery disease. HISTORY OF PRESENT ILLNESS:  I had the pleasure of seeing Mr. Zora Gonzalez in the office on 2021. I trust you received my full H and P from 2021. At that time, he had shortness of breath and chest pain and I did a cardiac catheterization on 2021. This looked good with a patent LIMA to the LAD and a patent vein graft to the diagonal, stent in the right coronary artery was widely patent, EF was 50%. He does have a history, as you know, of atrial fibrillation and is currently on amiodarone at 100 mg nightly. He is anticoagulated with Eliquis. He is fairly symptomatic when he develops atrial fibrillation. If he would develop it again on amiodarone, then I would consider sending him for ablation. He overall has been doing well. His weight has been stable. He had left carpal tunnel surgery done by Dr. Zohaib Kaufman in [place] one week ago. His left hand is quite swollen and very painful. He has had no syncope or near syncope, lightheadedness or dizziness. Denies any chest pain or chest discomfort. MEDICATIONS:  His medications are Cordarone 200 mg half a tablet nightly; Eliquis 5 mg b.i.d.; Lipitor 40 mg daily; Coreg 3.125 mg daily; Celebrex p.r.n.; Cardura 8 mg half a tablet in the morning and 1 tablet in the evening; Entresto 97/103 b.i.d.; Lasix 80 mg daily; Amaryl 4 mg, 2 mg in the morning; Imdur 30 mg daily; magnesium 500 mg daily; metformin 1000 mg daily; nifedipine 90 mg half a tablet daily;  Novolin 70/30, 54 in the morning, 32 at lunch, 40 at supper; spironolactone 25 mg daily; Ultram p.r.n.; Trulicity 1.5 mg weekly; and vitamin D 1000 units b.i.d. PHYSICAL EXAMINATION:  VITAL SIGNS:  His blood pressure was 120/60 with a heart rate of 80 and regular. Respiratory rate 18. O2 saturation 95%. Weight 250 pounds. GENERAL:  He is a pleasant 71-year-old gentleman. Denied pain. He was oriented to person, place and time. Answered questions appropriately. SKIN:  No unusual skin changes. HEENT:  The pupils are equally round and intact. Mucous membranes were dry. NECK:  No JVD. Good carotid pulses. No carotid bruits. No lymphadenopathy or thyromegaly. CARDIOVASCULAR EXAM:  S1 and S2 were normal.  No S3 or S4. Soft systolic blowing type murmur. No diastolic murmur. PMI was normal.  No lift, thrust, or pericardial friction rub. LUNGS:  Fairly clear to auscultation and percussion. ABDOMEN:  Soft and nontender. Good bowel sounds. EXTREMITIES:  Good femoral pulses. Good pedal pulses. No pedal edema. Skin was warm and dry. No calf tenderness. Nail beds pink. Good cap refill. PULSES:  Bilateral symmetrical radial, brachial and carotid pulses. No carotid bruits. Good femoral and pedal pulses. NEUROLOGIC EXAM:  Within normal limits. PSYCHIATRIC EXAM:  Within normal limits. IMPRESSION:  1. Paroxysmal atrial fibrillation, fairly well controlled with amiodarone with the plan to send him for possible ablation if he would re-develop his atrial fibrillation and if it continues to be markedly symptomatic. 2.  Catheterization on 05/14/2021, showing 90% LAD at the diagonal bifurcation, with a patent LIMA to the LAD, patent vein graft to the diagonal, unremarkable circumflex, with widely patent stent in the right coronary artery, EF 50%, medical therapy recommended. 3.  Sleep apnea, on a CPAP mask. 4.  Sick sinus syndrome with his heart rate being good on 3.125 mg of Coreg daily. PLAN:  1. No change in medications.   2.  See for full evaluation in November as he leaves for [place] in the end of November. DISCUSSION:  Mr. Edis Mcdermott overall is doing well. He has had no unusual shortness of breath, although he is less active now that he has had his carpal tunnel surgery and has had significant pain. He does have some edema, but it seems about at his baseline. I made no change in medications. I will plan on seeing him in November. His endocrinologist asked if he could switch him to either [place] or [place], which is fine from my standpoint. Thank you very much for allowing me the privilege of seeing Mr. Edis Mcdermott. I will see him in November in followup.     Sincerely,        [facility] Milady    D: 08/19/2021 13:15:01     T: 08/19/2021 18:43:44     [email or web address]  Job#: [id number]   Doc#: [id number]

## 2021-09-17 ENCOUNTER — TELEPHONE (OUTPATIENT)
Dept: PAIN MANAGEMENT | Age: 71
End: 2021-09-17

## 2021-09-17 DIAGNOSIS — G56.42 COMPLEX REGIONAL PAIN SYNDROME TYPE 2 OF LEFT UPPER EXTREMITY: Primary | ICD-10-CM

## 2021-09-17 NOTE — TELEPHONE ENCOUNTER
I hope that the patient's hand pain is better. In case it is still painful and swollen, recommend he get evaluation for peripheral median nerve stimulation with Dr Driscoll Client, referral provided.

## 2021-09-21 NOTE — TELEPHONE ENCOUNTER
TRIED TO CALL THE PATIENT REGARDING DR PEOPLES'S RESPONSE BUT WAS UNABLE TO REACH THE PATIENT, THERE WAS NO DIAL TONE.

## 2021-09-23 ENCOUNTER — TELEPHONE (OUTPATIENT)
Dept: FAMILY MEDICINE CLINIC | Age: 71
End: 2021-09-23

## 2021-09-23 ENCOUNTER — HOSPITAL ENCOUNTER (OUTPATIENT)
Dept: PREADMISSION TESTING | Age: 71
Setting detail: SPECIMEN
Discharge: HOME | End: 2021-09-23
Payer: MEDICARE

## 2021-09-23 DIAGNOSIS — Z20.822 SUSPECTED COVID-19 VIRUS INFECTION: ICD-10-CM

## 2021-09-23 DIAGNOSIS — Z20.822 SUSPECTED COVID-19 VIRUS INFECTION: Primary | ICD-10-CM

## 2021-09-23 LAB
SARS-COV-2, RAPID: NOT DETECTED
SPECIMEN DESCRIPTION: NORMAL

## 2021-09-23 PROCEDURE — C9803 HOPD COVID-19 SPEC COLLECT: HCPCS

## 2021-09-23 PROCEDURE — 87635 SARS-COV-2 COVID-19 AMP PRB: CPT

## 2021-09-23 NOTE — TELEPHONE ENCOUNTER
Patient is c/o congestion and cough X 3 days. Patient has had both covid vaccines. Covid Test or Appt in office? Please advise. Health Maintenance   Topic Date Due    DTaP/Tdap/Td vaccine (1 - Tdap) Never done    Annual Wellness Visit (AWV)  Never done    Diabetic microalbuminuria test  10/09/2020    TSH testing  04/11/2022    Diabetic retinal exam  04/30/2022    Lipid screen  05/01/2022    Potassium monitoring  06/01/2022    Creatinine monitoring  06/01/2022    A1C test (Diabetic or Prediabetic)  07/12/2022    Diabetic foot exam  08/02/2022    Colon cancer screen colonoscopy  10/09/2023    Flu vaccine  Completed    Shingles Vaccine  Completed    Pneumococcal 65+ years Vaccine  Completed    COVID-19 Vaccine  Completed    AAA screen  Completed    Hepatitis C screen  Completed    Hepatitis A vaccine  Aged Out    Hib vaccine  Aged Out    Meningococcal (ACWY) vaccine  Aged Out             (applicable per patient's age: Cancer Screenings, Depression Screening, Fall Risk Screening, Immunizations)    Hemoglobin A1C (%)   Date Value   07/12/2021 8.0   04/11/2021 7.7 (H)   04/20/2020 7.2 (H)     Microalb/Crt.  Ratio (mcg/mg creat)   Date Value   10/09/2019 CANNOT BE CALCULATED     LDL Cholesterol (mg/dL)   Date Value   05/01/2021 49     AST (U/L)   Date Value   05/01/2021 15     ALT (U/L)   Date Value   05/01/2021 18     BUN (mg/dL)   Date Value   06/01/2021 27 (H)      (goal A1C is < 7)   (goal LDL is <100) need 30-50% reduction from baseline     BP Readings from Last 3 Encounters:   08/23/21 (!) 124/52   08/19/21 120/60   07/20/21 (!) 116/50    (goal /80)      All Future Testing planned in [facility]:  Lab Frequency Next Occurrence   EKG 12 Lead Once 11/17/2021   TSH with Reflex Once 11/19/2021   CBC Auto Differential Once 11/19/2021   Comprehensive Metabolic Panel Once 26/49/6290   Vitamin D 25 Hydroxy Once 11/19/2021   Lipid Panel Once 11/19/2021   Magnesium Once 11/19/2021   EKG 12 Lead Once 11/19/2021   Comprehensive Metabolic Panel Every 4 Weeks        Next Visit Date:  Future Appointments   Date Time Provider Javon Coffeyi   10/21/2021  9:20 AM DO Janine Rawls [facility]   11/4/2021 12:00 PM Simi Balderrama MD [facility]            Patient Active Problem List:     Hypertension     Hyperlipidemia     Coronary artery disease involving native coronary artery of native heart without angina pectoris     H/O coronary angioplasty     Severe nonproliferative diabetic retinopathy with macular edema associated with type 2 diabetes mellitus (HCC)     Type 2 diabetes mellitus with peripheral neuropathy (HCC)     Diabetic peripheral neuropathy (HCC)     GHAZALA (obstructive sleep apnea)     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HCC)     Pulmonary HTN (HCC)     PAF (paroxysmal atrial fibrillation) (HCC)     Lumbosacral spondylosis without myelopathy     Pars defect with spondylolisthesis     Spinal stenosis of lumbar region without neurogenic claudication     Systolic congestive heart failure ([address]     Type 2 MI (myocardial infarction) ([address]

## 2021-09-24 ENCOUNTER — OFFICE VISIT (OUTPATIENT)
Dept: FAMILY MEDICINE CLINIC | Age: 71
End: 2021-09-24
Payer: MEDICARE

## 2021-09-24 VITALS
WEIGHT: 252 LBS | SYSTOLIC BLOOD PRESSURE: 152 MMHG | OXYGEN SATURATION: 97 % | DIASTOLIC BLOOD PRESSURE: 78 MMHG | HEART RATE: 78 BPM | BODY MASS INDEX: 36.16 KG/M2 | TEMPERATURE: 98.2 F

## 2021-09-24 DIAGNOSIS — J06.9 VIRAL URI: Primary | ICD-10-CM

## 2021-09-24 PROCEDURE — 1036F TOBACCO NON-USER: CPT | Performed by: NURSE PRACTITIONER

## 2021-09-24 PROCEDURE — 99213 OFFICE O/P EST LOW 20 MIN: CPT | Performed by: NURSE PRACTITIONER

## 2021-09-24 PROCEDURE — 1123F ACP DISCUSS/DSCN MKR DOCD: CPT | Performed by: NURSE PRACTITIONER

## 2021-09-24 PROCEDURE — G8417 CALC BMI ABV UP PARAM F/U: HCPCS | Performed by: NURSE PRACTITIONER

## 2021-09-24 PROCEDURE — G8427 DOCREV CUR MEDS BY ELIG CLIN: HCPCS | Performed by: NURSE PRACTITIONER

## 2021-09-24 PROCEDURE — 4040F PNEUMOC VAC/ADMIN/RCVD: CPT | Performed by: NURSE PRACTITIONER

## 2021-09-24 PROCEDURE — 3017F COLORECTAL CA SCREEN DOC REV: CPT | Performed by: NURSE PRACTITIONER

## 2021-09-24 RX ORDER — AMOXICILLIN AND CLAVULANATE POTASSIUM 875; 125 MG/1; MG/1
1 TABLET, FILM COATED ORAL 2 TIMES DAILY
Qty: 14 TABLET | Refills: 0 | Status: SHIPPED | OUTPATIENT
Start: 2021-09-24 | End: 2021-10-01

## 2021-09-24 ASSESSMENT — ENCOUNTER SYMPTOMS
COUGH: 1
DIARRHEA: 0
NAUSEA: 0
SHORTNESS OF BREATH: 0
VOMITING: 0

## 2021-09-24 NOTE — PROGRESS NOTES
HPI Notes    Name: Nacho Fisher  : 1950         Chief Complaint:     Chief Complaint   Patient presents with    Sinusitis     Patient here today with sinus pressure, drainage, cough, started 4 days ago. No fever. Covid negative       History of Present Illness:        Sinusitis  This is a new problem. The current episode started in the past 7 days. The problem has been gradually worsening since onset. There has been no fever. He is experiencing no pain. Associated symptoms include coughing. Pertinent negatives include no chills, congestion, headaches or shortness of breath. Past Medical History:     Past Medical History:   Diagnosis Date    Arthritis     Arthritis     CAD (coronary artery disease)     CHF (congestive heart failure) (HCC)     CHF (congestive heart failure) (HCC)     Coronary artery disease     Diabetes [facility]     Diabetes mellitus ([address]     H/O coronary angioplasty     Hyperlipidemia     Hypertension     Neuropathy     Numbness and tingling     dannie hands    Pulmonary edema     SECONDARY TO FAT EMBOLI AFTER KNEE SURGERY    Sleep apnea     Unspecified sleep apnea     cpap      Reviewed all health maintenance requirements and ordered appropriate tests  Health Maintenance Due   Topic Date Due    DTaP/Tdap/Td vaccine (1 - Tdap) Never done   [employer] Visit (AWV)  Never done    Diabetic microalbuminuria test  10/09/2020       Past Surgical History:     Past Surgical History:   Procedure Laterality Date    ANGIOPLASTY  2017    Dr. Najera More mm drug eluting Xience stents in the right coronary. DANIELA-3 flow prior to the procedure DANIELA-3 flow following the procedure. The patient has had a good result from his angioplasty of the right coronary artery. Omayra Escalante APPENDECTOMY      APPENDECTOMY      BUNIONECTOMY  2010    CARDIAC CATHETERIZATION      stent pacement    CARDIAC CATHETERIZATION Left 2014    Dr Austyn Buchanan. [facility] Severe CAD, 70% disese with (CELEBREX) 200 MG capsule 1 po daily 7/20/21  Yes Madai Amezcua DO   atorvastatin (LIPITOR) 40 MG tablet One daily 7/20/21  Yes Michelle Fletcher DO   ENTRESTO  MG per tablet TAKE 1 TABLET TWICE A DAY 6/21/21  Yes Jewell Borjas MD   doxazosin (CARDURA) 8 MG tablet 1/2 tab in the AM 1 tab in the Evening. Yes Historical Provider, MD   furosemide (LASIX) 80 MG tablet Take 80 mg by mouth daily   Yes Historical Provider, MD   carvedilol (COREG) 3.125 MG tablet Take 1 tablet by mouth daily 6/2/21  Yes Jewell Borjas MD   spironolactone (ALDACTONE) 25 MG tablet Take 25 mg by mouth daily  5/10/21  Yes Jewell Borjas MD   NOVOLIN 70/30 (70-30) 100 UNIT/ML injection vial Inject 54 units AM, Inject 32 units at Lunch, Inject 40 units at supper as directed subcutaneously. 4/21/21  Yes Madai Amezcua DO   metFORMIN (GLUCOPHAGE) 1000 MG tablet Take 1 tablet by mouth 2 times daily (with meals)  Patient taking differently: Take 1,000 mg by mouth daily  4/21/21  Yes Madai Amezcua DO   NIFEdipine (ADALAT CC) 90 MG extended release tablet Take 45 mg by mouth daily   Yes Historical Provider, MD   amiodarone (CORDARONE) 200 MG tablet Take 100 mg by mouth nightly  2/14/21  Yes Historical Provider, MD   Coenzyme Q10 (CO Q 10) 100 MG CAPS Take 100 mg by mouth nightly   Yes Historical Provider, MD   acetaminophen (TYLENOL) 500 MG tablet Take 500 mg by mouth See Admin Instructions Take 500 mg in am  Take 1000 mg in Pm   Yes Historical Provider, MD   isosorbide mononitrate (IMDUR) 30 MG extended release tablet TAKE 1 TABLET DAILY 3/2/21  Yes Jewell Borjas MD   apixaban (ELIQUIS) 5 MG TABS tablet TAKE 1 TABLET TWICE A DAY 11/18/20  Yes Jewell Borjas MD   traMADol (ULTRAM) 50 MG tablet Take 1 tablet by mouth 2 times daily as needed for Pain.   9/8/20  Yes Historical Provider, MD   TRULICITY 1.5 QU/9.9HM SOPN Inject 1.5 mg into the skin once a week Mondays 2/11/19  Yes Historical Provider, MD   Magnesium Oxide 500 MG TABS Take 500 mg by mouth daily    Yes Historical Provider, MD   ONE TOUCH ULTRA TEST strip  8/8/16  Yes Historical Provider, MD   BD ULTRA-FINE PEN NEEDLES 29G X 12.7MM MISC  8/1/16  Yes Historical Provider, MD   glimepiride (AMARYL) 4 MG tablet Take 2 mg by mouth every morning (before breakfast)    Yes Historical Provider, MD   Omega-3 Fatty Acids (FISH OIL) 1200 MG CAPS Take 1 capsule by mouth 2 times daily Plus 360 omega 3   Yes Historical Provider, MD   Glucosamine Sulfate 1000 MG CAPS Take 10,000 mg by mouth daily    Yes Historical Provider, MD   Chromium-Cinnamon (CINNAMON PLUS CHROMIUM PO) Take 1,000 mg by mouth 2 times daily    Yes Historical Provider, MD   Multiple Vitamins-Minerals (ONE-A-DAY MENS 50+ ADVANTAGE PO) Take 1 tablet by mouth daily    Yes Historical Provider, MD   Vitamin D (CHOLECALCIFEROL) 1000 UNITS CAPS capsule   Take by mouth 2 times daily Vitamin D 3   Yes Historical Provider, MD   Insulin Syringes, Disposable, U-100 0.3 ML MISC Inject  into the skin. Use prn 7/19/12  Yes Isreal Cole, DO        Allergies:       Patient has no known allergies. Social History:     Tobacco:    reports that he quit smoking about 45 years ago. He has a 1.50 pack-year smoking history. He has never used smokeless tobacco.  Alcohol:      reports no history of alcohol use. Drug Use:  reports no history of drug use. Family History:        Family History   Problem Relation Age of Onset    Alzheimer's Disease Mother     Heart Disease Mother     Arthritis Mother     High Blood Pressure Mother     Diabetes Mother     Heart Disease Father     Arthritis Father     High Blood Pressure Father     Diabetes Father        Review of Systems:         Review of Systems   Constitutional: Negative for chills and fever. HENT: Negative for congestion. Respiratory: Positive for cough. Negative for shortness of breath. Cardiovascular: Negative for chest pain and palpitations.    Gastrointestinal: Negative for diarrhea, nausea and vomiting. Neurological: Negative for dizziness, seizures and headaches. Physical Exam:     Vitals:  BP (!) 152/78 (Site: Left Upper Arm)   Pulse 78   Temp 98.2 °F (36.8 °C) (Oral)   Wt 252 lb (114.3 kg)   SpO2 97%   BMI 36.16 kg/m²       Physical Exam  Vitals and nursing note reviewed. Constitutional:       Appearance: He is well-developed. HENT:      Right Ear: Tympanic membrane normal.      Left Ear: Tympanic membrane normal.      Nose: Mucosal edema present. Mouth/Throat:      Pharynx: Posterior oropharyngeal erythema present. Cardiovascular:      Rate and Rhythm: Normal rate and regular rhythm. Heart sounds: Normal heart sounds. Pulmonary:      Effort: Pulmonary effort is normal. No respiratory distress. Breath sounds: Normal breath sounds. Neurological:      Mental Status: He is alert. Psychiatric:         Behavior: Behavior is cooperative. Data:     Lab Results   Component Value Date     06/01/2021    K 4.9 06/01/2021     06/01/2021    CO2 27 06/01/2021    BUN 27 06/01/2021    CREATININE 1.12 06/01/2021    GLUCOSE 138 06/01/2021    PROT 6.9 05/01/2021    LABALBU 4.1 05/01/2021    BILITOT 0.68 05/01/2021    ALKPHOS 67 05/01/2021    AST 15 05/01/2021    ALT 18 05/01/2021     Lab Results   Component Value Date    WBC 5.8 05/01/2021    RBC 4.09 05/01/2021    HGB 12.6 05/01/2021    HCT 37.9 05/01/2021    MCV 92.7 05/01/2021    MCH 30.8 05/01/2021    MCHC 33.2 05/01/2021    RDW 14.7 05/01/2021     05/01/2021    MPV NOT REPORTED 05/01/2021     Lab Results   Component Value Date    TSH 1.65 04/11/2021     Lab Results   Component Value Date    CHOL 106 05/01/2021    HDL 30 05/01/2021    PSA 0.31 05/21/2020    LABA1C 8.0 07/12/2021          Assessment & Plan        Diagnosis Orders   1. Viral URI       Patient educated about viral versus bacterial infections. Patient insistent that he needs an antibiotic.   Patient encouraged to use over-the-counter medications to treat the symptoms. However, I did print an antibiotic for him to start on Monday if he does not feel better because at that time he would be be on the 7-day dianne. Patient verbalizes understanding and agreement with plan. All questions answered. If symptoms do not resolve or worsen, return to office. Completed Refills   Requested Prescriptions     Signed Prescriptions Disp Refills    amoxicillin-clavulanate (AUGMENTIN) 875-125 MG per tablet 14 tablet 0     Sig: Take 1 tablet by mouth 2 times daily for 7 days     No follow-ups on file. Orders Placed This Encounter   Medications    amoxicillin-clavulanate (AUGMENTIN) 875-125 MG per tablet     Sig: Take 1 tablet by mouth 2 times daily for 7 days     Dispense:  14 tablet     Refill:  0     No orders of the defined types were placed in this encounter. Patient Instructions   SURVEY:    You may be receiving a survey from [employer] regarding your visit today. Please complete the survey to enable us to provide the highest quality of care to you and your family. If you cannot score us a very good (5 Stars) on any question, please call the office to discuss how we could have made your experience a very good one. Thank you. Clinical Care Team: MORELIA Matthew LPN    Clerical Team: [facility]        Electronically signed by YENNY Matthew CNP on 9/24/2021 at 8:38 AM           Completed Refills      Requested Prescriptions     Signed Prescriptions Disp Refills    amoxicillin-clavulanate (AUGMENTIN) 875-125 MG per tablet 14 tablet 0     Sig: Take 1 tablet by mouth 2 times daily for 7 days         Mónica Leyva received counseling on the following healthy behaviors: medication adherence  Reviewed prior labs and health maintenance.   Continue current medications, diet and exercise. Discussed use, benefit, and side effects of prescribed medications. Barriers to medication compliance addressed. Patient given educational materials - see patient instructions. All patient questions answered. Patient voiced understanding.

## 2021-10-04 RX ORDER — SPIRONOLACTONE 25 MG/1
25 TABLET ORAL DAILY
Qty: 90 TABLET | Refills: 3 | Status: SHIPPED | OUTPATIENT
Start: 2021-10-04 | End: 2022-08-29

## 2021-10-07 NOTE — TELEPHONE ENCOUNTER
Patient [place] leaves [place] for the winter in late November. Patient asks if Dr. Jes Cifuentes would order this procedure in order to receive authorization and be scheduled before then.

## 2021-10-08 ENCOUNTER — TELEPHONE (OUTPATIENT)
Dept: SPORTS MEDICINE | Age: 71
End: 2021-10-08

## 2021-10-08 DIAGNOSIS — M54.16 LUMBAR RADICULOPATHY: Primary | ICD-10-CM

## 2021-10-08 NOTE — TELEPHONE ENCOUNTER
MA- please send blood thinner stoppage request for Eliquis to prescribing provider. Can let patient know order was placed and can schedule in early or mid November once we are given permission to stop Eliquis.

## 2021-10-11 RX ORDER — FUROSEMIDE 80 MG
TABLET ORAL
Qty: 180 TABLET | Refills: 3 | Status: SHIPPED | OUTPATIENT
Start: 2021-10-11 | End: 2022-09-23 | Stop reason: SDUPTHER

## 2021-10-11 NOTE — TELEPHONE ENCOUNTER
BILAT L5-S1 TRANSFORAMINAL EPIDURAL STEROID INJECTION UNDER XR    NO AUTH REQUIRED    OK to schedule procedure approved as above. Please note sides/levels approved and date range. (If applicable, sides/levels approved may differ from those ordered)    TO BE SCHEDULED WITH DR. Crandall [place]

## 2021-10-21 ENCOUNTER — OFFICE VISIT (OUTPATIENT)
Dept: FAMILY MEDICINE CLINIC | Age: 71
End: 2021-10-21
Payer: MEDICARE

## 2021-10-21 VITALS
HEIGHT: 70 IN | HEART RATE: 75 BPM | WEIGHT: 258 LBS | SYSTOLIC BLOOD PRESSURE: 132 MMHG | BODY MASS INDEX: 36.94 KG/M2 | OXYGEN SATURATION: 98 % | DIASTOLIC BLOOD PRESSURE: 68 MMHG

## 2021-10-21 DIAGNOSIS — L85.8 CUTANEOUS HORN: ICD-10-CM

## 2021-10-21 DIAGNOSIS — L57.0 ACTINIC KERATOSES: ICD-10-CM

## 2021-10-21 DIAGNOSIS — R05.9 COUGH: ICD-10-CM

## 2021-10-21 DIAGNOSIS — Z00.00 ROUTINE GENERAL MEDICAL EXAMINATION AT A HEALTH CARE FACILITY: Primary | ICD-10-CM

## 2021-10-21 DIAGNOSIS — E11.42 TYPE 2 DIABETES MELLITUS WITH DIABETIC POLYNEUROPATHY, WITH LONG-TERM CURRENT USE OF INSULIN (HCC): ICD-10-CM

## 2021-10-21 DIAGNOSIS — G47.33 OSA TREATED WITH BIPAP: ICD-10-CM

## 2021-10-21 DIAGNOSIS — Z79.4 TYPE 2 DIABETES MELLITUS WITH DIABETIC POLYNEUROPATHY, WITH LONG-TERM CURRENT USE OF INSULIN (HCC): ICD-10-CM

## 2021-10-21 LAB
CREATININE URINE POCT: 100
HBA1C MFR BLD: 7.8 %
MICROALBUMIN/CREAT 24H UR: 80 MG/G{CREAT}
MICROALBUMIN/CREAT UR-RTO: NORMAL

## 2021-10-21 PROCEDURE — 99214 OFFICE O/P EST MOD 30 MIN: CPT | Performed by: FAMILY MEDICINE

## 2021-10-21 PROCEDURE — G8482 FLU IMMUNIZE ORDER/ADMIN: HCPCS | Performed by: FAMILY MEDICINE

## 2021-10-21 PROCEDURE — G0439 PPPS, SUBSEQ VISIT: HCPCS | Performed by: FAMILY MEDICINE

## 2021-10-21 PROCEDURE — 2022F DILAT RTA XM EVC RTNOPTHY: CPT | Performed by: FAMILY MEDICINE

## 2021-10-21 PROCEDURE — 1123F ACP DISCUSS/DSCN MKR DOCD: CPT | Performed by: FAMILY MEDICINE

## 2021-10-21 PROCEDURE — G8427 DOCREV CUR MEDS BY ELIG CLIN: HCPCS | Performed by: FAMILY MEDICINE

## 2021-10-21 PROCEDURE — 4040F PNEUMOC VAC/ADMIN/RCVD: CPT | Performed by: FAMILY MEDICINE

## 2021-10-21 PROCEDURE — 83036 HEMOGLOBIN GLYCOSYLATED A1C: CPT | Performed by: FAMILY MEDICINE

## 2021-10-21 PROCEDURE — 3017F COLORECTAL CA SCREEN DOC REV: CPT | Performed by: FAMILY MEDICINE

## 2021-10-21 PROCEDURE — 82044 UR ALBUMIN SEMIQUANTITATIVE: CPT | Performed by: FAMILY MEDICINE

## 2021-10-21 PROCEDURE — G8417 CALC BMI ABV UP PARAM F/U: HCPCS | Performed by: FAMILY MEDICINE

## 2021-10-21 PROCEDURE — 1036F TOBACCO NON-USER: CPT | Performed by: FAMILY MEDICINE

## 2021-10-21 PROCEDURE — 3051F HG A1C>EQUAL 7.0%<8.0%: CPT | Performed by: FAMILY MEDICINE

## 2021-10-21 ASSESSMENT — LIFESTYLE VARIABLES: HOW OFTEN DO YOU HAVE A DRINK CONTAINING ALCOHOL: 0

## 2021-10-21 ASSESSMENT — PATIENT HEALTH QUESTIONNAIRE - PHQ9
SUM OF ALL RESPONSES TO PHQ QUESTIONS 1-9: 0
SUM OF ALL RESPONSES TO PHQ QUESTIONS 1-9: 0
2. FEELING DOWN, DEPRESSED OR HOPELESS: 0
1. LITTLE INTEREST OR PLEASURE IN DOING THINGS: 0
SUM OF ALL RESPONSES TO PHQ9 QUESTIONS 1 & 2: 0
SUM OF ALL RESPONSES TO PHQ QUESTIONS 1-9: 0

## 2021-10-21 NOTE — PROGRESS NOTES
Name: Zunilda Renteria  : 1950         Chief Complaint:     Chief Complaint   Patient presents with    Medicare AWV    Diabetes       History of Present Illness: Zunilda Renteria is a 80 y.o.  male who presents with Medicare AWV and Diabetes      HPI    GHAZALA, still having some trouble with sleep, though decreasing bipap pressures did help. Difficulty getting mask fit just right, has to crank it really tight. At one point tried a nasal mask that worked great, slept very well, but caused persistent irritation and even bleeding just under his nose. Another was too tight on the face and stifled his breathing too much. Recent cold and it seems to be coming back. Sore throat intermittently. Did take augmentin course starting . F/u DM. Has continuous glucose sensor but it doesn't help as much as he would hope. Sugar had been in better control prior to having CGM, alarms when sugar is below 90 so then he eats to keep up with it or uses less insulin at mealtimes than he would have otherwise. Not much exercise, limited by pains and MARTINS. Upcoming endo appt. Skin lesion on left upper arm just above the elbow, gets a little irritated, notices it because he rubs topical pain relief products on the elbow fairly frequently. Also some rough areas in the left preauricular area, was not sure if they were due to mask wearing. No treatment tried.     Medical History:     Patient Active Problem List   Diagnosis    Hypertension    Hyperlipidemia    Coronary artery disease involving native coronary artery of native heart without angina pectoris    H/O coronary angioplasty    Severe nonproliferative diabetic retinopathy with macular edema associated with type 2 diabetes mellitus (HCC)    Type 2 diabetes mellitus with peripheral neuropathy (HCC)    Diabetic peripheral neuropathy (HCC)    GHAZALA (obstructive sleep apnea)    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HCC)    Pulmonary HTN (HCC)    [facility]    Lumbosacral spondylosis without myelopathy    Pars defect with spondylolisthesis    Spinal stenosis of lumbar region without neurogenic claudication    Systolic congestive heart failure (HCC)    Type 2 [facility]ocardial infarction) [facility]       Medications:       Prior to Admission medications    Medication Sig Start Date End Date Taking? Authorizing Provider   furosemide (LASIX) 80 MG tablet TAKE 1 TABLET TWICE A DAY 10/11/21  Yes Allen Reyes MD   spironolactone (ALDACTONE) 25 MG tablet Take 1 tablet by mouth daily 10/4/21  Yes Allen Reyes MD   celecoxib (CELEBREX) 200 MG capsule 1 po daily 7/20/21  Yes Alex Jackson DO   atorvastatin (LIPITOR) 40 MG tablet One daily 7/20/21  Yes Michelle Fletcher DO   ENTRESTO  MG per tablet TAKE 1 TABLET TWICE A DAY 6/21/21  Yes Allen Reyes MD   doxazosin (CARDURA) 8 MG tablet 1/2 tab in the AM 1 tab in the Evening. Yes Historical Provider, MD   carvedilol (COREG) 3.125 MG tablet Take 1 tablet by mouth daily 6/2/21  Yes Allen Reyes MD   NOVOLIN 70/30 (70-30) 100 UNIT/ML injection vial Inject 54 units AM, Inject 32 units at Lunch, Inject 40 units at supper as directed subcutaneously.  4/21/21  Yes Alex Jackson DO   metFORMIN (GLUCOPHAGE) 1000 MG tablet Take 1 tablet by mouth 2 times daily (with meals)  Patient taking differently: Take 1,000 mg by mouth daily  4/21/21  Yes Alex Jackson DO   NIFEdipine (ADALAT CC) 90 MG extended release tablet Take 45 mg by mouth daily   Yes Historical Provider, MD   amiodarone (CORDARONE) 200 MG tablet Take 100 mg by mouth nightly  2/14/21  Yes Historical Provider, MD   Coenzyme Q10 (CO Q 10) 100 MG CAPS Take 100 mg by mouth nightly   Yes Historical Provider, MD   acetaminophen (TYLENOL) 500 MG tablet Take 500 mg by mouth See Admin Instructions Take 500 mg in am  Take 1000 mg in Pm   Yes Historical Provider, MD   isosorbide mononitrate (IMDUR) 30 MG extended release tablet TAKE 1 TABLET DAILY 3/2/21  Yes Pk Fall MD   apixaban (ELIQUIS) 5 MG TABS tablet TAKE 1 TABLET TWICE A DAY 11/18/20  Yes Pk Fall MD   traMADol (ULTRAM) 50 MG tablet Take 1 tablet by mouth 2 times daily as needed for Pain. 9/8/20  Yes Historical Provider, MD   TRULICITY 1.5 KW/6.6MJ SOPN Inject 1.5 mg into the skin once a week Mondays 2/11/19  Yes Historical Provider, MD   Magnesium Oxide 500 MG TABS Take 500 mg by mouth daily    Yes Historical Provider, MD   ONE TOUCH ULTRA TEST strip  8/8/16  Yes Historical Provider, MD   BD ULTRA-FINE PEN NEEDLES 29G X 12.7MM MISC  8/1/16  Yes Historical Provider, MD   glimepiride (AMARYL) 4 MG tablet Take 2 mg by mouth every morning (before breakfast)    Yes Historical Provider, MD   Omega-3 Fatty Acids (FISH OIL) 1200 MG CAPS Take 1 capsule by mouth 2 times daily Plus 360 omega 3   Yes Historical Provider, MD   Glucosamine Sulfate 1000 MG CAPS Take 10,000 mg by mouth daily    Yes Historical Provider, MD   Chromium-Cinnamon (CINNAMON PLUS CHROMIUM PO) Take 1,000 mg by mouth 2 times daily    Yes Historical Provider, MD   Multiple Vitamins-Minerals (ONE-A-DAY MENS 50+ ADVANTAGE PO) Take 1 tablet by mouth daily    Yes Historical Provider, MD   Vitamin D (CHOLECALCIFEROL) 1000 UNITS CAPS capsule   Take by mouth 2 times daily Vitamin D 3   Yes Historical Provider, MD   Insulin Syringes, Disposable, U-100 0.3 ML MISC Inject  into the skin. Use prn 7/19/12  Yes Isreal Cole, DO        Allergies:       Patient has no known allergies. Physical Exam:     Vitals:  /68   Pulse 75   Ht 5' 10\" (1.778 m)   Wt 258 lb (117 kg)   SpO2 98%   BMI 37.02 kg/m²   Physical Exam  Vitals and nursing note reviewed. Constitutional:       Appearance: Normal appearance. He is well-developed. He is not ill-appearing.    HENT:      Right Ear: Hearing and tympanic membrane normal.      Left Ear: Hearing and tympanic membrane normal.      Nose: Nose normal. Mouth/Throat:      Dentition: Normal dentition. Eyes:      Conjunctiva/sclera: Conjunctivae normal.      Pupils: Pupils are equal, round, and reactive to light. Neck:      Thyroid: No thyroid mass or thyromegaly. Cardiovascular:      Rate and Rhythm: Normal rate and regular rhythm. Heart sounds: S1 normal and S2 normal. No murmur heard. Comments: No peripheral edema. Pulmonary:      Effort: Pulmonary effort is normal.      Breath sounds: Normal breath sounds. Abdominal:      General: Bowel sounds are normal.      Palpations: Abdomen is soft. Tenderness: There is no abdominal tenderness. Musculoskeletal:      Comments: Left elbow slightly decreased range of extension. Full flexion, pronation and supination. Bony hypertrophy present. Lymphadenopathy:      Cervical: No cervical adenopathy. Skin:     General: Skin is warm and dry. Findings: No rash. Comments: Left distal lateral upper arm just superior to the lateral epicondyle: Stuck on appearing flesh colored lesion approximately 6 x 8 mm, with cutaneous horn approximately 2 mm diameter located at anterior aspect  Left preauricular: Pink raw area with mild peeling approximately 5 x 7 mm. Similar lesion on the left lateral neck just under the ear   Neurological:      Mental Status: He is alert and oriented to person, place, and time. Psychiatric:         Behavior: Behavior normal. Behavior is cooperative.          Data:     Lab Results   Component Value Date     06/01/2021    K 4.9 06/01/2021     06/01/2021    CO2 27 06/01/2021    BUN 27 06/01/2021    CREATININE 1.12 06/01/2021    GLUCOSE 138 06/01/2021    PROT 6.9 05/01/2021    LABALBU 4.1 05/01/2021    BILITOT 0.68 05/01/2021    ALKPHOS 67 05/01/2021    AST 15 05/01/2021    ALT 18 05/01/2021     Lab Results   Component Value Date    WBC 5.8 05/01/2021    RBC 4.09 05/01/2021    HGB 12.6 05/01/2021    HCT 37.9 05/01/2021    MCV 92.7 05/01/2021    MCH 30.8 05/01/2021    MCHC 33.2 05/01/2021    RDW 14.7 05/01/2021     05/01/2021    MPV NOT REPORTED 05/01/2021     Lab Results   Component Value Date    TSH 1.65 04/11/2021     Lab Results   Component Value Date    CHOL 106 05/01/2021    HDL 30 05/01/2021    PSA 0.31 05/21/2020    LABA1C 7.8 10/21/2021         Assessment & Plan:        Diagnosis Orders   1. Routine general medical examination at a health care facility     2. Type 2 diabetes mellitus with diabetic polyneuropathy, with long-term current use of insulin (HCC)  POCT glycosylated hemoglobin (Hb A1C)    POCT microalbumin   3. GHAZALA treated with BiPAP     4. Cutaneous horn     5. Actinic keratoses     6. Cough     Diabetes slightly improved but remains uncontrolled, A1c 7.8%. Reviewed his regimen. Reviewed that perhaps the continuous glucose monitor has its threshold set too high for hypoglycemia alarms. Patient also asked about insulin resistance, at term he had just heard recently, and I explained it, including vicious cycle with insulin release and weight gain, benefits of decreasing carbohydrate intake. Seeing endocrinology next week. GHAZALA, still struggling with mask settings but current BiPAP pressure seems to be adequate. Skin lesions on left distal lateral upper arm and left side of neck and face. Unfortunately we have run out of liquid nitrogen. Advised patient to make follow-up appointment for cryotherapy. Mild cough, continue supportive care. Requested Prescriptions      No prescriptions requested or ordered in this encounter         Patient Instructions   SURVEY:    You may be receiving a survey from [employer] regarding your visit today. You may get this in the mail, through your MyChart or in your email. Please complete the survey to enable us to provide the highest quality of care to you and your family.     If you cannot score us as very good ( 5 Stars) on any question, please feel free to call the office to discuss how we could have made your experience exceptional.     Thank you. Clinical Care Team:  Dr. Rita Berger, DO [place], [address] [facility] Team:  Karla 11    Personalized Preventive Plan for Socorro Buckner - 10/21/2021  Medicare offers a range of preventive health benefits. Some of the tests and screenings are paid in full while other may be subject to a deductible, co-insurance, and/or copay. Some of these benefits include a comprehensive review of your medical history including lifestyle, illnesses that may run in your family, and various assessments and screenings as appropriate. After reviewing your medical record and screening and assessments performed today your provider may have ordered immunizations, labs, imaging, and/or referrals for you. A list of these orders (if applicable) as well as your Preventive Care list are included within your After Visit Summary for your review. Other Preventive Recommendations:    · A preventive eye exam performed by an eye specialist is recommended every 1-2 years to screen for glaucoma; cataracts, macular degeneration, and other eye disorders. · A preventive dental visit is recommended every 6 months. · Try to get at least 150 minutes of exercise per week or 10,000 steps per day on a pedometer . · Order or download the FREE \"Exercise & Physical Activity: Your Everyday Guide\" from [employer]. Call 1-919.480.2093 or search [employer] online. · You need 3607-5017 mg of calcium and 5915-8063 IU of vitamin D per day. It is possible to meet your calcium requirement with diet alone, but a vitamin D supplement is usually necessary to meet this goal.  · When exposed to the sun, use a sunscreen that protects against both UVA and UVB radiation with an SPF of 30 or greater.  Reapply every 2 to 3 hours or after sweating, drying off with a towel, or swimming. · Always wear a seat belt when traveling in a car. Always wear a helmet when riding a bicycle or motorcycle. Nba Duran received counseling on the following healthy behaviors: medication adherence  Reviewed prior labs and health maintenance. Continue current medications, diet and exercise. Discussed use, benefit, and side effects of prescribed medications. Barriers to medication compliance addressed. Patient given educational materials - see patient instructions. All patient questions answered.   Patient voiced understanding.     signed by Alex Jackson DO on 10/21/2021 at 4:50 PM  [address]  Dept: [phone]

## 2021-10-21 NOTE — PROGRESS NOTES
Medicare Annual Wellness Visit  Name: Verito Serrano Date: 10/21/2021   MRN: [id number] Sex: Male   Age: 80 y.o. Ethnicity: Non- / Non    : 1950 Race: White (non-)      Neo Quarles is here for Medicare AWV and Diabetes    Screenings for behavioral, psychosocial and functional/safety risks, and cognitive dysfunction are all negative except as indicated below. These results, as well as other patient data from the [address] form, are documented in Flowsheets linked to this Encounter. No Known Allergies    Prior to Visit Medications    Medication Sig Taking? Authorizing Provider   furosemide (LASIX) 80 MG tablet TAKE 1 TABLET TWICE A DAY Yes Domenic Mayo MD   spironolactone (ALDACTONE) 25 MG tablet Take 1 tablet by mouth daily Yes Domenic Mayo MD   celecoxib (CELEBREX) 200 MG capsule 1 po daily Yes Rae Bassett DO   atorvastatin (LIPITOR) 40 MG tablet One daily Yes Michelle Fletcher DO   ENTRESTO  MG per tablet TAKE 1 TABLET TWICE A DAY Yes Domenic Mayo MD   doxazosin (CARDURA) 8 MG tablet 1/2 tab in the AM 1 tab in the Evening. Yes Historical Provider, MD   carvedilol (COREG) 3.125 MG tablet Take 1 tablet by mouth daily Yes Domenic Mayo MD   NOVOLIN 70/30 (70-30) 100 UNIT/ML injection vial Inject 54 units AM, Inject 32 units at Lunch, Inject 40 units at supper as directed subcutaneously.  Yes Rae Bassett DO   metFORMIN (GLUCOPHAGE) 1000 MG tablet Take 1 tablet by mouth 2 times daily (with meals)  Patient taking differently: Take 1,000 mg by mouth daily  Yes Rae Bassett DO   NIFEdipine (ADALAT CC) 90 MG extended release tablet Take 45 mg by mouth daily Yes Historical Provider, MD   amiodarone (CORDARONE) 200 MG tablet Take 100 mg by mouth nightly  Yes Historical Provider, MD   Coenzyme Q10 (CO Q 10) 100 MG CAPS Take 100 mg by mouth nightly Yes Historical Provider, MD   acetaminophen (TYLENOL) 500 MG tablet Take 500 mg by mouth See Admin Instructions Take 500 mg in am  Take 1000 mg in Pm Yes Historical Provider, MD   isosorbide mononitrate (IMDUR) 30 MG extended release tablet TAKE 1 TABLET DAILY Yes Mima Bar MD   apixaban (ELIQUIS) 5 MG TABS tablet TAKE 1 TABLET TWICE A DAY Yes Mima Bar MD   traMADol (ULTRAM) 50 MG tablet Take 1 tablet by mouth 2 times daily as needed for Pain. Yes Historical Provider, MD   TRULICITY 1.5 RQ/0.2EA SOPN Inject 1.5 mg into the skin once a week Mondays Yes Historical Provider, MD   Magnesium Oxide 500 MG TABS Take 500 mg by mouth daily  Yes Historical Provider, MD   ONE TOUCH ULTRA TEST strip  Yes Historical Provider, MD   BD ULTRA-FINE PEN NEEDLES 29G X 12.7MM MISC  Yes Historical Provider, MD   glimepiride (AMARYL) 4 MG tablet Take 2 mg by mouth every morning (before breakfast)  Yes Historical Provider, MD   Omega-3 Fatty Acids (FISH OIL) 1200 MG CAPS Take 1 capsule by mouth 2 times daily Plus 360 omega 3 Yes Historical Provider, MD   Glucosamine Sulfate 1000 MG CAPS Take 10,000 mg by mouth daily  Yes Historical Provider, MD   Chromium-Cinnamon (CINNAMON PLUS CHROMIUM PO) Take 1,000 mg by mouth 2 times daily  Yes Historical Provider, MD   Multiple Vitamins-Minerals (ONE-A-DAY MENS 50+ ADVANTAGE PO) Take 1 tablet by mouth daily  Yes Historical Provider, MD   Vitamin D (CHOLECALCIFEROL) 1000 UNITS CAPS capsule   Take by mouth 2 times daily Vitamin D 3 Yes Historical Provider, MD   Insulin Syringes, Disposable, U-100 0.3 ML MISC Inject  into the skin.  Use prn Yes David Cole, DO       Past Medical History:   Diagnosis Date    Arthritis     Arthritis     CAD (coronary artery disease)     CHF (congestive heart failure) (HCC)     CHF (congestive heart failure) (HCC)     Coronary artery disease     [facility]     Diabetes mellitus [facility]     H/O coronary angioplasty     Hyperlipidemia     Hypertension     Neuropathy     Numbness and tingling     dannie hands    Pulmonary edema SECONDARY TO FAT EMBOLI AFTER KNEE SURGERY    Sleep apnea     Unspecified sleep apnea 2012    cpap       Past Surgical History:   Procedure Laterality Date    ANGIOPLASTY  08/30/2017    Dr. Velásquez Angles mm drug eluting Xience stents in the right coronary. DANIELA-3 flow prior to the procedure DANIELA-3 flow following the procedure. The patient has had a good result from his angioplasty of the right coronary artery. [facility] APPENDECTOMY      APPENDECTOMY      BUNIONECTOMY  2010    CARDIAC CATHETERIZATION      stent pacement    CARDIAC CATHETERIZATION Left 05/14/2014    Dr Sabrina Ness. [facility] Severe CAD, 70% disese with in-stent stenosis in the left anterior descending at the level of a very large codominant diagonal.  90% disease of the ostium of a very large codominant diagonal with 60% disease beyond the diagonal with a fractional flow reserve of 0.76 in the diagonal and 0.78 in the left anterior descending indicating obstructive disease of significance in both the     CARDIAC CATHETERIZATION Left 05/14/2014    left anterior descending & diagonal.. Mild irregularities of 30% in a small, nondominant circumglex. Mild 30- 40% disease in the midportion of a very large,dominant right coronary artery. Normal left ventricular function, ejection fraction of 55-60%    CARDIAC CATHETERIZATION Bilateral 11/15/2017    Dr. Ambrosio Robbins @ [facility]--Severe PHT with a PA pressure of 74/30. Widely patent stent in the very large dominat right coronary artery with 50% disease before the stent with an FFR of 0.89, indicating no significant obstruction. Severe disease in the LAD. Patent left internal mammary to the LAD. Patent saphenous vein graft to a very large diagonal.  Overall normal LV function.   EF of 55%    CARDIAC CATHETERIZATION Left 05/14/2021    Dr. Ambrosio Robbins @ [facility]--Medical therapy    CATARACT REMOVAL  2004    COLONOSCOPY  10/09/2013    Dr. Gloria Boykin (hemorrhoids)    COLONOSCOPY  10/09/2013    CORONARY ANGIOPLASTY WITH STENT PLACEMENT      CORONARY ARTERY BYPASS GRAFT  06/03/2014    Dr Rhianna Albert @ [address] [facility] GRAFT      EYE SURGERY  08/2012    B leakage in back of eyes    JOINT REPLACEMENT Bilateral     knees    JOINT REPLACEMENT      KNEE SURGERY  2010, 2011    BILATERAL KNEE ORTHO    KNEE SURGERY         Family History   Problem Relation Age of Onset    Alzheimer's Disease Mother     Heart Disease Mother     Arthritis Mother     High Blood Pressure Mother     Diabetes Mother     Heart Disease Father     Arthritis Father     High Blood Pressure Father     Diabetes Father        CareTeam (Including outside providers/suppliers regularly involved in providing care):   Patient Care Team:  Alva Cox DO as PCP - [address] [place], [place] [address]DO as PCP - [facility] Empaneled Provider    Wt Readings from Last 3 Encounters:   10/21/21 258 lb (117 kg)   09/24/21 252 lb (114.3 kg)   08/23/21 253 lb (114.8 kg)     Vitals:    10/21/21 0919   BP: 132/68   Pulse: 75   SpO2: 98%   Weight: 258 lb (117 kg)   Height: 5' 10\" (1.778 m)     Body mass index is 37.02 kg/m². Based upon direct observation of the patient, evaluation of cognition reveals recent and remote memory intact. Patient's complete Health Risk Assessment and screening values have been reviewed and are found in Flowsheets. The following problems were reviewed today and where indicated follow up appointments were made and/or referrals ordered.     Positive Risk Factor Screenings with Interventions:           Health Habits/Nutrition:  Health Habits/Nutrition  Do you exercise for at least 20 minutes 2-3 times per week?: Yes  Have you lost any weight without trying in the past 3 months?: No  Do you eat only one meal per day?: No  Have you seen the dentist within the past year?: Yes  Body mass index: (!) 37.02  Health Habits/Nutrition Interventions:  · Nutritional issues:  educational materials for healthy, well-balanced diet provided       Personalized Preventive Plan   Current Health Maintenance Status  Immunization History   Administered Date(s) Administered    COVID-19, Brandin Mendoza, PF, 30mcg/0.3mL 01/21/2021, 02/11/2021, 10/11/2021    Influenza Vaccine, unspecified formulation 01/09/2018    Influenza Virus Vaccine 01/09/2018    Influenza, High Dose (Fluzone 65 yrs and older) 09/24/2020, 09/09/2021    Influenza, High-dose, Quadv, 65 yrs +, IM (Fluzone) 09/24/2020, 09/27/2020, 09/08/2021    Influenza, MDCK Quadv, with preserv IM (Flucelvax 2 yrs and older) 09/02/2019    Influenza, Quadv, IM, PF (6 mo and older Fluzone, Flulaval, Fluarix, and 3 yrs and older Afluria) 01/09/2018, 10/17/2019    Pneumococcal Conjugate 13-valent ([id number]) 08/25/2016    Pneumococcal Polysaccharide ([id number]) 09/17/2018    Zoster Recombinant (Shingrix) 09/02/2019, 11/12/2019        Health Maintenance   Topic Date Due    DTaP/Tdap/Td vaccine (1 - Tdap) Never done    Diabetic microalbuminuria test  10/09/2020    Annual Wellness Visit (AWV)  04/21/2021    TSH testing  04/11/2022    Diabetic retinal exam  04/30/2022    Lipid screen  05/01/2022    Potassium monitoring  06/01/2022    Creatinine monitoring  06/01/2022    Diabetic foot exam  08/02/2022    A1C test (Diabetic or Prediabetic)  10/21/2022    Colon cancer screen colonoscopy  10/09/2023    Flu vaccine  Completed    Shingles Vaccine  Completed    Pneumococcal 65+ years Vaccine  Completed    COVID-19 Vaccine  Completed    AAA screen  Completed    Hepatitis C screen  Completed    Hepatitis A vaccine  Aged Out    Hib vaccine  Aged Out    Meningococcal (ACWY) vaccine  Aged Out     Recommendations for [employer] Due: see orders and patient instructions/AVS.  . Recommended screening schedule for the next 5-10 years is provided to the patient in written form: see Patient Instructions/AVS.    Taimka Collins was seen today for medicare awv and diabetes.     Diagnoses and all orders for this visit:    Type 2 diabetes mellitus with diabetic polyneuropathy, with long-term current use of insulin (HCC)  -     POCT glycosylated hemoglobin (Hb A1C)  -     POCT microalbumin

## 2021-10-21 NOTE — PATIENT INSTRUCTIONS
SURVEY:    You may be receiving a survey from [employer] regarding your visit today. You may get this in the mail, through your MyChart or in your email. Please complete the survey to enable us to provide the highest quality of care to you and your family. If you cannot score us as very good ( 5 Stars) on any question, please feel free to call the office to discuss how we could have made your experience exceptional.     Thank you. Clinical Care Team:  Dr. Cait Newell, DO Daniele Gaxiola, [address] [place] [address] Team:  Karla 11    Personalized Preventive Plan for Ruby Ng - 10/21/2021  Medicare offers a range of preventive health benefits. Some of the tests and screenings are paid in full while other may be subject to a deductible, co-insurance, and/or copay. Some of these benefits include a comprehensive review of your medical history including lifestyle, illnesses that may run in your family, and various assessments and screenings as appropriate. After reviewing your medical record and screening and assessments performed today your provider may have ordered immunizations, labs, imaging, and/or referrals for you. A list of these orders (if applicable) as well as your Preventive Care list are included within your After Visit Summary for your review. Other Preventive Recommendations:    · A preventive eye exam performed by an eye specialist is recommended every 1-2 years to screen for glaucoma; cataracts, macular degeneration, and other eye disorders. · A preventive dental visit is recommended every 6 months. · Try to get at least 150 minutes of exercise per week or 10,000 steps per day on a pedometer . · Order or download the FREE \"Exercise & Physical Activity: Your Everyday Guide\" from [employer].  Call 4-231.710.3916 or search [facility] on Aging online. · You need 7769-0720 mg of calcium and 0273-3891 IU of vitamin D per day. It is possible to meet your calcium requirement with diet alone, but a vitamin D supplement is usually necessary to meet this goal.  · When exposed to the sun, use a sunscreen that protects against both UVA and UVB radiation with an SPF of 30 or greater. Reapply every 2 to 3 hours or after sweating, drying off with a towel, or swimming. · Always wear a seat belt when traveling in a car. Always wear a helmet when riding a bicycle or motorcycle.

## 2021-10-26 ENCOUNTER — PROCEDURE VISIT (OUTPATIENT)
Dept: PAIN MANAGEMENT | Age: 71
End: 2021-10-26
Payer: MEDICARE

## 2021-10-26 DIAGNOSIS — M54.16 LUMBAR RADICULOPATHY: ICD-10-CM

## 2021-10-26 PROCEDURE — 64483 NJX AA&/STRD TFRM EPI L/S 1: CPT | Performed by: PHYSICAL MEDICINE & REHABILITATION

## 2021-10-26 RX ORDER — SODIUM CHLORIDE 9 MG/ML
1 INJECTION INTRAVENOUS ONCE
Status: COMPLETED | OUTPATIENT
Start: 2021-10-26 | End: 2021-10-26

## 2021-10-26 RX ORDER — DEXAMETHASONE SODIUM PHOSPHATE 10 MG/ML
10 INJECTION, EMULSION INTRAMUSCULAR; INTRAVENOUS ONCE
Status: DISCONTINUED | OUTPATIENT
Start: 2021-10-26 | End: 2022-09-19 | Stop reason: HOSPADM

## 2021-10-26 RX ORDER — LIDOCAINE HYDROCHLORIDE 10 MG/ML
5 INJECTION, SOLUTION EPIDURAL; INFILTRATION; INTRACAUDAL; PERINEURAL ONCE
Status: COMPLETED | OUTPATIENT
Start: 2021-10-26 | End: 2021-10-26

## 2021-10-26 RX ADMIN — Medication 0.5 MEQ: at 17:28

## 2021-10-26 RX ADMIN — SODIUM CHLORIDE 1 ML: 9 INJECTION INTRAVENOUS at 17:28

## 2021-10-26 RX ADMIN — LIDOCAINE HYDROCHLORIDE 5 ML: 10 INJECTION, SOLUTION EPIDURAL; INFILTRATION; INTRACAUDAL; PERINEURAL at 17:27

## 2021-10-26 NOTE — PROGRESS NOTES
LUMBAR TRANSFORAMINAL EPIDURAL STEROID INJECTION (TFESI)    Patient Name: Beto Jimenez   : 1950  Date: 10/26/2021     Physician: Sarai Atkinson MD     INDICATIONS: Beto Jimenez is a 80 y.o. male who presents with symptoms and physical exam findings consistent with lumbar radiculopathy. He has had persistent pain that limits his activities of daily living. The pain is persistent despite conservative measures. He has significant functional and psychological impairment due to this condition. Given his symptoms, physical exam findings, impairment in activities of daily living, and lack of response to conservative measures, consideration for lumbar transforaminal epidural corticosteroid injection was given. Discussed the risks including but not limited to bleeding, infection, worsened pain, damage to surrounding structures, side effects, toxicity, allergic reactions to medications used, need for surgery, headache, vision changes, difficulty with chewing and/or swallowing, immune and stress-response dysfunction, fat necrosis, skin pigmentation changes, blood sugar elevation, as well as catastrophic injury such as vision loss, paralysis, spinal cord or plexus injury, cerebral brainstem or spinal cord infarction, intrathecal injection, spinal cord puncture, arachnoiditis, discitis, stroke, bowel or bladder incontinence, ventilator dependence, loss of use of the arms and/or legs, and death. Discussed off-label use of corticosteroids and how the Food and Drug Administration (FDA) has not approved corticosteroids for epidural use. Discussed the risks, benefits, alternative procedures, and alternatives to the procedure including no procedure at all. Discussed that we cannot undo any permanent neurologic or orthopaedic damage or change the course of any underlying disease. After thorough discussion, patient expressed understanding and willingness to proceed. Written informed consent was obtained and is in the chart. Verbal consent to proceed was obtained. PROCEDURE: Standard ASIPP guidelines were followed and sterile technique used. Area was cleaned with Betadine three times. Fluoroscopic guidance was used for this procedure. The L5 vertebral body was taken as the first lumbar-appearing vertebral body directly above the sacrum on a lateral view, Grade 1 anterolisthesis is noted. The skin and subcutaneous tissue was anesthetized with 2 mL of 1% preservative-free lidocaine and 0.5 mEq sodium bicarbonate with a 27 gauge 1.5 inch needle. Then a 25 gauge 5 inch spinal needle was used for the remainder of the procedure. There was fair spread of contrast in the anterior epidural space and limited spread of contrast around the exiting nerve root on the right. There was fair spread of contrast in the anterior epidural space especially on the lateral view and limited spread of contrast around the exiting nerve root on the left. The 6 oclock position of the pedicle was identified. Multiple views of fluoroscopy including lateral were used to confirm accurate needle placement of depth. Injection of contrast dye was free of any subdural or vascular spread or any other aberrant uptake. Aspiration was negative throughout the procedure. An injectate containing 1 mL of 10 mg of Dexamethasone and 1 ml of 0.9% preservative-free normal saline was injected slowly and without difficulty and divided equally between the two sites. Patient tolerated the procedure well, no obvious complications occurred during the procedure. Patient was appropriately monitored and discharged home in stable condition with his usual motor strength. Post-procedure instructions were given to patient. Patient will resume anticoagulation tomorrow. He was advised that his blood sugars may increase after today's procedure.            [x] Bilateral [] T12-L1    [] L1-2   [] Right [] L2-3    [] L3-4   [] Left [] L4-5    [x] L5-S1                    [address] [facility]  Phone [phone]/[facility] [phone]

## 2021-11-01 ENCOUNTER — HOSPITAL ENCOUNTER (OUTPATIENT)
Age: 71
Discharge: HOME | End: 2021-11-01
Payer: MEDICARE

## 2021-11-01 DIAGNOSIS — E78.5 HYPERLIPIDEMIA, UNSPECIFIED HYPERLIPIDEMIA TYPE: ICD-10-CM

## 2021-11-01 DIAGNOSIS — N28.9 KIDNEY INSUFFICIENCY: ICD-10-CM

## 2021-11-01 DIAGNOSIS — I25.119 CORONARY ARTERY DISEASE INVOLVING NATIVE CORONARY ARTERY OF NATIVE HEART WITH ANGINA PECTORIS (HCC): ICD-10-CM

## 2021-11-01 DIAGNOSIS — I48.20 ATRIAL FIBRILLATION, CHRONIC (HCC): ICD-10-CM

## 2021-11-01 DIAGNOSIS — I48.0 PAF (PAROXYSMAL ATRIAL FIBRILLATION) (HCC): ICD-10-CM

## 2021-11-01 DIAGNOSIS — E55.9 VITAMIN D DEFICIENCY DISEASE: ICD-10-CM

## 2021-11-01 DIAGNOSIS — I25.10 CORONARY ARTERY DISEASE INVOLVING NATIVE CORONARY ARTERY OF NATIVE HEART WITHOUT ANGINA PECTORIS: ICD-10-CM

## 2021-11-01 DIAGNOSIS — I10 ESSENTIAL HYPERTENSION: ICD-10-CM

## 2021-11-01 DIAGNOSIS — I27.20 PULMONARY HTN (HCC): ICD-10-CM

## 2021-11-01 DIAGNOSIS — I50.22 CHRONIC SYSTOLIC CONGESTIVE HEART FAILURE (HCC): ICD-10-CM

## 2021-11-01 DIAGNOSIS — I50.32 CHRONIC DIASTOLIC CHF (CONGESTIVE HEART FAILURE), NYHA CLASS 3 (HCC): ICD-10-CM

## 2021-11-01 LAB
ABSOLUTE EOS #: 0.2 K/UL (ref 0–0.4)
ABSOLUTE IMMATURE GRANULOCYTE: ABNORMAL K/UL (ref 0–0.3)
ABSOLUTE LYMPH #: 1.3 K/UL (ref 1–4.8)
ABSOLUTE MONO #: 0.7 K/UL (ref 0–1)
ALBUMIN SERPL-MCNC: 4 G/DL (ref 3.5–5.2)
ALBUMIN/GLOBULIN RATIO: ABNORMAL (ref 1–2.5)
ALP BLD-CCNC: 63 U/L (ref 40–129)
ALT SERPL-CCNC: 22 U/L (ref 5–41)
ANION GAP SERPL CALCULATED.3IONS-SCNC: 9 MMOL/L (ref 9–17)
AST SERPL-CCNC: 17 U/L
BASOPHILS # BLD: 0 % (ref 0–2)
BASOPHILS ABSOLUTE: 0 K/UL (ref 0–0.2)
BILIRUB SERPL-MCNC: 0.5 MG/DL (ref 0.3–1.2)
BUN BLDV-MCNC: 23 MG/DL (ref 8–23)
BUN/CREAT BLD: 25 (ref 9–20)
CALCIUM SERPL-MCNC: 8.6 MG/DL (ref 8.6–10.4)
CHLORIDE BLD-SCNC: 105 MMOL/L (ref 98–107)
CHOLESTEROL/HDL RATIO: 3.6
CHOLESTEROL: 120 MG/DL
CO2: 25 MMOL/L (ref 20–31)
CREAT SERPL-MCNC: 0.93 MG/DL (ref 0.7–1.2)
DIFFERENTIAL TYPE: YES
EKG ATRIAL RATE: 71 BPM
EKG P AXIS: 33 DEGREES
EKG P-R INTERVAL: 198 MS
EKG Q-T INTERVAL: 448 MS
EKG QRS DURATION: 160 MS
EKG QTC CALCULATION (BAZETT): 486 MS
EKG R AXIS: 33 DEGREES
EKG T AXIS: 157 DEGREES
EKG VENTRICULAR RATE: 71 BPM
EOSINOPHILS RELATIVE PERCENT: 3 % (ref 0–5)
GFR AFRICAN AMERICAN: >60 ML/MIN
GFR NON-AFRICAN AMERICAN: >60 ML/MIN
GFR SERPL CREATININE-BSD FRML MDRD: ABNORMAL ML/MIN/{1.73_M2}
GFR SERPL CREATININE-BSD FRML MDRD: ABNORMAL ML/MIN/{1.73_M2}
GLUCOSE BLD-MCNC: 156 MG/DL (ref 70–99)
HCT VFR BLD CALC: 37.2 % (ref 41–53)
HDLC SERPL-MCNC: 33 MG/DL
HEMOGLOBIN: 12.4 G/DL (ref 13.5–17.5)
IMMATURE GRANULOCYTES: ABNORMAL %
LDL CHOLESTEROL: 66 MG/DL (ref 0–130)
LYMPHOCYTES # BLD: 22 % (ref 13–44)
MAGNESIUM: 2 MG/DL (ref 1.6–2.6)
MCH RBC QN AUTO: 31 PG (ref 26–34)
MCHC RBC AUTO-ENTMCNC: 33.3 G/DL (ref 31–37)
MCV RBC AUTO: 92.9 FL (ref 80–100)
MONOCYTES # BLD: 11 % (ref 5–9)
NRBC AUTOMATED: ABNORMAL PER 100 WBC
PATIENT FASTING?: YES
PDW BLD-RTO: 14.7 % (ref 12.1–15.2)
PLATELET # BLD: 264 K/UL (ref 140–450)
PLATELET ESTIMATE: ABNORMAL
PMV BLD AUTO: ABNORMAL FL (ref 6–12)
POTASSIUM SERPL-SCNC: 4.5 MMOL/L (ref 3.7–5.3)
RBC # BLD: 4 M/UL (ref 4.5–5.9)
RBC # BLD: ABNORMAL 10*6/UL
SEG NEUTROPHILS: 64 % (ref 39–75)
SEGMENTED NEUTROPHILS ABSOLUTE COUNT: 3.9 K/UL (ref 2.1–6.5)
SODIUM BLD-SCNC: 139 MMOL/L (ref 135–144)
TOTAL PROTEIN: 6.8 G/DL (ref 6.4–8.3)
TRIGL SERPL-MCNC: 107 MG/DL
TSH SERPL DL<=0.05 MIU/L-ACNC: 1.38 MIU/L (ref 0.3–5)
VITAMIN D 25-HYDROXY: 35.6 NG/ML (ref 30–100)
VLDLC SERPL CALC-MCNC: ABNORMAL MG/DL (ref 1–30)
WBC # BLD: 6.1 K/UL (ref 3.5–11)
WBC # BLD: ABNORMAL 10*3/UL

## 2021-11-01 PROCEDURE — 93010 ELECTROCARDIOGRAM REPORT: CPT | Performed by: INTERNAL MEDICINE

## 2021-11-01 PROCEDURE — 93005 ELECTROCARDIOGRAM TRACING: CPT

## 2021-11-01 PROCEDURE — 36415 COLL VENOUS BLD VENIPUNCTURE: CPT

## 2021-11-01 PROCEDURE — 85025 COMPLETE CBC W/AUTO DIFF WBC: CPT

## 2021-11-01 PROCEDURE — 82306 VITAMIN D 25 HYDROXY: CPT

## 2021-11-01 PROCEDURE — 83735 ASSAY OF MAGNESIUM: CPT

## 2021-11-01 PROCEDURE — 80053 COMPREHEN METABOLIC PANEL: CPT

## 2021-11-01 PROCEDURE — 80061 LIPID PANEL: CPT

## 2021-11-01 PROCEDURE — 84443 ASSAY THYROID STIM HORMONE: CPT

## 2021-11-01 RX ORDER — AMIODARONE HYDROCHLORIDE 200 MG/1
TABLET ORAL
Qty: 90 TABLET | Refills: 3 | Status: SHIPPED | OUTPATIENT
Start: 2021-11-01 | End: 2022-09-16

## 2021-11-04 ENCOUNTER — OFFICE VISIT (OUTPATIENT)
Dept: CARDIOLOGY CLINIC | Age: 71
End: 2021-11-04
Payer: MEDICARE

## 2021-11-04 VITALS
DIASTOLIC BLOOD PRESSURE: 60 MMHG | BODY MASS INDEX: 36.88 KG/M2 | WEIGHT: 257 LBS | OXYGEN SATURATION: 93 % | HEART RATE: 86 BPM | SYSTOLIC BLOOD PRESSURE: 120 MMHG

## 2021-11-04 DIAGNOSIS — I25.10 CORONARY ARTERY DISEASE INVOLVING NATIVE CORONARY ARTERY OF NATIVE HEART WITHOUT ANGINA PECTORIS: Primary | ICD-10-CM

## 2021-11-04 DIAGNOSIS — I48.0 PAF (PAROXYSMAL ATRIAL FIBRILLATION) (HCC): ICD-10-CM

## 2021-11-04 DIAGNOSIS — I25.119 CORONARY ARTERY DISEASE INVOLVING NATIVE CORONARY ARTERY OF NATIVE HEART WITH ANGINA PECTORIS (HCC): ICD-10-CM

## 2021-11-04 DIAGNOSIS — E78.5 HYPERLIPIDEMIA, UNSPECIFIED HYPERLIPIDEMIA TYPE: ICD-10-CM

## 2021-11-04 DIAGNOSIS — I27.20 PULMONARY HTN (HCC): ICD-10-CM

## 2021-11-04 DIAGNOSIS — I10 ESSENTIAL HYPERTENSION: ICD-10-CM

## 2021-11-04 DIAGNOSIS — E55.9 VITAMIN D DEFICIENCY DISEASE: ICD-10-CM

## 2021-11-04 PROCEDURE — 1123F ACP DISCUSS/DSCN MKR DOCD: CPT | Performed by: INTERNAL MEDICINE

## 2021-11-04 PROCEDURE — 3017F COLORECTAL CA SCREEN DOC REV: CPT | Performed by: INTERNAL MEDICINE

## 2021-11-04 PROCEDURE — G8417 CALC BMI ABV UP PARAM F/U: HCPCS | Performed by: INTERNAL MEDICINE

## 2021-11-04 PROCEDURE — G8482 FLU IMMUNIZE ORDER/ADMIN: HCPCS | Performed by: INTERNAL MEDICINE

## 2021-11-04 PROCEDURE — 4040F PNEUMOC VAC/ADMIN/RCVD: CPT | Performed by: INTERNAL MEDICINE

## 2021-11-04 PROCEDURE — G8427 DOCREV CUR MEDS BY ELIG CLIN: HCPCS | Performed by: INTERNAL MEDICINE

## 2021-11-04 PROCEDURE — 1036F TOBACCO NON-USER: CPT | Performed by: INTERNAL MEDICINE

## 2021-11-04 PROCEDURE — 99214 OFFICE O/P EST MOD 30 MIN: CPT | Performed by: INTERNAL MEDICINE

## 2021-11-04 NOTE — LETTER
Allison Kennedy M.D. [address] [place] [address], [facility] 80  [phone]          2021          Sara Castro MD  [address] [place] [address], [facility]      RE:   Edison Raojason  :  1950      Dear Dr. Parris Duran:    CHIEF COMPLAINT:  1. Paroxysmal atrial fibrillation, currently in sinus rhythm, with amiodarone. 2.  Coronary artery disease, status post catheterization on 2021, showing widely patent vein grafts, EF of 50%. HISTORY OF PRESENT ILLNESS:  I had the pleasure of seeing Mr. Aidan Freeman in our office on 2021. He is a very complex 78-year-old gentleman, who we have been following for his coronary artery disease. He had a catheterization on 10/31/2012, that showed severe disease at the bifurcation of the LAD and diagonal and unremarkable right coronary artery and circumflex. He had angioplasty on 2012, placing a 2.75 x 28 mm Promus stent with a good end result. On 2014, a catheterization showed 70% to 80% in-stent stenosis of the LAD and diagonal, with unremarkable right coronary artery and circumflex, EF of 55% to 60%. He had open heart surgery by Dr. Ailin Mayfield on 2014, with a LIMA to the LAD and a vein graft to the diagonal.    He had another catheterization on 2017, because of shortness of breath, that showed 95% disease in a very large dominant right coronary artery, with 90% LAD, patent LIMA to the LAD and a patent vein graft to the diagonal, circumflex unremarkable, and I placed a 3.5 x 18 mm drug-eluting Xience stent in the right coronary artery with a good end result. His last catheterization was on 11/15/2017, that showed widely patent bypass grafts, with EF of 55%. He has very symptomatic paroxysmal atrial fibrillation. He developed shortness of breath and came to the hospital because of CHF on 2021, and was found to be in atrial fibrillation.   We did a cardioversion and placed him back in sinus rhythm. We placed him on amiodarone 200 mg nightly. Our plan was to consider ablation if he re-developed atrial fibrillation on amiodarone. He did have carpal tunnel surgery in the left wrist in [facility] on 08/12, from which he has recovered nicely. He has had no PND, orthopnea or pedal edema. He does have severe lumbar radiculopathy and gets injections by Dr. Darleen Gee. He denies any chest pain or chest discomfort. He denies any PND or orthopnea and he does have chronic edema on both lower extremities, which is about his baseline. CARDIAC RISK FACTORS:  Known CAD:  Positive. PTCA:  Positive. Bypass Surgery:  Positive. Hypertension:  Positive. Hyperlipidemia:  Positive. Diabetes:  Positive. Smoking:  Negative. Other Family Members:  Positive. MEDICATIONS AT THIS TIME:  He is on Cordarone 200 mg nightly; Eliquis 5 mg b.i.d.; Lipitor 40 mg daily; Coreg 3.125 mg daily; Celebrex 200 mg daily; CoQ10 100 mg daily; Cardura 8 mg half in the morning and one in the evening; Entresto 97/103 b.i.d.; Lasix 80 mg b.i.d.; Amaryl 4 mg half a tablet in the morning; Imdur 30 mg daily; magnesium 500 mg daily; metformin 1000 mg daily; Adalat CC 90 mg half a tablet daily; Novolin 70/30 50 units in the morning, 32 at lunch, 40 at supper; Aldactone 25 mg daily; Ultram p.r.n.; Trulicity 1.5 mg weekly on Mondays; vitamin D 1000 units b.i.d. PAST MEDICAL AND SURGICAL HISTORY:  1. Arthroscopic surgery with knee replacement, complicated by fat embolism in 02/2011.  2.  Insulin-dependent diabetes for 33 years. 3.  Hypertension many years. 4.  Left and right cataract surgery. 5.  Sleep apnea in 2014, using CPAP mask, which has helped markedly. 6.  Paroxysmal atrial fibrillation, currently on Eliquis b.i.d. and amiodarone 200 mg daily. 7.  Mild sick sinus syndrome, but not requiring a pacemaker.   8.  Chronic back pain, with injections in his back, not wanting to do surgery. FAMILY HISTORY:  Mother and father had CHF. SOCIAL HISTORY:  He is 80years old, retired . . Two children. He has a trailer in [place], in which he and his wife spend the winter. Daughter is a professor at [employer] in [place], with two grandchildren in [place]. He does not smoke or drink alcohol. Uses a CPAP mask. Does have severe back pain but is moving around quite well at this time. REVIEW OF SYSTEMS:  Cardiac as above. Other systems reviewed including constitutional, eyes, ears, nose and throat, cardiovascular, respiratory, GI, , musculoskeletal, integumentary, neurologic, endocrine, hematologic and allergic/immunologic are negative except for what is described above. No weight loss or weight gain. No change in bowel habits, no blood in stools. No fevers, sweats or chills. PHYSICAL EXAMINATION:   VITAL SIGNS:  His blood pressure was 120/60 with a heart rate of 86 and regular. Respiratory rate 18. O2 saturation 93%. Weight 257 pounds. GENERAL:  He is a pleasant 45-year-old gentleman. Denied pain. He was oriented to person, place and time. Answered questions appropriately. SKIN:  No unusual skin changes. HEENT:  The pupils are equally round and intact. Mucous membranes were dry. NECK:  No JVD. Good carotid pulses. No carotid bruits. No lymphadenopathy or thyromegaly. CARDIOVASCULAR EXAM:  S1 and S2 were normal.  No S3 or S4. Soft systolic blowing type murmur. No diastolic murmur. PMI was normal.  No lift, thrust, or pericardial friction rub. LUNGS:  Clear to auscultation and percussion. ABDOMEN:  Soft and nontender. Good bowel sounds. EXTREMITIES:  Good femoral pulses. Good pedal pulses. He had 2+ edema. Skin was warm and dry. No calf tenderness. Nail beds pink. Good cap refill. PULSES:  Bilateral symmetrical radial, brachial and carotid pulses. No carotid bruits. Good femoral and pedal pulses.   NEUROLOGIC EXAM: Within normal limits. PSYCHIATRIC EXAM:  Within normal limits. LABORATORY DATA:  Sodium 139, potassium 4.5, BUN 23, creatinine 0.93. GFR greater than 60, magnesium 2.0, glucose was 156, calcium 8.6. Cholesterol 120 with an HDL of 33, LDL 66, triglycerides 107. ALT was 22, AST was 17. His last hemoglobin A1c was 7.8. TSH 1.38. Vitamin D 35.6. White count 6.1, hemoglobin 12.4 with a platelet count of 757,890. His EKG showed sinus rhythm with a left-bundle branch block, which is his baseline. Bedside echocardiogram showed borderline normal LV function, EF of 50%. IMPRESSION:  1. Severe coronary artery disease. 2.  Paroxysmal atrial fibrillation, with his last cardioversion in 04/2021, with him still on amiodarone 200 mg nightly with consideration to send him for ablation if he reverts back to atrial fibrillation. 3.  Catheterization on 10/31/2012, showing severe disease at the bifurcation of the LAD and diagonal, unremarkable right coronary artery and circumflex, EF of 55%. 4.  Angioplasty of the LAD and diagonal on 11/07/2012, placing a 2.75 x 28 mm Promus stent in the LAD. 5.  Catheterization 05/14/2014, showed 70% to 80% LAD and diagonal in-stent stenosis with an FFR of 0.78, with unremarkable right coronary artery and circumflex, EF of 55% to 60%. 6.  Open heart surgery on 06/30/2014, by Dr. Anand Sidhu, with a LIMA to the LAD and a vein graft to the diagonal.  7.  Catheterization on 08/30/2017, showed patent bypass grafts, with 95% disease in the mid RCA, EF of 50%, with angioplasty of the right coronary artery, placing a 3.5 x 18 mm Xience stent. 8.  Last catheterization on 05/14/2021, showed widely patent stents and widely patent bypass grafts. 9.  Sleep apnea, on a CPAP mask. 10.  Chronic back pain. PLAN:  1. No change in medications. 2.  We will see him back in mid April. 3.  We will do blood work monthly while he is in [place], including CBC and a complete metabolic profile.   4. If he would have any change in symptoms, he would call us and we would try to manage him over the phone unless he would become unstable. 5.  If he redevelops atrial fibrillation, we would consider an ablation. DISCUSSION:  Mr. Diana vásquez is doing well. He has had no chest pain, shortness of breath or loss of energy. He is getting around fairly well with the limitation of his back. He has 2+ edema, which is baseline. He has remained in sinus rhythm on Cordarone 200 mg daily. I have made no change in medications. We will monitor him monthly while he is in [place] with blood work and he will send us rhythm strips monthly from a [employer] device. He has done well over this last six months and hopefully, he will continue to do well in the future. Thank you very much.     Sincerely,        Wilian Wharton    D: 11/04/2021 12:44:05     T: 11/05/2021 3:55:32     [id number]  Job#: [id number]   Doc#: [id number]

## 2021-11-04 NOTE — PROGRESS NOTES
Ov DR Ambrosio Robbins follow up   Will be leaving for [facility]. Nov 22. No chest pain   C/o sob no change. Had carpal tunnel surgery  Lt wrist in [place]. occ dizziness if doing   Something to strain. Bedside echo done. Will come back in [facility] April.   Will see in May

## 2021-11-08 NOTE — PROGRESS NOTES
Jaron Abel M.D. [address] [place] [address], [facility]  [phone]          2021          Yasmani Dukes MD  [address], [facility]      RE:   Pankaj Len  :  1950      Dear Dr. Mckenna Brought:    CHIEF COMPLAINT:  1. Paroxysmal atrial fibrillation, currently in sinus rhythm, with amiodarone. 2.  Coronary artery disease, status post catheterization on 2021, showing widely patent vein grafts, EF of 50%. HISTORY OF PRESENT ILLNESS:  I had the pleasure of seeing Mr. Karin Villanueva in our office on 2021. He is a very complex 56-year-old gentleman, who we have been following for his coronary artery disease. He had a catheterization on 10/31/2012, that showed severe disease at the bifurcation of the LAD and diagonal and unremarkable right coronary artery and circumflex. He had angioplasty on 2012, placing a 2.75 x 28 mm Promus stent with a good end result. On 2014, a catheterization showed 70% to 80% in-stent stenosis of the LAD and diagonal, with unremarkable right coronary artery and circumflex, EF of 55% to 60%. He had open heart surgery by Dr. Zonia Sinha on 2014, with a LIMA to the LAD and a vein graft to the diagonal.    He had another catheterization on 2017, because of shortness of breath, that showed 95% disease in a very large dominant right coronary artery, with 90% LAD, patent LIMA to the LAD and a patent vein graft to the diagonal, circumflex unremarkable, and I placed a 3.5 x 18 mm drug-eluting Xience stent in the right coronary artery with a good end result. His last catheterization was on 11/15/2017, that showed widely patent bypass grafts, with EF of 55%. He has very symptomatic paroxysmal atrial fibrillation. He developed shortness of breath and came to the hospital because of CHF on 2021, and was found to be in atrial fibrillation.   We did a cardioversion and placed him back in sinus rhythm. We placed him on amiodarone 200 mg nightly. Our plan was to consider ablation if he re-developed atrial fibrillation on amiodarone. He did have carpal tunnel surgery in the left wrist in [place] on 08/12, from which he has recovered nicely. He has had no PND, orthopnea or pedal edema. He does have severe lumbar radiculopathy and gets injections by Dr. Lizabeth Shore. He denies any chest pain or chest discomfort. He denies any PND or orthopnea and he does have chronic edema on both lower extremities, which is about his baseline. CARDIAC RISK FACTORS:  Known CAD:  Positive. PTCA:  Positive. Bypass Surgery:  Positive. Hypertension:  Positive. Hyperlipidemia:  Positive. Diabetes:  Positive. Smoking:  Negative. Other Family Members:  Positive. MEDICATIONS AT THIS TIME:  He is on Cordarone 200 mg nightly; Eliquis 5 mg b.i.d.; Lipitor 40 mg daily; Coreg 3.125 mg daily; Celebrex 200 mg daily; CoQ10 100 mg daily; Cardura 8 mg half in the morning and one in the evening; Entresto 97/103 b.i.d.; Lasix 80 mg b.i.d.; Amaryl 4 mg half a tablet in the morning; Imdur 30 mg daily; magnesium 500 mg daily; metformin 1000 mg daily; Adalat CC 90 mg half a tablet daily; Novolin 70/30 50 units in the morning, 32 at lunch, 40 at supper; Aldactone 25 mg daily; Ultram p.r.n.; Trulicity 1.5 mg weekly on Mondays; vitamin D 1000 units b.i.d. PAST MEDICAL AND SURGICAL HISTORY:  1. Arthroscopic surgery with knee replacement, complicated by fat embolism in 02/2011.  2.  Insulin-dependent diabetes for 33 years. 3.  Hypertension many years. 4.  Left and right cataract surgery. 5.  Sleep apnea in 2014, using CPAP mask, which has helped markedly. 6.  Paroxysmal atrial fibrillation, currently on Eliquis b.i.d. and amiodarone 200 mg daily. 7.  Mild sick sinus syndrome, but not requiring a pacemaker.   8.  Chronic back pain, with injections in his back, not wanting to do surgery. FAMILY HISTORY:  Mother and father had CHF. SOCIAL HISTORY:  He is 80years old, retired . . Two children. He has a trailer in [place], in which he and his wife spend the winter. Daughter is a professor at [employer] in [place], with two grandchildren in [place]. He does not smoke or drink alcohol. Uses a CPAP mask. Does have severe back pain but is moving around quite well at this time. REVIEW OF SYSTEMS:  Cardiac as above. Other systems reviewed including constitutional, eyes, ears, nose and throat, cardiovascular, respiratory, GI, , musculoskeletal, integumentary, neurologic, endocrine, hematologic and allergic/immunologic are negative except for what is described above. No weight loss or weight gain. No change in bowel habits, no blood in stools. No fevers, sweats or chills. PHYSICAL EXAMINATION:   VITAL SIGNS:  His blood pressure was 120/60 with a heart rate of 86 and regular. Respiratory rate 18. O2 saturation 93%. Weight 257 pounds. GENERAL:  He is a pleasant 71-year-old gentleman. Denied pain. He was oriented to person, place and time. Answered questions appropriately. SKIN:  No unusual skin changes. HEENT:  The pupils are equally round and intact. Mucous membranes were dry. NECK:  No JVD. Good carotid pulses. No carotid bruits. No lymphadenopathy or thyromegaly. CARDIOVASCULAR EXAM:  S1 and S2 were normal.  No S3 or S4. Soft systolic blowing type murmur. No diastolic murmur. PMI was normal.  No lift, thrust, or pericardial friction rub. LUNGS:  Clear to auscultation and percussion. ABDOMEN:  Soft and nontender. Good bowel sounds. EXTREMITIES:  Good femoral pulses. Good pedal pulses. He had 2+ edema. Skin was warm and dry. No calf tenderness. Nail beds pink. Good cap refill. PULSES:  Bilateral symmetrical radial, brachial and carotid pulses. No carotid bruits. Good femoral and pedal pulses.   NEUROLOGIC EXAM: If he would have any change in symptoms, he would call us and we would try to manage him over the phone unless he would become unstable. 5.  If he redevelops atrial fibrillation, we would consider an ablation. DISCUSSION:  Mr. Leelee Dunbar overall is doing well. He has had no chest pain, shortness of breath or loss of energy. He is getting around fairly well with the limitation of his back. He has 2+ edema, which is baseline. He has remained in sinus rhythm on Cordarone 200 mg daily. I have made no change in medications. We will monitor him monthly while he is in [place] with blood work and he will send us rhythm strips monthly from a [employer] device. He has done well over this last six months and hopefully, he will continue to do well in the future. Thank you very much.     Sincerely,        Lalitha Neri    D: 11/04/2021 12:44:05     T: 11/05/2021 3:55:32     [id number]  Job#: [id number]   Doc#: [id number]

## 2021-11-11 RX ORDER — DEXAMETHASONE SODIUM PHOSPHATE 10 MG/ML
10 INJECTION, SOLUTION INTRAMUSCULAR; INTRAVENOUS ONCE
Status: COMPLETED | OUTPATIENT
Start: 2021-11-11 | End: 2021-11-11

## 2021-11-11 RX ADMIN — DEXAMETHASONE SODIUM PHOSPHATE 10 MG: 10 INJECTION, SOLUTION INTRAMUSCULAR; INTRAVENOUS at 11:33

## 2021-11-15 ENCOUNTER — OFFICE VISIT (OUTPATIENT)
Dept: FAMILY MEDICINE CLINIC | Age: 71
End: 2021-11-15
Payer: MEDICARE

## 2021-11-15 VITALS
HEART RATE: 76 BPM | SYSTOLIC BLOOD PRESSURE: 148 MMHG | WEIGHT: 259 LBS | OXYGEN SATURATION: 98 % | HEIGHT: 70 IN | BODY MASS INDEX: 37.08 KG/M2 | DIASTOLIC BLOOD PRESSURE: 70 MMHG

## 2021-11-15 DIAGNOSIS — L82.1 SEBORRHEIC KERATOSIS: ICD-10-CM

## 2021-11-15 DIAGNOSIS — L57.0 ACTINIC KERATOSES: Primary | ICD-10-CM

## 2021-11-15 PROCEDURE — 17000 DESTRUCT PREMALG LESION: CPT | Performed by: FAMILY MEDICINE

## 2021-11-15 PROCEDURE — 17003 DESTRUCT PREMALG LES 2-14: CPT | Performed by: FAMILY MEDICINE

## 2021-11-15 PROCEDURE — 17110 DESTRUCTION B9 LES UP TO 14: CPT | Performed by: FAMILY MEDICINE

## 2021-11-15 NOTE — PROGRESS NOTES
MG per tablet TAKE 1 TABLET TWICE A DAY 6/21/21   Emely Lazar MD   doxazosin (CARDURA) 8 MG tablet 1/2 tab in the AM 1 tab in the Evening. Historical Provider, MD   carvedilol (COREG) 3.125 MG tablet Take 1 tablet by mouth daily 6/2/21   Emely Lazar MD   NOVOLIN 70/30 (70-30) 100 UNIT/ML injection vial Inject 54 units AM, Inject 32 units at Lunch, Inject 40 units at supper as directed subcutaneously. 4/21/21   Alphia Primes, DO   metFORMIN (GLUCOPHAGE) 1000 MG tablet Take 1 tablet by mouth 2 times daily (with meals)  Patient taking differently: Take 1,000 mg by mouth daily  4/21/21   Alphia Primes, DO   NIFEdipine (ADALAT CC) 90 MG extended release tablet Take 45 mg by mouth daily    Historical Provider, MD   Coenzyme Q10 (CO Q 10) 100 MG CAPS Take 100 mg by mouth nightly    Historical Provider, MD   acetaminophen (TYLENOL) 500 MG tablet Take 500 mg by mouth See Admin Instructions Take 500 mg in am  Take 1000 mg in Pm    Historical Provider, MD   isosorbide mononitrate (IMDUR) 30 MG extended release tablet TAKE 1 TABLET DAILY 3/2/21   Emely Lazar MD   apixaban (ELIQUIS) 5 MG TABS tablet TAKE 1 TABLET TWICE A DAY 11/18/20   Emely Lazar MD   traMADol (ULTRAM) 50 MG tablet Take 1 tablet by mouth 2 times daily as needed for Pain.   9/8/20   Historical Provider, MD   TRULICITY 1.5 HG/2.7IS SOPN Inject 1.5 mg into the skin once a week Mondays 2/11/19   Historical Provider, MD   Magnesium Oxide 500 MG TABS Take 500 mg by mouth daily     Historical Provider, MD   ONE TOUCH ULTRA TEST strip  8/8/16   Historical Provider, MD   BD ULTRA-FINE PEN NEEDLES 29G X 12.7MM MISC  8/1/16   Historical Provider, MD   glimepiride (AMARYL) 4 MG tablet Take 2 mg by mouth every morning (before breakfast)     Historical Provider, MD   Omega-3 Fatty Acids (FISH OIL) 1200 MG CAPS Take 1 capsule by mouth 2 times daily Plus 360 omega 3    Historical Provider, MD   Glucosamine Sulfate 1000 MG CAPS Take 10,000 mg by mouth daily     Historical Provider, MD   Chromium-Cinnamon (CINNAMON PLUS CHROMIUM PO) Take 1,000 mg by mouth 2 times daily     Historical Provider, MD   Multiple Vitamins-Minerals (ONE-A-DAY MENS 50+ ADVANTAGE PO) Take 1 tablet by mouth daily     Historical Provider, MD   Vitamin D (CHOLECALCIFEROL) 1000 UNITS CAPS capsule   Take by mouth 2 times daily Vitamin D 3    Historical Provider, MD   Insulin Syringes, Disposable, U-100 0.3 ML MISC Inject  into the skin. Use prn 7/19/12   Mushtaq Pillow A Jump, DO        Allergies:       Patient has no known allergies. Physical Exam:     Vitals:  BP (!) 148/70   Pulse 76   Ht 5' 10\" (1.778 m)   Wt 259 lb (117.5 kg)   SpO2 98%   BMI 37.16 kg/m²   Physical Exam  Vitals and nursing note reviewed. Constitutional:       General: He is not in acute distress. Appearance: He is well-developed. Cardiovascular:      Rate and Rhythm: Normal rate and regular rhythm. Pulmonary:      Effort: Pulmonary effort is normal.   Skin:     General: Skin is warm and dry. Comments: Left distal lateral upper arm just superior to the lateral epicondyle: Stuck on appearing flesh colored lesion approximately 6 x 8 mm, with cutaneous horn approximately 2 mm diameter located at anterior aspect  Left preauricular: Pink raw area with mild peeling approximately 5 x 7 mm. Similar lesion on the left lateral neck just under the ear   All 3 of these lesions were treated with liquid nitrogen on cotton-tipped applicator for 3 freeze-thaw cycles, achieved full blanching each time. Tolerated well. Neurological:      Mental Status: He is alert and oriented to person, place, and time. Psychiatric:         Mood and Affect: Mood normal.         Behavior: Behavior normal.         Assessment & Plan:        Diagnosis Orders   1. Actinic keratoses  01525 - NC DESTRUC PREMALIGNANT,2-14 LESIONS   2.  Seborrheic keratosis  87933 - NC DESTRUCTION BENIGN LESIONS UP TO 14   2 AK's treated, left preauricular and under the left ear. Monitor for recurrence. Seborrheic keratosis on the left distal upper arm also treated. Follow-up as needed for these lesions. Patient's blood pressure was elevated today but he has not taken his medications, was waiting till he eats which he will do after the visit. Readings have been okay at recent appointments otherwise. Tamika Collins received counseling on the following healthy behaviors: medication adherence  Reviewed prior labs and health maintenance. Continue current medications, diet and exercise. Discussed use, benefit, and side effects of prescribed medications. Barriers to medication compliance addressed. Patient given educational materials - see patient instructions. All patient questions answered.   Patient voiced understanding.     signed by Rae Bassett DO on 11/15/2021 at 9:41 AM  [place] [address], [id number]  Dept: [phone]

## 2021-11-15 NOTE — PATIENT INSTRUCTIONS
SURVEY:    You may be receiving a survey from [employer] regarding your visit today. You may get this in the mail, through your MyChart or in your email. Please complete the survey to enable us to provide the highest quality of care to you and your family. If you cannot score us as very good ( 5 Stars) on any question, please feel free to call the office to discuss how we could have made your experience exceptional.     Thank you.     Clinical Care Team:  Dr. Emmanuel Barkley, DO Mert Garcia, [address] [place] [address] Team:  [address] [place] [address]

## 2021-12-01 RX ORDER — CARVEDILOL 3.12 MG/1
3.12 TABLET ORAL DAILY
Qty: 90 TABLET | Refills: 3 | Status: SHIPPED | OUTPATIENT
Start: 2021-12-01 | End: 2022-10-24

## 2021-12-15 LAB
ALBUMIN SERPL-MCNC: 4.3 G/DL
ALP BLD-CCNC: 55 U/L
ALT SERPL-CCNC: 16 U/L
ANION GAP SERPL CALCULATED.3IONS-SCNC: NORMAL MMOL/L
AST SERPL-CCNC: 19 U/L
BILIRUB SERPL-MCNC: 0.7 MG/DL (ref 0.1–1.4)
BUN BLDV-MCNC: 18 MG/DL
CALCIUM SERPL-MCNC: 8.8 MG/DL
CHLORIDE BLD-SCNC: 103 MMOL/L
CO2: 30 MMOL/L
CREAT SERPL-MCNC: 1.1 MG/DL
GFR CALCULATED: 68
GLUCOSE BLD-MCNC: 206 MG/DL
POTASSIUM SERPL-SCNC: 5.4 MMOL/L
SODIUM BLD-SCNC: 138 MMOL/L
TOTAL PROTEIN: 7

## 2021-12-20 DIAGNOSIS — I27.20 PULMONARY HTN (HCC): Primary | ICD-10-CM

## 2022-01-05 ENCOUNTER — TELEPHONE (OUTPATIENT)
Dept: CARDIOLOGY CLINIC | Age: 72
End: 2022-01-05

## 2022-01-11 DIAGNOSIS — I27.20 PULMONARY HTN (HCC): ICD-10-CM

## 2022-02-07 ENCOUNTER — TELEPHONE (OUTPATIENT)
Dept: CARDIOLOGY CLINIC | Age: 72
End: 2022-02-07

## 2022-02-07 NOTE — TELEPHONE ENCOUNTER
Patient called to report to Dr Barrett Valenzuela that he is having bilateral foot/ankle swelling. It is to the point where it is making his shoes tight and rubbing a sore on his foot. He is not having any more shortness of breath than normal.  He is wondering if he could step up his water pill to help with the swelling?  Please advise

## 2022-02-07 NOTE — TELEPHONE ENCOUNTER
DR Bobby Hernández reviewed to increase lasix 80 mg to 2 tabs in AM and 1 in Pm for 4 days. He is due to get bmp next week.

## 2022-02-13 DIAGNOSIS — I27.20 PULMONARY HTN (HCC): ICD-10-CM

## 2022-02-13 DIAGNOSIS — I50.32 CHRONIC DIASTOLIC CHF (CONGESTIVE HEART FAILURE), NYHA CLASS 3 (HCC): ICD-10-CM

## 2022-02-13 DIAGNOSIS — I48.0 PAF (PAROXYSMAL ATRIAL FIBRILLATION) (HCC): ICD-10-CM

## 2022-02-13 DIAGNOSIS — I25.119 CORONARY ARTERY DISEASE INVOLVING NATIVE CORONARY ARTERY OF NATIVE HEART WITH ANGINA PECTORIS (HCC): ICD-10-CM

## 2022-03-23 DIAGNOSIS — I48.0 PAF (PAROXYSMAL ATRIAL FIBRILLATION) (HCC): ICD-10-CM

## 2022-03-23 DIAGNOSIS — I25.119 CORONARY ARTERY DISEASE INVOLVING NATIVE CORONARY ARTERY OF NATIVE HEART WITH ANGINA PECTORIS (HCC): ICD-10-CM

## 2022-03-23 DIAGNOSIS — E55.9 VITAMIN D DEFICIENCY DISEASE: ICD-10-CM

## 2022-03-23 DIAGNOSIS — I10 ESSENTIAL HYPERTENSION: ICD-10-CM

## 2022-03-23 DIAGNOSIS — I27.20 PULMONARY HTN (HCC): ICD-10-CM

## 2022-03-23 DIAGNOSIS — E78.5 HYPERLIPIDEMIA, UNSPECIFIED HYPERLIPIDEMIA TYPE: ICD-10-CM

## 2022-03-23 DIAGNOSIS — I25.10 CORONARY ARTERY DISEASE INVOLVING NATIVE CORONARY ARTERY OF NATIVE HEART WITHOUT ANGINA PECTORIS: ICD-10-CM

## 2022-03-24 ENCOUNTER — TELEPHONE (OUTPATIENT)
Dept: CARDIOLOGY CLINIC | Age: 72
End: 2022-03-24

## 2022-03-24 DIAGNOSIS — N28.9 KIDNEY INSUFFICIENCY: Primary | ICD-10-CM

## 2022-03-31 RX ORDER — CELECOXIB 200 MG/1
CAPSULE ORAL
Qty: 90 CAPSULE | Refills: 1 | Status: SHIPPED | OUTPATIENT
Start: 2022-03-31 | End: 2022-09-23 | Stop reason: SDUPTHER

## 2022-03-31 NOTE — TELEPHONE ENCOUNTER
Last visit:  11/15/2021  Next Visit Date:    Future Appointments   Date Time Provider Javon Degroot   4/11/2022 12:30 PM MD Kle[facility]         Medication List:  Prior to Admission medications    Medication Sig Start Date End Date Taking? Authorizing Provider   apixaban (ELIQUIS) 5 MG TABS tablet TAKE 1 TABLET TWICE A DAY 2/14/22   Pamela López MD   carvedilol (COREG) 3.125 MG tablet Take 1 tablet by mouth daily 12/1/21   Pamela López MD   amiodarone (CORDARONE) 200 MG tablet TAKE 1 TABLET NIGHTLY 11/1/21   Pamela López MD   furosemide (LASIX) 80 MG tablet TAKE 1 TABLET TWICE A DAY 10/11/21   Pamela López MD   spironolactone (ALDACTONE) 25 MG tablet Take 1 tablet by mouth daily 10/4/21   Pamela López MD   celecoxib (CELEBREX) 200 MG capsule 1 po daily 7/20/21   Cheryl Kim DO   atorvastatin (LIPITOR) 40 MG tablet One daily 7/20/21   Cheryl Kim DO   ENTRESTO  MG per tablet TAKE 1 TABLET TWICE A DAY 6/21/21   Pamela López MD   doxazosin (CARDURA) 8 MG tablet 1/2 tab in the AM 1 tab in the Evening. Historical Provider, MD   NOVOLIN 70/30 (70-30) 100 UNIT/ML injection vial Inject 54 units AM, Inject 32 units at Lunch, Inject 40 units at supper as directed subcutaneously.  4/21/21   Cheryl Kim DO   metFORMIN (GLUCOPHAGE) 1000 MG tablet Take 1 tablet by mouth 2 times daily (with meals)  Patient taking differently: Take 1,000 mg by mouth daily  4/21/21   Cheryl Kim DO   NIFEdipine (ADALAT CC) 90 MG extended release tablet Take 45 mg by mouth daily    Historical Provider, MD   Coenzyme Q10 (CO Q 10) 100 MG CAPS Take 100 mg by mouth nightly    Historical Provider, MD   acetaminophen (TYLENOL) 500 MG tablet Take 500 mg by mouth See Admin Instructions Take 500 mg in am  Take 1000 mg in Pm    Historical Provider, MD   isosorbide mononitrate (IMDUR) 30 MG extended release tablet TAKE 1 TABLET DAILY 3/2/21   Pamela López MD   traMADol (ULTRAM) 50 MG tablet Take 1 tablet by mouth 2 times daily as needed for Pain. 9/8/20   Historical Provider, MD   TRULICITY 1.5 BS/3.5DD SOPN Inject 1.5 mg into the skin once a week Mondays 2/11/19   Historical Provider, MD   Magnesium Oxide 500 MG TABS Take 500 mg by mouth daily     Historical Provider, MD   ONE TOUCH ULTRA TEST strip  8/8/16   Historical Provider, MD   BD ULTRA-FINE PEN NEEDLES 29G X 12.7MM [address] [facility]  8/1/16   Historical Provider, MD   glimepiride (AMARYL) 4 MG tablet Take 2 mg by mouth every morning (before breakfast)     Historical Provider, MD   Omega-3 Fatty Acids (FISH OIL) 1200 MG CAPS Take 1 capsule by mouth 2 times daily Plus 360 omega 3    Historical Provider, MD   Glucosamine Sulfate 1000 MG CAPS Take 10,000 mg by mouth daily     Historical Provider, MD   Chromium-Cinnamon (CINNAMON PLUS CHROMIUM PO) Take 1,000 mg by mouth 2 times daily     Historical Provider, MD   Multiple Vitamins-Minerals (ONE-A-DAY MENS 50+ ADVANTAGE PO) Take 1 tablet by mouth daily     Historical Provider, MD   Vitamin D (CHOLECALCIFEROL) 1000 UNITS CAPS capsule   Take by mouth 2 times daily Vitamin D 3    Historical Provider, MD   Insulin Syringes, Disposable, U-100 0.3 ML MISC Inject  into the skin.  Use prn 7/19/12   Alex Cole DO

## 2022-04-06 ENCOUNTER — HOSPITAL ENCOUNTER (OUTPATIENT)
Age: 72
Discharge: HOME | End: 2022-04-06
Payer: MEDICARE

## 2022-04-06 ENCOUNTER — TELEPHONE (OUTPATIENT)
Dept: PAIN MANAGEMENT | Age: 72
End: 2022-04-06

## 2022-04-06 ENCOUNTER — HOSPITAL ENCOUNTER (OUTPATIENT)
Dept: GENERAL RADIOLOGY | Age: 72
Discharge: HOME | End: 2022-04-08
Payer: MEDICARE

## 2022-04-06 ENCOUNTER — HOSPITAL ENCOUNTER (OUTPATIENT)
Age: 72
Discharge: HOME | End: 2022-04-08
Payer: MEDICARE

## 2022-04-06 DIAGNOSIS — I48.0 PAF (PAROXYSMAL ATRIAL FIBRILLATION) (HCC): ICD-10-CM

## 2022-04-06 DIAGNOSIS — I25.119 CORONARY ARTERY DISEASE INVOLVING NATIVE CORONARY ARTERY OF NATIVE HEART WITH ANGINA PECTORIS (HCC): ICD-10-CM

## 2022-04-06 DIAGNOSIS — E55.9 VITAMIN D DEFICIENCY DISEASE: ICD-10-CM

## 2022-04-06 DIAGNOSIS — I10 ESSENTIAL HYPERTENSION: ICD-10-CM

## 2022-04-06 DIAGNOSIS — I25.10 CORONARY ARTERY DISEASE INVOLVING NATIVE CORONARY ARTERY OF NATIVE HEART WITHOUT ANGINA PECTORIS: ICD-10-CM

## 2022-04-06 DIAGNOSIS — M43.00 PARS DEFECT WITH SPONDYLOLISTHESIS: ICD-10-CM

## 2022-04-06 DIAGNOSIS — I48.20 ATRIAL FIBRILLATION, CHRONIC (HCC): ICD-10-CM

## 2022-04-06 DIAGNOSIS — E78.5 HYPERLIPIDEMIA, UNSPECIFIED HYPERLIPIDEMIA TYPE: ICD-10-CM

## 2022-04-06 DIAGNOSIS — I27.20 PULMONARY HTN (HCC): ICD-10-CM

## 2022-04-06 DIAGNOSIS — M43.10 PARS DEFECT WITH SPONDYLOLISTHESIS: ICD-10-CM

## 2022-04-06 DIAGNOSIS — M43.10 ANTEROLISTHESIS: ICD-10-CM

## 2022-04-06 DIAGNOSIS — M54.16 LUMBAR RADICULOPATHY: Primary | ICD-10-CM

## 2022-04-06 DIAGNOSIS — M48.061 SPINAL STENOSIS OF LUMBAR REGION WITHOUT NEUROGENIC CLAUDICATION: ICD-10-CM

## 2022-04-06 LAB
ABSOLUTE EOS #: 0.2 K/UL (ref 0–0.4)
ABSOLUTE LYMPH #: 1.4 K/UL (ref 1–4.8)
ABSOLUTE MONO #: 0.7 K/UL (ref 0–1)
ALBUMIN SERPL-MCNC: 4.5 G/DL (ref 3.5–5.2)
ALP BLD-CCNC: 55 U/L (ref 40–129)
ALT SERPL-CCNC: 17 U/L (ref 5–41)
ANION GAP SERPL CALCULATED.3IONS-SCNC: 12 MMOL/L (ref 9–17)
AST SERPL-CCNC: 17 U/L
BASOPHILS # BLD: 1 % (ref 0–2)
BASOPHILS ABSOLUTE: 0 K/UL (ref 0–0.2)
BILIRUB SERPL-MCNC: 0.8 MG/DL (ref 0.3–1.2)
BUN BLDV-MCNC: 20 MG/DL (ref 8–23)
BUN/CREAT BLD: 21 (ref 9–20)
CALCIUM SERPL-MCNC: 9 MG/DL (ref 8.6–10.4)
CHLORIDE BLD-SCNC: 102 MMOL/L (ref 98–107)
CHOLESTEROL/HDL RATIO: 3.4
CHOLESTEROL: 98 MG/DL
CO2: 25 MMOL/L (ref 20–31)
CREAT SERPL-MCNC: 0.96 MG/DL (ref 0.7–1.2)
DIFFERENTIAL TYPE: YES
EOSINOPHILS RELATIVE PERCENT: 3 % (ref 0–5)
GFR AFRICAN AMERICAN: >60 ML/MIN
GFR NON-AFRICAN AMERICAN: >60 ML/MIN
GFR SERPL CREATININE-BSD FRML MDRD: ABNORMAL ML/MIN/{1.73_M2}
GLUCOSE BLD-MCNC: 122 MG/DL (ref 70–99)
HCT VFR BLD CALC: 39.6 % (ref 41–53)
HDLC SERPL-MCNC: 29 MG/DL
HEMOGLOBIN: 13.1 G/DL (ref 13.5–17.5)
LDL CHOLESTEROL: 45 MG/DL (ref 0–130)
LYMPHOCYTES # BLD: 24 % (ref 13–44)
MAGNESIUM: 1.9 MG/DL (ref 1.6–2.6)
MCH RBC QN AUTO: 30.8 PG (ref 26–34)
MCHC RBC AUTO-ENTMCNC: 33 G/DL (ref 31–37)
MCV RBC AUTO: 93.6 FL (ref 80–100)
MONOCYTES # BLD: 12 % (ref 5–9)
PATIENT FASTING?: YES
PDW BLD-RTO: 14.4 % (ref 12.1–15.2)
PLATELET # BLD: 237 K/UL (ref 140–450)
POTASSIUM SERPL-SCNC: 4.5 MMOL/L (ref 3.7–5.3)
RBC # BLD: 4.24 M/UL (ref 4.5–5.9)
SEG NEUTROPHILS: 60 % (ref 39–75)
SEGMENTED NEUTROPHILS ABSOLUTE COUNT: 3.4 K/UL (ref 2.1–6.5)
SODIUM BLD-SCNC: 139 MMOL/L (ref 135–144)
TOTAL PROTEIN: 7 G/DL (ref 6.4–8.3)
TRIGL SERPL-MCNC: 121 MG/DL
TSH SERPL DL<=0.05 MIU/L-ACNC: 1.68 UIU/ML (ref 0.3–5)
VITAMIN D 25-HYDROXY: 34 NG/ML
WBC # BLD: 5.7 K/UL (ref 3.5–11)

## 2022-04-06 PROCEDURE — 71046 X-RAY EXAM CHEST 2 VIEWS: CPT

## 2022-04-06 PROCEDURE — 36415 COLL VENOUS BLD VENIPUNCTURE: CPT

## 2022-04-06 PROCEDURE — 93005 ELECTROCARDIOGRAM TRACING: CPT

## 2022-04-06 PROCEDURE — 80053 COMPREHEN METABOLIC PANEL: CPT

## 2022-04-06 PROCEDURE — 82306 VITAMIN D 25 HYDROXY: CPT

## 2022-04-06 PROCEDURE — 84443 ASSAY THYROID STIM HORMONE: CPT

## 2022-04-06 PROCEDURE — 85025 COMPLETE CBC W/AUTO DIFF WBC: CPT

## 2022-04-06 PROCEDURE — 80061 LIPID PANEL: CPT

## 2022-04-06 PROCEDURE — 83735 ASSAY OF MAGNESIUM: CPT

## 2022-04-06 NOTE — TELEPHONE ENCOUNTER
Patient called asking if he could schedule BILAT L5-S1 TRANSFORAMINAL EPIDURAL STEROID INJECTION UNDER XR again?

## 2022-04-07 LAB
EKG ATRIAL RATE: 107 BPM
EKG Q-T INTERVAL: 396 MS
EKG QRS DURATION: 156 MS
EKG QTC CALCULATION (BAZETT): 513 MS
EKG R AXIS: 60 DEGREES
EKG T AXIS: -153 DEGREES
EKG VENTRICULAR RATE: 101 BPM

## 2022-04-07 PROCEDURE — 93010 ELECTROCARDIOGRAM REPORT: CPT | Performed by: INTERNAL MEDICINE

## 2022-04-07 NOTE — TELEPHONE ENCOUNTER
-XR LS Spine ap lat eval facet arthropathy   -Monitor for worsening stenosis symptoms given up to severe canal stenosis at L3/4  -Bilateral Lumbar L5/S1 transforaminal epidural steroid injection under XR with Dr Oscar De La Garza. +Eliquis MUST BE HELD 3 days request permission, no antibiotics, +diabetes mellitus, no osteoporosis, no bleeding or platelet dysfunction, IV Dye GRIFFIN dent. 30 minute procedure.  Prone position

## 2022-04-08 NOTE — TELEPHONE ENCOUNTER
I called and spoke with patient to advise that Dr. Vicky Caldwell ordered injection for him, we will request to hold Eliquis 3 days prior to injection. Blood thinner request sent to Dr. Josh Ayala FAx # [phone].

## 2022-04-11 ENCOUNTER — OFFICE VISIT (OUTPATIENT)
Dept: CARDIOLOGY CLINIC | Age: 72
End: 2022-04-11
Payer: MEDICARE

## 2022-04-11 VITALS
BODY MASS INDEX: 37.16 KG/M2 | OXYGEN SATURATION: 95 % | WEIGHT: 259 LBS | SYSTOLIC BLOOD PRESSURE: 140 MMHG | DIASTOLIC BLOOD PRESSURE: 60 MMHG

## 2022-04-11 DIAGNOSIS — I48.0 PAF (PAROXYSMAL ATRIAL FIBRILLATION) (HCC): Primary | ICD-10-CM

## 2022-04-11 DIAGNOSIS — I27.20 PULMONARY HTN (HCC): Primary | ICD-10-CM

## 2022-04-11 PROCEDURE — G8417 CALC BMI ABV UP PARAM F/U: HCPCS | Performed by: INTERNAL MEDICINE

## 2022-04-11 PROCEDURE — 99214 OFFICE O/P EST MOD 30 MIN: CPT | Performed by: INTERNAL MEDICINE

## 2022-04-11 PROCEDURE — G8428 CUR MEDS NOT DOCUMENT: HCPCS | Performed by: INTERNAL MEDICINE

## 2022-04-11 PROCEDURE — 4040F PNEUMOC VAC/ADMIN/RCVD: CPT | Performed by: INTERNAL MEDICINE

## 2022-04-11 PROCEDURE — 1123F ACP DISCUSS/DSCN MKR DOCD: CPT | Performed by: INTERNAL MEDICINE

## 2022-04-11 PROCEDURE — 1036F TOBACCO NON-USER: CPT | Performed by: INTERNAL MEDICINE

## 2022-04-11 PROCEDURE — 3017F COLORECTAL CA SCREEN DOC REV: CPT | Performed by: INTERNAL MEDICINE

## 2022-04-11 RX ORDER — DOXAZOSIN 8 MG/1
8 TABLET ORAL 2 TIMES DAILY
Qty: 180 TABLET | Refills: 3 | Status: ON HOLD | OUTPATIENT
Start: 2022-04-11 | End: 2022-09-19 | Stop reason: SDUPTHER

## 2022-04-11 NOTE — LETTER
Susan Bourgeois M.D. [address] [place] [address]  [phone]        2022        DO César Yuenjosh  [facility]    RE:   Brad Holter  :  1950    Dear Dr. Cait Loaiza:    CHIEF COMPLAINT:  1. Paroxysmal atrial fibrillation, although now he has been in persistent atrial fibrillation for the last three months. 2.  Coronary artery disease, status post cardiac catheterization on 2021 showing widely patent grafts with an EF of 50%. HISTORY OF PRESENT ILLNESS:  I had the pleasure of seeing Mr. Kalyani Mancini in our office on 2022. He is a complex 44-year-old gentleman who we follow both for his coronary artery disease and for his atrial fibrillation. He had a catheterization on 10/31/2012 that showed severe disease at the bifurcation of the LAD and diagonal with an unremarkable right coronary artery and circumflex. He had angioplasty on 2012 placing a 2.75 x 28 mm Promus stent with a good end result. On 2014, a catheterization showed 70% to 80% in-stent stenosis of the LAD and diagonal with unremarkable right coronary artery and circumflex with EF of 55% to 60%. He had subsequent open heart surgery by Dr. Mally Mcghee on 2014 with LIMA to the LAD and a vein graft to the diagonal.    On 2017, he had another catheterization because of shortness of breath that showed 95% disease in a very large right coronary artery with 90% LAD with a patent LIMA to the LAD and patent vein graft to the diagonal.  The circumflex was unremarkable and I placed a 3.5 x 18 mm drug-eluting Xience stent in the right coronary artery with a good end result. Catheterization on 11/15/2017 showed widely patent bypass grafts with an EF of 55%. His last cardiac catheterization was on 2021. His right coronary artery was widely patent with a stent in the midportion.   The LAD had a stent in its proximal portion and there was a severe 90% lesion at the bifurcation of the LAD and diagonal.  The LAD filled from the left internal mammary artery to the LAD and the diagonal filled by a patent vein graft to the diagonal.  His circumflex was unremarkable. His left ventricle had an ejection fraction of 50% and medical therapy was recommended. He has a history of very symptomatic paroxysmal atrial fibrillation. He came to the hospital on 04/12/2021, was found to be in atrial fibrillation. I did a cardioversion and placed him back in sinus rhythm after anticoagulation and placed him on 200 mg of amiodarone nightly. Our plan was to consider ablation if he redeveloped atrial fibrillation on amiodarone. He has had paroxysmal atrial fibrillation first noted in 12/2017 which was converted back to sinus rhythm and on 03/09/2018 again which converted back to sinus rhythm. He has been anticoagulated with Eliquis. He has always been quite symptomatic when he is in atrial fibrillation with much more shortness of breath. He developed atrial flutter again on 10/19/2020. He had had a marked loss of energy when we had discovered that he was back in atrial flutter. Again, he had been anticoagulated with Eliquis. We did a cardioversion on 11/05/2020 to sinus rhythm. I started him on Cordarone at that time at 200 mg daily. We had to make some medication adjustments because of adding amiodarone. When I saw him on 11/04/2021, he was in sinus rhythm and had maintained sinus rhythm on amiodarone. Our plan was to consider ablation if he redeveloped atrial fibrillation on amiodarone. We had recommended the Kardia device which his wife obtained. They subsequently went to [place] and maintained contact with us with the [place] device while in [place]. He has been developing intermittent atrial fibrillation. In December, he was in atrial fibrillation.   He remained in atrial fibrillation from December until I see him today. He and his wife have been back from [place], which they go to every winter, for the last two weeks. His heart rate is usually in the 80s and 90s to 110. He has had no hospitalizations or procedures while in [place]. He denies any chest pain. He does feel fatigued. He also feels that he is more short of breath since he has been in atrial fibrillation. He denies any PND or orthopnea. He has very little pedal edema. He had no hospitalizations or procedures while in [place]. CARDIAC RISK FACTORS:  Known CAD:  Positive. PTCA:  Positive. Bypass Surgery:  Positive. Hypertension:  Positive. Hyperlipidemia:  Positive. Diabetes:  Positive. Smoking:  Negative. Other Family Members:  Positive. MEDICATIONS AT THIS TIME:  He is on Cordarone 100 mg nightly; Eliquis 5 mg b.i.d.; Lipitor 40 mg daily; Coreg 3.125 mg daily; Celebrex 200 mg daily; Cardura 8 mg half a tablet in the morning and a whole tablet in the evening; Entresto 97/103 b.i.d.; Lasix 80 mg b.i.d.; Amaryl 4 mg half a tablet daily; Imdur 30 mg daily; Glucophage 1000 mg daily; Adalat 90 mg half a tablet daily; Novolin 70/30 54 units in the a.m., 32 units at lunch, and 40 units at supper; Aldactone 25 mg daily; Trulicity 1.5 mg weekly; vitamin D 1000 mg two daily. PAST MEDICAL AND SURGICAL HISTORY:  1. Arthroscopic surgery with knee replacement, complicated by fat embolism in 02/2011.  2.  Insulin-dependent diabetes for 33 years. 3.  Hypertension many years. 4.  Left and right cataract surgery. 5.  Sleep apnea in 2014 and has been using a CPAP mask since that time. 6.  History of paroxysmal atrial fibrillation, which has now been persistent since 12/2021.  7.  Mild sick sinus syndrome, becoming hypotensive when he was on a higher dose of a beta-blocker with amiodarone. 8.  Chronic back pain, with injections in his back, not wanting to do surgery.   9.  He had carpal tunnel surgery on his left hand and still has and pedal pulses. NEUROLOGIC EXAM:  Within normal limits. PSYCHIATRIC EXAM:  Within normal limits. LABORATORY DATA:  His sodium was 139, potassium 4.3, BUN was 20, creatinine 0.96. GFR greater than 60, magnesium 1.9, calcium 9.0. His cholesterol was 98 with an HDL of 29, LDL 45, triglycerides 121. ALT was 17, AST was 17. TSH was 1.68. Vitamin D 34.0. White count 5.7, hemoglobin 13.1 with a platelet count 083,980. His EKG showed atrial fibrillation with a slightly rapid ventricular response at 100. He was in a left bundle-branch block which is a chronic finding. He was in atrial fibrillation on his EKG on 11/01/2021. Chest x-ray on 04/06/2022 was unremarkable. His echocardiogram in 04/2021 showed an EF of 40% to 45% with mild mitral regurgitation with the left atrium moderately-to-severely dilated, right atrium moderately dilated, mildly dilated right ventricle with a PA pressure estimated at 40 to 45 mmHg. Cardiac catheterization on 05/14/2021 showed an EF of 50% with a patent LIMA to the LAD and patent vein graft to the diagonal with unremarkable circumflex and widely patent stents in the right coronary artery. IMPRESSION:  1. History of paroxysmal atrial fibrillation in 2017 with cardioversion in 2018, with his last cardioversion on 04/12/2021, at which time I placed him on amiodarone with him now in persistent atrial fibrillation since 12/2021.  2.  Coronary artery disease, status post cardiac catheterization on 10/31/2012 showing severe disease at the bifurcation of the LAD and diagonal with unremarkable right coronary artery and circumflex, EF 55%. 3.  Angioplasty of LAD and diagonal on 11/07/2012 placing a 2.75 x 28 mm Promus stent in the LAD. 4.  Catheterization on 05/14/2014, showing 70% to 80% LAD and diagonal with in-stent stenosis with an FFR of 0.78, with unremarkable right coronary artery and circumflex, EF of 55% to 60%.   5.  Open heart surgery on 06/30/2014, by  Margherita Leyden, with a LIMA to the LAD and a vein graft to the diagonal.  6.  Catheterization on 08/30/2017, showing patent bypass grafts, with 95% disease in the mid right coronary artery, EF 50%, with angioplasty of the right coronary artery, placing a 3.5 x 18 mm Xience stent. 7.  Catheterization on 05/14/2021, showing patent LIMA to the LAD, patent vein grafts to the diagonal with unremarkable circumflex and a widely patent stent in the right coronary artery with an EF of 50%. 8.  Sleep apnea, on a CPAP mask. 9.  Marked loss of energy with shortness of breath, when he is in atrial fibrillation. 10.  Chronic back pain. PLAN:  1. We will stop amiodarone. 2.  We will start digoxin 0.125 mg daily. 3.  We will have him see Dr. Ashlee Palomares for consult considering ablation. 4.  His wife will continue to take Kardia readings to follow his heart rate, although I believe we are going to need to start digoxin 0.125 mg daily to control his rate. 5.  We will repeat an echocardiogram at [facility]. DISCUSSION:  Mr. Emily Arcos has been in atrial fibrillation since December. His rate has increased slightly and is now usually in the 80s. He will occasionally dip down, however, in the 60s. He does have a history of bradycardia and we have to be careful with his medications because of bradycardia. He was on a variable dose of Coreg because he had developed bradycardia. I am not sure how much his atrial fibrillation is contributing to his fatigue, although he feels that he is much more fatigued and short of breath since he has been in atrial fibrillation starting in December. Since he went into atrial fibrillation on amiodarone, I will stop his amiodarone and rate control. Again, we will have him see Dr. Ashlee Palomares for consideration for ablation. We will repeat his echocardiogram at [facility] to try to get a clear view of his chambers.   He has a history of EF in the 50% range with a moderately-to-severely dilated left atrium

## 2022-04-11 NOTE — PATIENT INSTRUCTIONS
Stop Amioradone--no need to wean  If elevates will start Digoxin  Referral to [facility]--consult ablation  See in 2 months f/u no testing  Send Alba nielson in prn

## 2022-04-11 NOTE — PROGRESS NOTES
Fany Sequeira here for 6 mo follow up  Back from [facility] for 2 weeks now  Stays in AFIB most of the time  Especially until next dose of medications  Hardly ever in sinus rhythm  Usually goes up to 110 mostly stay 80's 90's  Does Alba to keep track  No hospitalizations  No CP/ No SOB  Feels tired -doesn't sleep well  A little edema in ankles-takes an extra Furosemide if needed  Unable to get good reading on echo in room  Discussed ablation  Will stop Amioradone if heart rate elevates   Will start Digoxin  Saw Dr. Omar Vazquez @ [facility] in 2018 to discuss ablation --too soon  Will see Adela Godoy for a consult  See in 8 weeks   Will send Alba readings   Send Doxazosin BID so if patient needs to take BID

## 2022-04-14 NOTE — PROGRESS NOTES
Tameka Licona M.D. [address] [place] [address]  [phone]        2022        Dory Douglas DO  [facility], [place] [address]    RE:   Kam Hutton  :  1950    Dear Dr. Rocky Glynn:    CHIEF COMPLAINT:  1. Paroxysmal atrial fibrillation, although now he has been in persistent atrial fibrillation for the last three months. 2.  Coronary artery disease, status post cardiac catheterization on 2021 showing widely patent grafts with an EF of 50%. HISTORY OF PRESENT ILLNESS:  I had the pleasure of seeing Mr. Gilda Diggs in our office on 2022. He is a complex 71-year-old gentleman who we follow both for his coronary artery disease and for his atrial fibrillation. He had a catheterization on 10/31/2012 that showed severe disease at the bifurcation of the LAD and diagonal with an unremarkable right coronary artery and circumflex. He had angioplasty on 2012 placing a 2.75 x 28 mm Promus stent with a good end result. On 2014, a catheterization showed 70% to 80% in-stent stenosis of the LAD and diagonal with unremarkable right coronary artery and circumflex with EF of 55% to 60%. He had subsequent open heart surgery by Dr. Ilir Morales on 2014 with LIMA to the LAD and a vein graft to the diagonal.    On 2017, he had another catheterization because of shortness of breath that showed 95% disease in a very large right coronary artery with 90% LAD with a patent LIMA to the LAD and patent vein graft to the diagonal.  The circumflex was unremarkable and I placed a 3.5 x 18 mm drug-eluting Xience stent in the right coronary artery with a good end result. Catheterization on 11/15/2017 showed widely patent bypass grafts with an EF of 55%. His last cardiac catheterization was on 2021. His right coronary artery was widely patent with a stent in the midportion.   The LAD had a stent in its proximal portion and there was a severe 90% lesion at the bifurcation of the LAD and diagonal.  The LAD filled from the left internal mammary artery to the LAD and the diagonal filled by a patent vein graft to the diagonal.  His circumflex was unremarkable. His left ventricle had an ejection fraction of 50% and medical therapy was recommended. He has a history of very symptomatic paroxysmal atrial fibrillation. He came to the hospital on 04/12/2021, was found to be in atrial fibrillation. I did a cardioversion and placed him back in sinus rhythm after anticoagulation and placed him on 200 mg of amiodarone nightly. Our plan was to consider ablation if he redeveloped atrial fibrillation on amiodarone. He has had paroxysmal atrial fibrillation first noted in 12/2017 which was converted back to sinus rhythm and on 03/09/2018 again which converted back to sinus rhythm. He has been anticoagulated with Eliquis. He has always been quite symptomatic when he is in atrial fibrillation with much more shortness of breath. He developed atrial flutter again on 10/19/2020. He had had a marked loss of energy when we had discovered that he was back in atrial flutter. Again, he had been anticoagulated with Eliquis. We did a cardioversion on 11/05/2020 to sinus rhythm. I started him on Cordarone at that time at 200 mg daily. We had to make some medication adjustments because of adding amiodarone. When I saw him on 11/04/2021, he was in sinus rhythm and had maintained sinus rhythm on amiodarone. Our plan was to consider ablation if he redeveloped atrial fibrillation on amiodarone. We had recommended the Kardia device which his wife obtained. They subsequently went to [place] and maintained contact with us with the [place] device while in [place]. He has been developing intermittent atrial fibrillation. In December, he was in atrial fibrillation.   He remained in atrial fibrillation from December until I see him today. He and his wife have been back from [place], which they go to every winter, for the last two weeks. His heart rate is usually in the 80s and 90s to 110. He has had no hospitalizations or procedures while in [place]. He denies any chest pain. He does feel fatigued. He also feels that he is more short of breath since he has been in atrial fibrillation. He denies any PND or orthopnea. He has very little pedal edema. He had no hospitalizations or procedures while in [place]. CARDIAC RISK FACTORS:  Known CAD:  Positive. PTCA:  Positive. Bypass Surgery:  Positive. Hypertension:  Positive. Hyperlipidemia:  Positive. Diabetes:  Positive. Smoking:  Negative. Other Family Members:  Positive. MEDICATIONS AT THIS TIME:  He is on Cordarone 100 mg nightly; Eliquis 5 mg b.i.d.; Lipitor 40 mg daily; Coreg 3.125 mg daily; Celebrex 200 mg daily; Cardura 8 mg half a tablet in the morning and a whole tablet in the evening; Entresto 97/103 b.i.d.; Lasix 80 mg b.i.d.; Amaryl 4 mg half a tablet daily; Imdur 30 mg daily; Glucophage 1000 mg daily; Adalat 90 mg half a tablet daily; Novolin 70/30 54 units in the a.m., 32 units at lunch, and 40 units at supper; Aldactone 25 mg daily; Trulicity 1.5 mg weekly; vitamin D 1000 mg two daily. PAST MEDICAL AND SURGICAL HISTORY:  1. Arthroscopic surgery with knee replacement, complicated by fat embolism in 02/2011.  2.  Insulin-dependent diabetes for 33 years. 3.  Hypertension many years. 4.  Left and right cataract surgery. 5.  Sleep apnea in 2014 and has been using a CPAP mask since that time. 6.  History of paroxysmal atrial fibrillation, which has now been persistent since 12/2021.  7.  Mild sick sinus syndrome, becoming hypotensive when he was on a higher dose of a beta-blocker with amiodarone. 8.  Chronic back pain, with injections in his back, not wanting to do surgery.   9.  He had carpal tunnel surgery on his left hand and still has decreased  in his hand. FAMILY HISTORY:  Mother and father had CHF. SOCIAL HISTORY:  He is 80years old, retired . . Two children. He has a trailer in [place], which he and his wife spend the winter. Daughter is a professor at [employer] in [place], with two grandchildren in [place]. He does not smoke or drink alcohol. He uses a CPAP mask. He tried to stay relatively active in [place], although he has had more difficulty in [place] especially since December when he has been in atrial fibrillation. REVIEW OF SYSTEMS:  Cardiac as above. Other systems reviewed including constitutional, eyes, ears, nose and throat, cardiovascular, respiratory, GI, , musculoskeletal, integumentary, neurologic, endocrine, hematologic and allergic/immunologic are negative except for what is described above. PHYSICAL EXAMINATION:  VITAL SIGNS:  His blood pressure was 115/67 with O2 sat 95%. Weight 259 pounds. His rate was 80 and that was irregularly irregular. GENERAL:  He is a pleasant 71-year-old gentleman. Denied pain. He was oriented to person, place and time. Answered questions appropriately. SKIN:  No unusual skin changes. HEENT:  The pupils are equally round and reactive to light and accommodation. Extraocular movements were intact. Mucous membranes were dry. NECK:  No JVD. Good carotid pulses. No carotid bruits. No lymphadenopathy or thyromegaly. CARDIOVASCULAR EXAM:  S1 and S2 were normal.  No S3 or S4. He was irregular. He had a soft systolic murmur and diastolic murmur. LUNGS:  Fairly clear to auscultation and percussion. ABDOMEN:  Soft and nontender. Good bowel sounds. No hepatomegaly, splenomegaly. EXTREMITIES:  Good femoral pulses. Good pedal pulses. Mild pedal edema. Skin was warm and dry. No calf tenderness. Nail beds pink. Good cap refill. PULSES:  Bilateral symmetrical radial, brachial and carotid pulses. No carotid bruits.   Good femoral and pedal pulses. NEUROLOGIC EXAM:  Within normal limits. PSYCHIATRIC EXAM:  Within normal limits. LABORATORY DATA:  His sodium was 139, potassium 4.3, BUN was 20, creatinine 0.96. GFR greater than 60, magnesium 1.9, calcium 9.0. His cholesterol was 98 with an HDL of 29, LDL 45, triglycerides 121. ALT was 17, AST was 17. TSH was 1.68. Vitamin D 34.0. White count 5.7, hemoglobin 13.1 with a platelet count 322,472. His EKG showed atrial fibrillation with a slightly rapid ventricular response at 100. He was in a left bundle-branch block which is a chronic finding. He was in atrial fibrillation on his EKG on 11/01/2021. Chest x-ray on 04/06/2022 was unremarkable. His echocardiogram in 04/2021 showed an EF of 40% to 45% with mild mitral regurgitation with the left atrium moderately-to-severely dilated, right atrium moderately dilated, mildly dilated right ventricle with a PA pressure estimated at 40 to 45 mmHg. Cardiac catheterization on 05/14/2021 showed an EF of 50% with a patent LIMA to the LAD and patent vein graft to the diagonal with unremarkable circumflex and widely patent stents in the right coronary artery. IMPRESSION:  1. History of paroxysmal atrial fibrillation in 2017 with cardioversion in 2018, with his last cardioversion on 04/12/2021, at which time I placed him on amiodarone with him now in persistent atrial fibrillation since 12/2021.  2.  Coronary artery disease, status post cardiac catheterization on 10/31/2012 showing severe disease at the bifurcation of the LAD and diagonal with unremarkable right coronary artery and circumflex, EF 55%. 3.  Angioplasty of LAD and diagonal on 11/07/2012 placing a 2.75 x 28 mm Promus stent in the LAD. 4.  Catheterization on 05/14/2014, showing 70% to 80% LAD and diagonal with in-stent stenosis with an FFR of 0.78, with unremarkable right coronary artery and circumflex, EF of 55% to 60%.   5.  Open heart surgery on 06/30/2014, by  Yuki Aurelio, with a LIMA to the LAD and a vein graft to the diagonal.  6.  Catheterization on 08/30/2017, showing patent bypass grafts, with 95% disease in the mid right coronary artery, EF 50%, with angioplasty of the right coronary artery, placing a 3.5 x 18 mm Xience stent. 7.  Catheterization on 05/14/2021, showing patent LIMA to the LAD, patent vein grafts to the diagonal with unremarkable circumflex and a widely patent stent in the right coronary artery with an EF of 50%. 8.  Sleep apnea, on a CPAP mask. 9.  Marked loss of energy with shortness of breath, when he is in atrial fibrillation. 10.  Chronic back pain. PLAN:  1. We will stop amiodarone. 2.  We will start digoxin 0.125 mg daily. 3.  We will have him see Dr. Trino Mcgowan for consult considering ablation. 4.  His wife will continue to take Kardia readings to follow his heart rate, although I believe we are going to need to start digoxin 0.125 mg daily to control his rate. 5.  We will repeat an echocardiogram at [facility]. DISCUSSION:  Mr. Sheela Friedman has been in atrial fibrillation since December. His rate has increased slightly and is now usually in the 80s. He will occasionally dip down, however, in the 60s. He does have a history of bradycardia and we have to be careful with his medications because of bradycardia. He was on a variable dose of Coreg because he had developed bradycardia. I am not sure how much his atrial fibrillation is contributing to his fatigue, although he feels that he is much more fatigued and short of breath since he has been in atrial fibrillation starting in December. Since he went into atrial fibrillation on amiodarone, I will stop his amiodarone and rate control. Again, we will have him see Dr. Trino Mcgowan for consideration for ablation. We will repeat his echocardiogram at [facility] to try to get a clear view of his chambers.   He has a history of EF in the 50% range with a moderately-to-severely dilated left atrium and moderately dilated right atrium and right ventricle. Again, I am not sure whether he will feel much difference being in sinus rhythm, but I think it is worth consideration for ablation if possible. I will follow up in two months. He will continue to run rhythm strips with his Kardia device and I suspect that we will need to start digoxin. His blood pressure does run somewhat low in the 110 to 120 range and I hesitate to increase his Coreg at this time. He remains very complex. However, his coronary artery disease is doing well with his widely patent grafts and widely patent stents in the right coronary artery. He does have lung issues and is not able to exercise and I believe that certainly his lack of exercise with his chronic back pain is contributing to his overall fatigue and shortness of breath. Thank you very much for allowing me the privilege of seeing Mr. Lisa Huston. If you have any questions on my thoughts, please do not hesitate to contact me.     Sincerely,        Killian Smoker    D: 04/12/2022 3:46:46     T: 04/12/2022 22:44:36     [email or web address]  Job#: [id number]   Doc#: [id number]

## 2022-04-19 NOTE — TELEPHONE ENCOUNTER
Patient called requesting an update on BILAT L5-S1 TRANSFORAMINAL EPIDURAL STEROID INJECTION UNDER XR . I read to him the last note that we have here from 4/8/22 that a request was sent to Dr Mark Manuel office to hold taking Eliquis for 3 days. Patient stated that he's going to call their office right now to make sure that that gets sent over. Patient is asking if we can then schedule his DALJIT?

## 2022-04-19 NOTE — TELEPHONE ENCOUNTER
We will be able to schedule his procedure after we received permission from the prescriber of his blood thinner.

## 2022-04-21 ENCOUNTER — HOSPITAL ENCOUNTER (OUTPATIENT)
Dept: NON INVASIVE DIAGNOSTICS | Age: 72
Discharge: HOME | End: 2022-04-21
Payer: MEDICARE

## 2022-04-21 ENCOUNTER — TELEPHONE (OUTPATIENT)
Dept: CARDIOLOGY CLINIC | Age: 72
End: 2022-04-21

## 2022-04-21 DIAGNOSIS — I27.20 PULMONARY HTN (HCC): ICD-10-CM

## 2022-04-21 LAB
LV EF: 48 %
LVEF MODALITY: NORMAL

## 2022-04-21 PROCEDURE — 93306 TTE W/DOPPLER COMPLETE: CPT

## 2022-04-21 NOTE — TELEPHONE ENCOUNTER
PERMISSION TO HOLD ELIQUIS FOR 3 DAYS RECEIVED FROM DR Freddy Hinds ON 04/20/2022. PLEASE CALL PATIENT TO SCHEDULE PROCEDURE. Bilateral Lumbar L5/S1 transforaminal epidural steroid injection under XR with Dr Rajani Allen permission, no antibiotics, +diabetes mellitus, no osteoporosis, no bleeding or platelet dysfunction, IV Dye GRIFFIN dent. 30 minute procedure.  Prone position

## 2022-04-21 NOTE — TELEPHONE ENCOUNTER
Clearance letter faxed to Dr. Gisela Viramontes   Pt notified to hold eliqis 3 days prior  Per Dr. Frandy Brar

## 2022-04-25 ENCOUNTER — TELEPHONE (OUTPATIENT)
Dept: CARDIOLOGY CLINIC | Age: 72
End: 2022-04-25

## 2022-04-25 NOTE — TELEPHONE ENCOUNTER
Pt called with echo results  Dr Treasure Leahy unavailable until Nov   Pt set up with DR Aida Durant in July

## 2022-05-10 ENCOUNTER — PROCEDURE VISIT (OUTPATIENT)
Dept: PAIN MANAGEMENT | Age: 72
End: 2022-05-10
Payer: MEDICARE

## 2022-05-10 DIAGNOSIS — M54.16 LUMBAR RADICULOPATHY: Primary | ICD-10-CM

## 2022-05-10 DIAGNOSIS — E66.01 SEVERE OBESITY (BMI 35.0-39.9) WITH COMORBIDITY (HCC): ICD-10-CM

## 2022-05-10 PROCEDURE — 64483 NJX AA&/STRD TFRM EPI L/S 1: CPT | Performed by: PHYSICAL MEDICINE & REHABILITATION

## 2022-05-10 RX ORDER — SODIUM CHLORIDE 9 MG/ML
3 INJECTION INTRAVENOUS ONCE
Status: COMPLETED | OUTPATIENT
Start: 2022-05-10 | End: 2022-05-10

## 2022-05-10 RX ORDER — LIDOCAINE HYDROCHLORIDE 10 MG/ML
5 INJECTION, SOLUTION INFILTRATION; PERINEURAL ONCE
Status: COMPLETED | OUTPATIENT
Start: 2022-05-10 | End: 2022-05-10

## 2022-05-10 RX ORDER — DEXAMETHASONE SODIUM PHOSPHATE 10 MG/ML
10 INJECTION, SOLUTION INTRAMUSCULAR; INTRAVENOUS ONCE
Status: COMPLETED | OUTPATIENT
Start: 2022-05-10 | End: 2022-05-10

## 2022-05-10 RX ADMIN — LIDOCAINE HYDROCHLORIDE 5 ML: 10 INJECTION, SOLUTION INFILTRATION; PERINEURAL at 16:19

## 2022-05-10 RX ADMIN — SODIUM CHLORIDE 3 ML: 9 INJECTION INTRAVENOUS at 16:20

## 2022-05-10 RX ADMIN — Medication 0.5 MEQ: at 16:20

## 2022-05-10 RX ADMIN — DEXAMETHASONE SODIUM PHOSPHATE 10 MG: 10 INJECTION, SOLUTION INTRAMUSCULAR; INTRAVENOUS at 16:19

## 2022-05-10 NOTE — PROGRESS NOTES
LUMBAR TRANSFORAMINAL EPIDURAL STEROID INJECTION (TFESI)    Patient Name: Mera Garcia   : 1950  Date: 5/10/2022     Physician: Jinny Wright MD     INDICATIONS: Mera Garcia is a 80 y.o. male who presents with symptoms and physical exam findings consistent with lumbar radiculopathy. He has pain in both sides of his low back with pain into both legs. A focused physical exam demonstrates bilateral L5 paresthesias with positive straight leg raise. He has had persistent pain that limits his activities of daily living. The pain is persistent despite conservative measures. He has significant functional and psychological impairment due to this condition. Given his symptoms, physical exam findings, impairment in activities of daily living, and lack of response to conservative measures, consideration for lumbar transforaminal epidural corticosteroid injection was given. Discussed the risks including but not limited to bleeding, infection, worsened pain, damage to surrounding structures, side effects, toxicity, allergic reactions to medications used, need for surgery, headache, vision changes, difficulty with chewing and/or swallowing, immune and stress-response dysfunction, fat necrosis, skin pigmentation changes, blood sugar elevation, as well as catastrophic injury such as vision loss, paralysis, spinal cord or plexus injury, cerebral brainstem or spinal cord infarction, intrathecal injection, spinal cord puncture, arachnoiditis, discitis, stroke, bowel or bladder incontinence, ventilator dependence, loss of use of the arms and/or legs, and death. Discussed off-label use of corticosteroids and how the Food and Drug Administration (FDA) has not approved corticosteroids for epidural use. Discussed the risks, benefits, alternative procedures, and alternatives to the procedure including no procedure at all.  Discussed that we cannot undo any permanent neurologic or orthopaedic damage or change the course of any underlying disease. After thorough discussion, patient expressed understanding and willingness to proceed. Written informed consent was obtained and is in the chart. Verbal consent to proceed was obtained. PROCEDURE: Standard ASIPP guidelines were followed and sterile technique used. Area was cleaned with Betadine three times. Fluoroscopic guidance was used for this procedure. The L5 vertebral body was taken as the first lumbar-appearing vertebral body directly above the sacrum on a lateral view. The skin and subcutaneous tissue was anesthetized with 2 mL of 1% preservative-free lidocaine and 0.5 mEq sodium bicarbonate with a 27 gauge 1.5 inch needle. Then a 25 gauge 5 inch spinal needle was used for the remainder of the procedure. There was appropriate spread of contrast in the anterior inferior epidural space and appropriate spread of contrast around the exiting nerve root on the right. There was appropriate spread of contrast in the anterior inferior epidural space and appropriate spread of contrast around the exiting nerve root on the left. The 6 oclock position of the pedicle was identified. Multiple views of fluoroscopy including lateral were used to confirm accurate needle placement of depth. Injection of 1 mL of contrast dye was free of any subdural or vascular spread or any other aberrant uptake. Aspiration was negative throughout the procedure. An injectate containing 1 mL of 10 mg of Dexamethasone and 3 ml of 0.9% preservative-free normal saline was injected slowly and without difficulty and divided equally between the two sites. Patient tolerated the procedure well, no obvious complications occurred during the procedure. Patient was appropriately monitored and discharged home in stable condition with his usual motor strength. Post-procedure instructions were given to patient. Patient will resume anticoagulation tomorrow.  He was advised that his blood sugars may increase after today's procedure.            [x] Bilateral [] T12-L1    [] L1-2   [] Right [] L2-3    [] L3-4   [] Left [] L4-5    [x] L5-S1                    [address] [facility]  Phone [phone]/[facility] [phone]

## 2022-05-10 NOTE — PROGRESS NOTES
This procedure was 50% more difficult and required 50% more work secondary to the patient's habitus. The patient has a BMI of 37.2 and has comorbidities of coronary artery disease on anticoagulation, diabetes mellitus, hypertension, and osteoarthritis. This required increased work for safe and proper positioning upon the fluoroscopy table, increased needle passes for safe and appropriate needle placement, and increased fluoroscopy time and radiation exposure for proper visualization.

## 2022-05-16 RX ORDER — ISOSORBIDE MONONITRATE 30 MG/1
TABLET, EXTENDED RELEASE ORAL
Qty: 90 TABLET | Refills: 3 | Status: SHIPPED | OUTPATIENT
Start: 2022-05-16

## 2022-06-20 ENCOUNTER — OFFICE VISIT (OUTPATIENT)
Dept: PAIN MANAGEMENT | Age: 72
End: 2022-06-20
Payer: MEDICARE

## 2022-06-20 VITALS
SYSTOLIC BLOOD PRESSURE: 126 MMHG | WEIGHT: 252 LBS | TEMPERATURE: 97.6 F | HEIGHT: 70 IN | BODY MASS INDEX: 36.08 KG/M2 | DIASTOLIC BLOOD PRESSURE: 84 MMHG

## 2022-06-20 DIAGNOSIS — M54.16 LUMBAR RADICULOPATHY: Primary | ICD-10-CM

## 2022-06-20 PROCEDURE — G8417 CALC BMI ABV UP PARAM F/U: HCPCS | Performed by: NURSE PRACTITIONER

## 2022-06-20 PROCEDURE — 99213 OFFICE O/P EST LOW 20 MIN: CPT | Performed by: NURSE PRACTITIONER

## 2022-06-20 PROCEDURE — 1123F ACP DISCUSS/DSCN MKR DOCD: CPT | Performed by: NURSE PRACTITIONER

## 2022-06-20 PROCEDURE — 3017F COLORECTAL CA SCREEN DOC REV: CPT | Performed by: NURSE PRACTITIONER

## 2022-06-20 PROCEDURE — 1036F TOBACCO NON-USER: CPT | Performed by: NURSE PRACTITIONER

## 2022-06-20 PROCEDURE — G8427 DOCREV CUR MEDS BY ELIG CLIN: HCPCS | Performed by: NURSE PRACTITIONER

## 2022-06-20 ASSESSMENT — ENCOUNTER SYMPTOMS
CONSTIPATION: 0
DIARRHEA: 0
BACK PAIN: 1
RESPIRATORY NEGATIVE: 1

## 2022-06-20 NOTE — PROGRESS NOTES
Patient: Dayla Phoenix  YOB: 1950  Date: 6/20/22        Subjective: Dayla Phoenix is a 80 y.o. male who complains today of:    Chief Complaint   Patient presents with    Back Pain         Allergies:  Patient has no known allergies. Past Medical History:   Diagnosis Date    Arthritis     Arthritis     CAD (coronary artery disease)     CHF (congestive heart failure) (HCC)     CHF (congestive heart failure) (HCC)     Coronary artery disease     Diabetes ([facility] [address]     Diabetes mellitus ([facility] [address]     H/O coronary angioplasty     Hyperlipidemia     Hypertension     Neuropathy     Numbness and tingling     dannie hands    Pulmonary edema     SECONDARY TO FAT EMBOLI AFTER KNEE SURGERY    Sleep apnea     Unspecified sleep apnea 2012    cpap     Past Surgical History:   Procedure Laterality Date    ANGIOPLASTY  08/30/2017    Dr. Jere Ross mm drug eluting Xience stents in the right coronary. DANIELA-3 flow prior to the procedure DANIELA-3 flow following the procedure. The patient has had a good result from his angioplasty of the right coronary artery. Shauna Ortega APPENDECTOMY      APPENDECTOMY      BUNIONECTOMY  2010    CARDIAC CATHETERIZATION      stent pacement    CARDIAC CATHETERIZATION Left 05/14/2014    Dr Bianca Chilel. [facility] Severe CAD, 70% disese with in-stent stenosis in the left anterior descending at the level of a very large codominant diagonal.  90% disease of the ostium of a very large codominant diagonal with 60% disease beyond the diagonal with a fractional flow reserve of 0.76 in the diagonal and 0.78 in the left anterior descending indicating obstructive disease of significance in both the     CARDIAC CATHETERIZATION Left 05/14/2014    left anterior descending & diagonal.. Mild irregularities of 30% in a small, nondominant circumglex. Mild 30- 40% disease in the midportion of a very large,dominant right coronary artery.   Normal left ventricular function, ejection fraction of 55-60%    CARDIAC CATHETERIZATION Bilateral 11/15/2017    Dr. Ramón England @ [facility]--Severe PHT with a PA pressure of 74/30. Widely patent stent in the very large dominat right coronary artery with 50% disease before the stent with an FFR of 0.89, indicating no significant obstruction. Severe disease in the LAD. Patent left internal mammary to the LAD. Patent saphenous vein graft to a very large diagonal.  Overall normal LV function.   EF of 55%    CARDIAC CATHETERIZATION Left 2021    Dr. Ramón England @ [facility]--Medical therapy    CATARACT REMOVAL      COLONOSCOPY  10/09/2013    Dr. Manav Madera (hemorrhoids)    COLONOSCOPY  10/09/2013    CORONARY ANGIOPLASTY WITH STENT PLACEMENT      CORONARY ARTERY BYPASS GRAFT  2014    Dr Kim Garibay @ [address] [place] [address] GRAFT      EYE SURGERY  2012    B leakage in back of eyes    JOINT REPLACEMENT Bilateral     knees    JOINT REPLACEMENT      KNEE SURGERY  ,     BILATERAL KNEE ORTHO    KNEE SURGERY       Family History   Problem Relation Age of Onset    Alzheimer's Disease Mother     Heart Disease Mother     Arthritis Mother     High Blood Pressure Mother     Diabetes Mother     Heart Disease Father     Arthritis Father     High Blood Pressure Father     Diabetes Father      Social History     Socioeconomic History    Marital status:      Spouse name: Not on file    Number of children: Not on file    Years of education: Not on file    Highest education level: Not on file   Occupational History    Not on file   Tobacco Use    Smoking status: Former Smoker     Packs/day: 0.50     Years: 3.00     Pack years: 1.50     Quit date: 10/9/1975     Years since quittin.7    Smokeless tobacco: Never Used   Substance and Sexual Activity    Alcohol use: No    Drug use: No    Sexual activity: Not on file   Other Topics Concern    Not on file   Social History Narrative    Not on file     Social Determinants of Health     Financial Resource Strain:     Difficulty of Paying Living Expenses: Not on file   Food Insecurity:     Worried About Running Out of Food in the Last Year: Not on file    Nancy of Food in the Last Year: Not on file   Transportation Needs:     Lack of Transportation (Medical): Not on file    Lack of Transportation (Non-Medical):  Not on file   Physical Activity:     Days of Exercise per Week: Not on file    Minutes of Exercise per Session: Not on file   Stress:     Feeling of Stress : Not on file   Social Connections:     Frequency of Communication with Friends and Family: Not on file    Frequency of Social Gatherings with Friends and Family: Not on file    Attends [employer] Services: Not on file    Active Member of [address] [place] [address] [employer] or Organizations: Not on file    Attends Club or Organization Meetings: Not on file    Marital Status: Not on file   Intimate Partner Violence:     Fear of Current or Ex-Partner: Not on file    Emotionally Abused: Not on file    Physically Abused: Not on file    Sexually Abused: Not on file   Housing Stability:     Unable to Pay for Housing in the Last Year: Not on file    Number of [place] in the Last Year: Not on file    Unstable Housing in the Last Year: Not on file       Current Outpatient Medications on File Prior to Visit   Medication Sig Dispense Refill    isosorbide mononitrate (IMDUR) 30 MG extended release tablet TAKE 1 TABLET DAILY 90 tablet 3    doxazosin (CARDURA) 8 MG tablet Take 1 tablet by mouth 2 times daily 180 tablet 3    celecoxib (CELEBREX) 200 MG capsule TAKE 1 CAPSULE DAILY 90 capsule 1    apixaban (ELIQUIS) 5 MG TABS tablet TAKE 1 TABLET TWICE A  tablet 3    carvedilol (COREG) 3.125 MG tablet Take 1 tablet by mouth daily 90 tablet 3    amiodarone (CORDARONE) 200 MG tablet TAKE 1 TABLET NIGHTLY (Patient taking differently: Patient taking 1/2 tablet nightly) 90 tablet 3    furosemide (LASIX) 80 MG tablet TAKE 1 TABLET TWICE A  tablet 3    spironolactone (ALDACTONE) 25 MG tablet Take 1 tablet by mouth daily 90 tablet 3    atorvastatin (LIPITOR) 40 MG tablet One daily 90 tablet 3    ENTRESTO  MG per tablet TAKE 1 TABLET TWICE A  tablet 3    doxazosin (CARDURA) 8 MG tablet 8 mg Take 1 tablet twice a day      NOVOLIN 70/30 (70-30) 100 UNIT/ML injection vial Inject 54 units AM, Inject 32 units at Lunch, Inject 40 units at supper as directed subcutaneously. 1 vial 0    metFORMIN (GLUCOPHAGE) 1000 MG tablet Take 1 tablet by mouth 2 times daily (with meals) (Patient taking differently: Take 1,000 mg by mouth daily ) 60 tablet 0    NIFEdipine (ADALAT CC) 90 MG extended release tablet Take 45 mg by mouth daily      Coenzyme Q10 (CO Q 10) 100 MG CAPS Take 100 mg by mouth nightly      acetaminophen (TYLENOL) 500 MG tablet Take 500 mg by mouth See Admin Instructions Take 500 mg in am  Take 1000 mg in Pm      traMADol (ULTRAM) 50 MG tablet Take 1 tablet by mouth 2 times daily as needed for Pain.  TRULICITY 1.5 UB/3.7TJ SOPN Inject 1.5 mg into the skin once a week Mondays      Magnesium Oxide 500 MG TABS Take 500 mg by mouth daily       ONE TOUCH ULTRA TEST strip       BD ULTRA-FINE PEN NEEDLES 29G X 12.7MM MISC       glimepiride (AMARYL) 4 MG tablet Take 2 mg by mouth every morning (before breakfast)       Omega-3 Fatty Acids (FISH OIL) 1200 MG CAPS Take 1 capsule by mouth 2 times daily Plus 360 omega 3      Glucosamine Sulfate 1000 MG CAPS Take 10,000 mg by mouth daily       Chromium-Cinnamon (CINNAMON PLUS CHROMIUM PO) Take 1,000 mg by mouth 2 times daily       Multiple Vitamins-Minerals (ONE-A-DAY MENS 50+ ADVANTAGE PO) Take 1 tablet by mouth daily       Vitamin D (CHOLECALCIFEROL) 1000 UNITS CAPS capsule   Take by mouth 2 times daily Vitamin D 3      Insulin Syringes, Disposable, U-100 0.3 ML MISC Inject  into the skin.  Use prn 25 each 0     Current Facility-Administered Medications on File Prior to Visit   Medication Dose Route Frequency Provider Last Rate Last Admin    iopamidol (ISOVUE-300) 61 % injection 1 mL  1 mL Other ONCE PRN Liza Woods MD        dexamethasone (PF) (DECADRON) injection 10 mg  10 mg Intra-artICUlar Once Liza Woods MD        iopamidol (ISOVUE-300) 61 % injection 1 mL  1 mL Other ONCE PRN Liza Woods MD        sodium chloride 0.9 % injection 10 mL  10 mL IntraVENous PRN Irma Martinez MD   10 mL at 12/04/12 1035         60-year-old male here today to follow-up transforaminal epidural injection. 5/10/2022 TFESI bilateral L5-S1. Patient said this 1 did not work. He complains of low back pain radiating down both legs lateral thighs to knee. Dates he goes to [place] and states there for several months at a time and will be going again in November. Says he like to have 2 shots done before November. Previously had radiofrequency ablation in 2019 that did not work for him. Has me he needs back surgery but his general health is too risky to undergo anesthesia. Review of Systems   Constitutional: Negative. Negative for activity change and unexpected weight change. HENT: Negative. Negative for hearing loss. Respiratory: Negative. Cardiovascular: Negative for leg swelling. Gastrointestinal: Negative for constipation and diarrhea. Genitourinary: Negative. Musculoskeletal: Positive for back pain. Negative for gait problem, joint swelling and neck pain. Skin: Negative for rash. Neurological: Negative for dizziness, weakness, numbness and headaches. Psychiatric/Behavioral: Negative. Negative for sleep disturbance. Objective:     Vitals:  /84   Temp 97.6 °F (36.4 °C)   Ht 5' 10\" (1.778 m)   Wt 252 lb (114.3 kg)   BMI 36.16 kg/m²      Physical Exam  Vitals reviewed. Constitutional:       Appearance: He is well-developed. HENT:      Head: Normocephalic.       Nose: Nose normal.   Pulmonary:      Effort: Pulmonary effort is normal. Musculoskeletal:      Cervical back: Normal range of motion and neck supple. Lumbar back: No tenderness. Decreased range of motion. Negative right straight leg raise test and negative left straight leg raise test.      Comments: Lower extremities with hemosiderin, also healing leg wound on right. Lymphadenopathy:      Cervical: No cervical adenopathy. Skin:     General: Skin is warm and dry. Findings: No erythema or rash. Neurological:      Mental Status: He is alert and oriented to person, place, and time. Cranial Nerves: No cranial nerve deficit. Gait: Gait abnormal.      Deep Tendon Reflexes: Reflexes are normal and symmetric. Reflexes normal.   Psychiatric:         Mood and Affect: Mood normal.         Behavior: Behavior normal.         Thought Content: Thought content normal.         Judgment: Judgment normal.            Assessment:        Diagnosis Orders   1. Lumbar radiculopathy         Plan:          No orders of the defined types were placed in this encounter. No orders of the defined types were placed in this encounter.    -may consider TFESI bilateral L3-4. Needs to heal the wound first then schedule to see us. Follow up:  Return if symptoms worsen or fail to improve.     Leigh Hernandez, YENNY - CNP

## 2022-06-22 ENCOUNTER — OFFICE VISIT (OUTPATIENT)
Dept: CARDIOLOGY CLINIC | Age: 72
End: 2022-06-22

## 2022-06-22 DIAGNOSIS — I48.19 PERSISTENT ATRIAL FIBRILLATION (HCC): Primary | ICD-10-CM

## 2022-06-22 PROCEDURE — 99999 PR OFFICE/OUTPT VISIT,PROCEDURE ONLY: CPT | Performed by: INTERNAL MEDICINE

## 2022-06-22 NOTE — PROGRESS NOTES
Ov Dr. Soledad Ovalles for follow up  Will be having ablation   July 25   Wife thinks might be slightly more   Sob- ?heat   No chest pain       No charge for today   Should of been scheduled   After ablation

## 2022-07-13 RX ORDER — ATORVASTATIN CALCIUM 40 MG/1
TABLET, FILM COATED ORAL
Qty: 90 TABLET | Refills: 3 | Status: SHIPPED | OUTPATIENT
Start: 2022-07-13

## 2022-07-13 NOTE — TELEPHONE ENCOUNTER
Last visit:  11/15/2021  Next Visit Date:    Future Appointments   Date Time Provider Javon Degroot   10/18/2022  9:00 AM MD Dina Kraus [facility]         Medication List:  Prior to Admission medications    Medication Sig Start Date End Date Taking? Authorizing Provider   isosorbide mononitrate (IMDUR) 30 MG extended release tablet TAKE 1 TABLET DAILY 5/16/22   Tiana Puri MD   doxazosin (CARDURA) 8 MG tablet Take 1 tablet by mouth 2 times daily 4/11/22   Tiana Puri MD   celecoxib (CELEBREX) 200 MG capsule TAKE 1 CAPSULE DAILY 3/31/22   Sunny Juan DO   apixaban (ELIQUIS) 5 MG TABS tablet TAKE 1 TABLET TWICE A DAY 2/14/22   Tiana Puri MD   carvedilol (COREG) 3.125 MG tablet Take 1 tablet by mouth daily 12/1/21   Tiana Puri MD   amiodarone (CORDARONE) 200 MG tablet TAKE 1 TABLET NIGHTLY  Patient taking differently: Patient taking 1/2 tablet nightly 11/1/21   Tiana Puri MD   furosemide (LASIX) 80 MG tablet TAKE 1 TABLET TWICE A DAY 10/11/21   Tiana Puri MD   spironolactone (ALDACTONE) 25 MG tablet Take 1 tablet by mouth daily 10/4/21   Tiana Puri MD   atorvastatin (LIPITOR) 40 MG tablet One daily 7/20/21   Sunny Juan DO   ENTRESTO  MG per tablet TAKE 1 TABLET TWICE A DAY 6/21/21   Tiana Puri MD   doxazosin (CARDURA) 8 MG tablet 8 mg Take 1 tablet twice a day    Historical Provider, MD   NOVOLIN 70/30 (70-30) 100 UNIT/ML injection vial Inject 54 units AM, Inject 32 units at Lunch, Inject 40 units at supper as directed subcutaneously.  4/21/21   Sunny Juan DO   metFORMIN (GLUCOPHAGE) 1000 MG tablet Take 1 tablet by mouth 2 times daily (with meals)  Patient taking differently: Take 1,000 mg by mouth daily  4/21/21   Sunny Juan DO   NIFEdipine (ADALAT CC) 90 MG extended release tablet Take 45 mg by mouth daily    Historical Provider, MD   Coenzyme Q10 (CO Q 10) 100 MG CAPS Take 100 mg by mouth nightly    Historical Provider, MD acetaminophen (TYLENOL) 500 MG tablet Take 500 mg by mouth See Admin Instructions Take 500 mg in am  Take 1000 mg in Pm    Historical Provider, MD   traMADol (ULTRAM) 50 MG tablet Take 1 tablet by mouth 2 times daily as needed for Pain. 9/8/20   Historical Provider, MD   TRULICITY 1.5 AW/0.1HU SOPN Inject 1.5 mg into the skin once a week Mondays 2/11/19   Historical Provider, MD   Magnesium Oxide 500 MG TABS Take 500 mg by mouth daily     Historical Provider, MD   ONE TOUCH ULTRA TEST strip  8/8/16   Historical Provider, MD   BD ULTRA-FINE PEN NEEDLES 29G X 12.7MM [address] [facility]  8/1/16   Historical Provider, MD   glimepiride (AMARYL) 4 MG tablet Take 2 mg by mouth every morning (before breakfast)     Historical Provider, MD   Omega-3 Fatty Acids (FISH OIL) 1200 MG CAPS Take 1 capsule by mouth 2 times daily Plus 360 omega 3    Historical Provider, MD   Glucosamine Sulfate 1000 MG CAPS Take 10,000 mg by mouth daily     Historical Provider, MD   Chromium-Cinnamon (CINNAMON PLUS CHROMIUM PO) Take 1,000 mg by mouth 2 times daily     Historical Provider, MD   Multiple Vitamins-Minerals (ONE-A-DAY MENS 50+ ADVANTAGE PO) Take 1 tablet by mouth daily     Historical Provider, MD   Vitamin D (CHOLECALCIFEROL) 1000 UNITS CAPS capsule   Take by mouth 2 times daily Vitamin D 3    Historical Provider, MD   Insulin Syringes, Disposable, U-100 0.3 ML MISC Inject  into the skin.  Use prn 7/19/12   Ronel Cole, DO

## 2022-07-25 RX ORDER — SACUBITRIL AND VALSARTAN 97; 103 MG/1; MG/1
TABLET, FILM COATED ORAL
Qty: 180 TABLET | Refills: 3 | Status: SHIPPED | OUTPATIENT
Start: 2022-07-25

## 2022-08-02 ENCOUNTER — HOSPITAL ENCOUNTER (OUTPATIENT)
Age: 72
Discharge: HOME | End: 2022-08-02
Payer: MEDICARE

## 2022-08-02 LAB
CREATININE URINE: 24 MG/DL (ref 39–259)
MICROALBUMIN/CREAT 24H UR: 34 MG/L
MICROALBUMIN/CREAT UR-RTO: 142 MCG/MG CREAT

## 2022-08-02 PROCEDURE — 82043 UR ALBUMIN QUANTITATIVE: CPT

## 2022-08-02 PROCEDURE — 82570 ASSAY OF URINE CREATININE: CPT

## 2022-08-29 RX ORDER — SPIRONOLACTONE 25 MG/1
TABLET ORAL
Qty: 90 TABLET | Refills: 3 | Status: SHIPPED | OUTPATIENT
Start: 2022-08-29

## 2022-09-16 ENCOUNTER — APPOINTMENT (OUTPATIENT)
Dept: ULTRASOUND IMAGING | Age: 72
DRG: 872 | End: 2022-09-16
Payer: MEDICARE

## 2022-09-16 ENCOUNTER — TELEPHONE (OUTPATIENT)
Dept: FAMILY MEDICINE CLINIC | Age: 72
End: 2022-09-16

## 2022-09-16 ENCOUNTER — APPOINTMENT (OUTPATIENT)
Dept: GENERAL RADIOLOGY | Age: 72
DRG: 872 | End: 2022-09-16
Payer: MEDICARE

## 2022-09-16 ENCOUNTER — HOSPITAL ENCOUNTER (INPATIENT)
Age: 72
LOS: 3 days | Discharge: HOME | DRG: 872 | End: 2022-09-19
Attending: FAMILY MEDICINE | Admitting: INTERNAL MEDICINE
Payer: MEDICARE

## 2022-09-16 DIAGNOSIS — Z20.822 LAB TEST NEGATIVE FOR COVID-19 VIRUS: ICD-10-CM

## 2022-09-16 DIAGNOSIS — R78.81 BACTEREMIA: ICD-10-CM

## 2022-09-16 DIAGNOSIS — R65.10 SIRS (SYSTEMIC INFLAMMATORY RESPONSE SYNDROME) (HCC): Primary | ICD-10-CM

## 2022-09-16 PROBLEM — A41.9 SEPSIS (HCC): Status: ACTIVE | Noted: 2022-09-16

## 2022-09-16 LAB
-: NORMAL
ABSOLUTE EOS #: 0 K/UL (ref 0–0.4)
ABSOLUTE LYMPH #: 0.52 K/UL (ref 1–4.8)
ABSOLUTE MONO #: 0.86 K/UL (ref 0–1)
ANION GAP SERPL CALCULATED.3IONS-SCNC: 11 MMOL/L (ref 9–17)
BASOPHILS # BLD: 0 % (ref 0–2)
BASOPHILS ABSOLUTE: 0 K/UL (ref 0–0.2)
BILIRUBIN URINE: NEGATIVE
BUN BLDV-MCNC: 23 MG/DL (ref 8–23)
BUN/CREAT BLD: 24 (ref 9–20)
CALCIUM SERPL-MCNC: 8.7 MG/DL (ref 8.6–10.4)
CHLORIDE BLD-SCNC: 101 MMOL/L (ref 98–107)
CO2: 24 MMOL/L (ref 20–31)
COLOR: YELLOW
COMMENT UA: ABNORMAL
CREAT SERPL-MCNC: 0.95 MG/DL (ref 0.7–1.2)
EOSINOPHILS RELATIVE PERCENT: 0 % (ref 0–5)
EPITHELIAL CELLS UA: NORMAL /HPF
FLU A ANTIGEN: NEGATIVE
FLU B ANTIGEN: NEGATIVE
GFR AFRICAN AMERICAN: >60 ML/MIN
GFR NON-AFRICAN AMERICAN: >60 ML/MIN
GFR SERPL CREATININE-BSD FRML MDRD: ABNORMAL ML/MIN/{1.73_M2}
GLUCOSE BLD-MCNC: 189 MG/DL (ref 65–99)
GLUCOSE BLD-MCNC: 196 MG/DL (ref 70–99)
GLUCOSE BLD-MCNC: 213 MG/DL (ref 65–99)
GLUCOSE URINE: NEGATIVE
HCT VFR BLD CALC: 37 % (ref 41–53)
HEMOGLOBIN: 12.3 G/DL (ref 13.5–17.5)
KETONES, URINE: ABNORMAL
LACTIC ACID: 1.1 MMOL/L (ref 0.5–2.2)
LEUKOCYTE ESTERASE, URINE: NEGATIVE
LYMPHOCYTES # BLD: 3 % (ref 13–44)
MCH RBC QN AUTO: 31 PG (ref 26–34)
MCHC RBC AUTO-ENTMCNC: 33.4 G/DL (ref 31–37)
MCV RBC AUTO: 92.8 FL (ref 80–100)
MONOCYTES # BLD: 5 % (ref 5–9)
MORPHOLOGY: ABNORMAL
NITRITE, URINE: NEGATIVE
PDW BLD-RTO: 13.9 % (ref 12.1–15.2)
PH UA: 5 (ref 5–8)
PLATELET # BLD: 224 K/UL (ref 140–450)
POTASSIUM SERPL-SCNC: 4.4 MMOL/L (ref 3.7–5.3)
PROTEIN UA: ABNORMAL
RBC # BLD: 3.98 M/UL (ref 4.5–5.9)
RBC UA: NORMAL /HPF (ref 0–2)
SARS-COV-2, RAPID: NOT DETECTED
SARS-COV-2, RAPID: NOT DETECTED
SEG NEUTROPHILS: 92 % (ref 39–75)
SEGMENTED NEUTROPHILS ABSOLUTE COUNT: 15.82 K/UL (ref 2.1–6.5)
SODIUM BLD-SCNC: 136 MMOL/L (ref 135–144)
SPECIFIC GRAVITY UA: 1.02 (ref 1–1.03)
SPECIMEN DESCRIPTION: NORMAL
SPECIMEN DESCRIPTION: NORMAL
TROPONIN, HIGH SENSITIVITY: 43 NG/L (ref 0–22)
TROPONIN, HIGH SENSITIVITY: 45 NG/L (ref 0–22)
TURBIDITY: CLEAR
URINE HGB: ABNORMAL
UROBILINOGEN, URINE: NORMAL
WBC # BLD: 17.2 K/UL (ref 3.5–11)
WBC UA: NORMAL /HPF

## 2022-09-16 PROCEDURE — 94761 N-INVAS EAR/PLS OXIMETRY MLT: CPT

## 2022-09-16 PROCEDURE — 82947 ASSAY GLUCOSE BLOOD QUANT: CPT

## 2022-09-16 PROCEDURE — 87804 INFLUENZA ASSAY W/OPTIC: CPT

## 2022-09-16 PROCEDURE — 80048 BASIC METABOLIC PNL TOTAL CA: CPT

## 2022-09-16 PROCEDURE — 93976 VASCULAR STUDY: CPT

## 2022-09-16 PROCEDURE — 81001 URINALYSIS AUTO W/SCOPE: CPT

## 2022-09-16 PROCEDURE — 99285 EMERGENCY DEPT VISIT HI MDM: CPT

## 2022-09-16 PROCEDURE — 87205 SMEAR GRAM STAIN: CPT

## 2022-09-16 PROCEDURE — 93005 ELECTROCARDIOGRAM TRACING: CPT | Performed by: FAMILY MEDICINE

## 2022-09-16 PROCEDURE — 36415 COLL VENOUS BLD VENIPUNCTURE: CPT

## 2022-09-16 PROCEDURE — 85025 COMPLETE CBC W/AUTO DIFF WBC: CPT

## 2022-09-16 PROCEDURE — 83605 ASSAY OF LACTIC ACID: CPT

## 2022-09-16 PROCEDURE — 6370000000 HC RX 637 (ALT 250 FOR IP): Performed by: FAMILY MEDICINE

## 2022-09-16 PROCEDURE — 87635 SARS-COV-2 COVID-19 AMP PRB: CPT

## 2022-09-16 PROCEDURE — C9803 HOPD COVID-19 SPEC COLLECT: HCPCS

## 2022-09-16 PROCEDURE — 86403 PARTICLE AGGLUT ANTBDY SCRN: CPT

## 2022-09-16 PROCEDURE — 87086 URINE CULTURE/COLONY COUNT: CPT

## 2022-09-16 PROCEDURE — 6370000000 HC RX 637 (ALT 250 FOR IP): Performed by: INTERNAL MEDICINE

## 2022-09-16 PROCEDURE — 83036 HEMOGLOBIN GLYCOSYLATED A1C: CPT

## 2022-09-16 PROCEDURE — 2580000003 HC RX 258: Performed by: INTERNAL MEDICINE

## 2022-09-16 PROCEDURE — 2700000000 HC OXYGEN THERAPY PER DAY

## 2022-09-16 PROCEDURE — 96374 THER/PROPH/DIAG INJ IV PUSH: CPT

## 2022-09-16 PROCEDURE — 6360000002 HC RX W HCPCS: Performed by: INTERNAL MEDICINE

## 2022-09-16 PROCEDURE — 87040 BLOOD CULTURE FOR BACTERIA: CPT

## 2022-09-16 PROCEDURE — 6360000002 HC RX W HCPCS: Performed by: FAMILY MEDICINE

## 2022-09-16 PROCEDURE — 1200000000 HC SEMI PRIVATE

## 2022-09-16 PROCEDURE — 84484 ASSAY OF TROPONIN QUANT: CPT

## 2022-09-16 PROCEDURE — 71045 X-RAY EXAM CHEST 1 VIEW: CPT

## 2022-09-16 RX ORDER — FUROSEMIDE 40 MG/1
80 TABLET ORAL 2 TIMES DAILY
Status: DISCONTINUED | OUTPATIENT
Start: 2022-09-16 | End: 2022-09-19 | Stop reason: HOSPADM

## 2022-09-16 RX ORDER — NIFEDIPINE 30 MG/1
90 TABLET, EXTENDED RELEASE ORAL DAILY
Status: DISCONTINUED | OUTPATIENT
Start: 2022-09-16 | End: 2022-09-18

## 2022-09-16 RX ORDER — DEXTROSE MONOHYDRATE 100 MG/ML
INJECTION, SOLUTION INTRAVENOUS CONTINUOUS PRN
Status: DISCONTINUED | OUTPATIENT
Start: 2022-09-16 | End: 2022-09-19 | Stop reason: HOSPADM

## 2022-09-16 RX ORDER — ATORVASTATIN CALCIUM 40 MG/1
40 TABLET, FILM COATED ORAL NIGHTLY
Status: DISCONTINUED | OUTPATIENT
Start: 2022-09-16 | End: 2022-09-19 | Stop reason: HOSPADM

## 2022-09-16 RX ORDER — POLYETHYLENE GLYCOL 3350 17 G/17G
17 POWDER, FOR SOLUTION ORAL DAILY PRN
Status: DISCONTINUED | OUTPATIENT
Start: 2022-09-16 | End: 2022-09-19 | Stop reason: HOSPADM

## 2022-09-16 RX ORDER — LEVOFLOXACIN 5 MG/ML
500 INJECTION, SOLUTION INTRAVENOUS EVERY 24 HOURS
Status: DISCONTINUED | OUTPATIENT
Start: 2022-09-16 | End: 2022-09-18

## 2022-09-16 RX ORDER — SODIUM CHLORIDE 0.9 % (FLUSH) 0.9 %
5-40 SYRINGE (ML) INJECTION EVERY 12 HOURS SCHEDULED
Status: DISCONTINUED | OUTPATIENT
Start: 2022-09-16 | End: 2022-09-19 | Stop reason: HOSPADM

## 2022-09-16 RX ORDER — INSULIN LISPRO 100 [IU]/ML
0-4 INJECTION, SOLUTION INTRAVENOUS; SUBCUTANEOUS NIGHTLY
Status: DISCONTINUED | OUTPATIENT
Start: 2022-09-16 | End: 2022-09-19 | Stop reason: HOSPADM

## 2022-09-16 RX ORDER — LANOLIN ALCOHOL/MO/W.PET/CERES
400 CREAM (GRAM) TOPICAL DAILY
Status: DISCONTINUED | OUTPATIENT
Start: 2022-09-16 | End: 2022-09-19 | Stop reason: HOSPADM

## 2022-09-16 RX ORDER — NIFEDIPINE 30 MG/1
90 TABLET, EXTENDED RELEASE ORAL ONCE
Status: COMPLETED | OUTPATIENT
Start: 2022-09-16 | End: 2022-09-16

## 2022-09-16 RX ORDER — AMIODARONE HYDROCHLORIDE 200 MG/1
100 TABLET ORAL NIGHTLY
Status: DISCONTINUED | OUTPATIENT
Start: 2022-09-16 | End: 2022-09-19 | Stop reason: HOSPADM

## 2022-09-16 RX ORDER — SPIRONOLACTONE 25 MG/1
25 TABLET ORAL DAILY
Status: DISCONTINUED | OUTPATIENT
Start: 2022-09-16 | End: 2022-09-19 | Stop reason: HOSPADM

## 2022-09-16 RX ORDER — LANOLIN ALCOHOL/MO/W.PET/CERES
3000 CREAM (GRAM) TOPICAL DAILY
Status: DISCONTINUED | OUTPATIENT
Start: 2022-09-16 | End: 2022-09-19 | Stop reason: HOSPADM

## 2022-09-16 RX ORDER — ONDANSETRON 2 MG/ML
4 INJECTION INTRAMUSCULAR; INTRAVENOUS EVERY 6 HOURS PRN
Status: DISCONTINUED | OUTPATIENT
Start: 2022-09-16 | End: 2022-09-19 | Stop reason: HOSPADM

## 2022-09-16 RX ORDER — ISOSORBIDE MONONITRATE 30 MG/1
30 TABLET, EXTENDED RELEASE ORAL DAILY
Status: DISCONTINUED | OUTPATIENT
Start: 2022-09-16 | End: 2022-09-19 | Stop reason: HOSPADM

## 2022-09-16 RX ORDER — ACETAMINOPHEN 325 MG/1
650 TABLET ORAL EVERY 6 HOURS PRN
Status: DISCONTINUED | OUTPATIENT
Start: 2022-09-16 | End: 2022-09-19 | Stop reason: HOSPADM

## 2022-09-16 RX ORDER — CARVEDILOL 3.12 MG/1
3.12 TABLET ORAL ONCE
Status: COMPLETED | OUTPATIENT
Start: 2022-09-16 | End: 2022-09-16

## 2022-09-16 RX ORDER — SODIUM CHLORIDE 9 MG/ML
INJECTION, SOLUTION INTRAVENOUS PRN
Status: DISCONTINUED | OUTPATIENT
Start: 2022-09-16 | End: 2022-09-19 | Stop reason: HOSPADM

## 2022-09-16 RX ORDER — ONDANSETRON 2 MG/ML
4 INJECTION INTRAMUSCULAR; INTRAVENOUS ONCE
Status: COMPLETED | OUTPATIENT
Start: 2022-09-16 | End: 2022-09-16

## 2022-09-16 RX ORDER — CELECOXIB 100 MG/1
200 CAPSULE ORAL DAILY
Status: DISCONTINUED | OUTPATIENT
Start: 2022-09-16 | End: 2022-09-19 | Stop reason: HOSPADM

## 2022-09-16 RX ORDER — SODIUM CHLORIDE 0.9 % (FLUSH) 0.9 %
5-40 SYRINGE (ML) INJECTION PRN
Status: DISCONTINUED | OUTPATIENT
Start: 2022-09-16 | End: 2022-09-19 | Stop reason: HOSPADM

## 2022-09-16 RX ORDER — VITAMIN B COMPLEX
1000 TABLET ORAL 2 TIMES DAILY
Status: DISCONTINUED | OUTPATIENT
Start: 2022-09-16 | End: 2022-09-19 | Stop reason: HOSPADM

## 2022-09-16 RX ORDER — CARVEDILOL 3.12 MG/1
3.12 TABLET ORAL DAILY
Status: DISCONTINUED | OUTPATIENT
Start: 2022-09-16 | End: 2022-09-19 | Stop reason: HOSPADM

## 2022-09-16 RX ORDER — INSULIN LISPRO 100 [IU]/ML
0-8 INJECTION, SOLUTION INTRAVENOUS; SUBCUTANEOUS
Status: DISCONTINUED | OUTPATIENT
Start: 2022-09-16 | End: 2022-09-19 | Stop reason: HOSPADM

## 2022-09-16 RX ORDER — TRAMADOL HYDROCHLORIDE 50 MG/1
50 TABLET ORAL 2 TIMES DAILY PRN
Status: DISCONTINUED | OUTPATIENT
Start: 2022-09-16 | End: 2022-09-19 | Stop reason: HOSPADM

## 2022-09-16 RX ORDER — SODIUM CHLORIDE 9 MG/ML
INJECTION, SOLUTION INTRAVENOUS CONTINUOUS
Status: DISCONTINUED | OUTPATIENT
Start: 2022-09-16 | End: 2022-09-18

## 2022-09-16 RX ORDER — INSULIN GLARGINE 100 [IU]/ML
40 INJECTION, SOLUTION SUBCUTANEOUS 2 TIMES DAILY
Status: DISCONTINUED | OUTPATIENT
Start: 2022-09-16 | End: 2022-09-18

## 2022-09-16 RX ORDER — ACETAMINOPHEN 650 MG/1
650 SUPPOSITORY RECTAL EVERY 6 HOURS PRN
Status: DISCONTINUED | OUTPATIENT
Start: 2022-09-16 | End: 2022-09-19 | Stop reason: HOSPADM

## 2022-09-16 RX ORDER — AMOXICILLIN 500 MG
1 CAPSULE ORAL 2 TIMES DAILY
Status: DISCONTINUED | OUTPATIENT
Start: 2022-09-16 | End: 2022-09-16

## 2022-09-16 RX ORDER — ONDANSETRON 4 MG/1
4 TABLET, ORALLY DISINTEGRATING ORAL EVERY 8 HOURS PRN
Status: DISCONTINUED | OUTPATIENT
Start: 2022-09-16 | End: 2022-09-19 | Stop reason: HOSPADM

## 2022-09-16 RX ORDER — M-VIT,TX,IRON,MINS/CALC/FOLIC 27MG-0.4MG
1 TABLET ORAL DAILY
Status: DISCONTINUED | OUTPATIENT
Start: 2022-09-16 | End: 2022-09-19 | Stop reason: HOSPADM

## 2022-09-16 RX ADMIN — CYANOCOBALAMIN TAB 1000 MCG 3000 MCG: 1000 TAB at 19:00

## 2022-09-16 RX ADMIN — LEVOFLOXACIN 500 MG: 500 INJECTION, SOLUTION INTRAVENOUS at 19:05

## 2022-09-16 RX ADMIN — SPIRONOLACTONE 25 MG: 25 TABLET, FILM COATED ORAL at 19:00

## 2022-09-16 RX ADMIN — Medication 400 MG: at 19:00

## 2022-09-16 RX ADMIN — NIFEDIPINE 90 MG: 30 TABLET, FILM COATED, EXTENDED RELEASE ORAL at 14:02

## 2022-09-16 RX ADMIN — ONDANSETRON 4 MG: 2 INJECTION INTRAMUSCULAR; INTRAVENOUS at 10:01

## 2022-09-16 RX ADMIN — MULTIPLE VITAMINS W/ MINERALS TAB 1 TABLET: TAB at 19:00

## 2022-09-16 RX ADMIN — SACUBITRIL AND VALSARTAN 4 TABLET: 24; 26 TABLET, FILM COATED ORAL at 21:00

## 2022-09-16 RX ADMIN — CHOLECALCIFEROL TAB 25 MCG (1000 UNIT) 1000 UNITS: 25 TAB at 21:00

## 2022-09-16 RX ADMIN — SODIUM CHLORIDE: 9 INJECTION, SOLUTION INTRAVENOUS at 17:55

## 2022-09-16 RX ADMIN — ISOSORBIDE MONONITRATE 30 MG: 30 TABLET, EXTENDED RELEASE ORAL at 19:00

## 2022-09-16 RX ADMIN — APIXABAN 5 MG: 5 TABLET, FILM COATED ORAL at 21:00

## 2022-09-16 RX ADMIN — CARVEDILOL 3.12 MG: 3.12 TABLET, FILM COATED ORAL at 14:02

## 2022-09-16 RX ADMIN — INSULIN GLARGINE 40 UNITS: 100 INJECTION, SOLUTION SUBCUTANEOUS at 21:14

## 2022-09-16 RX ADMIN — ATORVASTATIN CALCIUM 40 MG: 40 TABLET, FILM COATED ORAL at 21:00

## 2022-09-16 RX ADMIN — CELECOXIB 200 MG: 100 CAPSULE ORAL at 19:00

## 2022-09-16 ASSESSMENT — PAIN DESCRIPTION - LOCATION: LOCATION: BACK

## 2022-09-16 ASSESSMENT — PAIN DESCRIPTION - FREQUENCY: FREQUENCY: CONTINUOUS

## 2022-09-16 ASSESSMENT — PAIN SCALES - GENERAL: PAINLEVEL_OUTOF10: 6

## 2022-09-16 ASSESSMENT — PAIN - FUNCTIONAL ASSESSMENT
PAIN_FUNCTIONAL_ASSESSMENT: PREVENTS OR INTERFERES SOME ACTIVE ACTIVITIES AND ADLS
PAIN_FUNCTIONAL_ASSESSMENT: 0-10

## 2022-09-16 ASSESSMENT — PAIN DESCRIPTION - PAIN TYPE: TYPE: CHRONIC PAIN

## 2022-09-16 ASSESSMENT — PAIN DESCRIPTION - ORIENTATION: ORIENTATION: MID

## 2022-09-16 ASSESSMENT — PAIN DESCRIPTION - DESCRIPTORS: DESCRIPTORS: ACHING

## 2022-09-16 NOTE — TELEPHONE ENCOUNTER
Pt's wife called stating pt had a heart ablation 6 weeks ago. Today he has a fever,vomiting, palpations, elevated heart rate, and body aches. Pt took a home COVID test with a negative result. Advised pt to go to the ER.

## 2022-09-16 NOTE — ED PROVIDER NOTES
[facility]  eMERGENCY dEPARTMENT eNCOUnter          CHIEF COMPLAINT       Chief Complaint   Patient presents with    Fatigue     Per wife pt has lost 16lb over the past month. Has been feeling weak, fever, chills, emesis since yesterday- home COVID test was negative. Had an ablation 6 weeks ago(7/25) at [facility]         Nurses Notes reviewed and I agree except as noted in the HPI. HISTORY OF PRESENT ILLNESS    Anh Bailey is a 71 y.o. male who presents to the emergency room via private vehicle with wife, patient has been seen in a fatigue, with weakness, fever chills, has had some vomiting yesterday, little bit of a cough. Patient T-max 102. 1. Last dose Tylenol at 070 0 hours. Patient has had a 16 pound weight loss but does acknowledge she has been urinating a lot more and the lower extremity edema has decreased over the past several weeks, since patient had a cardiac ablation performed 6 weeks prior. Patient states compliant with medications. REVIEW OF SYSTEMS     Review of Systems   All other systems reviewed and are negative. PAST MEDICAL HISTORY    has a past medical history of Arthritis, Arthritis, CAD (coronary artery disease), CHF (congestive heart failure) ([address] CHF (congestive heart failure) ([address] Coronary artery disease, Diabetes ([address] Diabetes mellitus ([address] H/O coronary angioplasty, Hyperlipidemia, Hypertension, Neuropathy, Numbness and tingling, Pulmonary edema, Sleep apnea, and Unspecified sleep apnea. SURGICAL HISTORY      has a past surgical history that includes knee surgery (2010, 2011); Bunionectomy (2010); Cataract removal (2004); Appendectomy; Appendectomy; eye surgery (08/2012); joint replacement (Bilateral); Cardiac catheterization; Coronary angioplasty with stent; Colonoscopy (10/09/2013); Colonoscopy (10/09/2013); Cardiac catheterization (Left, 05/14/2014); Cardiac catheterization (Left, 05/14/2014);  Coronary artery bypass graft (06/03/2014); angioplasty (08/30/2017); Cardiac catheterization (Bilateral, 11/15/2017); Coronary artery bypass graft; knee surgery; joint replacement; Cardiac catheterization (Left, 05/14/2021); and AV node ablation. CURRENT MEDICATIONS       Current Discharge Medication List        CONTINUE these medications which have NOT CHANGED    Details   cyanocobalamin 1000 MCG tablet Take 3,000 mcg by mouth daily      spironolactone (ALDACTONE) 25 MG tablet TAKE 1 TABLET DAILY  Qty: 90 tablet, Refills: 3      ENTRESTO  MG per tablet TAKE 1 TABLET TWICE A DAY  Qty: 180 tablet, Refills: 3      atorvastatin (LIPITOR) 40 MG tablet TAKE 1 TABLET DAILY  Qty: 90 tablet, Refills: 3      isosorbide mononitrate (IMDUR) 30 MG extended release tablet TAKE 1 TABLET DAILY  Qty: 90 tablet, Refills: 3      doxazosin (CARDURA) 8 MG tablet Take 1 tablet by mouth 2 times daily  Qty: 180 tablet, Refills: 3      celecoxib (CELEBREX) 200 MG capsule TAKE 1 CAPSULE DAILY  Qty: 90 capsule, Refills: 1      apixaban (ELIQUIS) 5 MG TABS tablet TAKE 1 TABLET TWICE A DAY  Qty: 180 tablet, Refills: 3    Associated Diagnoses: Pulmonary HTN ([facility] Coronary artery disease involving native coronary artery of native heart with angina pectoris ([facility] [facility] PAF (paroxysmal atrial fibrillation) ([facility] Chronic diastolic CHF (congestive heart failure), NYHA class 3 ([facility])      carvedilol (COREG) 3.125 MG tablet Take 1 tablet by mouth daily  Qty: 90 tablet, Refills: 3      furosemide (LASIX) 80 MG tablet TAKE 1 TABLET TWICE A DAY  Qty: 180 tablet, Refills: 3      NOVOLIN 70/30 (70-30) 100 UNIT/ML injection vial Inject 54 units AM, Inject 32 units at Lunch, Inject 40 units at supper as directed subcutaneously.   Qty: 1 vial, Refills: 0      metFORMIN (GLUCOPHAGE) 1000 MG tablet Take 1 tablet by mouth 2 times daily (with meals)  Qty: 60 tablet, Refills: 0      NIFEdipine (ADALAT CC) 90 MG extended release tablet Take 45 mg by mouth daily      Coenzyme Q10 (CO Q 10) 100 MG CAPS Take 100 mg by mouth nightly      acetaminophen (TYLENOL) 500 MG tablet Take 500 mg by mouth See Admin Instructions Take 500 mg in am  Take 1000 mg in Pm      traMADol (ULTRAM) 50 MG tablet Take 1 tablet by mouth 2 times daily as needed for Pain. TRULICITY 1.5 GC/4.1IC SOPN Inject 1.5 mg into the skin once a week       Magnesium Oxide 500 MG TABS Take 500 mg by mouth daily       ONE TOUCH ULTRA TEST strip       BD ULTRA-FINE PEN NEEDLES 29G X 12.7MM MISC       glimepiride (AMARYL) 4 MG tablet Take 2 mg by mouth every morning (before breakfast)       Omega-3 Fatty Acids (FISH OIL) 1200 MG CAPS Take 1 capsule by mouth 2 times daily Plus 360 omega 3      Chromium-Cinnamon (CINNAMON PLUS CHROMIUM PO) Take 1,000 mg by mouth 2 times daily       Multiple Vitamins-Minerals (ONE-A-DAY MENS 50+ ADVANTAGE PO) Take 1 tablet by mouth daily       Vitamin D (CHOLECALCIFEROL) 1000 UNITS CAPS capsule   Take by mouth 2 times daily Vitamin D 3      Insulin Syringes, Disposable, U-100 0.3 ML MISC Inject  into the skin. Use prn  Qty: 25 each, Refills: 0             ALLERGIES     has No Known Allergies. FAMILY HISTORY     He indicated that his mother is . He indicated that his father is . He indicated that his sister is alive. He indicated that his brother is alive. family history includes Alzheimer's Disease in his mother; Arthritis in his father and mother; Diabetes in his father and mother; Heart Disease in his father and mother; High Blood Pressure in his father and mother. SOCIAL HISTORY      reports that he quit smoking about 46 years ago. His smoking use included cigarettes. He has a 1.50 pack-year smoking history. He has never used smokeless tobacco. He reports that he does not drink alcohol and does not use drugs. PHYSICAL EXAM     INITIAL VITALS:  height is 5' 10\" (1.778 m) and weight is 244 lb 14.4 oz (111.1 kg). His oral temperature is 97.9 °F (36.6 °C).  His blood pressure is 120/55 (abnormal) and his pulse is 81. His respiration is 22 and oxygen saturation is 94%. Physical Exam   Constitutional: Patient is oriented to person, place, and time. Patient appears well-developed and well-nourished. Patient is active and cooperative. HENT:   Head: Normocephalic and atraumatic. Head is without contusion. Right Ear: Hearing and external ear normal. No drainage. Left Ear: Hearing and external ear normal. No drainage. Nose: Nose normal. No nasal deformity. No epistaxis. Mouth/Throat: Mucous membranes are not dry. Negative oral lesions or exudate  Eyes: EOMI. Conjunctivae, sclera, and lids are normal. Right eye exhibits no discharge. Left eye exhibits no discharge. Neck: Full passive range of motion without pain and phonation normal.   Cardiovascular:  Normal rate, regular rhythm and intact distal pulses. Bilateral lower extremity edema with 1-2+ pitting, right lightly greater than left  Pulses: Right radial pulse  2+   Pulmonary/Chest: Effort normal. No tachypnea and no bradypnea. No wheezes, rhonchi, or rales. Abdominal: Soft. Patient without distension or tenderness  Musculoskeletal:   Negative acute trauma or deformity,  apparent full range of motion and normal strength all extremities appropriate to age. Neurological: Patient is alert and oriented to person, place, and time. patient displays no tremor. Patient displays no seizure activity. Lymphatic: No gross cervical lymphadenopathy  Skin: Skin is warm and dry. Patient is not diaphoretic. Psychiatric: Patient has a normal mood and fatigued affect.  Patient speech is normal and behavior is normal. Cognition and memory are normal.     DIFFERENTIAL DIAGNOSIS:   Pneumonia bronchitis URI viral illness NOS dysrhythmia    DIAGNOSTIC RESULTS     EKG: All EKG's are interpreted by the Emergency Department Physician who either signs or Co-signs this chart in the absence of a cardiologist.  EKG    The patient had an EKG which is interpreted by me in the absence of a Cardiologist.   comparison to previous. 4/1/2022     EKG @ 1038 hrs -sinus rhythm, rate 91, right axis, , QRS 152ms, QTC prolonged 489, noted LBBB as compared to prior EKG the left bundle branch block more pronounced, noted change in axis of leads I      RADIOLOGY: non-plain film images(s) such as CT, Ultrasound and MRI are read by the radiologist.  [address]   Final Result      No overt evidence of epididymitis. No mass or torsion. XR CHEST PORTABLE   Final Result      Cardiomegaly. No acute change.                       LABS:   Labs Reviewed   CULTURE, BLOOD 1 - Abnormal; Notable for the following components:       Result Value    Culture POSITIVE Blood Culture (*)     All other components within normal limits   CULTURE, BLOOD 2 - Abnormal; Notable for the following components:    Culture POSITIVE Blood Culture (*)     All other components within normal limits   BASIC METABOLIC PANEL - Abnormal; Notable for the following components:    Glucose 196 (*)     Bun/Cre Ratio 24 (*)     All other components within normal limits   CBC WITH AUTO DIFFERENTIAL - Abnormal; Notable for the following components:    WBC 17.2 (*)     RBC 3.98 (*)     Hemoglobin 12.3 (*)     Hematocrit 37.0 (*)     Seg Neutrophils 92 (*)     Lymphocytes 3 (*)     Segs Absolute 15.82 (*)     Absolute Lymph # 0.52 (*)     All other components within normal limits   TROPONIN - Abnormal; Notable for the following components:    Troponin, High Sensitivity 45 (*)     All other components within normal limits   URINALYSIS - Abnormal; Notable for the following components:    Ketones, Urine SMALL (*)     Urine Hgb TRACE (*)     Protein, UA 1+ (*)     All other components within normal limits   TROPONIN - Abnormal; Notable for the following components:    Troponin, High Sensitivity 43 (*)     All other components within normal limits   GLUCOSE, WHOLE BLOOD - Abnormal; Notable for the following components:    POC Glucose 189 (*)     All other components within normal limits   GLUCOSE, WHOLE BLOOD - Abnormal; Notable for the following components:    POC Glucose 213 (*)     All other components within normal limits   COVID-19, RAPID   RAPID INFLUENZA A/B ANTIGENS   COVID-19, RAPID   CULTURE, URINE   MICROSCOPIC URINALYSIS   LACTIC ACID   HEMOGLOBIN P7Z   BASIC METABOLIC PANEL W/ REFLEX TO MG FOR LOW K   LACTIC ACID   CBC WITH AUTO DIFFERENTIAL   POCT GLUCOSE   POCT GLUCOSE   POCT GLUCOSE   POCT GLUCOSE       EMERGENCY DEPARTMENT COURSE:   Vitals:    Vitals:    09/16/22 2000 09/16/22 2100 09/16/22 2200 09/17/22 0000   BP:  (!) 124/58  (!) 120/55   Pulse: 86 84  81   Resp:  20  22   Temp:  97.8 °F (36.6 °C)  97.9 °F (36.6 °C)   TempSrc:  Oral  Oral   SpO2:  93% 93% 94%   Weight:       Height:         Patient history and physical exam taken at bedside, discussed patient symptoms and exam findings, discussed initial work-up to include rapid COVID test, chest x-ray, blood work. Patient placed on cardiac monitor, pulse ox, sitting high semi-Solis's in bed with wife at bedside. Chest x-ray reviewed, no gross consolidation effusion or pneumothorax    EKG as above    Rapid COVID-negative    Patient and patient's wife at bedside updated on COVID and chest x-ray results, we do blood work, acknowledged. Patient was asking for something such as ice chips for his mouth, given 1 cup ice chips    Watching patient on monitor, noted to have a run of SVT approximately 15-20 beats, EKG and troponin added to work-up    Initial labs reviewed, noting WBC 17.2 with 92% PMNs no bands, SCR 0.95, normal electrolytes, hsTnT 45    Patient's white blood cell count, respiratory rate, initial heart rate, patient does meet SIRS criteria.     Discussed with patient and patient's wife initial work-up, discussed concern for patient's white blood cell count, possible infection somewhere, at this time not seen in chest x-ray, I am awaiting urine results, patient does note these been having some tenderness in his scrotal area, I did examine him, there is does seem to be some tenderness in the bilateral testes, likely small hydrocele varicocele in the posterior left, no overlying integument aberration, will order ultrasound to look for orchitis or epididymitis as a possible cause given the patient is a diabetic. Urine analysis reviewed    Initial review of ultrasound imaging, no marked orchitis or epididymitis appreciated, later confirmed by radiology report    Patient began spiking fever in the emergency room, currently 100.2    Discussed with patient patient's wife overall work-up, patient had does have SIRS criteria, however I am unable to find a source and chest x-ray, urine, skin or scrotal, but given patient's overall presentation and work-up, these are high get patient admitted for further evaluation, given my concern that although the ultrasound not showing that he does have some bilateral tenderness in the testicle area, would start patient on antibiotic for presumed orchitis/testicular infection in the interim, will discuss with hospitalist, acknowledged    Case was discussed with Dr. Rosangela Florence, hospitalist, regarding patient's presentation and current work-up, accepted for admission    Blood cultures x2 ordered, I did add a urine culture on the patient's urine studies from previous, lactic acid ordered. Lactic 1.1, hsTnT 43        FINAL IMPRESSION      1. SIRS (systemic inflammatory response syndrome) (HCC)    2. Lab test negative for COVID-19 virus          DISPOSITION/PLAN   admit    PATIENT REFERRED TO:  No follow-up provider specified.     DISCHARGE MEDICATIONS:  Current Discharge Medication List              Summation      Patient Course:  admit    ED Medications administered this visit:    Medications   amiodarone (CORDARONE) tablet 100 mg (100 mg Oral Not Given 9/16/22 2103)   apixaban (ELIQUIS) tablet 5 mg (5 mg Oral Given 9/16/22 2100)   atorvastatin (LIPITOR) tablet 40 mg (40 mg Oral Given 9/16/22 2100)   carvedilol (COREG) tablet 3.125 mg (3.125 mg Oral Not Given 9/16/22 1914)   celecoxib (CELEBREX) capsule 200 mg (200 mg Oral Given 9/16/22 1900)   vitamin B-12 (CYANOCOBALAMIN) tablet 3,000 mcg (3,000 mcg Oral Given 9/16/22 1900)   sacubitril-valsartan (ENTRESTO) 24-26 MG per tablet 4 tablet (4 tablets Oral Given 9/16/22 2100)   furosemide (LASIX) tablet 80 mg (80 mg Oral Not Given 9/16/22 2103)   isosorbide mononitrate (IMDUR) extended release tablet 30 mg (30 mg Oral Given 9/16/22 1900)   magnesium oxide (MAG-OX) tablet 400 mg (400 mg Oral Given 9/16/22 1900)   NIFEdipine (PROCARDIA XL) extended release tablet 90 mg (90 mg Oral Not Given 9/16/22 1913)   therapeutic multivitamin-minerals 1 tablet (1 tablet Oral Given 9/16/22 1900)   insulin glargine (LANTUS) injection vial 40 Units (40 Units SubCUTAneous Given 9/16/22 2114)   spironolactone (ALDACTONE) tablet 25 mg (25 mg Oral Given 9/16/22 1900)   traMADol (ULTRAM) tablet 50 mg (has no administration in time range)   Vitamin D (CHOLECALCIFEROL) tablet 1,000 Units (1,000 Units Oral Given 9/16/22 2100)   sodium chloride flush 0.9 % injection 5-40 mL (5 mLs IntraVENous Not Given 9/16/22 2104)   sodium chloride flush 0.9 % injection 5-40 mL (has no administration in time range)   0.9 % sodium chloride infusion (has no administration in time range)   ondansetron (ZOFRAN-ODT) disintegrating tablet 4 mg (has no administration in time range)     Or   ondansetron (ZOFRAN) injection 4 mg (has no administration in time range)   polyethylene glycol (GLYCOLAX) packet 17 g (has no administration in time range)   acetaminophen (TYLENOL) tablet 650 mg (has no administration in time range)     Or   acetaminophen (TYLENOL) suppository 650 mg (has no administration in time range)   0.9 % sodium chloride infusion ( IntraVENous New Bag 9/16/22 0994)   levoFLOXacin (LEVAQUIN) 500 MG/100ML infusion 500 mg (0 mg IntraVENous Stopped 9/16/22 2005)   glucose chewable tablet 16 g (has no administration in time range)   dextrose bolus 10% 125 mL (has no administration in time range)     Or   dextrose bolus 10% 250 mL (has no administration in time range)   glucagon (rDNA) injection 1 mg (has no administration in time range)   dextrose 10 % infusion (has no administration in time range)   insulin lispro (HUMALOG) injection vial 0-8 Units (0 Units SubCUTAneous Not Given 9/16/22 1850)   insulin lispro (HUMALOG) injection vial 0-4 Units (0 Units SubCUTAneous Not Given 9/16/22 2059)   vancomycin (VANCOCIN) 2,000 mg in dextrose 5 % 500 mL IVPB (2,000 mg IntraVENous New Bag 9/17/22 0300)     Followed by   vancomycin 1000 mg IVPB in 250 mL D5W addavial (has no administration in time range)   vancomycin (VANCOCIN) intermittent dosing (placeholder) (has no administration in time range)   vancomycin (VANCOCIN) 1 g injection (  Not Given 9/17/22 0310)   ondansetron (ZOFRAN) injection 4 mg (4 mg IntraVENous Given 9/16/22 1001)   carvedilol (COREG) tablet 3.125 mg (3.125 mg Oral Given 9/16/22 1402)   NIFEdipine (PROCARDIA XL) extended release tablet 90 mg (90 mg Oral Given 9/16/22 1402)       New Prescriptions from this visit:    Current Discharge Medication List          Follow-up:  No follow-up provider specified. Final Impression:   1.  SIRS (systemic inflammatory response syndrome) (HCC)    2. Lab test negative for COVID-19 virus               (Please note that portions of this note were completed with a voice recognition program.  Efforts were made to edit the dictations but occasionally words are mis-transcribed.)    MD Rick Meyer MD  09/17/22 1708

## 2022-09-16 NOTE — PROGRESS NOTES
[address] [facility] [place],[address]   Herbal and Nutritional Product Restrictions      The following herbal, alternative, and/or nutritional/dietary supplement product(s) has been discontinued per [employer] approved policy:    Fish oil OTC (medication name/dose/frequency)    Please reorder upon discharge if appropriate.     Thank you,  Justyn Thomas, [facility]  9/16/2022 5:39 PM

## 2022-09-17 LAB
ABSOLUTE EOS #: 0 K/UL (ref 0–0.4)
ABSOLUTE LYMPH #: 0.6 K/UL (ref 1–4.8)
ABSOLUTE MONO #: 0.8 K/UL (ref 0–1)
ANION GAP SERPL CALCULATED.3IONS-SCNC: 10 MMOL/L (ref 9–17)
BASOPHILS # BLD: 0 % (ref 0–2)
BASOPHILS ABSOLUTE: 0 K/UL (ref 0–0.2)
BUN BLDV-MCNC: 25 MG/DL (ref 8–23)
BUN/CREAT BLD: 24 (ref 9–20)
CALCIUM SERPL-MCNC: 8.3 MG/DL (ref 8.6–10.4)
CHLORIDE BLD-SCNC: 100 MMOL/L (ref 98–107)
CO2: 25 MMOL/L (ref 20–31)
CREAT SERPL-MCNC: 1.06 MG/DL (ref 0.7–1.2)
CULTURE: ABNORMAL
CULTURE: NORMAL
DIFFERENTIAL TYPE: YES
EOSINOPHILS RELATIVE PERCENT: 0 % (ref 0–5)
GFR AFRICAN AMERICAN: >60 ML/MIN
GFR NON-AFRICAN AMERICAN: >60 ML/MIN
GFR SERPL CREATININE-BSD FRML MDRD: ABNORMAL ML/MIN/{1.73_M2}
GLUCOSE BLD-MCNC: 217 MG/DL (ref 65–99)
GLUCOSE BLD-MCNC: 260 MG/DL (ref 65–99)
GLUCOSE BLD-MCNC: 261 MG/DL (ref 65–99)
GLUCOSE BLD-MCNC: 279 MG/DL (ref 70–99)
GLUCOSE BLD-MCNC: 290 MG/DL (ref 65–99)
HCT VFR BLD CALC: 33.4 % (ref 41–53)
HEMOGLOBIN: 11.1 G/DL (ref 13.5–17.5)
LACTIC ACID: 1.2 MMOL/L (ref 0.5–2.2)
LYMPHOCYTES # BLD: 5 % (ref 13–44)
Lab: ABNORMAL
Lab: ABNORMAL
MCH RBC QN AUTO: 30.6 PG (ref 26–34)
MCHC RBC AUTO-ENTMCNC: 33.2 G/DL (ref 31–37)
MCV RBC AUTO: 92.3 FL (ref 80–100)
MONOCYTES # BLD: 7 % (ref 5–9)
PDW BLD-RTO: 14.1 % (ref 12.1–15.2)
PLATELET # BLD: 201 K/UL (ref 140–450)
POTASSIUM SERPL-SCNC: 4.3 MMOL/L (ref 3.7–5.3)
RBC # BLD: 3.62 M/UL (ref 4.5–5.9)
SEG NEUTROPHILS: 88 % (ref 39–75)
SEGMENTED NEUTROPHILS ABSOLUTE COUNT: 9.1 K/UL (ref 2.1–6.5)
SODIUM BLD-SCNC: 135 MMOL/L (ref 135–144)
SPECIMEN DESCRIPTION: ABNORMAL
SPECIMEN DESCRIPTION: ABNORMAL
SPECIMEN DESCRIPTION: NORMAL
WBC # BLD: 10.5 K/UL (ref 3.5–11)

## 2022-09-17 PROCEDURE — 94761 N-INVAS EAR/PLS OXIMETRY MLT: CPT

## 2022-09-17 PROCEDURE — 1200000000 HC SEMI PRIVATE

## 2022-09-17 PROCEDURE — 80048 BASIC METABOLIC PNL TOTAL CA: CPT

## 2022-09-17 PROCEDURE — 83605 ASSAY OF LACTIC ACID: CPT

## 2022-09-17 PROCEDURE — 2580000003 HC RX 258: Performed by: FAMILY MEDICINE

## 2022-09-17 PROCEDURE — 6360000002 HC RX W HCPCS: Performed by: FAMILY MEDICINE

## 2022-09-17 PROCEDURE — 36415 COLL VENOUS BLD VENIPUNCTURE: CPT

## 2022-09-17 PROCEDURE — 6360000002 HC RX W HCPCS: Performed by: INTERNAL MEDICINE

## 2022-09-17 PROCEDURE — 2580000003 HC RX 258: Performed by: INTERNAL MEDICINE

## 2022-09-17 PROCEDURE — 82947 ASSAY GLUCOSE BLOOD QUANT: CPT

## 2022-09-17 PROCEDURE — 6370000000 HC RX 637 (ALT 250 FOR IP): Performed by: INTERNAL MEDICINE

## 2022-09-17 PROCEDURE — 85025 COMPLETE CBC W/AUTO DIFF WBC: CPT

## 2022-09-17 PROCEDURE — 2700000000 HC OXYGEN THERAPY PER DAY

## 2022-09-17 RX ORDER — VANCOMYCIN HYDROCHLORIDE 1 G/20ML
INJECTION, POWDER, LYOPHILIZED, FOR SOLUTION INTRAVENOUS
Status: DISPENSED
Start: 2022-09-17 | End: 2022-09-17

## 2022-09-17 RX ADMIN — APIXABAN 5 MG: 5 TABLET, FILM COATED ORAL at 09:10

## 2022-09-17 RX ADMIN — APIXABAN 5 MG: 5 TABLET, FILM COATED ORAL at 20:43

## 2022-09-17 RX ADMIN — VANCOMYCIN HYDROCHLORIDE 2000 MG: 1 INJECTION, POWDER, LYOPHILIZED, FOR SOLUTION INTRAVENOUS at 03:00

## 2022-09-17 RX ADMIN — INSULIN LISPRO 4 UNITS: 100 INJECTION, SOLUTION INTRAVENOUS; SUBCUTANEOUS at 17:03

## 2022-09-17 RX ADMIN — CHOLECALCIFEROL TAB 25 MCG (1000 UNIT) 1000 UNITS: 25 TAB at 09:11

## 2022-09-17 RX ADMIN — SODIUM CHLORIDE: 9 INJECTION, SOLUTION INTRAVENOUS at 23:15

## 2022-09-17 RX ADMIN — CELECOXIB 200 MG: 100 CAPSULE ORAL at 09:10

## 2022-09-17 RX ADMIN — INSULIN LISPRO 2 UNITS: 100 INJECTION, SOLUTION INTRAVENOUS; SUBCUTANEOUS at 08:49

## 2022-09-17 RX ADMIN — INSULIN LISPRO 4 UNITS: 100 INJECTION, SOLUTION INTRAVENOUS; SUBCUTANEOUS at 12:10

## 2022-09-17 RX ADMIN — ISOSORBIDE MONONITRATE 30 MG: 30 TABLET, EXTENDED RELEASE ORAL at 09:10

## 2022-09-17 RX ADMIN — SODIUM CHLORIDE: 9 INJECTION, SOLUTION INTRAVENOUS at 07:52

## 2022-09-17 RX ADMIN — SPIRONOLACTONE 25 MG: 25 TABLET, FILM COATED ORAL at 09:11

## 2022-09-17 RX ADMIN — CYANOCOBALAMIN TAB 1000 MCG 3000 MCG: 1000 TAB at 09:10

## 2022-09-17 RX ADMIN — CHOLECALCIFEROL TAB 25 MCG (1000 UNIT) 1000 UNITS: 25 TAB at 20:43

## 2022-09-17 RX ADMIN — ATORVASTATIN CALCIUM 40 MG: 40 TABLET, FILM COATED ORAL at 20:43

## 2022-09-17 RX ADMIN — ACETAMINOPHEN 650 MG: 325 TABLET, FILM COATED ORAL at 12:56

## 2022-09-17 RX ADMIN — MULTIPLE VITAMINS W/ MINERALS TAB 1 TABLET: TAB at 09:10

## 2022-09-17 RX ADMIN — SODIUM CHLORIDE, PRESERVATIVE FREE 10 ML: 5 INJECTION INTRAVENOUS at 09:17

## 2022-09-17 RX ADMIN — CARVEDILOL 3.12 MG: 3.12 TABLET, FILM COATED ORAL at 09:09

## 2022-09-17 RX ADMIN — VANCOMYCIN HYDROCHLORIDE 1000 MG: 1 INJECTION, POWDER, LYOPHILIZED, FOR SOLUTION INTRAVENOUS at 14:22

## 2022-09-17 RX ADMIN — NIFEDIPINE 90 MG: 30 TABLET, FILM COATED, EXTENDED RELEASE ORAL at 09:10

## 2022-09-17 RX ADMIN — SACUBITRIL AND VALSARTAN 4 TABLET: 24; 26 TABLET, FILM COATED ORAL at 20:43

## 2022-09-17 RX ADMIN — Medication 400 MG: at 09:10

## 2022-09-17 RX ADMIN — INSULIN GLARGINE 40 UNITS: 100 INJECTION, SOLUTION SUBCUTANEOUS at 08:49

## 2022-09-17 RX ADMIN — FUROSEMIDE 80 MG: 40 TABLET ORAL at 09:10

## 2022-09-17 RX ADMIN — ACETAMINOPHEN 650 MG: 325 TABLET, FILM COATED ORAL at 22:19

## 2022-09-17 RX ADMIN — TRAMADOL HYDROCHLORIDE 50 MG: 50 TABLET ORAL at 21:46

## 2022-09-17 RX ADMIN — SACUBITRIL AND VALSARTAN 4 TABLET: 24; 26 TABLET, FILM COATED ORAL at 09:09

## 2022-09-17 RX ADMIN — INSULIN GLARGINE 40 UNITS: 100 INJECTION, SOLUTION SUBCUTANEOUS at 21:45

## 2022-09-17 RX ADMIN — LEVOFLOXACIN 500 MG: 500 INJECTION, SOLUTION INTRAVENOUS at 18:12

## 2022-09-17 ASSESSMENT — PAIN DESCRIPTION - DESCRIPTORS
DESCRIPTORS: DISCOMFORT
DESCRIPTORS: ACHING

## 2022-09-17 ASSESSMENT — PAIN SCALES - GENERAL
PAINLEVEL_OUTOF10: 2
PAINLEVEL_OUTOF10: 3
PAINLEVEL_OUTOF10: 4
PAINLEVEL_OUTOF10: 0
PAINLEVEL_OUTOF10: 0

## 2022-09-17 ASSESSMENT — PAIN DESCRIPTION - FREQUENCY
FREQUENCY: INTERMITTENT
FREQUENCY: INTERMITTENT

## 2022-09-17 ASSESSMENT — PAIN - FUNCTIONAL ASSESSMENT
PAIN_FUNCTIONAL_ASSESSMENT: ACTIVITIES ARE NOT PREVENTED
PAIN_FUNCTIONAL_ASSESSMENT: ACTIVITIES ARE NOT PREVENTED

## 2022-09-17 ASSESSMENT — PAIN DESCRIPTION - ORIENTATION
ORIENTATION: MID;LOWER
ORIENTATION: MID;LOWER
ORIENTATION: LOWER
ORIENTATION: LOWER

## 2022-09-17 ASSESSMENT — PAIN DESCRIPTION - ONSET
ONSET: OTHER (COMMENT)
ONSET: OTHER (COMMENT)

## 2022-09-17 ASSESSMENT — PAIN DESCRIPTION - LOCATION
LOCATION: BACK

## 2022-09-17 ASSESSMENT — PAIN DESCRIPTION - PAIN TYPE
TYPE: ACUTE PAIN
TYPE: ACUTE PAIN

## 2022-09-17 NOTE — PLAN OF CARE
Problem: Discharge Planning  Goal: Discharge to home or other facility with appropriate resources  9/17/2022 0312 by Jerman Hood RN  Outcome: Progressing  9/16/2022 1848 by Gregoria Esposito RN  Outcome: Progressing  Flowsheets (Taken 9/16/2022 1638)  Discharge to home or other facility with appropriate resources:   Identify barriers to discharge with patient and caregiver   Identify discharge learning needs (meds, wound care, etc)   Refer to discharge planning if patient needs post-hospital services based on physician order or complex needs related to functional status, cognitive ability or social support system   Arrange for needed discharge resources and transportation as appropriate     Problem: Pain  Goal: Verbalizes/displays adequate comfort level or baseline comfort level  9/17/2022 0312 by Jerman Hood RN  Outcome: Progressing  9/16/2022 1848 by Gregoria Esposito RN  Outcome: Progressing     Problem: Safety - Adult  Goal: Free from fall injury  9/17/2022 0312 by Jerman Hood RN  Outcome: Progressing  9/16/2022 1848 by Gregoria Esposito RN  Outcome: Progressing     Problem: Skin/Tissue Integrity  Goal: Absence of new skin breakdown  Description: 1. Monitor for areas of redness and/or skin breakdown  2. Assess vascular access sites hourly  3. Every 4-6 hours minimum:  Change oxygen saturation probe site  4. Every 4-6 hours:  If on nasal continuous positive airway pressure, respiratory therapy assess nares and determine need for appliance change or resting period.   9/17/2022 0312 by Jerman Hood RN  Outcome: Progressing  9/16/2022 1848 by Gregoria Esposito RN  Outcome: Progressing     Problem: Respiratory - Adult  Goal: Achieves optimal ventilation and oxygenation  9/17/2022 0312 by Jerman Hood RN  Outcome: Progressing  9/16/2022 1848 by Gregoria Esposito RN  Outcome: Progressing     Problem: Cardiovascular - Adult  Goal: Maintains optimal cardiac output and hemodynamic stability  9/17/2022 0312 by Valeria Jerome RN  Outcome: Progressing  9/16/2022 1848 by Bambi Smart RN  Outcome: Progressing     Problem: Skin/Tissue Integrity - Adult  Goal: Skin integrity remains intact  9/17/2022 0312 by Valeria Jerome RN  Outcome: Progressing  9/16/2022 1848 by Bambi Smart RN  Outcome: Progressing  Goal: Incisions, wounds, or drain sites healing without S/S of infection  9/17/2022 0312 by Valeria Jerome RN  Outcome: Progressing  9/16/2022 1848 by Bambi Smart RN  Outcome: Progressing     Problem: Musculoskeletal - Adult  Goal: Return mobility to safest level of function  9/17/2022 0312 by Valeria Jerome RN  Outcome: Progressing  9/16/2022 1848 by Bambi Smart RN  Outcome: Progressing  Goal: Return ADL status to a safe level of function  9/17/2022 0312 by Valeria Jerome RN  Outcome: Progressing  9/16/2022 1848 by Bambi Smart RN  Outcome: Progressing

## 2022-09-17 NOTE — H&P
History & Physical    Patient:  Jesus Conner  YOB: 1950  Date of Service: 9/17/2022  MRN: [id number]   Acct:   [de-identified]   Primary Care Physician: Tere Fine DO    Chief Complaint:   Chief Complaint   Patient presents with    Fatigue     Per wife pt has lost 16lb over the past month. Has been feeling weak, fever, chills, emesis since yesterday- home COVID test was negative. Had an ablation 6 weeks ago(7/25) at [facility]         History of Present Illness: The patient is a 71 y.o. male  presented to the emergency room complaining of generalized weakness, fevers, chills, nausea and vomiting for the past couple of days. Patient states that 2 days ago he started having chills and uncontrollable shakes. His wife checked his temperature and it was  102. 1. They ran a home COVID test and it was negative. She gave him Tylenol which made him feel better. Over the next 24-30 hours he developed persistent nausea and vomited and continued to have chills. His appetite was very poor. He had no pain, no cough, no headaches, no earaches or sore throat. Patient had ablation done at [facility] on 7/25 for persistent atrial fibrillation. Patient's wife states that after his ablation he lost about 16 pounds weight. She believes it was due to diuretics that he has been taking although he has been on them for more than 2 years. Patient has been having more frequent urination but no burning, pain or hematuria. Patient's work-up in the emergency room revealed temperature 100.1, heart rate 114, respirations 26, blood pressure 130/72. He was 96% on room air. BMP was essentially normal, troponin was 45 followed by 43, WBC was elevated 17.2, H&H 12.3/37.0, platelets 152. Urinalysis was negative for UTI. COVID-19 was negative. Flu a and B antigens were also negative. Chest x-ray was negative for evidence of pneumonia.   The patient did complain of some scrotal discomfort during his physical examination and for this reason ultrasound was obtained which revealed no evidence of epididymitis, mass or torsion. Due to patient's persistent fever, tachycardia and SIRS he was deemed appropriate for admission. Source of infection was unclear after ER work-up. He was started on IV Levaquin. Blood cultures today showing gram-positive cocci in pairs and chains. Patient feels better and had no fever overnight    Past Medical History:        Diagnosis Date    Arthritis     Arthritis     CAD (coronary artery disease)     CHF (congestive heart failure) (HCC)     CHF (congestive heart failure) (HCC)     Coronary artery disease     Diabetes ([facility] [address]     Diabetes mellitus ([facility] [address]     H/O coronary angioplasty     Hyperlipidemia     Hypertension     Neuropathy     Numbness and tingling     dannie hands    Pulmonary edema     SECONDARY TO FAT EMBOLI AFTER KNEE SURGERY    Sleep apnea     Unspecified sleep apnea 2012    cpap       Past Surgical History:        Procedure Laterality Date    ANGIOPLASTY  08/30/2017    Dr. Ronda Howard mm drug eluting Xience stents in the right coronary. DANIELA-3 flow prior to the procedure DANIELA-3 flow following the procedure. The patient has had a good result from his angioplasty of the right coronary artery. .    APPENDECTOMY      APPENDECTOMY      AV NODE ABLATION      BUNIONECTOMY  2010    CARDIAC CATHETERIZATION      stent pacement    CARDIAC CATHETERIZATION Left 05/14/2014    Dr Marlene Serrano. [facility] Severe CAD, 70% disese with in-stent stenosis in the left anterior descending at the level of a very large codominant diagonal.  90% disease of the ostium of a very large codominant diagonal with 60% disease beyond the diagonal with a fractional flow reserve of 0.76 in the diagonal and 0.78 in the left anterior descending indicating obstructive disease of significance in both the     CARDIAC CATHETERIZATION Left 05/14/2014    left anterior descending & diagonal.. Mild irregularities of 30% in a small, nondominant circumglex. Mild 30- 40% disease in the midportion of a very large,dominant right coronary artery. Normal left ventricular function, ejection fraction of 55-60%    CARDIAC CATHETERIZATION Bilateral 11/15/2017    Dr. Ivonne Simmons @ [facility]--Severe PHT with a PA pressure of 74/30. Widely patent stent in the very large dominat right coronary artery with 50% disease before the stent with an FFR of 0.89, indicating no significant obstruction. Severe disease in the LAD. Patent left internal mammary to the LAD. Patent saphenous vein graft to a very large diagonal.  Overall normal LV function.   EF of 55%    CARDIAC CATHETERIZATION Left 05/14/2021    Dr. Ivonne Simmons @ [facility]--Medical therapy    CATARACT REMOVAL  2004    COLONOSCOPY  10/09/2013    Dr. Jimy El (hemorrhoids)    COLONOSCOPY  10/09/2013    CORONARY ANGIOPLASTY WITH STENT PLACEMENT      CORONARY ARTERY BYPASS GRAFT  06/03/2014    Dr Figueroa Flaherty @  [facility]    CORONARY ARTERY BYPASS GRAFT      EYE SURGERY  08/2012    B leakage in back of eyes    JOINT REPLACEMENT Bilateral     knees    JOINT REPLACEMENT      KNEE SURGERY  2010, 2011    BILATERAL KNEE ORTHO    KNEE SURGERY         Home Medications:   Current Facility-Administered Medications on File Prior to Encounter   Medication Dose Route Frequency Provider Last Rate Last Admin    sodium chloride 0.9 % injection 10 mL  10 mL IntraVENous PRN Ansley Magana MD   10 mL at 12/04/12 1035     Current Outpatient Medications on File Prior to Encounter   Medication Sig Dispense Refill    cyanocobalamin 1000 MCG tablet Take 3,000 mcg by mouth daily      spironolactone (ALDACTONE) 25 MG tablet TAKE 1 TABLET DAILY 90 tablet 3    ENTRESTO  MG per tablet TAKE 1 TABLET TWICE A  tablet 3    atorvastatin (LIPITOR) 40 MG tablet TAKE 1 TABLET DAILY 90 tablet 3    isosorbide mononitrate (IMDUR) 30 MG extended release tablet TAKE 1 TABLET DAILY 90 tablet 3    doxazosin (CARDURA) 8 MG tablet Take 1 tablet by mouth 2 times daily (Patient taking differently: Take 4 mg by mouth 2 times daily) 180 tablet 3    celecoxib (CELEBREX) 200 MG capsule TAKE 1 CAPSULE DAILY 90 capsule 1    apixaban (ELIQUIS) 5 MG TABS tablet TAKE 1 TABLET TWICE A  tablet 3    carvedilol (COREG) 3.125 MG tablet Take 1 tablet by mouth daily 90 tablet 3    furosemide (LASIX) 80 MG tablet TAKE 1 TABLET TWICE A  tablet 3    NOVOLIN 70/30 (70-30) 100 UNIT/ML injection vial Inject 54 units AM, Inject 32 units at Lunch, Inject 40 units at supper as directed subcutaneously. (Patient taking differently: Inject 64 units AM, Inject 26-40 units at Lunch, Inject 12 units at supper as directed subcutaneously. USES SLIDING SCALE NO INSULIN IF UNDER 200) 1 vial 0    metFORMIN (GLUCOPHAGE) 1000 MG tablet Take 1 tablet by mouth 2 times daily (with meals) (Patient taking differently: Take 1,000 mg by mouth daily) 60 tablet 0    NIFEdipine (ADALAT CC) 90 MG extended release tablet Take 45 mg by mouth daily      Coenzyme Q10 (CO Q 10) 100 MG CAPS Take 100 mg by mouth nightly      acetaminophen (TYLENOL) 500 MG tablet Take 500 mg by mouth See Admin Instructions Take 500 mg in am  Take 1000 mg in Pm      traMADol (ULTRAM) 50 MG tablet Take 1 tablet by mouth 2 times daily as needed for Pain.        TRULICITY 1.5 TS/9.9HW SOPN Inject 1.5 mg into the skin once a week Mondays      Magnesium Oxide 500 MG TABS Take 500 mg by mouth daily       ONE TOUCH ULTRA TEST strip       BD ULTRA-FINE PEN NEEDLES 29G X 12.7MM MISC       glimepiride (AMARYL) 4 MG tablet Take 2 mg by mouth every morning (before breakfast)       Omega-3 Fatty Acids (FISH OIL) 1200 MG CAPS Take 1 capsule by mouth 2 times daily Plus 360 omega 3      Chromium-Cinnamon (CINNAMON PLUS CHROMIUM PO) Take 1,000 mg by mouth 2 times daily       Multiple Vitamins-Minerals (ONE-A-DAY MENS 50+ ADVANTAGE PO) Take 1 tablet by mouth daily       Vitamin D (CHOLECALCIFEROL) 1000 UNITS CAPS capsule   Take by mouth 2 times daily Vitamin D 3      Insulin Syringes, Disposable, U-100 0.3 ML MISC Inject  into the skin. Use prn 25 each 0       Allergies:  Patient has no known allergies. Social History:    reports that he quit smoking about 46 years ago. His smoking use included cigarettes. He has a 1.50 pack-year smoking history. He has never used smokeless tobacco. He reports that he does not drink alcohol and does not use drugs. Family History:       Problem Relation Age of Onset    Alzheimer's Disease Mother     Heart Disease Mother     Arthritis Mother     High Blood Pressure Mother     Diabetes Mother     Heart Disease Father     Arthritis Father     High Blood Pressure Father     Diabetes Father        Review of systems:  Constitutional: Positive for fevers, chills, malaise, poor appetite  Head: no headache, no head injury, no migranes. Eye: no blurring of vision, no double vision. Ears: no earache, no hearing difficulty, no tinnitus  Mouth/throat: no sore throat, no dental caries, dysphagia  Lungs: no cough, no shortness of breath, no wheeze  CVS: no palpitation, no chest pain, no shortness of breath  GI: no abdominal pain, positive for nausea and vomiting, no constipation  AUBREY: no dysuria, positive for frequency and urgency, no hematuria, no kidney stones  Musculoskeletal: no joint pain, swelling , stiffness  Endocrine: no polyuria, polydypsia, no cold or heat intolerence  Hematology: no anemia, no easy brusing or bleeding, no hx of clotting disorder  Dermatology: no skin rash, no eczema  Psychiatry: no depression, no anxiety,no panic attacks, no suicide ideation  Neurology: no syncope, no seizures, no numbness or tingling of hands, no numbness or tingling of feet, no paresis    Vitals:   Vitals:    09/17/22 0944   BP: 121/59   Pulse: 82   Resp: 16   Temp: 98.6   SpO2: 100%      BMI: Body mass index is 35.14 kg/m².     Physical Exam:  General Appearance: alert and oriented to person, place and time, in no acute distress  Cardiovascular: normal rate, regular rhythm, normal S1 and S2, no murmurs, rubs, clicks, or gallops, distal pulses intact  Pulmonary/Chest: clear to auscultation bilaterally- no wheezes, rales or rhonchi, normal air movement, no respiratory distress  Abdomen: soft, non-tender, non-distended, normal bowel sounds  Extremities: Lower extremities with no edema, noted to have chronic venous static skin discoloration, few skin abrasions on the left lower extremity with no drainage  Skin: warm and dry, no rash   Head: normocephalic and atraumatic  Eyes: pupils equal, round, and reactive to light  Neck: supple and non-tender without mass, no thyromegaly   Musculoskeletal: normal range of motion, no joint swelling, deformity or tenderness  Neurological: alert, oriented, normal speech, no focal findings or movement disorder noted    Review of Labs and Diagnostic Testing:    Recent Results (from the past 24 hour(s))   BMP    Collection Time: 09/16/22 10:34 AM   Result Value Ref Range    Glucose 196 (H) 70 - 99 mg/dL    BUN 23 8 - 23 mg/dL    Creatinine 0.95 0.70 - 1.20 mg/dL    Bun/Cre Ratio 24 (H) 9 - 20    Calcium 8.7 8.6 - 10.4 mg/dL    Sodium 136 135 - 144 mmol/L    Potassium 4.4 3.7 - 5.3 mmol/L    Chloride 101 98 - 107 mmol/L    CO2 24 20 - 31 mmol/L    Anion Gap 11 9 - 17 mmol/L    GFR Non-African American >60 >60 mL/min    GFR African American >60 >60 mL/min    GFR Comment         CBC with Auto Differential    Collection Time: 09/16/22 10:34 AM   Result Value Ref Range    WBC 17.2 (H) 3.5 - 11.0 k/uL    RBC 3.98 (L) 4.5 - 5.9 m/uL    Hemoglobin 12.3 (L) 13.5 - 17.5 g/dL    Hematocrit 37.0 (L) 41 - 53 %    MCV 92.8 80 - 100 fL    MCH 31.0 26 - 34 pg    MCHC 33.4 31 - 37 g/dL    RDW 13.9 12.1 - 15.2 %    Platelets 690 194 - 652 k/uL    Seg Neutrophils 92 (H) 39 - 75 %    Lymphocytes 3 (L) 13 - 44 %    Monocytes 5 5 - 9 %    Eosinophils % 0 0 - 5 %    Basophils 0 0 - 2 %    Segs Absolute 15.82 (H) 2.1 - 6.5 k/uL    Absolute Lymph # 0.52 (L) 1.0 - 4.8 k/uL    Absolute Mono # 0.86 0.0 - 1.0 k/uL    Absolute Eos # 0.00 0.0 - 0.4 k/uL    Basophils Absolute 0.00 0.0 - 0.2 k/uL    Morphology Scanned to verify automated differential.     Morphology Platelet morphology normal.     Morphology RBC morphology normal.    Troponin    Collection Time: 09/16/22 10:34 AM   Result Value Ref Range    Troponin, High Sensitivity 45 (H) 0 - 22 ng/L   EKG 12 Lead    Collection Time: 09/16/22 10:38 AM   Result Value Ref Range    Ventricular Rate 91 BPM    Atrial Rate 91 BPM    P-R Interval 184 ms    QRS Duration 152 ms    Q-T Interval 398 ms    QTc Calculation (Bazett) 489 ms    P Axis 53 degrees    R Axis 119 degrees    T Axis -63 degrees   Urinalysis    Collection Time: 09/16/22 11:10 AM   Result Value Ref Range    Color, UA Yellow Yellow    Turbidity UA Clear Clear    Glucose, Ur NEGATIVE NEGATIVE    Bilirubin Urine NEGATIVE NEGATIVE    Ketones, Urine SMALL (A) NEGATIVE    Specific Gravity, UA 1.020 1.005 - 1.030    Urine Hgb TRACE (A) NEGATIVE    pH, UA 5.0 5.0 - 8.0    Protein, UA 1+ (A) NEGATIVE    Urobilinogen, Urine Normal Normal    Nitrite, Urine NEGATIVE NEGATIVE    Leukocyte Esterase, Urine NEGATIVE NEGATIVE    Urinalysis Comments         Microscopic Urinalysis    Collection Time: 09/16/22 11:10 AM   Result Value Ref Range    WBC, UA NONE SEEN 0 /HPF    RBC, UA 2 TO 5 0 - 2 /HPF    Epithelial Cells UA 0 TO 2 /HPF    -         Blood Culture 1    Collection Time: 09/16/22 11:22 AM    Specimen: Blood   Result Value Ref Range    Specimen Description . BLOOD     Special Requests RIGHT AC  20ML     Culture POSITIVE Blood Culture (A)     Culture DIRECT GRAM STAIN FROM BOTTLE:     Culture GPC IN PAIRS AND CHAINS     Culture       (NOTE) Direct Gram Stain from bottle result called to and read back by:  RUPESH MCLEAN. SEE OTHER CULTURE.    Troponin    Collection Time: 09/16/22 11:31 AM   Result Value Ref Range    Troponin, High Sensitivity 43 (H) 0 - 22 ng/L   Culture, Blood 2    Collection Time: 09/16/22 11:31 AM    Specimen: Blood   Result Value Ref Range    Specimen Description . BLOOD     Special Requests LEFT AC   20ML     Culture POSITIVE Blood Culture (A)     Culture       DIRECT GRAM STAIN FROM BOTTLE: GPC IN PAIRS AND CHAINS    Culture       (NOTE) DIRECT GRAM STAIN FROM BOTTLE RESULT CALLED TO AND READ BACK BY RUPESH MCLEAN.  [id number]  2 OF 2 BOTTLES, 2 OF 2 CULTURES. Rapid influenza A/B antigens    Collection Time: 09/16/22 12:46 PM    Specimen: Nasopharyngeal   Result Value Ref Range    Flu A Antigen NEGATIVE NEGATIVE    Flu B Antigen NEGATIVE NEGATIVE   COVID-19, Rapid    Collection Time: 09/16/22 12:47 PM    Specimen: Nasopharyngeal Swab   Result Value Ref Range    Specimen Description . NASOPHARYNGEAL SWAB     SARS-CoV-2, Rapid Not Detected Not Detected   Lactic Acid    Collection Time: 09/16/22  3:15 PM   Result Value Ref Range    Lactic Acid 1.1 0.5 - 2.2 mmol/L   Glucose, Whole Blood    Collection Time: 09/16/22  5:06 PM   Result Value Ref Range    POC Glucose 189 (H) 65 - 99 mg/dL   Glucose, Whole Blood    Collection Time: 09/16/22  8:57 PM   Result Value Ref Range    POC Glucose 213 (H) 65 - 99 mg/dL   Basic Metabolic Panel w/ Reflex to MG    Collection Time: 09/17/22  5:57 AM   Result Value Ref Range    Glucose 279 (H) 70 - 99 mg/dL    BUN 25 (H) 8 - 23 mg/dL    Creatinine 1.06 0.70 - 1.20 mg/dL    Bun/Cre Ratio 24 (H) 9 - 20    Calcium 8.3 (L) 8.6 - 10.4 mg/dL    Sodium 135 135 - 144 mmol/L    Potassium 4.3 3.7 - 5.3 mmol/L    Chloride 100 98 - 107 mmol/L    CO2 25 20 - 31 mmol/L    Anion Gap 10 9 - 17 mmol/L    GFR Non-African American >60 >60 mL/min    GFR African American >60 >60 mL/min    GFR Comment         Lactic Acid    Collection Time: 09/17/22  5:57 AM   Result Value Ref Range    Lactic Acid 1.2 0.5 - 2.2 mmol/L   CBC with Auto Differential    Collection Time: 09/17/22  5:57 AM   Result Value Ref Range    WBC 10.5 3.5 - 11.0 k/uL    RBC 3.62 (L) 4.5 - 5.9 m/uL    Hemoglobin 11.1 (L) 13.5 - 17.5 g/dL    Hematocrit 33.4 (L) 41 - 53 %    MCV 92.3 80 - 100 fL    MCH 30.6 26 - 34 pg    MCHC 33.2 31 - 37 g/dL    RDW 14.1 12.1 - 15.2 %    Platelets 724 918 - 216 k/uL    Differential Type YES     Seg Neutrophils 88 (H) 39 - 75 %    Lymphocytes 5 (L) 13 - 44 %    Monocytes 7 5 - 9 %    Eosinophils % 0 0 - 5 %    Basophils 0 0 - 2 %    Segs Absolute 9.10 (H) 2.1 - 6.5 k/uL    Absolute Lymph # 0.60 (L) 1.0 - 4.8 k/uL    Absolute Mono # 0.80 0.0 - 1.0 k/uL    Absolute Eos # 0.00 0.0 - 0.4 k/uL    Basophils Absolute 0.00 0.0 - 0.2 k/uL   Glucose, Whole Blood    Collection Time: 09/17/22  7:50 AM   Result Value Ref Range    POC Glucose 217 (H) 65 - 99 mg/dL       Radiology:     XR CHEST PORTABLE    Result Date: 9/16/2022  EXAM: XR CHEST PORTABLE HISTORY: Reason for exam:->tachypnea 45-year-old male with cough, fever, shortness of breath. COMPARISON: 7/25/2022 [place] TECHNIQUE: AP portable chest 0950 hours. FINDINGS: Mild stable cardiomegaly. Previous sternotomy. No failure or pneumonia. Cardiomegaly. No acute change. US SCROTUM W LIMITED DUPLEX    Result Date: 9/16/2022  EXAM: US SCROTUM W LIMITED DUPLEX HISTORY: 45-year-old male possible sepsis. COMPARISON: None. TECHNIQUE: Scrotal ultrasound color Doppler. FINDINGS: PERTINENT POSITIVES: None. PERTINENT NEGATIVES: No mass or torsion. COINCIDENTAL FINDINGS: Minimal prominence of vascular flow in the epididymis bilaterally without overt epididymitis. ROUTINE EXAMINATION: Minimal fluid in the scrotum without overt hydroceles. No orchitis or mass. No overt evidence of epididymitis. No mass or torsion. Assessment/ Plan:    1. Bacteremia secondary to gram-positive cocci in pairs and chains -source of infection is not clear at this time. We will check echo to rule out endocarditis. Continue on IV Levaquin, add IV Vanco for broad coverage  2.   Fever, SIRS  - resolving with IV antibiotics, leukocytosis resolved, continue on empiric antibiotics  3. S/P ablation A. fib with mapping on 7/25/2022 in [address] by Bree Mars  4. Diabetes mellitus type 2 insulin-dependent with diabetic retinopathy, peripheral neuropathy  -patient placed on Lantus 40 units twice daily, sliding scale insulin, monitor Accu-Cheks  5. H/O severe CAD, s/p CABG x2 6/14 followed by cardiac cath 8/17 with angioplasty of RCA and stent placement, another cath on 5/14/2021 with no interventions. Asymptomatic, continue medical management  6. History of paroxysmal A. Fib -s/p cardioversions in the past and recent ablation  Anticoagulated with Eliquis, on amiodarone  6. Hypertension -blood pressure is stable, continue on Coreg, Imdur, nifedipine, Entresto  7. Hyperlipidemia -on Lipitor  8. Obstructive sleep apnea -on CPAP  9. Spinal stenosis and chronic low back pain -on Celebrex        Advanced Care Plan    ( x)  I confirmed that the patient's [address] is present, code status documented, or surrogate decision maker is listed in the patient's medical record. ( )  The patient's advanced care plan is not present because:  (select)   ( ) I confirmed today that the patient does not wish or was not able to name a surrogate decision maker or provide an [address]. ( ) Hospice care is currently being provided or has been provided this calender year. ( )  I did not confirm today the presence of an [address] or surrogate decision maker documented within the patient's medical record. (Does not satisfy [facility] performance). Documentation of Current Medications in the Medical Record    ( x)  I have utilized all available immediate resources to obtain, update, or review the patient's current medications.   If Yes, Stop Here  ( ) The patient is not eligible for medications reconciliation; the patient is in an emergent medical situation where delaying treatment would jeopardize the patient's health. ( ) I did not confirm, update or review the patient's current list of medications today. (does not satisfy [facility] performance)        Medical Necessity: Inpatient admission is appropriate for this patient secondary to the need of IV antibiotics, IV fluids, monitoring clinical condition, following up with blood cultures    Estimated length of stay: 2 days. The beneficiary may reasonably be expected to be discharged or transferred to a hospital within 96 hours after admission.     DVT prophylaxis:   [] Lovenox   [] SCDs   [] SQ Heparin   [] Encourage ambulation, low risk for DVT, no chemical or mechanical    prophylaxis necessary      [x] Already on Anticoagulation    Anticipated Disposition upon discharge:   [] Home   [] Home with Home Health   [] [facility]   [] [address]      Electronically signed by Robinette Claude, MD on 9/17/2022 at 10:30 AM

## 2022-09-17 NOTE — PROGRESS NOTES
[address] [facility] Pharmacokinetic Monitoring Service - Vancomycin     Brianna Yen is a 71 y.o. male starting on vancomycin therapy for a positive blood culture. Pharmacy consulted by Piotr Weldon for monitoring and adjustment. Target Concentration: Goal AUC/DESTINY 400-600 mg*hr/L    Additional Antimicrobials: levofloxacin    Pertinent Laboratory Values: Wt Readings from Last 1 Encounters:   09/16/22 244 lb 14.4 oz (111.1 kg)     Temp Readings from Last 1 Encounters:   09/17/22 97.9 °F (36.6 °C) (Oral)     Estimated Creatinine Clearance: 89 mL/min (based on SCr of 0.95 mg/dL). Recent Labs     09/16/22  1034   CREATININE 0.95   WBC 17.2*     Procalcitonin:     Pertinent Cultures:  Culture Date Source Results   9/17/22 Blood CPC in pairs and chains   MRSA Nasal Swab:     Plan:  Dosing recommendations based on Bayesian software  Start vancomycin 2000 mg for one dose followed 1000 mg every 12 hours.   Anticipated AUC of 462 and trough concentration of 15.2 at steady state  Renal labs as indicated     Pharmacy will continue to monitor patient and adjust therapy as indicated    Thank you for the consult,  Med [facility], [facility]  9/17/2022 1:46 AM

## 2022-09-18 LAB
ABSOLUTE EOS #: 0.2 K/UL (ref 0–0.4)
ABSOLUTE LYMPH #: 1 K/UL (ref 1–4.8)
ABSOLUTE MONO #: 0.9 K/UL (ref 0–1)
ANION GAP SERPL CALCULATED.3IONS-SCNC: 9 MMOL/L (ref 9–17)
BASOPHILS # BLD: 0 % (ref 0–2)
BASOPHILS ABSOLUTE: 0 K/UL (ref 0–0.2)
BUN BLDV-MCNC: 25 MG/DL (ref 8–23)
BUN/CREAT BLD: 27 (ref 9–20)
CALCIUM SERPL-MCNC: 8.3 MG/DL (ref 8.6–10.4)
CHLORIDE BLD-SCNC: 104 MMOL/L (ref 98–107)
CO2: 24 MMOL/L (ref 20–31)
CREAT SERPL-MCNC: 0.93 MG/DL (ref 0.7–1.2)
DIFFERENTIAL TYPE: YES
EKG ATRIAL RATE: 91 BPM
EKG P AXIS: 53 DEGREES
EKG P-R INTERVAL: 184 MS
EKG Q-T INTERVAL: 398 MS
EKG QRS DURATION: 152 MS
EKG QTC CALCULATION (BAZETT): 489 MS
EKG R AXIS: 119 DEGREES
EKG T AXIS: -63 DEGREES
EKG VENTRICULAR RATE: 91 BPM
EOSINOPHILS RELATIVE PERCENT: 3 % (ref 0–5)
ESTIMATED AVERAGE GLUCOSE: 209 MG/DL
GFR AFRICAN AMERICAN: >60 ML/MIN
GFR NON-AFRICAN AMERICAN: >60 ML/MIN
GFR SERPL CREATININE-BSD FRML MDRD: ABNORMAL ML/MIN/{1.73_M2}
GLUCOSE BLD-MCNC: 196 MG/DL (ref 65–99)
GLUCOSE BLD-MCNC: 214 MG/DL (ref 70–99)
GLUCOSE BLD-MCNC: 216 MG/DL (ref 65–99)
GLUCOSE BLD-MCNC: 243 MG/DL (ref 65–99)
GLUCOSE BLD-MCNC: 294 MG/DL (ref 65–99)
HBA1C MFR BLD: 8.9 % (ref 4–6)
HCT VFR BLD CALC: 32.7 % (ref 41–53)
HEMOGLOBIN: 11.1 G/DL (ref 13.5–17.5)
LYMPHOCYTES # BLD: 13 % (ref 13–44)
MCH RBC QN AUTO: 31.2 PG (ref 26–34)
MCHC RBC AUTO-ENTMCNC: 33.8 G/DL (ref 31–37)
MCV RBC AUTO: 92.2 FL (ref 80–100)
MONOCYTES # BLD: 13 % (ref 5–9)
PDW BLD-RTO: 13.7 % (ref 12.1–15.2)
PLATELET # BLD: 190 K/UL (ref 140–450)
POTASSIUM SERPL-SCNC: 4.4 MMOL/L (ref 3.7–5.3)
RBC # BLD: 3.55 M/UL (ref 4.5–5.9)
SEG NEUTROPHILS: 71 % (ref 39–75)
SEGMENTED NEUTROPHILS ABSOLUTE COUNT: 5.3 K/UL (ref 2.1–6.5)
SODIUM BLD-SCNC: 137 MMOL/L (ref 135–144)
WBC # BLD: 7.5 K/UL (ref 3.5–11)

## 2022-09-18 PROCEDURE — 2580000003 HC RX 258: Performed by: FAMILY MEDICINE

## 2022-09-18 PROCEDURE — 36415 COLL VENOUS BLD VENIPUNCTURE: CPT

## 2022-09-18 PROCEDURE — 85025 COMPLETE CBC W/AUTO DIFF WBC: CPT

## 2022-09-18 PROCEDURE — 99222 1ST HOSP IP/OBS MODERATE 55: CPT | Performed by: INTERNAL MEDICINE

## 2022-09-18 PROCEDURE — 1200000000 HC SEMI PRIVATE

## 2022-09-18 PROCEDURE — 80048 BASIC METABOLIC PNL TOTAL CA: CPT

## 2022-09-18 PROCEDURE — 94761 N-INVAS EAR/PLS OXIMETRY MLT: CPT

## 2022-09-18 PROCEDURE — 2580000003 HC RX 258: Performed by: INTERNAL MEDICINE

## 2022-09-18 PROCEDURE — 6370000000 HC RX 637 (ALT 250 FOR IP): Performed by: INTERNAL MEDICINE

## 2022-09-18 PROCEDURE — 6360000002 HC RX W HCPCS: Performed by: INTERNAL MEDICINE

## 2022-09-18 PROCEDURE — 82947 ASSAY GLUCOSE BLOOD QUANT: CPT

## 2022-09-18 PROCEDURE — 6360000002 HC RX W HCPCS: Performed by: FAMILY MEDICINE

## 2022-09-18 PROCEDURE — 93010 ELECTROCARDIOGRAM REPORT: CPT | Performed by: INTERNAL MEDICINE

## 2022-09-18 RX ORDER — NIFEDIPINE 30 MG/1
30 TABLET, EXTENDED RELEASE ORAL DAILY
Status: DISCONTINUED | OUTPATIENT
Start: 2022-09-19 | End: 2022-09-19 | Stop reason: HOSPADM

## 2022-09-18 RX ORDER — INSULIN GLARGINE 100 [IU]/ML
45 INJECTION, SOLUTION SUBCUTANEOUS 2 TIMES DAILY
Status: DISCONTINUED | OUTPATIENT
Start: 2022-09-18 | End: 2022-09-19 | Stop reason: HOSPADM

## 2022-09-18 RX ADMIN — TRAMADOL HYDROCHLORIDE 50 MG: 50 TABLET ORAL at 22:08

## 2022-09-18 RX ADMIN — LEVOFLOXACIN 500 MG: 500 INJECTION, SOLUTION INTRAVENOUS at 18:00

## 2022-09-18 RX ADMIN — INSULIN GLARGINE 45 UNITS: 100 INJECTION, SOLUTION SUBCUTANEOUS at 22:04

## 2022-09-18 RX ADMIN — CELECOXIB 200 MG: 100 CAPSULE ORAL at 09:06

## 2022-09-18 RX ADMIN — CEFTRIAXONE 2000 MG: 2 INJECTION, POWDER, FOR SOLUTION INTRAMUSCULAR; INTRAVENOUS at 16:19

## 2022-09-18 RX ADMIN — INSULIN LISPRO 4 UNITS: 100 INJECTION, SOLUTION INTRAVENOUS; SUBCUTANEOUS at 16:55

## 2022-09-18 RX ADMIN — INSULIN GLARGINE 40 UNITS: 100 INJECTION, SOLUTION SUBCUTANEOUS at 09:15

## 2022-09-18 RX ADMIN — CYANOCOBALAMIN TAB 1000 MCG 3000 MCG: 1000 TAB at 09:07

## 2022-09-18 RX ADMIN — ISOSORBIDE MONONITRATE 30 MG: 30 TABLET, EXTENDED RELEASE ORAL at 09:07

## 2022-09-18 RX ADMIN — CARVEDILOL 3.12 MG: 3.12 TABLET, FILM COATED ORAL at 09:06

## 2022-09-18 RX ADMIN — SPIRONOLACTONE 25 MG: 25 TABLET, FILM COATED ORAL at 09:07

## 2022-09-18 RX ADMIN — VANCOMYCIN HYDROCHLORIDE 1000 MG: 1 INJECTION, POWDER, LYOPHILIZED, FOR SOLUTION INTRAVENOUS at 14:43

## 2022-09-18 RX ADMIN — CHOLECALCIFEROL TAB 25 MCG (1000 UNIT) 1000 UNITS: 25 TAB at 09:07

## 2022-09-18 RX ADMIN — MULTIPLE VITAMINS W/ MINERALS TAB 1 TABLET: TAB at 09:07

## 2022-09-18 RX ADMIN — SODIUM CHLORIDE, PRESERVATIVE FREE 10 ML: 5 INJECTION INTRAVENOUS at 20:45

## 2022-09-18 RX ADMIN — INSULIN LISPRO 2 UNITS: 100 INJECTION, SOLUTION INTRAVENOUS; SUBCUTANEOUS at 12:04

## 2022-09-18 RX ADMIN — SACUBITRIL AND VALSARTAN 4 TABLET: 24; 26 TABLET, FILM COATED ORAL at 09:07

## 2022-09-18 RX ADMIN — Medication 400 MG: at 09:07

## 2022-09-18 RX ADMIN — VANCOMYCIN HYDROCHLORIDE 1000 MG: 1 INJECTION, POWDER, LYOPHILIZED, FOR SOLUTION INTRAVENOUS at 02:24

## 2022-09-18 RX ADMIN — ATORVASTATIN CALCIUM 40 MG: 40 TABLET, FILM COATED ORAL at 20:46

## 2022-09-18 RX ADMIN — APIXABAN 5 MG: 5 TABLET, FILM COATED ORAL at 20:46

## 2022-09-18 RX ADMIN — APIXABAN 5 MG: 5 TABLET, FILM COATED ORAL at 09:07

## 2022-09-18 RX ADMIN — SACUBITRIL AND VALSARTAN 4 TABLET: 24; 26 TABLET, FILM COATED ORAL at 20:49

## 2022-09-18 RX ADMIN — FUROSEMIDE 80 MG: 40 TABLET ORAL at 09:06

## 2022-09-18 RX ADMIN — CHOLECALCIFEROL TAB 25 MCG (1000 UNIT) 1000 UNITS: 25 TAB at 20:46

## 2022-09-18 RX ADMIN — SODIUM CHLORIDE: 9 INJECTION, SOLUTION INTRAVENOUS at 16:17

## 2022-09-18 ASSESSMENT — PAIN DESCRIPTION - LOCATION
LOCATION: HAND

## 2022-09-18 ASSESSMENT — PAIN DESCRIPTION - ORIENTATION
ORIENTATION: LEFT;RIGHT
ORIENTATION: RIGHT;LEFT
ORIENTATION: RIGHT;LEFT

## 2022-09-18 ASSESSMENT — PAIN SCALES - GENERAL
PAINLEVEL_OUTOF10: 5
PAINLEVEL_OUTOF10: 4

## 2022-09-18 ASSESSMENT — PAIN DESCRIPTION - DESCRIPTORS
DESCRIPTORS: ACHING

## 2022-09-18 NOTE — PROGRESS NOTES
Hospitalist Progress Note  9/18/2022 9:50 AM  Subjective:   Admit Date: 9/16/2022  PCP: Ellie Santana,     Interval History:     Patient states that he feels much better today. Has no complaints. Remained afebrile overnight. He reports no headache, chest pain, cough, nausea/vomiting/diarrhea      Diet: ADULT DIET;  Regular; 4 carb choices (60 gm/meal)  ADULT ORAL NUTRITION SUPPLEMENT; Breakfast, Lunch, Dinner; Diabetic Oral Supplement  Medications:   Scheduled Meds:   [START ON 9/19/2022] NIFEdipine  30 mg Oral Daily    vancomycin  1,000 mg IntraVENous Q12H    vancomycin (VANCOCIN) intermittent dosing (placeholder)   Other RX Placeholder    amiodarone  100 mg Oral Nightly    apixaban  5 mg Oral BID    atorvastatin  40 mg Oral Nightly    carvedilol  3.125 mg Oral Daily    celecoxib  200 mg Oral Daily    cyanocobalamin  3,000 mcg Oral Daily    sacubitril-valsartan  4 tablet Oral BID    furosemide  80 mg Oral BID    isosorbide mononitrate  30 mg Oral Daily    magnesium oxide  400 mg Oral Daily    therapeutic multivitamin-minerals  1 tablet Oral Daily    insulin glargine  40 Units SubCUTAneous BID    spironolactone  25 mg Oral Daily    Vitamin D  1,000 Units Oral BID    sodium chloride flush  5-40 mL IntraVENous 2 times per day    levofloxacin  500 mg IntraVENous Q24H    insulin lispro  0-8 Units SubCUTAneous TID WC    insulin lispro  0-4 Units SubCUTAneous Nightly     Continuous Infusions:   sodium chloride      sodium chloride 75 mL/hr at 09/17/22 2315    dextrose       PRN Medications: traMADol, sodium chloride flush, sodium chloride, ondansetron **OR** ondansetron, polyethylene glycol, acetaminophen **OR** acetaminophen, glucose, dextrose bolus **OR** dextrose bolus, glucagon (rDNA), dextrose    Objective:   Vitals: /61   Pulse 81   Temp 97.9 °F (36.6 °C) (Oral)   Resp 18   Ht 5' 10\" (1.778 m)   Wt 244 lb 14.4 oz (111.1 kg)   SpO2 94%   BMI 35.14 kg/m²   BMI: Body mass index is 35.14 kg/m².    CBC:   Recent Labs     09/16/22  1034 09/17/22  0557 09/18/22  0608   WBC 17.2* 10.5 7.5   HGB 12.3* 11.1* 11.1*    201 190     BMP:    Recent Labs     09/16/22  1034 09/17/22  0557 09/18/22  0608    135 137   K 4.4 4.3 4.4    100 104   CO2 24 25 24   BUN 23 25* 25*   CREATININE 0.95 1.06 0.93   GLUCOSE 196* 279* 214*     Physical Exam:    General Appearance: alert and oriented to person, place and time, in no acute distress  Cardiovascular: normal rate, regular rhythm, normal S1 and S2, no murmurs, rubs, clicks, or gallops, distal pulses intact  Pulmonary/Chest: clear to auscultation bilaterally- no wheezes, rales or rhonchi, normal air movement, no respiratory distress  Abdomen: soft, non-tender, non-distended, normal bowel sounds  Extremities: Lower extremities with no edema, noted to have chronic venous static skin discoloration, few skin abrasions on the left lower extremity with no drainage  Neurological: alert, oriented, normal speech, no focal findings or movement disorder noted    Assessment and Plan:       1. Bacteremia secondary to streptococci, beta-hemolytic group B - source of infection is not clear at this time. Will check echo to rule out endocarditis. Consult Dr. Ileana Hidalgo for recommendations. Continue on IV Levaquin and IV Vanco for broad coverage. Will consult ID.  2.  Fever, SIRS  - resolving with IV antibiotics, leukocytosis resolved, continue on empiric antibiotics  3. S/P ablation A. fib with mapping on 7/25/2022 in [address] [facility]  4. Diabetes mellitus type 2 insulin-dependent with diabetic retinopathy, peripheral neuropathy -uncontrolled, increase Lantus 45 units twice daily,on  sliding scale insulin, monitor Accu-Cheks  5. H/O severe CAD, s/p CABG x2 6/14 followed by cardiac cath 8/17 with angioplasty of RCA and stent placement, another cath on 5/14/2021 with no interventions. Asymptomatic, continue medical management  6.   History of paroxysmal A. Fib -s/p cardioversions in the past and recent ablation  Anticoagulated with Eliquis, on amiodarone  6. Hypertension -blood pressure is stable, continue on Coreg, Imdur, nifedipine, Entresto  7. Hyperlipidemia -on Lipitor  8. Obstructive sleep apnea -on CPAP  9.   Spinal stenosis and chronic low back pain -on Celebrex          Patient continues to require inpatient admission related to need for IV antibiotics, echocardiogram      Electronically signed by Artie Roger MD on 9/18/2022 at 9:50 AM    [facility]

## 2022-09-18 NOTE — CONSULTS
[facility] - Initial Consult Note  Today's Date and Time: 9/18/2022, 3:02 PM    Impression :   Streptococcus agalactiae septicemia, 9/16/2022  SIRS  Insulin dependent diabetes mellitus type 2 with diabetic retinopathy and peripheral neuropathy  History of coronary to disease. Status post CABG x2 in June 2014. Status post angioplasty of RCA with stent placement in August 2017  Essential hypertension   Obstructive sleep apnea   Chronic low back pain with spinal stenosis  Recommendations:   Rocephin 2 g IV daily to facilitate outpatient treatment. Stop date 9/26/2022  Discontinue vancomycin    Medical Decision Making/Summary/Discussion:9/18/2022       Infection Control Recommendations   Dafter Precautions    Antimicrobial Stewardship Recommendations     Simplification of therapy  Targeted therapy    Coordination of Outpatient Care:   Estimated Length of IV antimicrobials: 9/26/2022  Patient will need Midline Catheter Insertion: yes  Patient will need PICC line Insertion:no  Patient will need: Home IV , [place],  SNF,  LTAC: TBD  Patient will need outpatient wound care:no    Chief complaint/reason for consultation:   Streptococcus agalactia septicemia      History of Present Illness: Garret Nichols is a 71y.o.-year-old  male who was initially admitted on 9/16/2022. Patient seen at the request of Kaylene Loaiza. INITIAL HISTORY:    Patient presented through ER with complaints of of fevers, chills, nausea, vomiting and generalized weakness present for about 2 days prior to admission. He had developed a temperature of 102 at home. His home COVID test was negative. He attempted to control the fevers with Tylenol but progression of the symptoms led to his coming to the emergency room. The patient has a prior history of coronary artery disease with previous CABG and stent placement.   He also suffered from persistent atrial fibrillation and had undergone an ablation procedure [place] in [place] on 7/25/2022. Following the ablation he apparently diuresed well and lost about 16 pounds of fluids    The patient indicated more frequent urination but denied any dysuria, hematuria or pain with micturition. The emergency room he had a low-grade fever at 100.1 Fahrenheit. He was tachycardic and diaphoretic. His white cell count was elevated at 17.2. Chest x-ray did not show any pneumonia. He was admitted with SIRS and concern for sepsis. His subsequent blood cultures showed the presence of a Streptococcus agalactiae on 9/16/2022. The patient has improved with treatment with vancomycin. With the organism identified he could be switched to ceftriaxone 2 g IV daily to facilitate subsequent home treatment. CURRENT EVALUATION : 9/18/2022    Afebrile  VS stable    Patient feels better  No complaints  No new issues per RN    On room air  Respiratory rate 18  Saturation 94    Labs, X rays reviewed: 9/18/2022    BUN: 25  Cr: 4.93    WBC: 17.2-->7.5  Hb: 11.1  Plat: 190    Cultures:  Urine:    Blood:  9/16/2022 Streptococcus agalactiae  Sputum :    Wound:      Discussed with SUKHI RN. I have personally reviewed the past medical history, past surgical history, medications, social history, and family history, and I have updated the database accordingly.   Past Medical History:     Past Medical History:   Diagnosis Date    Arthritis     Arthritis     CAD (coronary artery disease)     CHF (congestive heart failure) (HCC)     CHF (congestive heart failure) (HCC)     Coronary artery disease     Diabetes ([facility] [address]     Diabetes mellitus ([facility] [address]     H/O coronary angioplasty     Hyperlipidemia     Hypertension     Neuropathy     Numbness and tingling     dannie hands    Pulmonary edema     SECONDARY TO FAT EMBOLI AFTER KNEE SURGERY    Sleep apnea     Unspecified sleep apnea 2012    cpap       Past Surgical  History:     Past Surgical History:   Procedure Laterality Date ANGIOPLASTY  08/30/2017    Dr. Charleen Prince mm drug eluting Xience stents in the right coronary. DANIELA-3 flow prior to the procedure DANIELA-3 flow following the procedure. The patient has had a good result from his angioplasty of the right coronary artery. .    APPENDECTOMY      APPENDECTOMY      AV NODE ABLATION      BUNIONECTOMY  2010    CARDIAC CATHETERIZATION      stent pacement    CARDIAC CATHETERIZATION Left 05/14/2014    Dr Arnold Urban. [facility] Severe CAD, 70% disese with in-stent stenosis in the left anterior descending at the level of a very large codominant diagonal.  90% disease of the ostium of a very large codominant diagonal with 60% disease beyond the diagonal with a fractional flow reserve of 0.76 in the diagonal and 0.78 in the left anterior descending indicating obstructive disease of significance in both the     CARDIAC CATHETERIZATION Left 05/14/2014    left anterior descending & diagonal.. Mild irregularities of 30% in a small, nondominant circumglex. Mild 30- 40% disease in the midportion of a very large,dominant right coronary artery. Normal left ventricular function, ejection fraction of 55-60%    CARDIAC CATHETERIZATION Bilateral 11/15/2017    Dr. Long Shankar @ [facility]--Severe PHT with a PA pressure of 74/30. Widely patent stent in the very large dominat right coronary artery with 50% disease before the stent with an FFR of 0.89, indicating no significant obstruction. Severe disease in the LAD. Patent left internal mammary to the LAD. Patent saphenous vein graft to a very large diagonal.  Overall normal LV function.   EF of 55%    CARDIAC CATHETERIZATION Left 05/14/2021    Dr. Long Shankar @ [facility]--Medical therapy    CATARACT REMOVAL  2004    COLONOSCOPY  10/09/2013    Dr. Sunni Haas (hemorrhoids)    COLONOSCOPY  10/09/2013    CORONARY ANGIOPLASTY WITH STENT PLACEMENT      CORONARY ARTERY BYPASS GRAFT  06/03/2014    Dr Halle Moeller @ [address] [place] [address] 2012    B leakage in back of eyes    JOINT REPLACEMENT Bilateral     knees    JOINT REPLACEMENT      KNEE SURGERY  ,     BILATERAL KNEE ORTHO    KNEE SURGERY         Medications:      [START ON 2022] NIFEdipine  30 mg Oral Daily    insulin glargine  45 Units SubCUTAneous BID    vancomycin  1,000 mg IntraVENous Q12H    vancomycin (VANCOCIN) intermittent dosing (placeholder)   Other RX Placeholder    amiodarone  100 mg Oral Nightly    apixaban  5 mg Oral BID    atorvastatin  40 mg Oral Nightly    carvedilol  3.125 mg Oral Daily    celecoxib  200 mg Oral Daily    cyanocobalamin  3,000 mcg Oral Daily    sacubitril-valsartan  4 tablet Oral BID    furosemide  80 mg Oral BID    isosorbide mononitrate  30 mg Oral Daily    magnesium oxide  400 mg Oral Daily    therapeutic multivitamin-minerals  1 tablet Oral Daily    spironolactone  25 mg Oral Daily    Vitamin D  1,000 Units Oral BID    sodium chloride flush  5-40 mL IntraVENous 2 times per day    levofloxacin  500 mg IntraVENous Q24H    insulin lispro  0-8 Units SubCUTAneous TID WC    insulin lispro  0-4 Units SubCUTAneous Nightly       Social History:     Social History     Socioeconomic History    Marital status:      Spouse name: Not on file    Number of children: Not on file    Years of education: Not on file    Highest education level: Not on file   Occupational History    Not on file   Tobacco Use    Smoking status: Former     Packs/day: 0.50     Years: 3.00     Pack years: 1.50     Types: Cigarettes     Quit date: 10/9/1975     Years since quittin.9    Smokeless tobacco: Never   Vaping Use    Vaping Use: Never used   Substance and Sexual Activity    Alcohol use: No    Drug use: No    Sexual activity: Not on file   Other Topics Concern    Not on file   Social History Narrative    Not on file     Social Determinants of Health     Financial Resource Strain: Not on file   Food Insecurity: Not on file   Transportation Needs: Not on file Physical Activity: Not on file   Stress: Not on file   Social Connections: Not on file   Intimate Partner Violence: Not on file   Housing Stability: Not on file       Family History:     Family History   Problem Relation Age of Onset    Alzheimer's Disease Mother     Heart Disease Mother     Arthritis Mother     High Blood Pressure Mother     Diabetes Mother     Heart Disease Father     Arthritis Father     High Blood Pressure Father     Diabetes Father         Allergies:   Patient has no known allergies. Review of Systems:   Constitutional: Fevers and chills on admission. Weakness and fatigue  Head: No headaches  Eyes: No double vision or blurry vision. No conjunctival inflammation. ENT: No sore throat or runny nose. . No hearing loss, tinnitus or vertigo. Cardiovascular: No chest pain or palpitations. No shortness of breath. No MARTINS  Lung: No shortness of breath or cough. No sputum production  Abdomen: No nausea, vomiting, diarrhea, or abdominal pain. Martine Maria Teresa No cramps. Genitourinary: No increased urinary frequency, or dysuria. No hematuria. No suprapubic or CVA pain  Musculoskeletal: No muscle aches or pains. No joint effusions, swelling or deformities  Hematologic: No bleeding or bruising. Neurologic: No headache, weakness, numbness, or tingling. Integument: No rash, no ulcers. Psychiatric: No depression. Endocrine: No polyuria, no polydipsia, no polyphagia. Physical Examination :   Patient Vitals for the past 8 hrs:   BP Temp Temp src Pulse Resp SpO2   09/18/22 1124 -- -- -- -- -- 94 %   09/18/22 0906 131/61 -- -- 81 -- --   09/18/22 0730 131/61 97.9 °F (36.6 °C) Oral 81 18 94 %     General Appearance: Awake, alert, and in no apparent distress  Head:  Normocephalic, no trauma  Eyes: Pupils equal, round, reactive to light and accommodation; extraocular movements intact; sclera anicteric; conjunctivae pink. No embolic phenomena. ENT: Oropharynx clear, without erythema, exudate, or thrush.  No tenderness of sinuses. Mouth/throat: mucosa pink and moist. No lesions. Dentition in good repair. Neck:Supple, without lymphadenopathy. Thyroid normal, No bruits. Pulmonary/Chest: Clear to auscultation, without wheezes, rales, or rhonchi. No dullness to percussion. Cardiovascular: Regular rate and rhythm without murmurs, rubs, or gallops. Abdomen: Soft, non tender. Bowel sounds normal. No organomegaly  All four Extremities: No cyanosis, clubbing, edema, or effusions. Neurologic: No gross sensory or motor deficits. Skin: Warm and dry with good turgor. Signs of peripheral venous insufficiency with venous stasis dermatitis and few ulcerations. Medical Decision Making -Laboratory:   I have independently reviewed/ordered the following labs:    CBC with Differential:   Recent Labs     09/17/22  0557 09/18/22  0608   WBC 10.5 7.5   HGB 11.1* 11.1*   HCT 33.4* 32.7*    190   LYMPHOPCT 5* 13   MONOPCT 7 13*     BMP:   Recent Labs     09/17/22  0557 09/18/22  0608    137   K 4.3 4.4    104   CO2 25 24   BUN 25* 25*   CREATININE 1.06 0.93     Hepatic Function Panel: No results for input(s): PROT, LABALBU, BILIDIR, IBILI, BILITOT, ALKPHOS, ALT, AST in the last 72 hours. No results for input(s): RPR in the last 72 hours. No results for input(s): HIV in the last 72 hours. No results for input(s): BC in the last 72 hours. Lab Results   Component Value Date/Time    RBC 3.55 09/18/2022 06:08 AM    WBC 7.5 09/18/2022 06:08 AM    TURBIDITY Clear 09/16/2022 11:10 AM     Lab Results   Component Value Date/Time    CREATININE 0.93 09/18/2022 06:08 AM    GLUCOSE 214 09/18/2022 06:08 AM       Medical Decision Making-Imaging:     EXAM: XR CHEST PORTABLE        HISTORY: Reason for exam:->tachypnea 20-year-old male with cough, fever,    shortness of breath. COMPARISON: 7/25/2022, [place]            TECHNIQUE: AP portable chest 0950 hours. FINDINGS: Mild stable cardiomegaly. Previous sternotomy.  No failure or    pneumonia. Impression       Cardiomegaly. No acute change. EXAM: US SCROTUM W LIMITED DUPLEX        HISTORY: 23-year-old male possible sepsis. COMPARISON: None. TECHNIQUE: Scrotal ultrasound color Doppler. FINDINGS: PERTINENT POSITIVES: None. PERTINENT NEGATIVES: No mass or torsion. COINCIDENTAL FINDINGS: Minimal prominence of vascular flow in the epididymis    bilaterally without overt epididymitis. ROUTINE EXAMINATION: Minimal fluid in the scrotum without overt hydroceles. No    orchitis or mass. Impression       No overt evidence of epididymitis. No mass or torsion. Medical Decision [id number]-[id number]:       Medical Decision Making-Other:     Note:  Labs, medications, radiologic studies were reviewed with personal review of films  Large amounts of data were reviewed  Discussed with nursing Staff, Discharge planner  Infection Control and Prevention measures reviewed  All prior entries were reviewed  Administer medications as ordered  Prognosis: Good  Discharge planning reviewed  Follow up as outpatient. Thank you for allowing us to participate in the care of this patient. Please call with questions.     Rekha Ward MD  Pager: [phone] - Office: [phone]

## 2022-09-19 ENCOUNTER — TELEPHONE (OUTPATIENT)
Dept: FAMILY MEDICINE CLINIC | Age: 72
End: 2022-09-19

## 2022-09-19 ENCOUNTER — ANESTHESIA EVENT (OUTPATIENT)
Dept: MEDSURG UNIT | Age: 72
DRG: 872 | End: 2022-09-19
Payer: MEDICARE

## 2022-09-19 ENCOUNTER — ANESTHESIA (OUTPATIENT)
Dept: MEDSURG UNIT | Age: 72
DRG: 872 | End: 2022-09-19
Payer: MEDICARE

## 2022-09-19 VITALS
SYSTOLIC BLOOD PRESSURE: 155 MMHG | TEMPERATURE: 97.9 F | RESPIRATION RATE: 16 BRPM | HEART RATE: 75 BPM | WEIGHT: 244.9 LBS | OXYGEN SATURATION: 95 % | HEIGHT: 70 IN | BODY MASS INDEX: 35.06 KG/M2 | DIASTOLIC BLOOD PRESSURE: 78 MMHG

## 2022-09-19 PROBLEM — R65.10 SIRS (SYSTEMIC INFLAMMATORY RESPONSE SYNDROME) (HCC): Status: ACTIVE | Noted: 2022-09-19

## 2022-09-19 PROBLEM — I87.2 VENOUS STASIS DERMATITIS OF BOTH LOWER EXTREMITIES: Status: ACTIVE | Noted: 2022-09-19

## 2022-09-19 PROBLEM — R60.0 BILATERAL LEG EDEMA: Status: ACTIVE | Noted: 2022-09-19

## 2022-09-19 PROBLEM — A40.1 SEPSIS DUE TO STREPTOCOCCUS AGALACTIAE (HCC): Status: ACTIVE | Noted: 2022-09-19

## 2022-09-19 LAB
ANION GAP SERPL CALCULATED.3IONS-SCNC: 11 MMOL/L (ref 9–17)
BUN BLDV-MCNC: 19 MG/DL (ref 8–23)
BUN/CREAT BLD: 22 (ref 9–20)
CALCIUM SERPL-MCNC: 8.5 MG/DL (ref 8.6–10.4)
CHLORIDE BLD-SCNC: 102 MMOL/L (ref 98–107)
CO2: 26 MMOL/L (ref 20–31)
CREAT SERPL-MCNC: 0.87 MG/DL (ref 0.7–1.2)
GFR AFRICAN AMERICAN: >60 ML/MIN
GFR NON-AFRICAN AMERICAN: >60 ML/MIN
GFR SERPL CREATININE-BSD FRML MDRD: ABNORMAL ML/MIN/{1.73_M2}
GLUCOSE BLD-MCNC: 124 MG/DL (ref 65–99)
GLUCOSE BLD-MCNC: 152 MG/DL (ref 70–99)
GLUCOSE BLD-MCNC: 245 MG/DL (ref 65–99)
GLUCOSE BLD-MCNC: 92 MG/DL (ref 65–99)
LV EF: 45 %
LVEF MODALITY: NORMAL
POTASSIUM SERPL-SCNC: 4 MMOL/L (ref 3.7–5.3)
SODIUM BLD-SCNC: 139 MMOL/L (ref 135–144)

## 2022-09-19 PROCEDURE — 36415 COLL VENOUS BLD VENIPUNCTURE: CPT

## 2022-09-19 PROCEDURE — 99232 SBSQ HOSP IP/OBS MODERATE 35: CPT | Performed by: INTERNAL MEDICINE

## 2022-09-19 PROCEDURE — 82947 ASSAY GLUCOSE BLOOD QUANT: CPT

## 2022-09-19 PROCEDURE — 93306 TTE W/DOPPLER COMPLETE: CPT

## 2022-09-19 PROCEDURE — 2580000003 HC RX 258: Performed by: INTERNAL MEDICINE

## 2022-09-19 PROCEDURE — 6360000002 HC RX W HCPCS: Performed by: INTERNAL MEDICINE

## 2022-09-19 PROCEDURE — APPSS30 APP SPLIT SHARED TIME 16-30 MINUTES: Performed by: NURSE PRACTITIONER

## 2022-09-19 PROCEDURE — 80048 BASIC METABOLIC PNL TOTAL CA: CPT

## 2022-09-19 PROCEDURE — 6370000000 HC RX 637 (ALT 250 FOR IP): Performed by: INTERNAL MEDICINE

## 2022-09-19 PROCEDURE — 94761 N-INVAS EAR/PLS OXIMETRY MLT: CPT

## 2022-09-19 RX ORDER — LIDOCAINE HYDROCHLORIDE 10 MG/ML
5 INJECTION, SOLUTION INFILTRATION; PERINEURAL ONCE
Status: DISCONTINUED | OUTPATIENT
Start: 2022-09-19 | End: 2022-09-19 | Stop reason: HOSPADM

## 2022-09-19 RX ORDER — FUROSEMIDE 10 MG/ML
80 INJECTION INTRAMUSCULAR; INTRAVENOUS 2 TIMES DAILY
Status: DISCONTINUED | OUTPATIENT
Start: 2022-09-19 | End: 2022-09-19 | Stop reason: HOSPADM

## 2022-09-19 RX ORDER — SODIUM CHLORIDE 0.9 % (FLUSH) 0.9 %
5-40 SYRINGE (ML) INJECTION PRN
Status: DISCONTINUED | OUTPATIENT
Start: 2022-09-19 | End: 2022-09-19 | Stop reason: HOSPADM

## 2022-09-19 RX ORDER — SODIUM CHLORIDE 9 MG/ML
25 INJECTION, SOLUTION INTRAVENOUS PRN
Status: DISCONTINUED | OUTPATIENT
Start: 2022-09-19 | End: 2022-09-19 | Stop reason: HOSPADM

## 2022-09-19 RX ORDER — DOXAZOSIN 8 MG/1
4 TABLET ORAL 2 TIMES DAILY
Qty: 60 TABLET | Refills: 0
Start: 2022-09-19

## 2022-09-19 RX ORDER — SODIUM CHLORIDE 0.9 % (FLUSH) 0.9 %
5-40 SYRINGE (ML) INJECTION EVERY 12 HOURS SCHEDULED
Status: DISCONTINUED | OUTPATIENT
Start: 2022-09-19 | End: 2022-09-19 | Stop reason: HOSPADM

## 2022-09-19 RX ORDER — AMIODARONE HYDROCHLORIDE 200 MG/1
TABLET ORAL
Qty: 90 TABLET | Refills: 3
Start: 2022-09-19

## 2022-09-19 RX ORDER — HUMAN INSULIN 100 [USP'U]/ML
INJECTION, SUSPENSION SUBCUTANEOUS
Qty: 1 EACH | Refills: 0
Start: 2022-09-19

## 2022-09-19 RX ADMIN — SACUBITRIL AND VALSARTAN 4 TABLET: 24; 26 TABLET, FILM COATED ORAL at 09:21

## 2022-09-19 RX ADMIN — MULTIPLE VITAMINS W/ MINERALS TAB 1 TABLET: TAB at 09:21

## 2022-09-19 RX ADMIN — FUROSEMIDE 80 MG: 10 INJECTION, SOLUTION INTRAMUSCULAR; INTRAVENOUS at 09:21

## 2022-09-19 RX ADMIN — CYANOCOBALAMIN TAB 1000 MCG 3000 MCG: 1000 TAB at 09:21

## 2022-09-19 RX ADMIN — CEFTRIAXONE 2000 MG: 2 INJECTION, POWDER, FOR SOLUTION INTRAMUSCULAR; INTRAVENOUS at 16:05

## 2022-09-19 RX ADMIN — NIFEDIPINE 30 MG: 30 TABLET, FILM COATED, EXTENDED RELEASE ORAL at 09:21

## 2022-09-19 RX ADMIN — SPIRONOLACTONE 25 MG: 25 TABLET, FILM COATED ORAL at 09:21

## 2022-09-19 RX ADMIN — APIXABAN 5 MG: 5 TABLET, FILM COATED ORAL at 09:21

## 2022-09-19 RX ADMIN — SODIUM CHLORIDE, PRESERVATIVE FREE 10 ML: 5 INJECTION INTRAVENOUS at 09:22

## 2022-09-19 RX ADMIN — ISOSORBIDE MONONITRATE 30 MG: 30 TABLET, EXTENDED RELEASE ORAL at 09:21

## 2022-09-19 RX ADMIN — CELECOXIB 200 MG: 100 CAPSULE ORAL at 09:21

## 2022-09-19 RX ADMIN — CARVEDILOL 3.12 MG: 3.12 TABLET, FILM COATED ORAL at 09:21

## 2022-09-19 RX ADMIN — Medication 400 MG: at 09:21

## 2022-09-19 RX ADMIN — INSULIN GLARGINE 45 UNITS: 100 INJECTION, SOLUTION SUBCUTANEOUS at 09:22

## 2022-09-19 RX ADMIN — CHOLECALCIFEROL TAB 25 MCG (1000 UNIT) 1000 UNITS: 25 TAB at 09:21

## 2022-09-19 ASSESSMENT — PAIN SCALES - GENERAL
PAINLEVEL_OUTOF10: 0
PAINLEVEL_OUTOF10: 3

## 2022-09-19 ASSESSMENT — PAIN DESCRIPTION - LOCATION: LOCATION: ARM

## 2022-09-19 ASSESSMENT — PAIN DESCRIPTION - ORIENTATION: ORIENTATION: LEFT

## 2022-09-19 NOTE — DISCHARGE INSTRUCTIONS
Discharge Instructions    Admission Date:  9/16/2022  Discharge Date:  9/19/22    Disposition:  Home    Activity:  As tolerated    Diet:  Diabetic      Discharge Instructions:  Resume previous home medications. IV Rocephin 2 g daily until 9/26/22. Repeat blood cultures x 2 on 10/10/22. ACE wrap lower legs to decrease edema. Activity:  As tolerated. Diet:  Diabetic. Follow Up: With your primary care physician (@PCP@) in 1 weeks.

## 2022-09-19 NOTE — PROGRESS NOTES
Quality flow rounds held on 9/19/22     Dionte Pearl is admitted for  Sepsis [facility]). Length of stay 3. Education:    Needed Education: sepsis, antibiotic use, diet, follow up, meds      Do you have any questions regarding your plan of care while at the hospital? denies    Planned Disposition:               [x]  Home when able                [] Swing Bed                [] ECF/SNF               [] Other/TBD    Barriers to Discharge:    Can you afford your medications? yes   Do you have transportation to follow up appointments? Drives self, wife drives   Do you need any new equipment at home? denies   Current equipment includes   CPAP, diabetic supplies, has a Rolator if needed as well as other DME if needed    Do you have a living will or durable power of  for healthcare? yes               If yes do we have a copy on file? yes    Do you or your family have any questions or concerns we haven't already discussed? Denies    Lives with wife, plans to return home and use outpatient therapy for IV ATB. PCP is Dr. Zoe High. Denies needs at this time.

## 2022-09-19 NOTE — ANESTHESIA PROCEDURE NOTES
PICC/Midline:    A midline catheter    was placed using direct puncture and ultrasound guided in the patient floor for the following indication(s): intravenous antibiotics. 9/19/2022 8:45 AM, 9/19/2022 9:10 AM.  Staffing  Performed: Resident/CRNA   Resident/CRNA: YENNY Herrera CRNASterility preparation included the following: hand hygiene performed prior to procedure, maximum sterile barriers used and sterile technique used to drape from head to toe. .  The  left,  brachial vein was prepped. A 4.5 Fr (size), Chloraprep}lidocaine 1%, single lumen   Advanced 15 cm., Exposed 0 cm., Total 15 cm. Kit: [id number]  Expiration date: 2/28/2023  direct aspiration of blood via the line    Post-procedure education provided and Patient verbalized understanding of education.   Preanesthetic Checklist  Completed: patient identified, IV checked, site marked, risks and benefits discussed, surgical/procedural consents, equipment checked, pre-op evaluation, timeout performed, anesthesia consent given, oxygen available, monitors applied/VS acknowledged, fire risk safety assessment completed and verbalized and blood product R/B/A discussed and consented

## 2022-09-19 NOTE — TELEPHONE ENCOUNTER
Last OV: 11/15/2021    Next scheduled apt: 9/23/2022      Scheduled hospital follow supposed to be discharged today

## 2022-09-19 NOTE — PROGRESS NOTES
Hospitalist Progress Note  9/19/2022 4:11 AM  Subjective:   Admit Date: 9/16/2022  PCP: Geoff Sutton DO    Interval History: Baljeet Ruth has no complaints other that being worried about the swelling in her lower legs. He states these were doing better at home until he came to the hospital and received IV antibiotics. No chest pain or SOB. He states he is feeling much better and hopes to go home today. Plan of care including ID recommendations discussed. ECHO to be performed today. Diet: ADULT DIET; Regular; 4 carb choices (60 gm/meal)  ADULT ORAL NUTRITION SUPPLEMENT; Breakfast, Lunch, Dinner; Diabetic Oral Supplement  Medications:   Scheduled Meds:   NIFEdipine  30 mg Oral Daily    insulin glargine  45 Units SubCUTAneous BID    cefTRIAXone (ROCEPHIN) IV  2,000 mg IntraVENous Q24H    amiodarone  100 mg Oral Nightly    apixaban  5 mg Oral BID    atorvastatin  40 mg Oral Nightly    carvedilol  3.125 mg Oral Daily    celecoxib  200 mg Oral Daily    cyanocobalamin  3,000 mcg Oral Daily    sacubitril-valsartan  4 tablet Oral BID    furosemide  80 mg Oral BID    isosorbide mononitrate  30 mg Oral Daily    magnesium oxide  400 mg Oral Daily    therapeutic multivitamin-minerals  1 tablet Oral Daily    spironolactone  25 mg Oral Daily    Vitamin D  1,000 Units Oral BID    sodium chloride flush  5-40 mL IntraVENous 2 times per day    insulin lispro  0-8 Units SubCUTAneous TID WC    insulin lispro  0-4 Units SubCUTAneous Nightly     Continuous Infusions:   sodium chloride      dextrose         Patient's current medications documented, reviewed, and updated.       CBC:   Recent Labs     09/16/22  1034 09/17/22  0557 09/18/22  0608   WBC 17.2* 10.5 7.5   HGB 12.3* 11.1* 11.1*    201 190     BMP:    Recent Labs     09/16/22  1034 09/17/22  0557 09/18/22  0608    135 137   K 4.4 4.3 4.4    100 104   CO2 24 25 24   BUN 23 25* 25*   CREATININE 0.95 1.06 0.93   GLUCOSE 196* 279* 214*     Hepatic: No results for input(s): AST, ALT, ALB, BILITOT, ALKPHOS in the last 72 hours. Troponin: No results for input(s): TROPONINI in the last 72 hours. BNP: No results for input(s): BNP in the last 72 hours. Lipids: No results for input(s): CHOL, HDL in the last 72 hours. Invalid input(s): LDLCALCU  INR: No results for input(s): INR in the last 72 hours. Objective:   Vitals: BP (!) 154/73   Pulse 85   Temp 98.1 °F (36.7 °C) (Oral)   Resp 16   Ht 5' 10\" (1.778 m)   Wt 244 lb 14.4 oz (111.1 kg)   SpO2 95%   BMI 35.14 kg/m²   General appearance: alert and cooperative with exam  HEENT: Head: Normocephalic, no lesions, without obvious abnormality. Ears: Normal TM's bilaterally. Normal auditory canals and external ears. Non-tender. Nose: Normal external nose, mucus membranes and septum. Neck: no adenopathy, no carotid bruit, and supple, symmetrical, trachea midline  Lungs: clear to auscultation bilaterally  Heart: regular rate and rhythm, S1, S2 normal, and II/VI systolic murmur  Abdomen: soft, non-tender; bowel sounds normal; no masses,  no organomegaly  Extremities:  2 + LE edema with no calf tenderness. Neurologic: Mental status: Alert, oriented, thought content appropriate    Assessment and Plan:   Bacteremia with beta hemolytic group B strep. Placed on IV Rocephin 2 g daily by ID after being treated with IV Levaquin and Vancomycin while awaiting ID of the organism. WBC normalized with no fever. SIRS - resolved with IV antibiotics. Increase lower leg edema secondary to IV hydration - ACE wraps ordered. S/P ablation for afib with mapping on 7/25/22 at [facility] by Dr. Tanya Barbour. CAD - no chest pain. s/p CABG x2 6/14 followed by cardiac cath 8/17 with angioplasty of RCA and stent placement, another cath on 5/14/2021 with no interventions. Hyperlipidemia - on Lipitor. GHAZALA - on CPAP at night. HTN - stable on Coreg, Imdur, Nifedipine, and Entresto. Spinal stenosis with chronic low back pain - on Celebrex. DM II - with retinopathy and neuropathy. On Lantus with Humalog sliding scale. Plan:  Consult to Dr. Gamal Rao today with ECHO to evaluate for cardiac valve vegetations. Place mid line today. Plan for discharge later today or tomorrow with home care for IV Rocephin. Place ACE wraps on lower legs to mobilize fluid. Patient continues to require in patient admission secondary to the need for IV antibiotics. DVT prophylaxis:   [] Lovenox   [] SCDs   [] SQ Heparin   [] Encourage ambulation, low risk for DVT, no chemical or mechanical    prophylaxis necessary      [x] Already on Anticoagulation    Patient Active Problem List:     Hypertension     Hyperlipidemia     Coronary artery disease involving native coronary artery of native heart without angina pectoris     H/O coronary angioplasty     Severe nonproliferative diabetic retinopathy with macular edema associated with type 2 diabetes mellitus (HCC)     Type 2 diabetes mellitus with peripheral neuropathy (HCC)     Diabetic peripheral neuropathy (HCC)     GHAZALA (obstructive sleep apnea)     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HCC)     Pulmonary HTN (HCC)     PAF (paroxysmal atrial fibrillation) (HCC)     Lumbosacral spondylosis without myelopathy     Pars defect with spondylolisthesis     Spinal stenosis of lumbar region without neurogenic claudication     Systolic congestive heart failure ([address]     Type 2 MI (myocardial infarction) ([address]     Sepsis ([address]      Documentation of the Current Medications in the Medical Record    (x)  I have utilized all available immediate resources to obtain, update, or review the patient's current medications. (Satisfies MIPS Performance)  If Yes, Stop Here  ( )  The patient is not eligible for medication reconciliation; the patient is in an emergent medical situation where delaying treatment would jeopardize the patient's health.   (MIPS Performance exception / exclusion)  ( )  I did not confirm, update or review the patient's current list of medications today. (Does not satisfy MIPS Performance)    Advanced Care Plan    (x)  I confirmed that the patient's Advanced Care Plan is present, code status documented, or surrogate decision maker is listed in the patient's medical record. ( )  The patient's advanced care plan is not present because:  (select)   ( ) I confirmed today that the patient does not wish or was not able to name a surrogate decision maker or provide an [address] ( ) Hospice care is currently being provided or has been provided this calender year. ( )  I did not confirm today the presence of an [address] or surrogate decision maker documented within the patient's medical record. (Does not satisfy MIPS performance).             Shakila Berumen MD, MD  [facility]

## 2022-09-19 NOTE — PROGRESS NOTES
Met with Patient and his wife and daughter this a.m. to discuss discharge planning. Patient is a 71year old , white male, admitted with a diagnosis of Sepsis. He is alert and oriented, polite and cooperative with this assessment. States that he is ready to go home and decides to take IV antibiotics through [address] rather than utilizing home health. Referral made to [facility] outpatient, Doyle Lazar, at this time. Patient [place] [facility] [facility] with his wife. He is retired from the [employer]. Patient uses a Bi pap machine and has a rollator walker that he uses on occasion. Patient has access to many other DME but currently does not need to use. Has good support from family and friends noted. Patient has no outside resources or services in place at this time. He drives himself and provides for his own transportation needs. PCP is Dr. Gene Cobos. Patient has medical insurance and has no difficulty with regards to affording his prescription medications at this point per his own report. Discharge plan is home with wife when deemed medically stable with IV antibiotics to be given through [email or web address]. Patient wife will transport to and from the hospital daily. Patient is a 'Full Code' status and he does have Advanced Directives on file. His wife is his designated decision maker. No further anticipated needs or concerns expressed by Patient or family at this time. LSW to monitor and assist with discharge planning as appropriate.     Chanda. Eliseo [address]  9/19/2022

## 2022-09-19 NOTE — PROGRESS NOTES
[facility] - Progress Note  Today's Date and Time: 9/19/2022, 11:21 AM    Impression :   Streptococcus agalactiae septicemia, 9/16/2022  SIRS  Insulin dependent diabetes mellitus type 2 with diabetic retinopathy and peripheral neuropathy  History of coronary to disease. Status post CABG x2 in June 2014. Status post angioplasty of RCA with stent placement in August 2017  Essential hypertension   Obstructive sleep apnea   Chronic low back pain with spinal stenosis  Recommendations:   Rocephin 2 g IV daily to facilitate outpatient treatment. Stop date 9/26/2022  Discontinue vancomycin    Medical Decision Making/Summary/Discussion:9/19/2022       Infection Control Recommendations   [place] Precautions    Antimicrobial Stewardship Recommendations     Simplification of therapy  Targeted therapy    Coordination of Outpatient Care:   Estimated Length of IV antimicrobials: 9/26/2022  Patient will need Midline Catheter Insertion: yes  Patient will need PICC line Insertion:no  Patient will need: Home IV , [place],  SNF,  LTAC: TBD  Patient will need outpatient wound care:no    Chief complaint/reason for consultation:   Streptococcus agalactia septicemia      History of Present Illness: Jesus Conner is a 71y.o.-year-old  male who was initially admitted on 9/16/2022. Patient seen at the request of Donovan Leija. INITIAL HISTORY:    Patient presented through ER with complaints of of fevers, chills, nausea, vomiting and generalized weakness present for about 2 days prior to admission. He had developed a temperature of 102 at home. His home COVID test was negative. He attempted to control the fevers with Tylenol but progression of the symptoms led to his coming to the emergency room. The patient has a prior history of coronary artery disease with previous CABG and stent placement.   He also suffered from persistent atrial fibrillation and had undergone an ablation procedure [place] in [place] on 7/25/2022. Following the ablation he apparently diuresed well and lost about 16 pounds of fluids    The patient indicated more frequent urination but denied any dysuria, hematuria or pain with micturition. The emergency room he had a low-grade fever at 100.1 Fahrenheit. He was tachycardic and diaphoretic. His white cell count was elevated at 17.2. Chest x-ray did not show any pneumonia. He was admitted with SIRS and concern for sepsis. His subsequent blood cultures showed the presence of a Streptococcus agalactiae on 9/16/2022. The patient has improved with treatment with vancomycin. With the organism identified he could be switched to ceftriaxone 2 g IV daily to facilitate subsequent home treatment. CURRENT EVALUATION : 9/19/2022  BP (!) 158/78   Pulse 77   Temp 98.4 °F (36.9 °C) (Oral)   Resp 16   Ht 5' 10\" (1.778 m)   Wt 244 lb 14.4 oz (111.1 kg)   SpO2 94%   BMI 35.14 kg/m²     Afebrile  VS stable    The patient is doing well on room air. He is scheduled for an echo today    Plan for discharge home with home IV abx      Labs, X rays reviewed: 9/19/2022    BUN: 25-->19  Cr: 0.93-->0.87    WBC: 17.2-->7.5  Hb: 11.1  Plat: 190    Cultures:  Urine:    Blood:  9/16/2022 Streptococcus agalactiae  Sputum :    Wound:      Discussed with RUPESH ISBELL. I have personally reviewed the past medical history, past surgical history, medications, social history, and family history, and I have updated the database accordingly.   Past Medical History:     Past Medical History:   Diagnosis Date    Arthritis     Arthritis     CAD (coronary artery disease)     CHF (congestive heart failure) (HCC)     CHF (congestive heart failure) (HCC)     Coronary artery disease     Diabetes ([facility]     Diabetes mellitus ([facility]     H/O coronary angioplasty     Hyperlipidemia     Hypertension     Neuropathy     Numbness and tingling     dannie hands    Pulmonary edema     SECONDARY TO FAT EMBOLI AFTER KNEE SURGERY    Sleep apnea     Unspecified sleep apnea 2012    cpap       Past Surgical  History:     Past Surgical History:   Procedure Laterality Date    ANGIOPLASTY  08/30/2017    Dr. Ronda Howard mm drug eluting Xience stents in the right coronary. DANIELA-3 flow prior to the procedure DANIELA-3 flow following the procedure. The patient has had a good result from his angioplasty of the right coronary artery. .    APPENDECTOMY      APPENDECTOMY      AV NODE ABLATION      BUNIONECTOMY  2010    CARDIAC CATHETERIZATION      stent pacement    CARDIAC CATHETERIZATION Left 05/14/2014    Dr Marlene Serrano. [facility] Severe CAD, 70% disese with in-stent stenosis in the left anterior descending at the level of a very large codominant diagonal.  90% disease of the ostium of a very large codominant diagonal with 60% disease beyond the diagonal with a fractional flow reserve of 0.76 in the diagonal and 0.78 in the left anterior descending indicating obstructive disease of significance in both the     CARDIAC CATHETERIZATION Left 05/14/2014    left anterior descending & diagonal.. Mild irregularities of 30% in a small, nondominant circumglex. Mild 30- 40% disease in the midportion of a very large,dominant right coronary artery. Normal left ventricular function, ejection fraction of 55-60%    CARDIAC CATHETERIZATION Bilateral 11/15/2017    Dr. Gamal Rao @ [facility]--Severe PHT with a PA pressure of 74/30. Widely patent stent in the very large dominat right coronary artery with 50% disease before the stent with an FFR of 0.89, indicating no significant obstruction. Severe disease in the LAD. Patent left internal mammary to the LAD. Patent saphenous vein graft to a very large diagonal.  Overall normal LV function.   EF of 55%    CARDIAC CATHETERIZATION Left 05/14/2021    Dr. Gamal Rao @ [facility]--Medical therapy    CATARACT REMOVAL  2004    COLONOSCOPY  10/09/2013    Dr. Alpa Donahue (hemorrhoids)    COLONOSCOPY  10/09/2013 CORONARY ANGIOPLASTY WITH STENT PLACEMENT      CORONARY ARTERY BYPASS GRAFT  2014    Dr Tucker Lazar @  [facility]    CORONARY ARTERY BYPASS GRAFT      EYE SURGERY  2012    B leakage in back of eyes    JOINT REPLACEMENT Bilateral     knees    JOINT REPLACEMENT      KNEE SURGERY  ,     BILATERAL KNEE ORTHO    KNEE SURGERY         Medications:      lidocaine 1 % injection  5 mL IntraDERmal Once    sodium chloride flush  5-40 mL IntraVENous 2 times per day    furosemide  80 mg IntraVENous BID    NIFEdipine  30 mg Oral Daily    insulin glargine  45 Units SubCUTAneous BID    cefTRIAXone (ROCEPHIN) IV  2,000 mg IntraVENous Q24H    amiodarone  100 mg Oral Nightly    apixaban  5 mg Oral BID    atorvastatin  40 mg Oral Nightly    carvedilol  3.125 mg Oral Daily    celecoxib  200 mg Oral Daily    cyanocobalamin  3,000 mcg Oral Daily    sacubitril-valsartan  4 tablet Oral BID    [Held by provider] furosemide  80 mg Oral BID    isosorbide mononitrate  30 mg Oral Daily    magnesium oxide  400 mg Oral Daily    therapeutic multivitamin-minerals  1 tablet Oral Daily    spironolactone  25 mg Oral Daily    Vitamin D  1,000 Units Oral BID    sodium chloride flush  5-40 mL IntraVENous 2 times per day    insulin lispro  0-8 Units SubCUTAneous TID WC    insulin lispro  0-4 Units SubCUTAneous Nightly       Social History:     Social History     Socioeconomic History    Marital status:      Spouse name: Not on file    Number of children: Not on file    Years of education: Not on file    Highest education level: Not on file   Occupational History    Not on file   Tobacco Use    Smoking status: Former     Packs/day: 0.50     Years: 3.00     Pack years: 1.50     Types: Cigarettes     Quit date: 10/9/1975     Years since quittin.9    Smokeless tobacco: Never   Vaping Use    Vaping Use: Never used   Substance and Sexual Activity    Alcohol use: No    Drug use: No    Sexual activity: Not on file   Other Topics Concern Not on file   Social History Narrative    Not on file     Social Determinants of Health     Financial Resource Strain: Not on file   Food Insecurity: Not on file   Transportation Needs: Not on file   Physical Activity: Not on file   Stress: Not on file   Social Connections: Not on file   Intimate Partner Violence: Not on file   Housing Stability: Not on file       Family History:     Family History   Problem Relation Age of Onset    Alzheimer's Disease Mother     Heart Disease Mother     Arthritis Mother     High Blood Pressure Mother     Diabetes Mother     Heart Disease Father     Arthritis Father     High Blood Pressure Father     Diabetes Father         Allergies:   Patient has no known allergies. Review of Systems:   Constitutional: Fevers and chills on admission. Weakness and fatigue  Head: No headaches  Eyes: No double vision or blurry vision. No conjunctival inflammation. ENT: No sore throat or runny nose. . No hearing loss, tinnitus or vertigo. Cardiovascular: No chest pain or palpitations. No shortness of breath. No MARTINS  Lung: No shortness of breath or cough. No sputum production  Abdomen: No nausea, vomiting, diarrhea, or abdominal pain. Nataliia Coamo No cramps. Genitourinary: No increased urinary frequency, or dysuria. No hematuria. No suprapubic or CVA pain  Musculoskeletal: No muscle aches or pains. No joint effusions, swelling or deformities  Hematologic: No bleeding or bruising. Neurologic: No headache, weakness, numbness, or tingling. Integument: No rash, no ulcers. Psychiatric: No depression. Endocrine: No polyuria, no polydipsia, no polyphagia.     Physical Examination :   Patient Vitals for the past 8 hrs:   BP Temp Temp src Pulse Resp SpO2 Height   09/19/22 1105 -- -- -- -- -- 94 % --   09/19/22 0749 (!) 158/78 98.4 °F (36.9 °C) Oral 77 16 97 % --   09/19/22 0719 -- -- -- -- -- -- 5' 10\" (1.778 m)   09/19/22 0556 -- -- -- -- -- 97 % --   09/19/22 0430 135/70 98.5 °F (36.9 °C) Axillary -- 16 97 % -- General Appearance: Awake, alert, and in no apparent distress  Head:  Normocephalic, no trauma  Eyes: Pupils equal, round, reactive to light and accommodation; extraocular movements intact; sclera anicteric; conjunctivae pink. No embolic phenomena. ENT: Oropharynx clear, without erythema, exudate, or thrush. No tenderness of sinuses. Mouth/throat: mucosa pink and moist. No lesions. Dentition in good repair. Neck:Supple, without lymphadenopathy. Thyroid normal, No bruits. Pulmonary/Chest: Clear to auscultation, without wheezes, rales, or rhonchi. No dullness to percussion. Cardiovascular: Regular rate and rhythm without murmurs, rubs, or gallops. Abdomen: Soft, non tender. Bowel sounds normal. No organomegaly  All four Extremities: No cyanosis, clubbing, edema, or effusions. Neurologic: No gross sensory or motor deficits. Skin: Warm and dry with good turgor. Signs of peripheral venous insufficiency with venous stasis dermatitis and few ulcerations. Medical Decision Making -Laboratory:   I have independently reviewed/ordered the following labs:    CBC with Differential:   Recent Labs     09/17/22  0557 09/18/22  0608   WBC 10.5 7.5   HGB 11.1* 11.1*   HCT 33.4* 32.7*    190   LYMPHOPCT 5* 13   MONOPCT 7 13*     BMP:   Recent Labs     09/18/22  0608 09/19/22  0350    139   K 4.4 4.0    102   CO2 24 26   BUN 25* 19   CREATININE 0.93 0.87     Hepatic Function Panel: No results for input(s): PROT, LABALBU, BILIDIR, IBILI, BILITOT, ALKPHOS, ALT, AST in the last 72 hours. No results for input(s): RPR in the last 72 hours. No results for input(s): HIV in the last 72 hours. No results for input(s): BC in the last 72 hours.   Lab Results   Component Value Date/Time    RBC 3.55 09/18/2022 06:08 AM    WBC 7.5 09/18/2022 06:08 AM    TURBIDITY Clear 09/16/2022 11:10 AM     Lab Results   Component Value Date/Time    CREATININE 0.87 09/19/2022 03:50 AM    GLUCOSE 152 09/19/2022 03:50 AM Medical Decision Making-Imaging:     EXAM: XR CHEST PORTABLE        HISTORY: Reason for exam:->tachypnea 67-year-old male with cough, fever,    shortness of breath. COMPARISON: 7/25/2022, [facility]            TECHNIQUE: AP portable chest 0950 hours. FINDINGS: Mild stable cardiomegaly. Previous sternotomy. No failure or    pneumonia. Impression       Cardiomegaly. No acute change. EXAM: US SCROTUM W LIMITED DUPLEX        HISTORY: 67-year-old male possible sepsis. COMPARISON: None. TECHNIQUE: Scrotal ultrasound color Doppler. FINDINGS: PERTINENT POSITIVES: None. PERTINENT NEGATIVES: No mass or torsion. COINCIDENTAL FINDINGS: Minimal prominence of vascular flow in the epididymis    bilaterally without overt epididymitis. ROUTINE EXAMINATION: Minimal fluid in the scrotum without overt hydroceles. No    orchitis or mass. Impression       No overt evidence of epididymitis. No mass or torsion. Medical Decision [id number]:       Medical Decision Making-Other:     Note:  Labs, medications, radiologic studies were reviewed with personal review of films  Large amounts of data were reviewed  Discussed with nursing Staff, Discharge planner  Infection Control and Prevention measures reviewed  All prior entries were reviewed  Administer medications as ordered  Prognosis: Good  Discharge planning reviewed      Thank you for allowing us to participate in the care of this patient. Please call with questions. Ekta Jolley, APRN - CNP    ATTESTATION:    I have discussed the case, including pertinent history and exam findings with the APRN. I have evaluated the  History, physical findings and pictures of the patient and the key elements of the encounter have been performed by me. I have reviewed the laboratory data, other diagnostic studies and discussed them with the APRN.  I have updated the medical record where necessary. I agree with the assessment, plan and orders as documented by the APRN.     Alice Bhakta MD.    Pager: [phone] - Office: [phone]

## 2022-09-19 NOTE — DISCHARGE SUMMARY
Hospitalist Discharge Summary    Miguel A Nelson  :  1950  MRN:  [id number]    Admit date:  2022  Discharge date:  22    Admitting Physician:  Amauri Kim MD    Discharge Diagnoses:   Bacteremia with beta hemolytic group B strep. SIRS      CAD      Lower extremity edema      DM II    Admission Condition:  poor      Discharged Condition:  good    Hospital Course: The patient is a 71 y.o. male  presented to the emergency room complaining of generalized weakness, fevers, chills, nausea and vomiting for the past couple of days. Patient states that 2 days ago he started having chills and uncontrollable shakes. His wife checked his temperature and it was  102. 1. They ran a home COVID test and it was negative. She gave him Tylenol which made him feel better. Over the next 24-30 hours he developed persistent nausea and vomited and continued to have chills. His appetite was very poor. He had no pain, no cough, no headaches, no earaches or sore throat. Patient had ablation done at [facility] on  for persistent atrial fibrillation. Patient's wife states that after his ablation he lost about 16 pounds weight. She believes it was due to diuretics that he has been taking although he has been on them for more than 2 years. Patient has been having more frequent urination but no burning, pain or hematuria. Patient's work-up in the emergency room revealed temperature 100.1, heart rate 114, respirations 26, blood pressure 130/72. He was 96% on room air. BMP was essentially normal, troponin was 45 followed by 43, WBC was elevated 17.2, H&H 12.3/37.0, platelets 656. Urinalysis was negative for UTI. COVID-19 was negative. Flu a and B antigens were also negative. Chest x-ray was negative for evidence of pneumonia.   The patient did complain of some scrotal discomfort during his physical examination and for this reason ultrasound was obtained which revealed no evidence of epididymitis, mass or torsion. Due to patient's persistent fever, tachycardia and SIRS he was deemed appropriate for admission. Source of infection was unclear after ER work-up. Marc Cancino was admitted on IV hydration and IV antibiotics to include Levaquin and Vancomycin with Pharmacy dosing. With IV antibiotics his WBC normalized and fever resolved. Blood cultures grew out beta hemolytic strep. Dr. Alden Luther in ID was consulted and discontinued Levaquin and Vancomycin and placed Marc Cancino on IV Rocephin 2 g daily. Dr. Essie Rosa was consulted and performed an ECHO to rule out valvular vegetations. An order was placed for a mid line as Marc Cancino elected to have out patient infusions at home though home care. With his improvement it was felt he could be discharged home with daily Rocephin infusions until 9/26/22. Home care was arranged at time of discharge. Discharge Exam:    Vitals: BP (!) 154/73   Pulse 85   Temp 98.1 °F (36.7 °C) (Oral)   Resp 16   Ht 5' 10\" (1.778 m)   Wt 244 lb 14.4 oz (111.1 kg)   SpO2 95%   BMI 35.14 kg/m²   General appearance: alert and cooperative with exam  HEENT: Head: Normocephalic, no lesions, without obvious abnormality. Ears: Normal TM's bilaterally. Normal auditory canals and external ears. Non-tender. Nose: Normal external nose, mucus membranes and septum. Neck: no adenopathy, no carotid bruit, and supple, symmetrical, trachea midline  Lungs: clear to auscultation bilaterally  Heart: regular rate and rhythm, S1, S2 normal, and II/VI systolic murmur  Abdomen: soft, non-tender; bowel sounds normal; no masses,  no organomegaly  Extremities:  2 + LE edema with no calf tenderness.   Neurologic: Mental status: Alert, oriented, thought content appropriate    Discharge Medications:         Medication List        START taking these medications      cefTRIAXone  infusion  Commonly known as: ROCEPHIN  Infuse 2,000 mg intravenously every 24 hours for 7 days Compound per protocol CONTINUE taking these medications      acetaminophen 500 MG tablet  Commonly known as: TYLENOL     amiodarone 200 MG tablet  Commonly known as: CORDARONE  Patient taking 1/2 tablet nightly     apixaban 5 MG Tabs tablet  Commonly known as: Eliquis  TAKE 1 TABLET TWICE A DAY     atorvastatin 40 MG tablet  Commonly known as: LIPITOR  TAKE 1 TABLET DAILY     BD ULTRA-FINE PEN NEEDLES 29G X 12.7MM Misc  Generic drug: Insulin Pen Needle     carvedilol 3.125 MG tablet  Commonly known as: COREG  Take 1 tablet by mouth daily     celecoxib 200 MG capsule  Commonly known as: CELEBREX  TAKE 1 CAPSULE DAILY     CINNAMON PLUS CHROMIUM PO     Co Q 10 100 MG Caps     cyanocobalamin 1000 MCG tablet     doxazosin 8 MG tablet  Commonly known as: CARDURA  Take 0.5 tablets by mouth 2 times daily     Entresto  MG per tablet  Generic drug: sacubitril-valsartan  TAKE 1 TABLET TWICE A DAY     Fish Oil 1200 MG Caps     furosemide 80 MG tablet  Commonly known as: LASIX  TAKE 1 TABLET TWICE A DAY     glimepiride 4 MG tablet  Commonly known as: AMARYL     Insulin Syringes (Disposable) U-100 0.3 ML Misc  Inject  into the skin. Use prn     isosorbide mononitrate 30 MG extended release tablet  Commonly known as: IMDUR  TAKE 1 TABLET DAILY     Magnesium Oxide 500 MG Tabs     metFORMIN 1000 MG tablet  Commonly known as: GLUCOPHAGE  Take 1 tablet by mouth daily     NIFEdipine 90 MG extended release tablet  Commonly known as: ADALAT CC     NovoLIN 70/30 (70-30) 100 UNIT per ML injection vial  Generic drug: insulin 70-30  Inject 64 units AM, Inject 26-40 units at Lunch, Inject 12 units at supper as directed subcutaneously.  USES SLIDING SCALE NO INSULIN IF UNDER 200     ONE TOUCH ULTRA TEST strip  Generic drug: blood glucose test strips     ONE-A-DAY MENS 50+ ADVANTAGE PO     spironolactone 25 MG tablet  Commonly known as: ALDACTONE  TAKE 1 TABLET DAILY     traMADol 50 MG tablet  Commonly known as: ULTRAM     Trulicity 1.5 WG/3.8KS Sopn  Generic drug: Dulaglutide     Vitamin D 1000 units Caps capsule  Commonly known as: CHOLECALCIFEROL               Where to Get Your Medications        You can get these medications from any pharmacy    Bring a paper prescription for each of these medications  cefTRIAXone  infusion       Information about where to get these medications is not yet available    Ask your nurse or doctor about these medications  amiodarone 200 MG tablet  doxazosin 8 MG tablet  metFORMIN 1000 MG tablet  NovoLIN 70/30 (70-30) 100 UNIT per ML injection vial         Consults:  Dr. Mayte Roche (Cardiology), Dr. Chary Amaral (ID), Pharmacy, and . Significant Diagnostic Studies:  Labs, blood culture, UA, CXR, scrotal US, ECHO. Treatments:   IV Levaquin / Vancomycin which was changed to IV Rocephin, IV Normal Saline. Disposition:   Home. Discharge Instructions:  Resume previous home medications. IV Rocephin 2 g daily until 9/26/22. Repeat blood cultures x 2 on 10/10/22. ACE wrap lower legs to decrease edema. Activity:  As tolerated. Diet:  Diabetic. Follow up with Yarely Petersen DO in 1 week. Discharge time =  32 minutes.      Signed:  Rajeev Wren MD  9/19/2022, 6:31 AM

## 2022-09-19 NOTE — PROGRESS NOTES
Cardiology    Sepsis secondary to beta hemolytic group B strep. CAD with CABG and stenting  Ablation for atrial fibrillation on 7-25-22  GHAZALA  EF 45-50%  NSR with LBBB    He has sepsis but no evidence of SBE. Echo is pending. Agree with 10 days IV antibiotics. I usually do 3 weekly BC after discharge and if negative, rules out SBE. Echo pending today. I think he is slightly fluid overloaded and will diurese with IV lasix today, tonight and tomorrow. Will go home on usual home dose of lasix 80 mg bid. The source of his sepsis is unknown and occult malignancy always a consideration.     Thanks, Kimi Elizabeth MD

## 2022-09-19 NOTE — PROGRESS NOTES
Comprehensive Nutrition Assessment    Type and Reason for Visit:  Initial, Positive Nutrition Screen    Nutrition Recommendations/Plan:   Encourage compliance with carb and low sodium in diet  Encourage water in place of diet Coke. Malnutrition Assessment:  Malnutrition Status: At risk for malnutrition (Comment) (09/19/22 3318)    Context:  Acute Illness     Findings of the 6 clinical characteristics of malnutrition:  Energy Intake:  No significant decrease in energy intake  Weight Loss:  Greater than 2% over 1 week (felt to be fluid loss)     Body Fat Loss:  No significant body fat loss     Muscle Mass Loss:  No significant muscle mass loss    Fluid Accumulation:  Moderate to Severe Extremities   Strength:  Not Performed    Nutrition Assessment:    Altered nutrition related labs r/t endocrine dysfunction, AEB A1C 8.9. Good appetite and intakes reported. Weight losses pta reportedly fluid losses r/t trulicity. Despite suboptimal glycemic control, patient denies and declines educational needs. States \"not adding\" salt to foods in relation to LE edema and CHF history. Pending PICC for iv atb, and encourage use of probiotic (dislikes yogurt). Is on a mvi and vit D supplement. Nutrition Related Findings:    BLE edema Wound Type: None       Current Nutrition Intake & Therapies:    Average Meal Intake: %  Average Supplements Intake: %  ADULT DIET; Regular; 4 carb choices (60 gm/meal)  ADULT ORAL NUTRITION SUPPLEMENT; Breakfast, Lunch, Dinner; Diabetic Oral Supplement    Anthropometric Measures:  Height: 5' 10\" (177.8 cm)  Ideal Body Weight (IBW): 166 lbs (75 kg)    Admission Body Weight: 242 lb (109.8 kg)  Current Body Weight: 244 lb 14.4 oz (111.1 kg), 147.5 % IBW.  Weight Source: Bed Scale  Current BMI (kg/m2): 35.1  Usual Body Weight: 252 lb (114.3 kg) (in last week per spouse report)  % Weight Change (Calculated): -2.8  Weight Adjustment For: No Adjustment                 BMI Categories: Obese Class 2 (BMI 35.0 -39.9)    Estimated Daily Nutrient Needs:  Energy Requirements Based On: Kcal/kg  Weight Used for Energy Requirements: Current  Energy (kcal/day): 7231-0502 (15-18)  Weight Used for Protein Requirements: Ideal  Protein (g/day): 83-98 (1.1-1.3)  Method Used for Fluid Requirements: 1 ml/kcal  Fluid (ml/day): 2000    Nutrition Diagnosis:   Altered nutrition-related lab values related to endocrine dysfuntion as evidenced by lab values    Lab Results   Component Value Date     09/19/2022    K 4.0 09/19/2022     09/19/2022    CO2 26 09/19/2022    BUN 19 09/19/2022    CREATININE 0.87 09/19/2022    GLUCOSE 152 (H) 09/19/2022    CALCIUM 8.5 (L) 09/19/2022    PROT 7.0 04/06/2022    LABALBU 4.5 04/06/2022    BILITOT 0.80 04/06/2022    ALKPHOS 55 04/06/2022    AST 17 04/06/2022    ALT 17 04/06/2022    LABGLOM >60 09/19/2022    GFRAA >60 09/19/2022     Lab Results   Component Value Date    LABA1C 8.9 (H) 09/16/2022     Lab Results   Component Value Date     09/16/2022     Lab Results   Component Value Date    VITD25 34.0 04/06/2022     Nutrition Interventions:   Food and/or Nutrient Delivery: Continue Current Diet  Nutrition Education/Counseling: Education declined  Coordination of Nutrition Care: Continue to monitor while inpatient  Plan of Care discussed with: patient    Goals:     Goals: Meet at least 75% of estimated needs       Nutrition Monitoring and Evaluation:   Behavioral-Environmental Outcomes: None Identified  Food/Nutrient Intake Outcomes: Food and Nutrient Intake, Supplement Intake  Physical Signs/Symptoms Outcomes: Biochemical Data, Fluid Status or Edema, Weight    Discharge Planning:    No discharge needs at this time     Peña Ghotra, [address] [place] [address], [address] [place] [address]: [phone]

## 2022-09-20 ENCOUNTER — HOSPITAL ENCOUNTER (OUTPATIENT)
Dept: NURSING | Age: 72
Setting detail: INFUSION SERIES
Discharge: HOME | End: 2022-09-20
Payer: MEDICARE

## 2022-09-20 ENCOUNTER — CARE COORDINATION (OUTPATIENT)
Dept: CARE COORDINATION | Age: 72
End: 2022-09-20

## 2022-09-20 ENCOUNTER — TELEPHONE (OUTPATIENT)
Dept: PHARMACY | Facility: CLINIC | Age: 72
End: 2022-09-20

## 2022-09-20 DIAGNOSIS — B95.1 BACTEREMIA DUE TO GROUP B STREPTOCOCCUS: ICD-10-CM

## 2022-09-20 DIAGNOSIS — R78.81 BACTEREMIA DUE TO GROUP B STREPTOCOCCUS: Primary | ICD-10-CM

## 2022-09-20 DIAGNOSIS — A41.9 SEPSIS, DUE TO UNSPECIFIED ORGANISM, UNSPECIFIED WHETHER ACUTE ORGAN DYSFUNCTION PRESENT (HCC): Primary | ICD-10-CM

## 2022-09-20 DIAGNOSIS — R78.81 BACTEREMIA DUE TO GROUP B STREPTOCOCCUS: ICD-10-CM

## 2022-09-20 DIAGNOSIS — B95.1 BACTEREMIA DUE TO GROUP B STREPTOCOCCUS: Primary | ICD-10-CM

## 2022-09-20 PROCEDURE — 1111F DSCHRG MED/CURRENT MED MERGE: CPT | Performed by: FAMILY MEDICINE

## 2022-09-20 PROCEDURE — 2580000003 HC RX 258: Performed by: INTERNAL MEDICINE

## 2022-09-20 PROCEDURE — 6360000002 HC RX W HCPCS: Performed by: INTERNAL MEDICINE

## 2022-09-20 PROCEDURE — 96365 THER/PROPH/DIAG IV INF INIT: CPT

## 2022-09-20 RX ORDER — SODIUM CHLORIDE 9 MG/ML
5-250 INJECTION, SOLUTION INTRAVENOUS PRN
Status: CANCELLED | OUTPATIENT
Start: 2022-09-21

## 2022-09-20 RX ORDER — ALBUTEROL SULFATE 90 UG/1
4 AEROSOL, METERED RESPIRATORY (INHALATION) PRN
Status: CANCELLED | OUTPATIENT
Start: 2022-09-20

## 2022-09-20 RX ORDER — ALBUTEROL SULFATE 90 UG/1
4 AEROSOL, METERED RESPIRATORY (INHALATION) PRN
Status: CANCELLED | OUTPATIENT
Start: 2022-09-21

## 2022-09-20 RX ORDER — FAMOTIDINE 10 MG/ML
20 INJECTION, SOLUTION INTRAVENOUS
Status: CANCELLED | OUTPATIENT
Start: 2022-09-21

## 2022-09-20 RX ORDER — SODIUM CHLORIDE 9 MG/ML
5-250 INJECTION, SOLUTION INTRAVENOUS PRN
Status: CANCELLED | OUTPATIENT
Start: 2022-09-20

## 2022-09-20 RX ORDER — ACETAMINOPHEN 325 MG/1
650 TABLET ORAL
Status: CANCELLED | OUTPATIENT
Start: 2022-09-20

## 2022-09-20 RX ORDER — SODIUM CHLORIDE 0.9 % (FLUSH) 0.9 %
5-40 SYRINGE (ML) INJECTION PRN
Status: CANCELLED | OUTPATIENT
Start: 2022-09-20

## 2022-09-20 RX ORDER — HEPARIN SODIUM (PORCINE) LOCK FLUSH IV SOLN 100 UNIT/ML 100 UNIT/ML
500 SOLUTION INTRAVENOUS PRN
Status: CANCELLED | OUTPATIENT
Start: 2022-09-20

## 2022-09-20 RX ORDER — SODIUM CHLORIDE 0.9 % (FLUSH) 0.9 %
5-40 SYRINGE (ML) INJECTION PRN
Status: DISCONTINUED | OUTPATIENT
Start: 2022-09-20 | End: 2022-09-21 | Stop reason: HOSPADM

## 2022-09-20 RX ORDER — SODIUM CHLORIDE 9 MG/ML
INJECTION, SOLUTION INTRAVENOUS CONTINUOUS
Status: CANCELLED | OUTPATIENT
Start: 2022-09-20

## 2022-09-20 RX ORDER — ONDANSETRON 2 MG/ML
8 INJECTION INTRAMUSCULAR; INTRAVENOUS
Status: CANCELLED | OUTPATIENT
Start: 2022-09-21

## 2022-09-20 RX ORDER — SODIUM CHLORIDE 0.9 % (FLUSH) 0.9 %
5-40 SYRINGE (ML) INJECTION PRN
Status: CANCELLED | OUTPATIENT
Start: 2022-09-21

## 2022-09-20 RX ORDER — HEPARIN SODIUM (PORCINE) LOCK FLUSH IV SOLN 100 UNIT/ML 100 UNIT/ML
500 SOLUTION INTRAVENOUS PRN
Status: CANCELLED | OUTPATIENT
Start: 2022-09-21

## 2022-09-20 RX ORDER — DIPHENHYDRAMINE HYDROCHLORIDE 50 MG/ML
50 INJECTION INTRAMUSCULAR; INTRAVENOUS
Status: CANCELLED | OUTPATIENT
Start: 2022-09-21

## 2022-09-20 RX ORDER — ACETAMINOPHEN 325 MG/1
650 TABLET ORAL
Status: CANCELLED | OUTPATIENT
Start: 2022-09-21

## 2022-09-20 RX ORDER — SODIUM CHLORIDE 9 MG/ML
INJECTION, SOLUTION INTRAVENOUS CONTINUOUS
Status: CANCELLED | OUTPATIENT
Start: 2022-09-21

## 2022-09-20 RX ORDER — ONDANSETRON 2 MG/ML
8 INJECTION INTRAMUSCULAR; INTRAVENOUS
Status: CANCELLED | OUTPATIENT
Start: 2022-09-20

## 2022-09-20 RX ORDER — DIPHENHYDRAMINE HYDROCHLORIDE 50 MG/ML
50 INJECTION INTRAMUSCULAR; INTRAVENOUS
Status: CANCELLED | OUTPATIENT
Start: 2022-09-20

## 2022-09-20 RX ADMIN — CEFTRIAXONE SODIUM 2000 MG: 2 INJECTION, POWDER, FOR SOLUTION INTRAMUSCULAR; INTRAVENOUS at 09:07

## 2022-09-20 NOTE — TELEPHONE ENCOUNTER
----- Message from Kandi Robert LPN sent at 2/09/4958 10:28 AM EDT -----  Regarding: Pharmacy Referral  Please follow with patient regarding polypharmacy.     Thank you  [place] [id number] Health/ Care Transition Nurse  [phone]

## 2022-09-20 NOTE — TELEPHONE ENCOUNTER
Received a referral:  from Care Coordinator to review patients medications. Called patient to schedule a time to speak with a pharmacist over the telephone. Spoke to patient and advised them of the above message. Patient made it clear this was unnecessary and declined to speak with Clinical Pharmacist.      Nayeli Nava [facility].    [facility] free: [address] Only    CPA in place:  No  Gap Closed?: Yes   Time Spent (min): 15

## 2022-09-20 NOTE — CARE COORDINATION
[facility] Transitions Initial Follow Up Call    Call within 2 business days of discharge: Yes    Patient: Avery Burton Patient : 1950   MRN: [id number]  Reason for Admission: Sepsis  Discharge Date: 22 RARS: Readmission Risk Score: 12.1      Last Discharge [facility]       Date Complaint Diagnosis Description Type Department Provider    22 Fatigue SIRS (systemic inflammatory response syndrome) ([facility] [address] . .. ED to Hosp-Admission (Discharged) (ADMITTED) [address] Vicente MD; Declan Serrano. .. Spoke with: Patient    [facility]: [facility]    Spoke with patient, states he is doing well. He denies any sxs of fever, chills, chest pain, SOB, n/v, elimination issues. He does report decreased appetite and mild edema of legs. FS this am was 125. He is going to outpatient for IV ATB's through the . Med rec completed with patient and he has all meds on hand as ordered. Pharmacy referral placed due to polypharmacy. Patient is scheduled to see PCP on 22 for [facility]ops f/u. He denies any needs at this time. Transitions of Care Initial Call    Was this an external facility discharge? No Discharge Facility: [facility] to be reviewed by the provider   Additional needs identified to be addressed with provider: No  none             Method of communication with provider : none      Advance Care Planning:   Does patient have an Advance Directive: reviewed and current, reviewed and needs to be updated, not on file; education provided, not on file, patient declined education, decision maker updated, and referral to internal ACP facilitator. Was this a readmission? No  Patient stated reason for admission: Sepsis  Patients top risk factors for readmission: medical condition-Sepsis, Cardiac hx, DM  polypharmacy    Care Transition Nurse (CTN) contacted the patient by telephone to perform post hospital discharge assessment.  Verified name and  with patient as identifiers. Provided introduction to self, and explanation of the CTN role. CTN reviewed discharge instructions, medical action plan and red flags with patient who verbalized understanding. Patient given an opportunity to ask questions and does not have any further questions or concerns at this time. Were discharge instructions available to patient? Yes. Reviewed appropriate site of care based on symptoms and resources available to patient including: PCP  Specialist  When to call 911. The patient agrees to contact the PCP office for questions related to their healthcare. Medication reconciliation was performed with patient, who verbalizes understanding of administration of home medications. Advised obtaining a 90-day supply of all daily and as-needed medications. CTN provided contact information. Plan for follow-up call in 5-7 days based on severity of symptoms and risk factors.   Plan for next call:  - sxs            - discuss PCP appt            - pharm ref    Mahamed Roland LPN  [facility] Transition Nurse  [phone]       Care Transitions 24 Hour Call    Schedule Follow Up Appointment with PCP: Completed  Do you have a copy of your discharge instructions?: Yes  Do you have all of your prescriptions and are they filled?: Yes  Have you been contacted by a [id number] [employer] Pharmacist?: No  Have you scheduled your follow up appointment?: Yes  How are you going to get to your appointment?: Car - family or friend to transport  Do you feel like you have everything you need to keep you well at home?: Yes  Care Transitions Interventions         Follow Up  Future Appointments   Date Time Provider Javon Degroot   9/21/2022  9:00 AM [address] OP RN Reida Falling   9/22/2022  9:00 AM [address] OP RN Reida Falling   9/23/2022  8:00 AM DO SONIA Zarate W   9/23/2022  9:00 AM [address] OP RN Reida Falling   9/26/2022  9:00 AM [facility] OP TREATMENT [address]   9/27/2022  9:00 AM [facility] OP TREATMENT BAY 1 [facility] OP RUPESH [address]   9/28/2022  9:00 AM [facility] OP TREATMENT BAY 1 [facility] OP RUPESH [address]   11/17/2022 12:30 PM [facility]       Irineo Covarrubias LPN

## 2022-09-20 NOTE — CONSULTS
[place] [address]                                  CONSULTATION    PATIENT NAME: Zackary Barone                    :        1950  MED REC NO:   [id number]                              ROOM:       9210  ACCOUNT NO:   [de-identified]                           ADMIT DATE: 2022  PROVIDER:     Ivan Napoles    CONSULT DATE:  2022    REASON FOR CONSULT:  Possible SBE. HISTORY OF PRESENT ILLNESS:  The patient is a pleasant 67-year-old  gentleman who is well known to me. He has a history of persistent  atrial fibrillation and has been treated with full medical therapy. However, he continued to have intermittent atrial fibrillation, and  therefore, I sent him to [facility] where he had an ablation on  2022, for his atrial fibrillation by Dr. Jaylin Stephens. He has remained  in sinus rhythm since his ablation. He does have history of coronary artery disease. He had a  catheterization on 10/31/2012, that showed severe disease at the  bifurcation of the LAD and diagonal with an unremarkable right coronary  artery and circumflex, and he had angioplasty on 2012, placing a  2.75 x 28 mm Promus stent in the LAD with a good end result. On 2014, catheterization showed 70% to 80% in-stent stenosis of  the LAD and diagonal, with unremarkable right coronary artery and  circumflex with an EF of 55% to 60%. He had open heart surgery by Dr. Yokasta Camacho on 2014, with LIMA to the LAD and a vein graft to the  diagonal.    On 2017, a catheterization showed 95% disease in a very large  right coronary artery with 90% LAD with a patent LIMA to the LAD and  patent vein graft to the diagonal.  The circumflex was unremarkable and  I placed a 3.5 x 18 mm drug-eluting Xience stent in the right coronary  artery.   A catheterization on 11/15/2017, showed widely patent stents,  EF of 55%. His last cardiac catheterization was on 05/14/2021. His right coronary  artery was widely patent with a stent in the midportion. The LAD had a  stent in its proximal portion and there was a severe 90% disease at the  bifurcation of the LAD and diagonal.  The LAD filled from the left  internal mammary artery and the diagonal filled by a patent vein graft  to the diagonal.  His circumflex was unremarkable. His EF was 50%. He has been doing well since his ablation. However, approximately two  days prior to admission, he developed generalized weakness with fevers  and shaking chills, nausea and vomiting. His temperature was 102, and  home COVID test was negative. Over the next 24 hours, he continued to  have chills and a fever. He came to the emergency room and COVID was  negative. Because of his fever and tachycardia, he was admitted for  SIRS. He was started on IV Levaquin. Blood cultures showed gram-positive  cocci on pairs and chains. His culture grew beta-hemolytic group B  strep. He was placed on Rocephin after ID was consulted along with  Levaquin and vancomycin. I saw him for possible SBE. When I see him, he is feeling much improved. He denies any unusual  chest pain or chest discomfort. He has received a fair amount of fluids  and has developed significant edema in both lower extremities. His  breathing however is good. He has had no PND or orthopnea. He has had no previous chills. The source of his sepsis is unknown. His enzymes have been negative and again he has denied any chest pain. CARDIAC RISK FACTORS:  Known CAD:  Positive. PTCA:  Positive. Bypass Surgery:  Positive. Hypertension:  Positive. Hyperlipidemia:  Positive. Diabetes:  Positive. Other Family Members:  Positive.     MEDICATIONS PRIOR TO ADMISSION:  He was on amiodarone 200 mg half a  tablet daily, Glucophage 1000 mg daily, Novolin 70/30 64 units in the  a.m. with sliding scale, Cardura 8 mg half a tablet b.i.d.,  spironolactone 25 mg daily, Entresto 97/103 b.i.d., Lipitor 40 mg daily,  Imdur 30 mg daily, Celebrex 200 mg daily, Eliquis 5 mg b.i.d., Coreg  3.125 mg daily, Lasix 80 mg daily. PAST MEDICAL AND SURGICAL HISTORY:  1. Arthroscopic surgery with knee replacement, complicated by fat  embolism in 02/2011.  2.  Insulin-dependent diabetes for 33 years. 3.  Hypertension. 4.  Left and right cataract surgery. 5.  Sleep apnea in 2014 and has been using a CPAP mask since that time. 6.  Persistent atrial fibrillation, status post ablation by Dr. Jazmin Boo  on 07/25/2022, in [place], with him in sinus rhythm at this time. 7.  Chronic back pain, with injections. FAMILY HISTORY:  Mother and father had CHF. SOCIAL HISTORY:  He is 71years old, retired . . Two children. He has a trailer in [place], where he and his wife spend  the winter. Daughter is a professor at the [employer] in  [place]. He does not smoke or drink alcohol. Tries to stay relatively  active. REVIEW OF SYSTEMS:  Cardiac as above. Other systems reviewed including  constitutional, eyes, ears, nose and throat, cardiovascular,  respiratory, GI, , musculoskeletal, integumentary, neurologic,  endocrine, hematologic and allergic/immunologic are negative except for  what is described above. PHYSICAL EXAMINATION:  VITAL SIGNS:  His blood pressure was 155/78 with heart rate of 75 and  regular. Respiratory rate 18. O2 sat was 95%. GENERAL:  He is a pleasant 13-year-old gentleman. Denied pain. He was  oriented to person, place and time. Answered questions appropriately. SKIN:  No unusual skin changes. HEENT:  The pupils are equally round and intact. Mucous membranes were  dry. NECK:  No JVD. Good carotid pulses. No carotid bruits. No  lymphadenopathy or thyromegaly. CARDIOVASCULAR EXAM:  S1 and S2 were normal.  No S3 or S4. Soft  systolic murmur.   No diastolic murmur. PMI was normal.  No lift,  thrust, or pericardial friction rub. LUNGS:  Clear to auscultation and percussion. ABDOMEN:  Soft and nontender with no masses felt. EXTREMITIES:  He did have 3+ edema. His EKG showed sinus rhythm with a left bundle-branch block. Echocardiogram was done. It is technically very difficult; however, his  EF appeared to be in the 45% range, which is his baseline. I did get a  fairly good look at his mitral valve and aortic valve and I did not see  any vegetations. IMPRESSION:  1. Sepsis with beta-hemolytic group B strep. 2.  No evidence of SBE on his echocardiogram at this time. 3.  EF of 45%, which is his baseline. 4.  Paroxysmal atrial fibrillation in 2017 with cardioversion on  04/12/2021, with him developing increased episodes of atrial  fibrillation, with an ablation done by Dr. Griffin Flores on 07/25/2022, with  him currently in sinus rhythm. 5.  Status post cardiac catheterization on 10/31/2012, showing severe  disease at the bifurcation of the LAD and diagonal, with unremarkable  right coronary artery and circumflex, EF of 55%. 6.  Angioplasty of the LAD and diagonal on 11/07/2012, placing a 2.75 x  28 mm Promus stent in the LAD. 7.  Catheterization on 05/14/2014, showing 70% to 80% LAD and diagonal  with in-stent stenosis with unremarkable right coronary artery and  circumflex, EF of 55% to 60%. 8.  Open heart surgery on 06/30/2014, by Dr. Verito Johnson with a LIMA to the  LAD and a vein graft to the diagonal.  9.  Catheterization on 08/30/2017, that showed 95% disease in the mid  right coronary artery, with patent bypass grafts and EF of 50%, with  angioplasty of his right coronary artery, placing a 3.5 x 18 mm Xience  stent. 10.  Catheterization on 05/14/2021, showing patent LIMA to the LAD,  patent vein graft to the diagonal, with unremarkable circumflex and a  widely patent stent in the right coronary artery, with an EF of 50%.   11.  Sleep apnea, on a CPAP mask.    PLAN:  1. Agreed with 10-day IV antibiotics. 2.  I will do three subsequent blood cultures starting a week after he  finishes his antibiotics to make sure that there is no bacteremia after  the antibiotics are finished. 3.  If he is culture negative one month after stopping his IV  antibiotics then I would be very confident that there is no SBE present. DISCUSSION:  The patient developed sepsis with severe chills. He is  waiting full sensitivities but he is being discharged with IV Rocephin  for 10 days. It is concerning that there is no source for his sepsis. With no  source, an occult malignancy is always a consideration although he  certainly has no symptoms or evidence of any malignant process such as  weight loss, etc.    His cardiac status is stable. I think he is somewhat fluid overload and  I gave 80 mg of Lasix IV. He will resume his Lasix and Aldactone as he has been previously before  his hospitalization. I will maintain relatively close contact and we will follow up in one  month for an assessment. He has maintained sinus rhythm since his ablation. He will of course  still be anticoagulated indefinitely. Thank you very much for allowing me the privilege of seeing the patient. If you have any questions on my thoughts, please do not hesitate to  contact me.         Mena Jacobs    D: 09/19/2022 22:36:52       T: 09/20/2022 0:40:13     [email or web address]  Job#: [id number]     Doc#: [id number]    CC:

## 2022-09-21 ENCOUNTER — HOSPITAL ENCOUNTER (OUTPATIENT)
Dept: NURSING | Age: 72
Setting detail: INFUSION SERIES
Discharge: HOME | End: 2022-09-21
Payer: MEDICARE

## 2022-09-21 DIAGNOSIS — R78.81 BACTEREMIA DUE TO GROUP B STREPTOCOCCUS: Primary | ICD-10-CM

## 2022-09-21 DIAGNOSIS — B95.1 BACTEREMIA DUE TO GROUP B STREPTOCOCCUS: Primary | ICD-10-CM

## 2022-09-21 PROCEDURE — 2580000003 HC RX 258: Performed by: INTERNAL MEDICINE

## 2022-09-21 PROCEDURE — 6360000002 HC RX W HCPCS: Performed by: INTERNAL MEDICINE

## 2022-09-21 PROCEDURE — 96365 THER/PROPH/DIAG IV INF INIT: CPT

## 2022-09-21 RX ORDER — DIPHENHYDRAMINE HYDROCHLORIDE 50 MG/ML
50 INJECTION INTRAMUSCULAR; INTRAVENOUS
Status: CANCELLED | OUTPATIENT
Start: 2022-09-22

## 2022-09-21 RX ORDER — SODIUM CHLORIDE 0.9 % (FLUSH) 0.9 %
5-40 SYRINGE (ML) INJECTION PRN
Status: CANCELLED | OUTPATIENT
Start: 2022-09-22

## 2022-09-21 RX ORDER — ALBUTEROL SULFATE 90 UG/1
4 AEROSOL, METERED RESPIRATORY (INHALATION) PRN
Status: CANCELLED | OUTPATIENT
Start: 2022-09-22

## 2022-09-21 RX ORDER — ONDANSETRON 2 MG/ML
8 INJECTION INTRAMUSCULAR; INTRAVENOUS
Status: CANCELLED | OUTPATIENT
Start: 2022-09-22

## 2022-09-21 RX ORDER — HEPARIN SODIUM (PORCINE) LOCK FLUSH IV SOLN 100 UNIT/ML 100 UNIT/ML
500 SOLUTION INTRAVENOUS PRN
Status: CANCELLED | OUTPATIENT
Start: 2022-09-22

## 2022-09-21 RX ORDER — ACETAMINOPHEN 325 MG/1
650 TABLET ORAL
Status: CANCELLED | OUTPATIENT
Start: 2022-09-22

## 2022-09-21 RX ORDER — SODIUM CHLORIDE 9 MG/ML
INJECTION, SOLUTION INTRAVENOUS CONTINUOUS
Status: CANCELLED | OUTPATIENT
Start: 2022-09-22

## 2022-09-21 RX ORDER — SODIUM CHLORIDE 9 MG/ML
5-250 INJECTION, SOLUTION INTRAVENOUS PRN
Status: CANCELLED | OUTPATIENT
Start: 2022-09-22

## 2022-09-21 RX ORDER — FAMOTIDINE 10 MG/ML
20 INJECTION, SOLUTION INTRAVENOUS
Status: CANCELLED | OUTPATIENT
Start: 2022-09-22

## 2022-09-21 RX ORDER — SODIUM CHLORIDE 0.9 % (FLUSH) 0.9 %
5-40 SYRINGE (ML) INJECTION PRN
Status: DISCONTINUED | OUTPATIENT
Start: 2022-09-21 | End: 2022-09-22 | Stop reason: HOSPADM

## 2022-09-21 RX ADMIN — SODIUM CHLORIDE, PRESERVATIVE FREE 10 ML: 5 INJECTION INTRAVENOUS at 09:01

## 2022-09-21 RX ADMIN — CEFTRIAXONE SODIUM 2000 MG: 2 INJECTION, POWDER, FOR SOLUTION INTRAMUSCULAR; INTRAVENOUS at 09:01

## 2022-09-22 ENCOUNTER — HOSPITAL ENCOUNTER (OUTPATIENT)
Dept: NURSING | Age: 72
Setting detail: INFUSION SERIES
Discharge: HOME | End: 2022-09-22
Payer: MEDICARE

## 2022-09-22 DIAGNOSIS — R78.81 BACTEREMIA DUE TO GROUP B STREPTOCOCCUS: Primary | ICD-10-CM

## 2022-09-22 DIAGNOSIS — B95.1 BACTEREMIA DUE TO GROUP B STREPTOCOCCUS: Primary | ICD-10-CM

## 2022-09-22 PROCEDURE — 96365 THER/PROPH/DIAG IV INF INIT: CPT

## 2022-09-22 PROCEDURE — 6360000002 HC RX W HCPCS: Performed by: INTERNAL MEDICINE

## 2022-09-22 PROCEDURE — 2580000003 HC RX 258: Performed by: INTERNAL MEDICINE

## 2022-09-22 RX ORDER — ALBUTEROL SULFATE 90 UG/1
4 AEROSOL, METERED RESPIRATORY (INHALATION) PRN
Status: CANCELLED | OUTPATIENT
Start: 2022-09-23

## 2022-09-22 RX ORDER — SODIUM CHLORIDE 9 MG/ML
5-250 INJECTION, SOLUTION INTRAVENOUS PRN
Status: CANCELLED | OUTPATIENT
Start: 2022-09-23

## 2022-09-22 RX ORDER — ACETAMINOPHEN 325 MG/1
650 TABLET ORAL
Status: CANCELLED | OUTPATIENT
Start: 2022-09-23

## 2022-09-22 RX ORDER — SODIUM CHLORIDE 0.9 % (FLUSH) 0.9 %
5-40 SYRINGE (ML) INJECTION PRN
Status: CANCELLED | OUTPATIENT
Start: 2022-09-23

## 2022-09-22 RX ORDER — SODIUM CHLORIDE 9 MG/ML
INJECTION, SOLUTION INTRAVENOUS CONTINUOUS
Status: CANCELLED | OUTPATIENT
Start: 2022-09-23

## 2022-09-22 RX ORDER — FAMOTIDINE 10 MG/ML
20 INJECTION, SOLUTION INTRAVENOUS
Status: CANCELLED | OUTPATIENT
Start: 2022-09-23

## 2022-09-22 RX ORDER — DIPHENHYDRAMINE HYDROCHLORIDE 50 MG/ML
50 INJECTION INTRAMUSCULAR; INTRAVENOUS
Status: CANCELLED | OUTPATIENT
Start: 2022-09-23

## 2022-09-22 RX ORDER — ONDANSETRON 2 MG/ML
8 INJECTION INTRAMUSCULAR; INTRAVENOUS
Status: CANCELLED | OUTPATIENT
Start: 2022-09-23

## 2022-09-22 RX ORDER — HEPARIN SODIUM (PORCINE) LOCK FLUSH IV SOLN 100 UNIT/ML 100 UNIT/ML
500 SOLUTION INTRAVENOUS PRN
Status: CANCELLED | OUTPATIENT
Start: 2022-09-23

## 2022-09-22 RX ADMIN — CEFTRIAXONE 2000 MG: 2 INJECTION, POWDER, FOR SOLUTION INTRAMUSCULAR; INTRAVENOUS at 09:11

## 2022-09-23 ENCOUNTER — HOSPITAL ENCOUNTER (OUTPATIENT)
Dept: NURSING | Age: 72
Setting detail: INFUSION SERIES
Discharge: HOME | End: 2022-09-23
Payer: MEDICARE

## 2022-09-23 ENCOUNTER — OFFICE VISIT (OUTPATIENT)
Dept: FAMILY MEDICINE CLINIC | Age: 72
End: 2022-09-23
Payer: MEDICARE

## 2022-09-23 VITALS
OXYGEN SATURATION: 98 % | BODY MASS INDEX: 35.22 KG/M2 | HEART RATE: 108 BPM | HEIGHT: 70 IN | DIASTOLIC BLOOD PRESSURE: 78 MMHG | WEIGHT: 246 LBS | SYSTOLIC BLOOD PRESSURE: 132 MMHG

## 2022-09-23 DIAGNOSIS — R78.81 BACTEREMIA DUE TO GROUP B STREPTOCOCCUS: Primary | ICD-10-CM

## 2022-09-23 DIAGNOSIS — A40.1 SEPSIS DUE TO STREPTOCOCCUS AGALACTIAE (HCC): Primary | ICD-10-CM

## 2022-09-23 DIAGNOSIS — B95.1 BACTEREMIA DUE TO GROUP B STREPTOCOCCUS: Primary | ICD-10-CM

## 2022-09-23 DIAGNOSIS — E11.65 TYPE 2 DIABETES MELLITUS WITH HYPERGLYCEMIA, WITH LONG-TERM CURRENT USE OF INSULIN (HCC): ICD-10-CM

## 2022-09-23 DIAGNOSIS — Z79.4 TYPE 2 DIABETES MELLITUS WITH HYPERGLYCEMIA, WITH LONG-TERM CURRENT USE OF INSULIN (HCC): ICD-10-CM

## 2022-09-23 DIAGNOSIS — Z09 HOSPITAL DISCHARGE FOLLOW-UP: ICD-10-CM

## 2022-09-23 DIAGNOSIS — I48.0 PAROXYSMAL ATRIAL FIBRILLATION (HCC): ICD-10-CM

## 2022-09-23 PROCEDURE — 6360000002 HC RX W HCPCS: Performed by: INTERNAL MEDICINE

## 2022-09-23 PROCEDURE — 2580000003 HC RX 258: Performed by: INTERNAL MEDICINE

## 2022-09-23 PROCEDURE — 96365 THER/PROPH/DIAG IV INF INIT: CPT

## 2022-09-23 PROCEDURE — 99496 TRANSJ CARE MGMT HIGH F2F 7D: CPT | Performed by: FAMILY MEDICINE

## 2022-09-23 RX ORDER — HEPARIN SODIUM (PORCINE) LOCK FLUSH IV SOLN 100 UNIT/ML 100 UNIT/ML
500 SOLUTION INTRAVENOUS PRN
Status: CANCELLED | OUTPATIENT
Start: 2022-09-24

## 2022-09-23 RX ORDER — ONDANSETRON 2 MG/ML
8 INJECTION INTRAMUSCULAR; INTRAVENOUS
Status: CANCELLED | OUTPATIENT
Start: 2022-09-24

## 2022-09-23 RX ORDER — SODIUM CHLORIDE 9 MG/ML
INJECTION, SOLUTION INTRAVENOUS CONTINUOUS
Status: CANCELLED | OUTPATIENT
Start: 2022-09-24

## 2022-09-23 RX ORDER — SODIUM CHLORIDE 9 MG/ML
5-250 INJECTION, SOLUTION INTRAVENOUS PRN
Status: CANCELLED | OUTPATIENT
Start: 2022-09-24

## 2022-09-23 RX ORDER — CELECOXIB 200 MG/1
CAPSULE ORAL
Qty: 90 CAPSULE | Refills: 1 | Status: SHIPPED | OUTPATIENT
Start: 2022-09-23

## 2022-09-23 RX ORDER — ACETAMINOPHEN 325 MG/1
650 TABLET ORAL
Status: CANCELLED | OUTPATIENT
Start: 2022-09-24

## 2022-09-23 RX ORDER — DIPHENHYDRAMINE HYDROCHLORIDE 50 MG/ML
50 INJECTION INTRAMUSCULAR; INTRAVENOUS
Status: CANCELLED | OUTPATIENT
Start: 2022-09-24

## 2022-09-23 RX ORDER — FAMOTIDINE 10 MG/ML
20 INJECTION, SOLUTION INTRAVENOUS
Status: CANCELLED | OUTPATIENT
Start: 2022-09-24

## 2022-09-23 RX ORDER — ALBUTEROL SULFATE 90 UG/1
4 AEROSOL, METERED RESPIRATORY (INHALATION) PRN
Status: CANCELLED | OUTPATIENT
Start: 2022-09-24

## 2022-09-23 RX ORDER — SODIUM CHLORIDE 0.9 % (FLUSH) 0.9 %
5-40 SYRINGE (ML) INJECTION PRN
Status: CANCELLED | OUTPATIENT
Start: 2022-09-24

## 2022-09-23 RX ORDER — FUROSEMIDE 80 MG
80 TABLET ORAL DAILY
Qty: 90 TABLET | Refills: 1 | Status: SHIPPED | OUTPATIENT
Start: 2022-09-23

## 2022-09-23 RX ADMIN — CEFTRIAXONE 2000 MG: 2 INJECTION, POWDER, FOR SOLUTION INTRAMUSCULAR; INTRAVENOUS at 09:07

## 2022-09-23 ASSESSMENT — PATIENT HEALTH QUESTIONNAIRE - PHQ9
SUM OF ALL RESPONSES TO PHQ QUESTIONS 1-9: 0
SUM OF ALL RESPONSES TO PHQ QUESTIONS 1-9: 0
2. FEELING DOWN, DEPRESSED OR HOPELESS: 0
SUM OF ALL RESPONSES TO PHQ QUESTIONS 1-9: 0
SUM OF ALL RESPONSES TO PHQ9 QUESTIONS 1 & 2: 0
1. LITTLE INTEREST OR PLEASURE IN DOING THINGS: 0
SUM OF ALL RESPONSES TO PHQ QUESTIONS 1-9: 0

## 2022-09-23 NOTE — LETTER
[facility]  [address] [address]  Phone: [phone]  Fax: [address] 679, DO         September 23, 2022     Patient: Melly Carrera   YOB: 1950   Date of Visit: 9/23/2022       To Whom It May Concern: It is my medical opinion that Nikos Seymour requires a disability parking placard for the following reasons:  He has limited walking ability due to an orthopedic condition. Duration of need: 5 years    If you have any questions or concerns, please don't hesitate to call.     Sincerely,        Domenic Butler, DO

## 2022-09-23 NOTE — LETTER
[facility] [address] [address]  Phone: [phone]  Fax: Darcy[address] 106, DO         September 23, 2022     Patient: Ifeoma Mckeon   YOB: 1950   Date of Visit: 9/23/2022       To Whom It May Concern: It is my medical opinion that Keith Martell requires a disability parking placard for the following reasons:  He cannot walk 200 feet without stopping to rest.  He has limited walking ability due to an orthopedic condition. Duration of need: 5 days    If you have any questions or concerns, please don't hesitate to call.     Sincerely,        Cynthia Anton, DO

## 2022-09-23 NOTE — PROGRESS NOTES
Name: Ra Thorne  : 1950         Chief Complaint:     Chief Complaint   Patient presents with    Blood Infection     Sepsis, -. Tcm        History of Present Illness: Ra Thorne is a 71 y.o.  male who presents with Blood Infection (Sepsis, -. Tcm )      HPI    Patient was admitted to [facility] from  to  for fever, sepsis. Initial post-discharge communication occurred between care navigator and patient on - see documentation in chart: telephone encounter. Diagnostic test results reviewed: inpatient labs, culture/micro-blood and urine cx, and echocardiogram    Patient risk of morbidity and mortality: high    Medical Decision Making: high complexity      On Thurs 9/15 on the way home from [place] felt very poorly, chills, vomited on the way home, temp 102.1 at home. Called our office and then went to ER where he was found to be febrile, had leukocytosis, admitted for sepsis and had positive blood cx. Given IV abx, had echo, had PICC placed. Has been getting daily rocephin infusion here at the hospital.    Still not completely certain etiology of sepsis. Hadn't had any recent dental work. Had undergone EP ablation about 6 wks prior. Had had a mouth sore, had also had sores on legs. Was urinating frequently prior to this also, thought r/t diuretic use. Balance very poor and still very tired. Sugars have been high but are starting to improve. Couple days did crash which is unusual for him. Readings 160s in the morning, as high as 290 in the morning. Per endo rec's he doesn't take insulin at dinner if sugar <200. F/u blood cultures ordered for 10/10. After ablation heart rate was more even for a couple wks and 70s-90s. With infection back up into 120s.      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[facility])    Pulmonary HTN ([facility])    PAF (paroxysmal atrial fibrillation) ([facility])    Lumbosacral spondylosis without myelopathy    Pars defect with spondylolisthesis    Spinal stenosis of lumbar region without neurogenic claudication    Systolic congestive heart failure ([facility])    Type 2 MI (myocardial infarction) ([facility]    Sepsis ([facility]    Sepsis due to Streptococcus agalactiae ([facility])    SIRS (systemic inflammatory response syndrome) ([facility])    Venous stasis dermatitis of both lower extremities    Bilateral leg edema    Bacteremia due to group B Streptococcus       Medications:       Prior to Admission medications    Medication Sig Start Date End Date Taking? Authorizing Provider   celecoxib (CELEBREX) 200 MG capsule TAKE 1 CAPSULE DAILY 9/23/22  Yes Tara Donaldson DO   furosemide (LASIX) 80 MG tablet Take 1 tablet by mouth daily 9/23/22  Yes Tara Donaldson DO   metFORMIN (GLUCOPHAGE) 1000 MG tablet Take 1 tablet by mouth daily 9/19/22  Yes Yordy Colon MD   NOVOLIN 70/30 (70-30) 100 UNIT/ML injection vial Inject 64 units AM, Inject 26-40 units at Lunch, Inject 12 units at supper as directed subcutaneously.  USES SLIDING SCALE NO INSULIN IF UNDER 200 9/19/22  Yes Yordy Colon MD   doxazosin (CARDURA) 8 MG tablet Take 0.5 tablets by mouth 2 times daily 9/19/22  Yes Yordy Colon MD   cefTRIAXone (ROCEPHIN) infusion Infuse 2,000 mg intravenously every 24 hours for 7 days Compound per protocol 9/19/22 9/26/22 Yes Yordy Colon MD   cyanocobalamin 1000 MCG tablet Take 3,000 mcg by mouth daily   Yes Historical Provider, MD   spironolactone (ALDACTONE) 25 MG tablet TAKE 1 TABLET DAILY 8/29/22 Yes Leonardo Christianson MD   ENTRESTO  MG per tablet TAKE 1 TABLET TWICE A DAY 7/25/22  Yes Leonardo Christianson MD   atorvastatin (LIPITOR) 40 MG tablet TAKE 1 TABLET DAILY 7/13/22  Yes Bernadette Thompson APRN - CNP   isosorbide mononitrate (IMDUR) 30 MG extended release tablet TAKE 1 TABLET DAILY 5/16/22  Yes Leonardo Christianson MD   apixaban (ELIQUIS) 5 MG TABS tablet TAKE 1 TABLET TWICE A DAY 2/14/22  Yes Leonardo Christianson MD   carvedilol (COREG) 3.125 MG tablet Take 1 tablet by mouth daily 12/1/21  Yes Leonardo Christianson MD   NIFEdipine (ADALAT CC) 90 MG extended release tablet Take 45 mg by mouth daily   Yes Historical Provider, MD   Coenzyme Q10 (CO Q 10) 100 MG CAPS Take 100 mg by mouth nightly   Yes Historical Provider, MD   acetaminophen (TYLENOL) 500 MG tablet Take 500 mg by mouth See Admin Instructions Take 500 mg in am  Take 1000 mg in Pm   Yes Historical Provider, MD   traMADol (ULTRAM) 50 MG tablet Take 1 tablet by mouth 2 times daily as needed for Pain.   9/8/20  Yes Historical Provider, MD   TRULICITY 1.5 NC/8.1CH SOPN Inject 1.5 mg into the skin once a week Mondays 2/11/19  Yes Historical Provider, MD   Magnesium Oxide 500 MG TABS Take 500 mg by mouth daily    Yes Historical Provider, MD   ONE TOUCH ULTRA TEST strip  8/8/16  Yes Historical Provider, MD   BD ULTRA-FINE PEN NEEDLES 29G X 12.7MM MISC  8/1/16  Yes Historical Provider, MD   glimepiride (AMARYL) 4 MG tablet Take 2 mg by mouth every morning (before breakfast)    Yes Historical Provider, MD   Omega-3 Fatty Acids (FISH OIL) 1200 MG CAPS Take 1 capsule by mouth 2 times daily Plus 360 omega 3   Yes Historical Provider, MD   Chromium-Cinnamon (CINNAMON PLUS CHROMIUM PO) Take 1,000 mg by mouth 2 times daily    Yes Historical Provider, MD   Multiple Vitamins-Minerals (ONE-A-DAY MENS 50+ ADVANTAGE PO) Take 1 tablet by mouth daily    Yes Historical Provider, MD   Vitamin D (CHOLECALCIFEROL) 1000 UNITS CAPS capsule   Take by mouth 2 times daily Vitamin D 3 Yes Historical Provider, MD   Insulin Syringes, Disposable, U-100 0.3 ML MISC Inject  into the skin. Use prn 7/19/12  Yes Isreal Cole, DO   amiodarone (CORDARONE) 200 MG tablet Patient taking 1/2 tablet nightly 9/19/22   Sky Colon MD        Allergies:       Patient has no known allergies. Physical Exam:     Vitals:  /78   Pulse (!) 108   Ht 5' 10\" (1.778 m)   Wt 246 lb (111.6 kg)   SpO2 98%   BMI 35.30 kg/m²   Physical Exam  Vitals and nursing note reviewed. Constitutional:       Appearance: He is well-developed. HENT:      Right Ear: Hearing and tympanic membrane normal.      Left Ear: Hearing and tympanic membrane normal.      Nose: Nose normal.      Mouth/Throat:      Mouth: Mucous membranes are moist.      Dentition: Normal dentition. Pharynx: Oropharynx is clear. No posterior oropharyngeal erythema. Comments: Very small abrasion L lower lip  Eyes:      Extraocular Movements: Extraocular movements intact. Conjunctiva/sclera: Conjunctivae normal.      Pupils: Pupils are equal, round, and reactive to light. Neck:      Thyroid: No thyroid mass or thyromegaly. Cardiovascular:      Rate and Rhythm: Normal rate and regular rhythm. Heart sounds: S1 normal and S2 normal. No murmur heard. Comments: No peripheral edema. Pulmonary:      Effort: Pulmonary effort is normal.      Breath sounds: Normal breath sounds. Abdominal:      General: Bowel sounds are normal.      Palpations: Abdomen is soft. Tenderness: There is no abdominal tenderness. Musculoskeletal:      Comments: Muscles of normal tone and bulk. Normal gait. Lymphadenopathy:      Cervical: No cervical adenopathy. Skin:     General: Skin is warm and dry. Findings: No rash. Comments: L medial distal upper arm PICC in place, duoderm on site, no sign of infection   Neurological:      Mental Status: He is alert and oriented to person, place, and time.    Psychiatric:         Mood and Affect: Mood normal.         Behavior: Behavior normal. Behavior is cooperative. Data:     Lab Results   Component Value Date/Time     09/19/2022 03:50 AM    K 4.0 09/19/2022 03:50 AM     09/19/2022 03:50 AM    CO2 26 09/19/2022 03:50 AM    BUN 19 09/19/2022 03:50 AM    CREATININE 0.87 09/19/2022 03:50 AM    GLUCOSE 152 09/19/2022 03:50 AM    PROT 7.0 04/06/2022 09:07 AM    LABALBU 4.5 04/06/2022 09:07 AM    BILITOT 0.80 04/06/2022 09:07 AM    ALKPHOS 55 04/06/2022 09:07 AM    AST 17 04/06/2022 09:07 AM    ALT 17 04/06/2022 09:07 AM     Lab Results   Component Value Date/Time    WBC 7.5 09/18/2022 06:08 AM    RBC 3.55 09/18/2022 06:08 AM    HGB 11.1 09/18/2022 06:08 AM    HCT 32.7 09/18/2022 06:08 AM    MCV 92.2 09/18/2022 06:08 AM    MCH 31.2 09/18/2022 06:08 AM    MCHC 33.8 09/18/2022 06:08 AM    RDW 13.7 09/18/2022 06:08 AM     09/18/2022 06:08 AM    MPV NOT REPORTED 11/01/2021 10:01 AM     Lab Results   Component Value Date/Time    TSH 1.68 04/06/2022 09:07 AM     Lab Results   Component Value Date/Time    CHOL 98 04/06/2022 09:07 AM    HDL 29 04/06/2022 09:07 AM    PSA 0.31 05/21/2020 12:18 PM    LABA1C 8.9 09/16/2022 10:37 AM         Assessment & Plan:        Diagnosis Orders   1. Sepsis due to Streptococcus agalactiae ([address]        2. Hospital discharge follow-up        3. Paroxysmal atrial fibrillation (HCC)        4. Type 2 diabetes mellitus with hyperglycemia, with long-term current use of insulin (HCC)          Clinically pt has improved, no further fevers. Reported feeling tired and did seem tired on exam. Finish 10 days of abx and have repeat blood cx in 2 wks as planned. Advised to call sooner if he develops recurrent fever, malaise, or body aches. May need transesophageal echo. Reviewed hospital records. 3. Parox afib, s/p ablation and seemed to be in sinus rhythm today. Continue same rx including anticoag. 4. DM uncontrolled and also having hypoglycemia at times.  Suggested perhaps he take NPH at dinner rather than all-or-nothing dosing of 70/30 depending on glucose reading pre-dinner. Pt will cont following with endocrinology.          Requested Prescriptions     Signed Prescriptions Disp Refills    celecoxib (CELEBREX) 200 MG capsule 90 capsule 1     Sig: TAKE 1 CAPSULE DAILY    furosemide (LASIX) 80 MG tablet 90 tablet 1     Sig: Take 1 tablet by mouth daily         There are no Patient Instructions on file for this visit.      signed by Harmeet Varela DO on 9/23/2022 at 10:31 PM  [address]  [address], [address]  Dept: [phone]

## 2022-09-24 ENCOUNTER — HOSPITAL ENCOUNTER (OUTPATIENT)
Dept: NURSING | Age: 72
Setting detail: INFUSION SERIES
Discharge: HOME | End: 2022-09-24
Payer: MEDICARE

## 2022-09-24 DIAGNOSIS — B95.1 BACTEREMIA DUE TO GROUP B STREPTOCOCCUS: Primary | ICD-10-CM

## 2022-09-24 DIAGNOSIS — R78.81 BACTEREMIA DUE TO GROUP B STREPTOCOCCUS: Primary | ICD-10-CM

## 2022-09-24 PROCEDURE — 96365 THER/PROPH/DIAG IV INF INIT: CPT

## 2022-09-24 PROCEDURE — 6360000002 HC RX W HCPCS: Performed by: INTERNAL MEDICINE

## 2022-09-24 PROCEDURE — 2580000003 HC RX 258: Performed by: INTERNAL MEDICINE

## 2022-09-24 RX ORDER — ONDANSETRON 2 MG/ML
8 INJECTION INTRAMUSCULAR; INTRAVENOUS
Status: CANCELLED | OUTPATIENT
Start: 2022-09-25

## 2022-09-24 RX ORDER — ALBUTEROL SULFATE 90 UG/1
4 AEROSOL, METERED RESPIRATORY (INHALATION) PRN
Status: CANCELLED | OUTPATIENT
Start: 2022-09-25

## 2022-09-24 RX ORDER — SODIUM CHLORIDE 9 MG/ML
INJECTION, SOLUTION INTRAVENOUS CONTINUOUS
Status: CANCELLED | OUTPATIENT
Start: 2022-09-25

## 2022-09-24 RX ORDER — DIPHENHYDRAMINE HYDROCHLORIDE 50 MG/ML
50 INJECTION INTRAMUSCULAR; INTRAVENOUS
Status: CANCELLED | OUTPATIENT
Start: 2022-09-25

## 2022-09-24 RX ORDER — SODIUM CHLORIDE 0.9 % (FLUSH) 0.9 %
5-40 SYRINGE (ML) INJECTION PRN
Status: CANCELLED | OUTPATIENT
Start: 2022-09-25

## 2022-09-24 RX ORDER — ACETAMINOPHEN 325 MG/1
650 TABLET ORAL
Status: CANCELLED | OUTPATIENT
Start: 2022-09-25

## 2022-09-24 RX ORDER — SODIUM CHLORIDE 9 MG/ML
5-250 INJECTION, SOLUTION INTRAVENOUS PRN
Status: CANCELLED | OUTPATIENT
Start: 2022-09-25

## 2022-09-24 RX ORDER — HEPARIN SODIUM (PORCINE) LOCK FLUSH IV SOLN 100 UNIT/ML 100 UNIT/ML
500 SOLUTION INTRAVENOUS PRN
Status: CANCELLED | OUTPATIENT
Start: 2022-09-25

## 2022-09-24 RX ORDER — FAMOTIDINE 10 MG/ML
20 INJECTION, SOLUTION INTRAVENOUS
Status: CANCELLED | OUTPATIENT
Start: 2022-09-25

## 2022-09-24 RX ADMIN — CEFTRIAXONE 2000 MG: 2 INJECTION, POWDER, FOR SOLUTION INTRAMUSCULAR; INTRAVENOUS at 09:13

## 2022-09-25 ENCOUNTER — HOSPITAL ENCOUNTER (OUTPATIENT)
Dept: NURSING | Age: 72
Setting detail: INFUSION SERIES
Discharge: HOME | End: 2022-09-25
Payer: MEDICARE

## 2022-09-25 VITALS
OXYGEN SATURATION: 96 % | RESPIRATION RATE: 16 BRPM | TEMPERATURE: 98.2 F | SYSTOLIC BLOOD PRESSURE: 130 MMHG | DIASTOLIC BLOOD PRESSURE: 68 MMHG | HEART RATE: 104 BPM

## 2022-09-25 DIAGNOSIS — B95.1 BACTEREMIA DUE TO GROUP B STREPTOCOCCUS: Primary | ICD-10-CM

## 2022-09-25 DIAGNOSIS — R78.81 BACTEREMIA DUE TO GROUP B STREPTOCOCCUS: Primary | ICD-10-CM

## 2022-09-25 PROCEDURE — 6360000002 HC RX W HCPCS: Performed by: INTERNAL MEDICINE

## 2022-09-25 PROCEDURE — 2580000003 HC RX 258: Performed by: INTERNAL MEDICINE

## 2022-09-25 PROCEDURE — 96365 THER/PROPH/DIAG IV INF INIT: CPT

## 2022-09-25 RX ORDER — DIPHENHYDRAMINE HYDROCHLORIDE 50 MG/ML
50 INJECTION INTRAMUSCULAR; INTRAVENOUS
Status: CANCELLED | OUTPATIENT
Start: 2022-09-26

## 2022-09-25 RX ORDER — SODIUM CHLORIDE 9 MG/ML
INJECTION, SOLUTION INTRAVENOUS CONTINUOUS
Status: CANCELLED | OUTPATIENT
Start: 2022-09-26

## 2022-09-25 RX ORDER — HEPARIN SODIUM (PORCINE) LOCK FLUSH IV SOLN 100 UNIT/ML 100 UNIT/ML
500 SOLUTION INTRAVENOUS PRN
Status: CANCELLED | OUTPATIENT
Start: 2022-09-26

## 2022-09-25 RX ORDER — SODIUM CHLORIDE 0.9 % (FLUSH) 0.9 %
5-40 SYRINGE (ML) INJECTION PRN
Status: CANCELLED | OUTPATIENT
Start: 2022-09-26

## 2022-09-25 RX ORDER — ALBUTEROL SULFATE 90 UG/1
4 AEROSOL, METERED RESPIRATORY (INHALATION) PRN
Status: CANCELLED | OUTPATIENT
Start: 2022-09-26

## 2022-09-25 RX ORDER — FAMOTIDINE 10 MG/ML
20 INJECTION, SOLUTION INTRAVENOUS
Status: CANCELLED | OUTPATIENT
Start: 2022-09-26

## 2022-09-25 RX ORDER — ACETAMINOPHEN 325 MG/1
650 TABLET ORAL
Status: CANCELLED | OUTPATIENT
Start: 2022-09-26

## 2022-09-25 RX ORDER — SODIUM CHLORIDE 0.9 % (FLUSH) 0.9 %
5-40 SYRINGE (ML) INJECTION PRN
Status: DISCONTINUED | OUTPATIENT
Start: 2022-09-25 | End: 2022-09-26 | Stop reason: HOSPADM

## 2022-09-25 RX ORDER — ONDANSETRON 2 MG/ML
8 INJECTION INTRAMUSCULAR; INTRAVENOUS
Status: CANCELLED | OUTPATIENT
Start: 2022-09-26

## 2022-09-25 RX ORDER — SODIUM CHLORIDE 9 MG/ML
5-250 INJECTION, SOLUTION INTRAVENOUS PRN
Status: CANCELLED | OUTPATIENT
Start: 2022-09-26

## 2022-09-25 RX ORDER — HEPARIN SODIUM (PORCINE) LOCK FLUSH IV SOLN 100 UNIT/ML 100 UNIT/ML
500 SOLUTION INTRAVENOUS PRN
Status: DISCONTINUED | OUTPATIENT
Start: 2022-09-25 | End: 2022-09-26 | Stop reason: HOSPADM

## 2022-09-25 RX ADMIN — CEFTRIAXONE 2000 MG: 2 INJECTION, POWDER, FOR SOLUTION INTRAMUSCULAR; INTRAVENOUS at 08:48

## 2022-09-25 ASSESSMENT — PAIN SCALES - GENERAL: PAINLEVEL_OUTOF10: 0

## 2022-09-25 NOTE — PROGRESS NOTES
Infusion of 2g IV Rocephin completed with no complications. PICC flushed with 20 ml normal saline. Capped and wrapped in [address]. Patient wheeled to car by wife with all belongings.

## 2022-09-26 ENCOUNTER — HOSPITAL ENCOUNTER (OUTPATIENT)
Dept: NURSING | Age: 72
Setting detail: INFUSION SERIES
Discharge: HOME | End: 2022-09-26
Payer: MEDICARE

## 2022-09-26 DIAGNOSIS — R78.81 BACTEREMIA DUE TO GROUP B STREPTOCOCCUS: Primary | ICD-10-CM

## 2022-09-26 DIAGNOSIS — B95.1 BACTEREMIA DUE TO GROUP B STREPTOCOCCUS: Primary | ICD-10-CM

## 2022-09-26 PROCEDURE — 96365 THER/PROPH/DIAG IV INF INIT: CPT

## 2022-09-26 PROCEDURE — 2580000003 HC RX 258: Performed by: INTERNAL MEDICINE

## 2022-09-26 PROCEDURE — 6360000002 HC RX W HCPCS: Performed by: INTERNAL MEDICINE

## 2022-09-26 RX ORDER — ALBUTEROL SULFATE 90 UG/1
4 AEROSOL, METERED RESPIRATORY (INHALATION) PRN
OUTPATIENT
Start: 2022-09-27

## 2022-09-26 RX ORDER — ONDANSETRON 2 MG/ML
8 INJECTION INTRAMUSCULAR; INTRAVENOUS
OUTPATIENT
Start: 2022-09-27

## 2022-09-26 RX ORDER — SODIUM CHLORIDE 9 MG/ML
INJECTION, SOLUTION INTRAVENOUS CONTINUOUS
OUTPATIENT
Start: 2022-09-27

## 2022-09-26 RX ORDER — SODIUM CHLORIDE 9 MG/ML
5-250 INJECTION, SOLUTION INTRAVENOUS PRN
OUTPATIENT
Start: 2022-09-27

## 2022-09-26 RX ORDER — ACETAMINOPHEN 325 MG/1
650 TABLET ORAL
OUTPATIENT
Start: 2022-09-27

## 2022-09-26 RX ORDER — HEPARIN SODIUM (PORCINE) LOCK FLUSH IV SOLN 100 UNIT/ML 100 UNIT/ML
500 SOLUTION INTRAVENOUS PRN
OUTPATIENT
Start: 2022-09-27

## 2022-09-26 RX ORDER — SODIUM CHLORIDE 0.9 % (FLUSH) 0.9 %
5-40 SYRINGE (ML) INJECTION PRN
OUTPATIENT
Start: 2022-09-27

## 2022-09-26 RX ORDER — FAMOTIDINE 10 MG/ML
20 INJECTION, SOLUTION INTRAVENOUS
OUTPATIENT
Start: 2022-09-27

## 2022-09-26 RX ORDER — DIPHENHYDRAMINE HYDROCHLORIDE 50 MG/ML
50 INJECTION INTRAMUSCULAR; INTRAVENOUS
OUTPATIENT
Start: 2022-09-27

## 2022-09-26 RX ADMIN — CEFTRIAXONE 2000 MG: 2 INJECTION, POWDER, FOR SOLUTION INTRAMUSCULAR; INTRAVENOUS at 08:59

## 2022-09-27 ENCOUNTER — CARE COORDINATION (OUTPATIENT)
Dept: CARE COORDINATION | Age: 72
End: 2022-09-27

## 2022-09-27 NOTE — CARE COORDINATION
[facility] Transitions Follow Up Call    2022    Patient: Melly Carrera  Patient : 1950   MRN: [id number]  Reason for Admission: Sepsis  Discharge Date: 22 RARS: Readmission Risk Score: 12.1         Spoke with: Patient    Spoke with patient, states he is doing well. He denies any sxs and completed his IV ATB yesterday. PICC line has been removed and he is doing well. He saw his PCP on 22 and new orders for lab work received. He is to repeat his blood cultures on 10/10/22. He reports his glucose levels have been back to normal and denies any further episodes of Hypoglycemia. Patient denies any further issues or needs at this time. Care Transitions Follow Up Call    Needs to be reviewed by the provider   Additional needs identified to be addressed with provider: No  none             Method of communication with provider : none      Care Transition Nurse (CTN) contacted the patient by telephone to follow up after admission on 22. Verified name and  with patient as identifiers. Addressed changes since last contact:  follow up with PCP  Discussed follow-up appointments. If no appointment was previously scheduled, appointment scheduling offered: Yes. Is follow up appointment scheduled within 7 days of discharge? Yes. Advance Care Planning:   Does patient have an Advance Directive: reviewed and current, reviewed and needs to be updated, not on file; education provided, not on file, patient declined education, decision maker updated, and referral to internal ACP facilitator. CTN reviewed discharge instructions, medical action plan and red flags with patient and discussed any barriers to care and/or understanding of plan of care after discharge. Discussed appropriate site of care based on symptoms and resources available to patient including: PCP  Specialist  When to call 911. The patient agrees to contact the PCP office for questions related to their healthcare. Patients top risk factors for readmission: medical condition-Sepsis    CTN provided contact information for future needs. Plan for follow-up call in 5-7 days based on severity of symptoms and risk factors. Plan for next call:  - recurrent sxs            - sign off      Nelytiago Toscano LPN  [address] [place] [address] Transition Nurse  [phone]        Care Transitions Subsequent and Final Call    Schedule Follow Up Appointment with PCP: Completed  Subsequent and Final Calls  Do you have any ongoing symptoms?: No  Have your medications changed?: Yes  Patient Reports: Completed ATB  Do you have any questions related to your medications?: No  Do you currently have any active services?: No  Do you have any needs or concerns that I can assist you with?: No  Identified Barriers: None  Care Transitions Interventions  Other Interventions:              Follow Up  Future Appointments   Date Time Provider Javon Degroot   11/17/2022 12:30 PM MD Anny Kimble [facility]       Verónica Rodriguez LPN

## 2022-10-03 ENCOUNTER — CARE COORDINATION (OUTPATIENT)
Dept: CARE COORDINATION | Age: 72
End: 2022-10-03

## 2022-10-03 NOTE — CARE COORDINATION
[facility] Transitions Follow Up Call    10/3/2022    Patient: Lenny Lyn  Patient : 1950   MRN: [id number]  Reason for Admission: Sepsis  Discharge Date: 22 RARS: Readmission Risk Score: 12.1         Spoke with: Patient    Spoke with patient, states everything is going good and he is doing fine. He denies any recurrent or ongoing sxs. He denies any need for assistance. CTN to sign off. Care Transitions Follow Up Call    Needs to be reviewed by the provider   Additional needs identified to be addressed with provider: No  none             Method of communication with provider : none      Care Transition Nurse (CTN) contacted the patient by telephone to follow up after admission on 22. Verified name and  with patient as identifiers. Addressed changes since last contact: none  Discussed follow-up appointments. If no appointment was previously scheduled, appointment scheduling offered: Yes. Is follow up appointment scheduled within 7 days of discharge? Yes. Advance Care Planning:   Does patient have an Advance Directive: reviewed and current. CTN reviewed discharge instructions, medical action plan and red flags with patient and discussed any barriers to care and/or understanding of plan of care after discharge. Discussed appropriate site of care based on symptoms and resources available to patient including: PCP  Specialist  When to call [address] The patient agrees to contact the PCP office for questions related to their healthcare. CTN provided contact information for future needs. No further follow-up call indicated based on severity of symptoms and risk factors.         Kell Walker LPN  [facility]/ Care Transition Nurse  [phone]        Care Transitions Subsequent and Final Call    Schedule Follow Up Appointment with PCP: Completed  Subsequent and Final Calls  Do you have any ongoing symptoms?: No  Have your medications changed?: No  Do you have any questions related to your medications?: No  Do you currently have any active services?: No  Do you have any needs or concerns that I can assist you with?: No  Identified Barriers: None  Care Transitions Interventions  Other Interventions:              Follow Up  Future Appointments   Date Time Provider Javon Degroot   11/17/2022 12:30 PM MD [facility]       Mariluz Davis LPN

## 2022-10-10 ENCOUNTER — HOSPITAL ENCOUNTER (OUTPATIENT)
Age: 72
Discharge: HOME | End: 2022-10-10
Payer: MEDICARE

## 2022-10-10 DIAGNOSIS — R78.81 BACTEREMIA: ICD-10-CM

## 2022-10-10 PROCEDURE — 87040 BLOOD CULTURE FOR BACTERIA: CPT

## 2022-10-10 PROCEDURE — 36415 COLL VENOUS BLD VENIPUNCTURE: CPT

## 2022-10-16 LAB
CULTURE: NORMAL
CULTURE: NORMAL
SPECIMEN DESCRIPTION: NORMAL
SPECIMEN DESCRIPTION: NORMAL

## 2022-10-23 ENCOUNTER — PATIENT MESSAGE (OUTPATIENT)
Dept: FAMILY MEDICINE CLINIC | Age: 72
End: 2022-10-23

## 2022-10-24 RX ORDER — CARVEDILOL 3.12 MG/1
TABLET ORAL
Qty: 90 TABLET | Refills: 3 | Status: SHIPPED | OUTPATIENT
Start: 2022-10-24

## 2022-10-24 NOTE — TELEPHONE ENCOUNTER
From: Anh Bailey  To: Dr. Carrillo Ip: 10/23/2022 10:46 PM EDT  Subject: Flu shot    Luis Summers received a senior flu shot on Pieter Cutler. Oct 23rd at [address] [place] [address]. Please update this information. Thank you.  Ashu Flores Dr

## 2022-11-14 DIAGNOSIS — Z98.61 H/O CORONARY ANGIOPLASTY: ICD-10-CM

## 2022-11-14 DIAGNOSIS — I48.0 PAF (PAROXYSMAL ATRIAL FIBRILLATION) (HCC): ICD-10-CM

## 2022-11-14 DIAGNOSIS — E78.5 HYPERLIPIDEMIA, UNSPECIFIED HYPERLIPIDEMIA TYPE: ICD-10-CM

## 2022-11-14 DIAGNOSIS — I25.10 CORONARY ARTERY DISEASE INVOLVING NATIVE CORONARY ARTERY OF NATIVE HEART WITHOUT ANGINA PECTORIS: ICD-10-CM

## 2022-11-14 DIAGNOSIS — E11.42 TYPE 2 DIABETES MELLITUS WITH PERIPHERAL NEUROPATHY (HCC): ICD-10-CM

## 2022-11-14 DIAGNOSIS — I10 PRIMARY HYPERTENSION: Primary | ICD-10-CM

## 2022-11-15 ENCOUNTER — HOSPITAL ENCOUNTER (OUTPATIENT)
Age: 72
Discharge: HOME | End: 2022-11-15
Payer: MEDICARE

## 2022-11-15 DIAGNOSIS — I48.0 PAF (PAROXYSMAL ATRIAL FIBRILLATION) (HCC): ICD-10-CM

## 2022-11-15 DIAGNOSIS — I10 PRIMARY HYPERTENSION: ICD-10-CM

## 2022-11-15 DIAGNOSIS — I25.10 CORONARY ARTERY DISEASE INVOLVING NATIVE CORONARY ARTERY OF NATIVE HEART WITHOUT ANGINA PECTORIS: ICD-10-CM

## 2022-11-15 DIAGNOSIS — E78.5 HYPERLIPIDEMIA, UNSPECIFIED HYPERLIPIDEMIA TYPE: ICD-10-CM

## 2022-11-15 DIAGNOSIS — E11.42 TYPE 2 DIABETES MELLITUS WITH PERIPHERAL NEUROPATHY (HCC): ICD-10-CM

## 2022-11-15 DIAGNOSIS — Z98.61 H/O CORONARY ANGIOPLASTY: ICD-10-CM

## 2022-11-15 LAB
ABSOLUTE EOS #: 0.2 K/UL (ref 0–0.4)
ABSOLUTE LYMPH #: 1.4 K/UL (ref 1–4.8)
ABSOLUTE MONO #: 0.5 K/UL (ref 0–1)
ALBUMIN SERPL-MCNC: 4 G/DL (ref 3.5–5.2)
ALP BLD-CCNC: 93 U/L (ref 40–129)
ALT SERPL-CCNC: 23 U/L (ref 5–41)
ANION GAP SERPL CALCULATED.3IONS-SCNC: 9 MMOL/L (ref 9–17)
AST SERPL-CCNC: 20 U/L
BASOPHILS # BLD: 1 % (ref 0–2)
BASOPHILS ABSOLUTE: 0 K/UL (ref 0–0.2)
BILIRUB SERPL-MCNC: 0.8 MG/DL (ref 0.3–1.2)
BUN BLDV-MCNC: 19 MG/DL (ref 8–23)
BUN/CREAT BLD: 23 (ref 9–20)
CALCIUM SERPL-MCNC: 8.6 MG/DL (ref 8.6–10.4)
CHLORIDE BLD-SCNC: 104 MMOL/L (ref 98–107)
CHOLESTEROL/HDL RATIO: 3.5
CHOLESTEROL: 101 MG/DL
CO2: 26 MMOL/L (ref 20–31)
CREAT SERPL-MCNC: 0.83 MG/DL (ref 0.7–1.2)
DIFFERENTIAL TYPE: YES
EKG ATRIAL RATE: 89 BPM
EKG P AXIS: 51 DEGREES
EKG P-R INTERVAL: 194 MS
EKG Q-T INTERVAL: 412 MS
EKG QRS DURATION: 152 MS
EKG QTC CALCULATION (BAZETT): 501 MS
EKG R AXIS: 57 DEGREES
EKG T AXIS: -143 DEGREES
EKG VENTRICULAR RATE: 89 BPM
EOSINOPHILS RELATIVE PERCENT: 3 % (ref 0–5)
GFR SERPL CREATININE-BSD FRML MDRD: >60 ML/MIN/1.73M2
GLUCOSE BLD-MCNC: 239 MG/DL (ref 70–99)
HCT VFR BLD CALC: 38.9 % (ref 41–53)
HDLC SERPL-MCNC: 29 MG/DL
HEMOGLOBIN: 12.9 G/DL (ref 13.5–17.5)
LDL CHOLESTEROL: 47 MG/DL (ref 0–130)
LYMPHOCYTES # BLD: 21 % (ref 13–44)
MAGNESIUM: 1.9 MG/DL (ref 1.6–2.6)
MCH RBC QN AUTO: 30.9 PG (ref 26–34)
MCHC RBC AUTO-ENTMCNC: 33.1 G/DL (ref 31–37)
MCV RBC AUTO: 93.3 FL (ref 80–100)
MONOCYTES # BLD: 8 % (ref 5–9)
PATIENT FASTING?: YES
PDW BLD-RTO: 13.8 % (ref 12.1–15.2)
PLATELET # BLD: 248 K/UL (ref 140–450)
POTASSIUM SERPL-SCNC: 4.5 MMOL/L (ref 3.7–5.3)
RBC # BLD: 4.17 M/UL (ref 4.5–5.9)
SEG NEUTROPHILS: 67 % (ref 39–75)
SEGMENTED NEUTROPHILS ABSOLUTE COUNT: 4.4 K/UL (ref 2.1–6.5)
SODIUM BLD-SCNC: 139 MMOL/L (ref 135–144)
TOTAL PROTEIN: 7.1 G/DL (ref 6.4–8.3)
TRIGL SERPL-MCNC: 126 MG/DL
TSH SERPL DL<=0.05 MIU/L-ACNC: 1.26 UIU/ML (ref 0.3–5)
WBC # BLD: 6.5 K/UL (ref 3.5–11)

## 2022-11-15 PROCEDURE — 36415 COLL VENOUS BLD VENIPUNCTURE: CPT

## 2022-11-15 PROCEDURE — 93010 ELECTROCARDIOGRAM REPORT: CPT | Performed by: INTERNAL MEDICINE

## 2022-11-15 PROCEDURE — 84443 ASSAY THYROID STIM HORMONE: CPT

## 2022-11-15 PROCEDURE — 80061 LIPID PANEL: CPT

## 2022-11-15 PROCEDURE — 93005 ELECTROCARDIOGRAM TRACING: CPT

## 2022-11-15 PROCEDURE — 83735 ASSAY OF MAGNESIUM: CPT

## 2022-11-15 PROCEDURE — 85025 COMPLETE CBC W/AUTO DIFF WBC: CPT

## 2022-11-15 PROCEDURE — 80053 COMPREHEN METABOLIC PANEL: CPT

## 2022-11-17 ENCOUNTER — OFFICE VISIT (OUTPATIENT)
Dept: CARDIOLOGY CLINIC | Age: 72
End: 2022-11-17
Payer: MEDICARE

## 2022-11-17 VITALS
WEIGHT: 246 LBS | SYSTOLIC BLOOD PRESSURE: 118 MMHG | OXYGEN SATURATION: 95 % | HEART RATE: 86 BPM | DIASTOLIC BLOOD PRESSURE: 60 MMHG | BODY MASS INDEX: 35.3 KG/M2

## 2022-11-17 DIAGNOSIS — I25.10 CORONARY ARTERY DISEASE INVOLVING NATIVE CORONARY ARTERY OF NATIVE HEART WITHOUT ANGINA PECTORIS: ICD-10-CM

## 2022-11-17 DIAGNOSIS — E78.5 HYPERLIPIDEMIA, UNSPECIFIED HYPERLIPIDEMIA TYPE: ICD-10-CM

## 2022-11-17 DIAGNOSIS — E11.42 TYPE 2 DIABETES MELLITUS WITH PERIPHERAL NEUROPATHY (HCC): ICD-10-CM

## 2022-11-17 DIAGNOSIS — I10 PRIMARY HYPERTENSION: Primary | ICD-10-CM

## 2022-11-17 PROCEDURE — 1123F ACP DISCUSS/DSCN MKR DOCD: CPT | Performed by: INTERNAL MEDICINE

## 2022-11-17 PROCEDURE — 99214 OFFICE O/P EST MOD 30 MIN: CPT | Performed by: INTERNAL MEDICINE

## 2022-11-17 PROCEDURE — G8417 CALC BMI ABV UP PARAM F/U: HCPCS | Performed by: INTERNAL MEDICINE

## 2022-11-17 PROCEDURE — 2022F DILAT RTA XM EVC RTNOPTHY: CPT | Performed by: INTERNAL MEDICINE

## 2022-11-17 PROCEDURE — 3078F DIAST BP <80 MM HG: CPT | Performed by: INTERNAL MEDICINE

## 2022-11-17 PROCEDURE — G8427 DOCREV CUR MEDS BY ELIG CLIN: HCPCS | Performed by: INTERNAL MEDICINE

## 2022-11-17 PROCEDURE — G8484 FLU IMMUNIZE NO ADMIN: HCPCS | Performed by: INTERNAL MEDICINE

## 2022-11-17 PROCEDURE — 3052F HG A1C>EQUAL 8.0%<EQUAL 9.0%: CPT | Performed by: INTERNAL MEDICINE

## 2022-11-17 PROCEDURE — 1036F TOBACCO NON-USER: CPT | Performed by: INTERNAL MEDICINE

## 2022-11-17 PROCEDURE — 3074F SYST BP LT 130 MM HG: CPT | Performed by: INTERNAL MEDICINE

## 2022-11-17 PROCEDURE — 3017F COLORECTAL CA SCREEN DOC REV: CPT | Performed by: INTERNAL MEDICINE

## 2022-11-17 NOTE — PROGRESS NOTES
Ov Dr. Estuardo Pierce for follow up  Had ablation end of July 25 2022  No chest pain or heaviness   No palpitations   No new sob     No changes    Will get monthly labs (CMP)    Follow up in 6 months

## 2022-11-17 NOTE — LETTER
Mirtha Hairston M.D. [address] [place] [address]  [phone]        2022        Shannon Yusuf DO  [place] [address]    RE:   Katalina Brain  :  1950    Dear Dr. Gordon Army:    CHIEF COMPLAINT:  1. Atrial fibrillation, status post ablation on 2022, by Dr. Manuel Garcia, with him currently in sinus rhythm. 2.  Mild LV dysfunction, EF of 45% in part due to his left bundle-branch block. 3.  Coronary artery disease, status post cardiac catheterization on 2021, with widely patent LIMA to the LAD and widely patent vein graft to the diagonal with patent stent in the right coronary artery and unremarkable circumflex, with an EF of 50%. HISTORY OF PRESENT ILLNESS:  I had the pleasure of seeing Sintia Rodríguez and his wife, Jennifer Bill, in our office on 2022. He is a very complex 61-year-old gentleman who has extensive cardiac history. He had a catheterization on 10/31/2012, that showed severe disease at the bifurcation of the LAD and diagonal, with unremarkable right coronary artery and circumflex. He had angioplasty on 2012, placing a 2.75 x 28 mm Promus stent in the LAD with a good end result. On 2014, a catheterization showed 70% to 80% in-stent stenosis of the LAD and diagonal, with unremarkable right coronary artery and circumflex, with an EF of 55% to 60%. He had open heart surgery by Dr. Young Davis on 2014, with LIMA to the LAD and a vein graft to the diagonal.    On 2017, a catheterization showed 95% disease in a very large right coronary artery, with patent LIMA to the LAD and patent vein graft to the diagonal, circumflex was unremarkable, and I placed a 3.5 x 18 mm drug-eluting Xience stent in the right coronary artery with a good end result. His last catheterization was on 2021.   His right coronary artery had a widely patent stent in the midportion, the LAD had a 90% lesion at the bifurcation of the LAD and diagonal, there was widely patent LIMA to the LAD and widely patent vein graft to the diagonal.  His circumflex was unremarkable. He had EF of 50%. He had paroxysmal atrial fibrillation and was very symptomatic. He was anticoagulated with Eliquis. He was very symptomatic when he would develop his atrial fibrillation. We placed him on amiodarone on 11/05/2020. He began developing intermittent atrial fibrillation in 12/2021. On 04/11/2022, he continued to have paroxysmal atrial fibrillation, which was very symptomatic. We stopped amiodarone and I started digoxin 0.125 mg daily and I scheduled him for EP evaluation for possible ablation. He had ablation by Dr. Brendan Cranker on 07/25/2022. He has done excellent since that time. He has been off amiodarone and has maintained sinus rhythm. He does have an underlying left bundle-branch block. He does not tolerate beta-blockade and he is on a very low dose of Coreg at 3.125 mg once a day. He has done well over the last several months. He has maintained sinus rhythm. He is still on Eliquis, which he will be on indefinitely. He is only on Coreg at 3.125 mg once a day and no other antiarrhythmics. He denies any chest pain or chest discomfort. He has shortness of breath, which is about the same as it had been previously. Energy level is good. No palpitations. He and Adela Tavera are leaving for [place] this coming week. He has had no hospitalizations or procedures except for his ablation. CARDIAC RISK FACTORS:  Known CAD:  Positive. PTCA:  Positive. Bypass Surgery:  Positive. Hypertension:  Positive. Hyperlipidemia:  Positive. Diabetes:  Positive. Smoking:  Negative. Other Family Members:  Positive.     MEDICATIONS AT THIS TIME:  He is on Eliquis 5 mg b.i.d., Lipitor 40 mg daily, Coreg 3.125 mg daily, Celebrex 200 mg daily, CoQ10 daily, Cardura 8 mg half a tablet b.i.d., [place]o Nicolasa 97/103 b.i.d., Lasix 80 mg daily, Amaryl 4 mg half a tablet daily, Imdur 30 mg daily, magnesium 500 mg daily, Glucophage 1000 mg daily, nifedipine 90 mg half a tablet daily, Novolog 70/30 64 units in the morning, sliding scale at lunch, and 12 units at supper, Aldactone 25 mg daily, Trulicity 1.5 mg weekly, vitamin D 1000 mg b.i.d. PAST MEDICAL AND SURGICAL HISTORY:  1. Arthroscopic surgery with knee replacement, complicated by fat embolism in 02/2011.  2.  Insulin-dependent diabetes for 34 years. 3.  Hypertension many years. 4.  Left and right cataract surgery. 5.  Sleep apnea in 2014, still using a CPAP mask since that time. 6.  History of paroxysmal atrial fibrillation, status post ablation on 07/25/2022, with maintaining sinus rhythm since then. 7.  Mild sick sinus syndrome, becoming hypotensive and bradycardic on a higher dose of Coreg. 8.  Chronic back pain. 9.  Carpal tunnel surgery in left hand. FAMILY HISTORY:  Mother and father had CHF. SOCIAL HISTORY:  He is 71years old, retired . . Two children. He has a trailer in [place], in which his wife and him spend the winter. Daughter is a professor at [employer] in [place], two grandchildren in [place]. He does not smoke or drink alcohol. Tries to stay active. REVIEW OF SYSTEMS:  Cardiac as above. Other systems reviewed including constitutional, eyes, ears, nose and throat, cardiovascular, respiratory, GI, , musculoskeletal, integumentary, neurologic, endocrine, hematologic and allergic/immunologic are negative except for what is described above. He has chronic edema in his both lower extremities, which has improved since he has maintained sinus rhythm. PHYSICAL EXAMINATION:  VITAL SIGNS:  His blood pressure was 118/60 with heart rate of 86 and regular. Respiratory rate 18. O2 sat 95%. Weight 246 pounds. GENERAL:  He is a pleasant 76-year-old gentleman. Denied pain.   He was oriented to person, place and time. Answered questions appropriately. SKIN:  No unusual skin changes. HEENT:  The pupils are equally round and reactive to light and accommodation. Extraocular movements were intact. Mucous membranes were dry. NECK:  No JVD. Good carotid pulses. No carotid bruits. No lymphadenopathy or thyromegaly. CARDIOVASCULAR EXAM:  S1 and S2 were normal.  No S3 or S4. Soft systolic blowing type murmur. No diastolic murmur. PMI was normal.  No lift, thrust, or pericardial friction rub. LUNGS:  Quite clear to auscultation and percussion. ABDOMEN:  Soft and nontender. Good bowel sounds. The aorta was not enlarged. No hepatomegaly, splenomegaly. EXTREMITIES:  Good femoral pulses. Good pedal pulses. 2+ pedal edema. Skin was warm and dry. No calf tenderness. Nail beds pink. Good cap refill. PULSES:  Bilateral symmetrical radial, brachial and carotid pulses. No carotid bruits. Good femoral and pedal pulses. NEUROLOGIC EXAM:  Within normal limits. PSYCHIATRIC EXAM:  Within normal limits. LABORATORY DATA:  His sodium was 139, potassium 4.5, BUN 19, creatinine 0.83. Calcium 8.6. Cholesterol 101 with an HDL of 29, LDL 47, triglycerides 126. ALT was 23, AST was 20. His TSH was 1.26. White count 6.5, hemoglobin 12.9 with a platelet count of 917,221. His EKG showed sins rhythm with left bundle-branch block. IMPRESSION:  1. History of paroxysmal atrial fibrillation status post ablation on 07/25/2022, by Dr. Blanca Wooten, with him maintaining sinus rhythm. 2.  On Eliquis 5 mg b.i.d. lifelong. 3.  Coronary artery disease, status post cardiac catheterization on 10/31/2012, showing severe disease at the bifurcation of the LAD and diagonal, with unremarkable right coronary artery and circumflex, EF of 55%. 4.  Angioplasty of the LAD and diagonal on 11/07/2012, placing a 2.75 x 28 mm Promus stent in the LAD.   5.  Catheterization on 05/14/2014 , showing 70% to 80% LAD and diagonal disease with in-stent stenosis with an FFR of 0.78, with unremarkable right coronary artery and circumflex, EF of 55% to 60%. 6.  Open heart surgery on 06/30/2014, by Dr. Karsten De La Paz, with a LIMA to the LAD and a vein graft to the diagonal.  7.  Catheterization on 08/30/2017, showing patent bypass grafts, with 95% disease in the mid right coronary artery, EF of 50%, with angioplasty of the right coronary artery, placing a 3.5 x 18 mm drug-eluting Xience stent. 8.  Catheterization on 05/14/2021, showing widely patent LIMA to the LAD, widely patent vein graft to the diagonal, with unremarkable circumflex with a widely patent stent in the right coronary artery, with an EF of 50%. 9.  EF of 45% in the last echocardiogram in part due to his left bundle-branch block with paradoxical motion of his septum. 10.  Sleep apnea, on a CPAP mask. 11.  Symptomatic atrial fibrillation with, again,  him in sinus rhythm. 12.  Chronic back pain. PLAN:  1. No change in medications. 2.  Will get monthly labs while he is in [place] to follow his renal functions and his potassium. 3.  We will see in 6 months. DISCUSSION:  Mr. Alesia Og overall is doing well. His main issues are his coronary artery disease; however, his bypass grafts were widely patent on 05/14/2021, and a stent in the right coronary artery was widely patent with an unremarkable circumflex. He does have mild LV dysfunction, EF in the 45% to 50% range. In part this is due to paradoxical motion of his septum with his left bundle-branch block. His LV function really has not changed and will vary between 45% and 50%. He does have very symptomatic atrial fibrillation, however, he has maintained sinus rhythm since his ablation. There is no limitation in his activity. He still has some shortness of breath due to deconditioning and underlying COPD. However, he is remaining as active as he can within the limits of his arthritis. I made no changes in his medications.   He is on an extremely low dose of Coreg at 3.125 mg daily but he does not tolerate a higher dose. I will see him again in 6 months unless a problem would develop. Thank you very much for allowing me the privilege of seeing Mr. Radha Plascencia. If you have any questions on my thoughts, please do not hesitate to contact me.      Sincerely,        Saravanan Mantilla    D: 11/17/2022 13:27:49     T: 11/18/2022 6:29:26     MELANI/[email or web address]  Job#: [id number]   Doc#: [id number]

## 2022-11-18 NOTE — PROGRESS NOTES
Jonathan Walker M.D. [address] [place] [address]  [phone]        2022        Cassidy Dupont   [place] [address]    RE:   Mary Hensley  :  1950    Dear Dr. Lilibeth Caballero:    CHIEF COMPLAINT:  1. Atrial fibrillation, status post ablation on 2022, by Dr. Esperanza Petit, with him currently in sinus rhythm. 2.  Mild LV dysfunction, EF of 45% in part due to his left bundle-branch block. 3.  Coronary artery disease, status post cardiac catheterization on 2021, with widely patent LIMA to the LAD and widely patent vein graft to the diagonal with patent stent in the right coronary artery and unremarkable circumflex, with an EF of 50%. HISTORY OF PRESENT ILLNESS:  I had the pleasure of seeing Yoandy Wolf and his wife, Tia Johnson, in our office on 2022. He is a very complex 29-year-old gentleman who has extensive cardiac history. He had a catheterization on 10/31/2012, that showed severe disease at the bifurcation of the LAD and diagonal, with unremarkable right coronary artery and circumflex. He had angioplasty on 2012, placing a 2.75 x 28 mm Promus stent in the LAD with a good end result. On 2014, a catheterization showed 70% to 80% in-stent stenosis of the LAD and diagonal, with unremarkable right coronary artery and circumflex, with an EF of 55% to 60%. He had open heart surgery by Dr. Su Waters on 2014, with LIMA to the LAD and a vein graft to the diagonal.    On 2017, a catheterization showed 95% disease in a very large right coronary artery, with patent LIMA to the LAD and patent vein graft to the diagonal, circumflex was unremarkable, and I placed a 3.5 x 18 mm drug-eluting Xience stent in the right coronary artery with a good end result. His last catheterization was on 2021.   His right coronary artery had a widely patent stent in the midportion, the LAD had a 90% lesion at the bifurcation of the LAD and diagonal, there was widely patent LIMA to the LAD and widely patent vein graft to the diagonal.  His circumflex was unremarkable. He had EF of 50%. He had paroxysmal atrial fibrillation and was very symptomatic. He was anticoagulated with Eliquis. He was very symptomatic when he would develop his atrial fibrillation. We placed him on amiodarone on 11/05/2020. He began developing intermittent atrial fibrillation in 12/2021. On 04/11/2022, he continued to have paroxysmal atrial fibrillation, which was very symptomatic. We stopped amiodarone and I started digoxin 0.125 mg daily and I scheduled him for EP evaluation for possible ablation. He had ablation by Dr. Eliane Walter on 07/25/2022. He has done excellent since that time. He has been off amiodarone and has maintained sinus rhythm. He does have an underlying left bundle-branch block. He does not tolerate beta-blockade and he is on a very low dose of Coreg at 3.125 mg once a day. He has done well over the last several months. He has maintained sinus rhythm. He is still on Eliquis, which he will be on indefinitely. He is only on Coreg at 3.125 mg once a day and no other antiarrhythmics. He denies any chest pain or chest discomfort. He has shortness of breath, which is about the same as it had been previously. Energy level is good. No palpitations. He and Latia Peres are leaving for [place] this coming week. He has had no hospitalizations or procedures except for his ablation. CARDIAC RISK FACTORS:  Known CAD:  Positive. PTCA:  Positive. Bypass Surgery:  Positive. Hypertension:  Positive. Hyperlipidemia:  Positive. Diabetes:  Positive. Smoking:  Negative. Other Family Members:  Positive.     MEDICATIONS AT THIS TIME:  He is on Eliquis 5 mg b.i.d., Lipitor 40 mg daily, Coreg 3.125 mg daily, Celebrex 200 mg daily, CoQ10 daily, Cardura 8 mg half a tablet b.i.d., [place] Pate 97/103 b.i.d., Lasix 80 mg daily, Amaryl 4 mg half a tablet daily, Imdur 30 mg daily, magnesium 500 mg daily, Glucophage 1000 mg daily, nifedipine 90 mg half a tablet daily, Novolog 70/30 64 units in the morning, sliding scale at lunch, and 12 units at supper, Aldactone 25 mg daily, Trulicity 1.5 mg weekly, vitamin D 1000 mg b.i.d. PAST MEDICAL AND SURGICAL HISTORY:  1. Arthroscopic surgery with knee replacement, complicated by fat embolism in 02/2011.  2.  Insulin-dependent diabetes for 34 years. 3.  Hypertension many years. 4.  Left and right cataract surgery. 5.  Sleep apnea in 2014, still using a CPAP mask since that time. 6.  History of paroxysmal atrial fibrillation, status post ablation on 07/25/2022, with maintaining sinus rhythm since then. 7.  Mild sick sinus syndrome, becoming hypotensive and bradycardic on a higher dose of Coreg. 8.  Chronic back pain. 9.  Carpal tunnel surgery in left hand. FAMILY HISTORY:  Mother and father had CHF. SOCIAL HISTORY:  He is 71years old, retired . . Two children. He has a trailer in [place], in which his wife and him spend the winter. Daughter is a professor at [employer] in [place], two grandchildren in [place]. He does not smoke or drink alcohol. Tries to stay active. REVIEW OF SYSTEMS:  Cardiac as above. Other systems reviewed including constitutional, eyes, ears, nose and throat, cardiovascular, respiratory, GI, , musculoskeletal, integumentary, neurologic, endocrine, hematologic and allergic/immunologic are negative except for what is described above. He has chronic edema in his both lower extremities, which has improved since he has maintained sinus rhythm. PHYSICAL EXAMINATION:  VITAL SIGNS:  His blood pressure was 118/60 with heart rate of 86 and regular. Respiratory rate 18. O2 sat 95%. Weight 246 pounds. GENERAL:  He is a pleasant 60-year-old gentleman. Denied pain.   He was oriented to person, place and time. Answered questions appropriately. SKIN:  No unusual skin changes. HEENT:  The pupils are equally round and reactive to light and accommodation. Extraocular movements were intact. Mucous membranes were dry. NECK:  No JVD. Good carotid pulses. No carotid bruits. No lymphadenopathy or thyromegaly. CARDIOVASCULAR EXAM:  S1 and S2 were normal.  No S3 or S4. Soft systolic blowing type murmur. No diastolic murmur. PMI was normal.  No lift, thrust, or pericardial friction rub. LUNGS:  Quite clear to auscultation and percussion. ABDOMEN:  Soft and nontender. Good bowel sounds. The aorta was not enlarged. No hepatomegaly, splenomegaly. EXTREMITIES:  Good femoral pulses. Good pedal pulses. 2+ pedal edema. Skin was warm and dry. No calf tenderness. Nail beds pink. Good cap refill. PULSES:  Bilateral symmetrical radial, brachial and carotid pulses. No carotid bruits. Good femoral and pedal pulses. NEUROLOGIC EXAM:  Within normal limits. PSYCHIATRIC EXAM:  Within normal limits. LABORATORY DATA:  His sodium was 139, potassium 4.5, BUN 19, creatinine 0.83. Calcium 8.6. Cholesterol 101 with an HDL of 29, LDL 47, triglycerides 126. ALT was 23, AST was 20. His TSH was 1.26. White count 6.5, hemoglobin 12.9 with a platelet count of 158,514. His EKG showed sins rhythm with left bundle-branch block. IMPRESSION:  1. History of paroxysmal atrial fibrillation status post ablation on 07/25/2022, by Dr. Caleb Tellez, with him maintaining sinus rhythm. 2.  On Eliquis 5 mg b.i.d. lifelong. 3.  Coronary artery disease, status post cardiac catheterization on 10/31/2012, showing severe disease at the bifurcation of the LAD and diagonal, with unremarkable right coronary artery and circumflex, EF of 55%. 4.  Angioplasty of the LAD and diagonal on 11/07/2012, placing a 2.75 x 28 mm Promus stent in the LAD.   5.  Catheterization on 05/14/2014 , showing 70% to 80% LAD and diagonal disease with in-stent stenosis with an FFR of 0.78, with unremarkable right coronary artery and circumflex, EF of 55% to 60%. 6.  Open heart surgery on 06/30/2014, by Dr. Timmy Salguero, with a LIMA to the LAD and a vein graft to the diagonal.  7.  Catheterization on 08/30/2017, showing patent bypass grafts, with 95% disease in the mid right coronary artery, EF of 50%, with angioplasty of the right coronary artery, placing a 3.5 x 18 mm drug-eluting Xience stent. 8.  Catheterization on 05/14/2021, showing widely patent LIMA to the LAD, widely patent vein graft to the diagonal, with unremarkable circumflex with a widely patent stent in the right coronary artery, with an EF of 50%. 9.  EF of 45% in the last echocardiogram in part due to his left bundle-branch block with paradoxical motion of his septum. 10.  Sleep apnea, on a CPAP mask. 11.  Symptomatic atrial fibrillation with, again,  him in sinus rhythm. 12.  Chronic back pain. PLAN:  1. No change in medications. 2.  Will get monthly labs while he is in [place] to follow his renal functions and his potassium. 3.  We will see in 6 months. DISCUSSION:  Mr. Apoorva Ervin overall is doing well. His main issues are his coronary artery disease; however, his bypass grafts were widely patent on 05/14/2021, and a stent in the right coronary artery was widely patent with an unremarkable circumflex. He does have mild LV dysfunction, EF in the 45% to 50% range. In part this is due to paradoxical motion of his septum with his left bundle-branch block. His LV function really has not changed and will vary between 45% and 50%. He does have very symptomatic atrial fibrillation, however, he has maintained sinus rhythm since his ablation. There is no limitation in his activity. He still has some shortness of breath due to deconditioning and underlying COPD. However, he is remaining as active as he can within the limits of his arthritis. I made no changes in his medications.   He is on an extremely low dose of Coreg at 3.125 mg daily but he does not tolerate a higher dose. I will see him again in 6 months unless a problem would develop. Thank you very much for allowing me the privilege of seeing Mr. Triny Wood. If you have any questions on my thoughts, please do not hesitate to contact me.      Sincerely,        Bam Palmer    D: 11/17/2022 13:27:49     T: 11/18/2022 6:29:26     [email or web address]  Job#: [id number]   Doc#: [id number]

## 2022-12-19 RX ORDER — FUROSEMIDE 80 MG
TABLET ORAL
Qty: 90 TABLET | Refills: 1 | Status: SHIPPED | OUTPATIENT
Start: 2022-12-19

## 2022-12-19 NOTE — TELEPHONE ENCOUNTER
Last OV: 9/23/2022 hospital f/u    Next scheduled apt: Visit date not found      Surescripts requesting refill of furosemide  Medication pending

## 2023-01-23 RX ORDER — NIFEDIPINE 90 MG/1
TABLET, FILM COATED, EXTENDED RELEASE ORAL
Qty: 90 TABLET | Refills: 3 | Status: SHIPPED | OUTPATIENT
Start: 2023-01-23

## 2023-02-26 DIAGNOSIS — I27.20 PULMONARY HTN (HCC): ICD-10-CM

## 2023-02-26 DIAGNOSIS — I48.0 PAF (PAROXYSMAL ATRIAL FIBRILLATION) (HCC): ICD-10-CM

## 2023-02-26 DIAGNOSIS — I25.119 CORONARY ARTERY DISEASE INVOLVING NATIVE CORONARY ARTERY OF NATIVE HEART WITH ANGINA PECTORIS (HCC): ICD-10-CM

## 2023-02-26 DIAGNOSIS — I50.32 CHRONIC DIASTOLIC CHF (CONGESTIVE HEART FAILURE), NYHA CLASS 3 (HCC): ICD-10-CM

## 2023-03-10 NOTE — PROGRESS NOTES
Phase II Cardiac Rehab Individualized Treatment Plan-60 Day     Patient Name: Ibis Irene  Date of Initial Assessment: 11/3/2017  ACCOUNT #: [de-identified]  Diagnosis: PTCA with Stent   Onset Date: 08/30/17  Referring Physician: Dr Stevens Eye  Risk Stratification: Mod  Session Number: 23   EXERCISE    Stages of Change:   [] pre-contemplation   [x] Action   [] Contemplate   [] Maintainence   [x] Prep   [] Relapse          Exercise Prescription:  Mode: [x] TM [x] B [x] STP [x] EL [x] R  Frequency: 3 x week  Duration: 31-60 min   Intensity: METS 4.8  Progression: Increase 1-2 levels/week or 1-2 min/week to achieve target HR and RPE 11-13. [] Angina with Exertion THR:    [] Resistance Training Weight (lbs):   Reps:     Hypertension:  [x] Yes  [] No  Resting BP: 152/80  Peak Exercise BP: 170/58  [] Med change? Intervention:  Home Exercise:  Type: bike and treadmill  Duration: 2040 min   Frequency: 3-4 x week   [x] Resistance Training    Education:   [x] Equipment [place]  [x] Self pulse   [x] Proper use weights/therabands   [x] S/S to report  [x] Low Na Diet    [x] Warm up/ Cool down  [x] BP Medication    [x] RPE Scale   [x] Understand BP   [x] Ex Safety   [] Exercise specialist class-Home Exercise       Target Goal:   -Individual Exercise Plan  -BP<140/90 or <130/80 if DM   -Aerobic active 30 + minutes 5-7 days per week    Nutrition    Stages of Change:   [] pre-contemplation   [x] Action   [] Contemplate   [] Maintainence   [x] Prep   [] Relapse    Lipids: Total Cholesterol:  No new results  Triglycerides:   HDL:   LDL:   [] Med Change? Diabetes:  [x] Yes  [] No  Random BS:146  HbA1c:6.6  [] Med Change?     Weight Management:  Wt Goal: 1-2 lbs/wk    Intervention:   [x] Dietitian Consult       [x] Nurse/Patient Discussion     [x] Diet Class           [] Referred to Diabetes Education     Education:  [x] S&S hypo/hyperglycemia  [x] Low fat/low cholesterol diet  [x] Weight loss methods      [x] Relate
no

## 2023-03-21 ENCOUNTER — TELEPHONE (OUTPATIENT)
Dept: CARDIOLOGY CLINIC | Age: 73
End: 2023-03-21

## 2023-03-29 DIAGNOSIS — I25.10 CORONARY ARTERY DISEASE INVOLVING NATIVE CORONARY ARTERY OF NATIVE HEART WITHOUT ANGINA PECTORIS: ICD-10-CM

## 2023-03-29 DIAGNOSIS — I10 PRIMARY HYPERTENSION: ICD-10-CM

## 2023-03-29 DIAGNOSIS — E11.42 TYPE 2 DIABETES MELLITUS WITH PERIPHERAL NEUROPATHY (HCC): ICD-10-CM

## 2023-03-29 DIAGNOSIS — E78.5 HYPERLIPIDEMIA, UNSPECIFIED HYPERLIPIDEMIA TYPE: ICD-10-CM

## 2023-04-19 LAB
AVERAGE GLUCOSE: NORMAL
HBA1C MFR BLD: 8.3 %

## 2023-04-21 ENCOUNTER — TELEPHONE (OUTPATIENT)
Dept: FAMILY MEDICINE CLINIC | Age: 73
End: 2023-04-21

## 2023-04-21 RX ORDER — AMIODARONE HYDROCHLORIDE 200 MG/1
TABLET ORAL
COMMUNITY

## 2023-04-21 RX ORDER — HYDROCODONE BITARTRATE AND ACETAMINOPHEN 5; 325 MG/1; MG/1
TABLET ORAL EVERY 8 HOURS
COMMUNITY
Start: 2021-08-18

## 2023-04-21 RX ORDER — DULAGLUTIDE 4.5 MG/.5ML
INJECTION, SOLUTION SUBCUTANEOUS
COMMUNITY
Start: 2023-04-19

## 2023-04-21 RX ORDER — ALLOPURINOL 100 MG/1
TABLET ORAL
COMMUNITY
Start: 2021-08-26

## 2023-04-24 RX ORDER — DOXAZOSIN 8 MG/1
TABLET ORAL
Qty: 180 TABLET | Refills: 3 | Status: SHIPPED | OUTPATIENT
Start: 2023-04-24

## 2023-04-24 SDOH — ECONOMIC STABILITY: FOOD INSECURITY: WITHIN THE PAST 12 MONTHS, THE FOOD YOU BOUGHT JUST DIDN'T LAST AND YOU DIDN'T HAVE MONEY TO GET MORE.: NEVER TRUE

## 2023-04-24 SDOH — HEALTH STABILITY: PHYSICAL HEALTH: ON AVERAGE, HOW MANY DAYS PER WEEK DO YOU ENGAGE IN MODERATE TO STRENUOUS EXERCISE (LIKE A BRISK WALK)?: 0 DAYS

## 2023-04-24 SDOH — ECONOMIC STABILITY: TRANSPORTATION INSECURITY
IN THE PAST 12 MONTHS, HAS LACK OF TRANSPORTATION KEPT YOU FROM MEETINGS, WORK, OR FROM GETTING THINGS NEEDED FOR DAILY LIVING?: NO

## 2023-04-24 SDOH — ECONOMIC STABILITY: FOOD INSECURITY: WITHIN THE PAST 12 MONTHS, YOU WORRIED THAT YOUR FOOD WOULD RUN OUT BEFORE YOU GOT MONEY TO BUY MORE.: NEVER TRUE

## 2023-04-24 SDOH — ECONOMIC STABILITY: INCOME INSECURITY: HOW HARD IS IT FOR YOU TO PAY FOR THE VERY BASICS LIKE FOOD, HOUSING, MEDICAL CARE, AND HEATING?: NOT HARD AT ALL

## 2023-04-24 SDOH — ECONOMIC STABILITY: HOUSING INSECURITY
IN THE LAST 12 MONTHS, WAS THERE A TIME WHEN YOU DID NOT HAVE A STEADY PLACE TO SLEEP OR SLEPT IN A SHELTER (INCLUDING NOW)?: NO

## 2023-04-24 ASSESSMENT — LIFESTYLE VARIABLES
HOW OFTEN DO YOU HAVE SIX OR MORE DRINKS ON ONE OCCASION: 1
HOW MANY STANDARD DRINKS CONTAINING ALCOHOL DO YOU HAVE ON A TYPICAL DAY: 0
HOW OFTEN DO YOU HAVE A DRINK CONTAINING ALCOHOL: NEVER
HOW OFTEN DO YOU HAVE A DRINK CONTAINING ALCOHOL: 1
HOW MANY STANDARD DRINKS CONTAINING ALCOHOL DO YOU HAVE ON A TYPICAL DAY: PATIENT DOES NOT DRINK

## 2023-04-24 ASSESSMENT — PATIENT HEALTH QUESTIONNAIRE - PHQ9
SUM OF ALL RESPONSES TO PHQ QUESTIONS 1-9: 0
SUM OF ALL RESPONSES TO PHQ9 QUESTIONS 1 & 2: 0
2. FEELING DOWN, DEPRESSED OR HOPELESS: 0
SUM OF ALL RESPONSES TO PHQ QUESTIONS 1-9: 0
1. LITTLE INTEREST OR PLEASURE IN DOING THINGS: 0
SUM OF ALL RESPONSES TO PHQ QUESTIONS 1-9: 0
SUM OF ALL RESPONSES TO PHQ QUESTIONS 1-9: 0

## 2023-04-26 ENCOUNTER — HOSPITAL ENCOUNTER (OUTPATIENT)
Age: 73
Discharge: HOME | End: 2023-04-28
Payer: MEDICARE

## 2023-04-26 ENCOUNTER — HOSPITAL ENCOUNTER (OUTPATIENT)
Dept: GENERAL RADIOLOGY | Age: 73
Discharge: HOME | End: 2023-04-28
Payer: MEDICARE

## 2023-04-26 ENCOUNTER — OFFICE VISIT (OUTPATIENT)
Dept: FAMILY MEDICINE CLINIC | Age: 73
End: 2023-04-26
Payer: MEDICARE

## 2023-04-26 VITALS
WEIGHT: 255 LBS | HEIGHT: 70 IN | BODY MASS INDEX: 36.51 KG/M2 | DIASTOLIC BLOOD PRESSURE: 60 MMHG | HEART RATE: 96 BPM | OXYGEN SATURATION: 96 % | SYSTOLIC BLOOD PRESSURE: 106 MMHG

## 2023-04-26 DIAGNOSIS — M54.12 CHRONIC CERVICAL RADICULOPATHY: ICD-10-CM

## 2023-04-26 DIAGNOSIS — J43.9 MIXED RESTRICTIVE AND OBSTRUCTIVE LUNG DISEASE (HCC): ICD-10-CM

## 2023-04-26 DIAGNOSIS — E11.42 TYPE 2 DIABETES MELLITUS WITH PERIPHERAL NEUROPATHY (HCC): ICD-10-CM

## 2023-04-26 DIAGNOSIS — R26.81 UNSTABLE GAIT: ICD-10-CM

## 2023-04-26 DIAGNOSIS — J98.4 MIXED RESTRICTIVE AND OBSTRUCTIVE LUNG DISEASE (HCC): ICD-10-CM

## 2023-04-26 DIAGNOSIS — Z00.00 MEDICARE ANNUAL WELLNESS VISIT, SUBSEQUENT: Primary | ICD-10-CM

## 2023-04-26 DIAGNOSIS — I27.20 PULMONARY HTN (HCC): ICD-10-CM

## 2023-04-26 DIAGNOSIS — I25.119 CORONARY ARTERY DISEASE INVOLVING NATIVE CORONARY ARTERY OF NATIVE HEART WITH ANGINA PECTORIS (HCC): ICD-10-CM

## 2023-04-26 DIAGNOSIS — I48.0 PAROXYSMAL ATRIAL FIBRILLATION (HCC): ICD-10-CM

## 2023-04-26 PROCEDURE — 3074F SYST BP LT 130 MM HG: CPT | Performed by: FAMILY MEDICINE

## 2023-04-26 PROCEDURE — 2022F DILAT RTA XM EVC RTNOPTHY: CPT | Performed by: FAMILY MEDICINE

## 2023-04-26 PROCEDURE — 99214 OFFICE O/P EST MOD 30 MIN: CPT | Performed by: FAMILY MEDICINE

## 2023-04-26 PROCEDURE — 1123F ACP DISCUSS/DSCN MKR DOCD: CPT | Performed by: FAMILY MEDICINE

## 2023-04-26 PROCEDURE — 3017F COLORECTAL CA SCREEN DOC REV: CPT | Performed by: FAMILY MEDICINE

## 2023-04-26 PROCEDURE — 3078F DIAST BP <80 MM HG: CPT | Performed by: FAMILY MEDICINE

## 2023-04-26 PROCEDURE — 3023F SPIROM DOC REV: CPT | Performed by: FAMILY MEDICINE

## 2023-04-26 PROCEDURE — G0439 PPPS, SUBSEQ VISIT: HCPCS | Performed by: FAMILY MEDICINE

## 2023-04-26 PROCEDURE — G8427 DOCREV CUR MEDS BY ELIG CLIN: HCPCS | Performed by: FAMILY MEDICINE

## 2023-04-26 PROCEDURE — 1036F TOBACCO NON-USER: CPT | Performed by: FAMILY MEDICINE

## 2023-04-26 PROCEDURE — 72050 X-RAY EXAM NECK SPINE 4/5VWS: CPT

## 2023-04-26 PROCEDURE — 3052F HG A1C>EQUAL 8.0%<EQUAL 9.0%: CPT | Performed by: FAMILY MEDICINE

## 2023-04-26 PROCEDURE — G8417 CALC BMI ABV UP PARAM F/U: HCPCS | Performed by: FAMILY MEDICINE

## 2023-05-01 ENCOUNTER — HOSPITAL ENCOUNTER (OUTPATIENT)
Age: 73
Discharge: HOME | End: 2023-05-01
Payer: MEDICARE

## 2023-05-01 DIAGNOSIS — E11.42 TYPE 2 DIABETES MELLITUS WITH PERIPHERAL NEUROPATHY (HCC): ICD-10-CM

## 2023-05-01 DIAGNOSIS — I25.10 CORONARY ARTERY DISEASE INVOLVING NATIVE CORONARY ARTERY OF NATIVE HEART WITHOUT ANGINA PECTORIS: ICD-10-CM

## 2023-05-01 DIAGNOSIS — E78.5 HYPERLIPIDEMIA, UNSPECIFIED HYPERLIPIDEMIA TYPE: ICD-10-CM

## 2023-05-01 DIAGNOSIS — I10 PRIMARY HYPERTENSION: ICD-10-CM

## 2023-05-01 LAB
ABSOLUTE EOS #: 0.2 K/UL (ref 0–0.4)
ABSOLUTE LYMPH #: 1.5 K/UL (ref 1–4.8)
ABSOLUTE MONO #: 0.7 K/UL (ref 0–1)
ALBUMIN SERPL-MCNC: 4 G/DL (ref 3.5–5.2)
ALBUMIN SERPL-MCNC: 4 G/DL (ref 3.5–5.2)
ALP SERPL-CCNC: 71 U/L (ref 40–129)
ALP SERPL-CCNC: 71 U/L (ref 40–129)
ALT SERPL-CCNC: 22 U/L (ref 5–41)
ALT SERPL-CCNC: 22 U/L (ref 5–41)
ANION GAP SERPL CALCULATED.3IONS-SCNC: 10 MMOL/L (ref 9–17)
ANION GAP SERPL CALCULATED.3IONS-SCNC: 10 MMOL/L (ref 9–17)
AST SERPL-CCNC: 20 U/L
AST SERPL-CCNC: 20 U/L
BASOPHILS # BLD: 1 % (ref 0–2)
BASOPHILS ABSOLUTE: 0 K/UL (ref 0–0.2)
BILIRUB SERPL-MCNC: 0.6 MG/DL (ref 0.3–1.2)
BILIRUB SERPL-MCNC: 0.6 MG/DL (ref 0.3–1.2)
BUN SERPL-MCNC: 22 MG/DL (ref 8–23)
BUN SERPL-MCNC: 22 MG/DL (ref 8–23)
BUN/CREAT BLD: 26 (ref 9–20)
BUN/CREAT BLD: 26 (ref 9–20)
CALCIUM SERPL-MCNC: 8.6 MG/DL (ref 8.6–10.4)
CALCIUM SERPL-MCNC: 8.6 MG/DL (ref 8.6–10.4)
CHLORIDE SERPL-SCNC: 101 MMOL/L (ref 98–107)
CHLORIDE SERPL-SCNC: 101 MMOL/L (ref 98–107)
CHOLEST SERPL-MCNC: 105 MG/DL
CHOLEST SERPL-MCNC: 106 MG/DL
CHOLESTEROL/HDL RATIO: 3.5
CHOLESTEROL/HDL RATIO: 3.6
CO2 SERPL-SCNC: 25 MMOL/L (ref 20–31)
CO2 SERPL-SCNC: 25 MMOL/L (ref 20–31)
CREAT SERPL-MCNC: 0.86 MG/DL (ref 0.7–1.2)
CREAT SERPL-MCNC: 0.86 MG/DL (ref 0.7–1.2)
CREATININE URINE: 121.3 MG/DL (ref 39–259)
DIFFERENTIAL TYPE: YES
EKG ATRIAL RATE: 90 BPM
EKG P AXIS: 6 DEGREES
EKG P-R INTERVAL: 158 MS
EKG Q-T INTERVAL: 408 MS
EKG QRS DURATION: 150 MS
EKG QTC CALCULATION (BAZETT): 499 MS
EKG R AXIS: 10 DEGREES
EKG T AXIS: 174 DEGREES
EKG VENTRICULAR RATE: 90 BPM
EOSINOPHILS RELATIVE PERCENT: 3 % (ref 0–5)
GFR SERPL CREATININE-BSD FRML MDRD: >60 ML/MIN/1.73M2
GFR SERPL CREATININE-BSD FRML MDRD: >60 ML/MIN/1.73M2
GLUCOSE SERPL-MCNC: 166 MG/DL (ref 70–99)
GLUCOSE SERPL-MCNC: 166 MG/DL (ref 70–99)
HCT VFR BLD AUTO: 39.5 % (ref 41–53)
HDLC SERPL-MCNC: 29 MG/DL
HDLC SERPL-MCNC: 30 MG/DL
HGB BLD-MCNC: 13.3 G/DL (ref 13.5–17.5)
LDLC SERPL CALC-MCNC: 33 MG/DL (ref 0–130)
LDLC SERPL CALC-MCNC: 33 MG/DL (ref 0–130)
LYMPHOCYTES # BLD: 25 % (ref 13–44)
MAGNESIUM SERPL-MCNC: 2.1 MG/DL (ref 1.6–2.6)
MCH RBC QN AUTO: 32 PG (ref 26–34)
MCHC RBC AUTO-ENTMCNC: 33.6 G/DL (ref 31–37)
MCV RBC AUTO: 95.2 FL (ref 80–100)
MICROALBUMIN/CREAT 24H UR: 12 MG/L
MICROALBUMIN/CREAT UR-RTO: 10 MCG/MG CREAT
MONOCYTES # BLD: 12 % (ref 5–9)
PATIENT FASTING?: YES
PATIENT FASTING?: YES
PDW BLD-RTO: 13.1 % (ref 12.1–15.2)
PLATELET # BLD AUTO: 260 K/UL (ref 140–450)
POTASSIUM SERPL-SCNC: 4.1 MMOL/L (ref 3.7–5.3)
POTASSIUM SERPL-SCNC: 4.1 MMOL/L (ref 3.7–5.3)
PROT SERPL-MCNC: 6.7 G/DL (ref 6.4–8.3)
PROT SERPL-MCNC: 6.7 G/DL (ref 6.4–8.3)
RBC # BLD: 4.15 M/UL (ref 4.5–5.9)
SEG NEUTROPHILS: 59 % (ref 39–75)
SEGMENTED NEUTROPHILS ABSOLUTE COUNT: 3.7 K/UL (ref 2.1–6.5)
SODIUM SERPL-SCNC: 136 MMOL/L (ref 135–144)
SODIUM SERPL-SCNC: 136 MMOL/L (ref 135–144)
TRIGL SERPL-MCNC: 215 MG/DL
TRIGL SERPL-MCNC: 216 MG/DL
TSH SERPL-ACNC: 1.72 UIU/ML (ref 0.3–5)
TSH SERPL-ACNC: 1.72 UIU/ML (ref 0.3–5)
WBC # BLD AUTO: 6.2 K/UL (ref 3.5–11)

## 2023-05-01 PROCEDURE — 80053 COMPREHEN METABOLIC PANEL: CPT

## 2023-05-01 PROCEDURE — 83735 ASSAY OF MAGNESIUM: CPT

## 2023-05-01 PROCEDURE — 84443 ASSAY THYROID STIM HORMONE: CPT

## 2023-05-01 PROCEDURE — 93005 ELECTROCARDIOGRAM TRACING: CPT

## 2023-05-01 PROCEDURE — 82043 UR ALBUMIN QUANTITATIVE: CPT

## 2023-05-01 PROCEDURE — 85025 COMPLETE CBC W/AUTO DIFF WBC: CPT

## 2023-05-01 PROCEDURE — 36415 COLL VENOUS BLD VENIPUNCTURE: CPT

## 2023-05-01 PROCEDURE — 82570 ASSAY OF URINE CREATININE: CPT

## 2023-05-01 PROCEDURE — 80061 LIPID PANEL: CPT

## 2023-05-08 ENCOUNTER — HOSPITAL ENCOUNTER (OUTPATIENT)
Dept: PHYSICAL THERAPY | Age: 73
Setting detail: THERAPIES SERIES
Discharge: HOME | End: 2023-05-08
Payer: MEDICARE

## 2023-05-08 PROCEDURE — 97162 PT EVAL MOD COMPLEX 30 MIN: CPT

## 2023-05-08 NOTE — PROGRESS NOTES
Phone: [address]         Fax: [phone]                      Outpatient Physical Therapy                                                                      Evaluation    Date: 2023  Patient: Charlette Carmona  : 1950  ACCT #: [de-identified]    Referring Provider (secondary): Dr. Georgi Quiroz    Diagnosis: Chronic Cervical Radiculopathy    Treatment Diagnosis: Neck pain/L UE numbness  Onset Date: 23  PT Insurance Information: [facility]  Total # of Visits Approved: 12 Per Physician Order  Total # of Visits to Date: 1  No Show: 0  Canceled Appointment: 0     Subjective     Additional Pertinent Hx: Pt reports L arm pain start \"a while ago\", it was determined his neck is the cause. Pt reports he does go to the chiropractor but min relief. Pt reports while driving R cervical rotation pain hurts in middle of upper back. Left rotation affects his Left arm. Pt report 1 wk ago he was having difficulty sleeping due to L arm pain, slept in the recliner and then it resolved. Pt had L carpal tunnel sx 2 yrs ago since then thumb and 1st and 2nd digit numbness constant. Xrays on 23: Severe degenerative disc disease and mid cervical facet arthropathy resulting in grade 1 subluxations at both the C3-C4 and C4-C5 levels as well as significant bilateral exit foraminal stenosis. Objective     Observation/Palpation  Posture: Fair (Fwd head, rounded shoulder, increased thoracic kyphosis)  Palpation: Capitus muscle tension, UT tension even in gravity eliminated position R>L. Decreased upper thoracic mobility with Thoracic release.   Observation: Pt with R head tilt at rest, question soft tissue restriction vs true scoliosis  Spine  Cervical: Flexion=42deg, Extension=30deg, Rotation: R=36deg, L=49deg , Sidebending: R=35deg(upper back discomfort) ,L=25deg  Strength RUE  R Shoulder Flexion: 4/5  R Shoulder Extension: 5/5  R Shoulder ABduction: 4/5 (tender in

## 2023-05-10 ENCOUNTER — HOSPITAL ENCOUNTER (OUTPATIENT)
Dept: PHYSICAL THERAPY | Age: 73
Setting detail: THERAPIES SERIES
Discharge: HOME | End: 2023-05-10
Payer: MEDICARE

## 2023-05-10 PROCEDURE — 97110 THERAPEUTIC EXERCISES: CPT

## 2023-05-10 PROCEDURE — 97140 MANUAL THERAPY 1/> REGIONS: CPT

## 2023-05-10 NOTE — PROGRESS NOTES
Phone: [address]      Fax: [phone]                            Outpatient Physical Therapy                                                                            Daily Note    Date: 5/10/2023  Patient Name: Zahraa Lagos        MRN: [id number]   ACCT#:  [de-identified]  : 1950  (71 y.o.)    Referring Provider (secondary): Dr. Connie Sagastume         Diagnosis: Chronic Cervical Radiculopathy  Treatment Diagnosis: Neck pain/L UE numbness    Onset Date: 23  PT Insurance Information: [facility]  Total # of Visits Approved: 12 Per Physician Order  Total # of Visits to Date: 2  No Show: 0  Canceled Appointment: 0    Pre-Treatment Pain:  0/10     Assessment  Assessment: Pt denies pain at time of appt. Has pain when more active and when driving turning to look. Initiated ex as outlined for postural strengthening with tactile and VC for scapular retraction . Manual therapy in supine  for soft tissue release and gentle distraction . Pt does notes some increased hand numbness in supine position. Pt notes no increased pain after session. Plan  Continue with current plan of care    Exercises/Modalities/Manual:  See DocFlow Sheet    Education: posture, scapular retraction with ex          Goals  (Total # of Visits to Date: 2)   Short Term Goals  Time Frame for Short Term Goals: 6 visits  Short Term Goal 1: Pt to report independence and compliance with HEP for posture/scapular stability. Short Term Goal 2: Pt to have no HA x4 consecutive days to improve ADL eloy. Long Term Goals  Time Frame for Long Term Goals : 12 visits (POC Exp 23)  Long Term Goal 1: Pt to improve NDI from 15/50 to less than 10/50 to improve ADL eloy  Long Term Goal 2: Pt to improve cervical rotation to 55deg B/L to improve driving eloy.   Long Term Goal 3: Pt to report no lateral hand numbness x 4 consecutive days to improve ADLs    Post Treatment Pain:  0/10    Time In: 1030    Time Out : 1115  Timed and

## 2023-05-15 ENCOUNTER — HOSPITAL ENCOUNTER (OUTPATIENT)
Dept: PHYSICAL THERAPY | Age: 73
Setting detail: THERAPIES SERIES
Discharge: HOME | End: 2023-05-15
Payer: MEDICARE

## 2023-05-15 PROCEDURE — 97140 MANUAL THERAPY 1/> REGIONS: CPT

## 2023-05-15 PROCEDURE — 97110 THERAPEUTIC EXERCISES: CPT

## 2023-05-16 ENCOUNTER — OFFICE VISIT (OUTPATIENT)
Dept: CARDIOLOGY CLINIC | Age: 73
End: 2023-05-16

## 2023-05-16 VITALS
BODY MASS INDEX: 36.01 KG/M2 | WEIGHT: 251 LBS | OXYGEN SATURATION: 95 % | DIASTOLIC BLOOD PRESSURE: 70 MMHG | HEART RATE: 89 BPM | SYSTOLIC BLOOD PRESSURE: 148 MMHG

## 2023-05-16 DIAGNOSIS — I10 PRIMARY HYPERTENSION: ICD-10-CM

## 2023-05-16 DIAGNOSIS — E78.5 HYPERLIPIDEMIA, UNSPECIFIED HYPERLIPIDEMIA TYPE: ICD-10-CM

## 2023-05-16 DIAGNOSIS — I48.0 PAF (PAROXYSMAL ATRIAL FIBRILLATION) (HCC): ICD-10-CM

## 2023-05-16 DIAGNOSIS — I25.119 CORONARY ARTERY DISEASE INVOLVING NATIVE CORONARY ARTERY OF NATIVE HEART WITH ANGINA PECTORIS (HCC): Primary | ICD-10-CM

## 2023-05-16 RX ORDER — DOXAZOSIN 8 MG/1
TABLET ORAL
COMMUNITY

## 2023-05-16 RX ORDER — LEUCOVORIN/PYRIDOX/MECOBALAMIN 4-50-2 MG
TABLET ORAL
COMMUNITY
Start: 2023-04-21

## 2023-05-16 NOTE — PROGRESS NOTES
Ov Dr. Christa Padilla for 6 month f/u   Brought medications but the over   The counter ones  No chest pain or heaviness   In PT for neck pain/balance  Using cane   Still some sob   No dizziness or lightheadedness     No changes    Follow up in 6 months (OCT prior going to [facility])

## 2023-05-18 ENCOUNTER — HOSPITAL ENCOUNTER (OUTPATIENT)
Dept: PHYSICAL THERAPY | Age: 73
Setting detail: THERAPIES SERIES
Discharge: HOME | End: 2023-05-18
Payer: MEDICARE

## 2023-05-18 PROCEDURE — 97140 MANUAL THERAPY 1/> REGIONS: CPT

## 2023-05-18 PROCEDURE — 97110 THERAPEUTIC EXERCISES: CPT

## 2023-05-18 ASSESSMENT — PAIN SCALES - GENERAL: PAINLEVEL_OUTOF10: 1

## 2023-05-22 ENCOUNTER — HOSPITAL ENCOUNTER (OUTPATIENT)
Dept: PHYSICAL THERAPY | Age: 73
Setting detail: THERAPIES SERIES
Discharge: HOME | End: 2023-05-22
Payer: MEDICARE

## 2023-05-22 PROCEDURE — 97110 THERAPEUTIC EXERCISES: CPT

## 2023-05-22 PROCEDURE — 97140 MANUAL THERAPY 1/> REGIONS: CPT

## 2023-05-22 ASSESSMENT — PAIN SCALES - GENERAL: PAINLEVEL_OUTOF10: 2

## 2023-05-22 NOTE — PROGRESS NOTES
Phone: [address]      Fax: [phone]                            Outpatient Physical Therapy                                                                            Daily Note    Date: 2023  Patient Name: Donis Ware        MRN: [id number]   ACCT#:  [de-identified]  : 1950  (71 y.o.)    Referring Provider (secondary): Dr. Mike Cuenca         Diagnosis: Chronic Cervical Radiculopathy  Treatment Diagnosis: Neck pain/L UE numbness    Onset Date: 23  PT Insurance Information: [facility]  Total # of Visits Approved: 12 Per Physician Order  Total # of Visits to Date: 5  No Show: 0  Canceled Appointment: 0    Pre-Treatment Pain:  2/10     Assessment  Assessment: Pain 1-2/10 today. Low pain over the weekend as well. No headaches over the weekend. Performed ex as outlined for postural strength and flexibility. Manual therapy for soft tissue release and distraction. No pain reported after session. Plan  Continue with current plan of care    Exercises/Modalities/Manual:  See DocFlow Sheet    Education: posture with ex          Goals  (Total # of Visits to Date: 5)   Short Term Goals  Time Frame for Short Term Goals: 6 visits  Short Term Goal 1: Pt to report independence and compliance with HEP for posture/scapular stability. -met  Short Term Goal 2: Pt to have no HA x4 consecutive days to improve ADL eloy.-met    Long Term Goals  Time Frame for Long Term Goals : 12 visits (POC Exp 23)  Long Term Goal 1: Pt to improve NDI from 15/50 to less than 10/50 to improve ADL eloy  Long Term Goal 2: Pt to improve cervical rotation to 55deg B/L to improve driving eloy.   Long Term Goal 3: Pt to report no lateral hand numbness x 4 consecutive days to improve ADLs    Post Treatment Pain:  0/10    Time In: 1115    Time Out : 1155        Timed Code Treatment Minutes: 40 Minutes  Total Treatment Time: 40 Minutes    Hilton Salinas   PTA  Date: 2023

## 2023-05-24 ENCOUNTER — HOSPITAL ENCOUNTER (OUTPATIENT)
Dept: PHYSICAL THERAPY | Age: 73
Setting detail: THERAPIES SERIES
Discharge: HOME | End: 2023-05-24
Payer: MEDICARE

## 2023-05-24 PROCEDURE — 97140 MANUAL THERAPY 1/> REGIONS: CPT

## 2023-05-24 PROCEDURE — 97110 THERAPEUTIC EXERCISES: CPT

## 2023-05-24 NOTE — PROGRESS NOTES
Phone: [address]      Fax: [phone]                            Outpatient Physical Therapy                                                                            Daily Note    Date: 2023  Patient Name: Gela Byrne        MRN: [id number]   ACCT#:  [de-identified]  : 1950  (71 y.o.)    Referring Provider (secondary): Dr. Oliver Walsh         Diagnosis: Chronic Cervical Radiculopathy  Treatment Diagnosis: Neck pain/L UE numbness    Onset Date: 23  PT Insurance Information: [facility]  Total # of Visits Approved: 12 Per Physician Order  Total # of Visits to Date: 6  No Show: 0  Canceled Appointment: 0    Pre-Treatment Pain:  2-3/10     Assessment  Assessment: Patient states neck pain is a 2-3/10 this morning. Completed postural strengthening exercises followed by manual. Following last two visits patient has noticed less pain traveling into the middle of his back when rotating his head to the R. Following today's session patient notes neck pain decreased to a 1-2/10. Plan  Continue with current plan of care    Exercises/Modalities/Manual:  See DocFlow Sheet    Education: Exercise form     Goals  (Total # of Visits to Date: 6)   Short Term Goals  Time Frame for Short Term Goals: 6 visits  Short Term Goal 1: Pt to report independence and compliance with HEP for posture/scapular stability. - MET  Short Term Goal 2: Pt to have no HA x4 consecutive days to improve ADL eloy. - MET    Long Term Goals  Time Frame for Long Term Goals : 12 visits (POC Exp 23)  Long Term Goal 1: Pt to improve NDI from 15/50 to less than 10/50 to improve ADL eloy  Long Term Goal 2: Pt to improve cervical rotation to 55deg B/L to improve driving eloy.   Long Term Goal 3: Pt to report no lateral hand numbness x 4 consecutive days to improve ADLs    Post Treatment Pain:  1-2/10    Time In: 1032    Time Out : 1112   Timed Code Treatment Minutes: 40 Minutes  Total Treatment Time: 40

## 2023-05-30 ENCOUNTER — HOSPITAL ENCOUNTER (OUTPATIENT)
Dept: PHYSICAL THERAPY | Age: 73
Setting detail: THERAPIES SERIES
Discharge: HOME | End: 2023-05-30
Payer: MEDICARE

## 2023-05-30 PROCEDURE — 97110 THERAPEUTIC EXERCISES: CPT

## 2023-05-30 PROCEDURE — 97140 MANUAL THERAPY 1/> REGIONS: CPT

## 2023-05-30 NOTE — PROGRESS NOTES
Phone: [address]      Fax: [phone]                            Outpatient Physical Therapy                                                                            Daily Note    Date: 2023  Patient Name: Sanket Guerrero        MRN: [id number]   ACCT#:  [de-identified]  : 1950  (71 y.o.)    Referring Provider (secondary): Dr. Mariel Persaud         Diagnosis: Chronic Cervical Radiculopathy  Treatment Diagnosis: Neck pain/L UE numbness    Onset Date: 23  PT Insurance Information: [facility]  Total # of Visits Approved: 12 Per Physician Order  Total # of Visits to Date: 7  No Show: 0  Canceled Appointment: 0  Plan of Care/Certification Expiration Date: 23    Pre-Treatment Pain:  0/10     Assessment  Assessment: Patient reports no neck pain this morning. Patient notes he wasn't able to sleep well last night, but notes it was because of his sugar vs neck. Completed postural strengthening ex per flowsheet followed by manual. B/L cervical rotation = 50 deg. Patient notes no pain radiating down into his upper back when roating to his R like before. Following session patient denies pain. Will continue to progress as able. Plan  Continue with current plan of care    Exercises/Modalities/Manual:  See DocFlow Sheet    Education: Exercise form     Goals  (Total # of Visits to Date: 7)   Short Term Goals  Time Frame for Short Term Goals: 6 visits  Short Term Goal 1: Pt to report independence and compliance with HEP for posture/scapular stability. - MET  Short Term Goal 2: Pt to have no HA x4 consecutive days to improve ADL eloy. - MET    Long Term Goals  Time Frame for Long Term Goals : 12 visits (POC Exp 23)  Long Term Goal 1: Pt to improve NDI from 15/50 to less than 10/50 to improve ADL eloy  Long Term Goal 2: Pt to improve cervical rotation to 55deg B/L to improve driving eloy.   Long Term Goal 3: Pt to report no lateral hand numbness x 4 consecutive days to

## 2023-06-02 ENCOUNTER — HOSPITAL ENCOUNTER (OUTPATIENT)
Dept: PHYSICAL THERAPY | Age: 73
Setting detail: THERAPIES SERIES
Discharge: HOME | End: 2023-06-02
Payer: MEDICARE

## 2023-06-02 PROCEDURE — 97140 MANUAL THERAPY 1/> REGIONS: CPT

## 2023-06-02 PROCEDURE — 97110 THERAPEUTIC EXERCISES: CPT

## 2023-06-02 ASSESSMENT — PAIN SCALES - GENERAL: PAINLEVEL_OUTOF10: 3

## 2023-06-02 NOTE — PROGRESS NOTES
Phone: [address]      Fax: [phone]                            Outpatient Physical Therapy                                                                            Daily Note    Date: 2023  Patient Name: Desmond Casper        MRN: [id number]   ACCT#:  [de-identified]  : 1950  (71 y.o.)    Referring Provider (secondary): Dr. Kayla Mcdowell         Diagnosis: Chronic Cervical Radiculopathy  Treatment Diagnosis: Neck pain/L UE numbness    Onset Date: 23  PT Insurance Information: [facility]  Total # of Visits Approved: 12 Per Physician Order  Total # of Visits to Date: 8  No Show: 0  Canceled Appointment: 0  Plan of Care/Certification Expiration Date: 23    Pre-Treatment Pain:  2-3/10     Assessment  Assessment: Patient continues to note decreasing right UE radicular symptoms. Tightness of right SCM and cervical musculature which limites left lateral flexion and affects head position. Completed UE tband in sitting due to balance issues in standing. Patient out of town next week an will return 23    Plan  Continue with current plan of care    Exercises/Modalities/Manual:  See DocFlow Sheet    Education: Cervicogenic dizziness          Goals  (Total # of Visits to Date: 8)   Short Term Goals  Time Frame for Short Term Goals: 6 visits  Short Term Goal 1: Pt to report independence and compliance with HEP for posture/scapular stability. - MET  Short Term Goal 2: Pt to have no HA x4 consecutive days to improve ADL eloy. - MET    Long Term Goals  Time Frame for Long Term Goals : 12 visits (POC Exp 23)  Long Term Goal 1: Pt to improve NDI from 15/50 to less than 10/50 to improve ADL eloy  Long Term Goal 2: Pt to improve cervical rotation to 55deg B/L to improve driving eloy.   Long Term Goal 3: Pt to report no lateral hand numbness x 4 consecutive days to improve ADLs    Post Treatment Pain:  1-2/10    Time In: 1015   Time Out : 1100        Timed Code

## 2023-06-26 RX ORDER — SACUBITRIL AND VALSARTAN 97; 103 MG/1; MG/1
TABLET, FILM COATED ORAL
Qty: 180 TABLET | Refills: 3 | Status: SHIPPED | OUTPATIENT
Start: 2023-06-26

## 2023-07-31 RX ORDER — ATORVASTATIN CALCIUM 40 MG/1
TABLET, FILM COATED ORAL
Qty: 90 TABLET | Refills: 0 | Status: SHIPPED | OUTPATIENT
Start: 2023-07-31

## 2023-08-14 RX ORDER — SPIRONOLACTONE 25 MG/1
TABLET ORAL
Qty: 90 TABLET | Refills: 3 | Status: SHIPPED | OUTPATIENT
Start: 2023-08-14

## 2023-08-14 RX ORDER — FUROSEMIDE 80 MG
TABLET ORAL
Qty: 90 TABLET | Refills: 1 | Status: SHIPPED | OUTPATIENT
Start: 2023-08-14

## 2023-08-14 NOTE — TELEPHONE ENCOUNTER
Last OV: 4/26/2023  AWV   Last RX:    Next scheduled apt: Visit date not found            Surescript requesting a refill

## 2023-09-18 RX ORDER — CARVEDILOL 3.12 MG/1
TABLET ORAL
Qty: 90 TABLET | Refills: 3 | Status: SHIPPED | OUTPATIENT
Start: 2023-09-18

## 2023-09-21 ENCOUNTER — HOSPITAL ENCOUNTER (EMERGENCY)
Age: 73
Discharge: HOME | End: 2023-09-21
Attending: EMERGENCY MEDICINE
Payer: MEDICARE

## 2023-09-21 ENCOUNTER — APPOINTMENT (OUTPATIENT)
Dept: GENERAL RADIOLOGY | Age: 73
End: 2023-09-21
Payer: MEDICARE

## 2023-09-21 VITALS
SYSTOLIC BLOOD PRESSURE: 163 MMHG | RESPIRATION RATE: 17 BRPM | HEART RATE: 87 BPM | OXYGEN SATURATION: 96 % | WEIGHT: 245 LBS | HEIGHT: 70 IN | BODY MASS INDEX: 35.07 KG/M2 | DIASTOLIC BLOOD PRESSURE: 78 MMHG | TEMPERATURE: 98.1 F

## 2023-09-21 DIAGNOSIS — S40.022A CONTUSION OF MULTIPLE SITES OF LEFT SHOULDER AND UPPER ARM, INITIAL ENCOUNTER: Primary | ICD-10-CM

## 2023-09-21 DIAGNOSIS — S40.012A CONTUSION OF MULTIPLE SITES OF LEFT SHOULDER AND UPPER ARM, INITIAL ENCOUNTER: Primary | ICD-10-CM

## 2023-09-21 DIAGNOSIS — S43.402A SPRAIN OF LEFT SHOULDER, UNSPECIFIED SHOULDER SPRAIN TYPE, INITIAL ENCOUNTER: ICD-10-CM

## 2023-09-21 PROCEDURE — 73030 X-RAY EXAM OF SHOULDER: CPT

## 2023-09-21 PROCEDURE — 99283 EMERGENCY DEPT VISIT LOW MDM: CPT

## 2023-09-21 RX ORDER — MELATONIN 10 MG
1 TABLET, SUBLINGUAL SUBLINGUAL 2 TIMES DAILY
COMMUNITY

## 2023-09-21 RX ORDER — INSULIN ASPART 100 [IU]/ML
INJECTION, SOLUTION INTRAVENOUS; SUBCUTANEOUS
COMMUNITY
Start: 2023-08-21

## 2023-09-21 ASSESSMENT — PAIN - FUNCTIONAL ASSESSMENT: PAIN_FUNCTIONAL_ASSESSMENT: NONE - DENIES PAIN

## 2023-09-21 NOTE — ED NOTES
Ticket to ride completed.  The following information was reported off:  Name  Allergies  Orientation Level  Destination  Safety Issues  Code Status  Oxygen Requirements  Special needs including mobility, language, communication       Shamarjoe Hernandez RN  09/21/23 0019

## 2023-09-21 NOTE — ED PROVIDER NOTES
This Visit: Left shoulder pain     Differential Diagnosis: Shoulder contusion, shoulder fracture, shoulder sprain     Diagnoses Considered but Do Not Suspect: Shoulder dislocation     Pertinent Comorbid Conditions: N/A     2)  Data Reviewed    My EKG interpretation:  N/A     Decision Rules/Scores utilized: N/A     Tests considered but not ordered and why: N/A     External Documents Reviewed: N/A     Labs, EKG and imaging were independently interpreted by myself as documented. Agree with documented formal imaging reads by radiology. Additional records reviewed including [facility] EMR. 3)  Treatment and Disposition     Patient repeat assessment:  N/A    Rx medications considered: None     Disposition discussion with patient/family: Discharged stable. Case discussed with consulting clinician: N/A    Admission/transfer was considered in this case: No     Social determinants of health impacting treatment or disposition: N/A     Shared Decision Making: N/A     Code Status Discussion: N/A          Summation      Patient Course:     ED Medications administered this visit:  Medications - No data to display    New Prescriptions from this visit:    Current Discharge Medication List          Follow-up:  Hilda Quintana DO  [address]  [phone]    Schedule an appointment as soon as possible for a visit   As needed, If symptoms worsen        Final Impression:   1.  Contusion of multiple sites of left shoulder and upper arm, initial encounter               (Please note that portions of this note were completed with a voice recognition program.  Efforts were made to edit the dictations but occasionally words are mis-transcribed.)         Eliot Munoz DO  09/21/23 0038

## 2023-09-27 ENCOUNTER — OFFICE VISIT (OUTPATIENT)
Dept: FAMILY MEDICINE CLINIC | Age: 73
End: 2023-09-27
Payer: MEDICARE

## 2023-09-27 VITALS
DIASTOLIC BLOOD PRESSURE: 52 MMHG | SYSTOLIC BLOOD PRESSURE: 130 MMHG | HEIGHT: 70 IN | BODY MASS INDEX: 34.93 KG/M2 | HEART RATE: 86 BPM | OXYGEN SATURATION: 97 % | WEIGHT: 244 LBS

## 2023-09-27 DIAGNOSIS — Z12.11 SCREENING FOR COLORECTAL CANCER: ICD-10-CM

## 2023-09-27 DIAGNOSIS — R93.6 ABNORMAL X-RAY OF SHOULDER: ICD-10-CM

## 2023-09-27 DIAGNOSIS — Z12.12 SCREENING FOR COLORECTAL CANCER: ICD-10-CM

## 2023-09-27 DIAGNOSIS — M25.512 ACUTE PAIN OF LEFT SHOULDER: Primary | ICD-10-CM

## 2023-09-27 PROCEDURE — 3017F COLORECTAL CA SCREEN DOC REV: CPT | Performed by: FAMILY MEDICINE

## 2023-09-27 PROCEDURE — G8417 CALC BMI ABV UP PARAM F/U: HCPCS | Performed by: FAMILY MEDICINE

## 2023-09-27 PROCEDURE — 3075F SYST BP GE 130 - 139MM HG: CPT | Performed by: FAMILY MEDICINE

## 2023-09-27 PROCEDURE — 1123F ACP DISCUSS/DSCN MKR DOCD: CPT | Performed by: FAMILY MEDICINE

## 2023-09-27 PROCEDURE — 99213 OFFICE O/P EST LOW 20 MIN: CPT | Performed by: FAMILY MEDICINE

## 2023-09-27 PROCEDURE — G8427 DOCREV CUR MEDS BY ELIG CLIN: HCPCS | Performed by: FAMILY MEDICINE

## 2023-09-27 PROCEDURE — 3078F DIAST BP <80 MM HG: CPT | Performed by: FAMILY MEDICINE

## 2023-09-27 PROCEDURE — 1036F TOBACCO NON-USER: CPT | Performed by: FAMILY MEDICINE

## 2023-09-27 NOTE — PATIENT INSTRUCTIONS
[employer] SURVEY:    You may be receiving a survey from [employer] regarding your visit today. You may get this in the mail, through your MyChart or in your email. Please complete the survey to enable us to provide the highest quality of care to you and your family. If you cannot score us as very good ( 5 Stars) on any question, please feel free to call the office to discuss how we could have made your experience exceptional.     Thank you.     Clinical Care Team:   Dr. Libby Krueger, [address] [facility], [address]                                     Triage: Fredi Clark, [address] [facility] Team:    Trina Diehl

## 2023-10-03 ENCOUNTER — HOSPITAL ENCOUNTER (OUTPATIENT)
Dept: CT IMAGING | Age: 73
Discharge: HOME | End: 2023-10-05
Attending: FAMILY MEDICINE
Payer: MEDICARE

## 2023-10-03 DIAGNOSIS — M25.512 ACUTE PAIN OF LEFT SHOULDER: ICD-10-CM

## 2023-10-03 DIAGNOSIS — R93.6 ABNORMAL X-RAY OF SHOULDER: ICD-10-CM

## 2023-10-03 PROCEDURE — 73200 CT UPPER EXTREMITY W/O DYE: CPT

## 2023-10-04 NOTE — RESULT ENCOUNTER NOTE
You really did a number on that shoulder. Actually had a comminuted fracture of the distal clavicle and have a healing fracture in the ball-and-socket joint. Recommend seeing orthopedics first. Would you like to see anyone specific? We can refer to Dr. Lauro Garcia he comes to [place]. Let me know and we will get the referral ordered.

## 2023-10-05 ENCOUNTER — TELEPHONE (OUTPATIENT)
Dept: FAMILY MEDICINE CLINIC | Age: 73
End: 2023-10-05

## 2023-10-05 DIAGNOSIS — S42.152A GLENOID FRACTURE OF SHOULDER, LEFT, CLOSED, INITIAL ENCOUNTER: ICD-10-CM

## 2023-10-05 DIAGNOSIS — S42.142D CLOSED FRACTURE OF GLENOID CAVITY AND NECK OF LEFT SCAPULA WITH ROUTINE HEALING, SUBSEQUENT ENCOUNTER: ICD-10-CM

## 2023-10-05 DIAGNOSIS — S42.142A GLENOID FRACTURE OF SHOULDER, LEFT, CLOSED, INITIAL ENCOUNTER: ICD-10-CM

## 2023-10-05 DIAGNOSIS — S42.032D CLOSED DISPLACED FRACTURE OF ACROMIAL END OF LEFT CLAVICLE WITH ROUTINE HEALING, SUBSEQUENT ENCOUNTER: Primary | ICD-10-CM

## 2023-10-05 DIAGNOSIS — S42.152D CLOSED FRACTURE OF GLENOID CAVITY AND NECK OF LEFT SCAPULA WITH ROUTINE HEALING, SUBSEQUENT ENCOUNTER: ICD-10-CM

## 2023-10-05 NOTE — TELEPHONE ENCOUNTER
Spoke to Sparkman as she called requesting clarification on CT report, again with the mention of the right clavicle when the imaging was actually done on the left. Advised they would have only imaged the left and that we will contact radiology to get the report corrected. They requested referral to Dr. Randy Youssef whom Cresencio Denny had seen for other joints. Referral placed. Also advised them to get a disc of CT images to take to visit.

## 2023-10-09 RX ORDER — CELECOXIB 200 MG/1
CAPSULE ORAL
Qty: 90 CAPSULE | Refills: 1 | Status: SHIPPED | OUTPATIENT
Start: 2023-10-09

## 2023-10-26 ENCOUNTER — HOSPITAL ENCOUNTER (OUTPATIENT)
Age: 73
Discharge: HOME | End: 2023-10-26
Payer: MEDICARE

## 2023-10-26 ENCOUNTER — HOSPITAL ENCOUNTER (OUTPATIENT)
Dept: GENERAL RADIOLOGY | Age: 73
Discharge: HOME | End: 2023-10-28
Payer: MEDICARE

## 2023-10-26 ENCOUNTER — HOSPITAL ENCOUNTER (OUTPATIENT)
Age: 73
Discharge: HOME | End: 2023-10-28
Payer: MEDICARE

## 2023-10-26 DIAGNOSIS — I48.0 PAF (PAROXYSMAL ATRIAL FIBRILLATION) (HCC): ICD-10-CM

## 2023-10-26 DIAGNOSIS — I10 PRIMARY HYPERTENSION: ICD-10-CM

## 2023-10-26 DIAGNOSIS — E78.5 HYPERLIPIDEMIA, UNSPECIFIED HYPERLIPIDEMIA TYPE: ICD-10-CM

## 2023-10-26 DIAGNOSIS — I25.119 CORONARY ARTERY DISEASE INVOLVING NATIVE CORONARY ARTERY OF NATIVE HEART WITH ANGINA PECTORIS (HCC): ICD-10-CM

## 2023-10-26 LAB
ALBUMIN SERPL-MCNC: 4 G/DL (ref 3.5–5.2)
ALP SERPL-CCNC: 77 U/L (ref 40–129)
ALT SERPL-CCNC: 20 U/L (ref 5–41)
ANION GAP SERPL CALCULATED.3IONS-SCNC: 8 MMOL/L (ref 9–17)
AST SERPL-CCNC: 18 U/L
BASOPHILS # BLD: 0.02 K/UL (ref 0–0.2)
BASOPHILS NFR BLD: 0 % (ref 0–2)
BILIRUB SERPL-MCNC: 0.6 MG/DL (ref 0.3–1.2)
BUN SERPL-MCNC: 28 MG/DL (ref 8–23)
BUN/CREAT SERPL: 28 (ref 9–20)
CALCIUM SERPL-MCNC: 8.5 MG/DL (ref 8.6–10.4)
CHLORIDE SERPL-SCNC: 99 MMOL/L (ref 98–107)
CHOLEST SERPL-MCNC: 112 MG/DL
CHOLESTEROL/HDL RATIO: 4
CO2 SERPL-SCNC: 25 MMOL/L (ref 20–31)
CREAT SERPL-MCNC: 1 MG/DL (ref 0.7–1.2)
CREAT UR-MCNC: 132.1 MG/DL (ref 39–259)
EOSINOPHIL # BLD: 0.18 K/UL (ref 0–0.4)
EOSINOPHILS RELATIVE PERCENT: 3 % (ref 0–5)
ERYTHROCYTE [DISTWIDTH] IN BLOOD BY AUTOMATED COUNT: 12.6 % (ref 12.1–15.2)
GFR SERPL CREATININE-BSD FRML MDRD: >60 ML/MIN/1.73M2
GLUCOSE SERPL-MCNC: 226 MG/DL (ref 70–99)
HCT VFR BLD AUTO: 40.2 % (ref 41–53)
HDLC SERPL-MCNC: 28 MG/DL
HGB BLD-MCNC: 13.4 G/DL (ref 13.5–17.5)
IMM GRANULOCYTES # BLD AUTO: 0.01 K/UL (ref 0–0.3)
IMM GRANULOCYTES NFR BLD: 0 % (ref 0–5)
LDLC SERPL CALC-MCNC: 15 MG/DL (ref 0–130)
LYMPHOCYTES NFR BLD: 1.57 K/UL (ref 1–4.8)
LYMPHOCYTES RELATIVE PERCENT: 21 % (ref 13–44)
MAGNESIUM SERPL-MCNC: 2.1 MG/DL (ref 1.6–2.6)
MCH RBC QN AUTO: 31.2 PG (ref 26–34)
MCHC RBC AUTO-ENTMCNC: 33.3 G/DL (ref 31–37)
MCV RBC AUTO: 93.5 FL (ref 80–100)
MICROALBUMIN UR-MCNC: 61 MG/L
MICROALBUMIN/CREAT UR-RTO: 46 MCG/MG CREAT
MONOCYTES NFR BLD: 0.76 K/UL (ref 0–1)
MONOCYTES NFR BLD: 10 % (ref 5–9)
NEUTROPHILS NFR BLD: 65 % (ref 39–75)
NEUTS SEG NFR BLD: 4.78 K/UL (ref 2.1–6.5)
PATIENT FASTING?: YES
PLATELET # BLD AUTO: 226 K/UL (ref 140–450)
PMV BLD AUTO: 9.6 FL (ref 6–12)
POTASSIUM SERPL-SCNC: 4.6 MMOL/L (ref 3.7–5.3)
PROT SERPL-MCNC: 6.9 G/DL (ref 6.4–8.3)
RBC # BLD AUTO: 4.3 M/UL (ref 4.5–5.9)
SODIUM SERPL-SCNC: 132 MMOL/L (ref 135–144)
TRIGL SERPL-MCNC: 343 MG/DL
TSH SERPL DL<=0.05 MIU/L-ACNC: 2.17 UIU/ML (ref 0.3–5)
WBC OTHER # BLD: 7.3 K/UL (ref 3.5–11)

## 2023-10-26 PROCEDURE — 82043 UR ALBUMIN QUANTITATIVE: CPT

## 2023-10-26 PROCEDURE — 80061 LIPID PANEL: CPT

## 2023-10-26 PROCEDURE — 83735 ASSAY OF MAGNESIUM: CPT

## 2023-10-26 PROCEDURE — 84443 ASSAY THYROID STIM HORMONE: CPT

## 2023-10-26 PROCEDURE — 82570 ASSAY OF URINE CREATININE: CPT

## 2023-10-26 PROCEDURE — 36415 COLL VENOUS BLD VENIPUNCTURE: CPT

## 2023-10-26 PROCEDURE — 85025 COMPLETE CBC W/AUTO DIFF WBC: CPT

## 2023-10-26 PROCEDURE — 71046 X-RAY EXAM CHEST 2 VIEWS: CPT

## 2023-10-26 PROCEDURE — 80053 COMPREHEN METABOLIC PANEL: CPT

## 2023-10-29 LAB
EKG ATRIAL RATE: 85 BPM
EKG P AXIS: 30 DEGREES
EKG P-R INTERVAL: 172 MS
EKG Q-T INTERVAL: 400 MS
EKG QRS DURATION: 150 MS
EKG QTC CALCULATION (BAZETT): 476 MS
EKG R AXIS: 53 DEGREES
EKG T AXIS: -173 DEGREES
EKG VENTRICULAR RATE: 85 BPM

## 2023-11-02 ENCOUNTER — OFFICE VISIT (OUTPATIENT)
Dept: CARDIOLOGY CLINIC | Age: 73
End: 2023-11-02

## 2023-11-02 VITALS
WEIGHT: 242 LBS | OXYGEN SATURATION: 95 % | DIASTOLIC BLOOD PRESSURE: 70 MMHG | SYSTOLIC BLOOD PRESSURE: 120 MMHG | BODY MASS INDEX: 34.72 KG/M2 | HEART RATE: 90 BPM

## 2023-11-02 DIAGNOSIS — E55.9 VITAMIN D DEFICIENCY DISEASE: ICD-10-CM

## 2023-11-02 DIAGNOSIS — I10 PRIMARY HYPERTENSION: ICD-10-CM

## 2023-11-02 DIAGNOSIS — I48.0 PAF (PAROXYSMAL ATRIAL FIBRILLATION) (HCC): ICD-10-CM

## 2023-11-02 DIAGNOSIS — E78.5 HYPERLIPIDEMIA, UNSPECIFIED HYPERLIPIDEMIA TYPE: ICD-10-CM

## 2023-11-02 DIAGNOSIS — I25.119 CORONARY ARTERY DISEASE INVOLVING NATIVE CORONARY ARTERY OF NATIVE HEART WITH ANGINA PECTORIS (HCC): Primary | ICD-10-CM

## 2023-11-02 RX ORDER — NIFEDIPINE 90 MG/1
TABLET, FILM COATED, EXTENDED RELEASE ORAL
COMMUNITY

## 2023-11-02 NOTE — PROGRESS NOTES
Ov Dr. Erich Peguero for 6 month f/u   Had fall 4-5 weeks ago   Tripped over something   Broke clavicle - socket   No surgeries   No chest pain   No palpations   No sob   No new issues   Bedside echo done       No changes    BMP in Dec     Follow up in 6 months

## 2023-11-06 LAB
AVERAGE GLUCOSE: NORMAL
HBA1C MFR BLD: 8.4 %

## 2023-12-13 ENCOUNTER — TELEPHONE (OUTPATIENT)
Dept: FAMILY MEDICINE CLINIC | Age: 73
End: 2023-12-13

## 2023-12-13 RX ORDER — ALLOPURINOL 100 MG/1
100 TABLET ORAL DAILY
Qty: 90 TABLET | Refills: 1 | Status: SHIPPED | OUTPATIENT
Start: 2023-12-13

## 2023-12-13 NOTE — TELEPHONE ENCOUNTER
Karma called in to let Dr. Aida Lucas know that Jason's uric acid result was 7.9. Dr. Aida Lucas had ordered this for him to have done and [place]. She is going to email the results but wanted to let Dr Aida Lucas know as well.         Health Maintenance   Topic Date Due    DTaP/Tdap/Td vaccine (1 - Tdap) Never done    Hepatitis B vaccine (1 of 3 - Risk 3-dose series) Never done    Respiratory Syncytial Virus (RSV) age 61 yrs+ (1 - 1-dose 60+ series) Never done    Diabetic retinal exam  04/29/2023    COVID-19 Vaccine (6 - 2023-24 season) 09/01/2023    Colorectal Cancer Screen  10/09/2023    Diabetic foot exam  11/14/2023    A1C test (Diabetic or Prediabetic)  04/19/2024    Depression Screen  04/24/2024    Annual Wellness Visit (AWV)  04/26/2024    Diabetic Alb to Cr ratio (uACR) test  10/26/2024    Lipids  10/26/2024    GFR test (Diabetes, CKD 3-4, OR last GFR 15-59)  10/26/2024    Flu vaccine  Completed    Shingles vaccine  Completed    Pneumococcal 65+ years Vaccine  Completed    AAA screen  Completed    Hepatitis C screen  Completed    Hepatitis A vaccine  Aged Out    Hib vaccine  Aged Out    Meningococcal (ACWY) vaccine  Aged Out             (applicable per patient's age: Cancer Screenings, Depression Screening, Fall Risk Screening, Immunizations)    Hemoglobin A1C (%)   Date Value   04/19/2023 8.3   09/16/2022 8.9 (H)   10/21/2021 7.8     LDL Cholesterol (mg/dL)   Date Value   10/26/2023 15     AST (U/L)   Date Value   10/26/2023 18     ALT (U/L)   Date Value   10/26/2023 20     BUN (mg/dL)   Date Value   10/26/2023 28 (H)      (goal A1C is < 7)   (goal LDL is <100) need 30-50% reduction from baseline     BP Readings from Last 3 Encounters:   11/02/23 120/70   09/27/23 (!) 130/52   09/21/23 (!) 163/78    (goal /80)      All Future Testing planned in [facility]:  Lab Frequency Next Occurrence   CBC with Auto Differential Once 05/02/2024   Comprehensive Metabolic Panel Once 69/92/3692   Vitamin D 25 Hydroxy

## 2023-12-14 ENCOUNTER — TELEPHONE (OUTPATIENT)
Dept: CARDIOLOGY CLINIC | Age: 73
End: 2023-12-14

## 2024-01-17 RX ORDER — NIFEDIPINE 90 MG/1
TABLET, FILM COATED, EXTENDED RELEASE ORAL
Qty: 45 TABLET | Refills: 3 | Status: SHIPPED | OUTPATIENT
Start: 2024-01-17 | End: 2024-01-18 | Stop reason: SDUPTHER

## 2024-01-18 RX ORDER — NIFEDIPINE 90 MG/1
TABLET, FILM COATED, EXTENDED RELEASE ORAL
Qty: 45 TABLET | Refills: 3 | Status: SHIPPED | OUTPATIENT
Start: 2024-01-18

## 2024-01-31 RX ORDER — FUROSEMIDE 80 MG
80 TABLET ORAL DAILY
Qty: 90 TABLET | Refills: 1 | Status: SHIPPED | OUTPATIENT
Start: 2024-01-31

## 2024-01-31 NOTE — TELEPHONE ENCOUNTER
Last OV: 9/27/2023 ED FU 04/26/23 awv  Last RX:     Next scheduled apt: Visit date not found        Surescript requesting a refill

## 2024-02-12 DIAGNOSIS — I25.119 CORONARY ARTERY DISEASE INVOLVING NATIVE CORONARY ARTERY OF NATIVE HEART WITH ANGINA PECTORIS (HCC): ICD-10-CM

## 2024-02-12 DIAGNOSIS — I27.20 PULMONARY HTN (HCC): ICD-10-CM

## 2024-02-12 DIAGNOSIS — I50.32 CHRONIC DIASTOLIC CHF (CONGESTIVE HEART FAILURE), NYHA CLASS 3 (HCC): ICD-10-CM

## 2024-02-12 DIAGNOSIS — I48.0 PAF (PAROXYSMAL ATRIAL FIBRILLATION) (HCC): ICD-10-CM

## 2024-02-15 ENCOUNTER — TELEPHONE (OUTPATIENT)
Dept: FAMILY MEDICINE CLINIC | Age: 74
End: 2024-02-15

## 2024-02-16 RX ORDER — ALLOPURINOL 200 MG/1
200 TABLET ORAL DAILY
Qty: 90 TABLET | Refills: 0 | Status: CANCELLED | OUTPATIENT
Start: 2024-02-16

## 2024-02-16 RX ORDER — ALLOPURINOL 100 MG/1
200 TABLET ORAL DAILY
Qty: 180 TABLET | Refills: 0 | Status: SHIPPED | OUTPATIENT
Start: 2024-02-16

## 2024-04-09 RX ORDER — CELECOXIB 200 MG/1
200 CAPSULE ORAL DAILY
Qty: 90 CAPSULE | Refills: 1 | Status: SHIPPED | OUTPATIENT
Start: 2024-04-09

## 2024-04-09 NOTE — TELEPHONE ENCOUNTER
Last OV: 9/27/2023  EDFU shoulder pain   Last RX:    Next scheduled apt: Visit date not found            Surescript requesting a refill

## 2024-04-11 RX ORDER — SACUBITRIL AND VALSARTAN 97; 103 MG/1; MG/1
1 TABLET, FILM COATED ORAL 2 TIMES DAILY
Qty: 180 TABLET | Refills: 3 | Status: SHIPPED | OUTPATIENT
Start: 2024-04-11

## 2024-04-11 RX ORDER — ATORVASTATIN CALCIUM 40 MG/1
40 TABLET, FILM COATED ORAL DAILY
Qty: 90 TABLET | Refills: 3 | Status: SHIPPED | OUTPATIENT
Start: 2024-04-11

## 2024-04-11 RX ORDER — SPIRONOLACTONE 25 MG/1
25 TABLET ORAL DAILY
Qty: 90 TABLET | Refills: 3 | Status: SHIPPED | OUTPATIENT
Start: 2024-04-11

## 2024-04-11 NOTE — TELEPHONE ENCOUNTER
Last visit:  9/27/2023  Next Visit Date:    Future Appointments   Date Time Provider Department Center   4/15/2024 10:15 AM [facility] [place] [facility]   5/9/2024 11:00 AM Arya Crump MD [place] [facility]         Medication List:  Prior to Admission medications    Medication Sig Start Date End Date Taking? Authorizing Provider   sacubitril-valsartan (ENTRESTO)  MG per tablet Take 1 tablet by mouth 2 times daily 4/11/24   Arya Crump MD   spironolactone (ALDACTONE) 25 MG tablet Take 1 tablet by mouth daily 4/11/24   Arya Crump MD   celecoxib (CELEBREX) 200 MG capsule Take 1 capsule by mouth daily 4/9/24   Michelle Fletcher DO   allopurinol (ZYLOPRIM) 100 MG tablet Take 2 tablets by mouth daily 2/16/24   Michelle Fletcher DO   apixaban (ELIQUIS) 5 MG TABS tablet TAKE 1 TABLET TWICE A DAY 2/12/24   Arya Crump MD   furosemide (LASIX) 80 MG tablet Take 1 tablet by mouth daily 1/31/24   Michelle Fletcher DO   NIFEdipine (ADALAT CC) 90 MG extended release tablet Take 1/2 tablet daily 1/18/24   Arya Crump MD   Lactobacillus (ACIDOPHILUS PO) Take by mouth    Gavi Thompson MD   Cyanocobalamin (HM SUPER VITAMIN B12 PO) Take by mouth    Gavi Thompson MD   atorvastatin (LIPITOR) 40 MG tablet TAKE 1 TABLET DAILY 10/23/23   Michelle Fletcher DO   Cholecalciferol (VITAMIN D3) 250 MCG (54041 UT) TABS Take 1 tablet by mouth 2 times daily    Gavi Thompson MD   Chromium-Cinnamon 100-500 MCG-MG CAPS Take 1,000 mg by mouth    Gavi Thompson MD   insulin aspart (NOVOLOG FLEXPEN) 100 UNIT/ML injection pen Up to 80 units per day in divided doses.  Take 15 units plus correction scale with each meal.   stop Novolin 70/30 No NovoLog if not eating food. 8/21/23   Gavi Thompson MD   Insulin Degludec 200 UNIT/ML SOPN 60 units daily. 8/21/23   Gavi Thompson MD   carvedilol (COREG) 3.125 MG tablet TAKE 1 TABLET DAILY 9/18/23   Arya Crump MD

## 2024-04-11 NOTE — TELEPHONE ENCOUNTER
Lipitor 40 mg    90 day [facility]   Topic Date Due    DTaP/Tdap/Td vaccine (1 - Tdap) Never done    Respiratory Syncytial Virus (RSV) Pregnant or age 60 yrs+ (1 - 1-dose 60+ series) Never done    Diabetic retinal exam  04/29/2023    COVID-19 Vaccine (6 - 2023-24 season) 09/01/2023    Colorectal Cancer Screen  10/09/2023    Diabetic foot exam  11/14/2023    Depression Screen  04/24/2024    Annual Wellness Visit (Medicare)  04/26/2024    Diabetic Alb to Cr ratio (uACR) test  10/26/2024    Lipids  10/26/2024    GFR test (Diabetes, CKD 3-4, OR last GFR 15-59)  10/26/2024    A1C test (Diabetic or Prediabetic)  11/06/2024    Flu vaccine  Completed    Shingles vaccine  Completed    Pneumococcal 65+ years Vaccine  Completed    AAA screen  Completed    Hepatitis C screen  Completed    Hepatitis A vaccine  Aged Out    Hepatitis B vaccine  Aged Out    Hib vaccine  Aged Out    Polio vaccine  Aged Out    Meningococcal (ACWY) vaccine  Aged Out             (applicable per patient's age: Cancer Screenings, Depression Screening, Fall Risk Screening, Immunizations)    Hemoglobin A1C (%)   Date Value   11/06/2023 8.4   04/19/2023 8.3   09/16/2022 8.9 (H)     LDL Cholesterol (mg/dL)   Date Value   10/26/2023 15     AST (U/L)   Date Value   10/26/2023 18     ALT (U/L)   Date Value   10/26/2023 20     BUN (mg/dL)   Date Value   10/26/2023 28 (H)      (goal A1C is < 7)   (goal LDL is <100) need 30-50% reduction from baseline     BP Readings from Last 3 Encounters:   11/02/23 120/70   09/27/23 (!) 130/52   09/21/23 (!) 163/78    (goal /80)      All Future Testing planned in [facility]:  Lab Frequency Next Occurrence   CBC with Auto Differential Once 05/02/2024   Comprehensive Metabolic Panel Once 05/02/2024   Vitamin D 25 Hydroxy Once 05/02/2024   Lipid Panel Once 05/02/2024   TSH with Reflex Once 05/02/2024   Magnesium Once 05/02/2024   EKG 12 Lead Once 05/02/2024   XR CHEST (2 VW) Once 05/02/2024   Uric

## 2024-04-12 ASSESSMENT — PATIENT HEALTH QUESTIONNAIRE - PHQ9
1. LITTLE INTEREST OR PLEASURE IN DOING THINGS: NOT AT ALL
SUM OF ALL RESPONSES TO PHQ QUESTIONS 1-9: 0
SUM OF ALL RESPONSES TO PHQ QUESTIONS 1-9: 0
2. FEELING DOWN, DEPRESSED OR HOPELESS: NOT AT ALL
1. LITTLE INTEREST OR PLEASURE IN DOING THINGS: NOT AT ALL
SUM OF ALL RESPONSES TO PHQ9 QUESTIONS 1 & 2: 0
SUM OF ALL RESPONSES TO PHQ QUESTIONS 1-9: 0
SUM OF ALL RESPONSES TO PHQ9 QUESTIONS 1 & 2: 0
SUM OF ALL RESPONSES TO PHQ QUESTIONS 1-9: 0
2. FEELING DOWN, DEPRESSED OR HOPELESS: NOT AT ALL

## 2024-04-15 ENCOUNTER — OFFICE VISIT (OUTPATIENT)
Dept: FAMILY MEDICINE CLINIC | Age: 74
End: 2024-04-15
Payer: MEDICARE

## 2024-04-15 VITALS
DIASTOLIC BLOOD PRESSURE: 68 MMHG | SYSTOLIC BLOOD PRESSURE: 139 MMHG | WEIGHT: 242 LBS | HEIGHT: 70 IN | BODY MASS INDEX: 34.65 KG/M2

## 2024-04-15 DIAGNOSIS — Z12.11 COLON CANCER SCREENING: Primary | ICD-10-CM

## 2024-04-15 DIAGNOSIS — Z01.818 PRE-OP EXAM: ICD-10-CM

## 2024-04-15 PROCEDURE — 1036F TOBACCO NON-USER: CPT | Performed by: INTERNAL MEDICINE

## 2024-04-15 PROCEDURE — 1123F ACP DISCUSS/DSCN MKR DOCD: CPT | Performed by: INTERNAL MEDICINE

## 2024-04-15 PROCEDURE — 3017F COLORECTAL CA SCREEN DOC REV: CPT | Performed by: INTERNAL MEDICINE

## 2024-04-15 PROCEDURE — G8417 CALC BMI ABV UP PARAM F/U: HCPCS | Performed by: INTERNAL MEDICINE

## 2024-04-15 PROCEDURE — 99213 OFFICE O/P EST LOW 20 MIN: CPT | Performed by: INTERNAL MEDICINE

## 2024-04-15 PROCEDURE — 3078F DIAST BP <80 MM HG: CPT | Performed by: INTERNAL MEDICINE

## 2024-04-15 PROCEDURE — 3075F SYST BP GE 130 - 139MM HG: CPT | Performed by: INTERNAL MEDICINE

## 2024-04-15 PROCEDURE — G8427 DOCREV CUR MEDS BY ELIG CLIN: HCPCS | Performed by: INTERNAL MEDICINE

## 2024-04-15 RX ORDER — SODIUM, POTASSIUM,MAG SULFATES 17.5-3.13G
SOLUTION, RECONSTITUTED, ORAL ORAL
Qty: 1 EACH | Refills: 0 | Status: SHIPPED | OUTPATIENT
Start: 2024-04-15

## 2024-04-15 ASSESSMENT — ENCOUNTER SYMPTOMS
ABDOMINAL PAIN: 0
NAUSEA: 0
CONSTIPATION: 0
SORE THROAT: 0
RESPIRATORY NEGATIVE: 1
DIARRHEA: 0
BLOOD IN STOOL: 0

## 2024-04-15 NOTE — PROGRESS NOTES
10/26/2023                 AST                      18                  10/26/2023                 ALT                      20                  10/26/2023                 LABGLOM                  >60                 10/26/2023                 GFRAA                    >60                 09/19/2022                 AGRATIO                  NOT REPORTED        11/01/2021              Lab Results       Component                Value               Date                       LABA1C                   8.4                 11/06/2023            Lab Results       Component                Value               Date                       EAG                      209                 09/16/2022              Lab Results       Component                Value               Date                       CHOL                     112                 10/26/2023                 CHOL                     105                 05/01/2023                 CHOL                     106                 05/01/2023            Lab Results       Component                Value               Date                       TRIG                     343 (H)             10/26/2023                 TRIG                     216 (H)             05/01/2023                 TRIG                     215 (H)             05/01/2023            Lab Results       Component                Value               Date                       HDL                      28 (L)              10/26/2023                 HDL                      29 (L)              05/01/2023                 HDL                      30 (L)              05/01/2023            Lab Results       Component                Value               Date                       LDLCHOLESTEROL           15                  10/26/2023                 LDLCHOLESTEROL           33                  05/01/2023                 LDLCHOLESTEROL           33                  05/01/2023            Lab Results       Component

## 2024-04-15 NOTE — PATIENT INSTRUCTIONS
Plan:  1. Colon cancer screening  Set up for a screening colonoscopy.  Risk and benefits of the procedure explained including risk for infection, bleeding, perforation, and death.  Verbal and written informed consent obtained.  The bowel prep was explained to Sid and he was instructed to start the prep the day before the procedure (printed directions were given).  Avoid corn 1 week prior to the procedure as this can cause suctioning difficulties during the procedure.  Avoid eating or drinking at least 8 hours prior to the procedure.  Sid was encouraged to call the clinic if he has any questions prior to the procedure.   - sodium-potassium-mag sulfate (SUPREP BOWEL PREP KIT) 17.5-3.13-1.6 GM/177ML SOLN solution; Continue as directed.  Dispense: 1 each; Refill: 0    2. Pre-op exam  ECG prior to the procedure.    - EKG 12 Lead; Future      Follow up with Dr. Fletcher after the procedure.

## 2024-04-16 LAB
ESTIMATED AVERAGE GLUCOSE: NORMAL
HBA1C MFR BLD: 8.3 %

## 2024-04-18 ENCOUNTER — TELEPHONE (OUTPATIENT)
Dept: FAMILY MEDICINE CLINIC | Age: 74
End: 2024-04-18

## 2024-04-18 DIAGNOSIS — E11.42 TYPE 2 DIABETES MELLITUS WITH PERIPHERAL NEUROPATHY (HCC): ICD-10-CM

## 2024-04-18 DIAGNOSIS — M10.079 ACUTE IDIOPATHIC GOUT OF ANKLE, UNSPECIFIED LATERALITY: Primary | ICD-10-CM

## 2024-04-18 NOTE — TELEPHONE ENCOUNTER
A1c and uric acid ordered  
Had uric acid checked on 2/13. Patient due for yearly check up  and AWV 4/26. Last seen 9/27 for shoulder pain. He is due for an A1C.            Last visit:  9/27/2023  Next Visit Date:    Future Appointments   Date Time Provider Department Center   5/9/2024 11:00 AM Arya Crump MD [facility]         Medication List:  Prior to Admission medications    Medication Sig Start Date End Date Taking? Authorizing Provider   sodium-potassium-mag sulfate (SUPREP BOWEL PREP KIT) 17.5-3.13-1.6 GM/177ML SOLN solution Continue as directed. 4/15/24   Yordy Colon MD   sacubitril-valsartan (ENTRESTO)  MG per tablet Take 1 tablet by mouth 2 times daily 4/11/24   Arya Crump MD   spironolactone (ALDACTONE) 25 MG tablet Take 1 tablet by mouth daily 4/11/24   Arya Crump MD   atorvastatin (LIPITOR) 40 MG tablet Take 1 tablet by mouth daily 4/11/24   Michelle Fletcher DO   celecoxib (CELEBREX) 200 MG capsule Take 1 capsule by mouth daily 4/9/24   Michelle Fletcher DO   allopurinol (ZYLOPRIM) 100 MG tablet Take 2 tablets by mouth daily 2/16/24   Michelle Fletcher DO   apixaban (ELIQUIS) 5 MG TABS tablet TAKE 1 TABLET TWICE A DAY 2/12/24   Arya Crump MD   furosemide (LASIX) 80 MG tablet Take 1 tablet by mouth daily 1/31/24   Michelle Fletcher DO   NIFEdipine (ADALAT CC) 90 MG extended release tablet Take 1/2 tablet daily 1/18/24   Arya Crump MD   Lactobacillus (ACIDOPHILUS PO) Take by mouth    Gavi Thompson MD   Cyanocobalamin (HM SUPER VITAMIN B12 PO) Take by mouth    Gavi Thompson MD   Cholecalciferol (VITAMIN D3) 250 MCG (76748 UT) TABS Take 1 tablet by mouth 2 times daily    Gavi Thompson MD   Chromium-Cinnamon 100-500 MCG-MG CAPS Take 1,000 mg by mouth    Gavi Thompson MD   insulin aspart (NOVOLOG FLEXPEN) 100 UNIT/ML injection pen Up to 80 units per day in divided doses.  Take 15 units plus correction scale with each meal.   stop Novolin 70/30 No NovoLog if 
Panel Once 05/02/2024   Vitamin D 25 Hydroxy Once 05/02/2024   Lipid Panel Once 05/02/2024   TSH with Reflex Once 05/02/2024   Magnesium Once 05/02/2024   EKG 12 Lead Once 05/02/2024   XR CHEST (2 VW) Once 05/02/2024   Uric Acid Once 11/30/2023   Uric Acid Once 01/30/2024   EKG 12 Lead Once 04/29/2024   Basic Metabolic Panel Every 8 Weeks 6/7/2024, 8/2/2024, 9/27/2024       Next Visit Date:  Future Appointments   Date Time Provider Department Center   5/9/2024 11:00 AM Arya Crump MD [facility]            Patient Active Problem List:     Hypertension     Hyperlipidemia     Coronary artery disease involving native coronary artery of native heart with angina pectoris ([facility])     H/O coronary angioplasty     Severe nonproliferative diabetic retinopathy with macular edema associated with type 2 diabetes mellitus ([facility])     Type 2 diabetes mellitus with peripheral neuropathy ([facility])     Diabetic peripheral neuropathy ([facility])     GHAZALA (obstructive sleep apnea)     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[facility])     Pulmonary HTN ([facility])     PAF (paroxysmal atrial fibrillation) ([facility])     Lumbosacral spondylosis without myelopathy     Pars defect with spondylolisthesis     Spinal stenosis of lumbar region without neurogenic claudication     Systolic congestive heart failure ([facility])     Type 2 MI (myocardial infarction) ([facility])     Sepsis ([facility])     Sepsis due to Streptococcus agalactiae ([facility])     SIRS (systemic inflammatory response syndrome) ([facility])     Venous stasis dermatitis of both lower extremities     Bilateral leg edema     Bacteremia due to group B Streptococcus

## 2024-04-19 NOTE — PROGRESS NOTES
[facility]   Preadmission Testing    Name: Sid Blakely  : 1950  Patient Phone: [phone] (home) [phone] (work)    Procedure: Colonoscopy  Date of Procedure: 2024  Surgeon: Yordy Colon MD    Ht:  177.8 cm (5' 10\")  Wt: 109.8 kg (242 lb)  Wt method: Stated    Allergies:   Allergies   Allergen Reactions    Oxycodone      Other reaction(s): vomiting                There were no vitals filed for this visit.    No LMP for male patient.    Do you take blood thinners?   [x] Yes    [] No         Instructed to stop blood thinners prior to procedure?    [x] Yes   Pt is to call Dr Crump per Dr Disla   [] No      [] N/A   Do you have sleep apnea?   [x] Yes    [] No     Do you have acid reflux ?   [] Yes    [x] No     Do you have  hiatal hernia?   [] Yes    [x] No    Do you ever experience motion sickness?   [] Yes    [x] No     Have you had a respiratory infection or sore throat in last 4 weeks before surgery?    [x] Yes    [] No     Do you have poorly controlled asthma or COPD?  Difficulty with intubation in past? [] Yes    [x] No      [] Yes    [x] No       Do you have a history of angina in the last month or symptomatic arrhythmia?   [] Yes    [x] No     Do you have significant central nervous system disease?   [] Yes    [x] No     Have you had an EKG, labs, or chest xray in last 12 months?  If yes provide copies to anesthesia   [x] Yes    [] No       [x] Lab    [x] EKG    [x] CXR     Have you had a stress test?     [x] Yes    [] No    When/where:  [place]    Was it normal?    [] Yes    [] No     Do you or your family have a history of Malignant Hyperthermia?   [] Yes    [x] No           Do you smoke? [] Yes    [x] No      Please refrain from smoking on the day of surgery.      Patient instructed on: [x] NPO Status   [x] Meds to Take  [x] Hold GLP-1 Receptor Agonist  [x] Ride Home  [x] No Jewelry/Contact Lenses/Nail Polish  [x] Prep/Lax/Clear Liquids    [] Chlorhexidene     DOS Patient

## 2024-04-22 ENCOUNTER — TELEPHONE (OUTPATIENT)
Dept: CARDIOLOGY CLINIC | Age: 74
End: 2024-04-22

## 2024-04-22 ENCOUNTER — HOSPITAL ENCOUNTER (OUTPATIENT)
Age: 74
Discharge: HOME | End: 2024-04-22
Payer: MEDICARE

## 2024-04-22 DIAGNOSIS — Z01.818 PRE-OP EXAM: ICD-10-CM

## 2024-04-22 DIAGNOSIS — M10.079 ACUTE IDIOPATHIC GOUT OF ANKLE, UNSPECIFIED LATERALITY: ICD-10-CM

## 2024-04-22 LAB — URATE SERPL-MCNC: 5.7 MG/DL (ref 3.4–7)

## 2024-04-22 PROCEDURE — 36415 COLL VENOUS BLD VENIPUNCTURE: CPT

## 2024-04-22 PROCEDURE — 93005 ELECTROCARDIOGRAM TRACING: CPT

## 2024-04-22 PROCEDURE — 84550 ASSAY OF BLOOD/URIC ACID: CPT

## 2024-04-22 NOTE — TELEPHONE ENCOUNTER
Jason stopped by the office to ask if it is okay to stop Eliquis 3 days prior to his colonoscopy.  He stated that Dr. Colon has given him instructions, but he still wanted to check with Dr. Crump.     Please call Jason

## 2024-04-23 LAB
EKG ATRIAL RATE: 85 BPM
EKG P AXIS: 68 DEGREES
EKG P-R INTERVAL: 170 MS
EKG Q-T INTERVAL: 398 MS
EKG QRS DURATION: 150 MS
EKG QTC CALCULATION (BAZETT): 473 MS
EKG R AXIS: 93 DEGREES
EKG T AXIS: -88 DEGREES
EKG VENTRICULAR RATE: 85 BPM

## 2024-04-23 PROCEDURE — 93010 ELECTROCARDIOGRAM REPORT: CPT | Performed by: INTERNAL MEDICINE

## 2024-04-24 ENCOUNTER — ANESTHESIA EVENT (OUTPATIENT)
Dept: ENDOSCOPY | Age: 74
End: 2024-04-24
Payer: MEDICARE

## 2024-04-25 ENCOUNTER — HOSPITAL ENCOUNTER (OUTPATIENT)
Age: 74
Discharge: HOME | End: 2024-04-25
Payer: MEDICARE

## 2024-04-25 DIAGNOSIS — E78.5 HYPERLIPIDEMIA, UNSPECIFIED HYPERLIPIDEMIA TYPE: ICD-10-CM

## 2024-04-25 DIAGNOSIS — I25.119 CORONARY ARTERY DISEASE INVOLVING NATIVE CORONARY ARTERY OF NATIVE HEART WITH ANGINA PECTORIS (HCC): ICD-10-CM

## 2024-04-25 DIAGNOSIS — E55.9 VITAMIN D DEFICIENCY DISEASE: ICD-10-CM

## 2024-04-25 DIAGNOSIS — I10 PRIMARY HYPERTENSION: ICD-10-CM

## 2024-04-25 DIAGNOSIS — I48.0 PAF (PAROXYSMAL ATRIAL FIBRILLATION) (HCC): ICD-10-CM

## 2024-04-25 LAB
25(OH)D3 SERPL-MCNC: 30.1 NG/ML (ref 30–100)
ALBUMIN SERPL-MCNC: 4 G/DL (ref 3.5–5.2)
ALP SERPL-CCNC: 73 U/L (ref 40–129)
ALT SERPL-CCNC: 21 U/L (ref 5–41)
ANION GAP SERPL CALCULATED.3IONS-SCNC: 10 MMOL/L (ref 9–17)
AST SERPL-CCNC: 22 U/L
BASOPHILS # BLD: 0.02 K/UL (ref 0–0.2)
BASOPHILS NFR BLD: 0 % (ref 0–2)
BILIRUB SERPL-MCNC: 0.6 MG/DL (ref 0.3–1.2)
BUN SERPL-MCNC: 20 MG/DL (ref 8–23)
BUN/CREAT SERPL: 25 (ref 9–20)
CALCIUM SERPL-MCNC: 8.8 MG/DL (ref 8.6–10.4)
CHLORIDE SERPL-SCNC: 103 MMOL/L (ref 98–107)
CHOLEST SERPL-MCNC: 94 MG/DL (ref 0–199)
CHOLESTEROL/HDL RATIO: 3
CO2 SERPL-SCNC: 25 MMOL/L (ref 20–31)
CREAT SERPL-MCNC: 0.8 MG/DL (ref 0.7–1.2)
EOSINOPHIL # BLD: 0.23 K/UL (ref 0–0.4)
EOSINOPHILS RELATIVE PERCENT: 4 % (ref 0–5)
ERYTHROCYTE [DISTWIDTH] IN BLOOD BY AUTOMATED COUNT: 13.3 % (ref 12.1–15.2)
GFR SERPL CREATININE-BSD FRML MDRD: >90 ML/MIN/1.73M2
GLUCOSE SERPL-MCNC: 148 MG/DL (ref 70–99)
HCT VFR BLD AUTO: 40.1 % (ref 41–53)
HDLC SERPL-MCNC: 29 MG/DL
HGB BLD-MCNC: 13.1 G/DL (ref 13.5–17.5)
IMM GRANULOCYTES # BLD AUTO: 0.01 K/UL (ref 0–0.3)
IMM GRANULOCYTES NFR BLD: 0 % (ref 0–5)
LDLC SERPL CALC-MCNC: 40 MG/DL (ref 0–100)
LYMPHOCYTES NFR BLD: 1.6 K/UL (ref 1–4.8)
LYMPHOCYTES RELATIVE PERCENT: 27 % (ref 13–44)
MAGNESIUM SERPL-MCNC: 2.2 MG/DL (ref 1.6–2.6)
MCH RBC QN AUTO: 31.3 PG (ref 26–34)
MCHC RBC AUTO-ENTMCNC: 32.7 G/DL (ref 31–37)
MCV RBC AUTO: 95.7 FL (ref 80–100)
MONOCYTES NFR BLD: 0.55 K/UL (ref 0–1)
MONOCYTES NFR BLD: 9 % (ref 5–9)
NEUTROPHILS NFR BLD: 60 % (ref 39–75)
NEUTS SEG NFR BLD: 3.58 K/UL (ref 2.1–6.5)
PLATELET # BLD AUTO: 228 K/UL (ref 140–450)
PMV BLD AUTO: 9.7 FL (ref 6–12)
POTASSIUM SERPL-SCNC: 4.7 MMOL/L (ref 3.7–5.3)
PROT SERPL-MCNC: 6.9 G/DL (ref 6.4–8.3)
RBC # BLD AUTO: 4.19 M/UL (ref 4.5–5.9)
SODIUM SERPL-SCNC: 138 MMOL/L (ref 135–144)
TRIGL SERPL-MCNC: 126 MG/DL
TSH SERPL DL<=0.05 MIU/L-ACNC: 2.23 UIU/ML (ref 0.3–5)
VLDLC SERPL CALC-MCNC: 25 MG/DL
WBC OTHER # BLD: 6 K/UL (ref 3.5–11)

## 2024-04-25 PROCEDURE — 82306 VITAMIN D 25 HYDROXY: CPT

## 2024-04-25 PROCEDURE — 85025 COMPLETE CBC W/AUTO DIFF WBC: CPT

## 2024-04-25 PROCEDURE — 83735 ASSAY OF MAGNESIUM: CPT

## 2024-04-25 PROCEDURE — 80061 LIPID PANEL: CPT

## 2024-04-25 PROCEDURE — 36415 COLL VENOUS BLD VENIPUNCTURE: CPT

## 2024-04-25 PROCEDURE — 84443 ASSAY THYROID STIM HORMONE: CPT

## 2024-04-25 PROCEDURE — 80053 COMPREHEN METABOLIC PANEL: CPT

## 2024-04-26 ENCOUNTER — PREP FOR PROCEDURE (OUTPATIENT)
Dept: INTERNAL MEDICINE CLINIC | Age: 74
End: 2024-04-26

## 2024-04-26 RX ORDER — SODIUM CHLORIDE, SODIUM LACTATE, POTASSIUM CHLORIDE, CALCIUM CHLORIDE 600; 310; 30; 20 MG/100ML; MG/100ML; MG/100ML; MG/100ML
INJECTION, SOLUTION INTRAVENOUS CONTINUOUS
Status: CANCELLED | OUTPATIENT
Start: 2024-04-26

## 2024-04-26 RX ORDER — SODIUM CHLORIDE 0.9 % (FLUSH) 0.9 %
5-40 SYRINGE (ML) INJECTION EVERY 12 HOURS SCHEDULED
Status: CANCELLED | OUTPATIENT
Start: 2024-04-26

## 2024-04-26 RX ORDER — SODIUM CHLORIDE 9 MG/ML
25 INJECTION, SOLUTION INTRAVENOUS PRN
Status: CANCELLED | OUTPATIENT
Start: 2024-04-26

## 2024-04-26 RX ORDER — SODIUM CHLORIDE 0.9 % (FLUSH) 0.9 %
5-40 SYRINGE (ML) INJECTION PRN
Status: CANCELLED | OUTPATIENT
Start: 2024-04-26

## 2024-04-29 ENCOUNTER — ANESTHESIA (OUTPATIENT)
Dept: ENDOSCOPY | Age: 74
End: 2024-04-29
Payer: MEDICARE

## 2024-04-29 ENCOUNTER — HOSPITAL ENCOUNTER (OUTPATIENT)
Age: 74
Setting detail: OUTPATIENT SURGERY
Discharge: HOME | End: 2024-04-29
Attending: INTERNAL MEDICINE | Admitting: INTERNAL MEDICINE
Payer: MEDICARE

## 2024-04-29 VITALS
TEMPERATURE: 97 F | WEIGHT: 240.5 LBS | RESPIRATION RATE: 13 BRPM | DIASTOLIC BLOOD PRESSURE: 61 MMHG | SYSTOLIC BLOOD PRESSURE: 140 MMHG | HEART RATE: 69 BPM | OXYGEN SATURATION: 95 % | BODY MASS INDEX: 34.43 KG/M2 | HEIGHT: 70 IN

## 2024-04-29 LAB — GLUCOSE BLD-MCNC: 126 MG/DL (ref 65–99)

## 2024-04-29 PROCEDURE — 2500000003 HC RX 250 WO HCPCS: Performed by: NURSE ANESTHETIST, CERTIFIED REGISTERED

## 2024-04-29 PROCEDURE — 2580000003 HC RX 258: Performed by: INTERNAL MEDICINE

## 2024-04-29 PROCEDURE — 82947 ASSAY GLUCOSE BLOOD QUANT: CPT

## 2024-04-29 PROCEDURE — 2709999900 HC NON-CHARGEABLE SUPPLY: Performed by: INTERNAL MEDICINE

## 2024-04-29 PROCEDURE — 3700000001 HC ADD 15 MINUTES (ANESTHESIA): Performed by: INTERNAL MEDICINE

## 2024-04-29 PROCEDURE — G0121 COLON CA SCRN NOT HI RSK IND: HCPCS | Performed by: INTERNAL MEDICINE

## 2024-04-29 PROCEDURE — 6360000002 HC RX W HCPCS: Performed by: NURSE ANESTHETIST, CERTIFIED REGISTERED

## 2024-04-29 PROCEDURE — 7100000010 HC PHASE II RECOVERY - FIRST 15 MIN: Performed by: INTERNAL MEDICINE

## 2024-04-29 PROCEDURE — 3609027000 HC COLONOSCOPY: Performed by: INTERNAL MEDICINE

## 2024-04-29 PROCEDURE — 7100000011 HC PHASE II RECOVERY - ADDTL 15 MIN: Performed by: INTERNAL MEDICINE

## 2024-04-29 PROCEDURE — 3700000000 HC ANESTHESIA ATTENDED CARE: Performed by: INTERNAL MEDICINE

## 2024-04-29 RX ORDER — LIDOCAINE HYDROCHLORIDE 10 MG/ML
INJECTION, SOLUTION EPIDURAL; INFILTRATION; INTRACAUDAL; PERINEURAL PRN
Status: DISCONTINUED | OUTPATIENT
Start: 2024-04-29 | End: 2024-04-29 | Stop reason: SDUPTHER

## 2024-04-29 RX ORDER — SODIUM CHLORIDE 9 MG/ML
25 INJECTION, SOLUTION INTRAVENOUS PRN
Status: DISCONTINUED | OUTPATIENT
Start: 2024-04-29 | End: 2024-04-29 | Stop reason: HOSPADM

## 2024-04-29 RX ORDER — SODIUM CHLORIDE 0.9 % (FLUSH) 0.9 %
5-40 SYRINGE (ML) INJECTION EVERY 12 HOURS SCHEDULED
Status: DISCONTINUED | OUTPATIENT
Start: 2024-04-29 | End: 2024-04-29 | Stop reason: HOSPADM

## 2024-04-29 RX ORDER — SODIUM CHLORIDE, SODIUM LACTATE, POTASSIUM CHLORIDE, CALCIUM CHLORIDE 600; 310; 30; 20 MG/100ML; MG/100ML; MG/100ML; MG/100ML
INJECTION, SOLUTION INTRAVENOUS CONTINUOUS
Status: DISCONTINUED | OUTPATIENT
Start: 2024-04-29 | End: 2024-04-29 | Stop reason: HOSPADM

## 2024-04-29 RX ORDER — SODIUM CHLORIDE 0.9 % (FLUSH) 0.9 %
5-40 SYRINGE (ML) INJECTION PRN
Status: DISCONTINUED | OUTPATIENT
Start: 2024-04-29 | End: 2024-04-29 | Stop reason: HOSPADM

## 2024-04-29 RX ORDER — PROPOFOL 10 MG/ML
INJECTION, EMULSION INTRAVENOUS PRN
Status: DISCONTINUED | OUTPATIENT
Start: 2024-04-29 | End: 2024-04-29 | Stop reason: SDUPTHER

## 2024-04-29 RX ORDER — PROPOFOL 10 MG/ML
INJECTION, EMULSION INTRAVENOUS CONTINUOUS PRN
Status: DISCONTINUED | OUTPATIENT
Start: 2024-04-29 | End: 2024-04-29 | Stop reason: SDUPTHER

## 2024-04-29 RX ORDER — FENTANYL CITRATE 50 UG/ML
INJECTION, SOLUTION INTRAMUSCULAR; INTRAVENOUS PRN
Status: DISCONTINUED | OUTPATIENT
Start: 2024-04-29 | End: 2024-04-29 | Stop reason: SDUPTHER

## 2024-04-29 RX ORDER — MIDAZOLAM HYDROCHLORIDE 1 MG/ML
INJECTION INTRAMUSCULAR; INTRAVENOUS PRN
Status: DISCONTINUED | OUTPATIENT
Start: 2024-04-29 | End: 2024-04-29 | Stop reason: SDUPTHER

## 2024-04-29 RX ADMIN — LIDOCAINE HYDROCHLORIDE 50 MG: 10 INJECTION, SOLUTION EPIDURAL; INFILTRATION; INTRACAUDAL; PERINEURAL at 07:18

## 2024-04-29 RX ADMIN — PROPOFOL 100 MG: 10 INJECTION, EMULSION INTRAVENOUS at 07:18

## 2024-04-29 RX ADMIN — FENTANYL CITRATE 50 MCG: 50 INJECTION, SOLUTION INTRAMUSCULAR; INTRAVENOUS at 07:18

## 2024-04-29 RX ADMIN — PROPOFOL 100 MCG/KG/MIN: 10 INJECTION, EMULSION INTRAVENOUS at 07:18

## 2024-04-29 RX ADMIN — SODIUM CHLORIDE, POTASSIUM CHLORIDE, SODIUM LACTATE AND CALCIUM CHLORIDE: 600; 310; 30; 20 INJECTION, SOLUTION INTRAVENOUS at 07:11

## 2024-04-29 RX ADMIN — MIDAZOLAM 2 MG: 1 INJECTION INTRAMUSCULAR; INTRAVENOUS at 07:13

## 2024-04-29 RX ADMIN — FENTANYL CITRATE 50 MCG: 50 INJECTION, SOLUTION INTRAMUSCULAR; INTRAVENOUS at 07:13

## 2024-04-29 ASSESSMENT — PAIN - FUNCTIONAL ASSESSMENT: PAIN_FUNCTIONAL_ASSESSMENT: 0-10

## 2024-04-29 ASSESSMENT — PAIN SCALES - GENERAL: PAINLEVEL_OUTOF10: 0

## 2024-04-29 ASSESSMENT — COPD QUESTIONNAIRES: CAT_SEVERITY: NO INTERVAL CHANGE

## 2024-04-29 NOTE — ANESTHESIA POSTPROCEDURE EVALUATION
Department of Anesthesiology  Postprocedure Note    Patient: Sid Blakely  MRN: [id number]  YOB: 1950  Date of evaluation: 4/29/2024    Procedure Summary       Date: 04/29/24 Room / Location: [facility] ENDO 01A / [facility] ENDOSCOPY    Anesthesia Start: 0712 Anesthesia Stop: 0750    Procedure: COLONOSCOPY (Abdomen) Diagnosis:       Screen for colon cancer      (Screen for colon cancer [Z12.11])    Surgeons: Yordy Colon MD Responsible Provider: Gilberto Johnson APRN - CRNA    Anesthesia Type: MAC ASA Status: 3            Anesthesia Type: No value filed.    Donn Phase I: Donn Score: 10    Donn Phase II:      Anesthesia Post Evaluation    Patient location during evaluation: PACU  Patient participation: complete - patient participated  Level of consciousness: awake and lethargic  Pain score: 0  Airway patency: patent  Nausea & Vomiting: no nausea and no vomiting  Cardiovascular status: hemodynamically stable  Respiratory status: acceptable, room air and spontaneous ventilation  Hydration status: euvolemic  Multimodal analgesia pain management approach  Pain management: adequate and satisfactory to patient    No notable events documented.

## 2024-04-29 NOTE — ANESTHESIA PRE PROCEDURE
Department of Anesthesiology  Preprocedure Note       Name:  Sid Blakely   Age:  73 y.o.  :  1950                                          MRN:  [id number]         Date:  2024      Surgeon: Surgeon(s):  Yordy Colon MD    Procedure: Procedure(s):  COLONOSCOPY    Medications prior to admission:   Prior to Admission medications    Medication Sig Start Date End Date Taking? Authorizing Provider   sacubitril-valsartan (ENTRESTO)  MG per tablet Take 1 tablet by mouth 2 times daily 24   Arya Crump MD   spironolactone (ALDACTONE) 25 MG tablet Take 1 tablet by mouth daily 24   Arya Crump MD   atorvastatin (LIPITOR) 40 MG tablet Take 1 tablet by mouth daily 24   Michelle Fletcher DO   celecoxib (CELEBREX) 200 MG capsule Take 1 capsule by mouth daily 24   Michelle Fletcher DO   allopurinol (ZYLOPRIM) 100 MG tablet Take 2 tablets by mouth daily 24   Michelle Fletcher DO   apixaban (ELIQUIS) 5 MG TABS tablet TAKE 1 TABLET TWICE A DAY 24   Arya Crump MD   furosemide (LASIX) 80 MG tablet Take 1 tablet by mouth daily 24   Michelle Fletcher DO   NIFEdipine (ADALAT CC) 90 MG extended release tablet Take 1/2 tablet daily 24   Arya Crump MD   Lactobacillus (ACIDOPHILUS PO) Take by mouth    Gavi Thompson MD   Cyanocobalamin (HM SUPER VITAMIN B12 PO) Take by mouth    Gavi Thompson MD   Cholecalciferol (VITAMIN D3) 250 MCG (15648 UT) TABS Take 1 tablet by mouth 2 times daily    Gavi Thompson MD   Chromium-Cinnamon 100-500 MCG-MG CAPS Take 1,000 mg by mouth    Gavi Thompson MD   insulin aspart (NOVOLOG FLEXPEN) 100 UNIT/ML injection pen 20 Units 3 times daily (before meals) Plus sliding scale with each meals 23   Gavi Thompson MD   Insulin Degludec 200 UNIT/ML SOPN 60 units daily. 23   Gavi Thompson MD   carvedilol (COREG) 3.125 MG tablet TAKE 1 TABLET DAILY 23   Arya Crump MD

## 2024-04-29 NOTE — DISCHARGE INSTRUCTIONS
Discharge Instructions    Admission Date:  4/29/2024  Discharge Date:  4/29/24     Disposition:  Home    Activity:  As tolerated    Diet:  Diabetic      Discharge Instructions:  Resume previous home medication.     Follow Up:  With your primary care physician (Dr. Fletcher) on next scheduled appointment or as needed.    Discharge Instructions for Colonoscopy    Do not drive or operate machinery for 24 hours.  Do not make important personal or business decisions for 24 hours.   Do not drink alcoholic beverages for 24 hours.  Do not smoke tobacco products for 24 hours.  It is normal to have a feeling of fullness or mild cramping in your abdomen afterwards due to air that is put into your bowel during the procedure. Mild activities such as walking will help you pass the air.  You may resume your regular diet.   Results from a biopsy may take up to 2 weeks to return from pathology.  Make a follow-up appointment with PCP to be seen in 2 weeks.     SEEK MEDICAL ATTENTION IF:    Chest Pain or trouble breathing.    Persistent and significant bleeding from your rectum, such as soaking through clothing and/or feeling dizzy and sweating.    A fever above 101F or if you have chills.    If symptoms are severe call 911 or go to the nearest Emergency Room.

## 2024-04-29 NOTE — PROCEDURES
abnormalities were encountered.  Colonoscope was slowly withdrawn back up the ascending colon, past the hepatic flexure into the transverse colon.  Upon 2nd pass to the ascending and transverse colon, no abnormalities were seen.  Colonoscope was withdrawn past the splenic flexure, down the descending colon and back to the sigmoid colon.  Upon 2nd pass to the descending and sigmoid colon, no abnormalities were noted.  Colonoscope was withdrawn back into the rectum and the scope was retroflexed upon itself in the retroflexed position.  Small internal hemorrhoids were noted.  No other abnormalities were seen.  Photodocumentation was taken.  Colonoscope was then straightened and suction was applied to remove excess air and the colonoscope was withdrawn and he tolerated the procedure well without complications.  He was taken to the recovery room for further monitoring.  He will be instructed to follow up with Dr. Fletcher as needed or on his next scheduled appointment, and he will also be instructed to have repeat colonoscopy in 10 years unless symptoms were to develop.          LIANA LAYTON MD      D:  04/29/2024 08:01:57     T:  04/29/2024 09:13:47     JACKIE/ILIA  Job #:  [id number]     Doc#:  [id number]    CC:   Dr. Michelle Fletcher

## 2024-04-29 NOTE — BRIEF OP NOTE
Brief Postoperative Note      Patient: Sid Blakely  YOB: 1950  MRN: [id number]    Date of Procedure: 4/29/2024    Pre-Op Diagnosis Codes:     * Screen for colon cancer [Z12.11]    Post-Op Diagnosis:  Colon cancer screening.     Normal Colon.     Small internal hemorrhoids.       Procedure(s):  Colonoscopy to the cecum.      Surgeon(s):  Yordy Colon MD    Assistant:  Agueda Phillips CST    Anesthesia: Gilberto Johnson CRNA.  MAC anesthesia    Estimated Blood Loss (mL): None    Complications: None    Specimens:   * No specimens in log *    Implants:  * No implants in log *      Drains: * No LDAs found *      Electronically signed by Yordy Colon MD on 4/29/2024 at 7:54 AM

## 2024-04-29 NOTE — H&P
NOT REPORTED        05/01/2021                 VLDL                                         04/12/2021                        NOT REPORTED (H)  Lab Results       Component                Value               Date                       CHOLHDLRATIO             4.0                 10/26/2023                 CHOLHDLRATIO             3.6                 05/01/2023                 CHOLHDLRATIO             3.5                 05/01/2023                                             Review of Systems   Constitutional: Negative.    HENT:  Negative for congestion, ear pain, postnasal drip, sneezing and sore throat.    Eyes:  Negative for visual disturbance.   Respiratory: Negative.     Cardiovascular:  Negative for chest pain, palpitations and leg swelling.   Gastrointestinal:  Negative for abdominal pain, blood in stool, constipation, diarrhea and nausea.   Genitourinary:  Negative for difficulty urinating, dysuria, frequency and urgency.   Musculoskeletal:  Negative for arthralgias, joint swelling, myalgias, neck pain and neck stiffness.   Skin: Negative.    Neurological:  Negative for syncope.   Psychiatric/Behavioral: Negative.           Objective   Physical Exam  Vitals and nursing note reviewed.   Constitutional:       Appearance: He is well-developed. He is obese.   HENT:      Head: Atraumatic.   Eyes:      Conjunctiva/sclera: Conjunctivae normal.   Cardiovascular:      Rate and Rhythm: Normal rate and regular rhythm.      Heart sounds: Murmur heard.   Pulmonary:      Effort: Pulmonary effort is normal.      Breath sounds: Normal breath sounds.   Abdominal:      Palpations: Abdomen is soft.      Tenderness: There is no abdominal tenderness.   Musculoskeletal:      Cervical back: Normal range of motion and neck supple.   Lymphadenopathy:      Cervical: No cervical adenopathy.   Skin:     Findings: No rash.   Neurological:      Mental Status: He is alert.   Psychiatric:         Behavior: Behavior normal.         Thought

## 2024-05-08 RX ORDER — FUROSEMIDE 80 MG
80 TABLET ORAL DAILY
Qty: 90 TABLET | Refills: 1 | Status: SHIPPED | OUTPATIENT
Start: 2024-05-08

## 2024-05-08 NOTE — TELEPHONE ENCOUNTER
Last OV: 9/27/2023  Last RX:    Next scheduled apt: 5/14/2024  AWV             Surescript requesting a refill

## 2024-05-09 ENCOUNTER — OFFICE VISIT (OUTPATIENT)
Dept: CARDIOLOGY CLINIC | Age: 74
End: 2024-05-09

## 2024-05-09 VITALS
WEIGHT: 246 LBS | DIASTOLIC BLOOD PRESSURE: 60 MMHG | HEART RATE: 87 BPM | BODY MASS INDEX: 35.3 KG/M2 | SYSTOLIC BLOOD PRESSURE: 110 MMHG | OXYGEN SATURATION: 93 %

## 2024-05-09 DIAGNOSIS — I10 PRIMARY HYPERTENSION: ICD-10-CM

## 2024-05-09 DIAGNOSIS — I25.119 CORONARY ARTERY DISEASE INVOLVING NATIVE CORONARY ARTERY OF NATIVE HEART WITH ANGINA PECTORIS (HCC): Primary | ICD-10-CM

## 2024-05-09 DIAGNOSIS — I48.0 PAF (PAROXYSMAL ATRIAL FIBRILLATION) (HCC): ICD-10-CM

## 2024-05-09 DIAGNOSIS — E55.9 VITAMIN D DEFICIENCY DISEASE: ICD-10-CM

## 2024-05-09 DIAGNOSIS — E11.42 TYPE 2 DIABETES MELLITUS WITH PERIPHERAL NEUROPATHY (HCC): ICD-10-CM

## 2024-05-09 NOTE — PROGRESS NOTES
Ov Dr Crump for follow up  No chest pain   No palpitations   No new   Balance is getting worse  Colonoscopy good   Stacie Resendez - was recruited   To another school d/t baseball   Renée is the dtg   Bedside echo done     No changes    Follow up prior to going to [facility]

## 2024-05-13 SDOH — ECONOMIC STABILITY: FOOD INSECURITY: WITHIN THE PAST 12 MONTHS, YOU WORRIED THAT YOUR FOOD WOULD RUN OUT BEFORE YOU GOT MONEY TO BUY MORE.: NEVER TRUE

## 2024-05-13 SDOH — HEALTH STABILITY: PHYSICAL HEALTH: ON AVERAGE, HOW MANY DAYS PER WEEK DO YOU ENGAGE IN MODERATE TO STRENUOUS EXERCISE (LIKE A BRISK WALK)?: 0 DAYS

## 2024-05-13 SDOH — ECONOMIC STABILITY: FOOD INSECURITY: WITHIN THE PAST 12 MONTHS, THE FOOD YOU BOUGHT JUST DIDN'T LAST AND YOU DIDN'T HAVE MONEY TO GET MORE.: NEVER TRUE

## 2024-05-13 SDOH — ECONOMIC STABILITY: INCOME INSECURITY: HOW HARD IS IT FOR YOU TO PAY FOR THE VERY BASICS LIKE FOOD, HOUSING, MEDICAL CARE, AND HEATING?: NOT HARD AT ALL

## 2024-05-13 ASSESSMENT — LIFESTYLE VARIABLES
HOW OFTEN DO YOU HAVE SIX OR MORE DRINKS ON ONE OCCASION: 1
HOW MANY STANDARD DRINKS CONTAINING ALCOHOL DO YOU HAVE ON A TYPICAL DAY: PATIENT DOES NOT DRINK
HOW MANY STANDARD DRINKS CONTAINING ALCOHOL DO YOU HAVE ON A TYPICAL DAY: 0
HOW OFTEN DO YOU HAVE A DRINK CONTAINING ALCOHOL: NEVER
HOW OFTEN DO YOU HAVE A DRINK CONTAINING ALCOHOL: 1

## 2024-05-13 ASSESSMENT — PATIENT HEALTH QUESTIONNAIRE - PHQ9
SUM OF ALL RESPONSES TO PHQ QUESTIONS 1-9: 0
SUM OF ALL RESPONSES TO PHQ9 QUESTIONS 1 & 2: 0
SUM OF ALL RESPONSES TO PHQ QUESTIONS 1-9: 0
SUM OF ALL RESPONSES TO PHQ QUESTIONS 1-9: 0
1. LITTLE INTEREST OR PLEASURE IN DOING THINGS: NOT AT ALL
2. FEELING DOWN, DEPRESSED OR HOPELESS: NOT AT ALL
SUM OF ALL RESPONSES TO PHQ QUESTIONS 1-9: 0

## 2024-05-14 ENCOUNTER — OFFICE VISIT (OUTPATIENT)
Dept: FAMILY MEDICINE CLINIC | Age: 74
End: 2024-05-14

## 2024-05-14 VITALS
OXYGEN SATURATION: 95 % | WEIGHT: 247 LBS | DIASTOLIC BLOOD PRESSURE: 64 MMHG | HEIGHT: 70 IN | BODY MASS INDEX: 35.36 KG/M2 | HEART RATE: 86 BPM | SYSTOLIC BLOOD PRESSURE: 130 MMHG

## 2024-05-14 DIAGNOSIS — E11.3419: ICD-10-CM

## 2024-05-14 DIAGNOSIS — R26.81 UNSTABLE GAIT: ICD-10-CM

## 2024-05-14 DIAGNOSIS — R29.818 DIFFICULTY BALANCING: ICD-10-CM

## 2024-05-14 DIAGNOSIS — I27.20 PULMONARY HTN (HCC): ICD-10-CM

## 2024-05-14 DIAGNOSIS — J98.4 MIXED RESTRICTIVE AND OBSTRUCTIVE LUNG DISEASE (HCC): ICD-10-CM

## 2024-05-14 DIAGNOSIS — G89.29 CHRONIC NECK PAIN: ICD-10-CM

## 2024-05-14 DIAGNOSIS — Z00.00 MEDICARE ANNUAL WELLNESS VISIT, SUBSEQUENT: Primary | ICD-10-CM

## 2024-05-14 DIAGNOSIS — J43.9 MIXED RESTRICTIVE AND OBSTRUCTIVE LUNG DISEASE (HCC): ICD-10-CM

## 2024-05-14 DIAGNOSIS — M54.2 CHRONIC NECK PAIN: ICD-10-CM

## 2024-05-14 DIAGNOSIS — G47.33 OSA TREATED WITH BIPAP: ICD-10-CM

## 2024-05-14 RX ORDER — ALLOPURINOL 100 MG/1
200 TABLET ORAL DAILY
Qty: 180 TABLET | Refills: 3 | Status: SHIPPED | OUTPATIENT
Start: 2024-05-14

## 2024-05-14 NOTE — PATIENT INSTRUCTIONS
blood flow. A heart attack happens when blood flow is completely blocked. A high-fat diet, smoking, and other factors increase the risk of heart disease.  Your doctor has found that you have a chance of having heart disease. A heart-healthy lifestyle can help keep your heart healthy and prevent heart disease. This lifestyle includes eating healthy, being active, staying at a weight that's healthy for you, and not smoking or using tobacco. It also includes taking medicines as directed, managing other health conditions, and trying to get a healthy amount of sleep.  Follow-up care is a key part of your treatment and safety. Be sure to make and go to all appointments, and call your doctor if you are having problems. It's also a good idea to know your test results and keep a list of the medicines you take.  How can you care for yourself at home?  Diet    Use less salt when you cook and eat. This helps lower your blood pressure. Taste food before salting. Add only a little salt when you think you need it. With time, your taste buds will adjust to less salt.     Eat fewer snack items, fast foods, canned soups, and other high-salt, high-fat, processed foods.     Read food labels and try to avoid saturated and trans fats. They increase your risk of heart disease by raising cholesterol levels.     Limit the amount of solid fat--butter, margarine, and shortening--you eat. Use olive, peanut, or canola oil when you cook. Bake, broil, and steam foods instead of frying them.     Eat a variety of fruit and vegetables every day. Dark green, deep orange, red, or yellow fruits and vegetables are especially good for you. Examples include spinach, carrots, peaches, and berries.     Foods high in fiber can reduce your cholesterol and provide important vitamins and minerals. High-fiber foods include whole-grain cereals and breads, oatmeal, beans, brown rice, citrus fruits, and apples.     Eat lean proteins. Heart-healthy proteins include

## 2024-05-16 PROBLEM — R65.10 SIRS (SYSTEMIC INFLAMMATORY RESPONSE SYNDROME) (HCC): Status: RESOLVED | Noted: 2022-09-19 | Resolved: 2024-05-16

## 2024-05-16 PROBLEM — I87.2 VENOUS STASIS DERMATITIS OF BOTH LOWER EXTREMITIES: Status: RESOLVED | Noted: 2022-09-19 | Resolved: 2024-05-16

## 2024-05-16 PROBLEM — R60.0 BILATERAL LEG EDEMA: Status: RESOLVED | Noted: 2022-09-19 | Resolved: 2024-05-16

## 2024-05-16 PROBLEM — R78.81 BACTEREMIA DUE TO GROUP B STREPTOCOCCUS: Status: RESOLVED | Noted: 2022-09-20 | Resolved: 2024-05-16

## 2024-05-16 PROBLEM — A40.1 SEPSIS DUE TO STREPTOCOCCUS AGALACTIAE (HCC): Status: RESOLVED | Noted: 2022-09-19 | Resolved: 2024-05-16

## 2024-05-16 PROBLEM — B95.1 BACTEREMIA DUE TO GROUP B STREPTOCOCCUS: Status: RESOLVED | Noted: 2022-09-20 | Resolved: 2024-05-16

## 2024-05-16 PROBLEM — A41.9 SEPSIS (HCC): Status: RESOLVED | Noted: 2022-09-16 | Resolved: 2024-05-16

## 2024-05-16 RX ORDER — ALLOPURINOL 100 MG/1
200 TABLET ORAL DAILY
Qty: 180 TABLET | Refills: 3 | OUTPATIENT
Start: 2024-05-16

## 2024-05-17 NOTE — PROGRESS NOTES
Name: Sid Blakely  : 1950         Chief Complaint:     Chief Complaint   Patient presents with    Medicare AWV    Diabetes    Hypertension    Gout       History of Present Illness:      Sid Blakely is a 73 y.o.  male who presents with Medicare AWV, Diabetes, Hypertension, and Gout      HPI    Poor balance, no recent falls but unsteady. A lot of neuropathy in feet for a long time and has very little sensation even 1/3-1/2 way up each leg, worse on L r/t CABG harvesting. Chronic neck pain, some pain in both shoulders. Does have lumbar stenosis.     DM up and down. Having med adjustments by endo, short-acting now 20 units with meals.     GHAZALA compliant and benefiting from BiPAP. Last BiPAP recommendation was 20/15 but was way too high of pressure, decreased and has done better. Was having trouble with machine but replaced power cord a couple days ago and it's doing better now. Machine at least a few yrs old. [employer].     Medical History:     Patient Active Problem List   Diagnosis    Hypertension    Hyperlipidemia    Coronary artery disease involving native coronary artery of native heart with angina pectoris (HCC)    H/O coronary angioplasty    Severe nonproliferative diabetic retinopathy with macular edema associated with type 2 diabetes mellitus (HCC)    Type 2 diabetes mellitus with peripheral neuropathy (HCC)    Diabetic peripheral neuropathy (HCC)    GHAZALA (obstructive sleep apnea)    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HCC)    Pulmonary HTN (HCC)    PAF (paroxysmal atrial fibrillation) (HCC)    Lumbosacral spondylosis without myelopathy    Pars defect with spondylolisthesis    Spinal stenosis of lumbar region without neurogenic claudication    Systolic congestive heart failure (HCC)       Medications:       Prior to Admission medications    Medication Sig Start Date End Date Taking? Authorizing Provider   allopurinol (ZYLOPRIM) 100 MG tablet Take 2 tablets by 
Substance Monitoring No signs of potential drug abuse or diversion identified. filed at 06/30/2020 1017             Activity, Diet, and Weight:  On average, how many days per week do you engage in moderate to strenuous exercise (like a brisk walk)?: 0 days       Do you eat balanced/healthy meals regularly?: Yes    Body mass index is 35.44 kg/m². (!) Abnormal    Obesity Interventions:  Patient declines any further evaluation or treatment     Hearing Screen:  Do you or your family notice any trouble with your hearing that hasn't been managed with hearing aids?: (!) Yes    Interventions:  Patient declines any further evaluation or treatment                     Objective   Vitals:    05/14/24 0942   BP: 130/64   Pulse: 86   SpO2: 95%   Weight: 112 kg (247 lb)   Height: 1.778 m (5' 10\")      Body mass index is 35.44 kg/m².             Allergies   Allergen Reactions    Oxycodone      Other reaction(s): vomiting     Prior to Visit Medications    Medication Sig Taking? Authorizing Provider   allopurinol (ZYLOPRIM) 100 MG tablet Take 2 tablets by mouth daily Yes Michelle Fletcher DO   furosemide (LASIX) 80 MG tablet Take 1 tablet by mouth daily Yes Michelle Fletcher DO   metFORMIN (GLUCOPHAGE) 1000 MG tablet 1 tablet 2 times daily (with meals) Yes Yordy Colon MD   sacubitril-valsartan (ENTRESTO)  MG per tablet Take 1 tablet by mouth 2 times daily Yes Arya Crump MD   spironolactone (ALDACTONE) 25 MG tablet Take 1 tablet by mouth daily Yes Arya Crump MD   atorvastatin (LIPITOR) 40 MG tablet Take 1 tablet by mouth daily Yes Michelle Fletchre DO   celecoxib (CELEBREX) 200 MG capsule Take 1 capsule by mouth daily Yes Michelle Fletcher DO   apixaban (ELIQUIS) 5 MG TABS tablet TAKE 1 TABLET TWICE A DAY Yes Arya Crump MD   NIFEdipine (ADALAT CC) 90 MG extended release tablet Take 1/2 tablet daily Yes Arya Crump MD   Lactobacillus (ACIDOPHILUS PO) Take by mouth Yes Gavi Thompson MD

## 2024-05-28 ENCOUNTER — HOSPITAL ENCOUNTER (OUTPATIENT)
Dept: PHYSICAL THERAPY | Age: 74
Setting detail: THERAPIES SERIES
Discharge: HOME | End: 2024-05-28
Payer: MEDICARE

## 2024-05-28 PROCEDURE — 97110 THERAPEUTIC EXERCISES: CPT

## 2024-05-28 PROCEDURE — 97162 PT EVAL MOD COMPLEX 30 MIN: CPT

## 2024-05-28 ASSESSMENT — PAIN SCALES - GENERAL: PAINLEVEL_OUTOF10: 0

## 2024-05-28 NOTE — THERAPY EVALUATION
improve VELASQUEZ score from26/56 to 36/56 to demonstrate improve balance and reduced fall risk  Long Term Goal 2: Patient will improve bilateral hip MMT to 4/5 to improve strength and reduce fall risk  Long Term Goal 3: Patient will improve bilateral ankle MMT to 4/5 to reduce fall risk    Patient's Goal:  Patient Goals : Improve balance    Timed Code Treatment Minutes: 10 Minutes  Total Treatment Time: 50     Time In: 1100  Time Out: 1150    Sdi Thorpe, PT Date: 5/28/2024

## 2024-05-28 NOTE — PLAN OF CARE
reduce fall risk  Long Term Goal 3: Patient will improve bilateral ankle MMT to 4/5 to reduce fall risk     Sid Thorpe, PT   Date: 5/28/2024    ______________________________________ Date: 5/28/2024  Physician Signature  By signing above or cosigning electronically, I have reviewed this Plan of Care and certify a need for medically necessary rehabilitation services.

## 2024-05-30 ENCOUNTER — HOSPITAL ENCOUNTER (OUTPATIENT)
Dept: PHYSICAL THERAPY | Age: 74
Setting detail: THERAPIES SERIES
Discharge: HOME | End: 2024-05-30
Payer: MEDICARE

## 2024-05-30 PROCEDURE — 97110 THERAPEUTIC EXERCISES: CPT

## 2024-05-30 NOTE — PROGRESS NOTES
Phone: [phone]                 [facility]      Fax: [phone]                            Outpatient Physical Therapy                                                                            Daily Note    Date: 2024  Patient Name: Sid Blakely        MRN: [id number]   ACCT#:  [id number]  : 1950  (73 y.o.)    Referring Provider (secondary): Justine         Diagnosis: R29.818 (ICD-10-CM) - Difficulty balancing  R26.81 (ICD-10-CM) - Unstable gait  M54.2, G89.29 (ICD-10-CM) - Chronic neck pain  Treatment Diagnosis: Poor balance    Onset Date: 23  PT Insurance Information: Medicare  Total # of Visits Approved: 24 Per Physician Order  Total # of Visits to Date: 2  Plan of Care/Certification Expiration Date: 24    Pre-Treatment Pain:  0/10     Assessment  Assessment: Patient ambulates into department with straight cane.  Denies pain prior to session but notes cramping in B LE primarily in thighs.  Completes exercises as outlined above to help promote strength and balance.  Continue to progress as able.    Plan  Continue with current plan of care    Exercises/Modalities/Manual:  See DocFlow Sheet    Education:           Goals  (Total # of Visits to Date: 2)   Short Term Goals  Time Frame for Short Term Goals: 12 visits  Short Term Goal 1: Patient will improve VELASQUEZ score from 26/56 to 30/56 to demonstrate improved balance and reduced fall risk  Short Term Goal 2: Patient will demonstrate compliance with HEP to optimize therapy progress    Long Term Goals  Time Frame for Long Term Goals : 24 visits  Long Term Goal 1: Patient will improve VELASQUEZ score from26/56 to 36/56 to demonstrate improve balance and reduced fall risk  Long Term Goal 2: Patient will improve bilateral hip MMT to 4/5 to improve strength and reduce fall risk  Long Term Goal 3: Patient will improve bilateral ankle MMT to 4/5 to reduce fall risk    Post Treatment Pain:  0/10    Time In: 1505    Time Out : 0054

## 2024-06-03 ENCOUNTER — HOSPITAL ENCOUNTER (OUTPATIENT)
Dept: PHYSICAL THERAPY | Age: 74
Setting detail: THERAPIES SERIES
Discharge: HOME | End: 2024-06-03
Payer: MEDICARE

## 2024-06-03 PROCEDURE — 97112 NEUROMUSCULAR REEDUCATION: CPT

## 2024-06-03 PROCEDURE — 97110 THERAPEUTIC EXERCISES: CPT

## 2024-06-03 NOTE — PROGRESS NOTES
Phone: [phone]                 [facility]      Fax: [phone]                            Outpatient Physical Therapy                                                                            Daily Note    Date: 6/3/2024  Patient Name: Sid Blakely        MRN: [id number]   ACCT#:  [id number]  : 1950  (73 y.o.)    Referring Provider (secondary): Justine         Diagnosis: R29.818 (ICD-10-CM) - Difficulty balancing  R26.81 (ICD-10-CM) - Unstable gait  M54.2, G89.29 (ICD-10-CM) - Chronic neck pain  Treatment Diagnosis: Poor balance    Onset Date: 23  PT Insurance Information: Medicare  Total # of Visits Approved: 24 Per Physician Order  Total # of Visits to Date: 3  Plan of Care/Certification Expiration Date: 24    Pre-Treatment Pain:  0/10     Assessment  Assessment: Patient ambulates into session with cane. Continued with balance and LE strengthening exercises per flowsheet. Patient struggles with balalnce ex on aerofoam and requires CGA. Post session patient denies pain. Will continue to progress as able.    Plan  Continue with current plan of care    Exercises/Modalities/Manual:  See DocFlow Sheet    Education: HEP     Goals  (Total # of Visits to Date: 3)   Short Term Goals  Time Frame for Short Term Goals: 12 visits  Short Term Goal 1: Patient will improve VELASQUEZ score from 26/56 to 30/56 to demonstrate improved balance and reduced fall risk  Short Term Goal 2: Patient will demonstrate compliance with HEP to optimize therapy progress    Long Term Goals  Time Frame for Long Term Goals : 24 visits  Long Term Goal 1: Patient will improve VELASQUEZ score from26/56 to 36/56 to demonstrate improve balance and reduced fall risk  Long Term Goal 2: Patient will improve bilateral hip MMT to 4/5 to improve strength and reduce fall risk  Long Term Goal 3: Patient will improve bilateral ankle MMT to 4/5 to reduce fall risk    Post Treatment Pain:  0/10    Time In: 1035    Time Out : 1115

## 2024-06-05 ENCOUNTER — HOSPITAL ENCOUNTER (OUTPATIENT)
Dept: PHYSICAL THERAPY | Age: 74
Setting detail: THERAPIES SERIES
Discharge: HOME | End: 2024-06-05
Payer: MEDICARE

## 2024-06-05 PROCEDURE — 97112 NEUROMUSCULAR REEDUCATION: CPT

## 2024-06-05 PROCEDURE — 97110 THERAPEUTIC EXERCISES: CPT

## 2024-06-05 NOTE — PROGRESS NOTES
Phone: [phone]                 [facility]      Fax: [phone]                            Outpatient Physical Therapy                                                                            Daily Note    Date: 2024  Patient Name: Sid Blakely        MRN: [id number]   ACCT#:  [id number]  : 1950  (73 y.o.)    Referring Provider (secondary): Justine         Diagnosis: R29.818 (ICD-10-CM) - Difficulty balancing  R26.81 (ICD-10-CM) - Unstable gait  M54.2, G89.29 (ICD-10-CM) - Chronic neck pain  Treatment Diagnosis: Poor balance    Onset Date: 23  PT Insurance Information: Medicare  Total # of Visits Approved: 24 Per Physician Order  Total # of Visits to Date: 4  Plan of Care/Certification Expiration Date: 24    Pre-Treatment Pain:  0/10     Assessment  Assessment: Patient reports no increased pain or soreness following last session. Continued working on LE strengthening and balance exercises per flowsheet. Patient notes he was able to complete bridging exercise at home the other night. Patient states he was able to get down on the floor to complete bridges as he sinks into bed and couch too much. Following today's session patient reports no pain, but does feel some LE fatigue. Patient was more SOB throughout today's session and required one seated restbreak.    Plan  Continue with current plan of care    Exercises/Modalities/Manual:  See DocFlow Sheet    Education: HEP     Goals  (Total # of Visits to Date: 4)   Short Term Goals  Time Frame for Short Term Goals: 12 visits  Short Term Goal 1: Patient will improve VELASQUEZ score from 26/56 to 30/56 to demonstrate improved balance and reduced fall risk  Short Term Goal 2: Patient will demonstrate compliance with HEP to optimize therapy progress    Long Term Goals  Time Frame for Long Term Goals : 24 visits  Long Term Goal 1: Patient will improve VELASQUEZ score from26/56 to 36/56 to demonstrate improve balance and reduced fall risk  Long

## 2024-06-07 ENCOUNTER — HOSPITAL ENCOUNTER (OUTPATIENT)
Dept: PHYSICAL THERAPY | Age: 74
Setting detail: THERAPIES SERIES
Discharge: HOME | End: 2024-06-07
Payer: MEDICARE

## 2024-06-07 PROCEDURE — 97112 NEUROMUSCULAR REEDUCATION: CPT

## 2024-06-07 PROCEDURE — 97110 THERAPEUTIC EXERCISES: CPT

## 2024-06-07 NOTE — PROGRESS NOTES
Phone: [phone]                 [facility]      Fax: [phone]                            Outpatient Physical Therapy                                                                            Daily Note    Date: 2024  Patient Name: Sid Blakely        MRN: [id number]   ACCT#:  [id number]  : 1950  (73 y.o.)    Referring Provider (secondary): Justine         Diagnosis: R29.818 (ICD-10-CM) - Difficulty balancing  R26.81 (ICD-10-CM) - Unstable gait  M54.2, G89.29 (ICD-10-CM) - Chronic neck pain  Treatment Diagnosis: Poor balance    Onset Date: 23  PT Insurance Information: Medicare  Total # of Visits Approved: 24 Per Physician Order  Total # of Visits to Date: 5  Plan of Care/Certification Expiration Date: 24    Pre-Treatment Pain:  0/10  Subjective: 1015:  Denies pain.  Feels his cramping is less frequent.  Also notes change in medication may have helped.  Tolerating treatments well so far, denies any adverse reactions to prior treatments.  Assessment  Assessment: Building strength.  Balance remains limited but is improving.  Requires SBA or CGA for most balance work.    Plan  Continue with current plan of care    Exercises/Modalities/Manual:  See DocFlow Sheet    Education: education on progression of exercises          Goals  (Total # of Visits to Date: 5)   Short Term Goals  Time Frame for Short Term Goals: 12 visits  Short Term Goal 1: Patient will improve VELASQUEZ score from 26/56 to 30/56 to demonstrate improved balance and reduced fall risk  Short Term Goal 2: Patient will demonstrate compliance with HEP to optimize therapy progress    Long Term Goals  Time Frame for Long Term Goals : 24 visits  Long Term Goal 1: Patient will improve VELASQUEZ score from26/56 to 36/56 to demonstrate improve balance and reduced fall risk  Long Term Goal 2: Patient will improve bilateral hip MMT to 4/5 to improve strength and reduce fall risk  Long Term Goal 3: Patient will improve bilateral ankle

## 2024-06-10 ENCOUNTER — HOSPITAL ENCOUNTER (OUTPATIENT)
Dept: PHYSICAL THERAPY | Age: 74
Setting detail: THERAPIES SERIES
Discharge: HOME | End: 2024-06-10
Payer: MEDICARE

## 2024-06-10 PROCEDURE — 97112 NEUROMUSCULAR REEDUCATION: CPT

## 2024-06-10 PROCEDURE — 97110 THERAPEUTIC EXERCISES: CPT

## 2024-06-10 NOTE — PROGRESS NOTES
Phone: [phone]                 [facility]      Fax: [phone]                            Outpatient Physical Therapy                                                                            Daily Note    Date: 6/10/2024  Patient Name: Sid Blakely        MRN: [id number]   ACCT#:  [id number]  : 1950  (73 y.o.)    Referring Provider (secondary): Justine         Diagnosis: R29.818 (ICD-10-CM) - Difficulty balancing  R26.81 (ICD-10-CM) - Unstable gait  M54.2, G89.29 (ICD-10-CM) - Chronic neck pain  Treatment Diagnosis: Poor balance    Onset Date: 23  PT Insurance Information: Medicare  Total # of Visits Approved: 24 Per Physician Order  Total # of Visits to Date: 6  Plan of Care/Certification Expiration Date: 24    Pre-Treatment Pain:  0/10     Assessment  Assessment: Patient denies pain this afternoon. Patient notes his strength continues to improve and notes a little improvement with balance also. Continued with LE strengthening and balance exercises per flowsheet. Patient reports compliance to HEP. Post session patient continues to report no pain. Will continue to progress as able.    Plan  Continue with current plan of care    Exercises/Modalities/Manual:  See DocFlow Sheet    Education: HEP     Goals  (Total # of Visits to Date: 6)   Short Term Goals  Time Frame for Short Term Goals: 12 visits  Short Term Goal 1: Patient will improve VELASQUEZ score from 26/56 to 30/56 to demonstrate improved balance and reduced fall risk  Short Term Goal 2: Patient will demonstrate compliance with HEP to optimize therapy progress    Long Term Goals  Time Frame for Long Term Goals : 24 visits  Long Term Goal 1: Patient will improve VELASQUEZ score from26/56 to 36/56 to demonstrate improve balance and reduced fall risk  Long Term Goal 2: Patient will improve bilateral hip MMT to 4/5 to improve strength and reduce fall risk  Long Term Goal 3: Patient will improve bilateral ankle MMT to 4/5 to reduce

## 2024-06-12 ENCOUNTER — HOSPITAL ENCOUNTER (OUTPATIENT)
Dept: PHYSICAL THERAPY | Age: 74
Setting detail: THERAPIES SERIES
Discharge: HOME | End: 2024-06-12
Payer: MEDICARE

## 2024-06-12 PROCEDURE — 97112 NEUROMUSCULAR REEDUCATION: CPT

## 2024-06-12 PROCEDURE — 97110 THERAPEUTIC EXERCISES: CPT

## 2024-06-12 NOTE — PROGRESS NOTES
Phone: [phone]                 [facility]      Fax: [phone]                            Outpatient Physical Therapy                                                                            Daily Note    Date: 2024  Patient Name: Sid Blakely        MRN: [id number]   ACCT#:  [id number]  : 1950  (73 y.o.)    Referring Provider (secondary): Justine         Diagnosis: R29.818 (ICD-10-CM) - Difficulty balancing  R26.81 (ICD-10-CM) - Unstable gait  M54.2, G89.29 (ICD-10-CM) - Chronic neck pain  Treatment Diagnosis: Poor balance    Onset Date: 23  PT Insurance Information: Medicare  Total # of Visits Approved: 24 Per Physician Order  Total # of Visits to Date: 7  Plan of Care/Certification Expiration Date: 24    Pre-Treatment Pain:  0/10     Assessment  Assessment: Patient continues to report no pain. Since beginning PT patient notes strength is improving, but notes his balance is stiff off. This is most likely due to his neuropathy. Completed strengthening and balance exercises per flowsheet. Patient had difficulty on aerofoam this morning. Post session patient continues to report no pain.    Plan  Continue with current plan of care    Exercises/Modalities/Manual:  See DocFlow Sheet    Education: HEP     Goals  (Total # of Visits to Date: 7)   Short Term Goals  Time Frame for Short Term Goals: 12 visits  Short Term Goal 1: Patient will improve VELASQUEZ score from 26/56 to 30/56 to demonstrate improved balance and reduced fall risk  Short Term Goal 2: Patient will demonstrate compliance with HEP to optimize therapy progress    Long Term Goals  Time Frame for Long Term Goals : 24 visits  Long Term Goal 1: Patient will improve VELASQUEZ score from26/56 to 36/56 to demonstrate improve balance and reduced fall risk  Long Term Goal 2: Patient will improve bilateral hip MMT to 4/5 to improve strength and reduce fall risk  Long Term Goal 3: Patient will improve bilateral ankle MMT to 4/5 to

## 2024-06-14 ENCOUNTER — HOSPITAL ENCOUNTER (OUTPATIENT)
Dept: PHYSICAL THERAPY | Age: 74
Setting detail: THERAPIES SERIES
Discharge: HOME | End: 2024-06-14
Payer: MEDICARE

## 2024-06-14 PROCEDURE — 97112 NEUROMUSCULAR REEDUCATION: CPT

## 2024-06-14 PROCEDURE — 97110 THERAPEUTIC EXERCISES: CPT

## 2024-06-14 NOTE — PROGRESS NOTES
Phone: [phone]                 [facility]      Fax: [phone]                            Outpatient Physical Therapy                                                                            Daily Note    Date: 2024  Patient Name: Sid Blakely        MRN: [id number]   ACCT#:  [id number]  : 1950  (73 y.o.)    Referring Provider (secondary): Justine         Diagnosis: R29.818 (ICD-10-CM) - Difficulty balancing  R26.81 (ICD-10-CM) - Unstable gait  M54.2, G89.29 (ICD-10-CM) - Chronic neck pain  Treatment Diagnosis: Poor balance    Onset Date: 23  PT Insurance Information: Medicare  Total # of Visits Approved: 24 Per Physician Order  Total # of Visits to Date: 8  Plan of Care/Certification Expiration Date: 24    Pre-Treatment Pain:  0/10  Subjective: 1050:  Nothing new to report.  Feeling tired today, did not sleep well.  Assessment  Assessment: Somewhat limited table exercises due to cramping.  Did well with balance work.  He continues to improve here.  Mostly challenged with eyes closed.    Plan  Continue with current plan of care    Exercises/Modalities/Manual:  See DocFlow Sheet    Education: Education on progression of exercises        Goals  (Total # of Visits to Date: 8)   Short Term Goals  Time Frame for Short Term Goals: 12 visits  Short Term Goal 1: Patient will improve VELASQUEZ score from 26/56 to 30/56 to demonstrate improved balance and reduced fall risk  Short Term Goal 2: Patient will demonstrate compliance with HEP to optimize therapy progress    Long Term Goals  Time Frame for Long Term Goals : 24 visits  Long Term Goal 1: Patient will improve VELASQUEZ score from26/56 to 36/56 to demonstrate improve balance and reduced fall risk  Long Term Goal 2: Patient will improve bilateral hip MMT to 4/5 to improve strength and reduce fall risk  Long Term Goal 3: Patient will improve bilateral ankle MMT to 4/5 to reduce fall risk    Post Treatment Pain:  0/10    Time In: 1050

## 2024-06-17 ENCOUNTER — HOSPITAL ENCOUNTER (OUTPATIENT)
Dept: PHYSICAL THERAPY | Age: 74
Setting detail: THERAPIES SERIES
Discharge: HOME | End: 2024-06-17
Payer: MEDICARE

## 2024-06-17 PROCEDURE — 97110 THERAPEUTIC EXERCISES: CPT

## 2024-06-17 PROCEDURE — 97112 NEUROMUSCULAR REEDUCATION: CPT

## 2024-06-17 NOTE — PROGRESS NOTES
Phone: [phone]                 [facility]      Fax: [phone]                            Outpatient Physical Therapy                                                                            Daily Note    Date: 2024  Patient Name: Sid Blakely        MRN: [id number]   ACCT#:  [id number]  : 1950  (73 y.o.)    Referring Provider (secondary): Justine         Diagnosis: R29.818 (ICD-10-CM) - Difficulty balancing  R26.81 (ICD-10-CM) - Unstable gait  M54.2, G89.29 (ICD-10-CM) - Chronic neck pain  Treatment Diagnosis: Poor balance    Onset Date: 23  PT Insurance Information: Medicare  Total # of Visits Approved: 24 Per Physician Order  Total # of Visits to Date: 9  Plan of Care/Certification Expiration Date: 24    Pre-Treatment Pain:  0/10     Assessment  Assessment: Patient denies pain this afternoon. Continued with balance and LE strengthening exercises per flowsheet with good tolerance from patient. Patient continues to note strength improving, but still notes balance difficulties due to neuropathy. Patient required one seated rest break today due to fatigue. Post session patient continues to report no pain.    Plan  Continue with current plan of care    Exercises/Modalities/Manual:  See DocFlow Sheet    Education: HEP     Goals  (Total # of Visits to Date: 9)   Short Term Goals  Time Frame for Short Term Goals: 12 visits  Short Term Goal 1: Patient will improve VELASQUEZ score from 26/56 to 30/56 to demonstrate improved balance and reduced fall risk  Short Term Goal 2: Patient will demonstrate compliance with HEP to optimize therapy progress    Long Term Goals  Time Frame for Long Term Goals : 24 visits  Long Term Goal 1: Patient will improve VELASQUEZ score from26/56 to 36/56 to demonstrate improve balance and reduced fall risk  Long Term Goal 2: Patient will improve bilateral hip MMT to 4/5 to improve strength and reduce fall risk  Long Term Goal 3: Patient will improve bilateral

## 2024-06-19 ENCOUNTER — HOSPITAL ENCOUNTER (OUTPATIENT)
Dept: PHYSICAL THERAPY | Age: 74
Setting detail: THERAPIES SERIES
Discharge: HOME | End: 2024-06-19
Payer: MEDICARE

## 2024-06-19 PROCEDURE — 97112 NEUROMUSCULAR REEDUCATION: CPT

## 2024-06-19 PROCEDURE — 97110 THERAPEUTIC EXERCISES: CPT

## 2024-06-19 NOTE — PROGRESS NOTES
Phone: [phone]                 [facility]      Fax: [phone]                            Outpatient Physical Therapy                                                                            Daily Note    Date: 2024  Patient Name: Sid Blakely        MRN: [id number]   ACCT#:  [id number]  : 1950  (73 y.o.)    Referring Provider (secondary): Justine         Diagnosis: R29.818 (ICD-10-CM) - Difficulty balancing  R26.81 (ICD-10-CM) - Unstable gait  M54.2, G89.29 (ICD-10-CM) - Chronic neck pain  Treatment Diagnosis: Poor balance    Onset Date: 23  PT Insurance Information: Medicare  Total # of Visits Approved: 24 Per Physician Order  Total # of Visits to Date: 10  Plan of Care/Certification Expiration Date: 24    Pre-Treatment Pain:  0/10     Assessment  Assessment: Patient reports no pain again this morning, but did note some cramping that relieved with pain cream. Continued with LE strengthening and balance exercises per flowsheet with good tolerance from patient. Patient continues to struggle with balalnce ex especially eyes closed. Post session patient denies pain. Will continue to progress as able.    Plan  Continue with current plan of care    Exercises/Modalities/Manual:  See DocFlow Sheet    Education: HEP     Goals  (Total # of Visits to Date: 10)   Short Term Goals  Time Frame for Short Term Goals: 12 visits  Short Term Goal 1: Patient will improve VELASQUEZ score from 26/56 to 30/56 to demonstrate improved balance and reduced fall risk  Short Term Goal 2: Patient will demonstrate compliance with HEP to optimize therapy progress - MET    Long Term Goals  Time Frame for Long Term Goals : 24 visits  Long Term Goal 1: Patient will improve VELASQUEZ score from26/56 to 36/56 to demonstrate improve balance and reduced fall risk  Long Term Goal 2: Patient will improve bilateral hip MMT to 4/5 to improve strength and reduce fall risk  Long Term Goal 3: Patient will improve bilateral

## 2024-06-21 ENCOUNTER — HOSPITAL ENCOUNTER (OUTPATIENT)
Dept: PHYSICAL THERAPY | Age: 74
Setting detail: THERAPIES SERIES
Discharge: HOME | End: 2024-06-21
Payer: MEDICARE

## 2024-06-21 PROCEDURE — 97110 THERAPEUTIC EXERCISES: CPT

## 2024-06-21 PROCEDURE — 97112 NEUROMUSCULAR REEDUCATION: CPT

## 2024-06-21 NOTE — PROGRESS NOTES
to reduce fall risk    Post Treatment Pain:  0/10    Time In: 1022    Time Out : 1100        Timed Code Treatment Minutes: 38 Minutes  Total Treatment Time: 38 Minutes    Sid Thorpe, PT     Date: 6/21/2024

## 2024-06-24 ENCOUNTER — HOSPITAL ENCOUNTER (OUTPATIENT)
Dept: PHYSICAL THERAPY | Age: 74
Setting detail: THERAPIES SERIES
Discharge: HOME | End: 2024-06-24
Payer: MEDICARE

## 2024-06-24 PROCEDURE — 97110 THERAPEUTIC EXERCISES: CPT

## 2024-06-24 PROCEDURE — 97112 NEUROMUSCULAR REEDUCATION: CPT

## 2024-06-24 RX ORDER — DOXAZOSIN 8 MG/1
8 TABLET ORAL 2 TIMES DAILY
Qty: 180 TABLET | Refills: 3 | Status: SHIPPED | OUTPATIENT
Start: 2024-06-24

## 2024-06-24 NOTE — PROGRESS NOTES
Phone: [phone]                 [facility]      Fax: [phone]                            Outpatient Physical Therapy                                                                            Daily Note    Date: 2024  Patient Name: Sid Blakely        MRN: [id number]   ACCT#:  [id number]  : 1950  (73 y.o.)    Referring Provider (secondary): Justine         Diagnosis: R29.818 (ICD-10-CM) - Difficulty balancing  R26.81 (ICD-10-CM) - Unstable gait  M54.2, G89.29 (ICD-10-CM) - Chronic neck pain  Treatment Diagnosis: Poor balance    Onset Date: 23  PT Insurance Information: Medicare  Total # of Visits Approved: 24 Per Physician Order  Total # of Visits to Date: 12  Plan of Care/Certification Expiration Date: 24    Pre-Treatment Pain:  0/10     Assessment  Assessment: Patient denies pain this morning. Continued with balance ex per flowsheet. Patient was able to complete 8 UE flex on aerofoam prior to LOB this morning. Previously patient was only able to complete 4-5 before LOB. Post session patient denies pain. Plan to recheck Cash and MMT next visit.    Plan  Continue with current plan of care    Exercises/Modalities/Manual:  See DocFlow Sheet    Education: HEP     Goals  (Total # of Visits to Date: 12)   Short Term Goals  Time Frame for Short Term Goals: 12 visits  Short Term Goal 1: Patient will improve CASH score from 26/56 to 30/56 to demonstrate improved balance and reduced fall risk - NOT MET  Short Term Goal 2: Patient will demonstrate compliance with HEP to optimize therapy progress - MET    Long Term Goals  Time Frame for Long Term Goals : 24 visits  Long Term Goal 1: Patient will improve CASH score from26/56 to 36/56 to demonstrate improve balance and reduced fall risk  Long Term Goal 2: Patient will improve bilateral hip MMT to 4/5 to improve strength and reduce fall risk  Long Term Goal 3: Patient will improve bilateral ankle MMT to 4/5 to reduce fall risk    Post

## 2024-06-26 ENCOUNTER — HOSPITAL ENCOUNTER (OUTPATIENT)
Dept: PHYSICAL THERAPY | Age: 74
Setting detail: THERAPIES SERIES
Discharge: HOME | End: 2024-06-26
Payer: MEDICARE

## 2024-06-26 PROCEDURE — 97112 NEUROMUSCULAR REEDUCATION: CPT

## 2024-06-26 PROCEDURE — 97110 THERAPEUTIC EXERCISES: CPT

## 2024-06-26 NOTE — PROGRESS NOTES
Phone: [phone]                 [facility]      Fax: [phone]                            Outpatient Physical Therapy                                                                            Daily Note    Date: 2024  Patient Name: Sid Blakely        MRN: [id number]   ACCT#:  [id number]  : 1950  (73 y.o.)    Referring Provider (secondary): Justine         Diagnosis: R29.818 (ICD-10-CM) - Difficulty balancing  R26.81 (ICD-10-CM) - Unstable gait  M54.2, G89.29 (ICD-10-CM) - Chronic neck pain  Treatment Diagnosis: Poor balance    Onset Date: 23  PT Insurance Information: Medicare  Total # of Visits Approved: 24 Per Physician Order  Total # of Visits to Date: 13  Plan of Care/Certification Expiration Date: 24    Pre-Treatment Pain:  0/10     Assessment  Assessment: Patient denies pain this morning. Completed VELASQUEZ balance test following NuStep this morning. VELASQUEZ Score = 31/56. Completed remaining strengthening exercises with good tolerance from patient. Post session patient denies pain. Will continue to progress strength and balance as able.    Plan  Continue with current plan of care    Exercises/Modalities/Manual:  See DocFlow Sheet    Education: HEP     Goals  (Total # of Visits to Date: 13)   Short Term Goals  Time Frame for Short Term Goals: 12 visits  Short Term Goal 1: Patient will improve VELASQUEZ score from 26/56 to 30/56 to demonstrate improved balance and reduced fall risk - MET  Short Term Goal 2: Patient will demonstrate compliance with HEP to optimize therapy progress - MET    Long Term Goals  Time Frame for Long Term Goals : 24 visits  Long Term Goal 1: Patient will improve VELASQUEZ score from26/56 to 36/56 to demonstrate improve balance and reduced fall risk  Long Term Goal 2: Patient will improve bilateral hip MMT to 4/5 to improve strength and reduce fall risk  Long Term Goal 3: Patient will improve bilateral ankle MMT to 4/5 to reduce fall risk    Post Treatment

## 2024-06-28 ENCOUNTER — HOSPITAL ENCOUNTER (OUTPATIENT)
Dept: PHYSICAL THERAPY | Age: 74
Setting detail: THERAPIES SERIES
Discharge: HOME | End: 2024-06-28
Payer: MEDICARE

## 2024-06-28 PROCEDURE — 97110 THERAPEUTIC EXERCISES: CPT

## 2024-06-28 NOTE — PROGRESS NOTES
Phone: [phone]                 [facility]      Fax: [phone]                            Outpatient Physical Therapy                                                                            Daily Note    Date: 2024  Patient Name: Sid Blakely        MRN: [id number]   ACCT#:  [id number]  : 1950  (73 y.o.)    Referring Provider (secondary): Justine         Diagnosis: R29.818 (ICD-10-CM) - Difficulty balancing  R26.81 (ICD-10-CM) - Unstable gait  M54.2, G89.29 (ICD-10-CM) - Chronic neck pain  Treatment Diagnosis: Poor balance    Onset Date: 23  PT Insurance Information: Medicare  Total # of Visits Approved: 24 Per Physician Order  Total # of Visits to Date: 14  Plan of Care/Certification Expiration Date: 24    Pre-Treatment Pain:  0/10  Subjective: 1120:  Denies pain today.  Nothing new to report.  \"Every now and then I feel better, then there are some days I just don't know.\"  Assessment  Assessment: Signfiicant improvement in strength of hips and knees.  Less so in ankles.  Would benefit from adjustment of POC to focus on ankle strength and balance for remainder of treatments.    Plan  Continue with current plan of care    Exercises/Modalities/Manual:  See DocFlow Sheet    Education: Education on ankle strengthening for HEP and adjustment of exercise routine          Goals  (Total # of Visits to Date: 14)   Short Term Goals  Time Frame for Short Term Goals: 12 visits  Short Term Goal 1: Patient will improve VELASQUEZ score from 26/56 to 30/56 to demonstrate improved balance and reduced fall risk - MET  Short Term Goal 2: Patient will demonstrate compliance with HEP to optimize therapy progress - MET    Long Term Goals  Time Frame for Long Term Goals : 24 visits  Long Term Goal 1: Patient will improve VELASQUEZ score from26/56 to 36/56 to demonstrate improve balance and reduced fall risk  Long Term Goal 2: Patient will improve bilateral hip MMT to 4/5 to improve strength and reduce

## 2024-07-01 ENCOUNTER — HOSPITAL ENCOUNTER (OUTPATIENT)
Dept: PHYSICAL THERAPY | Age: 74
Setting detail: THERAPIES SERIES
Discharge: HOME | End: 2024-07-01
Payer: MEDICARE

## 2024-07-01 PROCEDURE — 97110 THERAPEUTIC EXERCISES: CPT

## 2024-07-01 PROCEDURE — 97112 NEUROMUSCULAR REEDUCATION: CPT

## 2024-07-01 NOTE — PROGRESS NOTES
Phone: [phone]                 [facility]      Fax: [phone]                            Outpatient Physical Therapy                                                                            Daily Note    Date: 2024  Patient Name: Sid Blakely        MRN: [id number]   ACCT#:  [id number]  : 1950  (73 y.o.)    Referring Provider (secondary): Justine         Diagnosis: R29.818 (ICD-10-CM) - Difficulty balancing  R26.81 (ICD-10-CM) - Unstable gait  M54.2, G89.29 (ICD-10-CM) - Chronic neck pain  Treatment Diagnosis: Poor balance    Onset Date: 23  PT Insurance Information: Medicare  Total # of Visits Approved: 24 Per Physician Order  Total # of Visits to Date: 15  Plan of Care/Certification Expiration Date: 24    Pre-Treatment Pain:  0/10     Assessment  Assessment: Patient denies pain this morning. Continued with balance and LE strengthening exercises per flowsheet. Patient was able to complete 10 consecutive reps of UE flex while on aerofoam without LOB. Previously patient was only able to complete 3 or 4 reps before losing balance. Post session patient denies pain. Will continue to progress as able.    Plan  Continue with current plan of care    Exercises/Modalities/Manual:  See DocFlow Sheet    Education: HEP     Goals  (Total # of Visits to Date: 15)   Short Term Goals  Time Frame for Short Term Goals: 12 visits  Short Term Goal 1: Patient will improve VELASQUEZ score from 26/56 to 30/56 to demonstrate improved balance and reduced fall risk - MET  Short Term Goal 2: Patient will demonstrate compliance with HEP to optimize therapy progress - MET    Long Term Goals  Time Frame for Long Term Goals : 24 visits  Long Term Goal 1: Patient will improve VELASQUEZ score from26/56 to 36/56 to demonstrate improve balance and reduced fall risk  Long Term Goal 2: Patient will improve bilateral hip MMT to 4/5 to improve strength and reduce fall risk  Long Term Goal 3: Patient will improve

## 2024-07-03 ENCOUNTER — HOSPITAL ENCOUNTER (OUTPATIENT)
Dept: PHYSICAL THERAPY | Age: 74
Setting detail: THERAPIES SERIES
Discharge: HOME | End: 2024-07-03
Payer: MEDICARE

## 2024-07-03 PROCEDURE — 97110 THERAPEUTIC EXERCISES: CPT

## 2024-07-03 PROCEDURE — 97112 NEUROMUSCULAR REEDUCATION: CPT

## 2024-07-03 NOTE — PROGRESS NOTES
Phone: [phone]                 [facility]      Fax: [phone]                            Outpatient Physical Therapy                                                                            Daily Note    Date: 7/3/2024  Patient Name: Sid Blakely        MRN: [id number]   ACCT#:  [id number]  : 1950  (73 y.o.)    Referring Provider (secondary): Justine         Diagnosis: R29.818 (ICD-10-CM) - Difficulty balancing  R26.81 (ICD-10-CM) - Unstable gait  M54.2, G89.29 (ICD-10-CM) - Chronic neck pain  Treatment Diagnosis: Poor balance    Onset Date: 23  PT Insurance Information: Medicare  Total # of Visits Approved: 24 Per Physician Order  Total # of Visits to Date: 16  Plan of Care/Certification Expiration Date: 24    Pre-Treatment Pain:  0/10     Assessment  Assessment: Patient continues to report no pain this morning. Continued with balance and LE strengthening exercises per flowsheet. Patient was able to complete UE flex on aerofoam x10 reps without LOB again this morning. Post session patient continues to deny pain.    Plan  Continue with current plan of care    Exercises/Modalities/Manual:  See DocFlow Sheet    Education: Exercise form     Goals  (Total # of Visits to Date: 16)   Short Term Goals  Time Frame for Short Term Goals: 12 visits  Short Term Goal 1: Patient will improve VELASQUEZ score from 26/56 to 30/56 to demonstrate improved balance and reduced fall risk - MET  Short Term Goal 2: Patient will demonstrate compliance with HEP to optimize therapy progress - MET    Long Term Goals  Time Frame for Long Term Goals : 24 visits  Long Term Goal 1: Patient will improve VELASQUEZ score from26/56 to 36/56 to demonstrate improve balance and reduced fall risk  Long Term Goal 2: Patient will improve bilateral hip MMT to 4/5 to improve strength and reduce fall risk  Long Term Goal 3: Patient will improve bilateral ankle MMT to 4/5 to reduce fall risk    Post Treatment Pain:  0/10    Time

## 2024-07-05 ENCOUNTER — APPOINTMENT (OUTPATIENT)
Dept: GENERAL RADIOLOGY | Age: 74
End: 2024-07-05
Payer: MEDICARE

## 2024-07-05 ENCOUNTER — HOSPITAL ENCOUNTER (EMERGENCY)
Age: 74
Discharge: HOME | End: 2024-07-05
Attending: EMERGENCY MEDICINE
Payer: MEDICARE

## 2024-07-05 ENCOUNTER — HOSPITAL ENCOUNTER (OUTPATIENT)
Dept: PHYSICAL THERAPY | Age: 74
Setting detail: THERAPIES SERIES
Discharge: HOME | End: 2024-07-05
Payer: MEDICARE

## 2024-07-05 VITALS
RESPIRATION RATE: 18 BRPM | HEART RATE: 73 BPM | HEIGHT: 70 IN | BODY MASS INDEX: 36.22 KG/M2 | OXYGEN SATURATION: 96 % | DIASTOLIC BLOOD PRESSURE: 74 MMHG | WEIGHT: 253 LBS | TEMPERATURE: 98.3 F | SYSTOLIC BLOOD PRESSURE: 167 MMHG

## 2024-07-05 DIAGNOSIS — I50.9 ACUTE CONGESTIVE HEART FAILURE, UNSPECIFIED HEART FAILURE TYPE (HCC): Primary | ICD-10-CM

## 2024-07-05 LAB
ALBUMIN SERPL-MCNC: 3.8 G/DL (ref 3.5–5.2)
ALP SERPL-CCNC: 71 U/L (ref 40–129)
ALT SERPL-CCNC: 26 U/L (ref 5–41)
ANION GAP SERPL CALCULATED.3IONS-SCNC: 12 MMOL/L (ref 9–17)
AST SERPL-CCNC: 22 U/L
BASOPHILS # BLD: 0.03 K/UL (ref 0–0.2)
BASOPHILS NFR BLD: 1 % (ref 0–2)
BILIRUB SERPL-MCNC: 0.6 MG/DL (ref 0.3–1.2)
BNP SERPL-MCNC: 445 PG/ML
BUN SERPL-MCNC: 22 MG/DL (ref 8–23)
BUN/CREAT SERPL: 24 (ref 9–20)
CALCIUM SERPL-MCNC: 8.6 MG/DL (ref 8.6–10.4)
CHLORIDE SERPL-SCNC: 103 MMOL/L (ref 98–107)
CO2 SERPL-SCNC: 25 MMOL/L (ref 20–31)
CREAT SERPL-MCNC: 0.9 MG/DL (ref 0.7–1.2)
EOSINOPHIL # BLD: 0.18 K/UL (ref 0–0.4)
EOSINOPHILS RELATIVE PERCENT: 3 % (ref 0–5)
ERYTHROCYTE [DISTWIDTH] IN BLOOD BY AUTOMATED COUNT: 13 % (ref 12.1–15.2)
GFR, ESTIMATED: >90 ML/MIN/1.73M2
GLUCOSE SERPL-MCNC: 227 MG/DL (ref 70–99)
HCT VFR BLD AUTO: 37.3 % (ref 41–53)
HGB BLD-MCNC: 12.6 G/DL (ref 13.5–17.5)
IMM GRANULOCYTES # BLD AUTO: 0.01 K/UL (ref 0–0.3)
IMM GRANULOCYTES NFR BLD: 0 % (ref 0–5)
LYMPHOCYTES NFR BLD: 1.69 K/UL (ref 1–4.8)
LYMPHOCYTES RELATIVE PERCENT: 27 % (ref 13–44)
MCH RBC QN AUTO: 32.5 PG (ref 26–34)
MCHC RBC AUTO-ENTMCNC: 33.8 G/DL (ref 31–37)
MCV RBC AUTO: 96.1 FL (ref 80–100)
MONOCYTES NFR BLD: 0.79 K/UL (ref 0–1)
MONOCYTES NFR BLD: 13 % (ref 5–9)
NEUTROPHILS NFR BLD: 56 % (ref 39–75)
NEUTS SEG NFR BLD: 3.64 K/UL (ref 2.1–6.5)
PLATELET # BLD AUTO: 201 K/UL (ref 140–450)
PMV BLD AUTO: 10.2 FL (ref 6–12)
POTASSIUM SERPL-SCNC: 4.3 MMOL/L (ref 3.7–5.3)
PROT SERPL-MCNC: 6.7 G/DL (ref 6.4–8.3)
RBC # BLD AUTO: 3.88 M/UL (ref 4.5–5.9)
SODIUM SERPL-SCNC: 140 MMOL/L (ref 135–144)
TROPONIN I SERPL HS-MCNC: 42 NG/L (ref 0–22)
WBC OTHER # BLD: 6.3 K/UL (ref 3.5–11)

## 2024-07-05 PROCEDURE — 80053 COMPREHEN METABOLIC PANEL: CPT

## 2024-07-05 PROCEDURE — 83880 ASSAY OF NATRIURETIC PEPTIDE: CPT

## 2024-07-05 PROCEDURE — 71045 X-RAY EXAM CHEST 1 VIEW: CPT

## 2024-07-05 PROCEDURE — 97112 NEUROMUSCULAR REEDUCATION: CPT

## 2024-07-05 PROCEDURE — 99285 EMERGENCY DEPT VISIT HI MDM: CPT

## 2024-07-05 PROCEDURE — 84484 ASSAY OF TROPONIN QUANT: CPT

## 2024-07-05 PROCEDURE — 36415 COLL VENOUS BLD VENIPUNCTURE: CPT

## 2024-07-05 PROCEDURE — 93005 ELECTROCARDIOGRAM TRACING: CPT | Performed by: EMERGENCY MEDICINE

## 2024-07-05 PROCEDURE — 97110 THERAPEUTIC EXERCISES: CPT

## 2024-07-05 PROCEDURE — 85025 COMPLETE CBC W/AUTO DIFF WBC: CPT

## 2024-07-05 RX ORDER — SEMAGLUTIDE 1.34 MG/ML
1 INJECTION, SOLUTION SUBCUTANEOUS
COMMUNITY
Start: 2024-07-03

## 2024-07-05 ASSESSMENT — LIFESTYLE VARIABLES
HOW OFTEN DO YOU HAVE A DRINK CONTAINING ALCOHOL: NEVER
HOW MANY STANDARD DRINKS CONTAINING ALCOHOL DO YOU HAVE ON A TYPICAL DAY: PATIENT DOES NOT DRINK

## 2024-07-05 NOTE — PROGRESS NOTES
Phone: [phone]                 [facility]      Fax: [phone]                            Outpatient Physical Therapy                                                                            Daily Note    Date: 2024  Patient Name: Sid Blakely        MRN: [id number]   ACCT#:  [id number]  : 1950  (73 y.o.)    Referring Provider (secondary): Justine         Diagnosis: R29.818 (ICD-10-CM) - Difficulty balancing  R26.81 (ICD-10-CM) - Unstable gait  M54.2, G89.29 (ICD-10-CM) - Chronic neck pain  Treatment Diagnosis: Poor balance    Onset Date: 23  PT Insurance Information: Medicare  Total # of Visits Approved: 24 Per Physician Order  Total # of Visits to Date: 17  Plan of Care/Certification Expiration Date: 24    Pre-Treatment Pain:  0/10  Subjective: 0935:  Denies pain again today.  Was able to mow lawn yesterday.  Assessment  Assessment: Held some standing work today due to increased back and left leg pain.  Ankle strength improving, able to progress to blue band.    Plan  Continue with current plan of care    Exercises/Modalities/Manual:  See DocFlow Sheet    Education: Education on progression of exericses          Goals  (Total # of Visits to Date: 17)   Short Term Goals  Time Frame for Short Term Goals: 12 visits  Short Term Goal 1: Patient will improve VELASQUEZ score from 26/56 to 30/56 to demonstrate improved balance and reduced fall risk - MET  Short Term Goal 2: Patient will demonstrate compliance with HEP to optimize therapy progress - MET    Long Term Goals  Time Frame for Long Term Goals : 24 visits  Long Term Goal 1: Patient will improve VELASQUEZ score from26/56 to 36/56 to demonstrate improve balance and reduced fall risk  Long Term Goal 2: Patient will improve bilateral hip MMT to 4/5 to improve strength and reduce fall risk  Long Term Goal 3: Patient will improve bilateral ankle MMT to 4/5 to reduce fall risk    Post Treatment Pain:  2/10    Time In: 0935    Time Out :

## 2024-07-06 NOTE — ED PROVIDER NOTES
EMERGENCY DEPARTMENT ENCOUNTER      CHIEF COMPLAINT    Chief Complaint   Patient presents with    Bloated     Pt states that he has been bloated over the last couple days and it has gotten progressively worse. States that he is now SOB because of it. Wife states he is usually 240 lb, today weighed 253.       HPI    Sid Blakely is a 73 y.o. male with history of CHF on Lasix who presents to the emergency room for evaluation of feeling bloated over the last several days.  He feels short of breath because of the bloating in his abdomen.  He feels like he just cannot get a good breath.  He denies chest pain.  He states that he has gained around 9 pounds in the last several days.  He states he has not been watching his salt intake as he should.      PAST MEDICAL HISTORY    Past Medical History:   Diagnosis Date    Arthritis     Arthritis     Atrial fibrillation (HCC)     CAD (coronary artery disease)     CHF (congestive heart failure) (HCC)     CHF (congestive heart failure) (HCC)     Coronary artery disease     Diabetes (HCC)     Diabetes mellitus (HCC)     H/O coronary angioplasty     Hyperlipidemia     Hypertension     Neuropathy     Numbness and tingling     dannie hands    Pulmonary edema     SECONDARY TO FAT EMBOLI AFTER KNEE SURGERY    Sleep apnea     Unspecified sleep apnea 2012    cpap       SURGICAL HISTORY    Past Surgical History:   Procedure Laterality Date    ANGIOPLASTY  08/30/2017    Dr. Crump--3.5x18 mm drug eluting Xience stents in the right coronary.  DANIELA-3 flow prior to the procedure DANIELA-3 flow following the procedure.  The patient has had a good result from his angioplasty of the right coronary artery..    APPENDECTOMY      APPENDECTOMY      AV NODE ABLATION      BUNIONECTOMY  2010    CARDIAC CATHETERIZATION      stent pacement    CARDIAC CATHETERIZATION Left 05/14/2014    Dr STACY Byers Severe CAD, 70% disese with in-stent stenosis in the left anterior descending at the level of a very large  per Week: 0 days   Housing Stability: Unknown (5/13/2024)    Housing Stability Vital Sign     Unstable Housing in the Last Year: No     Review of Systems:    All relevant systems are reviewed and are negative with the exception of that stated in the HPI.    PHYSICAL EXAM    VITAL SIGNS: BP (!) 167/74   Pulse 73   Temp 98.3 °F (36.8 °C) (Oral)   Resp 18   Ht 1.778 m (5' 10\")   Wt 114.8 kg (253 lb)   SpO2 96%   BMI 36.30 kg/m²    Constitutional:  Well developed, Well nourished, No acute distress, Non-toxic appearance.   Eyes:  PERRL, EOMI, Conjunctiva normal, No discharge.   Respiratory:  Normal breath sounds, No respiratory distress, No wheezing, No chest tenderness.   Cardiovascular:  Normal heart rate, Normal rhythm, No murmurs, No rubs, No gallops.   GI:  Bowel sounds normal, Soft, No tenderness, distended  Integument:  Warm, Dry, No erythema, No rash.   Neurologic:  Alert & oriented x 3, Normal motor function, Normal sensory function, No focal deficits noted.     EKG interpretation    Interpreted by me    Rhythm: Normal sinus  Rate: Normal  Axis: Left  Ectopy: None  Conduction: Left bundle branch block  ST segments: Nonspecific ST segment changes in II, III and aVF that are unchanged from previous EKG  T waves: No acute change  Q waves: None    Comparison: April 22, 2024    Impression: Normal EKG and no acute changes    Medical Decision Making    Patient presents to the emergency room for evaluation of feeling bloated over the last several days.  He feels short of breath because of the bloating in his abdomen.  He feels like he just cannot get a good breath.  He denies chest pain.  He states that he has gained around 9 pounds in the last several days..  He reports that he is not watching his salt intake as he should. Vital signs stable.  He is not hypoxic or tachypneic or tachycardic physical exam reveals 1+ pitting edema bilateral lower extremities.  His abdomen is distended but nontender.  He is not

## 2024-07-06 NOTE — DISCHARGE INSTRUCTIONS
Call Dr. Crump on Monday for follow-up appointment.  Return to ER for worsening symptoms including worsening shortness of breath or worsening swelling or bloating.

## 2024-07-07 LAB
EKG ATRIAL RATE: 73 BPM
EKG P AXIS: 55 DEGREES
EKG P-R INTERVAL: 186 MS
EKG Q-T INTERVAL: 430 MS
EKG QRS DURATION: 154 MS
EKG QTC CALCULATION (BAZETT): 473 MS
EKG R AXIS: 81 DEGREES
EKG T AXIS: -138 DEGREES
EKG VENTRICULAR RATE: 73 BPM

## 2024-07-07 PROCEDURE — 93010 ELECTROCARDIOGRAM REPORT: CPT | Performed by: INTERNAL MEDICINE

## 2024-07-08 ENCOUNTER — HOSPITAL ENCOUNTER (OUTPATIENT)
Dept: PHYSICAL THERAPY | Age: 74
Setting detail: THERAPIES SERIES
Discharge: HOME | End: 2024-07-08
Payer: MEDICARE

## 2024-07-08 ENCOUNTER — TELEPHONE (OUTPATIENT)
Dept: CARDIOLOGY CLINIC | Age: 74
End: 2024-07-08

## 2024-07-08 DIAGNOSIS — R60.0 LEG EDEMA: Primary | ICD-10-CM

## 2024-07-08 PROCEDURE — 97110 THERAPEUTIC EXERCISES: CPT

## 2024-07-08 PROCEDURE — 97112 NEUROMUSCULAR REEDUCATION: CPT

## 2024-07-08 RX ORDER — FUROSEMIDE 80 MG
80 TABLET ORAL 2 TIMES DAILY
Qty: 90 TABLET | Refills: 1 | Status: SHIPPED | OUTPATIENT
Start: 2024-07-08

## 2024-07-08 NOTE — PROGRESS NOTES
Phone: [phone]                 [facility]      Fax: [phone]                            Outpatient Physical Therapy                                                                            Daily Note    Date: 2024  Patient Name: Sid Blakely        MRN: [id number]   ACCT#:  [id number]  : 1950  (74 y.o.)    Referring Provider (secondary): Justine         Diagnosis: R29.818 (ICD-10-CM) - Difficulty balancing  R26.81 (ICD-10-CM) - Unstable gait  M54.2, G89.29 (ICD-10-CM) - Chronic neck pain  Treatment Diagnosis: Poor balance    Onset Date: 23  PT Insurance Information: Medicare  Total # of Visits Approved: 24 Per Physician Order  Total # of Visits to Date: 18  Plan of Care/Certification Expiration Date: 24    Pre-Treatment Pain:  0/10     Assessment  Assessment: Patient denies pain this morning. Continued with LE strengthening and balance exercises per flowsheet with good tolerance from patient. Patient was able to complete 10 consecutive reps of overhead flex on aerfoaom again this morning, but still can only only EC for a few seconds before LOB. Post session patient denies pain.    Plan  Continue with current plan of care    Exercises/Modalities/Manual:  See DocFlow Sheet    Education: HEP     Goals  (Total # of Visits to Date: 18)   Short Term Goals  Time Frame for Short Term Goals: 12 visits  Short Term Goal 1: Patient will improve VELASQUEZ score from 26/56 to 30/56 to demonstrate improved balance and reduced fall risk - MET  Short Term Goal 2: Patient will demonstrate compliance with HEP to optimize therapy progress - MET    Long Term Goals  Time Frame for Long Term Goals : 24 visits  Long Term Goal 1: Patient will improve VELASQUEZ score from26/56 to 36/56 to demonstrate improve balance and reduced fall risk  Long Term Goal 2: Patient will improve bilateral hip MMT to 4/5 to improve strength and reduce fall risk  Long Term Goal 3: Patient will improve bilateral ankle MMT to

## 2024-07-08 NOTE — TELEPHONE ENCOUNTER
Patient called to report to Dr Crump that he was to [facility] ED over the weekend with SOB.  He was bloated and SOB.  He said it was his CHF.  The testing they did at the ED was Ok he was told.  He did go home and take and extra 1/2 tab of his diuretic which did give him some relief.  He did not know if Dr Crump may want to review his chart or if there is anything different he should do.  Please advise

## 2024-07-10 ENCOUNTER — HOSPITAL ENCOUNTER (OUTPATIENT)
Dept: PHYSICAL THERAPY | Age: 74
Setting detail: THERAPIES SERIES
Discharge: HOME | End: 2024-07-10
Payer: MEDICARE

## 2024-07-10 PROCEDURE — 97112 NEUROMUSCULAR REEDUCATION: CPT

## 2024-07-10 PROCEDURE — 97110 THERAPEUTIC EXERCISES: CPT

## 2024-07-10 NOTE — PROGRESS NOTES
Phone: [phone]                 [facility]      Fax: [phone]                            Outpatient Physical Therapy                                                                            Daily Note    Date: 7/10/2024  Patient Name: Sid Blakely        MRN: [id number]   ACCT#:  [id number]  : 1950  (74 y.o.)    Referring Provider (secondary): Justine         Diagnosis: R29.818 (ICD-10-CM) - Difficulty balancing  R26.81 (ICD-10-CM) - Unstable gait  M54.2, G89.29 (ICD-10-CM) - Chronic neck pain  Treatment Diagnosis: Poor balance    Onset Date: 23  PT Insurance Information: Medicare  Total # of Visits Approved: 24 Per Physician Order  Total # of Visits to Date: 19  Plan of Care/Certification Expiration Date: 24    Pre-Treatment Pain:  0/10     Assessment  Assessment: Patient reports no pain this morning. Continued with strengthening and balance exercises per flowsheet with good tolerance from patient. Patient did do as well with balance ex this morning. Patient was only able to complete 3-4 reps of UE flex on aerofoam before LOB this morning (previous 3 sessions was able to get 10 straight without LOB). Post session patient denies pain. Will continue to progress as able.    Plan  Continue with current plan of care    Exercises/Modalities/Manual:  See DocFlow Sheet    Education: Exercise form     Goals  (Total # of Visits to Date: 19)   Short Term Goals  Time Frame for Short Term Goals: 12 visits  Short Term Goal 1: Patient will improve VELASQUEZ score from 26/56 to 30/56 to demonstrate improved balance and reduced fall risk - MET  Short Term Goal 2: Patient will demonstrate compliance with HEP to optimize therapy progress - MET    Long Term Goals  Time Frame for Long Term Goals : 24 visits  Long Term Goal 1: Patient will improve VELASQUEZ score from26/56 to 36/56 to demonstrate improve balance and reduced fall risk  Long Term Goal 2: Patient will improve bilateral hip MMT to 4/5 to

## 2024-07-12 ENCOUNTER — HOSPITAL ENCOUNTER (OUTPATIENT)
Dept: PHYSICAL THERAPY | Age: 74
Setting detail: THERAPIES SERIES
Discharge: HOME | End: 2024-07-12
Payer: MEDICARE

## 2024-07-12 PROCEDURE — 97112 NEUROMUSCULAR REEDUCATION: CPT

## 2024-07-12 PROCEDURE — 97110 THERAPEUTIC EXERCISES: CPT

## 2024-07-12 NOTE — PROGRESS NOTES
Phone: [phone]                 [facility]      Fax: [phone]                            Outpatient Physical Therapy                                                                            Daily Note    Date: 2024  Patient Name: Sid Blakely        MRN: [id number]   ACCT#:  [id number]  : 1950  (74 y.o.)    Referring Provider (secondary): Justine         Diagnosis: R29.818 (ICD-10-CM) - Difficulty balancing  R26.81 (ICD-10-CM) - Unstable gait  M54.2, G89.29 (ICD-10-CM) - Chronic neck pain  Treatment Diagnosis: Poor balance    Onset Date: 23  PT Insurance Information: Medicare  Total # of Visits Approved: 24 Per Physician Order  Total # of Visits to Date: 20  Plan of Care/Certification Expiration Date: 24    Pre-Treatment Pain:  0/10     Assessment  Assessment: Patient reports no pain this morning. Continued with LE strengthening and balance exercises per flowsheet with good tolerance from patient. Patient was able to complete UE flex on aerfoam without LOB today. Post session patient denies pain. Plan to continue x2-4 visits and D/C to HEP.    Plan  Continue with current plan of care    Exercises/Modalities/Manual:  See DocFlow Sheet    Education: Exercise form     Goals  (Total # of Visits to Date: 20)   Short Term Goals  Time Frame for Short Term Goals: 12 visits  Short Term Goal 1: Patient will improve VELASQUEZ score from 26/56 to 30/56 to demonstrate improved balance and reduced fall risk - MET  Short Term Goal 2: Patient will demonstrate compliance with HEP to optimize therapy progress - MET    Long Term Goals  Time Frame for Long Term Goals : 24 visits  Long Term Goal 1: Patient will improve VELASQUEZ score from26/56 to 36/56 to demonstrate improve balance and reduced fall risk  Long Term Goal 2: Patient will improve bilateral hip MMT to 4/5 to improve strength and reduce fall risk  Long Term Goal 3: Patient will improve bilateral ankle MMT to 4/5 to reduce fall

## 2024-07-16 ENCOUNTER — APPOINTMENT (OUTPATIENT)
Dept: PHYSICAL THERAPY | Age: 74
End: 2024-07-16
Payer: MEDICARE

## 2024-07-18 ENCOUNTER — HOSPITAL ENCOUNTER (OUTPATIENT)
Dept: PHYSICAL THERAPY | Age: 74
Setting detail: THERAPIES SERIES
Discharge: HOME | End: 2024-07-18
Payer: MEDICARE

## 2024-07-18 PROCEDURE — 97110 THERAPEUTIC EXERCISES: CPT

## 2024-07-18 PROCEDURE — 97112 NEUROMUSCULAR REEDUCATION: CPT

## 2024-07-18 NOTE — PROGRESS NOTES
Phone: [phone]                 [facility]      Fax: [phone]                            Outpatient Physical Therapy                                                                            Daily Note    Date: 2024  Patient Name: Sid Blakely        MRN: [id number]   ACCT#:  [id number]  : 1950  (74 y.o.)    Referring Provider (secondary): Justine         Diagnosis: R29.818 (ICD-10-CM) - Difficulty balancing  R26.81 (ICD-10-CM) - Unstable gait  M54.2, G89.29 (ICD-10-CM) - Chronic neck pain  Treatment Diagnosis: Poor balance    Onset Date: 23  PT Insurance Information: Medicare  Total # of Visits Approved: 24 Per Physician Order  Total # of Visits to Date: 21  Plan of Care/Certification Expiration Date: 24    Pre-Treatment Pain:  0/10  Subjective: 0945:  Denies pain, just tired today.  Has to leave a little early for another doctor's appointment.  Assessment  Assessment: Abbreviated session due to patient having another appointment.  Still has great difficulty with eyes closed balance.  Visually dependant.  Will plan to finish POC and d/c.    Plan  Continue with current plan of care    Exercises/Modalities/Manual:  See DocFlow Sheet    Education: Education on POC and likely d/c next week          Goals  (Total # of Visits to Date: 21)   Short Term Goals  Time Frame for Short Term Goals: 12 visits  Short Term Goal 1: Patient will improve VELASQUEZ score from 26/56 to 30/56 to demonstrate improved balance and reduced fall risk - MET  Short Term Goal 2: Patient will demonstrate compliance with HEP to optimize therapy progress - MET    Long Term Goals  Time Frame for Long Term Goals : 24 visits  Long Term Goal 1: Patient will improve VELASQUEZ score from26/56 to 36/56 to demonstrate improve balance and reduced fall risk  Long Term Goal 2: Patient will improve bilateral hip MMT to 4/5 to improve strength and reduce fall risk  Long Term Goal 3: Patient will improve bilateral ankle MMT to

## 2024-07-19 ENCOUNTER — HOSPITAL ENCOUNTER (OUTPATIENT)
Dept: PHYSICAL THERAPY | Age: 74
Setting detail: THERAPIES SERIES
Discharge: HOME | End: 2024-07-19
Payer: MEDICARE

## 2024-07-19 PROCEDURE — 97110 THERAPEUTIC EXERCISES: CPT

## 2024-07-19 PROCEDURE — 97112 NEUROMUSCULAR REEDUCATION: CPT

## 2024-07-19 NOTE — PROGRESS NOTES
Phone: [phone]                 [facility]      Fax: [phone]                            Outpatient Physical Therapy                                                                            Daily Note    Date: 2024  Patient Name: Sid Blakely        MRN: [id number]   ACCT#:  [id number]  : 1950  (74 y.o.)    Referring Provider (secondary): Justine         Diagnosis: R29.818 (ICD-10-CM) - Difficulty balancing  R26.81 (ICD-10-CM) - Unstable gait  M54.2, G89.29 (ICD-10-CM) - Chronic neck pain  Treatment Diagnosis: Poor balance    Onset Date: 23  PT Insurance Information: Medicare  Total # of Visits Approved: 24 Per Physician Order  Total # of Visits to Date: 22  Plan of Care/Certification Expiration Date: 24    Pre-Treatment Pain:  0/10  Subjective: 0938:  Continues to deny pain.  No adverse effects from last visit.  Nothing new to report.  Assessment  Assessment: Continues to struggle with eyes closed.  Increased difficulty with trunk rotations today compared to last session.  Education on visual dependency and importance of keeping lights on when ambulating.    Plan  Continue with current plan of care    Exercises/Modalities/Manual:  See DocFlow Sheet    Education: Education on visual dependency and keeping lights on when ambulating          Goals  (Total # of Visits to Date: 22)   Short Term Goals  Time Frame for Short Term Goals: 12 visits  Short Term Goal 1: Patient will improve VELASQUEZ score from 26/56 to 30/56 to demonstrate improved balance and reduced fall risk - MET  Short Term Goal 2: Patient will demonstrate compliance with HEP to optimize therapy progress - MET    Long Term Goals  Time Frame for Long Term Goals : 24 visits  Long Term Goal 1: Patient will improve VELASQUEZ score from26/56 to 36/56 to demonstrate improve balance and reduced fall risk  Long Term Goal 2: Patient will improve bilateral hip MMT to 4/5 to improve strength and reduce fall risk  Long Term Goal

## 2024-07-23 ENCOUNTER — HOSPITAL ENCOUNTER (OUTPATIENT)
Age: 74
Discharge: HOME | End: 2024-07-23
Payer: MEDICARE

## 2024-07-23 ENCOUNTER — OFFICE VISIT (OUTPATIENT)
Dept: CARDIOLOGY CLINIC | Age: 74
End: 2024-07-23

## 2024-07-23 VITALS
DIASTOLIC BLOOD PRESSURE: 42 MMHG | HEART RATE: 96 BPM | WEIGHT: 248 LBS | OXYGEN SATURATION: 94 % | SYSTOLIC BLOOD PRESSURE: 108 MMHG | BODY MASS INDEX: 35.58 KG/M2

## 2024-07-23 DIAGNOSIS — R60.0 LEG EDEMA: ICD-10-CM

## 2024-07-23 DIAGNOSIS — R60.0 LEG EDEMA: Primary | ICD-10-CM

## 2024-07-23 DIAGNOSIS — I50.32 CHRONIC DIASTOLIC CHF (CONGESTIVE HEART FAILURE), NYHA CLASS 3 (HCC): ICD-10-CM

## 2024-07-23 LAB
ANION GAP SERPL CALCULATED.3IONS-SCNC: 14 MMOL/L (ref 9–17)
BUN SERPL-MCNC: 27 MG/DL (ref 8–23)
BUN/CREAT SERPL: 27 (ref 9–20)
CALCIUM SERPL-MCNC: 8.8 MG/DL (ref 8.6–10.4)
CHLORIDE SERPL-SCNC: 103 MMOL/L (ref 98–107)
CO2 SERPL-SCNC: 23 MMOL/L (ref 20–31)
CREAT SERPL-MCNC: 1 MG/DL (ref 0.7–1.2)
GFR, ESTIMATED: 79 ML/MIN/1.73M2
GLUCOSE SERPL-MCNC: 305 MG/DL (ref 70–99)
POTASSIUM SERPL-SCNC: 4.1 MMOL/L (ref 3.7–5.3)
SODIUM SERPL-SCNC: 140 MMOL/L (ref 135–144)

## 2024-07-23 PROCEDURE — 80048 BASIC METABOLIC PNL TOTAL CA: CPT

## 2024-07-23 PROCEDURE — 36415 COLL VENOUS BLD VENIPUNCTURE: CPT

## 2024-07-23 RX ORDER — FUROSEMIDE 80 MG
80 TABLET ORAL DAILY
Qty: 90 TABLET | Refills: 1 | Status: SHIPPED | OUTPATIENT
Start: 2024-07-23

## 2024-07-23 NOTE — PROGRESS NOTES
Katya Hester CNP  Follow up   Edema improved.  Wearing stockings.  Still has some sob  At baseline.   No chest pain  Still taking lasix 80 in am  40 in pm  
repeat labs, chest x-ray and EKG. However, we would be happy to see him sooner should the need arise.      Thank you very much for allowing us the privilege of seeing Mr. Blakely.  If you have any questions on our  thoughts, please do not hesitate to contact us.     Sincerely,  YENNY Terry - CNP  [facility]  [address] [place] [address], [place] [address]  Phone: [phone], Fax: [phone]

## 2024-07-24 ENCOUNTER — HOSPITAL ENCOUNTER (OUTPATIENT)
Dept: PHYSICAL THERAPY | Age: 74
Setting detail: THERAPIES SERIES
Discharge: HOME | End: 2024-07-24
Payer: MEDICARE

## 2024-07-24 PROCEDURE — 97112 NEUROMUSCULAR REEDUCATION: CPT

## 2024-07-24 PROCEDURE — 97110 THERAPEUTIC EXERCISES: CPT

## 2024-07-24 NOTE — PROGRESS NOTES
Phone: [phone]                 [facility]      Fax: [phone]                            Outpatient Physical Therapy                                                                            Daily Note    Date: 2024  Patient Name: Sid Blakely        MRN: [id number]   ACCT#:  [id number]  : 1950  (74 y.o.)    Referring Provider (secondary): Justine         Diagnosis: R29.818 (ICD-10-CM) - Difficulty balancing  R26.81 (ICD-10-CM) - Unstable gait  M54.2, G89.29 (ICD-10-CM) - Chronic neck pain  Treatment Diagnosis: Poor balance    Onset Date: 23  PT Insurance Information: Medicare  Total # of Visits Approved: 24 Per Physician Order  Total # of Visits to Date:   Plan of Care/Certification Expiration Date: 24    Pre-Treatment Pain:  0/10     Assessment  Assessment: Patient denies pain this afternoon. Patient notes his blood sugar has crashed a few times today and has had a few episodes of quad cramping. Completed light session this afternoon due to these issues. Patient did have one episode of R quad cramping, but after a rest break it subsided. Plan to continue x1 visit and D/C to HEP.    Plan  Continue with current plan of care    Exercises/Modalities/Manual:  See DocFlow Sheet    Education: HEP     Goals  (Total # of Visits to Date: )   Short Term Goals  Time Frame for Short Term Goals: 12 visits  Short Term Goal 1: Patient will improve VELASQUEZ score from 26/56 to 30/56 to demonstrate improved balance and reduced fall risk - MET  Short Term Goal 2: Patient will demonstrate compliance with HEP to optimize therapy progress - MET    Long Term Goals  Time Frame for Long Term Goals : 24 visits  Long Term Goal 1: Patient will improve VELASQUEZ score from26/56 to 36/56 to demonstrate improve balance and reduced fall risk  Long Term Goal 2: Patient will improve bilateral hip MMT to 4/5 to improve strength and reduce fall risk  Long Term Goal 3: Patient will improve bilateral ankle MMT

## 2024-07-26 ENCOUNTER — HOSPITAL ENCOUNTER (OUTPATIENT)
Dept: PHYSICAL THERAPY | Age: 74
Setting detail: THERAPIES SERIES
Discharge: HOME | End: 2024-07-26
Payer: MEDICARE

## 2024-07-26 PROCEDURE — 97112 NEUROMUSCULAR REEDUCATION: CPT

## 2024-07-26 NOTE — DISCHARGE SUMMARY
Phone: [phone]                 [facility]             Fax: [phone]                            Outpatient Physical Therapy                                                                    Discharge Summary    Patient: Sid Blakely  : 1950  ACCT #: [id number]   Referring Provider (secondary): Justine      Diagnosis: R29.818 (ICD-10-CM) - Difficulty balancing  R26.81 (ICD-10-CM) - Unstable gait  M54.2, G89.29 (ICD-10-CM) - Chronic neck pain    Date Treatment Initiated: 24  Date of Last Treatment: 24    PT Visit Information  Onset Date: 23  PT Insurance Information: Medicare  Total # of Visits Approved: 24  Total # of Visits to Date:   Plan of Care/Certification Expiration Date: 24  Referring Provider (secondary): Justine    Frequency/Duration  Days: 3 times per week  Weeks: 8 weeks    Treatment Received  Patient Education/HEP, Therapeutic Exercise, Neuro Re-ed, and Gait Training         Assessment  Assessment: Patient has shown improvement with strength, but has balanced has worsened over the past few weeks.  VELASQUEZ score dropped to 27/56 from 31/56 at begining of PT.  Due to worsening balance after 24 visits, will refer back to PCP and recommend additional testing or possible neuro referal as you see fit.         Goals  Short Term Goals  Time Frame for Short Term Goals: 12 visits  Short Term Goal 1: Patient will improve VELASQUEZ score from 26/56 to 30/56 to demonstrate improved balance and reduced fall risk - MET  Short Term Goal 2: Patient will demonstrate compliance with HEP to optimize therapy progress - MET    Long Term Goals  Time Frame for Long Term Goals : 24 visits  Long Term Goal 1: Patient will improve VELASQUEZ score from26/56 to 36/56 to demonstrate improve balance and reduced fall risk  LTG Goal 1 Status:: Not Met  Long Term Goal 2: Patient will improve bilateral hip MMT to 4/5 to improve strength and reduce fall risk  LTG Goal 2 Status:: Not Met  Long Term

## 2024-07-26 NOTE — PROGRESS NOTES
Phone: [phone]                 [facility]      Fax: [phone]                            Outpatient Physical Therapy                                                                            Daily Note    Date: 2024  Patient Name: Sid Blakely        MRN: [id number]   ACCT#:  [id number]  : 1950  (74 y.o.)    Referring Provider (secondary): Justine         Diagnosis: R29.818 (ICD-10-CM) - Difficulty balancing  R26.81 (ICD-10-CM) - Unstable gait  M54.2, G89.29 (ICD-10-CM) - Chronic neck pain  Treatment Diagnosis: Poor balance    Onset Date: 23  PT Insurance Information: Medicare  Total # of Visits Approved: 24 Per Physician Order  Total # of Visits to Date: 24  Plan of Care/Certification Expiration Date: 24    Pre-Treatment Pain:  0/10  Subjective: 1345:  Frustrated with lack of progress.  Feels his balance has worsened over the past few weeks.  Assessment  Assessment: Patient has shown improvement with strength, but has balanced has worsened over the past few weeks.  VELASQUEZ score dropped to 27/56 from 31/56 at begining of PT.  Due to worsening balance after 24 visits, will refer back to PCP and recommend additional testing or possible neuro referal as you see fit.    Plan  Discharge    Exercises/Modalities/Manual:  See DocFlow Sheet    Education: Education on possible next steps depending on opinion of PCP          Goals  (Total # of Visits to Date: 24)   Short Term Goals  Time Frame for Short Term Goals: 12 visits  Short Term Goal 1: Patient will improve VELASQUEZ score from 26/56 to 30/56 to demonstrate improved balance and reduced fall risk - MET  Short Term Goal 2: Patient will demonstrate compliance with HEP to optimize therapy progress - MET    Long Term Goals  Time Frame for Long Term Goals : 24 visits  Long Term Goal 1: Patient will improve VELASQUEZ score from26/56 to 36/56 to demonstrate improve balance and reduced fall risk  LTG Goal 1 Status:: Not Met  Long Term Goal 2:

## 2024-07-30 LAB
ESTIMATED AVERAGE GLUCOSE: NORMAL
HBA1C MFR BLD: 7.6 %

## 2024-08-01 ENCOUNTER — OFFICE VISIT (OUTPATIENT)
Dept: FAMILY MEDICINE CLINIC | Age: 74
End: 2024-08-01
Payer: MEDICARE

## 2024-08-01 VITALS
WEIGHT: 248 LBS | BODY MASS INDEX: 35.5 KG/M2 | HEART RATE: 80 BPM | DIASTOLIC BLOOD PRESSURE: 70 MMHG | OXYGEN SATURATION: 97 % | SYSTOLIC BLOOD PRESSURE: 120 MMHG | HEIGHT: 70 IN

## 2024-08-01 DIAGNOSIS — R29.818 DIFFICULTY BALANCING: ICD-10-CM

## 2024-08-01 DIAGNOSIS — R27.0 ATAXIA: ICD-10-CM

## 2024-08-01 DIAGNOSIS — F40.240 CLAUSTROPHOBIA: ICD-10-CM

## 2024-08-01 DIAGNOSIS — I50.22 CHRONIC SYSTOLIC CONGESTIVE HEART FAILURE (HCC): ICD-10-CM

## 2024-08-01 DIAGNOSIS — M54.2 CHRONIC NECK PAIN: ICD-10-CM

## 2024-08-01 DIAGNOSIS — R26.81 UNSTABLE GAIT: Primary | ICD-10-CM

## 2024-08-01 DIAGNOSIS — G89.29 CHRONIC NECK PAIN: ICD-10-CM

## 2024-08-01 PROCEDURE — 3017F COLORECTAL CA SCREEN DOC REV: CPT | Performed by: FAMILY MEDICINE

## 2024-08-01 PROCEDURE — G8427 DOCREV CUR MEDS BY ELIG CLIN: HCPCS | Performed by: FAMILY MEDICINE

## 2024-08-01 PROCEDURE — 99214 OFFICE O/P EST MOD 30 MIN: CPT | Performed by: FAMILY MEDICINE

## 2024-08-01 PROCEDURE — 3078F DIAST BP <80 MM HG: CPT | Performed by: FAMILY MEDICINE

## 2024-08-01 PROCEDURE — 1036F TOBACCO NON-USER: CPT | Performed by: FAMILY MEDICINE

## 2024-08-01 PROCEDURE — 1123F ACP DISCUSS/DSCN MKR DOCD: CPT | Performed by: FAMILY MEDICINE

## 2024-08-01 PROCEDURE — 3074F SYST BP LT 130 MM HG: CPT | Performed by: FAMILY MEDICINE

## 2024-08-01 PROCEDURE — G8417 CALC BMI ABV UP PARAM F/U: HCPCS | Performed by: FAMILY MEDICINE

## 2024-08-01 RX ORDER — LORAZEPAM 1 MG/1
TABLET ORAL
Qty: 1 TABLET | Refills: 0 | Status: SHIPPED | OUTPATIENT
Start: 2024-08-01 | End: 2024-08-02

## 2024-08-01 NOTE — PATIENT INSTRUCTIONS
[employer] SURVEY:    You may be receiving a survey from [employer] regarding your visit today.    You may get this in the mail, through your MyChart or in your email.     Please complete the survey to enable us to provide the highest quality of care to you and your family.    If you cannot score us as very good ( 5 Stars) on any question, please feel free to call the office to discuss how we could have made your experience exceptional.     Thank you.    Clinical Care Team:   DO Alberto Priest CMA                                     Triage: Nicki Linder CMA              Clerical Team:    Nicki Silva     [place] [phone]           Billing questions: [phone]           Medical Records Request: [phone]

## 2024-08-01 NOTE — PROGRESS NOTES
as above, unstable gait which has worsened.  Failed PT.  I do suspect he may have cervical spinal stenosis.  MRI ordered.  Ativan due to claustrophobia with MRI.  6.  Euvolemic at this time, feeling better.  Reviewed recent results.  Continue same Rx as in chart.    Requested Prescriptions     Signed Prescriptions Disp Refills    LORazepam (ATIVAN) 1 MG tablet 1 tablet 0     Si po 30 min prior to MRI. No driving for 6h after dose.         signed by Michelle Fletcher DO on 2024 at 8:07 AM  [facility]  [facility] CARE [address] [address]  Dept: [phone]

## 2024-08-09 ENCOUNTER — HOSPITAL ENCOUNTER (OUTPATIENT)
Dept: MRI IMAGING | Age: 74
End: 2024-08-09
Attending: FAMILY MEDICINE
Payer: MEDICARE

## 2024-08-09 DIAGNOSIS — R26.81 UNSTABLE GAIT: ICD-10-CM

## 2024-08-09 DIAGNOSIS — R29.818 DIFFICULTY BALANCING: ICD-10-CM

## 2024-08-09 DIAGNOSIS — R27.0 ATAXIA: ICD-10-CM

## 2024-08-09 PROCEDURE — 72141 MRI NECK SPINE W/O DYE: CPT

## 2024-08-11 ENCOUNTER — APPOINTMENT (OUTPATIENT)
Dept: GENERAL RADIOLOGY | Age: 74
End: 2024-08-11
Payer: MEDICARE

## 2024-08-11 ENCOUNTER — HOSPITAL ENCOUNTER (EMERGENCY)
Age: 74
Discharge: HOME | End: 2024-08-12
Attending: EMERGENCY MEDICINE
Payer: MEDICARE

## 2024-08-11 DIAGNOSIS — I50.9 ACUTE ON CHRONIC CONGESTIVE HEART FAILURE, UNSPECIFIED HEART FAILURE TYPE (HCC): Primary | ICD-10-CM

## 2024-08-11 LAB
ALBUMIN SERPL-MCNC: 4.2 G/DL (ref 3.5–5.2)
ALP SERPL-CCNC: 78 U/L (ref 40–129)
ALT SERPL-CCNC: 24 U/L (ref 5–41)
ANION GAP SERPL CALCULATED.3IONS-SCNC: 13 MMOL/L (ref 9–17)
AST SERPL-CCNC: 25 U/L
BASOPHILS # BLD: 0.03 K/UL (ref 0–0.2)
BASOPHILS NFR BLD: 0 % (ref 0–2)
BILIRUB SERPL-MCNC: 0.8 MG/DL (ref 0.3–1.2)
BNP SERPL-MCNC: 619 PG/ML
BUN SERPL-MCNC: 29 MG/DL (ref 8–23)
BUN/CREAT SERPL: 26 (ref 9–20)
CALCIUM SERPL-MCNC: 8.7 MG/DL (ref 8.6–10.4)
CHLORIDE SERPL-SCNC: 104 MMOL/L (ref 98–107)
CO2 SERPL-SCNC: 25 MMOL/L (ref 20–31)
CREAT SERPL-MCNC: 1.1 MG/DL (ref 0.7–1.2)
EOSINOPHIL # BLD: 0.19 K/UL (ref 0–0.4)
EOSINOPHILS RELATIVE PERCENT: 3 % (ref 0–5)
ERYTHROCYTE [DISTWIDTH] IN BLOOD BY AUTOMATED COUNT: 13.1 % (ref 12.1–15.2)
GFR, ESTIMATED: 71 ML/MIN/1.73M2
GLUCOSE SERPL-MCNC: 203 MG/DL (ref 70–99)
HCT VFR BLD AUTO: 38.3 % (ref 41–53)
HGB BLD-MCNC: 12.8 G/DL (ref 13.5–17.5)
IMM GRANULOCYTES # BLD AUTO: 0.02 K/UL (ref 0–0.3)
IMM GRANULOCYTES NFR BLD: 0 % (ref 0–5)
LYMPHOCYTES NFR BLD: 1.53 K/UL (ref 1–4.8)
LYMPHOCYTES RELATIVE PERCENT: 22 % (ref 13–44)
MCH RBC QN AUTO: 32.2 PG (ref 26–34)
MCHC RBC AUTO-ENTMCNC: 33.4 G/DL (ref 31–37)
MCV RBC AUTO: 96.5 FL (ref 80–100)
MONOCYTES NFR BLD: 0.6 K/UL (ref 0–1)
MONOCYTES NFR BLD: 9 % (ref 5–9)
NEUTROPHILS NFR BLD: 66 % (ref 39–75)
NEUTS SEG NFR BLD: 4.57 K/UL (ref 2.1–6.5)
PLATELET # BLD AUTO: 209 K/UL (ref 140–450)
PMV BLD AUTO: 10.2 FL (ref 6–12)
POTASSIUM SERPL-SCNC: 4.8 MMOL/L (ref 3.7–5.3)
PROT SERPL-MCNC: 7.5 G/DL (ref 6.4–8.3)
RBC # BLD AUTO: 3.97 M/UL (ref 4.5–5.9)
SODIUM SERPL-SCNC: 142 MMOL/L (ref 135–144)
TROPONIN I SERPL HS-MCNC: 52 NG/L (ref 0–22)
WBC OTHER # BLD: 6.9 K/UL (ref 3.5–11)

## 2024-08-11 PROCEDURE — 96374 THER/PROPH/DIAG INJ IV PUSH: CPT

## 2024-08-11 PROCEDURE — 84484 ASSAY OF TROPONIN QUANT: CPT

## 2024-08-11 PROCEDURE — 83880 ASSAY OF NATRIURETIC PEPTIDE: CPT

## 2024-08-11 PROCEDURE — 85025 COMPLETE CBC W/AUTO DIFF WBC: CPT

## 2024-08-11 PROCEDURE — 6360000002 HC RX W HCPCS: Performed by: EMERGENCY MEDICINE

## 2024-08-11 PROCEDURE — 80053 COMPREHEN METABOLIC PANEL: CPT

## 2024-08-11 PROCEDURE — 36415 COLL VENOUS BLD VENIPUNCTURE: CPT

## 2024-08-11 PROCEDURE — 71045 X-RAY EXAM CHEST 1 VIEW: CPT

## 2024-08-11 PROCEDURE — 93005 ELECTROCARDIOGRAM TRACING: CPT | Performed by: EMERGENCY MEDICINE

## 2024-08-11 PROCEDURE — 99285 EMERGENCY DEPT VISIT HI MDM: CPT

## 2024-08-11 RX ORDER — FUROSEMIDE 10 MG/ML
80 INJECTION INTRAMUSCULAR; INTRAVENOUS ONCE
Status: COMPLETED | OUTPATIENT
Start: 2024-08-11 | End: 2024-08-11

## 2024-08-11 RX ADMIN — FUROSEMIDE 80 MG: 10 INJECTION, SOLUTION INTRAMUSCULAR; INTRAVENOUS at 22:47

## 2024-08-11 ASSESSMENT — PAIN - FUNCTIONAL ASSESSMENT: PAIN_FUNCTIONAL_ASSESSMENT: NONE - DENIES PAIN

## 2024-08-12 ENCOUNTER — TELEPHONE (OUTPATIENT)
Dept: CARDIOLOGY CLINIC | Age: 74
End: 2024-08-12

## 2024-08-12 ENCOUNTER — PATIENT MESSAGE (OUTPATIENT)
Dept: FAMILY MEDICINE CLINIC | Age: 74
End: 2024-08-12

## 2024-08-12 VITALS
SYSTOLIC BLOOD PRESSURE: 121 MMHG | HEIGHT: 70 IN | HEART RATE: 75 BPM | DIASTOLIC BLOOD PRESSURE: 79 MMHG | WEIGHT: 252.7 LBS | OXYGEN SATURATION: 94 % | BODY MASS INDEX: 36.18 KG/M2 | RESPIRATION RATE: 20 BRPM

## 2024-08-12 DIAGNOSIS — R26.81 UNSTABLE GAIT: ICD-10-CM

## 2024-08-12 DIAGNOSIS — G89.29 CHRONIC NECK PAIN: ICD-10-CM

## 2024-08-12 DIAGNOSIS — M54.2 CHRONIC NECK PAIN: ICD-10-CM

## 2024-08-12 DIAGNOSIS — M48.02 CERVICAL SPINAL STENOSIS: Primary | ICD-10-CM

## 2024-08-12 DIAGNOSIS — R60.0 LEG EDEMA: Primary | ICD-10-CM

## 2024-08-12 LAB
EKG ATRIAL RATE: 89 BPM
EKG P AXIS: 40 DEGREES
EKG P-R INTERVAL: 168 MS
EKG Q-T INTERVAL: 408 MS
EKG QRS DURATION: 152 MS
EKG QTC CALCULATION (BAZETT): 496 MS
EKG R AXIS: 84 DEGREES
EKG T AXIS: 110 DEGREES
EKG VENTRICULAR RATE: 89 BPM
TROPONIN I SERPL HS-MCNC: 53 NG/L (ref 0–22)

## 2024-08-12 PROCEDURE — 93010 ELECTROCARDIOGRAM REPORT: CPT | Performed by: INTERNAL MEDICINE

## 2024-08-12 NOTE — TELEPHONE ENCOUNTER
Patient called to report to Dr Crump that he was to [facility] ED yesterday for shortness of breath.  He reports he was really having a hard time breathing.  They ran him through all the testing.  He was holding water. They gave him Lasix and that really helped to get the water off.  He is feeling better today.  Denies SOB, CP, Lightheadedness. He did not know if Dr Crump wanted him to do anything else. His next appt is not until November before he leaves to go to [place] for the winter. Please advise

## 2024-08-12 NOTE — DISCHARGE INSTRUCTIONS
Call Dr. Farnsworth of her direction on follow-up.  Continue all medications as previously prescribed.  Log your weight daily.  Return to ER for worsening symptoms including worsening shortness of breath or if develop any worsening chest pain or fevers or chills or any other concerning symptoms.

## 2024-08-12 NOTE — ED PROVIDER NOTES
Last Year: No     Review of Systems:    All relevant systems are reviewed and are negative with the exception of that stated in the HPI.    PHYSICAL EXAM    VITAL SIGNS: BP (!) 164/85   Pulse 91   Resp 22   Ht 1.778 m (5' 10\")   Wt 114.6 kg (252 lb 11.2 oz)   SpO2 95%   BMI 36.26 kg/m²    Constitutional:  Well developed, Well nourished, No acute distress, Non-toxic appearance.   Eyes:  PERRL, EOMI, Conjunctiva normal, No discharge.   Respiratory:  Normal breath sounds, No respiratory distress, No wheezing, No chest tenderness.   Cardiovascular:  Normal heart rate, Normal rhythm, No murmurs, No rubs, No gallops.   GI:  Bowel sounds normal, Soft, No tenderness, No masses, No pulsatile masses.   Integument:  Warm, Dry, No erythema, No rash.   Neurologic:  Alert & oriented x 3, Normal motor function, Normal sensory function, No focal deficits noted.     EKG interpretation    Interpreted by me    Rhythm: Normal sinus  Rate: Normal  Axis: Normal  Ectopy: None  Conduction: Intraventricular conduction delay  ST segments: No acute change  T waves: No acute change  Q waves: None    Comparison: July 5, 2024    Impression: Nonspecific EKG.  Unchanged from previous EKG.    Medical Decision Making    Patient presents to the emergency room for evaluation of shortness of breath that started this evening.  He denies any chest pain or cough or wheezing or recent weight gain.  He is taking his Lasix compliantly.  Vital signs stable.  Not hypoxic or tachypneic or tachycardic.  12 EKG interpretation shows.  Laboratory studies performed and results independently reviewed by me.  CBC and CMP normal.  Troponin is 52.  His troponin at the 2-hour dianne is 53.  This appears to be a chronic elevation when compared to his previous levels.  His BNP is 619.  Chest x-ray 1 view was performed and results independently reviewed by me.  There is CHF and pulmonary edema noted.  Patient was given Lasix 80 mg IV in the emergency room.  He was

## 2024-08-12 NOTE — DISCHARGE INSTR - COC
ADLs:602560096}  Feeding  {P DME ADLs:438197221}  Med Admin  {[facility] DME ADLs:452188032}  Med Delivery   { [facility] MED Delivery:707537021}    Wound Care Documentation and Therapy:        Elimination:  Continence:   Bowel: {YES / NO:}  Bladder: {YES / NO:}  Urinary Catheter: {Urinary Catheter:367045093}   Colostomy/Ileostomy/Ileal Conduit: {YES / NO:}       Date of Last BM: ***    Intake/Output Summary (Last 24 hours) at 2024 0031  Last data filed at 2024 2326  Gross per 24 hour   Intake --   Output 600 ml   Net -600 ml     No intake/output data recorded.    Safety Concerns:     { [facility] Safety Concerns:909913781}    Impairments/Disabilities:      { [facility] Impairments/Disabilities:105527793}    Nutrition Therapy:  Current Nutrition Therapy:   { [facility] Diet List:268781129}    Routes of Feeding: {[facility] Other Feedings:911579369}  Liquids: {Slp liquid thickness:95298}  Daily Fluid Restriction: {[facility] DME Yes amt example:993264644}  Last Modified Barium Swallow with Video (Video Swallowing Test): {Done Not Done Date:}    Treatments at the Time of Hospital Discharge:   Respiratory Treatments: ***  Oxygen Therapy:  {Therapy; copd oxygen:24767}  Ventilator:    {[facility] Vent List:158366954}    Rehab Therapies: {THERAPEUTIC INTERVENTION:4868998242}  Weight Bearing Status/Restrictions: {[facility] Weight Bearin}  Other Medical Equipment (for information only, NOT a DME order):  {EQUIPMENT:697654182}  Other Treatments: ***    Patient's personal belongings (please select all that are sent with patient):  {[facility] DME Belongings:707793739}    RN SIGNATURE:  {Esignature:310177484}    CASE MANAGEMENT/SOCIAL WORK SECTION    Inpatient Status Date: ***    Readmission Risk Assessment Score:  Readmission Risk              Risk of Unplanned Readmission:  0           Discharging to Facility/ Agency   Name:   Address:  Phone:  Fax:    Dialysis Facility (if applicable)   Name:  Address:  Dialysis

## 2024-08-15 RX ORDER — CARVEDILOL 3.12 MG/1
3.12 TABLET ORAL DAILY
Qty: 90 TABLET | Refills: 3 | Status: SHIPPED | OUTPATIENT
Start: 2024-08-15

## 2024-08-19 ENCOUNTER — HOSPITAL ENCOUNTER (OUTPATIENT)
Age: 74
Discharge: HOME | End: 2024-08-19
Payer: MEDICARE

## 2024-08-19 DIAGNOSIS — R60.0 LEG EDEMA: ICD-10-CM

## 2024-08-19 LAB
ANION GAP SERPL CALCULATED.3IONS-SCNC: 11 MMOL/L (ref 9–17)
BUN SERPL-MCNC: 23 MG/DL (ref 8–23)
BUN/CREAT SERPL: 29 (ref 9–20)
CALCIUM SERPL-MCNC: 8.8 MG/DL (ref 8.6–10.4)
CHLORIDE SERPL-SCNC: 105 MMOL/L (ref 98–107)
CO2 SERPL-SCNC: 24 MMOL/L (ref 20–31)
CREAT SERPL-MCNC: 0.8 MG/DL (ref 0.7–1.2)
GFR, ESTIMATED: >90 ML/MIN/1.73M2
GLUCOSE SERPL-MCNC: 176 MG/DL (ref 70–99)
POTASSIUM SERPL-SCNC: 4.3 MMOL/L (ref 3.7–5.3)
SODIUM SERPL-SCNC: 140 MMOL/L (ref 135–144)

## 2024-08-19 PROCEDURE — 80048 BASIC METABOLIC PNL TOTAL CA: CPT

## 2024-08-19 PROCEDURE — 36415 COLL VENOUS BLD VENIPUNCTURE: CPT

## 2024-08-23 ENCOUNTER — PATIENT MESSAGE (OUTPATIENT)
Dept: FAMILY MEDICINE CLINIC | Age: 74
End: 2024-08-23

## 2024-08-23 DIAGNOSIS — M54.2 CHRONIC NECK PAIN: ICD-10-CM

## 2024-08-23 DIAGNOSIS — M48.02 CERVICAL SPINAL STENOSIS: Primary | ICD-10-CM

## 2024-08-23 DIAGNOSIS — G89.29 CHRONIC NECK PAIN: ICD-10-CM

## 2024-08-23 DIAGNOSIS — R26.81 UNSTABLE GAIT: ICD-10-CM

## 2024-09-24 DIAGNOSIS — I48.0 PAF (PAROXYSMAL ATRIAL FIBRILLATION) (HCC): ICD-10-CM

## 2024-09-24 DIAGNOSIS — I10 PRIMARY HYPERTENSION: ICD-10-CM

## 2024-09-24 DIAGNOSIS — I25.119 CORONARY ARTERY DISEASE INVOLVING NATIVE CORONARY ARTERY OF NATIVE HEART WITH ANGINA PECTORIS (HCC): Primary | ICD-10-CM

## 2024-09-24 DIAGNOSIS — E55.9 VITAMIN D DEFICIENCY DISEASE: ICD-10-CM

## 2024-09-25 ENCOUNTER — OFFICE VISIT (OUTPATIENT)
Dept: CARDIOLOGY CLINIC | Age: 74
End: 2024-09-25

## 2024-09-25 ENCOUNTER — HOSPITAL ENCOUNTER (OUTPATIENT)
Age: 74
Discharge: HOME | End: 2024-09-25
Payer: MEDICARE

## 2024-09-25 VITALS
OXYGEN SATURATION: 93 % | WEIGHT: 252 LBS | DIASTOLIC BLOOD PRESSURE: 60 MMHG | HEART RATE: 90 BPM | BODY MASS INDEX: 36.16 KG/M2 | SYSTOLIC BLOOD PRESSURE: 140 MMHG

## 2024-09-25 DIAGNOSIS — I10 PRIMARY HYPERTENSION: ICD-10-CM

## 2024-09-25 DIAGNOSIS — E55.9 VITAMIN D DEFICIENCY DISEASE: ICD-10-CM

## 2024-09-25 DIAGNOSIS — R60.0 LEG EDEMA: Primary | ICD-10-CM

## 2024-09-25 DIAGNOSIS — I48.0 PAF (PAROXYSMAL ATRIAL FIBRILLATION) (HCC): ICD-10-CM

## 2024-09-25 DIAGNOSIS — R06.02 SOB (SHORTNESS OF BREATH): ICD-10-CM

## 2024-09-25 DIAGNOSIS — I25.119 CORONARY ARTERY DISEASE INVOLVING NATIVE CORONARY ARTERY OF NATIVE HEART WITH ANGINA PECTORIS (HCC): ICD-10-CM

## 2024-09-25 LAB
25(OH)D3 SERPL-MCNC: 29.6 NG/ML (ref 30–100)
ALBUMIN SERPL-MCNC: 3.9 G/DL (ref 3.5–5.2)
ALP SERPL-CCNC: 74 U/L (ref 40–129)
ALT SERPL-CCNC: 18 U/L (ref 5–41)
ANION GAP SERPL CALCULATED.3IONS-SCNC: 9 MMOL/L (ref 9–17)
AST SERPL-CCNC: 20 U/L
BASOPHILS # BLD: 0.02 K/UL (ref 0–0.2)
BASOPHILS NFR BLD: 0 % (ref 0–2)
BILIRUB SERPL-MCNC: 0.6 MG/DL (ref 0.3–1.2)
BUN SERPL-MCNC: 20 MG/DL (ref 8–23)
BUN/CREAT SERPL: 22 (ref 9–20)
CALCIUM SERPL-MCNC: 8.2 MG/DL (ref 8.6–10.4)
CHLORIDE SERPL-SCNC: 108 MMOL/L (ref 98–107)
CHOLEST SERPL-MCNC: 94 MG/DL (ref 0–199)
CHOLESTEROL/HDL RATIO: 3
CO2 SERPL-SCNC: 25 MMOL/L (ref 20–31)
CREAT SERPL-MCNC: 0.9 MG/DL (ref 0.7–1.2)
EOSINOPHIL # BLD: 0.22 K/UL (ref 0–0.4)
EOSINOPHILS RELATIVE PERCENT: 3 % (ref 0–5)
ERYTHROCYTE [DISTWIDTH] IN BLOOD BY AUTOMATED COUNT: 13.5 % (ref 12.1–15.2)
GFR, ESTIMATED: >90 ML/MIN/1.73M2
GLUCOSE SERPL-MCNC: 174 MG/DL (ref 70–99)
HCT VFR BLD AUTO: 36.4 % (ref 41–53)
HDLC SERPL-MCNC: 27 MG/DL
HGB BLD-MCNC: 12.1 G/DL (ref 13.5–17.5)
IMM GRANULOCYTES # BLD AUTO: 0 K/UL (ref 0–0.3)
IMM GRANULOCYTES NFR BLD: 0 % (ref 0–5)
LDLC SERPL CALC-MCNC: 36 MG/DL (ref 0–100)
LYMPHOCYTES NFR BLD: 1.45 K/UL (ref 1–4.8)
LYMPHOCYTES RELATIVE PERCENT: 23 % (ref 13–44)
MAGNESIUM SERPL-MCNC: 2 MG/DL (ref 1.6–2.6)
MCH RBC QN AUTO: 32.6 PG (ref 26–34)
MCHC RBC AUTO-ENTMCNC: 33.2 G/DL (ref 31–37)
MCV RBC AUTO: 98.1 FL (ref 80–100)
MONOCYTES NFR BLD: 0.63 K/UL (ref 0–1)
MONOCYTES NFR BLD: 10 % (ref 5–9)
NEUTROPHILS NFR BLD: 64 % (ref 39–75)
NEUTS SEG NFR BLD: 4.13 K/UL (ref 2.1–6.5)
PLATELET # BLD AUTO: 211 K/UL (ref 140–450)
PMV BLD AUTO: 10 FL (ref 6–12)
POTASSIUM SERPL-SCNC: 4.8 MMOL/L (ref 3.7–5.3)
PROT SERPL-MCNC: 6.7 G/DL (ref 6.4–8.3)
RBC # BLD AUTO: 3.71 M/UL (ref 4.5–5.9)
SODIUM SERPL-SCNC: 142 MMOL/L (ref 135–144)
TRIGL SERPL-MCNC: 152 MG/DL
TSH SERPL DL<=0.05 MIU/L-ACNC: 1.93 UIU/ML (ref 0.3–5)
VLDLC SERPL CALC-MCNC: 30 MG/DL
WBC OTHER # BLD: 6.5 K/UL (ref 3.5–11)

## 2024-09-25 PROCEDURE — 93005 ELECTROCARDIOGRAM TRACING: CPT

## 2024-09-25 PROCEDURE — 80061 LIPID PANEL: CPT

## 2024-09-25 PROCEDURE — 85025 COMPLETE CBC W/AUTO DIFF WBC: CPT

## 2024-09-25 PROCEDURE — 84443 ASSAY THYROID STIM HORMONE: CPT

## 2024-09-25 PROCEDURE — 36415 COLL VENOUS BLD VENIPUNCTURE: CPT

## 2024-09-25 PROCEDURE — 83735 ASSAY OF MAGNESIUM: CPT

## 2024-09-25 PROCEDURE — 82306 VITAMIN D 25 HYDROXY: CPT

## 2024-09-25 PROCEDURE — 80053 COMPREHEN METABOLIC PANEL: CPT

## 2024-09-25 RX ORDER — CELECOXIB 200 MG/1
200 CAPSULE ORAL DAILY
Qty: 90 CAPSULE | Refills: 1 | Status: SHIPPED | OUTPATIENT
Start: 2024-09-25

## 2024-09-27 ENCOUNTER — HOSPITAL ENCOUNTER (OUTPATIENT)
Age: 74
Discharge: HOME | End: 2024-09-29
Attending: INTERNAL MEDICINE
Payer: MEDICARE

## 2024-09-27 VITALS — BODY MASS INDEX: 35.07 KG/M2 | WEIGHT: 245 LBS | HEIGHT: 70 IN

## 2024-09-27 DIAGNOSIS — R60.0 LEG EDEMA: ICD-10-CM

## 2024-09-27 DIAGNOSIS — R06.02 SOB (SHORTNESS OF BREATH): ICD-10-CM

## 2024-09-27 LAB
ECHO AO ASC DIAM: 2 CM
ECHO AO ASCENDING AORTA INDEX: 0.88 CM/M2
ECHO AO ROOT DIAM: 2.6 CM
ECHO AO ROOT INDEX: 1.14 CM/M2
ECHO AV CUSP MM: 2.1 CM
ECHO AV MEAN GRADIENT: 9 MMHG
ECHO AV MEAN VELOCITY: 1.4 M/S
ECHO AV PEAK GRADIENT: 16 MMHG
ECHO AV PEAK VELOCITY: 2 M/S
ECHO AV VTI: 37.5 CM
ECHO BSA: 2.34 M2
ECHO EST RA PRESSURE: 8 MMHG
ECHO LA DIAMETER INDEX: 1.97 CM/M2
ECHO LA DIAMETER: 4.5 CM
ECHO LA TO AORTIC ROOT RATIO: 1.73
ECHO LA VOL A-L A4C: 80 ML (ref 18–58)
ECHO LA VOL MOD A4C: 75 ML (ref 18–58)
ECHO LA VOLUME INDEX A-L A4C: 35 ML/M2 (ref 16–34)
ECHO LA VOLUME INDEX MOD A4C: 33 ML/M2 (ref 16–34)
ECHO LV E' LATERAL VELOCITY: 9.7 CM/S
ECHO LV E' SEPTAL VELOCITY: 7.7 CM/S
ECHO LV EDV A4C: 190 ML
ECHO LV EDV INDEX A4C: 83 ML/M2
ECHO LV EJECTION FRACTION A4C: 57 %
ECHO LV ESV A4C: 82 ML
ECHO LV ESV INDEX A4C: 36 ML/M2
ECHO LV FRACTIONAL SHORTENING: 26 % (ref 28–44)
ECHO LV INTERNAL DIMENSION DIASTOLE INDEX: 2.5 CM/M2
ECHO LV INTERNAL DIMENSION DIASTOLIC: 5.7 CM (ref 4.2–5.9)
ECHO LV INTERNAL DIMENSION SYSTOLIC INDEX: 1.84 CM/M2
ECHO LV INTERNAL DIMENSION SYSTOLIC: 4.2 CM
ECHO LV IVSD: 1.4 CM (ref 0.6–1)
ECHO LV MASS 2D: 433.1 G (ref 88–224)
ECHO LV MASS INDEX 2D: 189.9 G/M2 (ref 49–115)
ECHO LV POSTERIOR WALL DIASTOLIC: 1.8 CM (ref 0.6–1)
ECHO LV RELATIVE WALL THICKNESS RATIO: 0.63
ECHO LVOT AREA: 4.2 CM2
ECHO LVOT DIAM: 2.3 CM
ECHO MV E DECELERATION TIME (DT): 181.4 MS
ECHO MV E VELOCITY: 1.03 M/S
ECHO MV E/E' LATERAL: 10.62
ECHO MV E/E' RATIO (AVERAGED): 12
ECHO MV E/E' SEPTAL: 13.38
ECHO PV MAX VELOCITY: 1.5 M/S
ECHO PV PEAK GRADIENT: 9 MMHG
ECHO RIGHT VENTRICULAR SYSTOLIC PRESSURE (RVSP): 52 MMHG
ECHO RV INTERNAL DIMENSION: 3.3 CM
ECHO TV REGURGITANT MAX VELOCITY: 3.33 M/S
ECHO TV REGURGITANT PEAK GRADIENT: 44 MMHG

## 2024-09-27 PROCEDURE — 93306 TTE W/DOPPLER COMPLETE: CPT

## 2024-09-28 LAB
EKG ATRIAL RATE: 88 BPM
EKG P AXIS: 10 DEGREES
EKG P-R INTERVAL: 172 MS
EKG Q-T INTERVAL: 428 MS
EKG QRS DURATION: 156 MS
EKG QTC CALCULATION (BAZETT): 517 MS
EKG R AXIS: 57 DEGREES
EKG T AXIS: 167 DEGREES
EKG VENTRICULAR RATE: 88 BPM

## 2024-09-30 ENCOUNTER — HOSPITAL ENCOUNTER (OUTPATIENT)
Dept: GENERAL RADIOLOGY | Age: 74
Discharge: HOME | End: 2024-10-02
Payer: MEDICARE

## 2024-09-30 ENCOUNTER — OFFICE VISIT (OUTPATIENT)
Dept: CARDIOLOGY CLINIC | Age: 74
End: 2024-09-30

## 2024-09-30 ENCOUNTER — HOSPITAL ENCOUNTER (OUTPATIENT)
Age: 74
Discharge: HOME | End: 2024-10-02
Payer: MEDICARE

## 2024-09-30 ENCOUNTER — HOSPITAL ENCOUNTER (OUTPATIENT)
Age: 74
Discharge: HOME | End: 2024-09-30
Payer: MEDICARE

## 2024-09-30 VITALS — WEIGHT: 248 LBS | BODY MASS INDEX: 35.58 KG/M2 | DIASTOLIC BLOOD PRESSURE: 60 MMHG | SYSTOLIC BLOOD PRESSURE: 140 MMHG

## 2024-09-30 DIAGNOSIS — R60.0 LEG EDEMA: Primary | ICD-10-CM

## 2024-09-30 DIAGNOSIS — R60.0 LEG EDEMA: ICD-10-CM

## 2024-09-30 DIAGNOSIS — R06.02 SOB (SHORTNESS OF BREATH): ICD-10-CM

## 2024-09-30 LAB
ANION GAP SERPL CALCULATED.3IONS-SCNC: 11 MMOL/L (ref 9–17)
BUN SERPL-MCNC: 25 MG/DL (ref 8–23)
BUN/CREAT SERPL: 28 (ref 9–20)
CALCIUM SERPL-MCNC: 9.1 MG/DL (ref 8.6–10.4)
CHLORIDE SERPL-SCNC: 105 MMOL/L (ref 98–107)
CO2 SERPL-SCNC: 26 MMOL/L (ref 20–31)
CREAT SERPL-MCNC: 0.9 MG/DL (ref 0.7–1.2)
ECHO AO ASC DIAM: 2 CM
ECHO AO ASCENDING AORTA INDEX: 0.88 CM/M2
ECHO AO ROOT DIAM: 2.6 CM
ECHO AO ROOT INDEX: 1.14 CM/M2
ECHO AV CUSP MM: 2.1 CM
ECHO AV MEAN GRADIENT: 9 MMHG
ECHO AV MEAN VELOCITY: 1.4 M/S
ECHO AV PEAK GRADIENT: 16 MMHG
ECHO AV PEAK VELOCITY: 2 M/S
ECHO AV VTI: 37.5 CM
ECHO BSA: 2.34 M2
ECHO EST RA PRESSURE: 8 MMHG
ECHO LA DIAMETER INDEX: 1.97 CM/M2
ECHO LA DIAMETER: 4.5 CM
ECHO LA TO AORTIC ROOT RATIO: 1.73
ECHO LA VOL A-L A4C: 80 ML (ref 18–58)
ECHO LA VOL MOD A4C: 75 ML (ref 18–58)
ECHO LA VOLUME INDEX A-L A4C: 35 ML/M2 (ref 16–34)
ECHO LA VOLUME INDEX MOD A4C: 33 ML/M2 (ref 16–34)
ECHO LV E' LATERAL VELOCITY: 9.7 CM/S
ECHO LV E' SEPTAL VELOCITY: 7.7 CM/S
ECHO LV EDV A4C: 190 ML
ECHO LV EDV INDEX A4C: 83 ML/M2
ECHO LV EF PHYSICIAN: 45 %
ECHO LV EJECTION FRACTION A4C: 57 %
ECHO LV ESV A4C: 82 ML
ECHO LV ESV INDEX A4C: 36 ML/M2
ECHO LV FRACTIONAL SHORTENING: 26 % (ref 28–44)
ECHO LV INTERNAL DIMENSION DIASTOLE INDEX: 2.5 CM/M2
ECHO LV INTERNAL DIMENSION DIASTOLIC: 5.7 CM (ref 4.2–5.9)
ECHO LV INTERNAL DIMENSION SYSTOLIC INDEX: 1.84 CM/M2
ECHO LV INTERNAL DIMENSION SYSTOLIC: 4.2 CM
ECHO LV IVSD: 1.4 CM (ref 0.6–1)
ECHO LV MASS 2D: 433.1 G (ref 88–224)
ECHO LV MASS INDEX 2D: 189.9 G/M2 (ref 49–115)
ECHO LV POSTERIOR WALL DIASTOLIC: 1.8 CM (ref 0.6–1)
ECHO LV RELATIVE WALL THICKNESS RATIO: 0.63
ECHO LVOT AREA: 4.2 CM2
ECHO LVOT DIAM: 2.3 CM
ECHO MV E DECELERATION TIME (DT): 181.4 MS
ECHO MV E VELOCITY: 1.03 M/S
ECHO MV E/E' LATERAL: 10.62
ECHO MV E/E' RATIO (AVERAGED): 12
ECHO MV E/E' SEPTAL: 13.38
ECHO PV MAX VELOCITY: 1.5 M/S
ECHO PV PEAK GRADIENT: 9 MMHG
ECHO RIGHT VENTRICULAR SYSTOLIC PRESSURE (RVSP): 52 MMHG
ECHO RV INTERNAL DIMENSION: 3.3 CM
ECHO TV REGURGITANT MAX VELOCITY: 3.33 M/S
ECHO TV REGURGITANT PEAK GRADIENT: 44 MMHG
GFR, ESTIMATED: >90 ML/MIN/1.73M2
GLUCOSE SERPL-MCNC: 192 MG/DL (ref 70–99)
POTASSIUM SERPL-SCNC: 4.6 MMOL/L (ref 3.7–5.3)
SODIUM SERPL-SCNC: 142 MMOL/L (ref 135–144)

## 2024-09-30 PROCEDURE — 36415 COLL VENOUS BLD VENIPUNCTURE: CPT

## 2024-09-30 PROCEDURE — 71046 X-RAY EXAM CHEST 2 VIEWS: CPT

## 2024-09-30 PROCEDURE — 80048 BASIC METABOLIC PNL TOTAL CA: CPT

## 2024-10-02 NOTE — PROGRESS NOTES
Ov DR Crump follow up  Lost 5lbs   Feeling better  Less sob.   No chest pain  Some seeping in lt  Leg stopped in rt leg    Will hold spot Nov 8 at 8am    Will see in 3 week with bmp      
changes.  HEENT:  Pupils are equally round and intact.  Mucous membranes were dry.  NECK:  No JVD.   Good carotid pulses.  No carotid bruits.  CARDIOVASCULAR:  S1 and S2 normal.  No S3, S4.  Soft systolic murmur.  No diastolic murmur.  PMI was normal.  No lift, thrust, or pericardial friction rub.  LUNGS:  Clear to auscultation and percussion.  ABDOMEN:  Soft and nontender.  Good bowel sounds.  EXTREMITIES:  Good femoral pulses.  Good pedal pulses.  He had 3+ edema, which is improved from his previous edema.  Skin is warm and dry.  No calf tenderness.    LABORATORY DATA:  His sodium is 142, potassium 4.6, BUN 25, creatinine 0.9, calcium 9.1.    IMPRESSION:    Fluid status, much improved although he is still fluid overloaded with 3+ edema.  His abdominal swelling has decreased.  Shortness of breath, improving.    PLAN:  His fluid status is improving, and there has been no change in his BUN and creatinine or potassium in spite of his increase in diuretics during the last visit.    Overall, he is much more comfortable.    Because his BUN and creatinine are staying stable, and he had a good response with the Lasix and Aldactone in the last several days, we will continue the same dose and followup with him in 3 weeks with another BMP.    We have held a spot on November 8th for catheterization at [facility].  I am not sure that he will need this, but we will have it on hold.    I am delighted at his response to the diuretics, and I am also delighted that his renal function has remained stable.    Thank you very much for allowing me the privilege of seeing Mr. Blakely.  If you have any questions on my thoughts, please do not hesitate to contact me.      Sincerely,          ROSEY ULLOA MD      D:  10/01/2024 06:07:02     T:  10/01/2024 06:49:16     GSJAKE/ILIA  Job #:  [id number]     Doc#:  [id number]

## 2024-10-07 DIAGNOSIS — R60.0 LEG EDEMA: ICD-10-CM

## 2024-10-07 DIAGNOSIS — I50.32 CHRONIC DIASTOLIC CHF (CONGESTIVE HEART FAILURE), NYHA CLASS 3 (HCC): ICD-10-CM

## 2024-10-07 NOTE — TELEPHONE ENCOUNTER
Patient is currently taking his Furosemide 80mg and Spironolactone 25mg BID per Dr Crump due to increased swelling.  He is almost out of his medication due to taking increased dose right now and needs a new Rx sent to [facility] pharmacy

## 2024-10-08 RX ORDER — FUROSEMIDE 80 MG
80 TABLET ORAL 2 TIMES DAILY
Qty: 180 TABLET | Refills: 3 | Status: SHIPPED | OUTPATIENT
Start: 2024-10-08

## 2024-10-08 RX ORDER — SPIRONOLACTONE 25 MG/1
25 TABLET ORAL 2 TIMES DAILY
Qty: 180 TABLET | Refills: 3 | Status: SHIPPED | OUTPATIENT
Start: 2024-10-08

## 2024-10-21 ENCOUNTER — HOSPITAL ENCOUNTER (OUTPATIENT)
Age: 74
Discharge: HOME | End: 2024-10-21
Payer: MEDICARE

## 2024-10-21 DIAGNOSIS — R60.0 LEG EDEMA: ICD-10-CM

## 2024-10-21 LAB
ANION GAP SERPL CALCULATED.3IONS-SCNC: 9 MMOL/L (ref 9–17)
BUN SERPL-MCNC: 36 MG/DL (ref 8–23)
BUN/CREAT SERPL: 30 (ref 9–20)
CALCIUM SERPL-MCNC: 8.5 MG/DL (ref 8.6–10.4)
CHLORIDE SERPL-SCNC: 105 MMOL/L (ref 98–107)
CO2 SERPL-SCNC: 25 MMOL/L (ref 20–31)
CREAT SERPL-MCNC: 1.2 MG/DL (ref 0.7–1.2)
GFR, ESTIMATED: 64 ML/MIN/1.73M2
GLUCOSE SERPL-MCNC: 163 MG/DL (ref 70–99)
POTASSIUM SERPL-SCNC: 4.5 MMOL/L (ref 3.7–5.3)
SODIUM SERPL-SCNC: 139 MMOL/L (ref 135–144)

## 2024-10-21 PROCEDURE — 36415 COLL VENOUS BLD VENIPUNCTURE: CPT

## 2024-10-21 PROCEDURE — 80048 BASIC METABOLIC PNL TOTAL CA: CPT

## 2024-10-23 ENCOUNTER — HOSPITAL ENCOUNTER (OUTPATIENT)
Dept: CT IMAGING | Age: 74
Discharge: HOME | End: 2024-10-25
Attending: INTERNAL MEDICINE
Payer: MEDICARE

## 2024-10-23 ENCOUNTER — OFFICE VISIT (OUTPATIENT)
Dept: CARDIOLOGY CLINIC | Age: 74
End: 2024-10-23
Payer: MEDICARE

## 2024-10-23 VITALS
OXYGEN SATURATION: 95 % | DIASTOLIC BLOOD PRESSURE: 80 MMHG | HEART RATE: 90 BPM | SYSTOLIC BLOOD PRESSURE: 140 MMHG | WEIGHT: 238 LBS | BODY MASS INDEX: 34.15 KG/M2

## 2024-10-23 DIAGNOSIS — R06.02 SOB (SHORTNESS OF BREATH): ICD-10-CM

## 2024-10-23 DIAGNOSIS — R19.8 ABDOMINAL FULLNESS: ICD-10-CM

## 2024-10-23 DIAGNOSIS — R07.9 CHEST PAIN, UNSPECIFIED TYPE: ICD-10-CM

## 2024-10-23 DIAGNOSIS — R10.9 ABDOMINAL PAIN, UNSPECIFIED ABDOMINAL LOCATION: ICD-10-CM

## 2024-10-23 DIAGNOSIS — I26.99 PE (PULMONARY THROMBOEMBOLISM) (HCC): ICD-10-CM

## 2024-10-23 DIAGNOSIS — R10.9 ABDOMINAL PAIN, UNSPECIFIED ABDOMINAL LOCATION: Primary | ICD-10-CM

## 2024-10-23 DIAGNOSIS — E78.5 HYPERLIPIDEMIA, UNSPECIFIED HYPERLIPIDEMIA TYPE: ICD-10-CM

## 2024-10-23 PROCEDURE — 3017F COLORECTAL CA SCREEN DOC REV: CPT | Performed by: INTERNAL MEDICINE

## 2024-10-23 PROCEDURE — 1159F MED LIST DOCD IN RCRD: CPT | Performed by: INTERNAL MEDICINE

## 2024-10-23 PROCEDURE — G8484 FLU IMMUNIZE NO ADMIN: HCPCS | Performed by: INTERNAL MEDICINE

## 2024-10-23 PROCEDURE — 1123F ACP DISCUSS/DSCN MKR DOCD: CPT | Performed by: INTERNAL MEDICINE

## 2024-10-23 PROCEDURE — 3079F DIAST BP 80-89 MM HG: CPT | Performed by: INTERNAL MEDICINE

## 2024-10-23 PROCEDURE — 74177 CT ABD & PELVIS W/CONTRAST: CPT

## 2024-10-23 PROCEDURE — 71275 CT ANGIOGRAPHY CHEST: CPT

## 2024-10-23 PROCEDURE — 99214 OFFICE O/P EST MOD 30 MIN: CPT | Performed by: INTERNAL MEDICINE

## 2024-10-23 PROCEDURE — G8427 DOCREV CUR MEDS BY ELIG CLIN: HCPCS | Performed by: INTERNAL MEDICINE

## 2024-10-23 PROCEDURE — 1036F TOBACCO NON-USER: CPT | Performed by: INTERNAL MEDICINE

## 2024-10-23 PROCEDURE — 3077F SYST BP >= 140 MM HG: CPT | Performed by: INTERNAL MEDICINE

## 2024-10-23 PROCEDURE — G8417 CALC BMI ABV UP PARAM F/U: HCPCS | Performed by: INTERNAL MEDICINE

## 2024-10-23 PROCEDURE — 6360000004 HC RX CONTRAST MEDICATION: Performed by: INTERNAL MEDICINE

## 2024-10-23 RX ORDER — IOPAMIDOL 755 MG/ML
100 INJECTION, SOLUTION INTRAVASCULAR
Status: COMPLETED | OUTPATIENT
Start: 2024-10-23 | End: 2024-10-23

## 2024-10-23 RX ADMIN — IOPAMIDOL 100 ML: 755 INJECTION, SOLUTION INTRAVENOUS at 10:34

## 2024-10-23 NOTE — PROGRESS NOTES
Ov DR Crump 3 week follow up  Leaving in Nov before Thanksgiving.  Edema improved  Still having sob and fatigue   Seen neurosurgeon in [place]   Sent to [facility]   Will not do back surgery    Will do CT chest abd pelvis   With contrast.     See on Nov 4     Pt called with CT results

## 2024-10-24 NOTE — PROGRESS NOTES
Noam Crump M.D.  [facility]  [facility]  [address] [id number] [phone]        2024        Michelle Fletcher,   [address] [facility] [address]     RE:  ROHINI VICTORIA  :  1950    Dear Dr. Fletcher:    CHIEF COMPLAINT:    Chest pain.  Shortness of breath.  Marked fatigue; all consistent with angina.    HISTORY OF PRESENT ILLNESS:  I met with Rohini Victoria and his wife, Karma, in our office on 2024.  As you know, he has had a marked increase in his shortness of breath and chest pain along with marked loss of energy.  He had a catheterization on May 14, 2020, that showed a widely patent stent in the right coronary artery, LAD had 90% disease at the bifurcation of the LAD and diagonal, but widely patent LIMA to the LAD and a widely patent vein graft to the diagonal.  Circumflex was unremarkable, EF 50%.    He has had increasing shortness of breath, loss of energy, and fatigue.  He also developed ascites and pedal edema.  An echo on  showed an EF of 45% with moderate pulmonary hypertension and PAP of 52 mmHg.    We placed him on Lasix 80 mg b.i.d. and Aldactone 25 mg b.i.d., and he has had a marked improvement in his Lasix and his ascites.    However, he is still having shortness of breath, chest pain, and fatigue.    He does have severe back pain.  He saw neurosurgeon in [place] and [facility] who recommended not doing any back surgery.    He also has pain in his abdomen, especially in the left lower quadrant.  There was a fullness there, although it was difficult to feel because of his ascites.    Because of his continued shortness of breath and loss of energy and chest pain, we will proceed with a cardiac catheterization on .  However, because of his mass, we will do a CT scan of chest, abdomen, and pelvis with contrast.  We will call him with those results.    We will repeat a blood work on

## 2024-11-04 ENCOUNTER — NURSE ONLY (OUTPATIENT)
Dept: CARDIOLOGY CLINIC | Age: 74
End: 2024-11-04

## 2024-11-04 ENCOUNTER — HOSPITAL ENCOUNTER (OUTPATIENT)
Age: 74
Discharge: HOME | End: 2024-11-04
Payer: MEDICARE

## 2024-11-04 VITALS
OXYGEN SATURATION: 95 % | RESPIRATION RATE: 16 BRPM | WEIGHT: 248 LBS | DIASTOLIC BLOOD PRESSURE: 60 MMHG | SYSTOLIC BLOOD PRESSURE: 130 MMHG | HEART RATE: 82 BPM | BODY MASS INDEX: 35.58 KG/M2

## 2024-11-04 DIAGNOSIS — R06.02 SOB (SHORTNESS OF BREATH): ICD-10-CM

## 2024-11-04 DIAGNOSIS — R19.8 ABDOMINAL FULLNESS: ICD-10-CM

## 2024-11-04 DIAGNOSIS — R07.9 CHEST PAIN, UNSPECIFIED TYPE: ICD-10-CM

## 2024-11-04 DIAGNOSIS — R10.9 ABDOMINAL PAIN, UNSPECIFIED ABDOMINAL LOCATION: ICD-10-CM

## 2024-11-04 DIAGNOSIS — I26.99 PE (PULMONARY THROMBOEMBOLISM) (HCC): ICD-10-CM

## 2024-11-04 DIAGNOSIS — N28.9 KIDNEY INSUFFICIENCY: Primary | ICD-10-CM

## 2024-11-04 DIAGNOSIS — E78.5 HYPERLIPIDEMIA, UNSPECIFIED HYPERLIPIDEMIA TYPE: ICD-10-CM

## 2024-11-04 LAB
ALBUMIN SERPL-MCNC: 4.1 G/DL (ref 3.5–5.2)
ALP SERPL-CCNC: 74 U/L (ref 40–129)
ALT SERPL-CCNC: 16 U/L (ref 5–41)
ANION GAP SERPL CALCULATED.3IONS-SCNC: 11 MMOL/L (ref 9–17)
AST SERPL-CCNC: 19 U/L
BASOPHILS # BLD: 0.02 K/UL (ref 0–0.2)
BASOPHILS NFR BLD: 0 % (ref 0–2)
BILIRUB SERPL-MCNC: 0.6 MG/DL (ref 0.3–1.2)
BUN SERPL-MCNC: 25 MG/DL (ref 8–23)
BUN/CREAT SERPL: 23 (ref 9–20)
CALCIUM SERPL-MCNC: 9 MG/DL (ref 8.6–10.4)
CHLORIDE SERPL-SCNC: 100 MMOL/L (ref 98–107)
CHOLEST SERPL-MCNC: 90 MG/DL (ref 0–199)
CHOLESTEROL/HDL RATIO: 3.3
CO2 SERPL-SCNC: 27 MMOL/L (ref 20–31)
CREAT SERPL-MCNC: 1.1 MG/DL (ref 0.7–1.2)
EOSINOPHIL # BLD: 0.19 K/UL (ref 0–0.4)
EOSINOPHILS RELATIVE PERCENT: 3 % (ref 0–5)
ERYTHROCYTE [DISTWIDTH] IN BLOOD BY AUTOMATED COUNT: 13.5 % (ref 12.1–15.2)
GFR, ESTIMATED: 71 ML/MIN/1.73M2
GLUCOSE SERPL-MCNC: 170 MG/DL (ref 70–99)
HCT VFR BLD AUTO: 37.9 % (ref 41–53)
HDLC SERPL-MCNC: 27 MG/DL
HGB BLD-MCNC: 12.5 G/DL (ref 13.5–17.5)
IMM GRANULOCYTES # BLD AUTO: 0 K/UL (ref 0–0.3)
IMM GRANULOCYTES NFR BLD: 0 % (ref 0–5)
LDLC SERPL CALC-MCNC: 34 MG/DL (ref 0–100)
LYMPHOCYTES NFR BLD: 1.33 K/UL (ref 1–4.8)
LYMPHOCYTES RELATIVE PERCENT: 22 % (ref 13–44)
MCH RBC QN AUTO: 32.1 PG (ref 26–34)
MCHC RBC AUTO-ENTMCNC: 33 G/DL (ref 31–37)
MCV RBC AUTO: 97.4 FL (ref 80–100)
MONOCYTES NFR BLD: 0.62 K/UL (ref 0–1)
MONOCYTES NFR BLD: 10 % (ref 5–9)
NEUTROPHILS NFR BLD: 65 % (ref 39–75)
NEUTS SEG NFR BLD: 4.03 K/UL (ref 2.1–6.5)
PLATELET # BLD AUTO: 188 K/UL (ref 140–450)
PMV BLD AUTO: 9.9 FL (ref 6–12)
POTASSIUM SERPL-SCNC: 4.4 MMOL/L (ref 3.7–5.3)
PROT SERPL-MCNC: 7 G/DL (ref 6.4–8.3)
RBC # BLD AUTO: 3.89 M/UL (ref 4.5–5.9)
SODIUM SERPL-SCNC: 138 MMOL/L (ref 135–144)
TRIGL SERPL-MCNC: 146 MG/DL
VLDLC SERPL CALC-MCNC: 29 MG/DL (ref 1–30)
WBC OTHER # BLD: 6.2 K/UL (ref 3.5–11)

## 2024-11-04 PROCEDURE — 80053 COMPREHEN METABOLIC PANEL: CPT

## 2024-11-04 PROCEDURE — 36415 COLL VENOUS BLD VENIPUNCTURE: CPT

## 2024-11-04 PROCEDURE — 93005 ELECTROCARDIOGRAM TRACING: CPT

## 2024-11-04 PROCEDURE — 85025 COMPLETE CBC W/AUTO DIFF WBC: CPT

## 2024-11-04 PROCEDURE — 80061 LIPID PANEL: CPT

## 2024-11-05 LAB
EKG Q-T INTERVAL: 382 MS
EKG QRS DURATION: 154 MS
EKG QTC CALCULATION (BAZETT): 469 MS
EKG R AXIS: 57 DEGREES
EKG T AXIS: -141 DEGREES
EKG VENTRICULAR RATE: 91 BPM

## 2024-11-05 NOTE — PROGRESS NOTES
Katya Hester CNP  Follow up   Cont to be sob  And fatigue  Set up heart cath   For Nov 8 at 8am.  
place, and time. No evidence of gross cranial nerve deficit. Coordination appeared normal.   Skin: Skin is warm and dry. There is no rash or diaphoresis.   Psychiatric: He has a normal mood and affect. His speech is normal and behavior is normal.      MOST RECENT LABS ON RECORD:   Lab Results   Component Value Date    WBC 6.2 11/04/2024    HGB 12.5 (L) 11/04/2024    HCT 37.9 (L) 11/04/2024     11/04/2024    CHOL 90 11/04/2024    TRIG 146 11/04/2024    HDL 27 (L) 11/04/2024    ALT 16 11/04/2024    AST 19 11/04/2024     11/04/2024    K 4.4 11/04/2024     11/04/2024    CREATININE 1.1 11/04/2024    BUN 25 (H) 11/04/2024    CO2 27 11/04/2024    TSH 1.93 09/25/2024    PSA 0.31 05/21/2020    LABA1C 7.6 07/30/2024       Encounter Date: 11/04/24   EKG 12 Lead   Result Value    Ventricular Rate 91    QRS Duration 154    Q-T Interval 382    QTc Calculation (Bazett) 469    R Axis 57    T Axis -141    Narrative    Atrial fibrillation  Left bundle branch block  Abnormal ECG  When compared with ECG of 25-SEP-2024 07:33,  Atrial fibrillation has replaced Sinus rhythm  T wave inversion more evident in Lateral leads  QT has shortened      Chest X-ray completed on 09/30/2024:  Mild cardiomegaly. Otherwise, unremarkable. No focal infiltrates are seen.     IMPRESSION:     CHF, both left and right sided.  Marked shortness of breath with any activity.  Occasional chest pain, although somewhat atypical.  Paroxysmal atrial fibrillation, very symptomatic, status post ablation on July 25, 2022, by Dr. Kaufman with him remaining in sinus rhythm.  Chronic interventricular conduction delay, occasional left bundle-branch block.  On Eliquis 5 mg a day lifelong for his paroxysmal atrial fibrillation.  Catheterization on October 31, 2012, showing severe disease and bifurcation of LAD and diagonal with unremarkable right coronary artery and circumflex, EF 55%.  Angioplasty and stent placed in the LAD and diagonal, November 7, 2012,

## 2024-11-07 DIAGNOSIS — N28.9 KIDNEY INSUFFICIENCY: Primary | ICD-10-CM

## 2024-11-07 DIAGNOSIS — I50.32 CHRONIC DIASTOLIC CHF (CONGESTIVE HEART FAILURE), NYHA CLASS 3 (HCC): ICD-10-CM

## 2024-11-08 ENCOUNTER — PROCEDURE VISIT (OUTPATIENT)
Dept: CARDIOLOGY CLINIC | Age: 74
End: 2024-11-08
Payer: MEDICARE

## 2024-11-08 DIAGNOSIS — R07.9 CHEST PAIN, UNSPECIFIED TYPE: ICD-10-CM

## 2024-11-08 DIAGNOSIS — R06.02 SOB (SHORTNESS OF BREATH): ICD-10-CM

## 2024-11-08 DIAGNOSIS — I25.119 CORONARY ARTERY DISEASE INVOLVING NATIVE CORONARY ARTERY OF NATIVE HEART WITH ANGINA PECTORIS (HCC): Primary | ICD-10-CM

## 2024-11-08 PROCEDURE — 93455 CORONARY ART/GRFT ANGIO S&I: CPT | Performed by: INTERNAL MEDICINE

## 2024-11-11 ENCOUNTER — HOSPITAL ENCOUNTER (OUTPATIENT)
Age: 74
Discharge: HOME | End: 2024-11-11
Payer: MEDICARE

## 2024-11-11 ENCOUNTER — TELEPHONE (OUTPATIENT)
Dept: CARDIOLOGY CLINIC | Age: 74
End: 2024-11-11

## 2024-11-11 DIAGNOSIS — N28.9 KIDNEY INSUFFICIENCY: ICD-10-CM

## 2024-11-11 DIAGNOSIS — I50.32 CHRONIC DIASTOLIC CHF (CONGESTIVE HEART FAILURE), NYHA CLASS 3 (HCC): ICD-10-CM

## 2024-11-11 DIAGNOSIS — N28.9 KIDNEY INSUFFICIENCY: Primary | ICD-10-CM

## 2024-11-11 LAB
ANION GAP SERPL CALCULATED.3IONS-SCNC: 11 MMOL/L (ref 9–17)
BUN SERPL-MCNC: 25 MG/DL (ref 8–23)
BUN/CREAT SERPL: 28 (ref 9–20)
CALCIUM SERPL-MCNC: 8.8 MG/DL (ref 8.6–10.4)
CHLORIDE SERPL-SCNC: 104 MMOL/L (ref 98–107)
CO2 SERPL-SCNC: 26 MMOL/L (ref 20–31)
CREAT SERPL-MCNC: 0.9 MG/DL (ref 0.7–1.2)
GFR, ESTIMATED: >90 ML/MIN/1.73M2
GLUCOSE SERPL-MCNC: 164 MG/DL (ref 70–99)
POTASSIUM SERPL-SCNC: 4.5 MMOL/L (ref 3.7–5.3)
SODIUM SERPL-SCNC: 141 MMOL/L (ref 135–144)

## 2024-11-11 PROCEDURE — 36415 COLL VENOUS BLD VENIPUNCTURE: CPT

## 2024-11-11 PROCEDURE — 80048 BASIC METABOLIC PNL TOTAL CA: CPT

## 2024-11-11 NOTE — TELEPHONE ENCOUNTER
Pt called following alejandrina Lopez to restart metformin   Will see when pt returns from   [place]

## 2024-11-13 LAB
CREATININE URINE: 208.07 MG/DL
ESTIMATED AVERAGE GLUCOSE: NORMAL
HBA1C MFR BLD: 7.7 %
MICROALBUMIN/CREAT 24H UR: 6.3 MG/DL
MICROALBUMIN/CREAT UR-RTO: 30.3 MG/G

## 2025-01-10 ENCOUNTER — TELEPHONE (OUTPATIENT)
Dept: FAMILY MEDICINE CLINIC | Age: 75
End: 2025-01-10

## 2025-01-10 NOTE — TELEPHONE ENCOUNTER
Karma said Jason has been fighting a cold for 2 weeks. He is coughing up a lot of mucus, slight headache and sore throat. No fever and he did test for COVID and it was negative. They have tried OTC cold medicine and nothing seems to be helping. Wanted to know if Dr. Fletcher would call in an antibiotic to [facility] in [place] for him. Please let Karma know.      Health Maintenance   Topic Date Due    DTaP/Tdap/Td vaccine (1 - Tdap) Never done    Diabetic retinal exam  04/29/2023    Diabetic foot exam  11/14/2023    Flu vaccine (1) 08/01/2024    Diabetic Alb to Cr ratio (uACR) test  10/26/2024    Depression Screen  05/13/2025    Annual Wellness Visit (Medicare)  05/15/2025    A1C test (Diabetic or Prediabetic)  07/30/2025    Lipids  11/04/2025    GFR test (Diabetes, CKD 3-4, OR last GFR 15-59)  11/11/2025    Colorectal Cancer Screen  04/29/2034    Shingles vaccine  Completed    Pneumococcal 65+ years Vaccine  Completed    COVID-19 Vaccine  Completed    Respiratory Syncytial Virus (RSV) Pregnant or age 60 yrs+  Completed    AAA screen  Completed    Hepatitis C screen  Completed    Hepatitis A vaccine  Aged Out    Hepatitis B vaccine  Aged Out    Hib vaccine  Aged Out    Polio vaccine  Aged Out    Meningococcal (ACWY) vaccine  Aged Out             (applicable per patient's age: Cancer Screenings, Depression Screening, Fall Risk Screening, Immunizations)    Hemoglobin A1C (%)   Date Value   07/30/2024 7.6   04/16/2024 8.3   11/06/2023 8.4     AST (U/L)   Date Value   11/04/2024 19     ALT (U/L)   Date Value   11/04/2024 16     BUN (mg/dL)   Date Value   11/11/2024 25 (H)      (goal A1C is < 7)   (goal LDL is <100) need 30-50% reduction from baseline     BP Readings from Last 3 Encounters:   11/04/24 130/60   10/23/24 (!) 140/80   09/30/24 (!) 140/60    (goal /80)      All Future Testing planned in [facility]:  Lab Frequency Next Occurrence   Hemoglobin A1C Once 04/18/2024   CBC with Auto Differential Once 11/09/2024

## 2025-01-16 ENCOUNTER — TELEPHONE (OUTPATIENT)
Age: 75
End: 2025-01-16

## 2025-01-16 NOTE — TELEPHONE ENCOUNTER
Patient is welcome to follow up when he returns from [place] if I can be of any assistance with his neck and back pain.

## 2025-01-20 DIAGNOSIS — N28.9 KIDNEY INSUFFICIENCY: ICD-10-CM

## 2025-01-20 DIAGNOSIS — I50.32 CHRONIC DIASTOLIC CHF (CONGESTIVE HEART FAILURE), NYHA CLASS 3 (HCC): ICD-10-CM

## 2025-01-27 RX ORDER — NIFEDIPINE 90 MG/1
TABLET, FILM COATED, EXTENDED RELEASE ORAL
Qty: 45 TABLET | Refills: 3 | Status: SHIPPED | OUTPATIENT
Start: 2025-01-27

## 2025-02-16 ENCOUNTER — PATIENT MESSAGE (OUTPATIENT)
Dept: FAMILY MEDICINE CLINIC | Age: 75
End: 2025-02-16

## 2025-02-17 NOTE — TELEPHONE ENCOUNTER
Letter done but I don't think my signature is on it. Also printed - please put on my desk, I'll sign, and then I think we'd need to scan it back into his chart. Thanks.

## 2025-02-20 DIAGNOSIS — I50.32 CHRONIC DIASTOLIC CHF (CONGESTIVE HEART FAILURE), NYHA CLASS 3 (HCC): ICD-10-CM

## 2025-02-20 DIAGNOSIS — N28.9 KIDNEY INSUFFICIENCY: ICD-10-CM

## 2025-02-27 DIAGNOSIS — I48.0 PAF (PAROXYSMAL ATRIAL FIBRILLATION) (HCC): ICD-10-CM

## 2025-02-27 DIAGNOSIS — I27.20 PULMONARY HTN (HCC): ICD-10-CM

## 2025-02-27 DIAGNOSIS — I25.119 CORONARY ARTERY DISEASE INVOLVING NATIVE CORONARY ARTERY OF NATIVE HEART WITH ANGINA PECTORIS: ICD-10-CM

## 2025-02-27 DIAGNOSIS — I50.32 CHRONIC DIASTOLIC CHF (CONGESTIVE HEART FAILURE), NYHA CLASS 3 (HCC): ICD-10-CM

## 2025-03-04 ENCOUNTER — TELEPHONE (OUTPATIENT)
Dept: FAMILY MEDICINE CLINIC | Age: 75
End: 2025-03-04

## 2025-03-04 RX ORDER — DOXYCYCLINE HYCLATE 100 MG
100 TABLET ORAL 2 TIMES DAILY
Qty: 20 TABLET | Refills: 0 | Status: SHIPPED | OUTPATIENT
Start: 2025-03-04 | End: 2025-03-14

## 2025-03-04 NOTE — TELEPHONE ENCOUNTER
Rx sent  
notified  
Gavi Thompson MD   Cholecalciferol (VITAMIN D3) 250 MCG (57131 UT) TABS Take 1 tablet by mouth 2 times daily    Gavi Thompson MD   Chromium-Cinnamon 100-500 MCG-MG CAPS Take 1,000 mg by mouth    Gavi Thompson MD   insulin aspart (NOVOLOG FLEXPEN) 100 UNIT/ML injection pen 20 Units 3 times daily (before meals) Plus sliding scale with each meals 8/21/23   Gavi Thompson MD   Insulin Degludec 200 UNIT/ML SOPN 60 units daily. 8/21/23   Gavi Thompson MD   Coenzyme Q10 (CO Q 10) 100 MG CAPS Take 100 mg by mouth nightly    Gavi Thompson MD   acetaminophen (TYLENOL) 500 MG tablet Take 1 tablet by mouth See Admin Instructions Take 500 mg in am  Take 1000 mg in Pm    Gavi Thompson MD   traMADol (ULTRAM) 50 MG tablet Take 1 tablet by mouth 2 times daily as needed for Pain. 9/8/20   Gavi Thompson MD   Magnesium Oxide 500 MG TABS Take 500 mg by mouth in the morning and 500 mg in the evening.    Gavi Thompson MD   ONE TOUCH ULTRA TEST strip  8/8/16   Gavi Thompson MD   BD ULTRA-FINE PEN NEEDLES 29G X 12.7MM MISC  8/1/16   Gavi Thompson MD   glimepiride (AMARYL) 4 MG tablet Take 0.5 tablets by mouth every morning (before breakfast)    Gavi Thompson MD   Multiple Vitamins-Minerals (ONE-A-DAY MENS 50+ ADVANTAGE PO) Take 1 tablet by mouth daily     Gavi Thompson MD   Insulin Syringes, Disposable, U-100 0.3 ML MISC Inject  into the skin. Use prn 7/19/12   Isreal Cole,

## 2025-03-10 ENCOUNTER — TELEPHONE (OUTPATIENT)
Dept: CARDIOLOGY CLINIC | Age: 75
End: 2025-03-10

## 2025-03-10 NOTE — TELEPHONE ENCOUNTER
Jason called to state that he was released the hospital in [place].  He had a cardioversion and his medication was changed. He was tested for flu and COVID which were negative. He stated that it started out with a cold and ended up in the hospital with AFIB.    He stated that he wanted to check with Dr. Crump since the doctor there called him.    He stated that he was prescribed Amiodarone 200 mg once daily.  Entresto was changed from 97/103 to 49/51.  Carvediolol was changed from 3.125 to 12.5 and nifedipine was changed from 45 to 90 mg.  He is anxious about changing strengths after Dr. Crump has had him on the same strengths for so long.    Please call Jason

## 2025-03-10 NOTE — TELEPHONE ENCOUNTER
DR jones review    Per DR Crump to   Go ahead with the med  Changes see how he   Does   Keep us updated   We can always go   Back to original dose

## 2025-03-20 RX ORDER — CELECOXIB 200 MG/1
200 CAPSULE ORAL DAILY
Qty: 90 CAPSULE | Refills: 1 | Status: SHIPPED | OUTPATIENT
Start: 2025-03-20

## 2025-04-03 ENCOUNTER — APPOINTMENT (OUTPATIENT)
Dept: CT IMAGING | Age: 75
End: 2025-04-03
Payer: MEDICARE

## 2025-04-03 ENCOUNTER — HOSPITAL ENCOUNTER (EMERGENCY)
Age: 75
Discharge: ANOTHER HOSPITAL | End: 2025-04-03
Attending: FAMILY MEDICINE
Payer: MEDICARE

## 2025-04-03 ENCOUNTER — APPOINTMENT (OUTPATIENT)
Dept: GENERAL RADIOLOGY | Age: 75
End: 2025-04-03
Payer: MEDICARE

## 2025-04-03 VITALS
SYSTOLIC BLOOD PRESSURE: 142 MMHG | BODY MASS INDEX: 36.51 KG/M2 | OXYGEN SATURATION: 93 % | WEIGHT: 255 LBS | DIASTOLIC BLOOD PRESSURE: 41 MMHG | HEART RATE: 51 BPM | TEMPERATURE: 97.6 F | RESPIRATION RATE: 22 BRPM | HEIGHT: 70 IN

## 2025-04-03 DIAGNOSIS — Z79.01 LONG TERM CURRENT USE OF ANTICOAGULANT: ICD-10-CM

## 2025-04-03 DIAGNOSIS — E87.5 HYPERKALEMIA: ICD-10-CM

## 2025-04-03 DIAGNOSIS — I50.9 ACUTE ON CHRONIC CONGESTIVE HEART FAILURE, UNSPECIFIED HEART FAILURE TYPE (HCC): Primary | ICD-10-CM

## 2025-04-03 DIAGNOSIS — N17.9 ACUTE KIDNEY INJURY (NONTRAUMATIC): ICD-10-CM

## 2025-04-03 DIAGNOSIS — Z86.79 HISTORY OF ATRIAL FIBRILLATION: ICD-10-CM

## 2025-04-03 DIAGNOSIS — Z86.39 HISTORY OF DIABETES MELLITUS: ICD-10-CM

## 2025-04-03 LAB
ALBUMIN SERPL-MCNC: 4.2 G/DL (ref 3.5–5.2)
ALBUMIN/GLOB SERPL: 1.4 {RATIO} (ref 1–2.5)
ALP SERPL-CCNC: 75 U/L (ref 40–129)
ALT SERPL-CCNC: 30 U/L (ref 5–41)
ANION GAP SERPL CALCULATED.3IONS-SCNC: 10 MMOL/L (ref 9–17)
AST SERPL-CCNC: 30 U/L
BASOPHILS # BLD: 0.03 K/UL (ref 0–0.2)
BASOPHILS NFR BLD: 1 % (ref 0–2)
BILIRUB SERPL-MCNC: 0.6 MG/DL (ref 0.3–1.2)
BUN SERPL-MCNC: 44 MG/DL (ref 8–23)
CALCIUM SERPL-MCNC: 9.1 MG/DL (ref 8.6–10.4)
CHLORIDE SERPL-SCNC: 100 MMOL/L (ref 98–107)
CO2 SERPL-SCNC: 28 MMOL/L (ref 20–31)
CREAT SERPL-MCNC: 1.8 MG/DL (ref 0.7–1.2)
EOSINOPHIL # BLD: 0.29 K/UL (ref 0–0.4)
EOSINOPHILS RELATIVE PERCENT: 5 % (ref 0–5)
ERYTHROCYTE [DISTWIDTH] IN BLOOD BY AUTOMATED COUNT: 13.8 % (ref 12.1–15.2)
GFR, ESTIMATED: 39 ML/MIN/1.73M2
GLUCOSE SERPL-MCNC: 229 MG/DL (ref 70–99)
HCT VFR BLD AUTO: 36.7 % (ref 41–53)
HGB BLD-MCNC: 12 G/DL (ref 13.5–17.5)
IMM GRANULOCYTES # BLD AUTO: 0.02 K/UL (ref 0–0.3)
IMM GRANULOCYTES NFR BLD: 0 % (ref 0–5)
LYMPHOCYTES NFR BLD: 1.56 K/UL (ref 1–4.8)
LYMPHOCYTES RELATIVE PERCENT: 25 % (ref 13–44)
MCH RBC QN AUTO: 32.3 PG (ref 26–34)
MCHC RBC AUTO-ENTMCNC: 32.7 G/DL (ref 31–37)
MCV RBC AUTO: 98.7 FL (ref 80–100)
MONOCYTES NFR BLD: 0.65 K/UL (ref 0–1)
MONOCYTES NFR BLD: 11 % (ref 5–9)
NEUTROPHILS NFR BLD: 58 % (ref 39–75)
NEUTS SEG NFR BLD: 3.59 K/UL (ref 2.1–6.5)
PLATELET # BLD AUTO: 229 K/UL (ref 140–450)
PMV BLD AUTO: 10.2 FL (ref 6–12)
POTASSIUM SERPL-SCNC: 5.5 MMOL/L (ref 3.7–5.3)
PROT SERPL-MCNC: 7.1 G/DL (ref 6.4–8.3)
RBC # BLD AUTO: 3.72 M/UL (ref 4.5–5.9)
SODIUM SERPL-SCNC: 138 MMOL/L (ref 135–144)
TROPONIN I SERPL HS-MCNC: 77 NG/L (ref 0–22)
TROPONIN I SERPL HS-MCNC: 80 NG/L (ref 0–22)
TROPONIN I SERPL HS-MCNC: 81 NG/L (ref 0–22)
WBC OTHER # BLD: 6.1 K/UL (ref 3.5–11)

## 2025-04-03 PROCEDURE — 36415 COLL VENOUS BLD VENIPUNCTURE: CPT

## 2025-04-03 PROCEDURE — 80053 COMPREHEN METABOLIC PANEL: CPT

## 2025-04-03 PROCEDURE — 71045 X-RAY EXAM CHEST 1 VIEW: CPT

## 2025-04-03 PROCEDURE — 84484 ASSAY OF TROPONIN QUANT: CPT

## 2025-04-03 PROCEDURE — 6370000000 HC RX 637 (ALT 250 FOR IP): Performed by: FAMILY MEDICINE

## 2025-04-03 PROCEDURE — 93005 ELECTROCARDIOGRAM TRACING: CPT | Performed by: FAMILY MEDICINE

## 2025-04-03 PROCEDURE — 2500000003 HC RX 250 WO HCPCS: Performed by: FAMILY MEDICINE

## 2025-04-03 PROCEDURE — 96375 TX/PRO/DX INJ NEW DRUG ADDON: CPT

## 2025-04-03 PROCEDURE — 99285 EMERGENCY DEPT VISIT HI MDM: CPT

## 2025-04-03 PROCEDURE — 71250 CT THORAX DX C-: CPT

## 2025-04-03 PROCEDURE — 96374 THER/PROPH/DIAG INJ IV PUSH: CPT

## 2025-04-03 PROCEDURE — 6360000002 HC RX W HCPCS: Performed by: FAMILY MEDICINE

## 2025-04-03 PROCEDURE — 85025 COMPLETE CBC W/AUTO DIFF WBC: CPT

## 2025-04-03 RX ORDER — SACUBITRIL AND VALSARTAN 97; 103 MG/1; MG/1
1 TABLET, FILM COATED ORAL 2 TIMES DAILY
COMMUNITY
Start: 2025-03-09 | End: 2025-04-03

## 2025-04-03 RX ORDER — AMPICILLIN TRIHYDRATE 250 MG
1000 CAPSULE ORAL 2 TIMES DAILY
COMMUNITY
Start: 2025-03-05 | End: 2025-04-03

## 2025-04-03 RX ORDER — AMOXICILLIN 500 MG
1 CAPSULE ORAL 2 TIMES DAILY
Status: ON HOLD | COMMUNITY

## 2025-04-03 RX ORDER — SEMAGLUTIDE 1.34 MG/ML
1 INJECTION, SOLUTION SUBCUTANEOUS
COMMUNITY
Start: 2025-03-05 | End: 2025-04-03

## 2025-04-03 RX ORDER — DEXTROSE MONOHYDRATE 25 G/50ML
25 INJECTION, SOLUTION INTRAVENOUS ONCE
Status: COMPLETED | OUTPATIENT
Start: 2025-04-03 | End: 2025-04-03

## 2025-04-03 RX ORDER — AMIODARONE HYDROCHLORIDE 200 MG/1
200 TABLET ORAL DAILY
Status: ON HOLD | COMMUNITY
Start: 2025-03-09

## 2025-04-03 RX ORDER — INSULIN GLARGINE 100 [IU]/ML
60 INJECTION, SOLUTION SUBCUTANEOUS DAILY
Status: ON HOLD | COMMUNITY
Start: 2025-03-05

## 2025-04-03 RX ORDER — INSULIN ASPART 100 [IU]/ML
INJECTION, SOLUTION INTRAVENOUS; SUBCUTANEOUS
Status: ON HOLD | COMMUNITY
Start: 2025-03-05

## 2025-04-03 RX ORDER — CALCIUM GLUCONATE 94 MG/ML
1000 INJECTION, SOLUTION INTRAVENOUS ONCE
Status: COMPLETED | OUTPATIENT
Start: 2025-04-03 | End: 2025-04-03

## 2025-04-03 RX ADMIN — INSULIN HUMAN 5 UNITS: 100 INJECTION, SOLUTION PARENTERAL at 21:25

## 2025-04-03 RX ADMIN — CALCIUM GLUCONATE 1000 MG: 98 INJECTION, SOLUTION INTRAVENOUS at 21:26

## 2025-04-03 RX ADMIN — DEXTROSE MONOHYDRATE 25 G: 25 INJECTION, SOLUTION INTRAVENOUS at 21:26

## 2025-04-03 ASSESSMENT — LIFESTYLE VARIABLES
HOW MANY STANDARD DRINKS CONTAINING ALCOHOL DO YOU HAVE ON A TYPICAL DAY: PATIENT DOES NOT DRINK
HOW OFTEN DO YOU HAVE A DRINK CONTAINING ALCOHOL: NEVER

## 2025-04-03 ASSESSMENT — ENCOUNTER SYMPTOMS
CHEST TIGHTNESS: 0
SHORTNESS OF BREATH: 1
ABDOMINAL DISTENTION: 1

## 2025-04-04 ENCOUNTER — APPOINTMENT (OUTPATIENT)
Age: 75
DRG: 291 | End: 2025-04-04
Attending: INTERNAL MEDICINE
Payer: MEDICARE

## 2025-04-04 ENCOUNTER — APPOINTMENT (OUTPATIENT)
Dept: ULTRASOUND IMAGING | Age: 75
DRG: 291 | End: 2025-04-04
Attending: INTERNAL MEDICINE
Payer: MEDICARE

## 2025-04-04 ENCOUNTER — HOSPITAL ENCOUNTER (INPATIENT)
Age: 75
LOS: 3 days | Discharge: HOME | DRG: 291 | End: 2025-04-07
Attending: INTERNAL MEDICINE | Admitting: STUDENT IN AN ORGANIZED HEALTH CARE EDUCATION/TRAINING PROGRAM
Payer: MEDICARE

## 2025-04-04 DIAGNOSIS — I50.9 OTHER CONGESTIVE HEART FAILURE: Primary | ICD-10-CM

## 2025-04-04 DIAGNOSIS — I50.32 CHRONIC DIASTOLIC CHF (CONGESTIVE HEART FAILURE), NYHA CLASS 3 (HCC): ICD-10-CM

## 2025-04-04 DIAGNOSIS — R00.1 BRADYCARDIA: ICD-10-CM

## 2025-04-04 DIAGNOSIS — R60.0 LEG EDEMA: ICD-10-CM

## 2025-04-04 PROBLEM — N17.9 AKI (ACUTE KIDNEY INJURY): Status: ACTIVE | Noted: 2025-04-04

## 2025-04-04 PROBLEM — I49.8 JUNCTIONAL RHYTHM: Status: ACTIVE | Noted: 2025-04-04

## 2025-04-04 PROBLEM — I44.7 LBBB (LEFT BUNDLE BRANCH BLOCK): Status: ACTIVE | Noted: 2025-04-04

## 2025-04-04 PROBLEM — I50.23 ACUTE ON CHRONIC HFREF (HEART FAILURE WITH REDUCED EJECTION FRACTION) (HCC): Status: ACTIVE | Noted: 2025-04-04

## 2025-04-04 LAB
ANION GAP SERPL CALCULATED.3IONS-SCNC: 11 MMOL/L (ref 9–16)
BACTERIA URNS QL MICRO: NORMAL
BILIRUB UR QL STRIP: NEGATIVE
BUN SERPL-MCNC: 43 MG/DL (ref 8–23)
C3 SERPL-MCNC: 153 MG/DL (ref 90–180)
C4 SERPL-MCNC: 27 MG/DL (ref 10–40)
CALCIUM SERPL-MCNC: 9 MG/DL (ref 8.6–10.4)
CASTS #/AREA URNS LPF: NORMAL /LPF (ref 0–8)
CHLORIDE SERPL-SCNC: 100 MMOL/L (ref 98–107)
CHLORIDE UR-SCNC: 91 MMOL/L
CLARITY UR: CLEAR
CO2 SERPL-SCNC: 27 MMOL/L (ref 20–31)
COLOR UR: YELLOW
CREAT SERPL-MCNC: 1.7 MG/DL (ref 0.7–1.2)
CREAT UR-MCNC: 42.6 MG/DL (ref 39–259)
ECHO AO ROOT DIAM: 3.1 CM
ECHO AO ROOT INDEX: 1.35 CM/M2
ECHO AV AREA PEAK VELOCITY: 1.9 CM2
ECHO AV AREA VTI: 2 CM2
ECHO AV AREA/BSA PEAK VELOCITY: 0.8 CM2/M2
ECHO AV AREA/BSA VTI: 0.9 CM2/M2
ECHO AV MEAN GRADIENT: 7 MMHG
ECHO AV MEAN VELOCITY: 1.2 M/S
ECHO AV PEAK GRADIENT: 14 MMHG
ECHO AV PEAK VELOCITY: 1.9 M/S
ECHO AV VELOCITY RATIO: 0.47
ECHO AV VTI: 37.1 CM
ECHO BSA: 2.37 M2
ECHO IVC PROX: 2.5 CM
ECHO LA AREA 2C: 25.5 CM2
ECHO LA AREA 4C: 23.5 CM2
ECHO LA DIAMETER INDEX: 2.01 CM/M2
ECHO LA DIAMETER: 4.6 CM
ECHO LA MAJOR AXIS: 6.2 CM
ECHO LA MINOR AXIS: 6 CM
ECHO LA TO AORTIC ROOT RATIO: 1.48
ECHO LA VOL BP: 78 ML (ref 18–58)
ECHO LA VOL MOD A2C: 86 ML (ref 18–58)
ECHO LA VOL MOD A4C: 69 ML (ref 18–58)
ECHO LA VOL/BSA BIPLANE: 34 ML/M2 (ref 16–34)
ECHO LA VOLUME INDEX MOD A2C: 38 ML/M2 (ref 16–34)
ECHO LA VOLUME INDEX MOD A4C: 30 ML/M2 (ref 16–34)
ECHO LV E' LATERAL VELOCITY: 9.46 CM/S
ECHO LV E' SEPTAL VELOCITY: 7.3 CM/S
ECHO LV EDV A2C: 58 ML
ECHO LV EDV A4C: 87 ML
ECHO LV EDV INDEX A4C: 38 ML/M2
ECHO LV EDV NDEX A2C: 25 ML/M2
ECHO LV EF PHYSICIAN: 51 %
ECHO LV EJECTION FRACTION A2C: 50 %
ECHO LV EJECTION FRACTION A4C: 54 %
ECHO LV EJECTION FRACTION BIPLANE: 52 % (ref 55–100)
ECHO LV ESV A2C: 29 ML
ECHO LV ESV A4C: 40 ML
ECHO LV ESV INDEX A2C: 13 ML/M2
ECHO LV ESV INDEX A4C: 17 ML/M2
ECHO LV FRACTIONAL SHORTENING: 23 % (ref 28–44)
ECHO LV INTERNAL DIMENSION DIASTOLE INDEX: 2.66 CM/M2
ECHO LV INTERNAL DIMENSION DIASTOLIC: 6.1 CM (ref 4.2–5.9)
ECHO LV INTERNAL DIMENSION SYSTOLIC INDEX: 2.05 CM/M2
ECHO LV INTERNAL DIMENSION SYSTOLIC: 4.7 CM
ECHO LV IVSD: 1.1 CM (ref 0.6–1)
ECHO LV MASS 2D: 287.5 G (ref 88–224)
ECHO LV MASS INDEX 2D: 125.5 G/M2 (ref 49–115)
ECHO LV POSTERIOR WALL DIASTOLIC: 1.1 CM (ref 0.6–1)
ECHO LV RELATIVE WALL THICKNESS RATIO: 0.36
ECHO LVOT AREA: 4.2 CM2
ECHO LVOT AV VTI INDEX: 0.49
ECHO LVOT DIAM: 2.3 CM
ECHO LVOT MEAN GRADIENT: 2 MMHG
ECHO LVOT PEAK GRADIENT: 3 MMHG
ECHO LVOT PEAK VELOCITY: 0.9 M/S
ECHO LVOT STROKE VOLUME INDEX: 32.6 ML/M2
ECHO LVOT SV: 74.7 ML
ECHO LVOT VTI: 18 CM
ECHO MV AREA VTI: 1.6 CM2
ECHO MV E DECELERATION TIME (DT): 243 MS
ECHO MV E VELOCITY: 1.05 M/S
ECHO MV E/E' LATERAL: 11.1
ECHO MV E/E' RATIO (AVERAGED): 12.74
ECHO MV E/E' SEPTAL: 14.38
ECHO MV LVOT VTI INDEX: 2.52
ECHO MV MAX VELOCITY: 1.2 M/S
ECHO MV MEAN GRADIENT: 2 MMHG
ECHO MV MEAN VELOCITY: 0.5 M/S
ECHO MV PEAK GRADIENT: 5 MMHG
ECHO MV VTI: 45.4 CM
ECHO PV MAX VELOCITY: 1.5 M/S
ECHO PV PEAK GRADIENT: 9 MMHG
ECHO RV BASAL DIMENSION: 4.6 CM
ECHO RV FREE WALL PEAK S': 9.2 CM/S
EKG ATRIAL RATE: 50 BPM
EKG P AXIS: 72 DEGREES
EKG P-R INTERVAL: 176 MS
EKG Q-T INTERVAL: 486 MS
EKG QRS DURATION: 156 MS
EKG QTC CALCULATION (BAZETT): 443 MS
EKG R AXIS: -30 DEGREES
EKG T AXIS: 133 DEGREES
EKG VENTRICULAR RATE: 50 BPM
EPI CELLS #/AREA URNS HPF: NORMAL /HPF (ref 0–5)
FERRITIN SERPL-MCNC: 186 NG/ML
FREE KAPPA/LAMBDA RATIO: 0.18 (ref 0.22–1.74)
GFR, ESTIMATED: 42 ML/MIN/1.73M2
GLUCOSE BLD-MCNC: 160 MG/DL (ref 75–110)
GLUCOSE BLD-MCNC: 180 MG/DL (ref 75–110)
GLUCOSE BLD-MCNC: 196 MG/DL (ref 75–110)
GLUCOSE BLD-MCNC: 303 MG/DL (ref 75–110)
GLUCOSE SERPL-MCNC: 211 MG/DL (ref 74–99)
GLUCOSE UR STRIP-MCNC: NEGATIVE MG/DL
HAV IGM SERPL QL IA: NONREACTIVE
HBV CORE IGM SERPL QL IA: NONREACTIVE
HBV SURFACE AG SERPL QL IA: NONREACTIVE
HCV AB SERPL QL IA: NONREACTIVE
HGB UR QL STRIP.AUTO: NEGATIVE
IRON SATN MFR SERPL: 24 % (ref 20–55)
IRON SERPL-MCNC: 63 UG/DL (ref 61–157)
KAPPA LC FREE SER-MCNC: 45.4 MG/L
KETONES UR STRIP-MCNC: NEGATIVE MG/DL
LAMBDA LC FREE SERPL-MCNC: 253 MG/L (ref 4.2–27.7)
LEUKOCYTE ESTERASE UR QL STRIP: NEGATIVE
MAGNESIUM SERPL-MCNC: 2.5 MG/DL (ref 1.6–2.4)
NITRITE UR QL STRIP: NEGATIVE
PH UR STRIP: 6.5 [PH] (ref 5–8)
POTASSIUM SERPL-SCNC: 5 MMOL/L (ref 3.7–5.3)
PROT UR STRIP-MCNC: NEGATIVE MG/DL
RBC #/AREA URNS HPF: NORMAL /HPF (ref 0–4)
SODIUM SERPL-SCNC: 138 MMOL/L (ref 136–145)
SODIUM UR-SCNC: 90 MMOL/L
SP GR UR STRIP: 1.01 (ref 1–1.03)
T4 FREE SERPL-MCNC: 1 NG/DL (ref 0.9–1.7)
TIBC SERPL-MCNC: 268 UG/DL (ref 250–450)
TOTAL PROTEIN, URINE: <4 MG/DL
TSH SERPL DL<=0.05 MIU/L-ACNC: 4.83 UIU/ML (ref 0.27–4.2)
UNSATURATED IRON BINDING CAPACITY: 205 UG/DL (ref 112–347)
URINE TOTAL PROTEIN CREATININE RATIO: NORMAL (ref 0–0.2)
UROBILINOGEN UR STRIP-ACNC: NORMAL EU/DL (ref 0–1)
WBC #/AREA URNS HPF: NORMAL /HPF (ref 0–5)

## 2025-04-04 PROCEDURE — 82728 ASSAY OF FERRITIN: CPT

## 2025-04-04 PROCEDURE — 84156 ASSAY OF PROTEIN URINE: CPT

## 2025-04-04 PROCEDURE — 36415 COLL VENOUS BLD VENIPUNCTURE: CPT

## 2025-04-04 PROCEDURE — 86160 COMPLEMENT ANTIGEN: CPT

## 2025-04-04 PROCEDURE — 84443 ASSAY THYROID STIM HORMONE: CPT

## 2025-04-04 PROCEDURE — 97166 OT EVAL MOD COMPLEX 45 MIN: CPT

## 2025-04-04 PROCEDURE — 6370000000 HC RX 637 (ALT 250 FOR IP): Performed by: STUDENT IN AN ORGANIZED HEALTH CARE EDUCATION/TRAINING PROGRAM

## 2025-04-04 PROCEDURE — 82436 ASSAY OF URINE CHLORIDE: CPT

## 2025-04-04 PROCEDURE — 83735 ASSAY OF MAGNESIUM: CPT

## 2025-04-04 PROCEDURE — 83540 ASSAY OF IRON: CPT

## 2025-04-04 PROCEDURE — 84165 PROTEIN E-PHORESIS SERUM: CPT

## 2025-04-04 PROCEDURE — 93306 TTE W/DOPPLER COMPLETE: CPT

## 2025-04-04 PROCEDURE — 80048 BASIC METABOLIC PNL TOTAL CA: CPT

## 2025-04-04 PROCEDURE — 84155 ASSAY OF PROTEIN SERUM: CPT

## 2025-04-04 PROCEDURE — 99223 1ST HOSP IP/OBS HIGH 75: CPT | Performed by: STUDENT IN AN ORGANIZED HEALTH CARE EDUCATION/TRAINING PROGRAM

## 2025-04-04 PROCEDURE — 2500000003 HC RX 250 WO HCPCS: Performed by: NURSE PRACTITIONER

## 2025-04-04 PROCEDURE — 6360000002 HC RX W HCPCS: Performed by: NURSE PRACTITIONER

## 2025-04-04 PROCEDURE — 80074 ACUTE HEPATITIS PANEL: CPT

## 2025-04-04 PROCEDURE — 6370000000 HC RX 637 (ALT 250 FOR IP): Performed by: NURSE PRACTITIONER

## 2025-04-04 PROCEDURE — 83521 IG LIGHT CHAINS FREE EACH: CPT

## 2025-04-04 PROCEDURE — 99222 1ST HOSP IP/OBS MODERATE 55: CPT | Performed by: INTERNAL MEDICINE

## 2025-04-04 PROCEDURE — 97530 THERAPEUTIC ACTIVITIES: CPT

## 2025-04-04 PROCEDURE — 97535 SELF CARE MNGMENT TRAINING: CPT

## 2025-04-04 PROCEDURE — 93010 ELECTROCARDIOGRAM REPORT: CPT | Performed by: INTERNAL MEDICINE

## 2025-04-04 PROCEDURE — 51798 US URINE CAPACITY MEASURE: CPT

## 2025-04-04 PROCEDURE — 6360000002 HC RX W HCPCS: Performed by: STUDENT IN AN ORGANIZED HEALTH CARE EDUCATION/TRAINING PROGRAM

## 2025-04-04 PROCEDURE — 76770 US EXAM ABDO BACK WALL COMP: CPT

## 2025-04-04 PROCEDURE — 99222 1ST HOSP IP/OBS MODERATE 55: CPT | Performed by: STUDENT IN AN ORGANIZED HEALTH CARE EDUCATION/TRAINING PROGRAM

## 2025-04-04 PROCEDURE — 86334 IMMUNOFIX E-PHORESIS SERUM: CPT

## 2025-04-04 PROCEDURE — 93306 TTE W/DOPPLER COMPLETE: CPT | Performed by: INTERNAL MEDICINE

## 2025-04-04 PROCEDURE — 82947 ASSAY GLUCOSE BLOOD QUANT: CPT

## 2025-04-04 PROCEDURE — 2060000000 HC ICU INTERMEDIATE R&B

## 2025-04-04 PROCEDURE — 82570 ASSAY OF URINE CREATININE: CPT

## 2025-04-04 PROCEDURE — 83550 IRON BINDING TEST: CPT

## 2025-04-04 PROCEDURE — 84439 ASSAY OF FREE THYROXINE: CPT

## 2025-04-04 PROCEDURE — 81001 URINALYSIS AUTO W/SCOPE: CPT

## 2025-04-04 PROCEDURE — 84300 ASSAY OF URINE SODIUM: CPT

## 2025-04-04 PROCEDURE — 94660 CPAP INITIATION&MGMT: CPT

## 2025-04-04 RX ORDER — INSULIN GLARGINE 100 [IU]/ML
60 INJECTION, SOLUTION SUBCUTANEOUS DAILY
Status: DISCONTINUED | OUTPATIENT
Start: 2025-04-04 | End: 2025-04-07 | Stop reason: HOSPADM

## 2025-04-04 RX ORDER — MAGNESIUM SULFATE IN WATER 40 MG/ML
2000 INJECTION, SOLUTION INTRAVENOUS PRN
Status: DISCONTINUED | OUTPATIENT
Start: 2025-04-04 | End: 2025-04-07 | Stop reason: HOSPADM

## 2025-04-04 RX ORDER — ACETAMINOPHEN 650 MG/1
650 SUPPOSITORY RECTAL EVERY 6 HOURS PRN
Status: DISCONTINUED | OUTPATIENT
Start: 2025-04-04 | End: 2025-04-07 | Stop reason: HOSPADM

## 2025-04-04 RX ORDER — HYDRALAZINE HYDROCHLORIDE 20 MG/ML
10 INJECTION INTRAMUSCULAR; INTRAVENOUS EVERY 6 HOURS PRN
Status: DISCONTINUED | OUTPATIENT
Start: 2025-04-04 | End: 2025-04-07 | Stop reason: HOSPADM

## 2025-04-04 RX ORDER — HEPARIN SODIUM 5000 [USP'U]/ML
5000 INJECTION, SOLUTION INTRAVENOUS; SUBCUTANEOUS EVERY 8 HOURS SCHEDULED
Status: DISCONTINUED | OUTPATIENT
Start: 2025-04-04 | End: 2025-04-04

## 2025-04-04 RX ORDER — ENOXAPARIN SODIUM 100 MG/ML
30 INJECTION SUBCUTANEOUS 2 TIMES DAILY
Status: DISCONTINUED | OUTPATIENT
Start: 2025-04-04 | End: 2025-04-04

## 2025-04-04 RX ORDER — INSULIN LISPRO 100 [IU]/ML
0-16 INJECTION, SOLUTION INTRAVENOUS; SUBCUTANEOUS
Status: DISCONTINUED | OUTPATIENT
Start: 2025-04-04 | End: 2025-04-07 | Stop reason: HOSPADM

## 2025-04-04 RX ORDER — DEXTROSE MONOHYDRATE 100 MG/ML
INJECTION, SOLUTION INTRAVENOUS CONTINUOUS PRN
Status: DISCONTINUED | OUTPATIENT
Start: 2025-04-04 | End: 2025-04-07 | Stop reason: HOSPADM

## 2025-04-04 RX ORDER — ALLOPURINOL 100 MG/1
200 TABLET ORAL DAILY
Status: DISCONTINUED | OUTPATIENT
Start: 2025-04-04 | End: 2025-04-07 | Stop reason: HOSPADM

## 2025-04-04 RX ORDER — ONDANSETRON 2 MG/ML
4 INJECTION INTRAMUSCULAR; INTRAVENOUS EVERY 6 HOURS PRN
Status: DISCONTINUED | OUTPATIENT
Start: 2025-04-04 | End: 2025-04-07 | Stop reason: HOSPADM

## 2025-04-04 RX ORDER — POTASSIUM CHLORIDE 1500 MG/1
40 TABLET, EXTENDED RELEASE ORAL PRN
Status: DISCONTINUED | OUTPATIENT
Start: 2025-04-04 | End: 2025-04-07 | Stop reason: HOSPADM

## 2025-04-04 RX ORDER — SODIUM CHLORIDE 0.9 % (FLUSH) 0.9 %
5-40 SYRINGE (ML) INJECTION EVERY 12 HOURS SCHEDULED
Status: DISCONTINUED | OUTPATIENT
Start: 2025-04-04 | End: 2025-04-07 | Stop reason: HOSPADM

## 2025-04-04 RX ORDER — FUROSEMIDE 10 MG/ML
40 INJECTION INTRAMUSCULAR; INTRAVENOUS 2 TIMES DAILY
Status: DISCONTINUED | OUTPATIENT
Start: 2025-04-04 | End: 2025-04-06

## 2025-04-04 RX ORDER — LISINOPRIL 5 MG/1
5 TABLET ORAL DAILY
Status: DISCONTINUED | OUTPATIENT
Start: 2025-04-05 | End: 2025-04-04

## 2025-04-04 RX ORDER — POTASSIUM CHLORIDE 7.45 MG/ML
10 INJECTION INTRAVENOUS PRN
Status: DISCONTINUED | OUTPATIENT
Start: 2025-04-04 | End: 2025-04-07 | Stop reason: HOSPADM

## 2025-04-04 RX ORDER — ATORVASTATIN CALCIUM 40 MG/1
40 TABLET, FILM COATED ORAL DAILY
Status: DISCONTINUED | OUTPATIENT
Start: 2025-04-04 | End: 2025-04-07 | Stop reason: HOSPADM

## 2025-04-04 RX ORDER — SODIUM CHLORIDE 0.9 % (FLUSH) 0.9 %
10 SYRINGE (ML) INJECTION PRN
Status: DISCONTINUED | OUTPATIENT
Start: 2025-04-04 | End: 2025-04-07 | Stop reason: HOSPADM

## 2025-04-04 RX ORDER — CARVEDILOL 3.12 MG/1
3.12 TABLET ORAL 2 TIMES DAILY WITH MEALS
Status: DISCONTINUED | OUTPATIENT
Start: 2025-04-05 | End: 2025-04-07

## 2025-04-04 RX ORDER — ONDANSETRON 4 MG/1
4 TABLET, ORALLY DISINTEGRATING ORAL EVERY 8 HOURS PRN
Status: DISCONTINUED | OUTPATIENT
Start: 2025-04-04 | End: 2025-04-07 | Stop reason: HOSPADM

## 2025-04-04 RX ORDER — ACETAMINOPHEN 325 MG/1
650 TABLET ORAL EVERY 6 HOURS PRN
Status: DISCONTINUED | OUTPATIENT
Start: 2025-04-04 | End: 2025-04-07 | Stop reason: HOSPADM

## 2025-04-04 RX ORDER — SODIUM CHLORIDE 9 MG/ML
INJECTION, SOLUTION INTRAVENOUS PRN
Status: DISCONTINUED | OUTPATIENT
Start: 2025-04-04 | End: 2025-04-07 | Stop reason: HOSPADM

## 2025-04-04 RX ORDER — POLYETHYLENE GLYCOL 3350 17 G/17G
17 POWDER, FOR SOLUTION ORAL DAILY PRN
Status: DISCONTINUED | OUTPATIENT
Start: 2025-04-04 | End: 2025-04-07 | Stop reason: HOSPADM

## 2025-04-04 RX ORDER — GLUCAGON 1 MG/ML
1 KIT INJECTION PRN
Status: DISCONTINUED | OUTPATIENT
Start: 2025-04-04 | End: 2025-04-07 | Stop reason: HOSPADM

## 2025-04-04 RX ADMIN — FUROSEMIDE 40 MG: 10 INJECTION, SOLUTION INTRAMUSCULAR; INTRAVENOUS at 09:50

## 2025-04-04 RX ADMIN — HYDRALAZINE HYDROCHLORIDE 10 MG: 20 INJECTION INTRAMUSCULAR; INTRAVENOUS at 16:12

## 2025-04-04 RX ADMIN — SODIUM CHLORIDE, PRESERVATIVE FREE 10 ML: 5 INJECTION INTRAVENOUS at 22:34

## 2025-04-04 RX ADMIN — INSULIN LISPRO 4 UNITS: 100 INJECTION, SOLUTION INTRAVENOUS; SUBCUTANEOUS at 22:35

## 2025-04-04 RX ADMIN — ALLOPURINOL 200 MG: 100 TABLET ORAL at 09:50

## 2025-04-04 RX ADMIN — ENOXAPARIN SODIUM 30 MG: 100 INJECTION SUBCUTANEOUS at 09:50

## 2025-04-04 RX ADMIN — SODIUM CHLORIDE, PRESERVATIVE FREE 10 ML: 5 INJECTION INTRAVENOUS at 09:50

## 2025-04-04 RX ADMIN — FUROSEMIDE 40 MG: 10 INJECTION, SOLUTION INTRAMUSCULAR; INTRAVENOUS at 18:15

## 2025-04-04 RX ADMIN — SACUBITRIL AND VALSARTAN 1 TABLET: 97; 103 TABLET, FILM COATED ORAL at 10:27

## 2025-04-04 RX ADMIN — ATORVASTATIN CALCIUM 40 MG: 40 TABLET, FILM COATED ORAL at 09:50

## 2025-04-04 RX ADMIN — APIXABAN 5 MG: 5 TABLET, FILM COATED ORAL at 22:34

## 2025-04-04 RX ADMIN — INSULIN GLARGINE 60 UNITS: 100 INJECTION, SOLUTION SUBCUTANEOUS at 10:27

## 2025-04-04 NOTE — ED PROVIDER NOTES
[facility] [place]  EMERGENCY DEPARTMENT ENCOUNTER      Pt Name: Sid Blakely  MRN: [id number]  Birthdate 1950  Date of evaluation: 4/3/2025  Provider: Sukhdev Steven MD    CHIEF COMPLAINT       Chief Complaint   Patient presents with    Bloated     Pt has a hx of CHF and states that since this past Sunday, he has had increased swelling in his abd and BLE. C/o not being able to pee as much.          HISTORY OF PRESENT ILLNESS      Sid Blakely is a 74 y.o. male who presents to the emergency department via private vehicle with wife, patient has known history of CHF, states for the past several days she has had increased abdominal bloating and discomfort, as well as worsening bilateral lower extremity edema.  Patient feels has not been able to urinate as much.  States compliant with all medications.  Denies any chest pain or tightness.  Blood sugars of normal for patient, indicating 160-180.  Denies any dysuria hematuria.  Patient did recently return from [place], and states overall he is compliant with his diet though did have some martinez this morning.    PCP: Justine  Cards: Alisia        REVIEW OF SYSTEMS       Review of Systems   Constitutional:  Negative for fever.   Respiratory:  Positive for shortness of breath. Negative for chest tightness.    Cardiovascular:  Positive for leg swelling. Negative for chest pain.   Gastrointestinal:  Positive for abdominal distention.   All other systems reviewed and are negative.        PAST MEDICAL HISTORY     Past Medical History:   Diagnosis Date    Arthritis     Arthritis     Atrial fibrillation (HCC)     CAD (coronary artery disease)     CHF (congestive heart failure) (HCC)     CHF (congestive heart failure) (HCC)     Coronary artery disease     Diabetes (HCC)     Diabetes mellitus (HCC)     H/O coronary angioplasty     Hyperlipidemia     Hypertension     Neuropathy     Numbness and tingling     dannie hands    Pulmonary edema     SECONDARY TO FAT EMBOLI AFTER

## 2025-04-04 NOTE — ED NOTES
Pt has Freestyle Pauline blood sugar sensing device and Dr. Steven ok with using after insulin and d5 admin. BS at this time 303.

## 2025-04-04 NOTE — H&P
bradycardia.    Bradycardia.  Cardiology concerned with junctional beats and possible isorhythmic dissociation with left bundle branch block with plan for electrophysiology evaluation.  Hold all AV beltran blocking agents.  Echocardiogram pending.    Acute kidney injury.  Bladder scan.  Continue diuretics.  Will get nephrology input.    Hypertension.  Stable.  Continue to monitor off Coreg and Entresto due to bradycardia and ANTONY respectively.  Hold nifedipine.  Will resume Aldactone if blood pressure becomes elevated and renal function improves.    Hyperlipidemia.  Continue Lipitor 40 mg daily    Type 2 diabetes mellitus.  Resume Lantus 60 units daily and start high-dose corrective algorithm.  Patient states he takes 22 units of lispro twice daily as well however will hold off at this time as he states he had experiences hyponatremia at times at home.  POCT glucose and hypoglycemia protocol.    Obstructive sleep apnea. Offer CPAP nightly.    DVT prophylaxis: Resume Eliquis  GI prophylaxis: None    Discharge plan: Pending cardiology evaluation and nephrology input as well as adequate diuresis.    Consultations:   IP CONSULT TO HEART FAILURE NURSE/COORDINATOR  IP CONSULT TO DIETITIAN  IP CONSULT TO CARDIOLOGY    Patient is admitted as inpatient status because of co-morbidities listed above, severity of signs and symptoms as outlined, requirement for current medical therapies and most importantly because of direct risk to patient if care not provided in a hospital setting.  Expected length of stay > 48 hours.    José Luis Myers DO  4/4/2025  11:18 AM    Copy sent to Michelle Chaudhari DO

## 2025-04-04 NOTE — ED NOTES
Pt leaves with [facility] at 2330. Report given to EMS personnel. Pt sent over with all necessary paperwork as well as charted belongings including Freestyle Pauline monitor. Report called to . 's to RUPESH Hernandez.

## 2025-04-05 ENCOUNTER — APPOINTMENT (OUTPATIENT)
Dept: ULTRASOUND IMAGING | Age: 75
DRG: 291 | End: 2025-04-05
Attending: INTERNAL MEDICINE
Payer: MEDICARE

## 2025-04-05 PROBLEM — I50.9 CONGESTIVE HEART FAILURE (HCC): Status: ACTIVE | Noted: 2025-04-05

## 2025-04-05 PROBLEM — E87.70 HYPERVOLEMIA: Status: ACTIVE | Noted: 2025-04-05

## 2025-04-05 LAB
ANION GAP SERPL CALCULATED.3IONS-SCNC: 12 MMOL/L (ref 9–16)
BNP SERPL-MCNC: 741 PG/ML (ref 0–125)
BUN SERPL-MCNC: 30 MG/DL (ref 8–23)
CALCIUM SERPL-MCNC: 8.9 MG/DL (ref 8.6–10.4)
CHLORIDE SERPL-SCNC: 107 MMOL/L (ref 98–107)
CHOLEST SERPL-MCNC: 131 MG/DL (ref 0–199)
CHOLESTEROL/HDL RATIO: 4.9
CO2 SERPL-SCNC: 22 MMOL/L (ref 20–31)
CREAT SERPL-MCNC: 1.2 MG/DL (ref 0.7–1.2)
GFR, ESTIMATED: 64 ML/MIN/1.73M2
GLUCOSE BLD-MCNC: 115 MG/DL (ref 75–110)
GLUCOSE BLD-MCNC: 127 MG/DL (ref 75–110)
GLUCOSE BLD-MCNC: 241 MG/DL (ref 75–110)
GLUCOSE SERPL-MCNC: 112 MG/DL (ref 74–99)
HDLC SERPL-MCNC: 27 MG/DL
LDLC SERPL CALC-MCNC: 68 MG/DL (ref 0–100)
MAGNESIUM SERPL-MCNC: 2.3 MG/DL (ref 1.6–2.4)
POTASSIUM SERPL-SCNC: 4.6 MMOL/L (ref 3.7–5.3)
SODIUM SERPL-SCNC: 141 MMOL/L (ref 136–145)
TRIGL SERPL-MCNC: 181 MG/DL
VLDLC SERPL CALC-MCNC: 36 MG/DL (ref 1–30)

## 2025-04-05 PROCEDURE — 36415 COLL VENOUS BLD VENIPUNCTURE: CPT

## 2025-04-05 PROCEDURE — 99233 SBSQ HOSP IP/OBS HIGH 50: CPT | Performed by: SURGERY

## 2025-04-05 PROCEDURE — 94640 AIRWAY INHALATION TREATMENT: CPT

## 2025-04-05 PROCEDURE — 2500000003 HC RX 250 WO HCPCS: Performed by: NURSE PRACTITIONER

## 2025-04-05 PROCEDURE — 6370000000 HC RX 637 (ALT 250 FOR IP): Performed by: STUDENT IN AN ORGANIZED HEALTH CARE EDUCATION/TRAINING PROGRAM

## 2025-04-05 PROCEDURE — 99232 SBSQ HOSP IP/OBS MODERATE 35: CPT | Performed by: STUDENT IN AN ORGANIZED HEALTH CARE EDUCATION/TRAINING PROGRAM

## 2025-04-05 PROCEDURE — 83880 ASSAY OF NATRIURETIC PEPTIDE: CPT

## 2025-04-05 PROCEDURE — 76705 ECHO EXAM OF ABDOMEN: CPT

## 2025-04-05 PROCEDURE — 82947 ASSAY GLUCOSE BLOOD QUANT: CPT

## 2025-04-05 PROCEDURE — 6360000002 HC RX W HCPCS: Performed by: NURSE PRACTITIONER

## 2025-04-05 PROCEDURE — 6370000000 HC RX 637 (ALT 250 FOR IP): Performed by: NURSE PRACTITIONER

## 2025-04-05 PROCEDURE — 80061 LIPID PANEL: CPT

## 2025-04-05 PROCEDURE — 83735 ASSAY OF MAGNESIUM: CPT

## 2025-04-05 PROCEDURE — 80048 BASIC METABOLIC PNL TOTAL CA: CPT

## 2025-04-05 PROCEDURE — 2060000000 HC ICU INTERMEDIATE R&B

## 2025-04-05 RX ORDER — DOXAZOSIN 2 MG/1
4 TABLET ORAL 2 TIMES DAILY
Status: DISCONTINUED | OUTPATIENT
Start: 2025-04-05 | End: 2025-04-07 | Stop reason: HOSPADM

## 2025-04-05 RX ORDER — TRAMADOL HYDROCHLORIDE 50 MG/1
50 TABLET ORAL EVERY 6 HOURS PRN
Status: DISCONTINUED | OUTPATIENT
Start: 2025-04-05 | End: 2025-04-07 | Stop reason: HOSPADM

## 2025-04-05 RX ORDER — ALBUTEROL SULFATE 90 UG/1
2 INHALANT RESPIRATORY (INHALATION) EVERY 6 HOURS PRN
Status: DISCONTINUED | OUTPATIENT
Start: 2025-04-05 | End: 2025-04-07 | Stop reason: HOSPADM

## 2025-04-05 RX ORDER — IPRATROPIUM BROMIDE AND ALBUTEROL SULFATE 2.5; .5 MG/3ML; MG/3ML
1 SOLUTION RESPIRATORY (INHALATION) EVERY 4 HOURS PRN
Status: DISCONTINUED | OUTPATIENT
Start: 2025-04-05 | End: 2025-04-07 | Stop reason: HOSPADM

## 2025-04-05 RX ORDER — IPRATROPIUM BROMIDE AND ALBUTEROL SULFATE 2.5; .5 MG/3ML; MG/3ML
1 SOLUTION RESPIRATORY (INHALATION)
Status: DISCONTINUED | OUTPATIENT
Start: 2025-04-05 | End: 2025-04-05

## 2025-04-05 RX ORDER — SPIRONOLACTONE 25 MG/1
25 TABLET ORAL DAILY
Status: DISCONTINUED | OUTPATIENT
Start: 2025-04-05 | End: 2025-04-05

## 2025-04-05 RX ORDER — PANTOPRAZOLE SODIUM 40 MG/1
40 TABLET, DELAYED RELEASE ORAL
Status: DISCONTINUED | OUTPATIENT
Start: 2025-04-06 | End: 2025-04-07 | Stop reason: HOSPADM

## 2025-04-05 RX ORDER — HYDRALAZINE HYDROCHLORIDE 25 MG/1
25 TABLET, FILM COATED ORAL EVERY 8 HOURS SCHEDULED
Status: DISCONTINUED | OUTPATIENT
Start: 2025-04-05 | End: 2025-04-06

## 2025-04-05 RX ADMIN — FUROSEMIDE 40 MG: 10 INJECTION, SOLUTION INTRAMUSCULAR; INTRAVENOUS at 08:20

## 2025-04-05 RX ADMIN — ATORVASTATIN CALCIUM 40 MG: 40 TABLET, FILM COATED ORAL at 21:20

## 2025-04-05 RX ADMIN — APIXABAN 5 MG: 5 TABLET, FILM COATED ORAL at 08:21

## 2025-04-05 RX ADMIN — HYDRALAZINE HYDROCHLORIDE 25 MG: 25 TABLET ORAL at 21:20

## 2025-04-05 RX ADMIN — SODIUM CHLORIDE, PRESERVATIVE FREE 10 ML: 5 INJECTION INTRAVENOUS at 08:21

## 2025-04-05 RX ADMIN — ALLOPURINOL 200 MG: 100 TABLET ORAL at 08:21

## 2025-04-05 RX ADMIN — IPRATROPIUM BROMIDE AND ALBUTEROL SULFATE 1 DOSE: 2.5; .5 SOLUTION RESPIRATORY (INHALATION) at 15:52

## 2025-04-05 RX ADMIN — INSULIN GLARGINE 60 UNITS: 100 INJECTION, SOLUTION SUBCUTANEOUS at 08:22

## 2025-04-05 RX ADMIN — HYDRALAZINE HYDROCHLORIDE 25 MG: 25 TABLET ORAL at 13:42

## 2025-04-05 RX ADMIN — FUROSEMIDE 40 MG: 10 INJECTION, SOLUTION INTRAMUSCULAR; INTRAVENOUS at 17:53

## 2025-04-05 RX ADMIN — DOXAZOSIN 4 MG: 2 TABLET ORAL at 21:20

## 2025-04-05 RX ADMIN — APIXABAN 5 MG: 5 TABLET, FILM COATED ORAL at 21:20

## 2025-04-05 RX ADMIN — DOXAZOSIN 4 MG: 2 TABLET ORAL at 13:42

## 2025-04-05 RX ADMIN — INSULIN LISPRO 8 UNITS: 100 INJECTION, SOLUTION INTRAVENOUS; SUBCUTANEOUS at 13:23

## 2025-04-06 PROBLEM — R76.8 ELEVATED SERUM IMMUNOGLOBULIN FREE LIGHT CHAIN LEVEL: Status: ACTIVE | Noted: 2025-04-06

## 2025-04-06 LAB
ANION GAP SERPL CALCULATED.3IONS-SCNC: 11 MMOL/L (ref 9–16)
BASOPHILS # BLD: 0.03 K/UL (ref 0–0.2)
BASOPHILS NFR BLD: 1 % (ref 0–2)
BUN SERPL-MCNC: 28 MG/DL (ref 8–23)
CALCIUM SERPL-MCNC: 8.8 MG/DL (ref 8.6–10.4)
CHLORIDE SERPL-SCNC: 105 MMOL/L (ref 98–107)
CO2 SERPL-SCNC: 22 MMOL/L (ref 20–31)
CREAT SERPL-MCNC: 1.1 MG/DL (ref 0.7–1.2)
EOSINOPHIL # BLD: 0.24 K/UL (ref 0–0.44)
EOSINOPHILS RELATIVE PERCENT: 4 % (ref 1–4)
ERYTHROCYTE [DISTWIDTH] IN BLOOD BY AUTOMATED COUNT: 14 % (ref 11.8–14.4)
GFR, ESTIMATED: 71 ML/MIN/1.73M2
GLUCOSE BLD-MCNC: 176 MG/DL (ref 75–110)
GLUCOSE BLD-MCNC: 202 MG/DL (ref 75–110)
GLUCOSE BLD-MCNC: 216 MG/DL (ref 75–110)
GLUCOSE BLD-MCNC: 229 MG/DL (ref 75–110)
GLUCOSE SERPL-MCNC: 178 MG/DL (ref 74–99)
HCT VFR BLD AUTO: 37.8 % (ref 40.7–50.3)
HGB BLD-MCNC: 12.2 G/DL (ref 13–17)
IGA SERPL-MCNC: 278 MG/DL (ref 70–400)
IGG SERPL-MCNC: 1117 MG/DL (ref 700–1600)
IGM SERPL-MCNC: 36 MG/DL (ref 40–230)
IMM GRANULOCYTES # BLD AUTO: 0.03 K/UL (ref 0–0.3)
IMM GRANULOCYTES NFR BLD: 1 %
LYMPHOCYTES NFR BLD: 1.41 K/UL (ref 1.1–3.7)
LYMPHOCYTES RELATIVE PERCENT: 21 % (ref 24–43)
MAGNESIUM SERPL-MCNC: 2.3 MG/DL (ref 1.6–2.4)
MCH RBC QN AUTO: 32 PG (ref 25.2–33.5)
MCHC RBC AUTO-ENTMCNC: 32.3 G/DL (ref 28.4–34.8)
MCV RBC AUTO: 99.2 FL (ref 82.6–102.9)
MONOCYTES NFR BLD: 0.82 K/UL (ref 0.1–1.2)
MONOCYTES NFR BLD: 12 % (ref 3–12)
NEUTROPHILS NFR BLD: 61 % (ref 36–65)
NEUTS SEG NFR BLD: 4.1 K/UL (ref 1.5–8.1)
NRBC BLD-RTO: 0 PER 100 WBC
PLATELET # BLD AUTO: 248 K/UL (ref 138–453)
PMV BLD AUTO: 10 FL (ref 8.1–13.5)
POTASSIUM SERPL-SCNC: 4.4 MMOL/L (ref 3.7–5.3)
RBC # BLD AUTO: 3.81 M/UL (ref 4.21–5.77)
SODIUM SERPL-SCNC: 138 MMOL/L (ref 136–145)
WBC OTHER # BLD: 6.6 K/UL (ref 3.5–11.3)

## 2025-04-06 PROCEDURE — 2060000000 HC ICU INTERMEDIATE R&B

## 2025-04-06 PROCEDURE — 6370000000 HC RX 637 (ALT 250 FOR IP): Performed by: STUDENT IN AN ORGANIZED HEALTH CARE EDUCATION/TRAINING PROGRAM

## 2025-04-06 PROCEDURE — 99232 SBSQ HOSP IP/OBS MODERATE 35: CPT | Performed by: STUDENT IN AN ORGANIZED HEALTH CARE EDUCATION/TRAINING PROGRAM

## 2025-04-06 PROCEDURE — 82947 ASSAY GLUCOSE BLOOD QUANT: CPT

## 2025-04-06 PROCEDURE — 6370000000 HC RX 637 (ALT 250 FOR IP): Performed by: NURSE PRACTITIONER

## 2025-04-06 PROCEDURE — 88185 FLOWCYTOMETRY/TC ADD-ON: CPT

## 2025-04-06 PROCEDURE — 85025 COMPLETE CBC W/AUTO DIFF WBC: CPT

## 2025-04-06 PROCEDURE — 99223 1ST HOSP IP/OBS HIGH 75: CPT | Performed by: INTERNAL MEDICINE

## 2025-04-06 PROCEDURE — 6360000002 HC RX W HCPCS: Performed by: NURSE PRACTITIONER

## 2025-04-06 PROCEDURE — 36415 COLL VENOUS BLD VENIPUNCTURE: CPT

## 2025-04-06 PROCEDURE — 86334 IMMUNOFIX E-PHORESIS SERUM: CPT

## 2025-04-06 PROCEDURE — 80048 BASIC METABOLIC PNL TOTAL CA: CPT

## 2025-04-06 PROCEDURE — 2500000003 HC RX 250 WO HCPCS: Performed by: NURSE PRACTITIONER

## 2025-04-06 PROCEDURE — 99233 SBSQ HOSP IP/OBS HIGH 50: CPT | Performed by: SURGERY

## 2025-04-06 PROCEDURE — 88184 FLOWCYTOMETRY/ TC 1 MARKER: CPT

## 2025-04-06 PROCEDURE — 83735 ASSAY OF MAGNESIUM: CPT

## 2025-04-06 PROCEDURE — 82784 ASSAY IGA/IGD/IGG/IGM EACH: CPT

## 2025-04-06 PROCEDURE — 6370000000 HC RX 637 (ALT 250 FOR IP): Performed by: SURGERY

## 2025-04-06 RX ORDER — HYDRALAZINE HYDROCHLORIDE 50 MG/1
50 TABLET, FILM COATED ORAL EVERY 8 HOURS SCHEDULED
Status: DISCONTINUED | OUTPATIENT
Start: 2025-04-06 | End: 2025-04-07 | Stop reason: HOSPADM

## 2025-04-06 RX ORDER — FUROSEMIDE 20 MG/1
20 TABLET ORAL 2 TIMES DAILY
Status: DISCONTINUED | OUTPATIENT
Start: 2025-04-07 | End: 2025-04-07 | Stop reason: HOSPADM

## 2025-04-06 RX ADMIN — SODIUM CHLORIDE, PRESERVATIVE FREE 10 ML: 5 INJECTION INTRAVENOUS at 21:01

## 2025-04-06 RX ADMIN — HYDRALAZINE HYDROCHLORIDE 50 MG: 50 TABLET ORAL at 13:58

## 2025-04-06 RX ADMIN — DOXAZOSIN 4 MG: 2 TABLET ORAL at 08:25

## 2025-04-06 RX ADMIN — ALLOPURINOL 200 MG: 100 TABLET ORAL at 08:19

## 2025-04-06 RX ADMIN — INSULIN LISPRO 4 UNITS: 100 INJECTION, SOLUTION INTRAVENOUS; SUBCUTANEOUS at 12:40

## 2025-04-06 RX ADMIN — ATORVASTATIN CALCIUM 40 MG: 40 TABLET, FILM COATED ORAL at 08:20

## 2025-04-06 RX ADMIN — HYDRALAZINE HYDROCHLORIDE 25 MG: 25 TABLET ORAL at 06:09

## 2025-04-06 RX ADMIN — INSULIN LISPRO 4 UNITS: 100 INJECTION, SOLUTION INTRAVENOUS; SUBCUTANEOUS at 18:53

## 2025-04-06 RX ADMIN — FUROSEMIDE 40 MG: 10 INJECTION, SOLUTION INTRAMUSCULAR; INTRAVENOUS at 08:20

## 2025-04-06 RX ADMIN — SODIUM CHLORIDE, PRESERVATIVE FREE 10 ML: 5 INJECTION INTRAVENOUS at 08:20

## 2025-04-06 RX ADMIN — PANTOPRAZOLE SODIUM 40 MG: 40 TABLET, DELAYED RELEASE ORAL at 06:09

## 2025-04-06 RX ADMIN — HYDRALAZINE HYDROCHLORIDE 50 MG: 50 TABLET ORAL at 21:01

## 2025-04-06 RX ADMIN — INSULIN GLARGINE 60 UNITS: 100 INJECTION, SOLUTION SUBCUTANEOUS at 08:23

## 2025-04-06 RX ADMIN — DOXAZOSIN 4 MG: 2 TABLET ORAL at 21:01

## 2025-04-06 RX ADMIN — APIXABAN 5 MG: 5 TABLET, FILM COATED ORAL at 08:20

## 2025-04-06 NOTE — CONSULTS
Today's Date: 4/6/2025  Patient Name: Sid Blakely  Date of admission: 4/4/2025  1:34 AM  Patient's age: 74 y.o., 1950  Admission Dx: CHF exacerbation (HCC) [I50.9]  Congestive heart failure, unspecified HF chronicity, unspecified heart failure type (HCC) [I50.9]  Acute exacerbation of chronic heart failure (HCC) [I50.9]    Reason for Consult: management recommendations  Requesting Physician: Rosalia Chan Sra, MD    CHIEF COMPLAINT: Abnormal free light chain ratio    History Obtained From:  patient, spouse, electronic medical record    HISTORY OF PRESENT ILLNESS:      The patient is a 74 y.o.  male who to the ER with chief complaints of volume overload shortness of breath.  Patient was also recently admitted in [facility] due to volume overload and was diuresed.  Denies fever chills chest pain abdominal pain.    Patient will bradycardia in the ER.  Potassium 5.5.  Creatinine 1.8.  Hemoglobin 12.0.  Platelet count 29.  CT chest and pelvis no acute findings.  Patient transferred to [place] for further workup and evaluation  Send patient underwent evaluation for acute kidney injury and free light chain ratio came back elevated.  Patient has history of coronary artery disease.  Echo from September 2024 showed mildly reduced LVEF.  Moderate septal thickening.    Lab workup shows hemoglobin 12.2.  During hospitalization course, creatinine has improved to 1.1.  Patient lambda kappa ratio was 5.5.  SPEP pending.  Patient does take Eliquis.    Past Medical History:   has a past medical history of Arthritis, Arthritis, Atrial fibrillation (HCC), CAD (coronary artery disease), CHF (congestive heart failure) (HCC), CHF (congestive heart failure) (HCC), COPD (chronic obstructive pulmonary disease) (HCC), Coronary artery disease, Diabetes (HCC), Diabetes mellitus (HCC), H/O coronary angioplasty, Hyperlipidemia, Hypertension, Neuropathy, Numbness and tingling, Pulmonary edema, Sleep apnea, and 
[facility]    Consult               Today's Date: 4/4/2025  Patient Name: Sid Blakely  Date of admission: 4/4/2025  1:34 AM  Patient's age: 74 y.o., 1950  Admission Dx: CHF exacerbation (HCC) [I50.9]  Congestive heart failure, unspecified HF chronicity, unspecified heart failure type (HCC) [I50.9]  Acute exacerbation of chronic heart failure (HCC) [I50.9]    Requesting Physician: José Luis Myers DO    Cardiac Evaluation Reason:  Acute on chronic congestive heart failure    History Obtained From: patient and chart review     History of Present Illness:    This patient 74 y.o. years old with past medical history given below. Patient presented himself to the ER with worsening of lower limbs swelling and abdominal discomfort for the past several days.  Patient denies any chest pain or tightness.  Patient also reports decreased urination.  EKG showed Wenckebach block and left bundle branch block.  Patient also have ANTONY and elevated troponins likely related to underlying ANTONY.  Cardiology consulted for further evaluation of bradycardia and congestive heart failure.    Past Medical History:   has a past medical history of Arthritis, Arthritis, Atrial fibrillation (HCC), CAD (coronary artery disease), CHF (congestive heart failure) (HCC), CHF (congestive heart failure) (HCC), COPD (chronic obstructive pulmonary disease) (HCC), Coronary artery disease, Diabetes (HCC), Diabetes mellitus (HCC), H/O coronary angioplasty, Hyperlipidemia, Hypertension, Neuropathy, Numbness and tingling, Pulmonary edema, Sleep apnea, and Unspecified sleep apnea.    Past Surgical History:   has a past surgical history that includes knee surgery (2010, 2011); Bunionectomy (2010); Cataract removal (2004); Appendectomy; Appendectomy; eye surgery (08/2012); joint replacement (Bilateral); Cardiac catheterization; Coronary angioplasty with stent; Colonoscopy (10/09/2013); Colonoscopy (10/09/2013); Cardiac catheterization (Left, 
Nutrition Education    Pt has had diet education before. Gave handout reviewing hearth healthy/carb control diet. Wife had questions about lowering sodium intake. Discussed foods high in salt and strategies to lower intakes.     Educated on Heart healthy diet.  Learners: Patient, Family, and Significant Other  Readiness: Acceptance  Method: Explanation and Handout  Response: Verbalizes Understanding  Contact name and number provided.    LUDIN ALCANTARA RD  Contact Number: [phone]    
weakness, abnormal movements or seizure like activity.  Skin:   No rashes, no itching.  :   No hematuria, no pyuria, no dysuria, no flank pain.  Extremities:  No swelling or joint pains.  ROS was otherwise negative except as mentioned in the [place].     Input/Output:       No intake/output data recorded.    Vital Signs:   Temperature:  Temp: 97.9 °F (36.6 °C)  TMax:   Temp (24hrs), Av.7 °F (36.5 °C), Min:97.6 °F (36.4 °C), Max:97.9 °F (36.6 °C)    Respirations:  Respirations: 18  Pulse:   Pulse: (!) 48  BP:    BP: (!) 163/55  BP Range: Systolic (24hrs), Av , Min:121 , Max:167       Diastolic (24hrs), Av, Min:41, Max:57      Physical Examination:     General:  AAO x 3, speaking in full sentences, no accessory muscle use.  HEENT: Atraumatic, normocephalic, no throat congestion, moist mucosa.  Eyes:   Pupils equal, round and reactive to light, EOMI.  Neck:   Supple  Chest:   Bilateral vesicular breath sounds, no rales or wheezes.  Cardiac:  S1 S2 bradycardic, no murmurs, gallops or rubs.  Abdomen: Soft, distended  :   No suprapubic or flank tenderness.  Neuro:  AAO x 3, No FND.  SKIN:  No rashes, good skin turgor.  Extremities:  Trace BLE edema, compression stockings in place.    Labs:       Recent Labs     25   WBC 6.1   RBC 3.72*   HGB 12.0*   HCT 36.7*   MCV 98.7   MCH 32.3   MCHC 32.7   RDW 13.8      MPV 10.2      BMP:   Recent Labs     25  0252    138   K 5.5* 5.0    100   CO2 28 27   BUN 44* 43*   CREATININE 1.8* 1.7*   GLUCOSE 229* 211*   CALCIUM 9.1 9.0        Magnesium:    Recent Labs     25  0252   MG 2.5*       ALEXANDRA:      Lab Results   Component Value Date/Time    ALEXANDRA NEGATIVE 2020 12:18 PM       Hep C AB:          Lab Results   Component Value Date/Time    HEPCAB NONREACTIVE 2020 12:18 PM       Urinalysis/Chemistries:      Lab Results   Component Value Date/Time    NITRU NEGATIVE 2022 11:10 AM    COLORU Yellow

## 2025-04-06 NOTE — RT PROTOCOL NOTE
Frequency and one order with Frequency of every 4 hours PRN wheezing or increased work of breathing using Per Protocol order mode.       11-13 - enter or revise RT Bronchodilator order(s) to one equivalent RT bronchodilator order with QID Frequency and an Albuterol order with Frequency of every 4 hours PRN wheezing or increased work of breathing using Per Protocol order mode.      Greater than 13 - enter or revise RT Bronchodilator order(s) to one equivalent RT bronchodilator order with every 4 hours Frequency and an Albuterol order with Frequency of every 2 hours PRN wheezing or increased work of breathing using Per Protocol order mode.     RT to enter RT Home Evaluation for COPD & MDI Assessment order using Per Protocol order mode.    Electronically signed by Alvaro Briggs RCP on 4/5/2025 at 8:43 PM

## 2025-04-07 VITALS
HEIGHT: 70 IN | BODY MASS INDEX: 33.77 KG/M2 | DIASTOLIC BLOOD PRESSURE: 63 MMHG | TEMPERATURE: 98.5 F | RESPIRATION RATE: 16 BRPM | WEIGHT: 235.89 LBS | SYSTOLIC BLOOD PRESSURE: 166 MMHG | OXYGEN SATURATION: 96 % | HEART RATE: 77 BPM

## 2025-04-07 LAB
ALBUMIN PERCENT: 57 % (ref 56–66)
ALBUMIN SERPL-MCNC: 3.7 G/DL (ref 3.2–5.2)
ALPHA 2 PERCENT: 12 % (ref 7–12)
ALPHA1 GLOB SERPL ELPH-MCNC: 0.3 G/DL (ref 0.1–0.4)
ALPHA1 GLOB SERPL ELPH-MCNC: 4 % (ref 3–5)
ALPHA2 GLOB SERPL ELPH-MCNC: 0.8 G/DL (ref 0.5–0.9)
ANION GAP SERPL CALCULATED.3IONS-SCNC: 11 MMOL/L (ref 9–16)
B-GLOBULIN SERPL ELPH-MCNC: 0.8 G/DL (ref 0.7–1.4)
B-GLOBULIN SERPL ELPH-MCNC: 13 % (ref 8–13)
BASOPHILS # BLD: 0.03 K/UL (ref 0–0.2)
BASOPHILS NFR BLD: 0 % (ref 0–2)
BUN SERPL-MCNC: 33 MG/DL (ref 8–23)
CALCIUM SERPL-MCNC: 8.8 MG/DL (ref 8.6–10.4)
CHLORIDE SERPL-SCNC: 104 MMOL/L (ref 98–107)
CO2 SERPL-SCNC: 22 MMOL/L (ref 20–31)
CREAT SERPL-MCNC: 1.2 MG/DL (ref 0.7–1.2)
EKG ATRIAL RATE: 74 BPM
EKG ATRIAL RATE: 75 BPM
EKG P AXIS: 50 DEGREES
EKG P AXIS: 52 DEGREES
EKG P-R INTERVAL: 188 MS
EKG P-R INTERVAL: 192 MS
EKG Q-T INTERVAL: 452 MS
EKG Q-T INTERVAL: 456 MS
EKG QRS DURATION: 164 MS
EKG QRS DURATION: 166 MS
EKG QTC CALCULATION (BAZETT): 504 MS
EKG QTC CALCULATION (BAZETT): 506 MS
EKG R AXIS: 32 DEGREES
EKG R AXIS: 47 DEGREES
EKG T AXIS: -156 DEGREES
EKG T AXIS: -158 DEGREES
EKG VENTRICULAR RATE: 74 BPM
EKG VENTRICULAR RATE: 75 BPM
EOSINOPHIL # BLD: 0.26 K/UL (ref 0–0.44)
EOSINOPHILS RELATIVE PERCENT: 4 % (ref 1–4)
ERYTHROCYTE [DISTWIDTH] IN BLOOD BY AUTOMATED COUNT: 14.1 % (ref 11.8–14.4)
GAMMA GLOB SERPL ELPH-MCNC: 0.9 G/DL (ref 0.5–1.5)
GAMMA GLOBULIN %: 14 % (ref 11–19)
GFR, ESTIMATED: 64 ML/MIN/1.73M2
GLUCOSE BLD-MCNC: 142 MG/DL (ref 75–110)
GLUCOSE BLD-MCNC: 170 MG/DL (ref 75–110)
GLUCOSE SERPL-MCNC: 160 MG/DL (ref 74–99)
HCT VFR BLD AUTO: 35.3 % (ref 40.7–50.3)
HGB BLD-MCNC: 11.1 G/DL (ref 13–17)
IMM GRANULOCYTES # BLD AUTO: 0.03 K/UL (ref 0–0.3)
IMM GRANULOCYTES NFR BLD: 0 %
ITYP INTERPRETATION: NORMAL
LYMPHOCYTES NFR BLD: 1.47 K/UL (ref 1.1–3.7)
LYMPHOCYTES RELATIVE PERCENT: 21 % (ref 24–43)
MAGNESIUM SERPL-MCNC: 2.3 MG/DL (ref 1.6–2.4)
MCH RBC QN AUTO: 31.6 PG (ref 25.2–33.5)
MCHC RBC AUTO-ENTMCNC: 31.4 G/DL (ref 28.4–34.8)
MCV RBC AUTO: 100.6 FL (ref 82.6–102.9)
MONOCYTES NFR BLD: 0.77 K/UL (ref 0.1–1.2)
MONOCYTES NFR BLD: 11 % (ref 3–12)
NEUTROPHILS NFR BLD: 64 % (ref 36–65)
NEUTS SEG NFR BLD: 4.34 K/UL (ref 1.5–8.1)
NRBC BLD-RTO: 0 PER 100 WBC
PATH REV: NORMAL
PATHOLOGIST: ABNORMAL
PLATELET # BLD AUTO: 242 K/UL (ref 138–453)
PMV BLD AUTO: 9.8 FL (ref 8.1–13.5)
POTASSIUM SERPL-SCNC: 4 MMOL/L (ref 3.7–5.3)
PROT PATTERN SERPL ELPH-IMP: ABNORMAL
PROT SERPL-MCNC: 6.5 G/DL (ref 6.6–8.7)
RBC # BLD AUTO: 3.51 M/UL (ref 4.21–5.77)
SODIUM SERPL-SCNC: 137 MMOL/L (ref 136–145)
TOTAL PROT. SUM,%: 100 % (ref 98–102)
TOTAL PROT. SUM: 6.5 G/DL (ref 6.3–8.2)
WBC OTHER # BLD: 6.9 K/UL (ref 3.5–11.3)

## 2025-04-07 PROCEDURE — 80048 BASIC METABOLIC PNL TOTAL CA: CPT

## 2025-04-07 PROCEDURE — 99239 HOSP IP/OBS DSCHRG MGMT >30: CPT | Performed by: STUDENT IN AN ORGANIZED HEALTH CARE EDUCATION/TRAINING PROGRAM

## 2025-04-07 PROCEDURE — 93010 ELECTROCARDIOGRAM REPORT: CPT | Performed by: INTERNAL MEDICINE

## 2025-04-07 PROCEDURE — 82947 ASSAY GLUCOSE BLOOD QUANT: CPT

## 2025-04-07 PROCEDURE — 93005 ELECTROCARDIOGRAM TRACING: CPT | Performed by: STUDENT IN AN ORGANIZED HEALTH CARE EDUCATION/TRAINING PROGRAM

## 2025-04-07 PROCEDURE — 83735 ASSAY OF MAGNESIUM: CPT

## 2025-04-07 PROCEDURE — 6370000000 HC RX 637 (ALT 250 FOR IP): Performed by: SURGERY

## 2025-04-07 PROCEDURE — 99233 SBSQ HOSP IP/OBS HIGH 50: CPT | Performed by: NURSE PRACTITIONER

## 2025-04-07 PROCEDURE — 2500000003 HC RX 250 WO HCPCS: Performed by: NURSE PRACTITIONER

## 2025-04-07 PROCEDURE — 85025 COMPLETE CBC W/AUTO DIFF WBC: CPT

## 2025-04-07 PROCEDURE — 6370000000 HC RX 637 (ALT 250 FOR IP): Performed by: NURSE PRACTITIONER

## 2025-04-07 PROCEDURE — 6370000000 HC RX 637 (ALT 250 FOR IP): Performed by: STUDENT IN AN ORGANIZED HEALTH CARE EDUCATION/TRAINING PROGRAM

## 2025-04-07 PROCEDURE — 99232 SBSQ HOSP IP/OBS MODERATE 35: CPT | Performed by: INTERNAL MEDICINE

## 2025-04-07 PROCEDURE — 36415 COLL VENOUS BLD VENIPUNCTURE: CPT

## 2025-04-07 RX ORDER — DOXAZOSIN 4 MG/1
4 TABLET ORAL 2 TIMES DAILY
Qty: 60 TABLET | Refills: 0 | Status: SHIPPED | OUTPATIENT
Start: 2025-04-07 | End: 2025-05-07

## 2025-04-07 RX ORDER — AMIODARONE HYDROCHLORIDE 200 MG/1
200 TABLET ORAL DAILY
Status: DISCONTINUED | OUTPATIENT
Start: 2025-04-07 | End: 2025-04-07

## 2025-04-07 RX ORDER — HYDRALAZINE HYDROCHLORIDE 25 MG/1
25 TABLET, FILM COATED ORAL 3 TIMES DAILY PRN
Qty: 90 TABLET | Refills: 3 | Status: SHIPPED | OUTPATIENT
Start: 2025-04-07

## 2025-04-07 RX ORDER — FUROSEMIDE 40 MG/1
40 TABLET ORAL 2 TIMES DAILY
Qty: 60 TABLET | Refills: 0 | Status: CANCELLED | OUTPATIENT
Start: 2025-04-07 | End: 2025-05-07

## 2025-04-07 RX ORDER — PANTOPRAZOLE SODIUM 40 MG/1
40 TABLET, DELAYED RELEASE ORAL
Qty: 30 TABLET | Refills: 3 | Status: CANCELLED | OUTPATIENT
Start: 2025-04-08

## 2025-04-07 RX ADMIN — HYDRALAZINE HYDROCHLORIDE 50 MG: 50 TABLET ORAL at 06:22

## 2025-04-07 RX ADMIN — DOXAZOSIN 4 MG: 2 TABLET ORAL at 08:13

## 2025-04-07 RX ADMIN — FUROSEMIDE 20 MG: 20 TABLET ORAL at 08:12

## 2025-04-07 RX ADMIN — INSULIN GLARGINE 60 UNITS: 100 INJECTION, SOLUTION SUBCUTANEOUS at 08:12

## 2025-04-07 RX ADMIN — AMIODARONE HYDROCHLORIDE 200 MG: 200 TABLET ORAL at 10:12

## 2025-04-07 RX ADMIN — HYDRALAZINE HYDROCHLORIDE 50 MG: 50 TABLET ORAL at 14:46

## 2025-04-07 RX ADMIN — PANTOPRAZOLE SODIUM 40 MG: 40 TABLET, DELAYED RELEASE ORAL at 06:22

## 2025-04-07 RX ADMIN — ALLOPURINOL 200 MG: 100 TABLET ORAL at 08:11

## 2025-04-07 RX ADMIN — ATORVASTATIN CALCIUM 40 MG: 40 TABLET, FILM COATED ORAL at 08:12

## 2025-04-07 RX ADMIN — SODIUM CHLORIDE, PRESERVATIVE FREE 10 ML: 5 INJECTION INTRAVENOUS at 08:12

## 2025-04-07 NOTE — PROGRESS NOTES
Pharmacy Note     Enoxaparin Dose Adjustment    Sid Blakely is a 74 y.o. male. Pharmacist assessment of enoxaparin dose for VTE prophylaxis.    Recent Labs     04/03/25 2015   BUN 44*       Recent Labs     04/03/25 2015   CREATININE 1.8*       Estimated Creatinine Clearance: 47 mL/min (A) (based on SCr of 1.8 mg/dL (H)).  Estimated CrCl using Ideal Body Weight: 38 mL/min (based on IBW 73 kg)    Height:   Ht Readings from Last 1 Encounters:   04/03/25 1.778 m (5' 10\")     Weight:  Wt Readings from Last 1 Encounters:   04/03/25 115.7 kg (255 lb)       The following enoxaparin dose has been adjusted based upon renal function and/or patient weight per P&T Guidelines:             Enoxaparin 40 mg subcutaneously once daily changed to Enoxaparin 30 mg subcutaneously twice daily.      
      Physical Therapy Cancel Note      DATE: 2025    NAME: Sid Blakely  MRN: [id number]   : 1950      Patient not seen this date for Physical Therapy due to:    Patient Declined: pt is waiting to find out if he's going to get a pacemaker today (per pt and wife, it looks like he isn't going to have the procedure).  Pt states he just wants to eat and go home.  Wife present and encouraging pt to participate with PT/go for a walk, pt continued to decline.  Pt and wife stated they feel that he will be able to get around at his baseline at DC.  Will defer PT eval per pt wishes.        Electronically signed by Jose Raul Paredes PT on 2025 at 11:02 AM      
  [facility]  Progress Note                   Date:   4/5/2025  Patient name: Sid Blakely  Date of admission:  4/4/2025  1:34 AM  MRN:   [id number]  YOB: 1950  PCP: Michelle Fletcher DO    Reason for Admission: CHF exacerbation (HCC) [I50.9]  Congestive heart failure, unspecified HF chronicity, unspecified heart failure type (HCC) [I50.9]  Acute exacerbation of chronic heart failure (HCC) [I50.9]    Subjective:       Clinical Changes /Abnormalities:Patient seen and examined.  Tele/vitals/labs reviewed .  No significant bradycardia on telemetry or pauses, remains in sinus rhythm    Review of Systems    Medications:   Scheduled Meds:   hydrALAZINE  25 mg Oral 3 times per day    [START ON 4/6/2025] pantoprazole  40 mg Oral QAM AC    ipratropium 0.5 mg-albuterol 2.5 mg  1 Dose Inhalation Q4H WA RT    doxazosin  4 mg Oral BID    sodium chloride flush  5-40 mL IntraVENous 2 times per day    [Held by provider] carvedilol  3.125 mg Oral BID WC    furosemide  40 mg IntraVENous BID    [Held by provider] sacubitril-valsartan  1 tablet Oral BID    allopurinol  200 mg Oral Daily    atorvastatin  40 mg Oral Daily    insulin glargine  60 Units SubCUTAneous Daily    insulin lispro  0-16 Units SubCUTAneous 4x Daily AC & HS    apixaban  5 mg Oral BID     Continuous Infusions:   sodium chloride      dextrose       CBC:   Recent Labs     04/03/25 2015   WBC 6.1   HGB 12.0*        BMP:    Recent Labs     04/03/25 2015 04/04/25  0252 04/05/25  0525    138 141   K 5.5* 5.0 4.6    100 107   CO2 28 27 22   BUN 44* 43* 30*   CREATININE 1.8* 1.7* 1.2   GLUCOSE 229* 211* 112*     Hepatic:  Recent Labs     04/03/25 2015   AST 30   ALT 30   BILITOT 0.6   ALKPHOS 75     Troponin:   Recent Labs     04/03/25 2015 04/03/25  2143 04/03/25  2315   TROPHS 77* 80* 81*     BNP: No results for input(s): \"BNP\" in the last 72 hours.  Lipids:   Recent Labs     04/05/25  0525   CHOL 131   HDL 27* 
[facility]  Office: [phone]  Jeferson Lyle, DO, Rodrigo Jane, DO, Ventura Palma DO, Paulo Romano, DO, Michelle Thomson MD, Anabelle Rodriguez MD, Jeimy Hughes MD, Amaya Travis MD,  Mike Brantley MD, Gilles Kelly MD, Adelia Reese MD,  Delmy Huffman DO, Asha Watt MD, Hector Sheth MD, Jere Lyle DO, Nelia White MD,  José Luis Myers DO, Marsha Garza MD, Luciana Barbour MD, Juan Alvarado MD,  Erik Keith MD, Brii Sawyer MD, oRsalia Butler MD, Jonnathan Beauchamp MD, Jorge A Moeller MD, Orville Taylor MD, Mathew Burgos DO, Ankush Mckeon MD, Delmy Licona MD, Mohsin Reza, MD, Angela Sanchez MD, Shirley Waterhouse, CNP,  Maribel Coffman, CNP, Mathew Encarnacion, CNP,  Martine Torres, DNP, Mindi Fox, CNP, Lucrecia Lopez, CNP, Fina Spann, CNP, Gissel Laws, CNP, Evelyn Davidson, PA-C, Destiny Ge, CNP, Robin Hagen, CNP,  Shelbie Lopez, CNP, Denice Angela, CNP, Darren Mixon, PA-C, Aileen Pacheco, CNP,  Lynn Freeman, CNS, Emilia Brian, CNP, Flavia Cutler, CNP,   Zehra Cotton, CNP         [facility]   IN-PATIENT SERVICE   [facility]    Progress Note    4/5/2025    6:27 PM    Name:   Sid Blakely  MRN:     [id number]     Acct:      [id number]   Room:   2023/2023-01  IP Day:  1  Admit Date:  4/4/2025  1:34 AM    PCP:   Michelle Fletcher DO  Code Status:  Full Code    Subjective:     C/C: No chief complaint on file.  Shortness of breath/abdominal distention/leg swelling  Interval History Status: improved.     Seen at bedside, hemodynamically stable blood pressure on the high side, hydralazine has been added, heart rate seems to have improved  Denies any new complaints states that breathing is much better although still has abdominal distention, is having bowel movements does have history of hernia  Labs reviewed kidney function improving    Brief History:     Per my colleague     'This is a 74-year-old male with a significant 
CLINICAL PHARMACY NOTE: MEDS TO BEDS    Total # of Prescriptions Filled: 1   The following medications were delivered to the patient:  Hydralazine 25mg tabs    Additional Documentation:  Delivered to pt + 1 in room   2023  on 4/7/2025 at 2:47 PM. Copay was $3.24 paid with cash. Pt did not want doxazosin 4mg tabs    
Congestive Heart Failure Education completed and charted. CHF booklet given. Patient was receptive to education.    Discussed the  importance of medication compliance.    Discussed the importance of a heart healthy diet. Discussed 2000 mg sodium-restricted daily diet.  Patient instructed to limit fluid intake to  1.5 to 2 liters per day.    Patient instructed to weigh self at the same time of each day each morning, reinforced teaching to monitor for 3-5 lb weight increase over 1-2 days notify physician if change noted.      Signs and symptoms of CHF discussed with patient, such as feeling more tired than normal, feeling short of breath, coughing that increases when lying down, sudden weight gain, swelling of the feet, legs or belly.  Patient verbalized understanding to notify physician office if these symptoms occur.  EF 45%   Pt resides in  [facility]  Recommend CHF support in [place], [place] if pt is willing to go.   
[facility]  Office: [phone]  Jeferson Lyle, DO, Rodrigo Jane, DO, Ventura Palma DO, Paulo Romano, DO, Michelle Thomson MD, Anabelle Rodriguez MD, Jeimy Hughes MD, Amaya Travis MD,  Mike Brantley MD, Gilles Kelly MD, Adelia Reese MD,  Delmy Huffman DO, Asha Watt MD, Hector Sheth MD, Jere Lyle DO, Nelia White MD,  José Luis Myers DO, Marsha Garza MD, Luciana Barbour MD, Juan Alvarado MD,  Erik Keith MD, Brii Sawyer MD, Rosalia Butler MD, Jonnathan Beauchamp MD, Jorge A Moeller MD, Orville Taylor MD, Mathew Burgos DO, Ankush Mckeon MD, Delmy Licona MD, Mohsin Reza, MD, Angela Sanchez MD, Shirley Waterhouse, CNP,  Maribel Coffman, CNP, Mathew Encarnacion, CNP,  Martine Torres, DNP, Mindi Fox, CNP, Lucrecia Lopez, CNP, Fina Spann, CNP, Gissel Laws, CNP, Evelyn Davidson, PA-C, Destiny Ge, CNP, Robin Hagen, CNP,  Shelbie Lopez, CNP, Denice Angela, CNP, Darren Mixon, PA-C, Aileen Pacheco, CNP,  Lynn Freeman, CNS, Emilia Brian, CNP, Flavia Cutler, CNP,   Zehra Cotton, CNP         [facility]   IN-PATIENT SERVICE   [facility]    Progress Note    4/6/2025    4:07 PM    Name:   Sid Blakely  MRN:     [id number]     Acct:      [id number]   Room:   2023/2023-01  IP Day:  2  Admit Date:  4/4/2025  1:34 AM    PCP:   Michelle Fletcher DO  Code Status:  Full Code    Subjective:     C/C: No chief complaint on file.  Shortness of breath/abdominal distention/leg swelling  Interval History Status: improved.     Seen at bedside, hemodynamically stable blood pressure on the high side, hydralazine dose increased, normal bradycardia   States that breathing is much better, lower extremity edema/abdominal distention improved, ultrasound abdomen reviewed  Labs reviewed     Brief History:     Per my colleague     'This is a 74-year-old male with a significant past medical history of hypertension, hyperlipidemia, type 2 diabetes mellitus, 
Patient heart rate recovered and in the 50s to 70s at rest.   Hold amio and coreg. Cont to monitor tel until Monday. Can implant on Monday if needed. Not in any immediate danger.  Discussed with family and patient and they are agreeable.  He was just started on amio 3 weeks ago at 200 BID and coreg was increased to 25.   Both on hold.    Thank you for allowing me to participate in the care of this patient, please do not hesitate to call if you have any questions.    Lesly Da Silva DO, [facility]  Board Certified Cardiologist  Fellow of the American College of Cardiology    Obesity & Weight Loss Medicine   of Medicine Levine, Susan. \Hospital Has a New Name and Outlook.\""   of Medicine [facility]  [facility]  [phone]           
Spiritual Health History and Assessment/Progress Note  [facility]    (P) Initial Encounter,  ,  ,      Name: Sid Blakely MRN: [id number]    Age: 74 y.o.     Sex: male   Language: English   Cheondoism: Mormon   Acute on chronic HFrEF (heart failure with reduced ejection fraction) (formerly [facility])     Date: 4/6/2025            Total Time Calculated: (P) 20 min              Spiritual Assessment began in [facility] CAR 2- STEPDOWN        Referral/Consult From: (P) Rounding   Encounter Overview/Reason: (P) Initial Encounter  Service Provided For: (P) Patient    Ramona, Belief, Meaning:   Patient is connected with a ramona tradition or spiritual practice  Family/Friends are connected with a ramona tradition or spiritual practice      Importance and Influence:  Patient has no beliefs influential to healthcare decision-making identified during this visit  Family/Friends have no beliefs influential to healthcare decision-making identified during this visit    Community:  Patient is connected with a spiritual community and feels well-supported. Support system includes: Spouse/Partner  Family/Friends are connected with a spiritual community: and feel well-supported. Support system includes: Spouse/Partner    Assessment and Plan of Care:     Patient Interventions include: Other:  provided a supportive presence through active listening and words of affirmation.  Family/Friends Interventions include: Other: Engaged in conversation about health and ramona.    Patient Plan of Care: Spiritual Care available upon further referral  Family/Friends Plan of Care: Spiritual Care available upon further referral    Electronically signed by Chaplain Aquiles on 4/6/2025 at 8:12 PM   
Household distances with cane, does become SOB, requires verbal cues to recognize symptoms and take a break       Patient Education  Patient Education  Education Given To: Patient  Education Provided: Role of Therapy  Education Method: Verbal  Barriers to Learning: None  Education Outcome: Verbalized understanding    Goals  Short Term Goals  Time Frame for Short Term Goals: Prior to discharge  Short Term Goal 1: Patient to demonstarte unsupported sitting/standing Grooming/UB ADLs/toileting/LB ADLs with modified indep including pt performing set up  Short Term Goal 2: Patient to demonstrate static/dyanmic standing balance 0-1 hand support modified indep  Short Term Goal 3: Patient to perform functional mobility household distances with LRAD with modified indep  Short Term Goal 4: Patient to identify and apply 2-3 energy conservation during ADLs/functional mobility    Plan  Occupational Therapy Plan  Times Per Week: 1-2x/wk  Current Treatment Recommendations: Balance training, Functional mobility training, Endurance training, Equipment evaluation, education, & procurement, Self-Care / ADL  Additional Comments: Energy conservation    Minutes  OT Individual Minutes  Time In: 1637  Time Out: 1711  Minutes: 34  Time Code Minutes   Timed Code Treatment Minutes: 24 Minutes    Electronically signed by Sonia Larsen OT on 4/4/25 at 5:50 PM EDT    
Lumbosacral spondylosis without myelopathy     Pars defect with spondylolisthesis     Spinal stenosis of lumbar region without neurogenic claudication     Acute on chronic HFrEF (heart failure with reduced ejection fraction) (HCC)     Junctional rhythm     LBBB (left bundle branch block)     Bradycardia     ANTONY (acute kidney injury)      Plan of Treatment:   No significant bradycardia on telemetry or pauses, remains in sinus rhythm -continue to avoid AV beltran blocking agents-PPM  was recommended by Dr. Anderson however on hold for now and per Dr. SHELLY Da Silva cont to monitor tel until Monday re-eval  for  need for PPM  , hold eliquis and keep NPO after midnight just in case   Increase  hydralazine as blood pressure improved but still elevated today  Echo improved with recovered ejection fraction to 52%   Continue  statin  Volume management diuresis per nephrology    Electronically signed by YENNY Cheatham NP on 4/6/2025 at 10:29 AM  [facility] [employer]  [phone]         
neuropathy ([facility])     GHAZALA (obstructive sleep apnea)     Mixed restrictive and obstructive lung disease     Pulmonary HTN (HCC)     PAF (paroxysmal atrial fibrillation) ([facility])     Lumbosacral spondylosis without myelopathy     Pars defect with spondylolisthesis     Spinal stenosis of lumbar region without neurogenic claudication     Acute on chronic HFrEF (heart failure with reduced ejection fraction) ([facility])     Junctional rhythm     LBBB (left bundle branch block)     Bradycardia     ANTONY (acute kidney injury)      Plan of Treatment:   No significant bradycardia on telemetry or pauses, remains in sinus rhythm, but did have increased PVCs overnight.  Labs reviewed.  Electrolytes WNL.  Discussed with Dr. Da Silva.  At this time, will restart Amio 200mg daily per Dr. Da Silva recs.  Hold BB and nifidepine on discharge.    HTN.  Will restart Entresto.  On hydralazine.    Echo improved with recovered ejection fraction to 52%   Continue  statin  Volume management diuresis per nephrology  Restart Eliquis BID   No objection to discharge.  Recommend holter monitor when he follows up with primary Cardiology, Dr. Marcial.      Electronically signed by YENNY SIU CNP on 4/7/2025 at 9:38 AM  [facility]  [phone]         
echogenicity.  No evidence of  hydronephrosis or intrarenal stones.     Hepatic steatosis.        Bladder:     Unremarkable appearance of the bladder.  Prevoid volume 476 cc.     IMPRESSION:  Unremarkable ultrasound of the kidneys and urinary bladder.  Assessment:     Acute Kidney Injury likely secondary to ischemic ATN in the setting of reduced perfusion from heart failure exacerbation and complete heart block, complicated by concomitant use of Entresto, Celebrex, and diuretics.  Acute on chronic HF likely secondary to bradycardia.  EF 52%  3.   Hx of CAD s/p CABG x 2, LIMA-LAD, SVG-diagonal 2014 and stents to RCA, LAD and ramus s/p cardiac cath 11/2024 with patent LIMA-LAD, patent SVG-diagonal and patent RCA stents.   Hypertension: Not controlled as all meds on hold  Type 2 diabetes.  Hyperlipidemia.  GHAZALA.  PAF.    Plan:   1. Start Hydralazine for Blood pressure  2. Ok with RAASi resuming will defer to cardiology  3.  Continue Lasix 40mg IV BID  4. Will sign off     Nutrition   Please ensure that patient is on a renal diet/TF. Avoid nephrotoxic drugs/contrast exposure.    We will continue to follow along with you.     Piper Torres CNP    Attending Physician Statement  I have discussed the care of Sid Blakely, including pertinent history and exam findings with the NP. I have reviewed the key elements of all parts of the encounter with the resident/fellow. I have seen and examined the patient with the NP.  I agree with the assessment, plan and orders as documented by the NP.    My medical decision making is as follows:  Seen and examined at bedside, patient's dyspnea significantly improved, continue IV Lasix today, defer rest of the management per cardiology, creatinine has returned to baseline, no need for outpatient nephrology follow-up at this time.    Alexandrea Esquivel MD     
Value Ref Range    Ferritin 186 ng/mL   Basic Metabolic Panel   Result Value Ref Range    Sodium 138 136 - 145 mmol/L    Potassium 5.0 3.7 - 5.3 mmol/L    Chloride 100 98 - 107 mmol/L    CO2 27 20 - 31 mmol/L    Anion Gap 11 9 - 16 mmol/L    Glucose 211 (H) 74 - 99 mg/dL    BUN 43 (H) 8 - 23 mg/dL    Creatinine 1.7 (H) 0.7 - 1.2 mg/dL    Est, Glom Filt Rate 42 (L) >60 mL/min/1.73m2    Calcium 9.0 8.6 - 10.4 mg/dL   Magnesium   Result Value Ref Range    Magnesium 2.5 (H) 1.6 - 2.4 mg/dL   T4, Free   Result Value Ref Range    T4 Free 1.0 0.9 - 1.7 ng/dL   Brain Natriuretic Peptide   Result Value Ref Range    NT Pro- (H) 0 - 125 pg/mL   Protein / creatinine ratio, urine   Result Value Ref Range    Total Protein, Urine <4 mg/dL    Creatinine, Ur 42.6 39.0 - 259.0 mg/dL    Urine Total Protein Creatinine Ratio Can not be calculated 0.00 - 0.20   Electrophoresis Protein, Serum   Result Value Ref Range    Total Protein 6.5 (L) 6.6 - 8.7 g/dL    Albumin (calculated) PENDING g/dL    Albumin % PENDING %    Alpha-1-Globulin PENDING g/dL    Alpha 1 % PENDING %    Alpha-2-Globulin PENDING g/dL    Alpha 2 % PENDING %    Beta Globulin PENDING g/dL    Beta Percent PENDING %    Gamma Globulin PENDING g/dL    Gamma Globulin % PENDING %    M Brad 1, Electrophoresis Protein Serum PENDING g/dL    M Brad 2, Immunofixation Serum PENDING g/dL    Total Prot. Sum PENDING g/dL    Total Prot. Sum,% PENDING %    Protein Electrophoresis, Serum PENDING     Pathologist PENDING    Urinalysis with Microscopic   Result Value Ref Range    Color, UA Yellow Yellow    Turbidity UA Clear Clear    Glucose, Ur NEGATIVE NEGATIVE mg/dL    Bilirubin, Urine NEGATIVE NEGATIVE    Ketones, Urine NEGATIVE NEGATIVE mg/dL    Specific Gravity, UA 1.009 1.005 - 1.030    Urine Hgb NEGATIVE NEGATIVE    pH, Urine 6.5 5.0 - 8.0    Protein, UA NEGATIVE NEGATIVE mg/dL    Urobilinogen, Urine Normal 0.0 - 1.0 EU/dL    Nitrite, Urine NEGATIVE NEGATIVE    Leukocyte

## 2025-04-07 NOTE — CARE COORDINATION
Case Management   Daily Progress Note       Patient Name: Sid Blakely                   YOB: 1950  Diagnosis: CHF exacerbation (HCC) [I50.9]  Congestive heart failure, unspecified HF chronicity, unspecified heart failure type (HCC) [I50.9]  Acute exacerbation of chronic heart failure (HCC) [I50.9]                       GMLOS: 4.1 days  Length of Stay: 2  days    Anticipated Discharge Date: One day until discharge    Readmission Risk (Low < 19, Mod (19-27), High > 27): Readmission Risk Score: 14        Current Transitional Plan    [x] Home Independently    [] Home with HC    [] Skilled Nursing Facility    [] Acute Rehabilitation    [] Long Term Acute Care (LTAC)    [] Other:     Plan for the Stay (Medical Management) : med changes,           Workflow Continuation (Additional Notes) : Plan is to return home independent with wife        Joanna Dubose RN  April 6, 2025        
Case Management   Daily Progress Note       Patient Name: Sid Blakely                   YOB: 1950  Diagnosis: CHF exacerbation (HCC) [I50.9]  Congestive heart failure, unspecified HF chronicity, unspecified heart failure type (HCC) [I50.9]  Acute exacerbation of chronic heart failure (HCC) [I50.9]                       GMLOS: 4.1 days  Length of Stay: 3  days    Anticipated Discharge Date: Ready for discharge    Readmission Risk (Low < 19, Mod (19-27), High > 27): Readmission Risk Score: 14.6        Current Transitional Plan    [x] Home Independently    [] Home with HC    [] Skilled Nursing Facility    [] Acute Rehabilitation    [] Long Term Acute Care (LTAC)    [] Other:     Plan for the Stay (Medical Management) : n/a      Workflow Continuation (Additional Notes) : patient returning home with his wife. They deny needs        Leah Caceres RN  April 7, 2025          
provide assistance at DC: (P) Yes  Would you like Case Management to discuss the discharge plan with any other family members/significant others, and if so, who? (P) Yes (wife)  Plans to Return to Present Housing: (P) Yes  Other Identified Issues/Barriers to RETURNING to current housing: none  Potential Assistance needed at discharge: (P) N/A            Potential DME:    Patient expects to discharge to: (P) House  Plan for transportation at discharge: (P) Family    Financial    Payor: MEDICARE / Plan: [employer] MEDICARE / Product Type: *No Product type* /     Does insurance require precert for SNF: No    Potential assistance Purchasing Medications: No  Meds-to-Beds request: Yes      [facility] [address] [place] [address] - [address] [facility] [phone] - F [phone]  [address] [facility] [address]  Phone: [phone] Fax: [phone]    [facility] [place] - [facility] -  [phone] - F [phone]  [facility]  [place] [address]  Phone: [phone] Fax: [phone]    [facility]/pharmacy #[id number] [place], [place] - [address] [facility] -  [phone] - F [phone]  [address] [facility] [address]  Phone: [phone] Fax: [phone]    [facility]/pharmacy #[id number] [place], [place] - [address] [facility] - P [phone] - F [phone]  [address] [facility] [address]  Phone: [phone] Fax: [phone]      Notes:    Factors facilitating achievement of predicted outcomes: Family support, Cooperative, Pleasant, and Has needed Durable Medical Equipment at home    Barriers to discharge: Medical complications    Additional Case Management Notes: patient returning home with his wife. He denies needs at this time    The Plan for Transition of Care is related to the following treatment goals of CHF exacerbation (HCC) [I50.9]  Congestive heart failure, unspecified HF chronicity, unspecified heart failure type (HCC) [I50.9]  Acute exacerbation of chronic

## 2025-04-07 NOTE — FLOWSHEET NOTE
Patient had a 4 second / 15 beat run vtach when he was trying to sit up. EKG strip attached to chart. patient asymptomatic with blood pressure of 148/59. Informed primary team NP.    Currently in normal sinus rhythm with PVC's in quadrigeminy. Will continue with care.

## 2025-04-07 NOTE — DISCHARGE SUMMARY
AMARYL     HM SUPER VITAMIN B12 PO     Insulin Syringes (Disposable) U-100 0.3 ML Misc  Inject  into the skin. Use prn     magnesium Oxide 500 MG Tabs     metFORMIN 1000 MG tablet  Commonly known as: GLUCOPHAGE     ONE TOUCH ULTRA TEST strip  Generic drug: blood glucose test strips     ONE-A-DAY MENS 50+ ADVANTAGE PO     Ozempic (1 MG/DOSE) 4 MG/3ML Sopn sc injection  Generic drug: Semaglutide (1 MG/DOSE)     spironolactone 25 MG tablet  Commonly known as: ALDACTONE  Take 1 tablet by mouth 2 times daily     traMADol 50 MG tablet  Commonly known as: ULTRAM     Vitamin D3 250 MCG (45282 UT) Tabs            STOP taking these medications      carvedilol 3.125 MG tablet  Commonly known as: COREG     celecoxib 200 MG capsule  Commonly known as: CELEBREX     Insulin Degludec 200 UNIT/ML Sopn     NIFEdipine 90 MG extended release tablet  Commonly known as: ADALAT CC               Where to Get Your Medications        These medications were sent to [address] -  [phone] - F [phone]  [address] [place] [address]      Phone: [phone]   doxazosin 4 MG tablet  hydrALAZINE 25 MG tablet         No discharge procedures on file.    Time Spent on discharge is  35 mins in patient examination, evaluation, counseling as well as medication reconciliation, prescriptions for required medications, discharge plan and follow up.    Electronically signed by   Rosalia Chan Sra, MD  4/7/2025  3:45 PM      Thank you Michelle Chaudhari DO for the opportunity to be involved in this patient's care.

## 2025-04-07 NOTE — DISCHARGE INSTRUCTIONS
Continue taking medications as prescribed, keep a record of your blood pressure at home, follow-up with PCP and cardiology for further optimization of medications.  Follow-up with cardiology for Holter monitor  Follow-up with heme oncologist for abnormal proteins seen  in your blood for pending results.  Follow-up with PCP for repeat thyroid labs

## 2025-04-07 NOTE — PLAN OF CARE
Problem: Chronic Conditions and Co-morbidities  Goal: Patient's chronic conditions and co-morbidity symptoms are monitored and maintained or improved  Outcome: Progressing     Problem: Discharge Planning  Goal: Discharge to home or other facility with appropriate resources  Outcome: Progressing     Problem: Safety - Adult  Goal: Free from fall injury  Outcome: Progressing     Problem: ABCDS Injury Assessment  Goal: Absence of physical injury  Outcome: Progressing     Problem: Skin/Tissue Integrity  Goal: Skin integrity remains intact  Description: 1.  Monitor for areas of redness and/or skin breakdown  2.  Assess vascular access sites hourly  3.  Every 4-6 hours minimum:  Change oxygen saturation probe site  4.  Every 4-6 hours:  If on nasal continuous positive airway pressure, respiratory therapy assess nares and determine need for appliance change or resting period  Outcome: Progressing     Problem: Respiratory - Adult  Goal: Achieves optimal ventilation and oxygenation  Outcome: Progressing     
  Problem: Chronic Conditions and Co-morbidities  Goal: Patient's chronic conditions and co-morbidity symptoms are monitored and maintained or improved  Outcome: Progressing     Problem: Discharge Planning  Goal: Discharge to home or other facility with appropriate resources  Outcome: Progressing  Flowsheets (Taken 4/4/2025 0220)  Discharge to home or other facility with appropriate resources:   Identify barriers to discharge with patient and caregiver   Identify discharge learning needs (meds, wound care, etc)   Refer to discharge planning if patient needs post-hospital services based on physician order or complex needs related to functional status, cognitive ability or social support system   Arrange for needed discharge resources and transportation as appropriate   Arrange for interpreters to assist at discharge as needed     Problem: Safety - Adult  Goal: Free from fall injury  Outcome: Progressing     Problem: ABCDS Injury Assessment  Goal: Absence of physical injury  Outcome: Progressing     Problem: Skin/Tissue Integrity  Goal: Skin integrity remains intact  Description: 1.  Monitor for areas of redness and/or skin breakdown  2.  Assess vascular access sites hourly  3.  Every 4-6 hours minimum:  Change oxygen saturation probe site  4.  Every 4-6 hours:  If on nasal continuous positive airway pressure, respiratory therapy assess nares and determine need for appliance change or resting period  Outcome: Progressing     
  Problem: Chronic Conditions and Co-morbidities  Goal: Patient's chronic conditions and co-morbidity symptoms are monitored and maintained or improved  Outcome: Progressing  Flowsheets (Taken 4/5/2025 0800 by Shirin Collins, RN)  Care Plan - Patient's Chronic Conditions and Co-Morbidity Symptoms are Monitored and Maintained or Improved:   Monitor and assess patient's chronic conditions and comorbid symptoms for stability, deterioration, or improvement   Collaborate with multidisciplinary team to address chronic and comorbid conditions and prevent exacerbation or deterioration     Problem: Discharge Planning  Goal: Discharge to home or other facility with appropriate resources  Outcome: Progressing  Flowsheets (Taken 4/5/2025 0800 by Shirin Collins, RN)  Discharge to home or other facility with appropriate resources:   Identify barriers to discharge with patient and caregiver   Arrange for needed discharge resources and transportation as appropriate     Problem: Safety - Adult  Goal: Free from fall injury  4/5/2025 2143 by Luiz Ford, RN  Outcome: Progressing     Problem: ABCDS Injury Assessment  Goal: Absence of physical injury  4/5/2025 2143 by Luiz Ford RN  Outcome: Progressing     Problem: Skin/Tissue Integrity  Goal: Skin integrity remains intact  Description: 1.  Monitor for areas of redness and/or skin breakdown  2.  Assess vascular access sites hourly  3.  Every 4-6 hours minimum:  Change oxygen saturation probe site  4.  Every 4-6 hours:  If on nasal continuous positive airway pressure, respiratory therapy assess nares and determine need for appliance change or resting period  Outcome: Progressing  Flowsheets (Taken 4/5/2025 0800 by Shirin Collins, RN)  Skin Integrity Remains Intact: Monitor for areas of redness and/or skin breakdown     Problem: Respiratory - Adult  Goal: Achieves optimal ventilation and oxygenation  4/5/2025 2143 by Luiz Ford, RN  Outcome: 
  Problem: Chronic Conditions and Co-morbidities  Goal: Patient's chronic conditions and co-morbidity symptoms are monitored and maintained or improved  Recent Flowsheet Documentation  Taken 4/5/2025 0800 by Shirin Collins, RN  Care Plan - Patient's Chronic Conditions and Co-Morbidity Symptoms are Monitored and Maintained or Improved:   Monitor and assess patient's chronic conditions and comorbid symptoms for stability, deterioration, or improvement   Collaborate with multidisciplinary team to address chronic and comorbid conditions and prevent exacerbation or deterioration     
  Problem: Respiratory - Adult  Goal: Achieves optimal ventilation and oxygenation  Outcome: Progressing     
  Problem: Skin/Tissue Integrity  Goal: Skin integrity remains intact  Description: 1.  Monitor for areas of redness and/or skin breakdown  2.  Assess vascular access sites hourly  3.  Every 4-6 hours minimum:  Change oxygen saturation probe site  4.  Every 4-6 hours:  If on nasal continuous positive airway pressure, respiratory therapy assess nares and determine need for appliance change or resting period  Outcome: Progressing  Flowsheets (Taken 4/4/2025 1771)  Skin Integrity Remains Intact: Monitor for areas of redness and/or skin breakdown     
to ask for assistance with transferring infant if caregiver noted to have fall risk factors     Problem: ABCDS Injury Assessment  Goal: Absence of physical injury  4/5/2025 0028 by Mary Lott RN  Outcome: Progressing  4/4/2025 1857 by Kenzie Sandoval RN  Outcome: Progressing  Flowsheets (Taken 4/4/2025 1857)  Absence of Physical Injury: Implement safety measures based on patient assessment     Problem: Skin/Tissue Integrity  Goal: Skin integrity remains intact  Description: 1.  Monitor for areas of redness and/or skin breakdown  2.  Assess vascular access sites hourly  3.  Every 4-6 hours minimum:  Change oxygen saturation probe site  4.  Every 4-6 hours:  If on nasal continuous positive airway pressure, respiratory therapy assess nares and determine need for appliance change or resting period  4/5/2025 0028 by Mary Lott RN  Outcome: Progressing  4/4/2025 1857 by Kenzie Sandoval RN  Outcome: Progressing  Flowsheets (Taken 4/4/2025 1308)  Skin Integrity Remains Intact: Monitor for areas of redness and/or skin breakdown

## 2025-04-08 ENCOUNTER — TELEPHONE (OUTPATIENT)
Dept: FAMILY MEDICINE CLINIC | Age: 75
End: 2025-04-08

## 2025-04-08 ENCOUNTER — CARE COORDINATION (OUTPATIENT)
Dept: CARE COORDINATION | Age: 75
End: 2025-04-08

## 2025-04-08 LAB — SURGICAL PATHOLOGY REPORT: NORMAL

## 2025-04-08 NOTE — TELEPHONE ENCOUNTER
Care Transitions Initial Follow Up Call    Outreach made within 2 business days of discharge: Yes    Patient: Sid Blakely Patient : 1950   MRN: [id number]  Reason for Admission: chf  Discharge Date: 25       Spoke with: Karma    Discharge department/facility: [facility] Interactive Patient Contact:  Was patient able to fill all prescriptions: Yes  Was patient instructed to bring all medications to the follow-up visit: Yes  Is patient taking all medications as directed in the discharge summary? Yes  Does patient understand their discharge instructions: Yes  Does patient have questions or concerns that need addressed prior to 7-14 day follow up office visit: no    Additional needs identified to be addressed with provider  No needs identified             Scheduled appointment with PCP within 7-14 days    Follow Up  Future Appointments   Date Time Provider Department Center   2025  9:00 AM [facility]   2025  9:30 AM Светлана Hester, APRN - CNP [facility]       AZIZA MATA MA

## 2025-04-08 NOTE — TELEPHONE ENCOUNTER
Care Transitions Initial Follow Up Call    Outreach made within 2 business days of discharge: Yes    Patient: Sid Blakely Patient : 1950   MRN: [id number]  Reason for Admission: heart failure  Discharge Date: 25       Spoke with: VM left to return call    Discharge department/facility: [facility]      Scheduled appointment with PCP within 7-14 days    Follow Up  Future Appointments   Date Time Provider Department Center   2025  9:00 AM Mandi Leon MD [facility] onc [facility]   2025  9:30 AM Светлана Hester, APRN - CNP [facility]       AZIZA MATA MA

## 2025-04-09 LAB — FLOW CYTOMETRY BL: NORMAL

## 2025-04-10 ENCOUNTER — OFFICE VISIT (OUTPATIENT)
Dept: FAMILY MEDICINE CLINIC | Age: 75
End: 2025-04-10

## 2025-04-10 ENCOUNTER — HOSPITAL ENCOUNTER (OUTPATIENT)
Age: 75
Discharge: HOME | End: 2025-04-12
Attending: FAMILY MEDICINE
Payer: MEDICARE

## 2025-04-10 VITALS
BODY MASS INDEX: 34.22 KG/M2 | HEIGHT: 70 IN | DIASTOLIC BLOOD PRESSURE: 54 MMHG | OXYGEN SATURATION: 95 % | HEART RATE: 78 BPM | WEIGHT: 239 LBS | SYSTOLIC BLOOD PRESSURE: 130 MMHG

## 2025-04-10 DIAGNOSIS — Z09 HOSPITAL DISCHARGE FOLLOW-UP: ICD-10-CM

## 2025-04-10 DIAGNOSIS — I48.0 PAROXYSMAL ATRIAL FIBRILLATION (HCC): ICD-10-CM

## 2025-04-10 DIAGNOSIS — R00.1 BRADYCARDIA: ICD-10-CM

## 2025-04-10 DIAGNOSIS — I50.23 ACUTE ON CHRONIC HFREF (HEART FAILURE WITH REDUCED EJECTION FRACTION) (HCC): Primary | ICD-10-CM

## 2025-04-10 DIAGNOSIS — Z79.4 TYPE 2 DIABETES MELLITUS WITH DIABETIC POLYNEUROPATHY, WITH LONG-TERM CURRENT USE OF INSULIN (HCC): ICD-10-CM

## 2025-04-10 DIAGNOSIS — E11.42 TYPE 2 DIABETES MELLITUS WITH DIABETIC POLYNEUROPATHY, WITH LONG-TERM CURRENT USE OF INSULIN (HCC): ICD-10-CM

## 2025-04-10 DIAGNOSIS — R79.89 ELEVATED TSH: ICD-10-CM

## 2025-04-10 LAB — ECHO BSA: 2.31 M2

## 2025-04-10 PROCEDURE — 93246 EXT ECG>7D<15D RECORDING: CPT

## 2025-04-10 RX ORDER — SODIUM FLUORIDE 1.5 MG/G
PASTE, DENTIFRICE DENTAL
COMMUNITY

## 2025-04-10 ASSESSMENT — PATIENT HEALTH QUESTIONNAIRE - PHQ9
SUM OF ALL RESPONSES TO PHQ QUESTIONS 1-9: 0
SUM OF ALL RESPONSES TO PHQ QUESTIONS 1-9: 0
2. FEELING DOWN, DEPRESSED OR HOPELESS: NOT AT ALL
SUM OF ALL RESPONSES TO PHQ QUESTIONS 1-9: 0
1. LITTLE INTEREST OR PLEASURE IN DOING THINGS: NOT AT ALL
SUM OF ALL RESPONSES TO PHQ QUESTIONS 1-9: 0

## 2025-04-10 NOTE — PROGRESS NOTES
Name: Sid Blakely  : 1950         Chief Complaint:     Chief Complaint   Patient presents with    Follow-Up from Hospital    Congestive Heart Failure       History of Present Illness:      Sid Blakely is a 74 y.o.  male who presents with Follow-Up from Hospital and Congestive Heart Failure      HPI    Patient was admitted to [facility] from  to  for CHF exacerbation, ANTONY.    Initial post-discharge communication occurred between medical assistant and caregiver- wife Karma  on  - see documentation in chart: telephone encounter.    Diagnostic test results reviewed: inpatient labs, EKG, chest x-ray, CT-chest/abd/pelvis, US-renal and liver, and echocardiogram    Patient risk of morbidity and mortality: high    Medical Decision Making: high complexity    Early March had been hospitalized in [place] for SOB, underwent cardioversion. Does note he was very out of it for a few days after that, required scop patch, r/t ketamine that was used for sedation. During same hospital stay had big inc of coreg dose also, had been on 3.125 daily and was changed to 12.5 BID. Couple wks later pt and wife drove home from [place], legs down the whole time, developed a lot of swelling, then abd distension. Came to ER and was transferred to [facility], had 20# of fluid taken off. Considered pacemaker but then HR started coming back up. Taken off any BB and restarted amiodarone. Mentioned holter but not ordered.    Abnl proteins, will be seeing hem-onc. Taken off celebrex d/t kidney function. Hydralazine prn high BP.     Spot L anterior thigh sore for a couple days but a lot better now. Does inject insulins and ozempic in thighs and unsure whether that was an injection site.    Sugars very much up and down. Seeing endo next wk. Has CGM. Using rx as directed and limiting carbs and salt.     Medical History:     Patient Active Problem List   Diagnosis    Hypertension    Hyperlipidemia    Type 2 diabetes mellitus

## 2025-04-10 NOTE — CARDIO/PULMONARY
The patient was educated on the use of a  10 dayholter monitor. The patient's comprehension was high. The patient was able to verbalize recall. The patient was instructed on how and when to return the monitor.

## 2025-04-10 NOTE — PATIENT INSTRUCTIONS
[employer] SURVEY:    You may be receiving a survey from [employer] regarding your visit today.    You may get this in the mail, through your MyChart or in your email.     Please complete the survey to enable us to provide the highest quality of care to you and your family.    If you cannot score us as very good ( 5 Stars) on any question, please feel free to call the office to discuss how we could have made your experience exceptional.     Thank you.    Clinical Care Team:   DO Alberto Priest CMA                                     Triage: Nicki Linder CMA              Clerical Team:    Nicki Silva     [place] [phone]           Billing questions: [phone]           Medical Records Request: [phone]

## 2025-04-16 ENCOUNTER — OFFICE VISIT (OUTPATIENT)
Dept: ONCOLOGY | Age: 75
End: 2025-04-16
Payer: MEDICARE

## 2025-04-16 VITALS
BODY MASS INDEX: 34.74 KG/M2 | TEMPERATURE: 97.2 F | HEART RATE: 85 BPM | RESPIRATION RATE: 18 BRPM | DIASTOLIC BLOOD PRESSURE: 55 MMHG | SYSTOLIC BLOOD PRESSURE: 144 MMHG | WEIGHT: 242.1 LBS

## 2025-04-16 DIAGNOSIS — D47.2 MONOCLONAL GAMMOPATHY: Primary | ICD-10-CM

## 2025-04-16 DIAGNOSIS — R76.8 ELEVATED SERUM IMMUNOGLOBULIN FREE LIGHT CHAIN LEVEL: ICD-10-CM

## 2025-04-16 PROCEDURE — G8417 CALC BMI ABV UP PARAM F/U: HCPCS | Performed by: INTERNAL MEDICINE

## 2025-04-16 PROCEDURE — 1111F DSCHRG MED/CURRENT MED MERGE: CPT | Performed by: INTERNAL MEDICINE

## 2025-04-16 PROCEDURE — 1159F MED LIST DOCD IN RCRD: CPT | Performed by: INTERNAL MEDICINE

## 2025-04-16 PROCEDURE — 1036F TOBACCO NON-USER: CPT | Performed by: INTERNAL MEDICINE

## 2025-04-16 PROCEDURE — 3078F DIAST BP <80 MM HG: CPT | Performed by: INTERNAL MEDICINE

## 2025-04-16 PROCEDURE — 1123F ACP DISCUSS/DSCN MKR DOCD: CPT | Performed by: INTERNAL MEDICINE

## 2025-04-16 PROCEDURE — 99214 OFFICE O/P EST MOD 30 MIN: CPT | Performed by: INTERNAL MEDICINE

## 2025-04-16 PROCEDURE — 3017F COLORECTAL CA SCREEN DOC REV: CPT | Performed by: INTERNAL MEDICINE

## 2025-04-16 PROCEDURE — G8427 DOCREV CUR MEDS BY ELIG CLIN: HCPCS | Performed by: INTERNAL MEDICINE

## 2025-04-16 PROCEDURE — 3077F SYST BP >= 140 MM HG: CPT | Performed by: INTERNAL MEDICINE

## 2025-04-16 PROCEDURE — 99211 OFF/OP EST MAY X REQ PHY/QHP: CPT | Performed by: INTERNAL MEDICINE

## 2025-04-20 PROBLEM — D47.2 MONOCLONAL GAMMOPATHY: Status: ACTIVE | Noted: 2025-04-20

## 2025-04-21 ENCOUNTER — HOSPITAL ENCOUNTER (OUTPATIENT)
Dept: NON INVASIVE DIAGNOSTICS | Age: 75
Discharge: HOME | End: 2025-04-21
Payer: MEDICARE

## 2025-04-21 ENCOUNTER — HOSPITAL ENCOUNTER (OUTPATIENT)
Age: 75
Discharge: HOME | End: 2025-04-21
Payer: MEDICARE

## 2025-04-21 DIAGNOSIS — I25.119 CORONARY ARTERY DISEASE INVOLVING NATIVE CORONARY ARTERY OF NATIVE HEART WITH ANGINA PECTORIS: ICD-10-CM

## 2025-04-21 DIAGNOSIS — I10 PRIMARY HYPERTENSION: ICD-10-CM

## 2025-04-21 DIAGNOSIS — E11.42 TYPE 2 DIABETES MELLITUS WITH DIABETIC POLYNEUROPATHY, WITH LONG-TERM CURRENT USE OF INSULIN (HCC): ICD-10-CM

## 2025-04-21 DIAGNOSIS — E55.9 VITAMIN D DEFICIENCY DISEASE: ICD-10-CM

## 2025-04-21 DIAGNOSIS — E11.42 TYPE 2 DIABETES MELLITUS WITH PERIPHERAL NEUROPATHY (HCC): ICD-10-CM

## 2025-04-21 DIAGNOSIS — I48.0 PAF (PAROXYSMAL ATRIAL FIBRILLATION) (HCC): ICD-10-CM

## 2025-04-21 DIAGNOSIS — Z79.4 TYPE 2 DIABETES MELLITUS WITH DIABETIC POLYNEUROPATHY, WITH LONG-TERM CURRENT USE OF INSULIN (HCC): ICD-10-CM

## 2025-04-21 LAB
25(OH)D3 SERPL-MCNC: 32.2 NG/ML (ref 30–100)
ALBUMIN SERPL-MCNC: 4 G/DL (ref 3.5–5.2)
ALBUMIN/GLOB SERPL: 1.4 {RATIO} (ref 1–2.5)
ALP SERPL-CCNC: 64 U/L (ref 40–129)
ALT SERPL-CCNC: 18 U/L (ref 5–41)
ANION GAP SERPL CALCULATED.3IONS-SCNC: 12 MMOL/L (ref 9–17)
AST SERPL-CCNC: 24 U/L
BASOPHILS # BLD: 0.02 K/UL (ref 0–0.2)
BASOPHILS NFR BLD: 0 % (ref 0–2)
BILIRUB SERPL-MCNC: 0.6 MG/DL (ref 0.3–1.2)
BUN SERPL-MCNC: 48 MG/DL (ref 8–23)
CALCIUM SERPL-MCNC: 8.9 MG/DL (ref 8.6–10.4)
CHLORIDE SERPL-SCNC: 100 MMOL/L (ref 98–107)
CHOLEST SERPL-MCNC: 125 MG/DL (ref 0–199)
CHOLESTEROL/HDL RATIO: 4.6
CO2 SERPL-SCNC: 25 MMOL/L (ref 20–31)
CREAT SERPL-MCNC: 1.8 MG/DL (ref 0.7–1.2)
EOSINOPHIL # BLD: 0.18 K/UL (ref 0–0.4)
EOSINOPHILS RELATIVE PERCENT: 3 % (ref 0–5)
ERYTHROCYTE [DISTWIDTH] IN BLOOD BY AUTOMATED COUNT: 13.4 % (ref 12.1–15.2)
GFR, ESTIMATED: 39 ML/MIN/1.73M2
GLUCOSE SERPL-MCNC: 201 MG/DL (ref 70–99)
HCT VFR BLD AUTO: 37.5 % (ref 41–53)
HDLC SERPL-MCNC: 27 MG/DL
HGB BLD-MCNC: 12.1 G/DL (ref 13.5–17.5)
IMM GRANULOCYTES # BLD AUTO: 0.01 K/UL (ref 0–0.3)
IMM GRANULOCYTES NFR BLD: 0 % (ref 0–5)
LDLC SERPL CALC-MCNC: 46 MG/DL (ref 0–100)
LYMPHOCYTES NFR BLD: 1.47 K/UL (ref 1–4.8)
LYMPHOCYTES RELATIVE PERCENT: 20 % (ref 13–44)
MAGNESIUM SERPL-MCNC: 2.6 MG/DL (ref 1.6–2.6)
MCH RBC QN AUTO: 31.9 PG (ref 26–34)
MCHC RBC AUTO-ENTMCNC: 32.3 G/DL (ref 31–37)
MCV RBC AUTO: 98.9 FL (ref 80–100)
MONOCYTES NFR BLD: 0.76 K/UL (ref 0–1)
MONOCYTES NFR BLD: 11 % (ref 5–9)
NEUTROPHILS NFR BLD: 66 % (ref 39–75)
NEUTS SEG NFR BLD: 4.77 K/UL (ref 2.1–6.5)
PLATELET # BLD AUTO: 220 K/UL (ref 140–450)
PMV BLD AUTO: 9.9 FL (ref 6–12)
POTASSIUM SERPL-SCNC: 5.2 MMOL/L (ref 3.7–5.3)
PROT SERPL-MCNC: 6.9 G/DL (ref 6.4–8.3)
RBC # BLD AUTO: 3.79 M/UL (ref 4.5–5.9)
SODIUM SERPL-SCNC: 137 MMOL/L (ref 135–144)
T4 FREE SERPL-MCNC: 1.1 NG/DL (ref 0.92–1.68)
TRIGL SERPL-MCNC: 258 MG/DL
TSH SERPL DL<=0.05 MIU/L-ACNC: 2.96 UIU/ML (ref 0.27–4.2)
TSH SERPL DL<=0.05 MIU/L-ACNC: 2.96 UIU/ML (ref 0.27–4.2)
VLDLC SERPL CALC-MCNC: 52 MG/DL (ref 1–30)
WBC OTHER # BLD: 7.2 K/UL (ref 3.5–11)

## 2025-04-21 PROCEDURE — 85025 COMPLETE CBC W/AUTO DIFF WBC: CPT

## 2025-04-21 PROCEDURE — 80053 COMPREHEN METABOLIC PANEL: CPT

## 2025-04-21 PROCEDURE — 93005 ELECTROCARDIOGRAM TRACING: CPT

## 2025-04-21 PROCEDURE — 84443 ASSAY THYROID STIM HORMONE: CPT

## 2025-04-21 PROCEDURE — 82306 VITAMIN D 25 HYDROXY: CPT

## 2025-04-21 PROCEDURE — 84439 ASSAY OF FREE THYROXINE: CPT

## 2025-04-21 PROCEDURE — 80061 LIPID PANEL: CPT

## 2025-04-21 PROCEDURE — 36415 COLL VENOUS BLD VENIPUNCTURE: CPT

## 2025-04-21 PROCEDURE — 83735 ASSAY OF MAGNESIUM: CPT

## 2025-04-22 ENCOUNTER — RESULTS FOLLOW-UP (OUTPATIENT)
Dept: FAMILY MEDICINE CLINIC | Age: 75
End: 2025-04-22

## 2025-04-22 ENCOUNTER — CARE COORDINATION (OUTPATIENT)
Dept: CARE COORDINATION | Age: 75
End: 2025-04-22

## 2025-04-22 LAB
EKG ATRIAL RATE: 51 BPM
EKG P AXIS: 73 DEGREES
EKG P-R INTERVAL: 188 MS
EKG Q-T INTERVAL: 496 MS
EKG QRS DURATION: 166 MS
EKG QTC CALCULATION (BAZETT): 457 MS
EKG R AXIS: 106 DEGREES
EKG T AXIS: -75 DEGREES
EKG VENTRICULAR RATE: 51 BPM
ESTIMATED AVERAGE GLUCOSE: NORMAL
HBA1C MFR BLD: 8.1 %

## 2025-04-22 PROCEDURE — 93010 ELECTROCARDIOGRAM REPORT: CPT | Performed by: INTERNAL MEDICINE

## 2025-04-23 ENCOUNTER — OFFICE VISIT (OUTPATIENT)
Dept: CARDIOLOGY CLINIC | Age: 75
End: 2025-04-23
Payer: MEDICARE

## 2025-04-23 VITALS — SYSTOLIC BLOOD PRESSURE: 120 MMHG | DIASTOLIC BLOOD PRESSURE: 50 MMHG

## 2025-04-23 DIAGNOSIS — I50.32 CHRONIC DIASTOLIC CHF (CONGESTIVE HEART FAILURE), NYHA CLASS 3 (HCC): Primary | ICD-10-CM

## 2025-04-23 PROCEDURE — G8417 CALC BMI ABV UP PARAM F/U: HCPCS | Performed by: INTERNAL MEDICINE

## 2025-04-23 PROCEDURE — 3074F SYST BP LT 130 MM HG: CPT | Performed by: INTERNAL MEDICINE

## 2025-04-23 PROCEDURE — 1036F TOBACCO NON-USER: CPT | Performed by: INTERNAL MEDICINE

## 2025-04-23 PROCEDURE — 3078F DIAST BP <80 MM HG: CPT | Performed by: INTERNAL MEDICINE

## 2025-04-23 PROCEDURE — 1123F ACP DISCUSS/DSCN MKR DOCD: CPT | Performed by: INTERNAL MEDICINE

## 2025-04-23 PROCEDURE — 3017F COLORECTAL CA SCREEN DOC REV: CPT | Performed by: INTERNAL MEDICINE

## 2025-04-23 PROCEDURE — 1111F DSCHRG MED/CURRENT MED MERGE: CPT | Performed by: INTERNAL MEDICINE

## 2025-04-23 PROCEDURE — G8427 DOCREV CUR MEDS BY ELIG CLIN: HCPCS | Performed by: INTERNAL MEDICINE

## 2025-04-23 PROCEDURE — 99214 OFFICE O/P EST MOD 30 MIN: CPT | Performed by: INTERNAL MEDICINE

## 2025-04-23 PROCEDURE — 1159F MED LIST DOCD IN RCRD: CPT | Performed by: INTERNAL MEDICINE

## 2025-04-23 NOTE — PROGRESS NOTES
Ov Dr Crump follow up from hospital   [facility]'s CHF   Started swelling and sob    Feels back to his baseline   Very tired   Some dizziness.   Only used hydralazine  Twice since out   No edema    Turned in heart monitor   3 days ago.  Was to possibly have   Pacemaker done at [facility]  Was cancelled heart rate   Came up.     Having bone marrow bx   Next Wed 4/30    Decrease amiodarone to 5 days  A week skip Wednesday and Sunday.    Bmp on Tuesday   See in 2 weeks 5/7

## 2025-04-24 RX ORDER — ATORVASTATIN CALCIUM 40 MG/1
40 TABLET, FILM COATED ORAL DAILY
Qty: 90 TABLET | Refills: 3 | Status: SHIPPED | OUTPATIENT
Start: 2025-04-24

## 2025-04-24 RX ORDER — SACUBITRIL AND VALSARTAN 97; 103 MG/1; MG/1
1 TABLET, FILM COATED ORAL 2 TIMES DAILY
Qty: 180 TABLET | Refills: 3 | Status: SHIPPED | OUTPATIENT
Start: 2025-04-24

## 2025-04-24 RX ORDER — ALLOPURINOL 100 MG/1
200 TABLET ORAL DAILY
Qty: 180 TABLET | Refills: 3 | Status: SHIPPED | OUTPATIENT
Start: 2025-04-24

## 2025-04-24 NOTE — TELEPHONE ENCOUNTER
Last visit:  4/10/2025  Next Visit Date:    Future Appointments   Date Time Provider Department Center   4/30/2025 11:30 AM [facility] Rad   5/7/2025  8:00 AM Arya Crump MD [facility]   5/7/2025  3:00 PM Delmy Melgoza MD tiff onc spe [facility]   7/14/2025  9:40 AM Michelle Fletcher DO [facility]CC DEP         Medication List:  Prior to Admission medications    Medication Sig Start Date End Date Taking? Authorizing Provider   ENTRESTO  MG per tablet TAKE 1 TABLET TWICE A DAY 4/24/25   Arya Crump MD   Specialty Vitamins Products (NERVIVE NERVE RELIEF) TABS Take by mouth    Gavi Thompson MD   doxazosin (CARDURA) 4 MG tablet Take 1 tablet by mouth 2 times daily 4/7/25 5/7/25  Rosalia Butler MD   hydrALAZINE (APRESOLINE) 25 MG tablet Take 1 tablet by mouth 3 times daily as needed (for SBP>150) 4/7/25   Rosalia Butler MD   insulin aspart (NOVOLOG) 100 UNIT/ML injection vial Inject into the skin 3 times daily (before meals) 10-22 units sliding scale with each meal. 3/5/25   Gavi Thompson MD   insulin glargine (BASAGLAR KWIKPEN) 100 UNIT/ML injection pen Inject 50 Units into the skin daily 3/5/25   Gavi Thompson MD   amiodarone (CORDARONE) 200 MG tablet Take 1 tablet by mouth Five times weekly Skip Wednesday and Maximus 3/9/25   Gavi Thompson MD   Omega-3 Fatty Acids (FISH OIL) 1200 MG CAPS Take 1,200 mg by mouth in the morning and at bedtime    Gavi Thompson MD   apixaban (ELIQUIS) 5 MG TABS tablet TAKE 1 TABLET TWICE A DAY 2/27/25   Arya Crump MD   furosemide (LASIX) 80 MG tablet Take 1 tablet by mouth 2 times daily 10/8/24   Arya Crump MD   spironolactone (ALDACTONE) 25 MG tablet Take 1 tablet by mouth 2 times daily 10/8/24   Arya Crump MD   Semaglutide, 1 MG/DOSE, (OZEMPIC, 1 MG/DOSE,) 4 MG/3ML SOPN sc injection Inject 1 mg into the skin every 7 days 7/3/24   Provider, MD Gavi

## 2025-04-25 ENCOUNTER — CARE COORDINATION (OUTPATIENT)
Dept: CARE COORDINATION | Age: 75
End: 2025-04-25

## 2025-04-25 LAB — ECHO BSA: 2.31 M2

## 2025-04-25 NOTE — CARE COORDINATION
Care Transitions Note    Follow Up Call     Attempted to reach patient for transitions of care follow up.  Unable to reach patient.      Outreach Attempts:# 1  HIPAA compliant voicemail left for patient.     Care Summary Note: #1    Follow Up Appointment:   Future Appointments         Provider Specialty Dept Phone    4/30/2025 11:30 AM (Arrive by 11:15 AM) Radiologist, [facility]; [facility] CAT SCAN ROOM Cardiology [phone]    5/7/2025 8:00 AM Arya Crump MD Cardiology [phone]    5/7/2025 3:00 PM Delmy Melgoza MD Oncology [phone]    7/14/2025 9:40 AM Michelle Fletcher,  Family Medicine [phone]            Plan for follow-up call in 2-5 days based on severity of symptoms and risk factors.   Patricia Campos LPN

## 2025-04-29 ENCOUNTER — HOSPITAL ENCOUNTER (OUTPATIENT)
Age: 75
Discharge: HOME | End: 2025-04-29
Payer: MEDICARE

## 2025-04-29 DIAGNOSIS — I50.32 CHRONIC DIASTOLIC CHF (CONGESTIVE HEART FAILURE), NYHA CLASS 3 (HCC): ICD-10-CM

## 2025-04-29 LAB
ANION GAP SERPL CALCULATED.3IONS-SCNC: 10 MMOL/L (ref 9–17)
BUN SERPL-MCNC: 35 MG/DL (ref 8–23)
CALCIUM SERPL-MCNC: 9 MG/DL (ref 8.6–10.4)
CHLORIDE SERPL-SCNC: 101 MMOL/L (ref 98–107)
CO2 SERPL-SCNC: 26 MMOL/L (ref 20–31)
CREAT SERPL-MCNC: 1.4 MG/DL (ref 0.7–1.2)
GFR, ESTIMATED: 53 ML/MIN/1.73M2
GLUCOSE SERPL-MCNC: 218 MG/DL (ref 70–99)
POTASSIUM SERPL-SCNC: 4.4 MMOL/L (ref 3.7–5.3)
SODIUM SERPL-SCNC: 137 MMOL/L (ref 135–144)

## 2025-04-29 PROCEDURE — 36415 COLL VENOUS BLD VENIPUNCTURE: CPT

## 2025-04-29 PROCEDURE — 80048 BASIC METABOLIC PNL TOTAL CA: CPT

## 2025-04-30 ENCOUNTER — HOSPITAL ENCOUNTER (OUTPATIENT)
Dept: CT IMAGING | Age: 75
Discharge: HOME | End: 2025-05-02
Attending: INTERNAL MEDICINE
Payer: MEDICARE

## 2025-04-30 ENCOUNTER — RESULTS FOLLOW-UP (OUTPATIENT)
Dept: CARDIOLOGY CLINIC | Age: 75
End: 2025-04-30

## 2025-04-30 VITALS
TEMPERATURE: 97.4 F | HEART RATE: 61 BPM | OXYGEN SATURATION: 95 % | SYSTOLIC BLOOD PRESSURE: 161 MMHG | DIASTOLIC BLOOD PRESSURE: 61 MMHG | RESPIRATION RATE: 11 BRPM

## 2025-04-30 DIAGNOSIS — D47.2 MONOCLONAL GAMMOPATHIES: ICD-10-CM

## 2025-04-30 DIAGNOSIS — R76.8 ELEVATED SERUM IMMUNOGLOBULIN FREE LIGHT CHAIN LEVEL: ICD-10-CM

## 2025-04-30 LAB
BASOPHILS # BLD: 0.03 K/UL (ref 0–0.2)
BASOPHILS NFR BLD: 0 % (ref 0–2)
EOSINOPHIL # BLD: 0.12 K/UL (ref 0–0.44)
EOSINOPHILS RELATIVE PERCENT: 2 % (ref 1–4)
ERYTHROCYTE [DISTWIDTH] IN BLOOD BY AUTOMATED COUNT: 13.7 % (ref 11.8–14.4)
HCT VFR BLD AUTO: 37.6 % (ref 40.7–50.3)
HGB BLD-MCNC: 12.2 G/DL (ref 13–17)
IMM GRANULOCYTES # BLD AUTO: <0.03 K/UL (ref 0–0.3)
IMM GRANULOCYTES NFR BLD: 0 %
IMM RETICS NFR: 16.5 % (ref 2.7–18.3)
LYMPHOCYTES NFR BLD: 1.22 K/UL (ref 1.1–3.7)
LYMPHOCYTES RELATIVE PERCENT: 18 % (ref 24–43)
MCH RBC QN AUTO: 32.9 PG (ref 25.2–33.5)
MCHC RBC AUTO-ENTMCNC: 32.4 G/DL (ref 28.4–34.8)
MCV RBC AUTO: 101.3 FL (ref 82.6–102.9)
MONOCYTES NFR BLD: 0.62 K/UL (ref 0.1–1.2)
MONOCYTES NFR BLD: 9 % (ref 3–12)
NEUTROPHILS NFR BLD: 71 % (ref 36–65)
NEUTS SEG NFR BLD: 4.92 K/UL (ref 1.5–8.1)
NRBC BLD-RTO: 0 PER 100 WBC
PLATELET # BLD AUTO: 228 K/UL (ref 138–453)
PMV BLD AUTO: 10 FL (ref 8.1–13.5)
RBC # BLD AUTO: 3.71 M/UL (ref 4.21–5.77)
RETIC HEMOGLOBIN: 36.3 PG (ref 28.2–35.7)
RETICS # AUTO: 0.07 M/UL (ref 0.03–0.08)
RETICS/RBC NFR AUTO: 1.9 % (ref 0.5–1.9)
WBC OTHER # BLD: 6.9 K/UL (ref 3.5–11.3)

## 2025-04-30 PROCEDURE — 77012 CT SCAN FOR NEEDLE BIOPSY: CPT

## 2025-04-30 PROCEDURE — 85025 COMPLETE CBC W/AUTO DIFF WBC: CPT

## 2025-04-30 PROCEDURE — 6360000002 HC RX W HCPCS: Performed by: RADIOLOGY

## 2025-04-30 PROCEDURE — 85045 AUTOMATED RETICULOCYTE COUNT: CPT

## 2025-04-30 PROCEDURE — 88280 CHROMOSOME KARYOTYPE STUDY: CPT

## 2025-04-30 PROCEDURE — 88264 CHROMOSOME ANALYSIS 20-25: CPT

## 2025-04-30 PROCEDURE — 88237 TISSUE CULTURE BONE MARROW: CPT

## 2025-04-30 RX ORDER — MIDAZOLAM HYDROCHLORIDE 1 MG/ML
INJECTION, SOLUTION INTRAMUSCULAR; INTRAVENOUS PRN
Status: COMPLETED | OUTPATIENT
Start: 2025-04-30 | End: 2025-04-30

## 2025-04-30 RX ORDER — LIDOCAINE HYDROCHLORIDE 10 MG/ML
INJECTION, SOLUTION EPIDURAL; INFILTRATION; INTRACAUDAL; PERINEURAL PRN
Status: COMPLETED | OUTPATIENT
Start: 2025-04-30 | End: 2025-04-30

## 2025-04-30 RX ADMIN — LIDOCAINE HYDROCHLORIDE 6 ML: 10 INJECTION, SOLUTION EPIDURAL; INFILTRATION; INTRACAUDAL; PERINEURAL at 12:16

## 2025-04-30 RX ADMIN — Medication 50 MCG: at 12:13

## 2025-04-30 RX ADMIN — MIDAZOLAM 1 MG: 1 INJECTION INTRAMUSCULAR; INTRAVENOUS at 12:13

## 2025-04-30 NOTE — POST SEDATION
Sedation Post Procedure Note    Patient Name: Sid Blakely   YOB: 1950  Room/Bed: Room/bed info not found  Medical Record Number: [id number]  Date: 4/30/2025   Time: 12:39 PM         Physicians/Assistants: Tim Ruiz MD, MD    Procedure Performed:  Bone marrow biopsy    Post-Sedation Vital Signs:  Vitals:    04/30/25 1226   BP: (!) 126/57   Pulse: 73   Resp: 20   Temp:    SpO2: 93%      Vital signs were reviewed and were stable after the procedure (see flow sheet for vitals)    Complications: none    Electronically signed by Tim Ruiz MD on 4/30/2025 at 12:39 PM

## 2025-04-30 NOTE — PRE SEDATION
Medication Sig Start Date End Date Taking? Authorizing Provider   allopurinol (ZYLOPRIM) 100 MG tablet TAKE 2 TABLETS DAILY 4/24/25  Yes Michelle Fletcher DO   atorvastatin (LIPITOR) 40 MG tablet TAKE 1 TABLET DAILY 4/24/25  Yes Michelle Fletcher DO   Specialty Vitamins Products (NERVIVE NERVE RELIEF) TABS Take by mouth   Yes Gavi Thompson MD   doxazosin (CARDURA) 4 MG tablet Take 1 tablet by mouth 2 times daily 4/7/25 5/7/25 Yes Rosalia Butler MD   hydrALAZINE (APRESOLINE) 25 MG tablet Take 1 tablet by mouth 3 times daily as needed (for SBP>150) 4/7/25  Yes Rosalia Butler MD   amiodarone (CORDARONE) 200 MG tablet Take 1 tablet by mouth Five times weekly Skip Wednesday and Maximus 3/9/25  Yes Gavi Thompson MD   Omega-3 Fatty Acids (FISH OIL) 1200 MG CAPS Take 1,200 mg by mouth in the morning and at bedtime   Yes Gavi Thompson MD   apixaban (ELIQUIS) 5 MG TABS tablet TAKE 1 TABLET TWICE A DAY 2/27/25  Yes Arya Crump MD   Lactobacillus (ACIDOPHILUS PO) Take by mouth   Yes Gavi Thompson MD   Cyanocobalamin (HM SUPER VITAMIN B12 PO) Take by mouth   Yes Gavi Thompson MD   Cholecalciferol (VITAMIN D3) 250 MCG (90428 UT) TABS Take 1 tablet by mouth 2 times daily   Yes Gavi Thompson MD   Coenzyme Q10 (CO Q 10) 100 MG CAPS Take 200 mg by mouth nightly   Yes Gavi Thompson MD   acetaminophen (TYLENOL) 500 MG tablet Take 1 tablet by mouth in the morning and at bedtime   Yes Gavi Thompson MD   traMADol (ULTRAM) 50 MG tablet Take 1 tablet by mouth daily. 9/8/20  Yes Gavi Thompson MD   Magnesium Oxide 500 MG TABS Take 120 mg by mouth in the morning and 120 mg in the evening.   Yes Gavi Thompson MD   Multiple Vitamins-Minerals (ONE-A-DAY MENS 50+ ADVANTAGE PO) Take 1 tablet by mouth daily    Yes Gavi Thompson MD   ENTRESTO  MG per tablet TAKE 1 TABLET TWICE A DAY 4/24/25   Arya Crump MD   insulin aspart

## 2025-04-30 NOTE — DISCHARGE INSTRUCTIONS
Bone Marrow Aspiration and Biopsy: What to Expect at Home  Your Recovery  The biopsy site may feel sore for several days. It can help to walk, take pain medicine, and put ice packs on the site. You will probably be able to return to work and your usual activities the day after the procedure. Your doctor or nurse will call you with the results of your test.  This care sheet gives you a general idea about how long it will take for you to recover. But each person recovers at a different pace. Follow the steps below to get better as quickly as possible.  How can you care for yourself at home?  Activity  Rest when you feel tired. Getting enough sleep will help you recover.  For 24 hours, do not drink alcohol, drive, operate machinery, sign important papers or make important decisions if sedation medications were received during procedure.  You may drive when you are no longer taking pain pills and can quickly move your foot from the gas pedal to the brake. You must also be able to sit comfortably for a long period of time, even if you do not plan to go far. You might get caught in traffic.  Most people are able to return to work the day after the procedure.  Medicines  Your doctor will tell you if and when you can restart your medicines. He or she will also give you instructions about taking any new medicines.  If you take blood thinners, such as warfarin (Coumadin), clopidogrel (Plavix), or aspirin, be sure to talk to your doctor. He or she will tell you if and when to start taking those medicines again. Make sure that you understand exactly what your doctor wants you to do.  Be safe with medicines. Take pain medicines exactly as directed.  If the doctor gave you a prescription medicine for pain, take it as prescribed.  If you are not taking a prescription pain medicine, take an over-the-counter medicine such as acetaminophen (Tylenol), ibuprofen (Advil, Motrin), or naproxen (Aleve). Read and follow all instructions

## 2025-04-30 NOTE — OR NURSING
Patient assisted off of table and taken by wheelchair to post procedure area. Report given to post procedure nursing staff.

## 2025-05-01 ENCOUNTER — TELEPHONE (OUTPATIENT)
Dept: CARDIOLOGY CLINIC | Age: 75
End: 2025-05-01

## 2025-05-02 ENCOUNTER — CARE COORDINATION (OUTPATIENT)
Dept: CARE COORDINATION | Age: 75
End: 2025-05-02

## 2025-05-02 NOTE — CARE COORDINATION
Care Transitions Note    Final Call     Patient Current Location:  Home: [address] [place] [address] [address]  [place] [address]    [facility] Care Coordinator contacted the patient by telephone. Verified name and  as identifiers.    Patient graduated from the Care Transitions program on 25.  Patient/family verbalizes confidence in the ability to self-manage at this time..      Advance Care Planning:   Does patient have an Advance Directive: reviewed during previous call, see note. .    Handoff:   Patient was not referred to the ACM team due to patient declined services.      Care Summary Note: Writer spoke to Jason for follow up transitional care call. He states that he is doing okay. He denies cp, sob or leg swelling discussed daily weights and when to contact PCP or cardiology. States he has his usual fatigue. Discussed cardiology appointment notes reviewed his is taking amiodarone 5 days a week skipping Wednesday and Sundays. He reports he is checking BP follow up cardiology appointment 25. He voiced no needs or concerns declined ACM referral. Okay with ending JOSE calls. Advised close follow up with care team. Thanked writer for calling. Will resolve CTN episode.     Assessments:  Care Transitions Subsequent and Final Call    Subsequent and Final Calls  Care Transitions Interventions     Other Services: Declined     Specialty Service Referral: Declined    Other Interventions:              Upcoming Appointments:    Future Appointments         Provider Specialty Dept Phone    2025 8:00 AM Arya Crump MD Cardiology [phone]    2025 3:00 PM Delmy Melgoza MD Oncology [phone]    2025 9:40 AM Michelle Fletcher DO Family Medicine [phone]            Patient has agreed to contact primary care provider and/or specialist for any further questions, concerns, or needs.    Patricia Campos LPN

## 2025-05-05 LAB — BONE MARROW REPORT: NORMAL

## 2025-05-07 ENCOUNTER — OFFICE VISIT (OUTPATIENT)
Dept: CARDIOLOGY CLINIC | Age: 75
End: 2025-05-07
Payer: MEDICARE

## 2025-05-07 ENCOUNTER — OFFICE VISIT (OUTPATIENT)
Dept: ONCOLOGY | Age: 75
End: 2025-05-07
Payer: MEDICARE

## 2025-05-07 VITALS
DIASTOLIC BLOOD PRESSURE: 60 MMHG | WEIGHT: 244 LBS | BODY MASS INDEX: 35.01 KG/M2 | OXYGEN SATURATION: 93 % | SYSTOLIC BLOOD PRESSURE: 146 MMHG | HEART RATE: 63 BPM

## 2025-05-07 VITALS
BODY MASS INDEX: 34.72 KG/M2 | SYSTOLIC BLOOD PRESSURE: 134 MMHG | HEART RATE: 74 BPM | DIASTOLIC BLOOD PRESSURE: 63 MMHG | WEIGHT: 242 LBS | TEMPERATURE: 98 F | RESPIRATION RATE: 18 BRPM

## 2025-05-07 DIAGNOSIS — I25.119 CORONARY ARTERY DISEASE INVOLVING NATIVE CORONARY ARTERY OF NATIVE HEART WITH ANGINA PECTORIS: ICD-10-CM

## 2025-05-07 DIAGNOSIS — I48.11 LONGSTANDING PERSISTENT ATRIAL FIBRILLATION (HCC): ICD-10-CM

## 2025-05-07 DIAGNOSIS — R00.1 BRADYCARDIA: ICD-10-CM

## 2025-05-07 DIAGNOSIS — R76.8 ELEVATED SERUM IMMUNOGLOBULIN FREE LIGHT CHAIN LEVEL: ICD-10-CM

## 2025-05-07 DIAGNOSIS — D47.2 MONOCLONAL GAMMOPATHY: Primary | ICD-10-CM

## 2025-05-07 DIAGNOSIS — I48.0 PAROXYSMAL ATRIAL FIBRILLATION (HCC): Primary | ICD-10-CM

## 2025-05-07 LAB — FLOW CYTOMETRY, BM: NORMAL

## 2025-05-07 PROCEDURE — 3078F DIAST BP <80 MM HG: CPT | Performed by: INTERNAL MEDICINE

## 2025-05-07 PROCEDURE — G8417 CALC BMI ABV UP PARAM F/U: HCPCS | Performed by: INTERNAL MEDICINE

## 2025-05-07 PROCEDURE — G8428 CUR MEDS NOT DOCUMENT: HCPCS | Performed by: INTERNAL MEDICINE

## 2025-05-07 PROCEDURE — 99211 OFF/OP EST MAY X REQ PHY/QHP: CPT | Performed by: INTERNAL MEDICINE

## 2025-05-07 PROCEDURE — 1036F TOBACCO NON-USER: CPT | Performed by: INTERNAL MEDICINE

## 2025-05-07 PROCEDURE — 3077F SYST BP >= 140 MM HG: CPT | Performed by: INTERNAL MEDICINE

## 2025-05-07 PROCEDURE — 99214 OFFICE O/P EST MOD 30 MIN: CPT | Performed by: INTERNAL MEDICINE

## 2025-05-07 PROCEDURE — 1111F DSCHRG MED/CURRENT MED MERGE: CPT | Performed by: INTERNAL MEDICINE

## 2025-05-07 PROCEDURE — 3017F COLORECTAL CA SCREEN DOC REV: CPT | Performed by: INTERNAL MEDICINE

## 2025-05-07 PROCEDURE — 3075F SYST BP GE 130 - 139MM HG: CPT | Performed by: INTERNAL MEDICINE

## 2025-05-07 PROCEDURE — 1126F AMNT PAIN NOTED NONE PRSNT: CPT | Performed by: INTERNAL MEDICINE

## 2025-05-07 PROCEDURE — 1123F ACP DISCUSS/DSCN MKR DOCD: CPT | Performed by: INTERNAL MEDICINE

## 2025-05-07 RX ORDER — HYDRALAZINE HYDROCHLORIDE 50 MG/1
50 TABLET, FILM COATED ORAL 3 TIMES DAILY
Qty: 270 TABLET | Refills: 3 | Status: SHIPPED | OUTPATIENT
Start: 2025-05-07

## 2025-05-07 NOTE — PATIENT INSTRUCTIONS
Increase Hydralazine to 50 mg BID  (Rx will be for TID)    Follow up in 2 month with echo   And 7 day e patch prior to the appt

## 2025-05-07 NOTE — PROGRESS NOTES
Ov Dr. Crump for follow up   No heart changes since last visit   No chest pain   No new sob     Increase Hydralazine to 50 mg BID  (Rx will be for TID)    Follow up in 2 month with echo   And 7 day e patch prior to the appt

## 2025-05-07 NOTE — PROGRESS NOTES
Chief Complaint   Patient presents with    Results     Discuss Bone Marrow Bx report       SUBJECTIVE:  .Patient seen for follow up after recent hospitalization. He had ANTONY. Nephrology evaluation showed monoclonal gammopathy with elevated light chain immunoglobulins.    Flow cytometry was negative.  No complaints at the present time.  Bone marrow aspiration or biopsy was done and while the specimen was suboptimal, immunostains showed only 5% involvement.  Consistent with MGUS  BRIEF CASE HISTORY:    The patient is a 74 y.o.  male who to the ER with chief complaints of volume overload shortness of breath.  Patient was also recently admitted in [facility] due to volume overload and was diuresed.  Denies fever chills chest pain abdominal pain.    Patient will bradycardia in the ER.  Potassium 5.5.  Creatinine 1.8.  Hemoglobin 12.0.  Platelet count 29.  CT chest and pelvis no acute findings.  Patient transferred to [facility] for further workup and evaluation  Send patient underwent evaluation for acute kidney injury and free light chain ratio came back elevated.  Patient has history of coronary artery disease.  Echo from September 2024 showed mildly reduced LVEF.  Moderate septal thickening.    Lab workup shows hemoglobin 12.2.  During hospitalization course, creatinine has improved to 1.1.  Patient lambda kappa ratio was 5.5.  SPEP pending.  Patient does take Eliquis.    Past Medical History:   has a past medical history of Arthritis, Arthritis, Atrial fibrillation ([facility]), CAD (coronary artery disease), CHF (congestive heart failure) ([facility]), CHF (congestive heart failure) ([facility]), COPD (chronic obstructive pulmonary disease) ([facility]), Coronary artery disease, Diabetes ([facility]), Diabetes mellitus ([facility]), H/O coronary angioplasty, Hyperlipidemia, Hypertension, Neuropathy, Numbness and tingling, Pulmonary edema, Sleep apnea, and Unspecified sleep apnea.    Past Surgical History:   has a past surgical history that

## 2025-05-08 ENCOUNTER — PATIENT MESSAGE (OUTPATIENT)
Dept: FAMILY MEDICINE CLINIC | Age: 75
End: 2025-05-08

## 2025-05-08 DIAGNOSIS — R79.89 ELEVATED SERUM CREATININE: Primary | ICD-10-CM

## 2025-05-08 LAB — CHROMOSOME STUDY: NORMAL

## 2025-05-08 NOTE — PROGRESS NOTES
Noam Crump M.D.  [facility]  [facility]  [address] [place] [address]  [phone]        May 07, 2025        Michelle Fletcher,   [address] [facility] [address]     RE:  Sid Blakely JESSICA  :  1950    Dear Dr. Fletcher:    CHIEF COMPLAINT:    Paroxysmal atrial fibrillation, currently in sinus rhythm.  On amiodarone 200 mg 5 days a week.  Shortness of breath.    HISTORY OF PRESENT ILLNESS:  I met with Jason and his wife, Karma, in our office on May 7, 2025.  I trust you received my full H and P from 2025.    He was mildly bradycardic when I saw him on the  and therefore, I decreased his amiodarone to 5 days a week, skipping  and Sundays.  I met with him today for repeat evaluation.    He overall is doing well.  His heart rate is generally in the 50s to perhaps 60 range at home.  Blood pressure ranges from 140 to 170 mmHg.  He has had no lightheadedness, dizziness, syncope, or near syncope.  Denies any palpitations.  He has chronic shortness of breath, but it sounds like it is about at baseline.  Denies any chest pain or chest discomfort.  He has had no pedal edema.    MEDICATIONS:  Tylenol 500 mg b.i.d. Zyloprim 100 mg 2 tablets daily. Cordarone 200 mg 5 days a week. Eliquis 5 mg b.i.d. Lipitor 40 mg daily. Vitamin D 1000 units b.i.d.Cardura 4 mg b.i.d. Entresto 97/103 b.i.d. Lasix 80 mg b.i.d. Apresoline 25 mg b.i.d.  Aspart 15 to 24 units sliding scale. Glargine 50 units daily. Magnesium 120 mg b.i.d. Semaglutide every 7 days. Aldactone 25 mg b.i.d. Ultram p.r.n.    PHYSICAL EXAMINATION:  VITAL SIGNS:  His blood pressure was 146/60 with a heart rate of 63 and regular, respiratory rate 18, O2 saturation was 93%, and weight at 244 pounds.  GENERAL:  He is a pleasant 74-year-old gentleman. Denied any pain.  He was oriented to person, place, and time.  Answered questions appropriately.  SKIN:  No unusual skin changes.  HEENT:

## 2025-05-10 ENCOUNTER — APPOINTMENT (OUTPATIENT)
Dept: GENERAL RADIOLOGY | Age: 75
End: 2025-05-10
Payer: MEDICARE

## 2025-05-10 ENCOUNTER — HOSPITAL ENCOUNTER (EMERGENCY)
Age: 75
Discharge: HOME | End: 2025-05-10
Payer: MEDICARE

## 2025-05-10 VITALS
BODY MASS INDEX: 35.15 KG/M2 | TEMPERATURE: 99.2 F | DIASTOLIC BLOOD PRESSURE: 44 MMHG | HEART RATE: 64 BPM | OXYGEN SATURATION: 95 % | HEIGHT: 70 IN | RESPIRATION RATE: 16 BRPM | SYSTOLIC BLOOD PRESSURE: 176 MMHG | WEIGHT: 245.5 LBS

## 2025-05-10 DIAGNOSIS — R09.81 CONGESTION OF NASAL SINUS: ICD-10-CM

## 2025-05-10 DIAGNOSIS — B34.9 VIRAL SYNDROME: Primary | ICD-10-CM

## 2025-05-10 LAB
ALBUMIN SERPL-MCNC: 4.2 G/DL (ref 3.5–5.2)
ALBUMIN/GLOB SERPL: 1.6 {RATIO} (ref 1–2.5)
ALP SERPL-CCNC: 84 U/L (ref 40–129)
ALT SERPL-CCNC: 21 U/L (ref 5–41)
ANION GAP SERPL CALCULATED.3IONS-SCNC: 14 MMOL/L (ref 9–17)
AST SERPL-CCNC: 23 U/L
BASOPHILS # BLD: 0.01 K/UL (ref 0–0.2)
BASOPHILS NFR BLD: 0 % (ref 0–2)
BILIRUB SERPL-MCNC: 0.5 MG/DL (ref 0.3–1.2)
BUN SERPL-MCNC: 49 MG/DL (ref 8–23)
CALCIUM SERPL-MCNC: 9 MG/DL (ref 8.6–10.4)
CHLORIDE SERPL-SCNC: 99 MMOL/L (ref 98–107)
CO2 SERPL-SCNC: 24 MMOL/L (ref 20–31)
CREAT SERPL-MCNC: 1.7 MG/DL (ref 0.7–1.2)
EOSINOPHIL # BLD: 0.07 K/UL (ref 0–0.4)
EOSINOPHILS RELATIVE PERCENT: 1 % (ref 0–5)
ERYTHROCYTE [DISTWIDTH] IN BLOOD BY AUTOMATED COUNT: 13.5 % (ref 12.1–15.2)
FLUAV AG SPEC QL: NEGATIVE
FLUBV AG SPEC QL: NEGATIVE
GFR, ESTIMATED: 42 ML/MIN/1.73M2
GLUCOSE SERPL-MCNC: 330 MG/DL (ref 70–99)
HCT VFR BLD AUTO: 37 % (ref 41–53)
HGB BLD-MCNC: 12.2 G/DL (ref 13.5–17.5)
IMM GRANULOCYTES # BLD AUTO: 0.01 K/UL (ref 0–0.3)
IMM GRANULOCYTES NFR BLD: 0 % (ref 0–5)
LYMPHOCYTES NFR BLD: 0.58 K/UL (ref 1–4.8)
LYMPHOCYTES RELATIVE PERCENT: 10 % (ref 13–44)
MCH RBC QN AUTO: 32.6 PG (ref 26–34)
MCHC RBC AUTO-ENTMCNC: 33 G/DL (ref 31–37)
MCV RBC AUTO: 98.9 FL (ref 80–100)
MONOCYTES NFR BLD: 0.47 K/UL (ref 0–1)
MONOCYTES NFR BLD: 8 % (ref 5–9)
NEUTROPHILS NFR BLD: 81 % (ref 39–75)
NEUTS SEG NFR BLD: 4.65 K/UL (ref 2.1–6.5)
PLATELET # BLD AUTO: 209 K/UL (ref 140–450)
PMV BLD AUTO: 10.1 FL (ref 6–12)
POTASSIUM SERPL-SCNC: 4.9 MMOL/L (ref 3.7–5.3)
PROT SERPL-MCNC: 6.9 G/DL (ref 6.4–8.3)
RBC # BLD AUTO: 3.74 M/UL (ref 4.5–5.9)
SARS-COV-2 RDRP RESP QL NAA+PROBE: NOT DETECTED
SODIUM SERPL-SCNC: 137 MMOL/L (ref 135–144)
SPECIMEN DESCRIPTION: NORMAL
WBC OTHER # BLD: 5.8 K/UL (ref 3.5–11)

## 2025-05-10 PROCEDURE — 85025 COMPLETE CBC W/AUTO DIFF WBC: CPT

## 2025-05-10 PROCEDURE — 6370000000 HC RX 637 (ALT 250 FOR IP)

## 2025-05-10 PROCEDURE — 96360 HYDRATION IV INFUSION INIT: CPT

## 2025-05-10 PROCEDURE — 80053 COMPREHEN METABOLIC PANEL: CPT

## 2025-05-10 PROCEDURE — 87804 INFLUENZA ASSAY W/OPTIC: CPT

## 2025-05-10 PROCEDURE — 71045 X-RAY EXAM CHEST 1 VIEW: CPT

## 2025-05-10 PROCEDURE — 2580000003 HC RX 258

## 2025-05-10 PROCEDURE — 87635 SARS-COV-2 COVID-19 AMP PRB: CPT

## 2025-05-10 PROCEDURE — 36415 COLL VENOUS BLD VENIPUNCTURE: CPT

## 2025-05-10 PROCEDURE — 99284 EMERGENCY DEPT VISIT MOD MDM: CPT

## 2025-05-10 RX ORDER — 0.9 % SODIUM CHLORIDE 0.9 %
500 INTRAVENOUS SOLUTION INTRAVENOUS ONCE
Status: COMPLETED | OUTPATIENT
Start: 2025-05-10 | End: 2025-05-10

## 2025-05-10 RX ORDER — FLUTICASONE PROPIONATE 50 MCG
2 SPRAY, SUSPENSION (ML) NASAL DAILY
Qty: 48 G | Refills: 1 | Status: SHIPPED | OUTPATIENT
Start: 2025-05-10

## 2025-05-10 RX ORDER — ACETAMINOPHEN 500 MG
1000 TABLET ORAL ONCE
Status: COMPLETED | OUTPATIENT
Start: 2025-05-10 | End: 2025-05-10

## 2025-05-10 RX ADMIN — ACETAMINOPHEN 1000 MG: 500 TABLET, FILM COATED ORAL at 23:15

## 2025-05-10 RX ADMIN — SODIUM CHLORIDE 500 ML: 0.9 INJECTION, SOLUTION INTRAVENOUS at 22:41

## 2025-05-10 ASSESSMENT — PAIN SCALES - GENERAL
PAINLEVEL_OUTOF10: 3
PAINLEVEL_OUTOF10: 3

## 2025-05-10 ASSESSMENT — PAIN DESCRIPTION - LOCATION
LOCATION: GENERALIZED
LOCATION: GENERALIZED

## 2025-05-10 ASSESSMENT — PAIN DESCRIPTION - DESCRIPTORS
DESCRIPTORS: ACHING
DESCRIPTORS: ACHING

## 2025-05-10 ASSESSMENT — PAIN - FUNCTIONAL ASSESSMENT: PAIN_FUNCTIONAL_ASSESSMENT: 0-10

## 2025-05-12 NOTE — ED PROVIDER NOTES
[facility]  EMERGENCY DEPARTMENT ENCOUNTER      Pt Name: Sid Blakely  MRN: [id number]  Birthdate 1950  Date of evaluation: 5/10/2025  Provider: Oswaldo Plata MD    CHIEF COMPLAINT       Chief Complaint   Patient presents with    Fever     Had a fever and chills beginning today. Body aches with recent bone marrow study on 4/30.       HISTORY OF PRESENT ILLNESS      Sid Blakely is a 74 y.o., male ,  has a past medical history of Arthritis, Arthritis, Atrial fibrillation (HCC), CAD (coronary artery disease), CHF (congestive heart failure) (HCC), CHF (congestive heart failure) (HCC), COPD (chronic obstructive pulmonary disease) (HCC), Coronary artery disease, Diabetes (HCC), Diabetes mellitus (HCC), H/O coronary angioplasty, Hyperlipidemia, Hypertension, Neuropathy, Numbness and tingling, Pulmonary edema, Sleep apnea, and Unspecified sleep apnea (2012). who was evaluated in the emergency room for Fever.   Had a fever and chills beginning today.   He checked his temperature at home and it was 100.3, he did not take any medication for his symptoms. He had a recent bone marrow biopsy, was told to closely observe for any fever.   His other symptoms include, congestion, runny nose.  He denies headache, sore throat, sob, chest pain, nausea, vomiting, abdominal pain, headache, dysuria, hematuria.         REVIEW OF SYSTEMS       Review of system: Negative except for what is mentioned in the HPI.      PAST MEDICAL HISTORY     Past Medical History:   Diagnosis Date    Arthritis     Arthritis     Atrial fibrillation (HCC)     CAD (coronary artery disease)     CHF (congestive heart failure) (HCC)     CHF (congestive heart failure) (HCC)     COPD (chronic obstructive pulmonary disease) (HCC)     Coronary artery disease     Diabetes (HCC)     Diabetes mellitus (HCC)     H/O coronary angioplasty     Hyperlipidemia     Hypertension     Neuropathy     Numbness and tingling     dannie hands    Pulmonary edema

## 2025-05-14 ENCOUNTER — PATIENT MESSAGE (OUTPATIENT)
Dept: FAMILY MEDICINE CLINIC | Age: 75
End: 2025-05-14

## 2025-05-16 ENCOUNTER — HOSPITAL ENCOUNTER (OUTPATIENT)
Age: 75
Discharge: HOME | End: 2025-05-16
Payer: MEDICARE

## 2025-05-16 ENCOUNTER — APPOINTMENT (OUTPATIENT)
Dept: GENERAL RADIOLOGY | Age: 75
End: 2025-05-16
Payer: MEDICARE

## 2025-05-16 ENCOUNTER — OFFICE VISIT (OUTPATIENT)
Dept: FAMILY MEDICINE CLINIC | Age: 75
End: 2025-05-16

## 2025-05-16 ENCOUNTER — RESULTS FOLLOW-UP (OUTPATIENT)
Dept: FAMILY MEDICINE CLINIC | Age: 75
End: 2025-05-16

## 2025-05-16 ENCOUNTER — HOSPITAL ENCOUNTER (EMERGENCY)
Age: 75
Discharge: HOME | End: 2025-05-16
Attending: FAMILY MEDICINE
Payer: MEDICARE

## 2025-05-16 VITALS
BODY MASS INDEX: 34.29 KG/M2 | TEMPERATURE: 97.8 F | SYSTOLIC BLOOD PRESSURE: 131 MMHG | OXYGEN SATURATION: 96 % | DIASTOLIC BLOOD PRESSURE: 64 MMHG | HEART RATE: 70 BPM | WEIGHT: 239.5 LBS | RESPIRATION RATE: 20 BRPM | HEIGHT: 70 IN

## 2025-05-16 VITALS
SYSTOLIC BLOOD PRESSURE: 134 MMHG | WEIGHT: 240 LBS | DIASTOLIC BLOOD PRESSURE: 60 MMHG | TEMPERATURE: 98.4 F | BODY MASS INDEX: 34.36 KG/M2 | HEART RATE: 52 BPM | HEIGHT: 70 IN | OXYGEN SATURATION: 97 %

## 2025-05-16 DIAGNOSIS — R79.89 ELEVATED TROPONIN: ICD-10-CM

## 2025-05-16 DIAGNOSIS — R63.8 SYMPTOMS OF DEHYDRATION: Primary | ICD-10-CM

## 2025-05-16 DIAGNOSIS — B34.9 NONSPECIFIC SYNDROME SUGGESTIVE OF VIRAL ILLNESS: ICD-10-CM

## 2025-05-16 DIAGNOSIS — D47.2 MONOCLONAL GAMMOPATHY: ICD-10-CM

## 2025-05-16 DIAGNOSIS — R50.9 FEBRILE ILLNESS: ICD-10-CM

## 2025-05-16 DIAGNOSIS — R50.9 FEBRILE ILLNESS: Primary | ICD-10-CM

## 2025-05-16 LAB
ALBUMIN SERPL-MCNC: 3.9 G/DL (ref 3.5–5.2)
ALBUMIN/GLOB SERPL: 1.3 {RATIO} (ref 1–2.5)
ALP SERPL-CCNC: 132 U/L (ref 40–129)
ALT SERPL-CCNC: 32 U/L (ref 5–41)
ANION GAP SERPL CALCULATED.3IONS-SCNC: 12 MMOL/L (ref 9–17)
AST SERPL-CCNC: 26 U/L
BASOPHILS # BLD: 0.03 K/UL (ref 0–0.2)
BASOPHILS NFR BLD: 1 % (ref 0–2)
BILIRUB SERPL-MCNC: 0.7 MG/DL (ref 0.3–1.2)
BILIRUBIN, POC: NORMAL
BLOOD URINE, POC: NORMAL
BUN SERPL-MCNC: 57 MG/DL (ref 8–23)
CALCIUM SERPL-MCNC: 9 MG/DL (ref 8.6–10.4)
CHLORIDE SERPL-SCNC: 98 MMOL/L (ref 98–107)
CLARITY, POC: CLEAR
CO2 SERPL-SCNC: 25 MMOL/L (ref 20–31)
COLOR, POC: YELLOW
CREAT SERPL-MCNC: 2.5 MG/DL (ref 0.7–1.2)
CRP SERPL HS-MCNC: 73.7 MG/L (ref 0–5)
EOSINOPHIL # BLD: 0.09 K/UL (ref 0–0.4)
EOSINOPHILS RELATIVE PERCENT: 2 % (ref 0–5)
ERYTHROCYTE [DISTWIDTH] IN BLOOD BY AUTOMATED COUNT: 13.6 % (ref 12.1–15.2)
ERYTHROCYTE [SEDIMENTATION RATE] IN BLOOD BY PHOTOMETRIC METHOD: 52 MM/HR (ref 0–20)
GFR, ESTIMATED: 26 ML/MIN/1.73M2
GLUCOSE SERPL-MCNC: 235 MG/DL (ref 70–99)
GLUCOSE URINE, POC: NORMAL MG/DL
HCT VFR BLD AUTO: 35.9 % (ref 41–53)
HGB BLD-MCNC: 12 G/DL (ref 13.5–17.5)
IMM GRANULOCYTES # BLD AUTO: 0.01 K/UL (ref 0–0.3)
IMM GRANULOCYTES NFR BLD: 0 % (ref 0–5)
KETONES, POC: NORMAL MG/DL
LEUKOCYTE EST, POC: NORMAL
LYMPHOCYTES NFR BLD: 1.11 K/UL (ref 1–4.8)
LYMPHOCYTES RELATIVE PERCENT: 18 % (ref 13–44)
MCH RBC QN AUTO: 32.3 PG (ref 26–34)
MCHC RBC AUTO-ENTMCNC: 33.4 G/DL (ref 31–37)
MCV RBC AUTO: 96.5 FL (ref 80–100)
MONOCYTES NFR BLD: 0.73 K/UL (ref 0–1)
MONOCYTES NFR BLD: 12 % (ref 5–9)
NEUTROPHILS NFR BLD: 67 % (ref 39–75)
NEUTS SEG NFR BLD: 4.08 K/UL (ref 2.1–6.5)
NITRITE, POC: NORMAL
PH, POC: 5.5
PLATELET # BLD AUTO: 205 K/UL (ref 140–450)
PMV BLD AUTO: 10.1 FL (ref 6–12)
POTASSIUM SERPL-SCNC: 5.1 MMOL/L (ref 3.7–5.3)
PROT SERPL-MCNC: 7 G/DL (ref 6.4–8.3)
PROTEIN, POC: 100 MG/DL
RBC # BLD AUTO: 3.72 M/UL (ref 4.5–5.9)
SARS-COV-2 RDRP RESP QL NAA+PROBE: NOT DETECTED
SODIUM SERPL-SCNC: 135 MMOL/L (ref 135–144)
SPECIFIC GRAVITY, POC: 1.02
SPECIMEN DESCRIPTION: NORMAL
TROPONIN I SERPL HS-MCNC: 101 NG/L (ref 0–22)
TROPONIN I SERPL HS-MCNC: 98 NG/L (ref 0–22)
UROBILINOGEN, POC: 0.2 MG/DL
WBC OTHER # BLD: 6.1 K/UL (ref 3.5–11)

## 2025-05-16 PROCEDURE — 86140 C-REACTIVE PROTEIN: CPT

## 2025-05-16 PROCEDURE — 87635 SARS-COV-2 COVID-19 AMP PRB: CPT

## 2025-05-16 PROCEDURE — 80053 COMPREHEN METABOLIC PANEL: CPT

## 2025-05-16 PROCEDURE — 87086 URINE CULTURE/COLONY COUNT: CPT

## 2025-05-16 PROCEDURE — 87040 BLOOD CULTURE FOR BACTERIA: CPT

## 2025-05-16 PROCEDURE — 71046 X-RAY EXAM CHEST 2 VIEWS: CPT

## 2025-05-16 PROCEDURE — 99285 EMERGENCY DEPT VISIT HI MDM: CPT

## 2025-05-16 PROCEDURE — 84484 ASSAY OF TROPONIN QUANT: CPT

## 2025-05-16 PROCEDURE — 87077 CULTURE AEROBIC IDENTIFY: CPT

## 2025-05-16 PROCEDURE — 6370000000 HC RX 637 (ALT 250 FOR IP): Performed by: FAMILY MEDICINE

## 2025-05-16 PROCEDURE — 93005 ELECTROCARDIOGRAM TRACING: CPT | Performed by: FAMILY MEDICINE

## 2025-05-16 PROCEDURE — 85025 COMPLETE CBC W/AUTO DIFF WBC: CPT

## 2025-05-16 PROCEDURE — 87186 SC STD MICRODIL/AGAR DIL: CPT

## 2025-05-16 PROCEDURE — 85652 RBC SED RATE AUTOMATED: CPT

## 2025-05-16 PROCEDURE — 36415 COLL VENOUS BLD VENIPUNCTURE: CPT

## 2025-05-16 RX ORDER — HYDRALAZINE HYDROCHLORIDE 50 MG/1
50 TABLET, FILM COATED ORAL 2 TIMES DAILY
Qty: 270 TABLET | Refills: 3
Start: 2025-05-16

## 2025-05-16 RX ORDER — CINNAMON BARK
POWDER (GRAM) MISCELLANEOUS
COMMUNITY
End: 2025-05-16

## 2025-05-16 RX ORDER — ASPIRIN 81 MG/1
324 TABLET, CHEWABLE ORAL ONCE
Status: COMPLETED | OUTPATIENT
Start: 2025-05-16 | End: 2025-05-16

## 2025-05-16 RX ADMIN — ASPIRIN 324 MG: 81 TABLET, CHEWABLE ORAL at 15:46

## 2025-05-16 ASSESSMENT — PAIN - FUNCTIONAL ASSESSMENT: PAIN_FUNCTIONAL_ASSESSMENT: NONE - DENIES PAIN

## 2025-05-16 NOTE — PROGRESS NOTES
Name: Sid Blakely  : 1950         Chief Complaint:     Chief Complaint   Patient presents with    Fever     Gets the chills at night       History of Present Illness:      Sid Blakely is a 74 y.o.  male who presents with Fever (Gets the chills at night)      HPI    Patient presents due to malaise and fever off and on beginning 5/10.  5/10 he did come to the ER, had testing which did not identify a specific cause of fever.  Recalls that night he woke up during the night feeling he needed to have bowel movement, went to the bathroom and vomited up some bilious fluid, did not have any diarrhea.  He has continued to have chills intermittently over the past few days.  He has some nasal congestion which is not unusual for him related to allergies.  No cough or sore throat.  Breathing is at its baseline.  Bowel movements are at their baseline also, range from diarrhea to constipation, which again is his baseline.  No abdominal pain.  Poor appetite for quite a while.  He notices with urination that he often has urgency and sometimes is unable to make it to the restroom in time.  Per note that wife sent with him, he had a normal postvoid residual while hospitalized at [facility].  He also underwent a recent bone marrow biopsy, site on the left ilium, denies any pain or swelling in the area.  He typically takes acetaminophen total of about 3 g/day and has continued that this week.    Medical History:     Patient Active Problem List   Diagnosis    Hypertension    Hyperlipidemia    Type 2 diabetes mellitus without complication, with long-term current use of insulin (HCC)    Coronary artery disease involving native coronary artery of native heart with angina pectoris    H/O coronary angioplasty    Severe nonproliferative diabetic retinopathy with macular edema associated with type 2 diabetes mellitus (HCC)    Type 2 diabetes mellitus with peripheral neuropathy (HCC)    Diabetic peripheral neuropathy (HCC)    GHAZALA

## 2025-05-16 NOTE — ED PROVIDER NOTES
[facility]  EMERGENCY DEPARTMENT ENCOUNTER      Pt Name: Sid Blakely  MRN: [id number]  Birthdate 1950  Date of evaluation: 5/16/2025  Provider: Sukhdev Steven MD    CHIEF COMPLAINT       Chief Complaint   Patient presents with    Fever     Ongoing fever and abnormal labs today         HISTORY OF PRESENT ILLNESS      Sid Blakely is a 74 y.o. male who presents to the emergency department via private vehicle, patient been to PCP office earlier today with various complaints, has been having intermittent fevers at evening time of 100.4, has had subjective fever chills, is a few nights ago he was not sure if he was going to throw up or have diarrhea and went to the bathroom though not occurred.  Overall patient's been not feeling quite his normal self.  Denies any significant cough over his baseline COPD, denies any chest pain, denies any increase in swelling of his lower extremities, states compliant with all medications.  No known sick contacts.  Patient did have a bone marrow biopsy taken off the left posterior lateral pelvis 2.5 weeks prior.  Patient recently diagnosed MGUS after they rule out multiple myeloma.    PCP: Justine  Cards: Alisia  Heme-onc: Rubi  Endo: Mario        REVIEW OF SYSTEMS       Review of Systems   Constitutional:  Positive for fever (Recurrent low-grade fevers at night, 100.4 (per wife)).        Intermittent fever chills   HENT:          Intermittent mild congestion   Gastrointestinal:         Sensation of having to vomit or have diarrhea a few nights ago   All other systems reviewed and are negative.        PAST MEDICAL HISTORY     Past Medical History:   Diagnosis Date    Arthritis     Arthritis     Atrial fibrillation (HCC)     CAD (coronary artery disease)     CHF (congestive heart failure) (HCC)     CHF (congestive heart failure) (HCC)     COPD (chronic obstructive pulmonary disease) (HCC)     Coronary artery disease     Diabetes (HCC)     Diabetes mellitus

## 2025-05-16 NOTE — DISCHARGE INSTRUCTIONS
Stay well-hydrated with water or nonsugar sports drinks, we did order outpatient labs for next week, close follow-up with your cardiologist Dr. Crump next week, follow-up with your established primary care, return ER if symptoms change worsen or other concerns

## 2025-05-17 ENCOUNTER — RESULTS FOLLOW-UP (OUTPATIENT)
Dept: EMERGENCY DEPT | Age: 75
End: 2025-05-17

## 2025-05-17 LAB
EKG Q-T INTERVAL: 426 MS
EKG QRS DURATION: 162 MS
EKG QTC CALCULATION (BAZETT): 485 MS
EKG R AXIS: -7 DEGREES
EKG T AXIS: 194 DEGREES
EKG VENTRICULAR RATE: 78 BPM

## 2025-05-17 PROCEDURE — 93010 ELECTROCARDIOGRAM REPORT: CPT | Performed by: INTERNAL MEDICINE

## 2025-05-18 LAB
MICROORGANISM SPEC CULT: ABNORMAL
MICROORGANISM SPEC CULT: ABNORMAL
MICROORGANISM SPEC CULT: NORMAL
MICROORGANISM SPEC CULT: NORMAL
SERVICE CMNT-IMP: ABNORMAL
SERVICE CMNT-IMP: NORMAL
SERVICE CMNT-IMP: NORMAL
SPECIMEN DESCRIPTION: ABNORMAL
SPECIMEN DESCRIPTION: NORMAL
SPECIMEN DESCRIPTION: NORMAL

## 2025-05-19 ENCOUNTER — CLINICAL SUPPORT (OUTPATIENT)
Dept: FAMILY MEDICINE CLINIC | Age: 75
End: 2025-05-19
Payer: MEDICARE

## 2025-05-19 ENCOUNTER — TELEPHONE (OUTPATIENT)
Dept: FAMILY MEDICINE CLINIC | Age: 75
End: 2025-05-19

## 2025-05-19 DIAGNOSIS — N30.01 ACUTE CYSTITIS WITH HEMATURIA: Primary | ICD-10-CM

## 2025-05-19 PROCEDURE — 96372 THER/PROPH/DIAG INJ SC/IM: CPT | Performed by: FAMILY MEDICINE

## 2025-05-19 RX ORDER — AMPICILLIN 500 MG/1
500 CAPSULE ORAL 2 TIMES DAILY
Qty: 14 CAPSULE | Refills: 0 | Status: SHIPPED | OUTPATIENT
Start: 2025-05-19 | End: 2025-05-26

## 2025-05-19 RX ORDER — AMIODARONE HYDROCHLORIDE 200 MG/1
200 TABLET ORAL
Qty: 90 TABLET | Refills: 3 | Status: SHIPPED | OUTPATIENT
Start: 2025-05-19

## 2025-05-19 RX ORDER — CEFTRIAXONE 1 G/1
1000 INJECTION, POWDER, FOR SOLUTION INTRAMUSCULAR; INTRAVENOUS ONCE
Status: COMPLETED | OUTPATIENT
Start: 2025-05-19 | End: 2025-05-19

## 2025-05-19 RX ADMIN — CEFTRIAXONE 1000 MG: 1 INJECTION, POWDER, FOR SOLUTION INTRAMUSCULAR; INTRAVENOUS at 14:41

## 2025-05-19 NOTE — TELEPHONE ENCOUNTER
Yes, has actually rather low-level infection but with 2 bacteria and it is still possible this could be causing his symptoms.  Between the antibiotic resistance, kidney function, and other medications, it is pretty difficult finding a good antibiotic.  I think the best course of action is Rocephin 1 g IM daily for 5 days, either our office or upstairs, plus ampicillin 500 mg BID for 7 days. Ampicillin rx sent.

## 2025-05-19 NOTE — TELEPHONE ENCOUNTER
Patient stopped by office for refill of his Amiodarone 200mg taking 5 times weekly. Send to [facility] mail pharmacy

## 2025-05-19 NOTE — TELEPHONE ENCOUNTER
Patient was in the ED on 5/16/25.  Patient's wife called to see if Dr. Fletcher would take a peak at his urine culture.      Health Maintenance   Topic Date Due    DTaP/Tdap/Td vaccine (1 - Tdap) Never done    Diabetic retinal exam  04/29/2023    Diabetic foot exam  11/14/2023    COVID-19 Vaccine (8 - 2024-25 season) 05/03/2025    Annual Wellness Visit (Medicare)  05/15/2025    Diabetic Alb to Cr ratio (uACR) test  11/13/2025    Depression Screen  04/10/2026    Lipids  04/21/2026    A1C test (Diabetic or Prediabetic)  04/22/2026    GFR test (Diabetes, CKD 3-4, OR last GFR 15-59)  05/16/2026    Colorectal Cancer Screen  04/29/2034    Flu vaccine  Completed    Shingles vaccine  Completed    Pneumococcal 50+ years Vaccine  Completed    Respiratory Syncytial Virus (RSV) Pregnant or age 60 yrs+  Completed    AAA screen  Completed    Hepatitis C screen  Completed    Hepatitis A vaccine  Aged Out    Hepatitis B vaccine  Aged Out    Hib vaccine  Aged Out    Polio vaccine  Aged Out    Meningococcal (ACWY) vaccine  Aged Out    Meningococcal B vaccine  Aged Out             (applicable per patient's age: Cancer Screenings, Depression Screening, Fall Risk Screening, Immunizations)    Hemoglobin A1C (%)   Date Value   04/22/2025 8.1   11/13/2024 7.7   07/30/2024 7.6     AST (U/L)   Date Value   05/16/2025 26     ALT (U/L)   Date Value   05/16/2025 32     BUN (mg/dL)   Date Value   05/16/2025 57 (H)      (goal A1C is < 7)   (goal LDL is <100) need 30-50% reduction from baseline     BP Readings from Last 3 Encounters:   05/16/25 131/64   05/16/25 134/60   05/10/25 (!) 176/44    (goal /80)      All Future Testing planned in [facility]:  Lab Frequency Next Occurrence   BUN & Creatinine Once 11/04/2024   Basic Metabolic Panel Once 04/10/2025   IR PROCEDURAL REQUEST Once 04/16/2025   CT Bone Marrow Biopsy and Aspiration Once 04/16/2025   Extended cardiac holter monitor (3 days-14 day) Once 05/07/2025   Echo (TTE) complete (PRN

## 2025-05-20 ENCOUNTER — HOSPITAL ENCOUNTER (OUTPATIENT)
Age: 75
Discharge: HOME | End: 2025-05-20
Payer: MEDICARE

## 2025-05-20 ENCOUNTER — CLINICAL SUPPORT (OUTPATIENT)
Dept: FAMILY MEDICINE CLINIC | Age: 75
End: 2025-05-20
Payer: MEDICARE

## 2025-05-20 DIAGNOSIS — N30.01 ACUTE CYSTITIS WITH HEMATURIA: Primary | ICD-10-CM

## 2025-05-20 LAB
ANION GAP SERPL CALCULATED.3IONS-SCNC: 13 MMOL/L (ref 9–17)
BASOPHILS # BLD: 0.04 K/UL (ref 0–0.2)
BASOPHILS NFR BLD: 1 % (ref 0–2)
BUN SERPL-MCNC: 56 MG/DL (ref 8–23)
CALCIUM SERPL-MCNC: 9.1 MG/DL (ref 8.6–10.4)
CHLORIDE SERPL-SCNC: 99 MMOL/L (ref 98–107)
CO2 SERPL-SCNC: 24 MMOL/L (ref 20–31)
CREAT SERPL-MCNC: 1.9 MG/DL (ref 0.7–1.2)
CRP SERPL HS-MCNC: 15.8 MG/L (ref 0–5)
EOSINOPHIL # BLD: 0.17 K/UL (ref 0–0.4)
EOSINOPHILS RELATIVE PERCENT: 3 % (ref 0–5)
ERYTHROCYTE [DISTWIDTH] IN BLOOD BY AUTOMATED COUNT: 13.6 % (ref 12.1–15.2)
ERYTHROCYTE [SEDIMENTATION RATE] IN BLOOD BY PHOTOMETRIC METHOD: 64 MM/HR (ref 0–20)
GFR, ESTIMATED: 37 ML/MIN/1.73M2
GLUCOSE SERPL-MCNC: 271 MG/DL (ref 70–99)
HCT VFR BLD AUTO: 36.9 % (ref 41–53)
HGB BLD-MCNC: 12 G/DL (ref 13.5–17.5)
IMM GRANULOCYTES # BLD AUTO: 0.02 K/UL (ref 0–0.3)
IMM GRANULOCYTES NFR BLD: 0 % (ref 0–5)
LYMPHOCYTES NFR BLD: 1.27 K/UL (ref 1–4.8)
LYMPHOCYTES RELATIVE PERCENT: 19 % (ref 13–44)
MCH RBC QN AUTO: 32.1 PG (ref 26–34)
MCHC RBC AUTO-ENTMCNC: 32.5 G/DL (ref 31–37)
MCV RBC AUTO: 98.7 FL (ref 80–100)
MONOCYTES NFR BLD: 0.6 K/UL (ref 0–1)
MONOCYTES NFR BLD: 9 % (ref 5–9)
NEUTROPHILS NFR BLD: 69 % (ref 39–75)
NEUTS SEG NFR BLD: 4.61 K/UL (ref 2.1–6.5)
PLATELET # BLD AUTO: 329 K/UL (ref 140–450)
PMV BLD AUTO: 10 FL (ref 6–12)
POTASSIUM SERPL-SCNC: 5.1 MMOL/L (ref 3.7–5.3)
RBC # BLD AUTO: 3.74 M/UL (ref 4.5–5.9)
SODIUM SERPL-SCNC: 136 MMOL/L (ref 135–144)
TROPONIN I SERPL HS-MCNC: 86 NG/L (ref 0–22)
WBC OTHER # BLD: 6.7 K/UL (ref 3.5–11)

## 2025-05-20 PROCEDURE — 80048 BASIC METABOLIC PNL TOTAL CA: CPT

## 2025-05-20 PROCEDURE — 84484 ASSAY OF TROPONIN QUANT: CPT

## 2025-05-20 PROCEDURE — 85652 RBC SED RATE AUTOMATED: CPT

## 2025-05-20 PROCEDURE — 86140 C-REACTIVE PROTEIN: CPT

## 2025-05-20 PROCEDURE — 36415 COLL VENOUS BLD VENIPUNCTURE: CPT

## 2025-05-20 PROCEDURE — 96372 THER/PROPH/DIAG INJ SC/IM: CPT | Performed by: FAMILY MEDICINE

## 2025-05-20 PROCEDURE — 85025 COMPLETE CBC W/AUTO DIFF WBC: CPT

## 2025-05-20 RX ORDER — CEFTRIAXONE 1 G/1
1000 INJECTION, POWDER, FOR SOLUTION INTRAMUSCULAR; INTRAVENOUS ONCE
Status: COMPLETED | OUTPATIENT
Start: 2025-05-20 | End: 2025-05-20

## 2025-05-20 RX ADMIN — CEFTRIAXONE 1000 MG: 1 INJECTION, POWDER, FOR SOLUTION INTRAMUSCULAR; INTRAVENOUS at 11:32

## 2025-05-21 ENCOUNTER — CLINICAL SUPPORT (OUTPATIENT)
Dept: FAMILY MEDICINE CLINIC | Age: 75
End: 2025-05-21
Payer: MEDICARE

## 2025-05-21 ENCOUNTER — OFFICE VISIT (OUTPATIENT)
Dept: CARDIOLOGY CLINIC | Age: 75
End: 2025-05-21
Payer: MEDICARE

## 2025-05-21 VITALS
DIASTOLIC BLOOD PRESSURE: 60 MMHG | HEART RATE: 62 BPM | WEIGHT: 237 LBS | SYSTOLIC BLOOD PRESSURE: 130 MMHG | OXYGEN SATURATION: 96 % | BODY MASS INDEX: 34.01 KG/M2

## 2025-05-21 DIAGNOSIS — R00.1 BRADYCARDIA: ICD-10-CM

## 2025-05-21 DIAGNOSIS — N30.01 ACUTE CYSTITIS WITH HEMATURIA: Primary | ICD-10-CM

## 2025-05-21 DIAGNOSIS — I10 PRIMARY HYPERTENSION: ICD-10-CM

## 2025-05-21 DIAGNOSIS — I44.7 LBBB (LEFT BUNDLE BRANCH BLOCK): ICD-10-CM

## 2025-05-21 DIAGNOSIS — E55.9 VITAMIN D DEFICIENCY DISEASE: ICD-10-CM

## 2025-05-21 DIAGNOSIS — I49.8 JUNCTIONAL RHYTHM: Primary | ICD-10-CM

## 2025-05-21 DIAGNOSIS — I50.23 ACUTE ON CHRONIC HFREF (HEART FAILURE WITH REDUCED EJECTION FRACTION) (HCC): ICD-10-CM

## 2025-05-21 DIAGNOSIS — I48.0 PAF (PAROXYSMAL ATRIAL FIBRILLATION) (HCC): ICD-10-CM

## 2025-05-21 DIAGNOSIS — I27.20 PULMONARY HTN (HCC): ICD-10-CM

## 2025-05-21 DIAGNOSIS — I25.119 CORONARY ARTERY DISEASE INVOLVING NATIVE CORONARY ARTERY OF NATIVE HEART WITH ANGINA PECTORIS: ICD-10-CM

## 2025-05-21 PROCEDURE — 3074F SYST BP LT 130 MM HG: CPT | Performed by: INTERNAL MEDICINE

## 2025-05-21 PROCEDURE — G8428 CUR MEDS NOT DOCUMENT: HCPCS | Performed by: INTERNAL MEDICINE

## 2025-05-21 PROCEDURE — 1123F ACP DISCUSS/DSCN MKR DOCD: CPT | Performed by: INTERNAL MEDICINE

## 2025-05-21 PROCEDURE — 96372 THER/PROPH/DIAG INJ SC/IM: CPT | Performed by: FAMILY MEDICINE

## 2025-05-21 PROCEDURE — 1036F TOBACCO NON-USER: CPT | Performed by: INTERNAL MEDICINE

## 2025-05-21 PROCEDURE — 3078F DIAST BP <80 MM HG: CPT | Performed by: INTERNAL MEDICINE

## 2025-05-21 PROCEDURE — G8417 CALC BMI ABV UP PARAM F/U: HCPCS | Performed by: INTERNAL MEDICINE

## 2025-05-21 PROCEDURE — 3017F COLORECTAL CA SCREEN DOC REV: CPT | Performed by: INTERNAL MEDICINE

## 2025-05-21 PROCEDURE — 99214 OFFICE O/P EST MOD 30 MIN: CPT | Performed by: INTERNAL MEDICINE

## 2025-05-21 RX ORDER — CEFTRIAXONE 1 G/1
INJECTION, POWDER, FOR SOLUTION INTRAMUSCULAR; INTRAVENOUS EVERY 24 HOURS
COMMUNITY

## 2025-05-21 RX ORDER — CEFTRIAXONE 1 G/1
1000 INJECTION, POWDER, FOR SOLUTION INTRAMUSCULAR; INTRAVENOUS ONCE
Status: COMPLETED | OUTPATIENT
Start: 2025-05-21 | End: 2025-05-21

## 2025-05-21 RX ADMIN — CEFTRIAXONE 1000 MG: 1 INJECTION, POWDER, FOR SOLUTION INTRAMUSCULAR; INTRAVENOUS at 12:31

## 2025-05-21 NOTE — PROGRESS NOTES
Pt in office for ED f/u with Dr Crump    Currently has a UTI- getting rocephin shots at primary care office for 1 week and on ampicillin     Is still in a-fib but pt does not feel it    Is feeling better, UTI is being treated    Edema is about the same, maybe less per Karma        ~~~~~~~~~~~~~~~~~~~~~~~~~~~~~~~~~~~~~~~~~~~~~  No changes  Keep appointment in July   Will decide then if they want to do cardioversion   Repeat blood work for July appointment

## 2025-05-22 ENCOUNTER — CLINICAL SUPPORT (OUTPATIENT)
Dept: FAMILY MEDICINE CLINIC | Age: 75
End: 2025-05-22
Payer: MEDICARE

## 2025-05-22 DIAGNOSIS — N30.01 ACUTE CYSTITIS WITH HEMATURIA: Primary | ICD-10-CM

## 2025-05-22 LAB
MICROORGANISM SPEC CULT: NORMAL
MICROORGANISM SPEC CULT: NORMAL
SERVICE CMNT-IMP: NORMAL
SERVICE CMNT-IMP: NORMAL
SPECIMEN DESCRIPTION: NORMAL
SPECIMEN DESCRIPTION: NORMAL

## 2025-05-22 PROCEDURE — 96372 THER/PROPH/DIAG INJ SC/IM: CPT | Performed by: FAMILY MEDICINE

## 2025-05-22 RX ORDER — CEFTRIAXONE 1 G/1
1000 INJECTION, POWDER, FOR SOLUTION INTRAMUSCULAR; INTRAVENOUS ONCE
Status: COMPLETED | OUTPATIENT
Start: 2025-05-22 | End: 2025-05-22

## 2025-05-22 RX ADMIN — CEFTRIAXONE 1000 MG: 1 INJECTION, POWDER, FOR SOLUTION INTRAMUSCULAR; INTRAVENOUS at 11:36

## 2025-05-23 ENCOUNTER — CLINICAL SUPPORT (OUTPATIENT)
Dept: FAMILY MEDICINE CLINIC | Age: 75
End: 2025-05-23
Payer: MEDICARE

## 2025-05-23 DIAGNOSIS — N30.01 ACUTE CYSTITIS WITH HEMATURIA: Primary | ICD-10-CM

## 2025-05-23 PROCEDURE — 96372 THER/PROPH/DIAG INJ SC/IM: CPT | Performed by: FAMILY MEDICINE

## 2025-05-23 RX ORDER — CEFTRIAXONE 1 G/1
1000 INJECTION, POWDER, FOR SOLUTION INTRAMUSCULAR; INTRAVENOUS ONCE
Status: COMPLETED | OUTPATIENT
Start: 2025-05-23 | End: 2025-05-23

## 2025-05-23 RX ADMIN — CEFTRIAXONE 1000 MG: 1 INJECTION, POWDER, FOR SOLUTION INTRAMUSCULAR; INTRAVENOUS at 11:46

## 2025-05-26 ENCOUNTER — PATIENT MESSAGE (OUTPATIENT)
Dept: FAMILY MEDICINE CLINIC | Age: 75
End: 2025-05-26

## 2025-05-26 DIAGNOSIS — N30.01 ACUTE CYSTITIS WITH HEMATURIA: Primary | ICD-10-CM

## 2025-06-02 ENCOUNTER — HOSPITAL ENCOUNTER (OUTPATIENT)
Age: 75
Setting detail: SPECIMEN
Discharge: HOME | End: 2025-06-02
Payer: MEDICARE

## 2025-06-02 DIAGNOSIS — N30.01 ACUTE CYSTITIS WITH HEMATURIA: ICD-10-CM

## 2025-06-02 PROCEDURE — 87086 URINE CULTURE/COLONY COUNT: CPT

## 2025-06-02 NOTE — PROGRESS NOTES
Noam Crump M.D.  [facility]  [facility]  [address] [id number] [phone]        May 21, 2025        Michelle Flethcer,   [address] [place] [address]       RE:  ROHINI VICTORIA JESSICA  :  1950      Dear Dr. Fletcher:    CHIEF COMPLAINT:    Paroxysmal atrial fibrillation currently in atrial fibrillation.  On amiodarone 200 mg 5 days a week.    HISTORY OF PRESENT ILLNESS:  I met with Jason and his wife on May 21, 2025.  On May 7th, he was in sinus rhythm.  He overall was doing fairly well.  His full H and P is outlined on 2025.      However, he came to emergency room with the viral syndrome on May 16, 2025.  His EKG showed atrial fibrillation with a controlled ventricular response with a left bundle branch block.  I brought him back today for evaluation.    He does have a UTI.  He is getting Rocephin shots for 1 week and is also on ampicillin.    He is still in atrial fibrillation, although he does not feel it.  He is feeling better now that his UTI is being treated.    He does have some edema, although it seems about at baseline.  His shortness of breath is also about at baseline.  Denies any chest pain or chest discomfort.    MEDICATIONS:  Cordarone 200 mg 5 days a week, Eliquis 5 mg b.i.d., Lipitor 40 mg daily, Rocephin 1 g every 24 hours, vitamin D 1000 units b.i.d., Co Q10 200 mg nightly, Entresto 97/103 b.i.d., Flonase p.r.n., Lasix 80 mg b.i.d., hydralazine 50 mg b.i.d., aspart sliding scale, glargine 15 units daily, semaglutide every 7 days, spironolactone 25 mg b.i.d. and Ultram p.r.n.    PHYSICAL EXAMINATION:  VITAL SIGNS:  His blood pressure today was 130/60 with a heart rate of 62 and somewhat irregular, respiratory rate 18, O2 saturation was 96%, weight of 237 pounds.  GENERAL:  He is a pleasant 74-year-old gentleman.  Denied pain.  He was oriented to person, place, and time.  Answered appropriately.  SKIN:  No

## 2025-06-03 ENCOUNTER — RESULTS FOLLOW-UP (OUTPATIENT)
Dept: FAMILY MEDICINE CLINIC | Age: 75
End: 2025-06-03

## 2025-06-03 LAB
MICROORGANISM SPEC CULT: NORMAL
SERVICE CMNT-IMP: NORMAL
SPECIMEN DESCRIPTION: NORMAL

## 2025-06-18 ENCOUNTER — HOSPITAL ENCOUNTER (OUTPATIENT)
Age: 75
Discharge: HOME | End: 2025-06-20
Attending: INTERNAL MEDICINE
Payer: MEDICARE

## 2025-06-18 VITALS — WEIGHT: 237 LBS | HEIGHT: 70 IN | BODY MASS INDEX: 33.93 KG/M2

## 2025-06-18 DIAGNOSIS — R00.1 BRADYCARDIA: ICD-10-CM

## 2025-06-18 DIAGNOSIS — I48.0 PAROXYSMAL ATRIAL FIBRILLATION (HCC): ICD-10-CM

## 2025-06-18 DIAGNOSIS — I25.119 CORONARY ARTERY DISEASE INVOLVING NATIVE CORONARY ARTERY OF NATIVE HEART WITH ANGINA PECTORIS: ICD-10-CM

## 2025-06-18 DIAGNOSIS — I48.11 LONGSTANDING PERSISTENT ATRIAL FIBRILLATION (HCC): ICD-10-CM

## 2025-06-18 LAB — ECHO BSA: 2.3 M2

## 2025-06-18 PROCEDURE — 6360000004 HC RX CONTRAST MEDICATION: Performed by: INTERNAL MEDICINE

## 2025-06-18 PROCEDURE — C8929 TTE W OR WO FOL WCON,DOPPLER: HCPCS

## 2025-06-18 PROCEDURE — 93242 EXT ECG>48HR<7D RECORDING: CPT

## 2025-06-18 RX ADMIN — SULFUR HEXAFLUORIDE 2 ML: KIT at 13:57

## 2025-06-20 LAB
ECHO AO ROOT DIAM: 3.1 CM
ECHO AO ROOT INDEX: 1.38 CM/M2
ECHO AV AREA PEAK VELOCITY: 2.4 CM2
ECHO AV AREA VTI: 2.5 CM2
ECHO AV AREA/BSA PEAK VELOCITY: 1.1 CM2/M2
ECHO AV AREA/BSA VTI: 1.1 CM2/M2
ECHO AV MEAN GRADIENT: 5 MMHG
ECHO AV MEAN VELOCITY: 1 M/S
ECHO AV PEAK GRADIENT: 9 MMHG
ECHO AV PEAK VELOCITY: 1.5 M/S
ECHO AV VELOCITY RATIO: 0.6
ECHO AV VTI: 29 CM
ECHO BSA: 2.3 M2
ECHO EST RA PRESSURE: 8 MMHG
ECHO LA AREA 4C: 29.9 CM2
ECHO LA VOL A-L A2C: 122 ML (ref 18–58)
ECHO LA VOL A-L A4C: 109 ML (ref 18–58)
ECHO LA VOL BP: 116 ML (ref 18–58)
ECHO LA VOL MOD A2C: 118 ML (ref 18–58)
ECHO LA VOL MOD A4C: 99 ML (ref 18–58)
ECHO LA VOL/BSA BIPLANE: 52 ML/M2 (ref 16–34)
ECHO LA VOLUME AREA LENGTH: 123 ML
ECHO LA VOLUME INDEX A-L A2C: 54 ML/M2 (ref 16–34)
ECHO LA VOLUME INDEX A-L A4C: 49 ML/M2 (ref 16–34)
ECHO LA VOLUME INDEX AREA LENGTH: 55 ML/M2 (ref 16–34)
ECHO LA VOLUME INDEX MOD A2C: 53 ML/M2 (ref 16–34)
ECHO LA VOLUME INDEX MOD A4C: 44 ML/M2 (ref 16–34)
ECHO LV EDV A2C: 146 ML
ECHO LV EDV A4C: 220 ML
ECHO LV EDV BP: 186 ML (ref 67–155)
ECHO LV EDV INDEX A4C: 98 ML/M2
ECHO LV EDV INDEX BP: 83 ML/M2
ECHO LV EDV NDEX A2C: 65 ML/M2
ECHO LV EF PHYSICIAN: 38 %
ECHO LV EJECTION FRACTION A2C: 35 %
ECHO LV EJECTION FRACTION A4C: 34 %
ECHO LV EJECTION FRACTION BIPLANE: 37 % (ref 55–100)
ECHO LV ESV A2C: 95 ML
ECHO LV ESV A4C: 145 ML
ECHO LV ESV BP: 117 ML (ref 22–58)
ECHO LV ESV INDEX A2C: 42 ML/M2
ECHO LV ESV INDEX A4C: 65 ML/M2
ECHO LV ESV INDEX BP: 52 ML/M2
ECHO LV INTERNAL DIMENSION DIASTOLE INDEX: 2.54 CM/M2
ECHO LV INTERNAL DIMENSION DIASTOLIC: 5.7 CM (ref 4.2–5.9)
ECHO LV IVSD: 1.3 CM (ref 0.6–1)
ECHO LV MASS 2D: 375.7 G (ref 88–224)
ECHO LV MASS INDEX 2D: 167.7 G/M2 (ref 49–115)
ECHO LV POSTERIOR WALL DIASTOLIC: 1.6 CM (ref 0.6–1)
ECHO LV RELATIVE WALL THICKNESS RATIO: 0.56
ECHO LVOT AREA: 4.2 CM2
ECHO LVOT AV VTI INDEX: 0.62
ECHO LVOT DIAM: 2.3 CM
ECHO LVOT MEAN GRADIENT: 1 MMHG
ECHO LVOT PEAK GRADIENT: 3 MMHG
ECHO LVOT PEAK VELOCITY: 0.9 M/S
ECHO LVOT STROKE VOLUME INDEX: 33.2 ML/M2
ECHO LVOT SV: 74.3 ML
ECHO LVOT VTI: 17.9 CM
ECHO RA AREA 4C: 26.4 CM2
ECHO RA END SYSTOLIC VOLUME APICAL 4 CHAMBER INDEX BSA: 41 ML/M2
ECHO RA VOLUME AREA LENGTH APICAL 4 CHAMBER: 95 ML
ECHO RA VOLUME: 91 ML
ECHO RIGHT VENTRICULAR SYSTOLIC PRESSURE (RVSP): 53 MMHG
ECHO RV BASAL DIMENSION: 5.1 CM
ECHO RV LONGITUDINAL DIMENSION: 8.2 CM
ECHO RV MID DIMENSION: 4 CM
ECHO RV TAPSE: 0.8 CM (ref 1.7–?)
ECHO TV REGURGITANT MAX VELOCITY: 3.34 M/S
ECHO TV REGURGITANT PEAK GRADIENT: 45 MMHG

## 2025-06-20 PROCEDURE — 93306 TTE W/DOPPLER COMPLETE: CPT | Performed by: INTERNAL MEDICINE

## 2025-06-30 LAB — ECHO BSA: 2.3 M2

## 2025-07-01 ENCOUNTER — HOSPITAL ENCOUNTER (OUTPATIENT)
Age: 75
Discharge: HOME | End: 2025-07-01
Payer: MEDICARE

## 2025-07-01 DIAGNOSIS — I10 PRIMARY HYPERTENSION: ICD-10-CM

## 2025-07-01 DIAGNOSIS — I27.20 PULMONARY HTN (HCC): ICD-10-CM

## 2025-07-01 DIAGNOSIS — I49.8 JUNCTIONAL RHYTHM: ICD-10-CM

## 2025-07-01 DIAGNOSIS — I44.7 LBBB (LEFT BUNDLE BRANCH BLOCK): ICD-10-CM

## 2025-07-01 DIAGNOSIS — E55.9 VITAMIN D DEFICIENCY DISEASE: ICD-10-CM

## 2025-07-01 DIAGNOSIS — I48.0 PAF (PAROXYSMAL ATRIAL FIBRILLATION) (HCC): ICD-10-CM

## 2025-07-01 DIAGNOSIS — I50.23 ACUTE ON CHRONIC HFREF (HEART FAILURE WITH REDUCED EJECTION FRACTION) (HCC): ICD-10-CM

## 2025-07-01 DIAGNOSIS — I25.119 CORONARY ARTERY DISEASE INVOLVING NATIVE CORONARY ARTERY OF NATIVE HEART WITH ANGINA PECTORIS: ICD-10-CM

## 2025-07-01 DIAGNOSIS — R00.1 BRADYCARDIA: ICD-10-CM

## 2025-07-01 LAB
25(OH)D3 SERPL-MCNC: 53.2 NG/ML (ref 30–100)
ALBUMIN SERPL-MCNC: 4.1 G/DL (ref 3.5–5.2)
ALBUMIN/GLOB SERPL: 1.4 {RATIO} (ref 1–2.5)
ALP SERPL-CCNC: 76 U/L (ref 40–129)
ALT SERPL-CCNC: 13 U/L (ref 5–41)
ANION GAP SERPL CALCULATED.3IONS-SCNC: 7 MMOL/L (ref 9–17)
AST SERPL-CCNC: 19 U/L
BASOPHILS # BLD: 0.02 K/UL (ref 0–0.2)
BASOPHILS NFR BLD: 0 % (ref 0–2)
BILIRUB SERPL-MCNC: 0.7 MG/DL (ref 0.3–1.2)
BUN SERPL-MCNC: 40 MG/DL (ref 8–23)
CALCIUM SERPL-MCNC: 9.5 MG/DL (ref 8.6–10.4)
CHLORIDE SERPL-SCNC: 101 MMOL/L (ref 98–107)
CHOLEST SERPL-MCNC: 103 MG/DL (ref 0–199)
CHOLESTEROL/HDL RATIO: 3.8
CO2 SERPL-SCNC: 29 MMOL/L (ref 20–31)
CREAT SERPL-MCNC: 1.5 MG/DL (ref 0.7–1.2)
EOSINOPHIL # BLD: 0.14 K/UL (ref 0–0.4)
EOSINOPHILS RELATIVE PERCENT: 2 % (ref 0–5)
ERYTHROCYTE [DISTWIDTH] IN BLOOD BY AUTOMATED COUNT: 13.5 % (ref 12.1–15.2)
GFR, ESTIMATED: 49 ML/MIN/1.73M2
GLUCOSE SERPL-MCNC: 162 MG/DL (ref 70–99)
HCT VFR BLD AUTO: 35.8 % (ref 41–53)
HDLC SERPL-MCNC: 27 MG/DL
HGB BLD-MCNC: 11.8 G/DL (ref 13.5–17.5)
IMM GRANULOCYTES # BLD AUTO: 0 K/UL (ref 0–0.3)
IMM GRANULOCYTES NFR BLD: 0 % (ref 0–5)
LDLC SERPL CALC-MCNC: 39 MG/DL (ref 0–100)
LYMPHOCYTES NFR BLD: 1.13 K/UL (ref 1–4.8)
LYMPHOCYTES RELATIVE PERCENT: 17 % (ref 13–44)
MAGNESIUM SERPL-MCNC: 2.3 MG/DL (ref 1.6–2.6)
MCH RBC QN AUTO: 32.6 PG (ref 26–34)
MCHC RBC AUTO-ENTMCNC: 33 G/DL (ref 31–37)
MCV RBC AUTO: 98.9 FL (ref 80–100)
MONOCYTES NFR BLD: 0.7 K/UL (ref 0–1)
MONOCYTES NFR BLD: 11 % (ref 5–9)
NEUTROPHILS NFR BLD: 70 % (ref 39–75)
NEUTS SEG NFR BLD: 4.63 K/UL (ref 2.1–6.5)
PLATELET # BLD AUTO: 224 K/UL (ref 140–450)
PMV BLD AUTO: 9.7 FL (ref 6–12)
POTASSIUM SERPL-SCNC: 5.2 MMOL/L (ref 3.7–5.3)
PROT SERPL-MCNC: 7.1 G/DL (ref 6.4–8.3)
RBC # BLD AUTO: 3.62 M/UL (ref 4.5–5.9)
SODIUM SERPL-SCNC: 137 MMOL/L (ref 135–144)
TRIGL SERPL-MCNC: 187 MG/DL
TSH SERPL DL<=0.05 MIU/L-ACNC: 2.87 UIU/ML (ref 0.27–4.2)
VLDLC SERPL CALC-MCNC: 37 MG/DL (ref 1–30)
WBC OTHER # BLD: 6.6 K/UL (ref 3.5–11)

## 2025-07-01 PROCEDURE — 80061 LIPID PANEL: CPT

## 2025-07-01 PROCEDURE — 83735 ASSAY OF MAGNESIUM: CPT

## 2025-07-01 PROCEDURE — 36415 COLL VENOUS BLD VENIPUNCTURE: CPT

## 2025-07-01 PROCEDURE — 80053 COMPREHEN METABOLIC PANEL: CPT

## 2025-07-01 PROCEDURE — 85025 COMPLETE CBC W/AUTO DIFF WBC: CPT

## 2025-07-01 PROCEDURE — 82306 VITAMIN D 25 HYDROXY: CPT

## 2025-07-01 PROCEDURE — 84443 ASSAY THYROID STIM HORMONE: CPT

## 2025-07-09 ENCOUNTER — OFFICE VISIT (OUTPATIENT)
Dept: CARDIOLOGY CLINIC | Age: 75
End: 2025-07-09
Payer: MEDICARE

## 2025-07-09 ENCOUNTER — TELEPHONE (OUTPATIENT)
Dept: CARDIOLOGY CLINIC | Age: 75
End: 2025-07-09

## 2025-07-09 VITALS — SYSTOLIC BLOOD PRESSURE: 130 MMHG | DIASTOLIC BLOOD PRESSURE: 50 MMHG | OXYGEN SATURATION: 93 % | HEART RATE: 74 BPM

## 2025-07-09 DIAGNOSIS — I48.0 PAROXYSMAL ATRIAL FIBRILLATION (HCC): Primary | ICD-10-CM

## 2025-07-09 DIAGNOSIS — E55.9 VITAMIN D DEFICIENCY DISEASE: ICD-10-CM

## 2025-07-09 DIAGNOSIS — I25.119 CORONARY ARTERY DISEASE INVOLVING NATIVE CORONARY ARTERY OF NATIVE HEART WITH ANGINA PECTORIS: ICD-10-CM

## 2025-07-09 DIAGNOSIS — I10 PRIMARY HYPERTENSION: ICD-10-CM

## 2025-07-09 DIAGNOSIS — I48.0 PAF (PAROXYSMAL ATRIAL FIBRILLATION) (HCC): ICD-10-CM

## 2025-07-09 LAB — ECHO BSA: 2.3 M2

## 2025-07-09 PROCEDURE — G8428 CUR MEDS NOT DOCUMENT: HCPCS | Performed by: INTERNAL MEDICINE

## 2025-07-09 PROCEDURE — G8417 CALC BMI ABV UP PARAM F/U: HCPCS | Performed by: INTERNAL MEDICINE

## 2025-07-09 PROCEDURE — 1036F TOBACCO NON-USER: CPT | Performed by: INTERNAL MEDICINE

## 2025-07-09 PROCEDURE — 3074F SYST BP LT 130 MM HG: CPT | Performed by: INTERNAL MEDICINE

## 2025-07-09 PROCEDURE — 3017F COLORECTAL CA SCREEN DOC REV: CPT | Performed by: INTERNAL MEDICINE

## 2025-07-09 PROCEDURE — 1123F ACP DISCUSS/DSCN MKR DOCD: CPT | Performed by: INTERNAL MEDICINE

## 2025-07-09 PROCEDURE — 3078F DIAST BP <80 MM HG: CPT | Performed by: INTERNAL MEDICINE

## 2025-07-09 PROCEDURE — 99214 OFFICE O/P EST MOD 30 MIN: CPT | Performed by: INTERNAL MEDICINE

## 2025-07-09 RX ORDER — METOPROLOL TARTRATE 25 MG/1
25 TABLET, FILM COATED ORAL 2 TIMES DAILY
Qty: 60 TABLET | Refills: 0 | Status: SHIPPED | OUTPATIENT
Start: 2025-07-09

## 2025-07-09 RX ORDER — METOPROLOL TARTRATE 25 MG/1
25 TABLET, FILM COATED ORAL 2 TIMES DAILY
Qty: 60 TABLET | Refills: 0 | Status: SHIPPED | OUTPATIENT
Start: 2025-07-09 | End: 2025-07-09 | Stop reason: SDUPTHER

## 2025-07-09 RX ORDER — METOPROLOL TARTRATE 25 MG/1
25 TABLET, FILM COATED ORAL 2 TIMES DAILY
Qty: 180 TABLET | Refills: 3 | Status: SHIPPED | OUTPATIENT
Start: 2025-07-09 | End: 2025-07-09 | Stop reason: SDUPTHER

## 2025-07-09 NOTE — PROGRESS NOTES
Ov Dr. Crump for 2 month follow up   Here to review holter and echo   No new heart issues since last visit   Did have an URI better now       STOP AMIODARONE     WILL ADD METOPROLOL 25 MG  ONE TABLET TWICE A DAY     CONTINUE TO MONITOR BP/HR DAILY     FOLLOW UP IN OCT

## 2025-07-09 NOTE — PATIENT INSTRUCTIONS
STOP AMIODARONE     WILL ADD METOPROLOL 25 MG  ONE TABLET TWICE A DAY     CONTINUE TO MONITOR BP/HR DAILY     FOLLOW UP IN OCT WITH ALL TESTING

## 2025-07-09 NOTE — TELEPHONE ENCOUNTER
Pt wife contacted office. States that in order for pt to receive a new bipap machine, [facility] will need to be faxed a new script with pressure settings (Currently set at 18/13), and progress notes stating that pt is benefited by machine.     [facility]  P) [phone]  F) [phone]

## 2025-07-13 SDOH — HEALTH STABILITY: PHYSICAL HEALTH: ON AVERAGE, HOW MANY DAYS PER WEEK DO YOU ENGAGE IN MODERATE TO STRENUOUS EXERCISE (LIKE A BRISK WALK)?: 0 DAYS

## 2025-07-13 ASSESSMENT — PATIENT HEALTH QUESTIONNAIRE - PHQ9
SUM OF ALL RESPONSES TO PHQ QUESTIONS 1-9: 0
1. LITTLE INTEREST OR PLEASURE IN DOING THINGS: NOT AT ALL
2. FEELING DOWN, DEPRESSED OR HOPELESS: NOT AT ALL
SUM OF ALL RESPONSES TO PHQ QUESTIONS 1-9: 0

## 2025-07-13 ASSESSMENT — LIFESTYLE VARIABLES
HOW OFTEN DO YOU HAVE SIX OR MORE DRINKS ON ONE OCCASION: 1
HOW MANY STANDARD DRINKS CONTAINING ALCOHOL DO YOU HAVE ON A TYPICAL DAY: 0
HOW MANY STANDARD DRINKS CONTAINING ALCOHOL DO YOU HAVE ON A TYPICAL DAY: PATIENT DOES NOT DRINK
HOW OFTEN DO YOU HAVE A DRINK CONTAINING ALCOHOL: NEVER
HOW OFTEN DO YOU HAVE A DRINK CONTAINING ALCOHOL: 1

## 2025-07-14 ENCOUNTER — OFFICE VISIT (OUTPATIENT)
Dept: FAMILY MEDICINE CLINIC | Age: 75
End: 2025-07-14
Payer: MEDICARE

## 2025-07-14 VITALS
HEART RATE: 62 BPM | DIASTOLIC BLOOD PRESSURE: 60 MMHG | BODY MASS INDEX: 35.07 KG/M2 | SYSTOLIC BLOOD PRESSURE: 110 MMHG | WEIGHT: 245 LBS | HEIGHT: 70 IN | OXYGEN SATURATION: 94 %

## 2025-07-14 DIAGNOSIS — J44.9 MIXED RESTRICTIVE AND OBSTRUCTIVE LUNG DISEASE (HCC): ICD-10-CM

## 2025-07-14 DIAGNOSIS — Z79.4 TYPE 2 DIABETES MELLITUS WITH HYPERGLYCEMIA, WITH LONG-TERM CURRENT USE OF INSULIN (HCC): ICD-10-CM

## 2025-07-14 DIAGNOSIS — E11.65 TYPE 2 DIABETES MELLITUS WITH HYPERGLYCEMIA, WITH LONG-TERM CURRENT USE OF INSULIN (HCC): ICD-10-CM

## 2025-07-14 DIAGNOSIS — I48.0 PAROXYSMAL ATRIAL FIBRILLATION (HCC): ICD-10-CM

## 2025-07-14 DIAGNOSIS — Z00.00 MEDICARE ANNUAL WELLNESS VISIT, SUBSEQUENT: Primary | ICD-10-CM

## 2025-07-14 DIAGNOSIS — I50.22 CHRONIC SYSTOLIC (CONGESTIVE) HEART FAILURE (HCC): ICD-10-CM

## 2025-07-14 DIAGNOSIS — R79.89 ELEVATED SERUM CREATININE: ICD-10-CM

## 2025-07-14 DIAGNOSIS — J98.4 MIXED RESTRICTIVE AND OBSTRUCTIVE LUNG DISEASE (HCC): ICD-10-CM

## 2025-07-14 PROCEDURE — 1160F RVW MEDS BY RX/DR IN RCRD: CPT | Performed by: FAMILY MEDICINE

## 2025-07-14 PROCEDURE — 3052F HG A1C>EQUAL 8.0%<EQUAL 9.0%: CPT | Performed by: FAMILY MEDICINE

## 2025-07-14 PROCEDURE — G8417 CALC BMI ABV UP PARAM F/U: HCPCS | Performed by: FAMILY MEDICINE

## 2025-07-14 PROCEDURE — 1036F TOBACCO NON-USER: CPT | Performed by: FAMILY MEDICINE

## 2025-07-14 PROCEDURE — 3023F SPIROM DOC REV: CPT | Performed by: FAMILY MEDICINE

## 2025-07-14 PROCEDURE — 1159F MED LIST DOCD IN RCRD: CPT | Performed by: FAMILY MEDICINE

## 2025-07-14 PROCEDURE — 2022F DILAT RTA XM EVC RTNOPTHY: CPT | Performed by: FAMILY MEDICINE

## 2025-07-14 PROCEDURE — 3017F COLORECTAL CA SCREEN DOC REV: CPT | Performed by: FAMILY MEDICINE

## 2025-07-14 PROCEDURE — 3078F DIAST BP <80 MM HG: CPT | Performed by: FAMILY MEDICINE

## 2025-07-14 PROCEDURE — G8427 DOCREV CUR MEDS BY ELIG CLIN: HCPCS | Performed by: FAMILY MEDICINE

## 2025-07-14 PROCEDURE — 99214 OFFICE O/P EST MOD 30 MIN: CPT | Performed by: FAMILY MEDICINE

## 2025-07-14 PROCEDURE — 1123F ACP DISCUSS/DSCN MKR DOCD: CPT | Performed by: FAMILY MEDICINE

## 2025-07-14 PROCEDURE — 3074F SYST BP LT 130 MM HG: CPT | Performed by: FAMILY MEDICINE

## 2025-07-14 PROCEDURE — G0439 PPPS, SUBSEQ VISIT: HCPCS | Performed by: FAMILY MEDICINE

## 2025-07-14 RX ORDER — DOXAZOSIN 4 MG/1
4 TABLET ORAL 2 TIMES DAILY
Qty: 180 TABLET | Refills: 3 | Status: SHIPPED | OUTPATIENT
Start: 2025-07-14 | End: 2025-08-13

## 2025-07-14 NOTE — PATIENT INSTRUCTIONS
[employer] SURVEY:    You may be receiving a survey from [employer] regarding your visit today.    You may get this in the mail, through your MyChart or in your email.     Please complete the survey to enable us to provide the highest quality of care to you and your family.      Thank you.    Clinical Care Team:   Dr. Michelle Fletcher, DO Alberto Moe CMA                                     Triage: Nicki Linder CMA              Clerical Team:    Nicki Silva     [place] [phone]           Billing questions: [phone]           Medical Records Request: [phone]

## 2025-07-14 NOTE — PROGRESS NOTES
Name: Sid Blakely  : 1950         Chief Complaint:     Chief Complaint   Patient presents with    Medicare AWV    Diabetes    Hypertension       History of Present Illness:      Sid Blakely is a 75 y.o.  male who presents with Medicare AWV, Diabetes, and Hypertension      HPI    Elevated Cr - will see nephro this wk. Urinating ok, frequently. Was taken off oral DM meds d/t kidneys and sugars have been very high. Novolog mealtime -6 recommended but he ends up using high 20s with most meals. Has had some lows recently. Sees endo in a week.     Afib all the time, had lopressor added and rates went from 80s-90s to 60s and home bp cuff shows HR regular.     Weight has been gradually increasing but swelling doing ok and no unusual SOB (chronic SOB even with light exertion). Has had a big appetite recently. No fluid restriction. Taking diuretics as directed. Drinks alkaline water 48 oz daily (helps muscle cramps), some diet coke and a little regular water.     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

## 2025-07-15 NOTE — PROGRESS NOTES
Medicare Annual Wellness Visit    Sid Blakely is here for Medicare AWV, Diabetes, and Hypertension    Assessment & Plan   Medicare annual wellness visit, subsequent  Chronic systolic (congestive) heart failure (HCC)  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HCC)  Paroxysmal atrial fibrillation (HCC)  Type 2 diabetes mellitus with hyperglycemia, with long-term current use of insulin (HCC)  Elevated serum creatinine       Return in about 6 months (around 1/14/2026) for DM.     Subjective       Patient's complete Health Risk Assessment and screening values have been reviewed and are found in Flowsheets. The following problems were reviewed today and where indicated follow up appointments were made and/or referrals ordered.    Positive Risk Factor Screenings with Interventions:    Fall Risk:  Do you feel unsteady or are you worried about falling? : (!) yes  2 or more falls in past year?: no  Fall with injury in past year?: (!) yes  Interventions:    Reviewed medications, home hazards, visual acuity, and co-morbidities that can increase risk for falls         Controlled Medication Review:    Today's Pain Level: No data recorded   Opioid Risk: (Low risk score <55) Opioid risk score: 29    Patient is low risk for opioid use disorder or overdose.    Last PDMP Bhupinder as Reviewed:  Review User Review Instant Review Result   KD PRADHAN 3/29/2021  3:26 PM     Reviewed PDMP [1]     Last Controlled Substance Monitoring Documentation      Flowsheet Row Office Visit from 6/30/2020 in [facility]   Periodic Controlled Substance Monitoring No signs of potential drug abuse or diversion identified. filed at 06/30/2020 1012            General HRA Questions:  Select all that apply: (!) New or Increased Pain  Interventions - Pain:  Patient declined any further interventions or treatment      Inactivity:  On average, how many days per week do you engage in moderate to strenuous exercise (like a brisk

## 2025-07-16 RX ORDER — METOPROLOL TARTRATE 25 MG/1
25 TABLET, FILM COATED ORAL 2 TIMES DAILY
Qty: 180 TABLET | Refills: 3 | Status: SHIPPED | OUTPATIENT
Start: 2025-07-16

## 2025-07-17 ENCOUNTER — OFFICE VISIT (OUTPATIENT)
Dept: NEPHROLOGY | Age: 75
End: 2025-07-17
Payer: MEDICARE

## 2025-07-17 VITALS
TEMPERATURE: 97.1 F | BODY MASS INDEX: 34.86 KG/M2 | RESPIRATION RATE: 20 BRPM | HEART RATE: 78 BPM | DIASTOLIC BLOOD PRESSURE: 63 MMHG | HEIGHT: 70 IN | SYSTOLIC BLOOD PRESSURE: 121 MMHG | WEIGHT: 243.5 LBS

## 2025-07-17 DIAGNOSIS — N18.31 STAGE 3A CHRONIC KIDNEY DISEASE (HCC): Primary | ICD-10-CM

## 2025-07-17 PROCEDURE — G8417 CALC BMI ABV UP PARAM F/U: HCPCS | Performed by: INTERNAL MEDICINE

## 2025-07-17 PROCEDURE — 99204 OFFICE O/P NEW MOD 45 MIN: CPT | Performed by: INTERNAL MEDICINE

## 2025-07-17 PROCEDURE — 1160F RVW MEDS BY RX/DR IN RCRD: CPT | Performed by: INTERNAL MEDICINE

## 2025-07-17 PROCEDURE — G8427 DOCREV CUR MEDS BY ELIG CLIN: HCPCS | Performed by: INTERNAL MEDICINE

## 2025-07-17 PROCEDURE — 3078F DIAST BP <80 MM HG: CPT | Performed by: INTERNAL MEDICINE

## 2025-07-17 PROCEDURE — 1159F MED LIST DOCD IN RCRD: CPT | Performed by: INTERNAL MEDICINE

## 2025-07-17 PROCEDURE — 1123F ACP DISCUSS/DSCN MKR DOCD: CPT | Performed by: INTERNAL MEDICINE

## 2025-07-17 PROCEDURE — 99214 OFFICE O/P EST MOD 30 MIN: CPT | Performed by: INTERNAL MEDICINE

## 2025-07-17 PROCEDURE — 3074F SYST BP LT 130 MM HG: CPT | Performed by: INTERNAL MEDICINE

## 2025-07-17 PROCEDURE — 3017F COLORECTAL CA SCREEN DOC REV: CPT | Performed by: INTERNAL MEDICINE

## 2025-07-17 PROCEDURE — 1036F TOBACCO NON-USER: CPT | Performed by: INTERNAL MEDICINE

## 2025-07-17 NOTE — PROGRESS NOTES
[facility]  [address] [facility] [address]  Dept: [phone]  Outpatient Consult  [phone]  7/17/2025 12:31 PM EDT        Pt Name:    Sid Blakely  YOB: 1950  Primary Care Physician:  Michelle Fletcher DO     Chief Complaint:   Chief Complaint   Patient presents with    Chronic Kidney Disease        Background Information/Interval History:   The patient is a 75 y.o. year old  male referred by PCP for elevated creatinine.   Creatinine a year ago around 1.0.  now ranging 1.5-1.8 mgd/L and recently went up to 2.5 mg/dL in setting of UTI.  He has hx of HTN, HLD, DM, HFrEF, Afib, GHAZALA,MGUS    Hospitalized in march and April for CHF and Afib. Had cardioversion in March. Still in Afib Past chronic use of Celebrex but stopped past few months.   On lasix 80 mg bid, entresto, aldactone 25 mg bid.   A1C in the 8s.   Bp stable.   No further nsaid use.  No proteinuria when last checked. Renal US ok.  He was evaluated for monoclonal gammopathy had bone marrow biopsy which was negative for myeloma.      Allergies:  Ketamine and Oxycodone        Past Medical History:  Past Medical History:   Diagnosis Date    Arthritis     Arthritis     Atrial fibrillation (HCC)     CAD (coronary artery disease)     Cataract     CHF (congestive heart failure) (HCC)     CHF (congestive heart failure) (HCC)     COPD (chronic obstructive pulmonary disease) (HCC)     Coronary artery disease     Diabetes (HCC)     Diabetes mellitus (HCC)     H/O coronary angioplasty     Hyperlipidemia     Hypertension     Neuropathy     Numbness and tingling     dannie hands    Pulmonary edema     SECONDARY TO FAT EMBOLI AFTER KNEE SURGERY    Sleep apnea     Unspecified sleep apnea 2012    cpap        Past Surgical History:  Past Surgical History:   Procedure Laterality Date    ABLATION OF DYSRHYTHMIC FOCUS      ANGIOPLASTY  08/30/2017

## 2025-07-17 NOTE — PATIENT INSTRUCTIONS
I am ok with Metformin since GFR is > 30.   ? Farxiga or Jardiance given his CHF history with CKD and diabetes.         SURVEY:    Thank you for allowing us to care for you today.    You may be receiving a survey from [facility] regarding your visit today- electronically or via mail.      Please help us by completing the survey as this will provide the needed feedback to ensure we are providing the very best care for you and your family.    If you cannot score us a very good on any question, please call the office to discuss how we could have made your experience a very good one.    Thank you.       STAFF:    Allyson Weller, Katey FOX      CLINICAL STAFF:    Linh LIN, Tram FOX, Afia FOX, Isela LIN

## 2025-07-17 NOTE — PROGRESS NOTES
Noam Crump MD  [facility]  [facility]  [address] [id number] [phone]        2025        Michelle Fletcher DO  [address] [place] [address]     RE:  ROHINI VICTORIA  :  1950    Dear Dr. Fletcher:    CHIEF COMPLAINT:    Atrial fibrillation, now chronic.  Functional class 1.    HISTORY OF PRESENT ILLNESS:  I had the pleasure of seeing Jason and his wife, Karma, in our office on 2025.  He is a pleasant 75-year-old gentleman with extensive cardiac history.    He had been in atrial fibrillation which had been controlled with amiodarone.  However, he again developed atrial fibrillation.  He was relatively asymptomatic when I had last seen him on May 21, 2025, and we left him in atrial fibrillation and on amiodarone to give us the option of doing a cardioversion in the future.  I met with him today to review his symptoms.    He overall is doing well.  He denies any unusual shortness of breath or any unusual chest pain.  He has had no PND, orthopnea, or pedal edema.  He does get short of breath with activity, but it seems to be about at his baseline.  He did have an upper respiratory infection which has resolved.    We did do an echocardiogram that showed an EF of 38% with severely dilated left atrium, moderately dilated right atrium.  His EF was slightly less than it had been previously.  His PA pressure was estimated at 53 mmHg.    However, again he feels about at his baseline.  We did an EKG that did show atrial fibrillation with controlled ventricular response.    We decided, therefore, to leave him in atrial fibrillation, not attempt to do cardioversion.  He will stop his amiodarone and we will add metoprolol 25 mg b.i.d.    MEDICATIONS:  His medications therefore, will be as follows, Zyloprim 100 mg 2 tablets daily, Eliquis 5 mg b.i.d., Lipitor 40 mg daily, CoQ10 daily, Cardura 4 mg b.i.d., Entresto 97/103 b.i.d.,

## 2025-08-01 ENCOUNTER — TELEPHONE (OUTPATIENT)
Dept: CARDIOLOGY CLINIC | Age: 75
End: 2025-08-01

## 2025-08-20 DIAGNOSIS — G47.30 SLEEP APNEA, UNSPECIFIED TYPE: Primary | ICD-10-CM

## 2025-08-25 RX ORDER — HYDRALAZINE HYDROCHLORIDE 50 MG/1
50 TABLET, FILM COATED ORAL 2 TIMES DAILY
Qty: 270 TABLET | Refills: 3 | Status: SHIPPED | OUTPATIENT
Start: 2025-08-25

## (undated) DEVICE — Device: Brand: DEFENDO VALVE AND CONNECTOR KIT

## (undated) DEVICE — 4-PORT MANIFOLD: Brand: NEPTUNE 2

## (undated) DEVICE — ENDO KIT W/SYRINGE: Brand: MEDLINE INDUSTRIES, INC.

## (undated) DEVICE — JELLY LUBRICATING 4OZ FLIP TOP TB E Z